# Patient Record
Sex: MALE | Race: WHITE | NOT HISPANIC OR LATINO | Employment: OTHER | URBAN - METROPOLITAN AREA
[De-identification: names, ages, dates, MRNs, and addresses within clinical notes are randomized per-mention and may not be internally consistent; named-entity substitution may affect disease eponyms.]

---

## 2017-01-09 DIAGNOSIS — C61 PROSTATE CANCER (H): Primary | ICD-10-CM

## 2017-01-10 ENCOUNTER — INFUSION THERAPY VISIT (OUTPATIENT)
Dept: INFUSION THERAPY | Facility: CLINIC | Age: 72
End: 2017-01-10
Attending: INTERNAL MEDICINE
Payer: MEDICARE

## 2017-01-10 ENCOUNTER — HOSPITAL ENCOUNTER (OUTPATIENT)
Facility: CLINIC | Age: 72
Setting detail: SPECIMEN
Discharge: HOME | End: 2017-01-10
Attending: INTERNAL MEDICINE | Admitting: INTERNAL MEDICINE
Payer: MEDICARE

## 2017-01-10 ENCOUNTER — ONCOLOGY VISIT (OUTPATIENT)
Dept: ONCOLOGY | Facility: CLINIC | Age: 72
End: 2017-01-10
Attending: INTERNAL MEDICINE
Payer: MEDICARE

## 2017-01-10 VITALS
WEIGHT: 225.6 LBS | RESPIRATION RATE: 20 BRPM | SYSTOLIC BLOOD PRESSURE: 148 MMHG | BODY MASS INDEX: 32.85 KG/M2 | HEART RATE: 57 BPM | TEMPERATURE: 98.3 F | DIASTOLIC BLOOD PRESSURE: 87 MMHG | OXYGEN SATURATION: 96 %

## 2017-01-10 DIAGNOSIS — C61 PROSTATE CANCER (H): Primary | ICD-10-CM

## 2017-01-10 DIAGNOSIS — C61 PROSTATE CANCER (H): ICD-10-CM

## 2017-01-10 LAB
ALBUMIN SERPL-MCNC: 3.9 G/DL (ref 3.4–5)
ALP SERPL-CCNC: 99 U/L (ref 40–150)
ALT SERPL W P-5'-P-CCNC: 35 U/L (ref 0–70)
ANION GAP SERPL CALCULATED.3IONS-SCNC: 7 MMOL/L (ref 3–14)
AST SERPL W P-5'-P-CCNC: 21 U/L (ref 0–45)
BASOPHILS # BLD AUTO: 0.1 10E9/L (ref 0–0.2)
BASOPHILS NFR BLD AUTO: 1.2 %
BILIRUB SERPL-MCNC: 0.7 MG/DL (ref 0.2–1.3)
BUN SERPL-MCNC: 14 MG/DL (ref 7–30)
CALCIUM SERPL-MCNC: 8.8 MG/DL (ref 8.5–10.1)
CHLORIDE SERPL-SCNC: 106 MMOL/L (ref 94–109)
CO2 SERPL-SCNC: 26 MMOL/L (ref 20–32)
CREAT SERPL-MCNC: 0.96 MG/DL (ref 0.66–1.25)
DIFFERENTIAL METHOD BLD: NORMAL
EOSINOPHIL # BLD AUTO: 0.2 10E9/L (ref 0–0.7)
EOSINOPHIL NFR BLD AUTO: 5 %
ERYTHROCYTE [DISTWIDTH] IN BLOOD BY AUTOMATED COUNT: 12.8 % (ref 10–15)
GFR SERPL CREATININE-BSD FRML MDRD: 77 ML/MIN/1.7M2
GLUCOSE SERPL-MCNC: 90 MG/DL (ref 70–99)
HCT VFR BLD AUTO: 44.1 % (ref 40–53)
HGB BLD-MCNC: 15.2 G/DL (ref 13.3–17.7)
IMM GRANULOCYTES # BLD: 0 10E9/L (ref 0–0.4)
IMM GRANULOCYTES NFR BLD: 0.4 %
LDH SERPL L TO P-CCNC: 216 U/L (ref 85–227)
LYMPHOCYTES # BLD AUTO: 1.3 10E9/L (ref 0.8–5.3)
LYMPHOCYTES NFR BLD AUTO: 26.9 %
MCH RBC QN AUTO: 31 PG (ref 26.5–33)
MCHC RBC AUTO-ENTMCNC: 34.5 G/DL (ref 31.5–36.5)
MCV RBC AUTO: 90 FL (ref 78–100)
MONOCYTES # BLD AUTO: 0.4 10E9/L (ref 0–1.3)
MONOCYTES NFR BLD AUTO: 8.5 %
NEUTROPHILS # BLD AUTO: 2.8 10E9/L (ref 1.6–8.3)
NEUTROPHILS NFR BLD AUTO: 58 %
NRBC # BLD AUTO: 0 10*3/UL
NRBC BLD AUTO-RTO: 0 /100
PLATELET # BLD AUTO: 179 10E9/L (ref 150–450)
POTASSIUM SERPL-SCNC: 4 MMOL/L (ref 3.4–5.3)
PROT SERPL-MCNC: 7.6 G/DL (ref 6.8–8.8)
PSA SERPL-MCNC: 2.15 UG/L (ref 0–4)
RBC # BLD AUTO: 4.9 10E12/L (ref 4.4–5.9)
SODIUM SERPL-SCNC: 139 MMOL/L (ref 133–144)
WBC # BLD AUTO: 4.8 10E9/L (ref 4–11)

## 2017-01-10 PROCEDURE — 83615 LACTATE (LD) (LDH) ENZYME: CPT | Performed by: INTERNAL MEDICINE

## 2017-01-10 PROCEDURE — 25000128 H RX IP 250 OP 636: Performed by: INTERNAL MEDICINE

## 2017-01-10 PROCEDURE — 99211 OFF/OP EST MAY X REQ PHY/QHP: CPT

## 2017-01-10 PROCEDURE — 99211 OFF/OP EST MAY X REQ PHY/QHP: CPT | Mod: 25

## 2017-01-10 PROCEDURE — 85025 COMPLETE CBC W/AUTO DIFF WBC: CPT | Performed by: INTERNAL MEDICINE

## 2017-01-10 PROCEDURE — 36415 COLL VENOUS BLD VENIPUNCTURE: CPT

## 2017-01-10 PROCEDURE — 84403 ASSAY OF TOTAL TESTOSTERONE: CPT | Performed by: INTERNAL MEDICINE

## 2017-01-10 PROCEDURE — 96402 CHEMO HORMON ANTINEOPL SQ/IM: CPT

## 2017-01-10 PROCEDURE — 80053 COMPREHEN METABOLIC PANEL: CPT | Performed by: INTERNAL MEDICINE

## 2017-01-10 PROCEDURE — 99215 OFFICE O/P EST HI 40 MIN: CPT | Performed by: INTERNAL MEDICINE

## 2017-01-10 PROCEDURE — 84153 ASSAY OF PSA TOTAL: CPT | Performed by: INTERNAL MEDICINE

## 2017-01-10 RX ADMIN — LEUPROLIDE ACETATE 22.5 MG: KIT at 15:02

## 2017-01-10 NOTE — PROGRESS NOTES
"Wallace Zelaya is a 71 year old male who presents for:  Chief Complaint   Patient presents with     Oncology Clinic Visit     prostate checkup. follow up        Initial Vitals:  There were no vitals taken for this visit. Estimated body mass index is 32.97 kg/(m^2) as calculated from the following:    Height as of 10/28/16: 1.765 m (5' 9.49\").    Weight as of 12/14/16: 102.694 kg (226 lb 6.4 oz).. There is no height or weight on file to calculate BSA. BP completed using cuff size: regular  Data Unavailable No LMP for male patient. Allergies and medications reviewed.     Medications: Medication refills not needed today.  Pharmacy name entered into teextee:    [facility] - [place], [place] - [address] [facility] - [facility] [address]    Comments: nervous about results--  bloodwork drawn.    10 minutes for nursing intake (face to face time)   Brie Abraham LPN  DISCHARGE PLAN:  Next appointments: See patient instruction section   Departure Mode: Ambulatory  Accompanied by: self  5 minutes for nursing discharge (face to face time)   Brie Abraham LPN        "

## 2017-01-10 NOTE — MR AVS SNAPSHOT
After Visit Summary   1/10/2017    Wallace Zelaya    MRN: [id number]           Patient Information     Date Of Birth          1945        Visit Information        Provider Department      1/10/2017 12:45 PM Gigi Ward MD [facility]        Today's Diagnoses     Prostate cancer (HCC)           Care Instructions    - CT chest soon at patient's convenience- scheduled for Friday, Jan 20th @ 3:30/Zaria    - Lupron today and then again in 3 months- Scheduled for April 11th @ 3:45/Zaria    - To see me in 3 months on day of his lupron with labs a week prior to visit- Scheduled for April 11th @ 3:15/Zaria    Lab Draw--April 4th @ 3:30/Zaria    AVS printed for ptAugie Enciso        Follow-ups after your visit        Your next 10 appointments already scheduled     Jan 20, 2017  3:30 PM   CT CHEST W CONTRAST with [id number] ([facility])    [address]  [facility] [address] [phone]           Please bring any scans or X-rays taken at other hospitals, if similar tests were done. Also bring a list of your medicines, including vitamins, minerals and over-the-counter drugs. It is safest to leave personal items at home.  Be sure to tell your doctor:   If you have any allergies.   If there s any chance you are pregnant.   If you are breastfeeding.   If you have any special needs.  You will have contrast for this exam. To prepare:   Do not eat or drink for 2 hours before your exam. If you need to take medicine, you may take it with small sips of water. (We may ask you to take liquid medicine as well.)   The day before your exam, drink extra fluids at least six 8-ounce glasses (unless your doctor tells you to restrict your fluids).  Patients over 70 or patients with diabetes or kidney problems:   If you haven t had a blood test (creatinine test) within the last 30 days, go to your clinic or Diagnostic Imaging  Department for this test.  If you have diabetes:   If your kidney function is normal, continue taking your metformin (Avandamet, Glucophage, Glucovance, Metaglip) on the day of your exam.   If your kidney function is abnormal, wait 48 hours before restarting this medicine.  Please wear loose clothing, such as a sweat suit or jogging clothes. Avoid snaps, zippers and other metal. We may ask you to undress and put on a hospital gown.  If you have any questions, please call the Imaging Department where you will have your exam.            Apr 04, 2017  3:30 PM   Return Visit with  ONCOLOGY NURSE   [facility] ([facility])    [facility]  [address]  [facility] [address]   [phone]            Apr 11, 2017  3:15 PM   Return Visit with Gigi Ward MD   [facility] [place] ([facility])    [facility]  [address]  [facility] [address]   [phone]            Apr 11, 2017  3:45 PM   Level 1 with  INFUSION CHAIR 6   [facility] ([facility])    [facility]  [address]  [facility] [address]   [phone]              Future tests that were ordered for you today     Open Future Orders        Priority Expected Expires Ordered    CT Chest w Contrast Routine 1/10/2017 12/11/2017 1/10/2017            Who to contact     If you have questions or need follow up information about today's clinic visit or your schedule please contact [facility] directly at [phone].  Normal or non-critical lab and imaging results will be communicated to you by MyChart, letter or phone within 4 business days after the clinic has received the results. If you do not hear from us within 7 days, please contact the clinic through MyChart or phone. If you have a critical or abnormal lab  result, we will notify you by phone as soon as possible.  Submit refill requests through Teneros or call your pharmacy and they will forward the refill request to us. Please allow 3 business days for your refill to be completed.          Additional Information About Your Visit        Oxitechart Information     Teneros gives you secure access to your electronic health record. If you see a primary care provider, you can also send messages to your care team and make appointments. If you have questions, please call your primary care clinic.  If you do not have a primary care provider, please call [phone] and they will assist you.        Care EveryWhere ID     This is your Care EveryWhere ID. This could be used by other organizations to access your [place] medical records  [id number]        Your Vitals Were     Pulse Temperature Respirations Pulse Oximetry          57 98.3  F (36.8  C) (Tympanic) 20 96%         Blood Pressure from Last 3 Encounters:   01/10/17 148/87   12/14/16 133/80   12/03/16 120/80    Weight from Last 3 Encounters:   01/10/17 102.331 kg (225 lb 9.6 oz)   12/14/16 102.694 kg (226 lb 6.4 oz)   12/03/16 96.616 kg (213 lb)              We Performed the Following     CBC with platelets differential     Comprehensive metabolic panel     Lactate Dehydrogenase     PSA tumor marker     Testosterone total        Primary Care Provider Office Phone # Fax #    Jd Almaraz [phone]       [facility] [address] [address] [facility] [address]        Thank you!     Thank you for choosing [facility]  for your care. Our goal is always to provide you with excellent care. Hearing back from our patients is one way we can continue to improve our services. Please take a few minutes to complete the written survey that you may receive in the mail after your visit with us. Thank you!             Your Updated Medication List - Protect others around you: Learn how  to safely use, store and throw away your medicines at www.disposemymeds.org.          This list is accurate as of: 1/10/17  2:41 PM.  Always use your most recent med list.                   Brand Name Dispense Instructions for use    albuterol 108 (90 BASE) MCG/ACT Inhaler    PROAIR HFA/PROVENTIL HFA/VENTOLIN HFA    1 Inhaler    Inhale 1-2 puffs into the lungs every 6 hours as needed for shortness of breath / dyspnea or wheezing       amLODIPine 5 MG tablet    NORVASC    90 tablet    Take 1 tablet (5 mg) by mouth daily       atorvastatin 20 MG tablet    LIPITOR    90 tablet    Take 1 tablet (20 mg) by mouth daily       azithromycin 250 MG tablet    ZITHROMAX    6 tablet    Two tablets first day, then one tablet daily for four days.       benzonatate 100 MG capsule    TESSALON    20 capsule    Take 1 capsule (100 mg) by mouth 3 times daily as needed for cough       bicalutamide 50 MG tablet    CASODEX    21 tablet    Take 1 tablet (50 mg) by mouth daily       valsartan 160 MG tablet    DIOVAN    90 tablet    Take 1 tablet (160 mg) by mouth daily

## 2017-01-10 NOTE — PATIENT INSTRUCTIONS
- CT chest soon at patient's convenience- scheduled for Friday, Jan 20th @ 3:30/Zaria    - Lupron today and then again in 3 months- Scheduled for April 11th @ 3:45/Zaria    - To see me in 3 months on day of his lupron with labs a week prior to visit- Scheduled for April 11th @ 3:15/Zaria    Lab Draw--April 4th @ 3:30/Zaria    AVS printed for ptAugie Enciso

## 2017-01-10 NOTE — MR AVS SNAPSHOT
After Visit Summary   1/10/2017    Wallace Zelaya    MRN: [id number]           Patient Information     Date Of Birth          1945        Visit Information        Provider Department      1/10/2017 1:00 PM  INFUSION CHAIR 5 [facility]         Follow-ups after your visit        Your next 10 appointments already scheduled     Jan 20, 2017  3:30 PM   CT CHEST W CONTRAST with [id number]   [facility] ([facility])    [address]  [facility] [address] [phone]           Please bring any scans or X-rays taken at other hospitals, if similar tests were done. Also bring a list of your medicines, including vitamins, minerals and over-the-counter drugs. It is safest to leave personal items at home.  Be sure to tell your doctor:   If you have any allergies.   If there s any chance you are pregnant.   If you are breastfeeding.   If you have any special needs.  You will have contrast for this exam. To prepare:   Do not eat or drink for 2 hours before your exam. If you need to take medicine, you may take it with small sips of water. (We may ask you to take liquid medicine as well.)   The day before your exam, drink extra fluids at least six 8-ounce glasses (unless your doctor tells you to restrict your fluids).  Patients over 70 or patients with diabetes or kidney problems:   If you haven t had a blood test (creatinine test) within the last 30 days, go to your clinic or Diagnostic Imaging Department for this test.  If you have diabetes:   If your kidney function is normal, continue taking your metformin (Avandamet, Glucophage, Glucovance, Metaglip) on the day of your exam.   If your kidney function is abnormal, wait 48 hours before restarting this medicine.  Please wear loose clothing, such as a sweat suit or jogging clothes. Avoid snaps, zippers and other metal. We may ask you to undress and put on a  [facility].  If you have any questions, please call the Imaging Department where you will have your exam.            Apr 04, 2017  3:30 PM   Return Visit with RH ONCOLOGY NURSE   [facility] ([facility])    [facility]  [address]  [facility] [address]   [phone]            Apr 11, 2017  3:15 PM   Return Visit with Gigi Ward MD   [facility] ([facility])    [facility]  [address]  [facility] [address]   [phone]            Apr 11, 2017  3:45 PM   Level 1 with  INFUSION CHAIR 6   [facility] ([facility])    [facility]  [address]  [facility] [address]   [phone]              Future tests that were ordered for you today     Open Future Orders        Priority Expected Expires Ordered    CT Chest w Contrast Routine 1/10/2017 12/11/2017 1/10/2017            Who to contact     If you have questions or need follow up information about today's clinic visit or your schedule please contact [facility] directly at [phone].  Normal or non-critical lab and imaging results will be communicated to you by Investoprestohart, letter or phone within 4 business days after the clinic has received the results. If you do not hear from us within 7 days, please contact the clinic through Investoprestohart or phone. If you have a critical or abnormal lab result, we will notify you by phone as soon as possible.  Submit refill requests through Naonext or call your pharmacy and they will forward the refill request to us. Please allow 3 business days for your refill to be completed.          Additional Information About Your Visit        InvestoprestoharManyWho Information     Naonext gives you secure access to your electronic health record. If you see a primary care  provider, you can also send messages to your care team and make appointments. If you have questions, please call your primary care clinic.  If you do not have a primary care provider, please call [phone] and they will assist you.        Care EveryWhere ID     This is your Care EveryWhere ID. This could be used by other organizations to access your [place] medical records  [id number]         Blood Pressure from Last 3 Encounters:   01/10/17 148/87   12/14/16 133/80   12/03/16 120/80    Weight from Last 3 Encounters:   01/10/17 102.331 kg (225 lb 9.6 oz)   12/14/16 102.694 kg (226 lb 6.4 oz)   12/03/16 96.616 kg (213 lb)              Today, you had the following     No orders found for display       Primary Care Provider Office Phone # Fax #    Jd Almaraz [phone]       [facility] [address]  [facility] [address]        Thank you!     Thank you for choosing [facility]  for your care. Our goal is always to provide you with excellent care. Hearing back from our patients is one way we can continue to improve our services. Please take a few minutes to complete the written survey that you may receive in the mail after your visit with us. Thank you!             Your Updated Medication List - Protect others around you: Learn how to safely use, store and throw away your medicines at www.disposemymeds.org.          This list is accurate as of: 1/10/17  2:51 PM.  Always use your most recent med list.                   Brand Name Dispense Instructions for use    albuterol 108 (90 BASE) MCG/ACT Inhaler    PROAIR HFA/PROVENTIL HFA/VENTOLIN HFA    1 Inhaler    Inhale 1-2 puffs into the lungs every 6 hours as needed for shortness of breath / dyspnea or wheezing       amLODIPine 5 MG tablet    NORVASC    90 tablet    Take 1 tablet (5 mg) by mouth daily       atorvastatin 20 MG tablet    LIPITOR    90 tablet    Take 1 tablet (20 mg) by mouth  daily       azithromycin 250 MG tablet    ZITHROMAX    6 tablet    Two tablets first day, then one tablet daily for four days.       benzonatate 100 MG capsule    TESSALON    20 capsule    Take 1 capsule (100 mg) by mouth 3 times daily as needed for cough       bicalutamide 50 MG tablet    CASODEX    21 tablet    Take 1 tablet (50 mg) by mouth daily       valsartan 160 MG tablet    DIOVAN    90 tablet    Take 1 tablet (160 mg) by mouth daily

## 2017-01-10 NOTE — PROGRESS NOTES
Infusion Nursing Note:  Wallace Zelaya presents today for Lupron.    Patient seen by provider today: Yes: Dr. Ward    Note: N/A.    Intravenous Access:  No Intravenous access/labs at this visit.    Treatment Conditions:  Not Applicable.      Post Infusion Assessment:  Patient tolerated injection without incident.    Discharge Plan:   Discharge instructions reviewed with: Patient.  Patient and/or family verbalized understanding of discharge instructions and all questions answered.  AVS to patient via [employer] AGT.  Patient will return 4/11/2017 for next appointment.   Patient discharged in stable condition accompanied by: self.  Departure Mode: Ambulatory.    Face to Face: first time Lupron. Information sheet given and reviewed. 5 Min.    Isha Zamora RN

## 2017-01-10 NOTE — PROGRESS NOTES
[facility] PHYSICIANS  [facility]   HEMATOLOGY AND MEDICAL ONCOLOGY    NEW PATIENT VISIT NOTE    PATIENT NAME: Wallace Zelaya   MRN# [id number]     Date of Visit: Jude 10, 2017    Referring Provider: No referring provider defined for this encounter. YOB: 1945      HISTORY OF PRESENTING ILLNESS   Wallace is 71 year old male with prostate cancer - chaparro 4+4 which is recurrent after prostatectomy and radiation therapy for which he has been referred to Medical Oncology.     Wallace comes alone for visit alone. He notes that he was noted to have rising screening PSA from 2 -4. He was referred to Dr. Rodolfo Connor. He had radical prostatectomy on 11/2015. Pathology from this revealed Dunfermline 4+4 disease with extraprostatic extension, seminal vesicle extension and he was staged at pT3b. All of the 12 lymph nodes resected were negative for disease. Post operatively his PSA did not drop to undetectable levels and remained elevated at 0.56. It vladimir to 0.9 in April 2016 and he was referred to Dr. Newton for adjuvant radiation therapy. He completed radiation therapy but did not have any PSA response to this treatment.     He has some urinary incontinence. He leaks a little bit and uses a pad. He sleeps good and continues to stay active.      He has stayed positive. Prostate cancer has been a life changing event, but he seems to be coping with it. He has his own insurance agency. 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      PAST MEDICAL HISTORY     Past Medical History   Diagnosis Date     Hypertension goal BP (blood pressure) < 140/90 5/22/2014     Hyperlipidemia LDL goal <130 10/31/2010     Basal cell carcinoma     Prostate cancer with biochemical recurrence      CURRENT OUTPATIENT MEDICATIONS     Current Outpatient Prescriptions   Medication Sig     bicalutamide (CASODEX) 50 MG tablet Take 1 tablet (50 mg) by mouth daily     atorvastatin (LIPITOR) 20 MG tablet Take 1 tablet (20  mg) by mouth daily     valsartan (DIOVAN) 160 MG tablet Take 1 tablet (160 mg) by mouth daily     amLODIPine (NORVASC) 5 MG tablet Take 1 tablet (5 mg) by mouth daily     azithromycin (ZITHROMAX) 250 MG tablet Two tablets first day, then one tablet daily for four days.     albuterol (PROAIR HFA/PROVENTIL HFA/VENTOLIN HFA) 108 (90 BASE) MCG/ACT Inhaler Inhale 1-2 puffs into the lungs every 6 hours as needed for shortness of breath / dyspnea or wheezing     benzonatate (TESSALON) 100 MG capsule Take 1 capsule (100 mg) by mouth 3 times daily as needed for cough     No current facility-administered medications for this visit.        ALLERGIES   All allergies reviewed and addressed  Allergies   Allergen Reactions     Seasonal Allergies       SOCIAL HISTORY   He is . He has his  for - [employer]. He has 3 grown up kids. 1 in [place] and 2 in [place]. He has 3 grandsons and 1 grand daughter.     He does not smoke. He quit 5-6 years ago. He used to smoke pack to pack and half a day. He drinks alcohol  occasionally. He denies drug abuse.      FAMILY HISTORY   Father had prostate cancer  2-3 uncles had prostate cancer  He has 5 brother and 5 sisters; 1 brother (Brooks) has prostate cancer (lives in [place])     REVIEW OF SYSTEMS   Pertinent positives have been included in HPI; remainder of detailed complete 20-point ROS was negative.     PHYSICAL EXAM   B/P: 148/87, T: 98.3, P: 57, R: 20  Wt Readings from Last 3 Encounters:   01/10/17 102.331 kg (225 lb 9.6 oz)   12/14/16 102.694 kg (226 lb 6.4 oz)   12/03/16 96.616 kg (213 lb)     GEN: NAD  HEENT: PERRL, EOMI, no icterus, injection or pallor. Oropharynx is clear.  NECK: no cervical or supraclavicular lymphadenopathy  LUNGS: clear bilaterally  CV: regular, no murmurs, rubs, or gallops  ABDOMEN: soft, non-tender, non-distended, normal bowel sounds, no hepatosplenomegaly by percussion or palpation  EXT: warm, well perfused, no  edema  NEURO: alert  SKIN: no rashes     LABORATORY AND IMAGING STUDIES     Recent Labs   Lab Test  01/10/17   1257  08/03/16   0920  11/16/15   0714  11/14/15   0633  11/12/15   1625  07/02/15   0946   NA  139  142  137  140   --   140   POTASSIUM  4.0  4.7  3.9  4.3  3.9  4.1   CHLORIDE  106  107  103  108   --   107   CO2  26  27  28  25   --   25   ANIONGAP  7  8  6  7   --   8   BUN  14  17  9  16   --   13   CR  0.96  1.03  0.90  0.99  0.99  0.90   GLC  90  96  118*  108*   --   98   TY  8.8  8.8  7.8*  7.9*   --   8.3*       Recent Labs   Lab Test  01/10/17   1257  08/03/16   0920  11/16/15   0714  11/14/15   0633  11/12/15   1625  04/09/13   1003  04/04/12   1123  03/17/11   1128  03/11/10   1014   WBC  4.8  5.1  8.8   --    --   5.8  6.7  5.8  6.2   HGB  15.2  15.6  11.6*  13.8  16.8  15.9  15.8  15.9  15.4   PLT  179  158  160   --    --   182  183  182  181   MCV  90  91  90   --    --   89  89  90  90   NEUTROPHIL  58.0   --    --    --    --   48.1  48.8  49.1  52     Recent Labs   Lab Test  01/10/17   1257  08/03/16   0920  07/02/15   0946   BILITOTAL  0.7  0.7  0.5   ALKPHOS  99  101  100   ALT  35  30  26   AST  21  20  17   ALBUMIN  3.9  3.8  3.7   LDH  216   --    --      No results found for: TSH    Results for orders placed or performed during the hospital encounter of 11/09/16   NM Bone Scan Whole Body    Narrative    NUCLEAR MEDICINE BONE SCAN WHOLE BODY  11/9/2016 12:48 PM    HISTORY: Malignant neoplasm of prostate.    DOSE: 25.0 mCi 99mTc-HDP Injected.    COMPARISON:  Prior bone scan: 4/14/2016.   Relevant imaging study: None.    FINDINGS: There is a small focus of increased activity at the right  third costovertebral junction. This could represent a fracture or  degenerative change, but is new compared with the previous exam.  Therefore, a metastatic focus cannot be excluded. Radiographic  correlation recommended. There are some scattered areas of presumed  degenerative/arthritic type  "activity. No bony metastatic pattern.      Impression    IMPRESSION: New small indeterminate lesion at the right costovertebral  junction.    GUANAKITO BROUSSARD MD     Lab Results   Component Value Date    PATH  11/13/2015     Patient Name: PEPITO DAY  MR#: [id number]  Specimen #: [id number]  Collected: 11/13/2015  Received: 11/13/2015  Reported: 11/16/2015 15:25  Ordering Phy(s): SULEIMAN ROCK WEIGHT    SPECIMEN(S):  A: Prostate radical resection  B: Lymph nodes, bilateral pelvic    FINAL DIAGNOSIS:  Prostate, radical prostatectomy and bilateral pelvic lymph node  dissection.         PROSTATE GLAND: Radical Prostatectomy    Specimen  Procedure:    Radical prostatectomy.  Prostate Size and weight: See gross description.  Lymph Node Sampling: Bilateral pelvic lymph nodes.    Tumor  Histologic Type:   Adenocarcinoma (acinar, not otherwise specified).  Alexis Pattern:  Primary Pattern:   Grade 4  Secondary Pattern:   Grade 4  Total Alexis Score: 8 of 10.    Extent  Tumor Quantitation:  Tumor estimated to involve approximately 30% of  total prostate volume.  Extraprostatic Extension:   Present.  Seminal Vesicle Invasion:   Present.    Margins:   Close but uninvolved by invasive carcinoma, see microscopic  description.    Lymph Nodes: 12 bilateral pelvic lymph nodes negative for metastatic  carcinoma (specimen B.).    Accessory Findings  Lymph-Vascular Invasion:   Indeterminate.  Perineural Invasion:   Present.    Pathologic Staging (pTNM):  pT3b pN0 pM N/A.    Electronically signed out by:    Vincent Almaguer M.D.    CLINICAL HISTORY:  Malignant neoplasm.    GROSS:  A.  The specimen received in formalin labeled \"prostate and seminal  vesicles\" consists of a 90 g radical prostatectomy specimen comprised of  prostate gland (5.5 cm-superior to inferior; 5.5 cm-right to left; and  4.5 cm-anterior to posterior) with attached right seminal vesicle (2 x  1.5 x 0.5 cm), left seminal vesicle (2 x 1.5 x 0.5 cm, " "partly  disrupted), right vas deferens (6.5 cm long, 0.5 cm wide), and left vas  deferens (3.5 cm long, 0.5 cm wide).  The prostatic capsular surface is  tan-pink slightly irregular over the posterior aspect.  The left side of  the prostate is inked in black, and the right side in blue. Sectioning  of the prostate shows a pink-cream multinodular cut surface.  The  prostate is sectioned into thin slices and representative slices are  submitted.   The apical and bladder neck margins are coned, radially  sectioned, and entirely submitted.  Representative sections of the  seminal vesicles and vasa deferentia are submitted.  Representative is  submitted.    1. Left seminal vesicle and vas deferens  2. Right seminal vesicle and vas deferens  3. Left apical margin  4. Right apical margin  5. Left bladder neck margin  6. Right bladder neck margin  7-9. Left anterior prostate, inferior to superior  10-12.  Left posterior prostate, inferior to superior  13-15.  Right anterior prostate, inferior to superior  16-18.  Right posterior prostate inferior to superior.    B. The specimen received in formalin labeled \"bilateral pelvic lymph  nodes\" consists of multiple yellow lobular fatty-appearing soft tissue  fragments with an aggregate dimension of 5 x 4.5 x 2 cm.  Several fatty  lymph nodes are palpable, largest measuring 1.5 cm in greatest  dimension.  The larger 3 nodes are bisected.  The specimen is entirely  submitted in a total of 10 cassettes with cassettes 8 -10 each including  a single bisected lymph node. (Dictated by: Yolanda Johnson MD 11/13/2015  03:39 PM)    MICROSCOPIC:  A: Carcinoma is present bilaterally, predominately on the right side.  The majoity is Cayla grade 4 pattern with fused cribriform and solid  nests of tumor present.  Some of the tumor shows clear or foamy  cytoplasm.  A smaller component of grade 3 is present this makes up a  minority of the tumor mass.  Tumor is seen extending from sections " from  the apex to the prostate base.  Bilateral seminal vesicle involvement is  present.  Extracapsular extension is present in the form of tumor around  nerves and ganglia as well as infiltrating soft tissues.  Some of the  latter may be within vascular spaces.  Tumor in a perineural location is  focally very close to an inked margin (within a fraction of a  millimeter) on the right posterior superior region and there is some  artifactual tumor carry over secondary to tissue cutting and processing.   No definitive tumor is seen transected at ink.    B: No metastatic tumor is identified in the lymph nodes.  Some show  prominent fatty replacement.    CPT Codes:  A: 60717-VG0  B: 03881-QX2    TESTING LAB LOCATION:  [facility]  [address] [place]  [address] [phone]    COLLECTION SITE:  Client: [facility]  [facility]       Recent Labs   Lab Test  01/10/17   1257  10/24/16   0826  08/03/16   0920  04/30/16   1021  04/04/16   1009   PSA  2.15  2.08  1.27  1.15  0.90   TESTOSTTOTAL  310   --    --    --    --           ASSESSMENT  1.   Biochemical PSA relapse post prostatectomy without any clear response to adjuvant radiation therapy  2. HTN  3. ECOG PS1  4. No medical comorbidity     DISCUSSION   I had a lengthy discussion with Wallace regarding his prostate cancer. His Donahue 8 disease did place him in a high risk category and he is behaving like one. His PSA has been steadily rising after just a brief fall post prostatectomy. He did not have any PSA response to adjuvant radiation therapy. His PSA continues to rise steadily and was elevated at 2.08 in Oct 2016.   His staging CT scans has been negative (though CT abd/pelvis was only done). His bone scan is negative except for small indeterminate lesion at the right costovertebral junction. I would suggest that we get a CT scan as this will help interpret this lesion seen on bone scan and also help complete  staging.     For now Mr. Zelaya has a biochemical PSA relapse after radical prostatectomy and adjuvant radiation. His PSA is gradually trending up with the doubling time is over six months. We reviewed different treatment options including watchful waiting versus intermittent androgen ablation therapy with him for his prostate cancer.     The likelihood of an imaging study to find residual disease is low. I am not sure if this would change our management at this point in time. It is hard to imagine that any further radiation therapy would be an option for him for persistent localized disease. If indeed we do find metastatic disease again, the only definitive management for this would be for hormonal therapy with androgen ablation.     I discussed the sequence of systemic treatment. I reviewed with him data from trial from continuous versus intermittent androgen ablation from [facility].7 trial in patients with nonmetastatic prostate cancer and elevation of PSA levels after definitive radiotherapy. Eligible patients had rising PSA > 3.0 ng/ml. Intermittent androgen ablation cycles were 8 months treatment with PSA-determined off-treatment periods. PSA levels were monitored every 2 months on trial and if PSA dropped to <4 during the 8-month treatment period, androgen ablation therapy was held until levels vladimir to 10 ng/mL, at which time therapy was resumed for another 8-month period. This was continued as long as PSA levels were controlled. At the time of disease progression, patients were treated with continuous hormonal treatment.  The study concluded that intermittent therapy was not inferior to continuous therapy with respect to survival (median survival, 8.8 years and 9.1 years, respectively).  I also reviewed similar study led by Pablo Irvin for the SWOG - intergroup (N Engl J Med 2013; 368:1531-6825) in which patients had known metastatic disease were offered continuous versus intermittent androgen ablation  therapy. With his metastatic disease, he would have been a candidate for this study. There was a trend towards inferior survival with intermittent androgen ablation as compared to continuous androgen ablation therapy. Median survival was 5.8 years in the continuous-therapy group and 5.1 years in the intermittent-therapy group (hazard ratio for death with intermittent therapy, 1.10; 90% confidence interval, 0.99 to 1.23).   Based on these two above studies, it does appear that continuous androgen deprivation therapy might have benefit in patients with aggressive disease and or higher disease burden. While those with low grade tumor and or minimal tumor burden, the benefit of this therapy is lost due to additional death from other causes.   He has an aggressive disease. He would benefit from initiation of therapy.        PLAN  1.   We will get his labs, including testosterone and PSA levels drawn at this clinic visit.   2. We will get a CT scan of chest done to correlate with the indeterminate lesion seen on bone scan and complete staging.   3. I would initiate therapy with lupron. I have explained the testosterone surge with lupron and have prescribed casodex for a few weeks.     Over 65 minutes of direct face-to-face time was spent with the patient with more than 50% of the time spent in counseling and coordinating care.     Gigi Ward MD    Hematology, Oncology and Transplantation

## 2017-01-12 LAB — TESTOST SERPL-MCNC: 310 NG/DL (ref 240–950)

## 2017-01-20 ENCOUNTER — HOSPITAL ENCOUNTER (OUTPATIENT)
Dept: CT IMAGING | Facility: CLINIC | Age: 72
Discharge: HOME | End: 2017-01-20
Attending: INTERNAL MEDICINE | Admitting: INTERNAL MEDICINE
Payer: MEDICARE

## 2017-01-20 DIAGNOSIS — C61 PROSTATE CANCER (H): ICD-10-CM

## 2017-01-20 PROCEDURE — 71260 CT THORAX DX C+: CPT

## 2017-01-20 PROCEDURE — 25500064 ZZH RX 255 OP 636: Performed by: RADIOLOGY

## 2017-01-20 PROCEDURE — 25000128 H RX IP 250 OP 636: Performed by: RADIOLOGY

## 2017-01-20 RX ORDER — IOPAMIDOL 755 MG/ML
100 INJECTION, SOLUTION INTRAVASCULAR ONCE
Status: COMPLETED | OUTPATIENT
Start: 2017-01-20 | End: 2017-01-20

## 2017-01-20 RX ORDER — IOPAMIDOL 755 MG/ML
100 INJECTION, SOLUTION INTRAVASCULAR ONCE
Status: DISCONTINUED | OUTPATIENT
Start: 2017-01-20 | End: 2017-01-20 | Stop reason: CLARIF

## 2017-01-20 RX ORDER — IOPAMIDOL 755 MG/ML
500 INJECTION, SOLUTION INTRAVASCULAR ONCE
Status: DISCONTINUED | OUTPATIENT
Start: 2017-01-20 | End: 2017-01-20 | Stop reason: CLARIF

## 2017-01-20 RX ADMIN — SODIUM CHLORIDE 60 ML: 9 INJECTION, SOLUTION INTRAVENOUS at 15:53

## 2017-01-20 RX ADMIN — IOPAMIDOL 80 ML: 755 INJECTION, SOLUTION INTRAVENOUS at 15:59

## 2017-03-16 DIAGNOSIS — I10 HYPERTENSION GOAL BP (BLOOD PRESSURE) < 140/90: ICD-10-CM

## 2017-03-16 RX ORDER — VALSARTAN 160 MG/1
160 TABLET ORAL DAILY
Qty: 30 TABLET | Refills: 0 | Status: SHIPPED | OUTPATIENT
Start: 2017-03-16 | End: 2017-04-05

## 2017-03-16 NOTE — TELEPHONE ENCOUNTER
Pending Prescriptions:                       Disp   Refills    valsartan (DIOVAN) 160 MG tablet          90 tab*1            Sig: Take 1 tablet (160 mg) by mouth daily          Last Written Prescription Date: 08/20/2016  Last Fill Quantity: 90, # refills: 1  Last Office Visit with FMLEON, NAIF or [facility] prescribing provider: 08/03/2016    Next 5 appointments (look out 90 days)     Apr 04, 2017  3:30 PM CDT   Return Visit with  ONCOLOGY NURSE   [facility] ([facility])    [facility]  [address] [place] [address] [address]  [facility] [address]   [phone]            Apr 11, 2017  3:15 PM CDT   Return Visit with Gigi Ward MD   [facility] ([facility])    [facility]  [address]  [facility] [address]   [phone]                   Potassium   Date Value Ref Range Status   01/10/2017 4.0 3.4 - 5.3 mmol/L Final     Creatinine   Date Value Ref Range Status   01/10/2017 0.96 0.66 - 1.25 mg/dL Final     BP Readings from Last 3 Encounters:   01/10/17 148/87   12/14/16 133/80   12/03/16 120/80     Alfred BUTTS

## 2017-03-16 NOTE — TELEPHONE ENCOUNTER
Medication is being filled for 1 time refill only due to:  Patient needs to be seen because needs BP follow up.   Arturo Howard RN, BSN

## 2017-04-04 ENCOUNTER — HOSPITAL ENCOUNTER (OUTPATIENT)
Facility: CLINIC | Age: 72
Setting detail: SPECIMEN
Discharge: HOME | End: 2017-04-04
Attending: INTERNAL MEDICINE | Admitting: INTERNAL MEDICINE
Payer: MEDICARE

## 2017-04-04 ENCOUNTER — ONCOLOGY VISIT (OUTPATIENT)
Dept: ONCOLOGY | Facility: CLINIC | Age: 72
End: 2017-04-04
Attending: INTERNAL MEDICINE
Payer: MEDICARE

## 2017-04-04 DIAGNOSIS — C61 PROSTATE CANCER (H): Primary | ICD-10-CM

## 2017-04-04 LAB
ALBUMIN SERPL-MCNC: 3.8 G/DL (ref 3.4–5)
ALP SERPL-CCNC: 99 U/L (ref 40–150)
ALT SERPL W P-5'-P-CCNC: 56 U/L (ref 0–70)
ANION GAP SERPL CALCULATED.3IONS-SCNC: 8 MMOL/L (ref 3–14)
AST SERPL W P-5'-P-CCNC: 32 U/L (ref 0–45)
BASOPHILS # BLD AUTO: 0.1 10E9/L (ref 0–0.2)
BASOPHILS NFR BLD AUTO: 1 %
BILIRUB SERPL-MCNC: 0.5 MG/DL (ref 0.2–1.3)
BUN SERPL-MCNC: 12 MG/DL (ref 7–30)
CALCIUM SERPL-MCNC: 8.4 MG/DL (ref 8.5–10.1)
CHLORIDE SERPL-SCNC: 106 MMOL/L (ref 94–109)
CO2 SERPL-SCNC: 27 MMOL/L (ref 20–32)
CREAT SERPL-MCNC: 0.95 MG/DL (ref 0.66–1.25)
DIFFERENTIAL METHOD BLD: NORMAL
EOSINOPHIL # BLD AUTO: 0.2 10E9/L (ref 0–0.7)
EOSINOPHIL NFR BLD AUTO: 4.2 %
ERYTHROCYTE [DISTWIDTH] IN BLOOD BY AUTOMATED COUNT: 13.1 % (ref 10–15)
GFR SERPL CREATININE-BSD FRML MDRD: 78 ML/MIN/1.7M2
GLUCOSE SERPL-MCNC: 88 MG/DL (ref 70–99)
HCT VFR BLD AUTO: 41.9 % (ref 40–53)
HGB BLD-MCNC: 14.7 G/DL (ref 13.3–17.7)
IMM GRANULOCYTES # BLD: 0 10E9/L (ref 0–0.4)
IMM GRANULOCYTES NFR BLD: 0.2 %
LDH SERPL L TO P-CCNC: 244 U/L (ref 85–227)
LYMPHOCYTES # BLD AUTO: 1.1 10E9/L (ref 0.8–5.3)
LYMPHOCYTES NFR BLD AUTO: 21.6 %
MCH RBC QN AUTO: 31.1 PG (ref 26.5–33)
MCHC RBC AUTO-ENTMCNC: 35.1 G/DL (ref 31.5–36.5)
MCV RBC AUTO: 89 FL (ref 78–100)
MONOCYTES # BLD AUTO: 0.4 10E9/L (ref 0–1.3)
MONOCYTES NFR BLD AUTO: 7.7 %
NEUTROPHILS # BLD AUTO: 3.3 10E9/L (ref 1.6–8.3)
NEUTROPHILS NFR BLD AUTO: 65.3 %
NRBC # BLD AUTO: 0 10*3/UL
NRBC BLD AUTO-RTO: 0 /100
PLATELET # BLD AUTO: 197 10E9/L (ref 150–450)
POTASSIUM SERPL-SCNC: 3.9 MMOL/L (ref 3.4–5.3)
PROT SERPL-MCNC: 7.5 G/DL (ref 6.8–8.8)
PSA SERPL-MCNC: 0.09 UG/L (ref 0–4)
RBC # BLD AUTO: 4.72 10E12/L (ref 4.4–5.9)
SODIUM SERPL-SCNC: 141 MMOL/L (ref 133–144)
WBC # BLD AUTO: 5 10E9/L (ref 4–11)

## 2017-04-04 PROCEDURE — 80053 COMPREHEN METABOLIC PANEL: CPT | Performed by: INTERNAL MEDICINE

## 2017-04-04 PROCEDURE — 84403 ASSAY OF TOTAL TESTOSTERONE: CPT | Performed by: INTERNAL MEDICINE

## 2017-04-04 PROCEDURE — 36415 COLL VENOUS BLD VENIPUNCTURE: CPT

## 2017-04-04 PROCEDURE — 83615 LACTATE (LD) (LDH) ENZYME: CPT | Performed by: INTERNAL MEDICINE

## 2017-04-04 PROCEDURE — 84153 ASSAY OF PSA TOTAL: CPT | Performed by: INTERNAL MEDICINE

## 2017-04-04 PROCEDURE — 85025 COMPLETE CBC W/AUTO DIFF WBC: CPT | Performed by: INTERNAL MEDICINE

## 2017-04-04 NOTE — MR AVS SNAPSHOT
After Visit Summary   4/4/2017    Wallace Zelaya    MRN: [id number]           Patient Information     Date Of Birth          1945        Visit Information        Provider Department      4/4/2017 3:30 PM RH ONCOLOGY NURSE [facility]        Today's Diagnoses     Prostate cancer (HCC)    -  1       Follow-ups after your visit        Your next 10 appointments already scheduled     Apr 05, 2017  2:45 PM CDT   SHORT with Jd Almaraz MD   [facility]    [address] [place] [address] [phone]            Apr 11, 2017  3:15 PM CDT   Return Visit with Gigi Ward MD   [facility] ([facility])    [facility]  [address]  [facility] [address] [phone]            Apr 11, 2017  3:30 PM CDT   Level 1 with  INFUSION CHAIR 6   [facility] ([facility])    [facility]  [address]  [facility] [address] [phone]              Who to contact     If you have questions or need follow up information about today's clinic visit or your schedule please contact [facility] directly at [phone].  Normal or non-critical lab and imaging results will be communicated to you by [employer], letter or phone within 4 business days after the clinic has received the results. If you do not hear from us within 7 days, please contact the clinic through MyChart or phone. If you have a critical or abnormal lab result, we will notify you by phone as soon as possible.  Submit refill requests through [employer] or call your pharmacy and they will forward the refill request to us. Please allow 3 business days for your refill to be completed.          Additional Information About Your Visit        [employer] Information     [employer] gives you secure  access to your electronic health record. If you see a primary care provider, you can also send messages to your care team and make appointments. If you have questions, please call your primary care clinic.  If you do not have a primary care provider, please call [phone] and they will assist you.        Care EveryWhere ID     This is your Care EveryWhere ID. This could be used by other organizations to access your [place] medical records  [id number]         Blood Pressure from Last 3 Encounters:   01/10/17 148/87   12/14/16 133/80   12/03/16 120/80    Weight from Last 3 Encounters:   01/10/17 102.3 kg (225 lb 9.6 oz)   12/14/16 102.7 kg (226 lb 6.4 oz)   12/03/16 96.6 kg (213 lb)              We Performed the Following     CBC with platelets and differential     Comprehensive metabolic panel (BMP + Alb, Alk Phos, ALT, AST, Total. Bili, TP)     Lactate Dehydrogenase     PSA, tumor marker     Testosterone, total        Primary Care Provider Office Phone # Fax #    Jd Almaraz [phone]       [facility] [address] [facility] [address]        Thank you!     Thank you for choosing [facility]  for your care. Our goal is always to provide you with excellent care. Hearing back from our patients is one way we can continue to improve our services. Please take a few minutes to complete the written survey that you may receive in the mail after your visit with us. Thank you!             Your Updated Medication List - Protect others around you: Learn how to safely use, store and throw away your medicines at www.disposemymeds.org.          This list is accurate as of: 4/4/17  3:52 PM.  Always use your most recent med list.                   Brand Name Dispense Instructions for use    albuterol 108 (90 BASE) MCG/ACT Inhaler    PROAIR HFA/PROVENTIL HFA/VENTOLIN HFA    1 Inhaler    Inhale 1-2 puffs into the lungs every 6 hours as needed for shortness of  breath / dyspnea or wheezing       amLODIPine 5 MG tablet    NORVASC    90 tablet    Take 1 tablet (5 mg) by mouth daily       atorvastatin 20 MG tablet    LIPITOR    90 tablet    Take 1 tablet (20 mg) by mouth daily       azithromycin 250 MG tablet    ZITHROMAX    6 tablet    Two tablets first day, then one tablet daily for four days.       benzonatate 100 MG capsule    TESSALON    20 capsule    Take 1 capsule (100 mg) by mouth 3 times daily as needed for cough       bicalutamide 50 MG tablet    CASODEX    21 tablet    Take 1 tablet (50 mg) by mouth daily       valsartan 160 MG tablet    DIOVAN    30 tablet    Take 1 tablet (160 mg) by mouth daily

## 2017-04-04 NOTE — NURSING NOTE
Medical Assistant Note:  Wallace Zelaya presents today for Blood Draw.    Patient seen by provider today: No.   present during visit today: Not Applicable.    Concerns: No Concerns.    Procedure:  Labs drawn: Yes     Post Assessment:  Labs drawn without difficulty: Yes.    Discharge Plan:  Patient discharged in stable condition accompanied by: mauricio Franks MA

## 2017-04-05 ENCOUNTER — OFFICE VISIT (OUTPATIENT)
Dept: FAMILY MEDICINE | Facility: CLINIC | Age: 72
End: 2017-04-05
Payer: COMMERCIAL

## 2017-04-05 VITALS
TEMPERATURE: 98.3 F | HEART RATE: 73 BPM | BODY MASS INDEX: 32.35 KG/M2 | WEIGHT: 226 LBS | SYSTOLIC BLOOD PRESSURE: 164 MMHG | DIASTOLIC BLOOD PRESSURE: 88 MMHG | HEIGHT: 70 IN | OXYGEN SATURATION: 96 %

## 2017-04-05 DIAGNOSIS — I10 ESSENTIAL HYPERTENSION WITH GOAL BLOOD PRESSURE LESS THAN 140/90: Primary | ICD-10-CM

## 2017-04-05 DIAGNOSIS — C61 PROSTATE CANCER (H): ICD-10-CM

## 2017-04-05 DIAGNOSIS — E78.5 HYPERLIPIDEMIA LDL GOAL <130: ICD-10-CM

## 2017-04-05 PROCEDURE — 99214 OFFICE O/P EST MOD 30 MIN: CPT | Performed by: FAMILY MEDICINE

## 2017-04-05 RX ORDER — ATORVASTATIN CALCIUM 20 MG/1
20 TABLET, FILM COATED ORAL DAILY
Qty: 90 TABLET | Refills: 3 | Status: SHIPPED | OUTPATIENT
Start: 2017-04-05 | End: 2018-04-30

## 2017-04-05 RX ORDER — AMLODIPINE BESYLATE 5 MG/1
5 TABLET ORAL DAILY
Qty: 90 TABLET | Refills: 1 | Status: CANCELLED | OUTPATIENT
Start: 2017-04-05

## 2017-04-05 RX ORDER — AMLODIPINE BESYLATE 10 MG/1
10 TABLET ORAL DAILY
Qty: 90 TABLET | Refills: 3 | Status: SHIPPED | OUTPATIENT
Start: 2017-04-05 | End: 2018-01-08

## 2017-04-05 RX ORDER — VALSARTAN 160 MG/1
160 TABLET ORAL DAILY
Qty: 90 TABLET | Refills: 3 | Status: SHIPPED | OUTPATIENT
Start: 2017-04-05 | End: 2018-02-15

## 2017-04-05 NOTE — NURSING NOTE
"Chief Complaint   Patient presents with     Hypertension     medication request, 90 pills       Initial /82 (BP Location: Right arm, Patient Position: Chair, Cuff Size: Adult Regular)  Pulse 73  Temp 98.3  F (36.8  C) (Oral)  Ht 5' 9.5\" (1.765 m)  Wt 226 lb (102.5 kg)  SpO2 96%  BMI 32.9 kg/m2 Estimated body mass index is 32.9 kg/(m^2) as calculated from the following:    Height as of this encounter: 5' 9.5\" (1.765 m).    Weight as of this encounter: 226 lb (102.5 kg).  Medication Reconciliation: complete     Jacky Ramon CMA      "

## 2017-04-05 NOTE — MR AVS SNAPSHOT
After Visit Summary   4/5/2017    Wallace Zelaya    MRN: [id number]           Patient Information     Date Of Birth          1945        Visit Information        Provider Department      4/5/2017 2:45 PM Jd Almaraz MD [facility]        Today's Diagnoses     Essential hypertension with goal blood pressure less than 140/90        Hyperlipidemia LDL goal <130          Care Instructions    Increase amlodipine to 10 mg daily    Follow-up BP 2-3 weeks with nurse blood pressure check        Follow-ups after your visit        Your next 10 appointments already scheduled     Apr 11, 2017  3:15 PM CDT   Return Visit with Gigi Ward MD   [facility] ([facility])    [facility]  [address]  [facility] [address] [phone]            Apr 11, 2017  3:30 PM CDT   Level 1 with RH INFUSION CHAIR 6   [facility] ([facility])    [facility]  [address]  [facility] [address] [phone]              Who to contact     If you have questions or need follow up information about today's clinic visit or your schedule please contact [facility] directly at [phone].  Normal or non-critical lab and imaging results will be communicated to you by MyChart, letter or phone within 4 business days after the clinic has received the results. If you do not hear from us within 7 days, please contact the clinic through [employer] or phone. If you have a critical or abnormal lab result, we will notify you by phone as soon as possible.  Submit refill requests through [employer] or call your pharmacy and they will forward the refill request to us. Please allow 3 business days for your refill to be completed.          Additional Information About Your Visit        MyChart Information     [employer] gives you secure access to your  "electronic health record. If you see a primary care provider, you can also send messages to your care team and make appointments. If you have questions, please call your primary care clinic.  If you do not have a primary care provider, please call [phone] and they will assist you.        Care EveryWhere ID     This is your Care EveryWhere ID. This could be used by other organizations to access your [facility] medical records  [id number]        Your Vitals Were     Pulse Temperature Height Pulse Oximetry BMI (Body Mass Index)       73 98.3  F (36.8  C) (Oral) 5' 9.5\" (1.765 m) 96% 32.9 kg/m2        Blood Pressure from Last 3 Encounters:   04/05/17 164/88   01/10/17 148/87   12/14/16 133/80    Weight from Last 3 Encounters:   04/05/17 226 lb (102.5 kg)   01/10/17 225 lb 9.6 oz (102.3 kg)   12/14/16 226 lb 6.4 oz (102.7 kg)              Today, you had the following     No orders found for display         Today's Medication Changes          These changes are accurate as of: 4/5/17  3:41 PM.  If you have any questions, ask your nurse or doctor.               These medicines have changed or have updated prescriptions.        Dose/Directions    amLODIPine 10 MG tablet   Commonly known as:  NORVASC   This may have changed:    - medication strength  - how much to take   Used for:  Essential hypertension with goal blood pressure less than 140/90   Changed by:  Jd Almaraz MD        Dose:  10 mg   Take 1 tablet (10 mg) by mouth daily   Quantity:  90 tablet   Refills:  3            Where to get your medicines      These medications were sent to [facility] - [place], [place] - [address]  [address], [facility] [address]     Phone:  [phone]     atorvastatin 20 MG tablet    valsartan 160 MG tablet         These medications were sent to [id number] - [facility] [address] [facility]  [address] [facility] [address]     Phone:  [phone]     amLODIPine 10 MG tablet "                Primary Care Provider Office Phone # Fax #    Jd Almaraz [phone]       [facility] [address]  [facility] [address]        Thank you!     Thank you for choosing [facility]  for your care. Our goal is always to provide you with excellent care. Hearing back from our patients is one way we can continue to improve our services. Please take a few minutes to complete the written survey that you may receive in the mail after your visit with us. Thank you!             Your Updated Medication List - Protect others around you: Learn how to safely use, store and throw away your medicines at www.disposemymeds.org.          This list is accurate as of: 4/5/17  3:41 PM.  Always use your most recent med list.                   Brand Name Dispense Instructions for use    amLODIPine 10 MG tablet    NORVASC    90 tablet    Take 1 tablet (10 mg) by mouth daily       atorvastatin 20 MG tablet    LIPITOR    90 tablet    Take 1 tablet (20 mg) by mouth daily       bicalutamide 50 MG tablet    CASODEX    21 tablet    Take 1 tablet (50 mg) by mouth daily       valsartan 160 MG tablet    DIOVAN    90 tablet    Take 1 tablet (160 mg) by mouth daily

## 2017-04-06 LAB — TESTOST SERPL-MCNC: 7 NG/DL (ref 240–950)

## 2017-04-11 ENCOUNTER — INFUSION THERAPY VISIT (OUTPATIENT)
Dept: INFUSION THERAPY | Facility: CLINIC | Age: 72
End: 2017-04-11
Attending: INTERNAL MEDICINE
Payer: MEDICARE

## 2017-04-11 ENCOUNTER — ONCOLOGY VISIT (OUTPATIENT)
Dept: ONCOLOGY | Facility: CLINIC | Age: 72
End: 2017-04-11
Attending: INTERNAL MEDICINE
Payer: MEDICARE

## 2017-04-11 VITALS
TEMPERATURE: 97 F | BODY MASS INDEX: 32.21 KG/M2 | RESPIRATION RATE: 16 BRPM | WEIGHT: 225 LBS | HEART RATE: 69 BPM | SYSTOLIC BLOOD PRESSURE: 152 MMHG | OXYGEN SATURATION: 95 % | DIASTOLIC BLOOD PRESSURE: 88 MMHG | HEIGHT: 70 IN

## 2017-04-11 DIAGNOSIS — C61 PROSTATE CANCER (H): Primary | ICD-10-CM

## 2017-04-11 PROCEDURE — 25000128 H RX IP 250 OP 636: Performed by: INTERNAL MEDICINE

## 2017-04-11 PROCEDURE — 96402 CHEMO HORMON ANTINEOPL SQ/IM: CPT

## 2017-04-11 PROCEDURE — 99213 OFFICE O/P EST LOW 20 MIN: CPT | Performed by: INTERNAL MEDICINE

## 2017-04-11 RX ADMIN — LEUPROLIDE ACETATE 22.5 MG: KIT at 15:28

## 2017-04-11 ASSESSMENT — PAIN SCALES - GENERAL: PAINLEVEL: NO PAIN (0)

## 2017-04-11 NOTE — MR AVS SNAPSHOT
After Visit Summary   4/11/2017    Wallace Zelaya    MRN: [id number]           Patient Information     Date Of Birth          1945        Visit Information        Provider Department      4/11/2017 3:15 PM Gigi Ward MD [facility]        Care Instructions    Lupron today as previously planned    Follow up in 4 months or so with labs a day or two prior to visit            Follow-ups after your visit        Your next 10 appointments already scheduled     Aug 03, 2017  4:00 PM CDT   Return Visit with  ONCOLOGY NURSE   [facility] ([facility])    [facility]  [address] [place] [address] [address]  [facility] [address]   [phone]            Aug 08, 2017  3:15 PM CDT   Return Visit with Gigi Ward MD   [facility] ([facility])    [facility]  [address]  [facility] [address]   [phone]            Aug 08, 2017  3:30 PM CDT   Level 1 with  INFUSION CHAIR 11   [facility] ([facility])    [facility]  [address]  [facility] [address]   [phone]              Who to contact     If you have questions or need follow up information about today's clinic visit or your schedule please contact [facility] directly at [phone].  Normal or non-critical lab and imaging results will be communicated to you by MyChart, letter or phone within 4 business days after the clinic has received the results. If you do not hear from us within 7 days, please contact the clinic through MyChart or phone. If you have a critical or abnormal lab result, we will notify you by phone as soon as possible.  Submit refill requests through [employer] or call your pharmacy and they will forward the refill request to us. Please allow 3 business days for  "your refill to be completed.          Additional Information About Your Visit        MyChart Information     [employer] gives you secure access to your electronic health record. If you see a primary care provider, you can also send messages to your care team and make appointments. If you have questions, please call your primary care clinic.  If you do not have a primary care provider, please call [phone] and they will assist you.        Care EveryWhere ID     This is your Care EveryWhere ID. This could be used by other organizations to access your [facility] medical records  [id number]        Your Vitals Were     Pulse Temperature Respirations Height Pulse Oximetry BMI (Body Mass Index)    69 97  F (36.1  C) (Tympanic) 16 1.765 m (5' 9.5\") 95% 32.75 kg/m2       Blood Pressure from Last 3 Encounters:   04/11/17 152/88   04/05/17 164/88   01/10/17 148/87    Weight from Last 3 Encounters:   04/11/17 102.1 kg (225 lb)   04/05/17 102.5 kg (226 lb)   01/10/17 102.3 kg (225 lb 9.6 oz)              Today, you had the following     No orders found for display       Primary Care Provider Office Phone # Fax #    Jd Almaraz [phone]       [facility] [address] [address] [facility] [address]        Thank you!     Thank you for choosing [facility]  for your care. Our goal is always to provide you with excellent care. Hearing back from our patients is one way we can continue to improve our services. Please take a few minutes to complete the written survey that you may receive in the mail after your visit with us. Thank you!             Your Updated Medication List - Protect others around you: Learn how to safely use, store and throw away your medicines at www.disposemymeds.org.          This list is accurate as of: 4/11/17  4:01 PM.  Always use your most recent med list.                   Brand Name Dispense Instructions for use    amLODIPine 10 MG tablet    NORVASC "    90 tablet    Take 1 tablet (10 mg) by mouth daily       atorvastatin 20 MG tablet    LIPITOR    90 tablet    Take 1 tablet (20 mg) by mouth daily       bicalutamide 50 MG tablet    CASODEX    21 tablet    Take 1 tablet (50 mg) by mouth daily       valsartan 160 MG tablet    DIOVAN    90 tablet    Take 1 tablet (160 mg) by mouth daily

## 2017-04-11 NOTE — NURSING NOTE
"Wallace Zelaya is a 71 year old male who presents for:  Chief Complaint   Patient presents with     Oncology Clinic Visit     Prostate cancer - follow up        Initial Vitals:  /88  Pulse 69  Temp 97  F (36.1  C) (Tympanic)  Resp 16  Ht 1.765 m (5' 9.5\")  Wt 102.1 kg (225 lb)  SpO2 95%  BMI 32.75 kg/m2 Estimated body mass index is 32.75 kg/(m^2) as calculated from the following:    Height as of this encounter: 1.765 m (5' 9.5\").    Weight as of this encounter: 102.1 kg (225 lb).. Body surface area is 2.24 meters squared. BP completed using cuff size: regular  No Pain (0) No LMP for male patient. Allergies and medications reviewed.     Medications: Medication refills not needed today.  Pharmacy name entered into St[employer]:    [facility] - [place], [place] - [address] [facility] [address] [facility] [address]    Comments: Follow up    8 minutes for nursing intake (face to face time)   Tania Giordano CMA     DISCHARGE PLAN:  Next appointments: See patient instruction section  Departure Mode: Ambulatory  Accompanied by: self  0 minutes for nursing discharge (face to face time)   Tania Giordano CMA              "

## 2017-04-11 NOTE — MR AVS SNAPSHOT
After Visit Summary   4/11/2017    Wallace Zelaya    MRN: [id number]           Patient Information     Date Of Birth          1945        Visit Information        Provider Department      4/11/2017 3:30 PM  INFUSION CHAIR 2 [facility]        Today's Diagnoses     Prostate cancer (HCC)    -  1       Follow-ups after your visit        Who to contact     If you have questions or need follow up information about today's clinic visit or your schedule please contact [facility] directly at [phone].  Normal or non-critical lab and imaging results will be communicated to you by [employer], letter or phone within 4 business days after the clinic has received the results. If you do not hear from us within 7 days, please contact the clinic through [employer] or phone. If you have a critical or abnormal lab result, we will notify you by phone as soon as possible.  Submit refill requests through [employer] or call your pharmacy and they will forward the refill request to us. Please allow 3 business days for your refill to be completed.          Additional Information About Your Visit        MyChart Information     [employer] gives you secure access to your electronic health record. If you see a primary care provider, you can also send messages to your care team and make appointments. If you have questions, please call your primary care clinic.  If you do not have a primary care provider, please call [phone] and they will assist you.        Care EveryWhere ID     This is your Care EveryWhere ID. This could be used by other organizations to access your [facility] medical records  [id number]         Blood Pressure from Last 3 Encounters:   04/11/17 152/88   04/05/17 164/88   01/10/17 148/87    Weight from Last 3 Encounters:   04/11/17 102.1 kg (225 lb)   04/05/17 102.5 kg (226 lb)   01/10/17 102.3 kg (225 lb 9.6 oz)              Today, you had  the following     No orders found for display       Primary Care Provider Office Phone # Fax #    Jd Almaraz [phone]       [facility] [address]  [facility] [address]        Thank you!     Thank you for choosing [facility]  for your care. Our goal is always to provide you with excellent care. Hearing back from our patients is one way we can continue to improve our services. Please take a few minutes to complete the written survey that you may receive in the mail after your visit with us. Thank you!             Your Updated Medication List - Protect others around you: Learn how to safely use, store and throw away your medicines at www.disposemymeds.org.          This list is accurate as of: 4/11/17  3:31 PM.  Always use your most recent med list.                   Brand Name Dispense Instructions for use    amLODIPine 10 MG tablet    NORVASC    90 tablet    Take 1 tablet (10 mg) by mouth daily       atorvastatin 20 MG tablet    LIPITOR    90 tablet    Take 1 tablet (20 mg) by mouth daily       bicalutamide 50 MG tablet    CASODEX    21 tablet    Take 1 tablet (50 mg) by mouth daily       valsartan 160 MG tablet    DIOVAN    90 tablet    Take 1 tablet (160 mg) by mouth daily

## 2017-04-11 NOTE — PROGRESS NOTES
SUBJECTIVE:                                                    Wallace Zelaya is a 71 year old male who presents to clinic today for the following health issues:    Patient presents with:  Hypertension: medication request, 90 pills    Patient with prostate cancer treated by Dr. Rodolfo holloway with robotic radical prostatectomy and radiation 2015.  Now on Casodex and Lupron injections.  PSA down today from previous.    History of hyperlipidemia on statin, denies myalgias.    History of hypertension currently well controlled with ARB.  Denies shortness or breath, chest pain, headache, vision change, or lower extremity edema.    Patient Active Problem List   Diagnosis     Colon polyp     Advanced directives, counseling/discussion     Hypertension goal BP (blood pressure) < 140/90     Hyperlipidemia LDL goal <130     Prostate cancer (HCC)     Past Surgical History:   Procedure Laterality Date     BIOPSY  2011    skin tagged left arm     C NONSPECIFIC PROCEDURE      Lawnmower injury right foot-toe      C NONSPECIFIC PROCEDURE      Pilonidal cyst     COLONOSCOPY  2013    Procedure: COLONOSCOPY;  Colonoscopy ;  Surgeon: Emerson Martinez MD, MD;  Location:  GI     DAVINCI PROSTATECTOMY N/A 2015    Procedure: DAVINCI PROSTATECTOMY;  Surgeon: Rodolfo Holloway MD;  Location:  OR       Social History   Substance Use Topics     Smoking status: Former Smoker     Packs/day: 1.00     Years: 25.00     Quit date: 2009     Smokeless tobacco: Never Used      Comment: 4-5 cigs qd /quit 2009     Alcohol use Yes      Comment: socially,very little     Family History   Problem Relation Age of Onset     CANCER Mother      86 YO AND HYPERTENSION     Hypertension Mother      HEART DISEASE Father       86 YO MI     Prostate Cancer Brother 50     prostate cancer         ROS:  CV: NEGATIVE for chest pain, palpitations or peripheral edema  : stable mild urinary issues, no hematuria or  "dysuria    OBJECTIVE:                                                    /88  Pulse 73  Temp 98.3  F (36.8  C) (Oral)  Ht 5' 9.5\" (1.765 m)  Wt 226 lb (102.5 kg)  SpO2 96%  BMI 32.9 kg/m2Body mass index is 32.9 kg/(m^2).  GENERAL APPEARANCE: alert and no distress  RESP: lungs clear to auscultation - no rales, rhonchi or wheezes  CV: regular rates and rhythm and no murmur, click or rub     ASSESSMENT/PLAN:                                                    1. Essential hypertension with goal blood pressure less than 140/90  Blood pressure not controlled. Increase amlodipine to 10 mg daily.  Continue with exercise, recommend weight loss and reduce sodium intake.  Recheck blood pressure in 2-3 weeks.  - amLODIPine (NORVASC) 10 MG tablet; Take 1 tablet (10 mg) by mouth daily  Dispense: 90 tablet; Refill: 3  - valsartan (DIOVAN) 160 MG tablet; Take 1 tablet (160 mg) by mouth daily  Dispense: 90 tablet; Refill: 3    2. Hyperlipidemia LDL goal <130  - atorvastatin (LIPITOR) 20 MG tablet; Take 1 tablet (20 mg) by mouth daily  Dispense: 90 tablet; Refill: 3    3. Prostate cancer (HCC)  Continue follow up with Dr. Connor and oncology Dr. Ward.    Jd Almaraz MD  [facility]  "

## 2017-04-11 NOTE — PATIENT INSTRUCTIONS
Lupron today as previously planned    Follow up in 4 months or so with labs a day or two prior to visit

## 2017-04-11 NOTE — PROGRESS NOTES
[facility] CANCER CLINIC  FOLLOW-UP VISIT NOTE    PATIENT NAME: Wallace Zelaya MRN # [id number]  DATE OF VISIT: Apr 11, 2017 YOB: 1945    REFERRING PROVIDER: No referring provider defined for this encounter.    CANCER TYPE: Prostate cancer; Biochemical recurrence; Hormone sensitive disease  STAGE: Stage III - pT3b at diagnosis; M0    HISTORY OF PRESENTING ILLNESS:  Wallace was noted to have rising screening PSA from 2 - 4. He was referred to Dr. Rodolfo Connor. He had radical prostatectomy on 11/2015. Pathology from this revealed Cayla 4+4 disease with extraprostatic extension, seminal vesicle extension and he was staged at pT3b. All of the 12 lymph nodes resected were negative for disease. Post operatively his PSA did not drop to undetectable levels and remained elevated at 0.56. It vladimir to 0.9 in April 2016 and he was referred to Dr. Newton for adjuvant radiation therapy. He completed radiation therapy but did not have any PSA response to this treatment.     TREATMENT SUMMARY:  11/13/2015 Radical prostatectomy  April 2016  Radiation therapy    CURRENT INTERVENTIONS:  Lupron - Intermittent androgen deprivation on treatment phase    SUBJECTIVE   Wallace is being seen for his prostate cancer    He received his first dose of lupron about 3 months ago. He gets 3-4 hot flashes every day. He has been finding it very tolerable. He wakes up once at night to urinate.     He has been trying to lose weight for the last 2-3 years but finds it hard. He continues to go out to golf regularly.       PAST MEDICAL HISTORY   1. HTN  2. Dyslipidemia  3. Prostate cancer as detailed above      CURRENT OUTPATIENT MEDICATIONS     Current Outpatient Prescriptions   Medication Sig     amLODIPine (NORVASC) 10 MG tablet Take 1 tablet (10 mg) by mouth daily     valsartan (DIOVAN) 160 MG tablet Take 1 tablet (160 mg) by mouth daily     atorvastatin (LIPITOR) 20 MG tablet Take 1 tablet (20 mg) by mouth  "daily     bicalutamide (CASODEX) 50 MG tablet Take 1 tablet (50 mg) by mouth daily     No current facility-administered medications for this visit.      Facility-Administered Medications Ordered in Other Visits   Medication     leuprolide (LUPRON DEPOT) kit 22.5 mg        ALLERGIES     Allergies   Allergen Reactions     Seasonal Allergies         REVIEW OF SYSTEMS   As above in the HPI, o/w complete 12-point ROS was negative.     PHYSICAL EXAM   /88  Pulse 69  Temp 97  F (36.1  C) (Tympanic)  Resp 16  Ht 1.765 m (5' 9.5\")  Wt 102.1 kg (225 lb)  SpO2 95%  BMI 32.75 kg/m2  SpO2 Readings from Last 4 Encounters:   04/11/17 95%   04/05/17 96%   01/10/17 96%   12/14/16 95%     Wt Readings from Last 3 Encounters:   04/11/17 102.1 kg (225 lb)   04/05/17 102.5 kg (226 lb)   01/10/17 102.3 kg (225 lb 9.6 oz)     GEN: NAD  HEENT: PERRL, EOMI, no icterus, injection or pallor. Oropharynx is clear.  NECK: no cervical or supraclavicular lymphadenopathy  LUNGS: clear bilaterally  CV: regular, no murmurs, rubs, or gallops  ABDOMEN: soft, non-tender, non-distended, normal bowel sounds, no hepatosplenomegaly by percussion or palpation  EXT: warm, well perfused, no edema  NEURO: alert  SKIN: no rashes   LABORATORY AND IMAGING STUDIES     Recent Labs   Lab Test  04/04/17   1542  01/10/17   1257   NA  141  139   POTASSIUM  3.9  4.0   CHLORIDE  106  106   CO2  27  26   ANIONGAP  8  7   BUN  12  14   CR  0.95  0.96   GLC  88  90   TY  8.4*  8.8     Recent Labs   Lab Test  04/04/17   1542  01/10/17   1257  08/03/16   0920   04/09/13   1003   WBC  5.0  4.8  5.1   < >  5.8   HGB  14.7  15.2  15.6   < >  15.9   PLT  197  179  158   < >  182   MCV  89  90  91   < >  89   NEUTROPHIL  65.3  58.0   --    --   48.1    < > = values in this interval not displayed.     Recent Labs   Lab Test  04/04/17   1542  01/10/17   1257  08/03/16   0920   BILITOTAL  0.5  0.7  0.7   ALKPHOS  99  99  101   ALT  56  35  30   AST  32  21  20   ALBUMIN  " 3.8  3.9  3.8   LDH  244*  216   --      No results found for: TSH]    Results for orders placed or performed during the hospital encounter of 01/20/17   CT Chest w Contrast    Narrative    CT CHEST WITH CONTRAST   1/20/2017 4:04 PM     HISTORY: Malignant neoplasm of the prostate gland.    COMPARISON: 11/9/2016 and 4/14/2016 - CT abdomen and pelvis.    TECHNIQUE: Following the uneventful administration of 80mL Isovue-370  intravenous contrast, helical sections were acquired through the  lungs. Coronal reconstructions were generated. Radiation dose for this  scan was reduced using automated exposure control, adjustment of the  mA and/or kV according to the patient's size, or iterative  reconstruction technique.    FINDINGS: Tiny 0.2 cm nodule in the posterolateral aspect of the left  upper lobe (series 3 image 13) and tiny 0.2 cm nodule in the  posteromedial aspect of the right upper lobe (series 3 image 19). A  few linear opacities in the lungs likely represent atelectasis or  scarring. The lungs are otherwise clear. No pleural or pericardial  effusion. No enlarged lymph nodes in the chest. Atherosclerotic  calcification in the thoracic aorta. Small hiatal hernia.    Scan through the upper abdomen is significant for a 1.3 cm right  adrenal nodule that is unchanged since 4/14/2016.      Impression    IMPRESSION:   1. No convincing evidence of metastatic neoplasm in the chest.  2. Two tiny indeterminate pulmonary nodules. These are likely benign  nodules. Recommend comparison with prior imaging studies, if  available. If not available and the patient is a smoker or has other  significant risk factors, a followup CT scan could be considered in 12  months to confirm stability.   3. Indeterminate 1.3 cm right adrenal nodule, unchanged since  4/14/2016. 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    MANUEL MARQUEZ MD     Recent Labs   Lab Test  04/04/17   1542  01/10/17   1257  10/24/16   0826  08/03/16   0920  04/30/16   1021   PSA   0.09  2.15  2.08  1.27  1.15   TESTOSTTOTAL  7*  310   --    --    --           ASSESSMENT AND PLAN   1. Biochemical PSA relapse post prostatectomy without any response to adjuvant radiation therapy  2. HTN  3. ECOG PS1  4. No medical comorbidity    I had a lengthy discussion with Wallace. He has been doing well. He has no significant difficulty with the ongoing androgen deprivation. He does get a few episodes of hot flashes - but it is not bothersome.     His PSA has responded nicely as expected. We had reviewed intermittent androgen deprivation therapy and he is interested. I would suggest that we administer lupron today as in the intermittent androgen deprivation therapy studies utilized 8 months of androgen deprivation. I would like to see him in 4-6 months time. We will follow his PSA values over time and treat him when the PSA value rises above 4.     He has been trying to lose weight. He has been golfing and stays activity. I have encouraged him to chose an activity other than his current golfing.     I have reviewed his restaging scans. The lesion noted on the bone scan is not seen on the CT scan.       Gigi Ward  ,  Division of Hematology, Oncology & Transplantation  [facility].

## 2017-04-11 NOTE — PROGRESS NOTES
Infusion Nursing Note:  Wallace Zelaya presents today for Lupron.    Patient seen by provider today: Yes: Dr Ward   present during visit today: Not Applicable.    Note: N/A.    Intravenous Access:  No Intravenous access/labs at this visit.    Treatment Conditions:  Not Applicable.      Post Infusion Assessment:  Patient tolerated injection without incident.    Discharge Plan:   Discharge instructions reviewed with: Patient.  Patient and/or family verbalized understanding of discharge instructions and all questions answered.  Patient discharged in stable condition accompanied by: self.  Departure Mode: Ambulatory.    Charlee Florez RN

## 2017-05-24 ENCOUNTER — TELEPHONE (OUTPATIENT)
Dept: FAMILY MEDICINE | Facility: CLINIC | Age: 72
End: 2017-05-24

## 2017-05-24 NOTE — TELEPHONE ENCOUNTER
Panel Management Review      Patient has the following on his problem list:     Hypertension   Last three blood pressure readings:  BP Readings from Last 3 Encounters:   04/11/17 152/88   04/05/17 164/88   01/10/17 148/87     Blood pressure: Passed    HTN Guidelines:  Age 18-59 BP range:  Less than 140/90  Age 60-85 with Diabetes:  Less than 140/90  Age 60-85 without Diabetes:  less than 150/90      Composite cancer screening  Chart review shows that this patient is due/due soon for the following None  Summary:    Patient is due/failing the following:   BP CHECK    Action needed:   Patient needs nurse only appointment.    Type of outreach:    Phone, left message for patient to call back.     Questions for provider review:    None                                                                                                                                    Ivelisse Simpson CMA       Chart routed to none .

## 2017-06-23 ENCOUNTER — OFFICE VISIT (OUTPATIENT)
Dept: FAMILY MEDICINE | Facility: CLINIC | Age: 72
End: 2017-06-23
Payer: COMMERCIAL

## 2017-06-23 VITALS
OXYGEN SATURATION: 96 % | HEART RATE: 62 BPM | DIASTOLIC BLOOD PRESSURE: 70 MMHG | TEMPERATURE: 98.1 F | BODY MASS INDEX: 33.46 KG/M2 | WEIGHT: 225.9 LBS | HEIGHT: 69 IN | SYSTOLIC BLOOD PRESSURE: 130 MMHG
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
## 2017-06-23 DIAGNOSIS — S30.860A TICK BITE OF BACK, INITIAL ENCOUNTER: Primary | ICD-10-CM

## 2017-06-23 PROCEDURE — 99213 OFFICE O/P EST LOW 20 MIN: CPT | Performed by: PHYSICIAN ASSISTANT

## 2017-06-23 RX ORDER — DOXYCYCLINE 100 MG/1
200 CAPSULE ORAL ONCE
Qty: 2 CAPSULE | Refills: 0 | Status: SHIPPED | OUTPATIENT
Start: 2017-06-23 | End: 2017-06-24

## 2017-06-23 NOTE — NURSING NOTE
"Chief Complaint   Patient presents with     Insect Bites       Initial /70 (BP Location: Right arm, Patient Position: Chair, Cuff Size: Adult Regular)  Pulse 62  Temp 98.1  F (36.7  C)  Ht 5' 9\" (1.753 m)  Wt 225 lb 14.4 oz (102.5 kg)  SpO2 96%  BMI 33.36 kg/m2 Estimated body mass index is 33.36 kg/(m^2) as calculated from the following:    Height as of this encounter: 5' 9\" (1.753 m).    Weight as of this encounter: 225 lb 14.4 oz (102.5 kg).  Medication Reconciliation: complete     KA    "

## 2017-06-23 NOTE — PATIENT INSTRUCTIONS
"(S38.955A,  W57.XXXA) Tick bite of back, initial encounter  (primary encounter diagnosis)  Comment:   Plan: doxycycline (VIBRAMYCIN) 100 MG capsule              Tick Bites  Ticks are insects that feed on the blood of animals and humans. They may gorge themselves for days before you find and remove them. The bites themselves aren't cause for concern. But ticks can carry and transmit illnesses such as Lyme disease and [place] spotted fever. Both diseases begin with a rash and symptoms similar to the flu. In advanced stages, these diseases can be quite serious.     A \"bull's eye\" rash is a common symptom of Lyme disease.   When to go to the emergency department (ED)  Not all ticks carry disease. And a tick must remain attached for at least 24 hours to infect you. If you find a tick, don't panic. Try to carefully remove it with tweezers. Grasp the insect near its head and pull without twisting. If you can't easily dislodge the tick or if you leave the head in your skin, get medical care right away.  What to expect in the ED    The tick or any remnants will be removed and the bite cleaned.    To prevent disease, you may be given antibiotics. Both Lyme disease and [place] spotted fever respond quickly to these medications.    You may be asked to see your health care provider for a blood test to check for Lyme disease.  Follow-up care  Some states and [facility] have services that test ticks for the presence of Lyme's disease and other diseases. You may check with your local officials to see if this service is available in your area.  If you remove a tick yourself, watch for signs of a tick-borne illness. Symptoms may appear within a few days or weeks after a bite. Call your health care provider if you notice any of the following:    Rash (This may spread outward in a ring from a hard white lump. Or, it may move up your arms and legs to your chest.)    Chills and fever    Body aches and joint pain    Severe " headache  Date Last Reviewed: 12/1/2016 2000-2017 [employer]. [address] [place] [address], [place] [address]. All rights reserved. This information is not intended as a substitute for professional medical care. Always follow your healthcare professional's instructions.

## 2017-06-23 NOTE — PROGRESS NOTES
"  SUBJECTIVE:                                                    Wallace Zelaya is a 71 year old male who presents to clinic today for the following health issues:      Tick bite : noticed last night and wife pulled out without any complications.  Itchy and rashy  Thinks he may have had it attached for 2 days after mowing lawn         Problem list and histories reviewed & adjusted, as indicated.  Additional history: as documented    BP Readings from Last 3 Encounters:   06/23/17 130/70   04/11/17 152/88   04/05/17 164/88    Wt Readings from Last 3 Encounters:   06/23/17 225 lb 14.4 oz (102.5 kg)   04/11/17 225 lb (102.1 kg)   04/05/17 226 lb (102.5 kg)                    Reviewed and updated as needed this visit by clinical staff       Reviewed and updated as needed this visit by Provider         ROS:  Constitutional, HEENT, cardiovascular, pulmonary, gi and gu systems are negative, except as otherwise noted.    OBJECTIVE:                                                    /70 (BP Location: Right arm, Patient Position: Chair, Cuff Size: Adult Regular)  Pulse 62  Temp 98.1  F (36.7  C)  Ht 5' 9\" (1.753 m)  Wt 225 lb 14.4 oz (102.5 kg)  SpO2 96%  BMI 33.36 kg/m2  Body mass index is 33.36 kg/(m^2).  GENERAL APPEARANCE: healthy, alert and no distress  RESP: lungs clear to auscultation - no rales, rhonchi or wheezes  CV: regular rates and rhythm, normal S1 S2, no S3 or S4 and no murmur, click or rub  SKIN: 1 cm excoriation surrounded by  3 cm of erythema. Weeping          ASSESSMENT/PLAN:                                                    1. Tick bite of back, initial encounter  Well treat prophylactic ally   - doxycycline (VIBRAMYCIN) 100 MG capsule; Take 2 capsules (200 mg) by mouth once for 1 dose  Dispense: 2 capsule; Refill: 0    Signs of infection discussed.  If lesion enlarged and/or gets more painful, please follow-up       Patient Instructions     (S30.860A,  W57.XXXA) Tick bite of back, initial " "encounter  (primary encounter diagnosis)  Comment:   Plan: doxycycline (VIBRAMYCIN) 100 MG capsule              Tick Bites  Ticks are insects that feed on the blood of animals and humans. They may gorge themselves for days before you find and remove them. The bites themselves aren't cause for concern. But ticks can carry and transmit illnesses such as Lyme disease and [place] spotted fever. Both diseases begin with a rash and symptoms similar to the flu. In advanced stages, these diseases can be quite serious.     A \"bull's eye\" rash is a common symptom of Lyme disease.   When to go to the emergency department (ED)  Not all ticks carry disease. And a tick must remain attached for at least 24 hours to infect you. If you find a tick, don't panic. Try to carefully remove it with tweezers. Grasp the insect near its head and pull without twisting. If you can't easily dislodge the tick or if you leave the head in your skin, get medical care right away.  What to expect in the ED    The tick or any remnants will be removed and the bite cleaned.    To prevent disease, you may be given antibiotics. Both Lyme disease and [place] spotted fever respond quickly to these medications.    You may be asked to see your health care provider for a blood test to check for Lyme disease.  Follow-up care  Some states and counties have services that test ticks for the presence of Lyme's disease and other diseases. You may check with your local officials to see if this service is available in your area.  If you remove a tick yourself, watch for signs of a tick-borne illness. Symptoms may appear within a few days or weeks after a bite. Call your health care provider if you notice any of the following:    Rash (This may spread outward in a ring from a hard white lump. Or, it may move up your arms and legs to your chest.)    Chills and fever    Body aches and joint pain    Severe headache  Date Last Reviewed: 12/1/2016 2000-2017 " [employer]. [address] [place] [address], [place] [address]. All rights reserved. This information is not intended as a substitute for professional medical care. Always follow your healthcare professional's instructions.            Ramona Ann Aaseby-Aguilera, PA-C  [facility]

## 2017-06-23 NOTE — MR AVS SNAPSHOT
"              After Visit Summary   6/23/2017    Wallace Zelaya    MRN: [id number]           Patient Information     Date Of Birth          1945        Visit Information        Provider Department      6/23/2017 11:00 AM Aaseby-Aguilera, Ramona Ann, PA-C [facility]        Today's Diagnoses     Tick bite of back, initial encounter    -  1      Care Instructions    (S30.860A,  W57.XXXA) Tick bite of back, initial encounter  (primary encounter diagnosis)  Comment:   Plan: doxycycline (VIBRAMYCIN) 100 MG capsule              Tick Bites  Ticks are insects that feed on the blood of animals and humans. They may gorge themselves for days before you find and remove them. The bites themselves aren't cause for concern. But ticks can carry and transmit illnesses such as Lyme disease and [place] spotted fever. Both diseases begin with a rash and symptoms similar to the flu. In advanced stages, these diseases can be quite serious.     A \"bull's eye\" rash is a common symptom of Lyme disease.   When to go to the emergency department (ED)  Not all ticks carry disease. And a tick must remain attached for at least 24 hours to infect you. If you find a tick, don't panic. Try to carefully remove it with tweezers. Grasp the insect near its head and pull without twisting. If you can't easily dislodge the tick or if you leave the head in your skin, get medical care right away.  What to expect in the ED    The tick or any remnants will be removed and the bite cleaned.    To prevent disease, you may be given antibiotics. Both Lyme disease and Jeremy [place] spotted fever respond quickly to these medications.    You may be asked to see your health care provider for a blood test to check for Lyme disease.  Follow-up care  Some [facility] and [facility] have services that test ticks for the presence of Lyme's disease and other diseases. You may check with your local officials to see if this service is available in your " area.  If you remove a tick yourself, watch for signs of a tick-borne illness. Symptoms may appear within a few days or weeks after a bite. Call your health care provider if you notice any of the following:    Rash (This may spread outward in a ring from a hard white lump. Or, it may move up your arms and legs to your chest.)    Chills and fever    Body aches and joint pain    Severe headache  Date Last Reviewed: 12/1/2016 2000-2017 [employer]. [address] [place] [address]. All rights reserved. This information is not intended as a substitute for professional medical care. Always follow your healthcare professional's instructions.                Follow-ups after your visit        Your next 10 appointments already scheduled     Aug 03, 2017  4:00 PM CDT   Return Visit with  ONCOLOGY NURSE   [facility]    [facility]  [address]  [facility] [address]   [phone]            Aug 08, 2017  3:15 PM CDT   Return Visit with Gigi Ward MD   [facility]    [facility]  [address]  [facility] [address]   [phone]            Aug 08, 2017  3:30 PM CDT   Level 1 with  INFUSION CHAIR 11   [facility] ([facility])    [facility]  [address]  [facility] [address]   [phone]              Who to contact     If you have questions or need follow up information about today's clinic visit or your schedule please contact [facility] directly at [phone].  Normal or non-critical lab and imaging results will be communicated to you by MyChart, letter or phone within 4 business days after the clinic has received the results. If you do not hear from us within 7 days, please contact the clinic through [employer]  "or phone. If you have a critical or abnormal lab result, we will notify you by phone as soon as possible.  Submit refill requests through [employer] or call your pharmacy and they will forward the refill request to us. Please allow 3 business days for your refill to be completed.          Additional Information About Your Visit        [employer] Information     [employer] gives you secure access to your electronic health record. If you see a primary care provider, you can also send messages to your care team and make appointments. If you have questions, please call your primary care clinic.  If you do not have a primary care provider, please call [phone] and they will assist you.        Care EveryWhere ID     This is your Care EveryWhere ID. This could be used by other organizations to access your [place] medical records  [id number]        Your Vitals Were     Pulse Temperature Height Pulse Oximetry BMI (Body Mass Index)       62 98.1  F (36.7  C) 5' 9\" (1.753 m) 96% 33.36 kg/m2        Blood Pressure from Last 3 Encounters:   06/23/17 130/70   04/11/17 152/88   04/05/17 164/88    Weight from Last 3 Encounters:   06/23/17 225 lb 14.4 oz (102.5 kg)   04/11/17 225 lb (102.1 kg)   04/05/17 226 lb (102.5 kg)              Today, you had the following     No orders found for display         Today's Medication Changes          These changes are accurate as of: 6/23/17 11:14 AM.  If you have any questions, ask your nurse or doctor.               Start taking these medicines.        Dose/Directions    doxycycline 100 MG capsule   Commonly known as:  VIBRAMYCIN   Used for:  Tick bite of back, initial encounter   Started by:  Aaseby-Aguilera, Ramona Ann, PA-C        Dose:  200 mg   Take 2 capsules (200 mg) by mouth once for 1 dose   Quantity:  2 capsule   Refills:  0            Where to get your medicines      These medications were sent to [facility] [address] [place] [address] [facility]  [address] [facility] " [address]     Phone:  [phone]     doxycycline 100 MG capsule                Primary Care Provider Office Phone # Fax #    Jd Almaraz [phone]       [facility] [place] [address]  [facility] [address]        Equal Access to Services     CADEN ALLRED : Hadii aad ku hadasho Soomaali, waaxda luqadaha, qaybta kaalmada adeegyada, waxay joseein hayrolfn sergio charmaineute ladamaris fernandez. So [facility] [phone].    ATENCIÓN: Si habla español, tiene a crain disposición servicios gratuitos de asistencia lingüística. Alcidesame al [phone].    We comply with applicable federal civil rights laws and [place] laws. We do not discriminate on the basis of race, color, national origin, age, disability sex, sexual orientation or gender identity.            Thank you!     Thank you for choosing [facility]  for your care. Our goal is always to provide you with excellent care. Hearing back from our patients is one way we can continue to improve our services. Please take a few minutes to complete the written survey that you may receive in the mail after your visit with us. Thank you!             Your Updated Medication List - Protect others around you: Learn how to safely use, store and throw away your medicines at www.disposemymeds.org.          This list is accurate as of: 6/23/17 11:14 AM.  Always use your most recent med list.                   Brand Name Dispense Instructions for use Diagnosis    amLODIPine 10 MG tablet    NORVASC    90 tablet    Take 1 tablet (10 mg) by mouth daily    Essential hypertension with goal blood pressure less than 140/90       atorvastatin 20 MG tablet    LIPITOR    90 tablet    Take 1 tablet (20 mg) by mouth daily    Hyperlipidemia LDL goal <130       bicalutamide 50 MG tablet    CASODEX    21 tablet    Take 1 tablet (50 mg) by mouth daily    Malignant neoplasm of prostate (H)       doxycycline 100 MG capsule    VIBRAMYCIN    2 capsule    Take 2 capsules (200 mg) by  mouth once for 1 dose    Tick bite of back, initial encounter       valsartan 160 MG tablet    DIOVAN    90 tablet    Take 1 tablet (160 mg) by mouth daily    Essential hypertension with goal blood pressure less than 140/90

## 2017-06-24 ENCOUNTER — OFFICE VISIT (OUTPATIENT)
Dept: URGENT CARE | Facility: URGENT CARE | Age: 72
End: 2017-06-24
Payer: COMMERCIAL

## 2017-06-24 VITALS
OXYGEN SATURATION: 97 % | HEART RATE: 66 BPM | RESPIRATION RATE: 18 BRPM | DIASTOLIC BLOOD PRESSURE: 74 MMHG | TEMPERATURE: 97.6 F | SYSTOLIC BLOOD PRESSURE: 126 MMHG | BODY MASS INDEX: 33.23 KG/M2 | WEIGHT: 225 LBS

## 2017-06-24 DIAGNOSIS — S30.860D TICK BITE OF BACK, SUBSEQUENT ENCOUNTER: Primary | ICD-10-CM
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
## 2017-06-24 PROCEDURE — 99213 OFFICE O/P EST LOW 20 MIN: CPT | Performed by: FAMILY MEDICINE

## 2017-06-24 RX ORDER — DOXYCYCLINE 100 MG/1
100 CAPSULE ORAL 2 TIMES DAILY
Qty: 28 CAPSULE | Refills: 0 | Status: SHIPPED | OUTPATIENT
Start: 2017-06-24 | End: 2017-07-06

## 2017-06-24 NOTE — PROGRESS NOTES
SUBJECTIVE:  Chief Complaint   Patient presents with     Urgent Care     Insect Bites     Follow up from yesterday     Wallace Zelaya is a 71 year old male who presents to the clinic today for a tick bite with    accompanying rash.  The embedded tick was discovered and removed 3 day(s) ago.  He was seen in clinic 1 day ago and given single dose treatment of doxycycline 200 mg, but rash has developed and worsened since he took the medication     Rash is gradual onset, still present and worsening.   Location of the rash:  right   back.  Quality/symptoms of rash: itching, burning and redness   Symptoms are moderate and rash seems to be worsening.     Associated symptoms include: nothing.-  No fever or joint aches    Past Medical History:   Diagnosis Date     Basal cell carcinoma      Hyperlipidemia LDL goal <130 10/31/2010     Hypertension goal BP (blood pressure) < 140/90 2014       ALLERGIES:  Seasonal allergies      Current Outpatient Prescriptions on File Prior to Visit:  doxycycline (VIBRAMYCIN) 100 MG capsule Take 2 capsules (200 mg) by mouth once for 1 dose   amLODIPine (NORVASC) 10 MG tablet Take 1 tablet (10 mg) by mouth daily   valsartan (DIOVAN) 160 MG tablet Take 1 tablet (160 mg) by mouth daily   atorvastatin (LIPITOR) 20 MG tablet Take 1 tablet (20 mg) by mouth daily   bicalutamide (CASODEX) 50 MG tablet Take 1 tablet (50 mg) by mouth daily     No current facility-administered medications on file prior to visit.     Social History   Substance Use Topics     Smoking status: Former Smoker     Packs/day: 1.00     Years: 25.00     Quit date: 2009     Smokeless tobacco: Never Used      Comment: 4-5 cigs qd /quit 2009     Alcohol use Yes      Comment: socially,very little       Family History   Problem Relation Age of Onset     CANCER Mother      86 YO AND HYPERTENSION     Hypertension Mother      HEART DISEASE Father       86 YO MI     Prostate Cancer Brother 50     prostate cancer           ROS:  EYES: NEGATIVE for vision changes or irritation  ENT/MOUTH: NEGATIVE for ear, mouth and throat problems  RESP:NEGATIVE for significant cough or SOB  GI: NEGATIVE for nausea, abdominal pain, heartburn, or change in bowel habits    EXAM:   /74  Pulse 66  Temp 97.6  F (36.4  C) (Oral)  Resp 18  Wt 225 lb (102.1 kg)  SpO2 97%  BMI 33.23 kg/m2  GENERAL: alert, no acute distress.  SKIN: Rash description:    Distribution: localized  Location: right back    Color: red,  Lesion type: papular, pustular, confluent oval  with tenderness, swelling and inflammation  10 cm x 4 cm    Has square shape rash around the site from allergic reaction to adhesive of bandage     GENERAL APPEARANCE: alert, mild distress and cooperative   EYES: EOMI,  PERRL, conjunctiva clear,NECK: supple, non-tender to palpation, no adenopathy noted,RESP: lungs clear to auscultation - no rales, rhonchi or wheezes,CV: regular rates and rhythm, normal S1 S2, no murmur noted    ASSESSMENT:  Tick bite of back, subsequent encounter     - doxycycline (VIBRAMYCIN) 100 MG capsule; Take 1 capsule (100 mg) by mouth 2 times daily for 14 days         Patient agreed to prophylactic treatment of the tick bite/  Possible Lyme disease       Doxycycline 100 mg. bid x 14  days        We discussed the limitations of Lyme Disease testing early in the disease process  and the need to follow-up with primary physician for possible repeat titer  or if worsening    Given patient information on tick bite

## 2017-06-24 NOTE — NURSING NOTE
"Chief Complaint   Patient presents with     Urgent Care     Insect Bites     Follow up from yesterday       Initial /74  Pulse 66  Temp 97.6  F (36.4  C) (Oral)  Resp 18  Wt 225 lb (102.1 kg)  SpO2 97%  BMI 33.23 kg/m2 Estimated body mass index is 33.23 kg/(m^2) as calculated from the following:    Height as of 6/23/17: 5' 9\" (1.753 m).    Weight as of this encounter: 225 lb (102.1 kg).  Medication Reconciliation: complete       Mihaela Holland  CMA      "

## 2017-06-24 NOTE — MR AVS SNAPSHOT
After Visit Summary   6/24/2017    Wallace Zelaya    MRN: [id number]           Patient Information     Date Of Birth          1945        Visit Information        Provider Department      6/24/2017 10:45 AM Johana Clark MD [facility]        Today's Diagnoses     Tick bite of back, subsequent encounter    -  1      Care Instructions      Tick Bite (Antibiotic Treatment)    Ticks are small arachnids that feed on the blood of rodents, rabbits, birds, deer, dogs and humans. The bite may cause a local reaction like that of a spider, with a small amount of local redness, itching and slight swelling. Sometimes there is no local reaction.  Most tick bites are harmless. But some ticks carry diseases, such as Lyme disease or [place] spotted fever. These can be passed to people at the time of the bite. Lyme disease is of greatest concern. Right now you have no symptoms of Lyme disease or other serious reaction to the bite. It is important to watch for the warning signs, which could appear weeks to months after the tick bite.  Home care  The following guidelines can help you care for your bite at home:    If itching is a problem, avoid tight clothing and anything that heats up your skin. This includes hot showers or baths and direct sunlight. This often makes the itching worse.    An ice pack will reduce local areas of redness and itching. Make your own ice pack by putting ice cubes in a zip-top plastic bag and wrapping it in a thin towel. Over-the-counter creams containing benzocaine may help with itching.    You can use an antihistamine with diphenhydramine if your doctor did not give you another antihistamine. This medicine may be used to reduce itching if large areas of the skin are involved. It is available at drugstores and grocery stores. If symptoms continue, talk with your doctor or pharmacist about other over-the counter medicines that may be helpful.    Your doctor  may prescribe antibiotics to reduce your risk of getting Lyme disease. It is very important that you take them exactly as directed until they are completely finished.  Follow-up care  Follow up with your healthcare provider, or as advised.  Call 911  Call 911 if any of these occur:    Irregular or rapid heartbeat    Numbness, tingling, or weakness in the arms or legs  When to seek medical advice  Call your healthcare provider right away if any of these occur:  Signs of local infection. Watch for these during the next few days.    Increasing redness around the bite site    Increased pain or swelling    Fever over 100.4 F (38.0 C), or as directed by your healthcare provider    Fluid draining from the bite area  Signs of tick-related disease. Watch for these over the next few weeks to months.    Circular, red, ring-like rash appears at the bite area within 1 to 3 weeks    Tiredness, fever or chills, nausea or vomiting    Neck pain or stiffness, headache, or confusion    Muscle or bone aches    Joint pain or swelling, especially in the knee    Weakness on one side of the face  Date Last Reviewed: 10/1/2016    5243-8558 [employer]. [address] [place] [address]. All rights reserved. This information is not intended as a substitute for professional medical care. Always follow your healthcare professional's instructions.                Follow-ups after your visit        Your next 10 appointments already scheduled     Aug 03, 2017  4:00 PM CDT   Return Visit with  ONCOLOGY NURSE   [facility] ([facility] [place])    [facility]  [address]  [facility] [address]   [phone]            Aug 08, 2017  3:15 PM CDT   Return Visit with Gigi Ward MD   [facility] ([facility] [place])    [facility]  [address]  [facility] [address]   [phone]             Aug 08, 2017  3:30 PM CDT   Level 1 with RH INFUSION CHAIR 11   [facility] ([facility])    [facility]  [address]  [facility] [address] [phone]              Who to contact     If you have questions or need follow up information about today's clinic visit or your schedule please contact [facility] directly at [phone].  Normal or non-critical lab and imaging results will be communicated to you by [employer], letter or phone within 4 business days after the clinic has received the results. If you do not hear from us within 7 days, please contact the clinic through [employer] or phone. If you have a critical or abnormal lab result, we will notify you by phone as soon as possible.  Submit refill requests through [employer] or call your pharmacy and they will forward the refill request to us. Please allow 3 business days for your refill to be completed.          Additional Information About Your Visit        [employer] Information     [employer] gives you secure access to your electronic health record. If you see a primary care provider, you can also send messages to your care team and make appointments. If you have questions, please call your primary care clinic.  If you do not have a primary care provider, please call [phone] and they will assist you.        Care EveryWhere ID     This is your Care EveryWhere ID. This could be used by other organizations to access your [place] medical records  [id number]        Your Vitals Were     Pulse Temperature Respirations Pulse Oximetry BMI (Body Mass Index)       66 97.6  F (36.4  C) (Oral) 18 97% 33.23 kg/m2        Blood Pressure from Last 3 Encounters:   06/24/17 126/74   06/23/17 130/70   04/11/17 152/88    Weight from Last 3 Encounters:   06/24/17 225 lb (102.1 kg)   06/23/17 225 lb 14.4 oz (102.5 kg)   04/11/17 225 lb (102.1 kg)              Today, you had  the following     No orders found for display         Today's Medication Changes          These changes are accurate as of: 6/24/17 11:25 AM.  If you have any questions, ask your nurse or doctor.               These medicines have changed or have updated prescriptions.        Dose/Directions    * doxycycline 100 MG capsule   Commonly known as:  VIBRAMYCIN   This may have changed:  Another medication with the same name was added. Make sure you understand how and when to take each.   Used for:  Tick bite of back, initial encounter   Changed by:  Aaseby-Aguilera, Ramona Ann, PA-C        Dose:  200 mg   Take 2 capsules (200 mg) by mouth once for 1 dose   Quantity:  2 capsule   Refills:  0       * doxycycline 100 MG capsule   Commonly known as:  VIBRAMYCIN   This may have changed:  You were already taking a medication with the same name, and this prescription was added. Make sure you understand how and when to take each.   Used for:  Tick bite of back, subsequent encounter   Changed by:  Johana Clark MD        Dose:  100 mg   Take 1 capsule (100 mg) by mouth 2 times daily for 14 days   Quantity:  28 capsule   Refills:  0       * Notice:  This list has 2 medication(s) that are the same as other medications prescribed for you. Read the directions carefully, and ask your doctor or other care provider to review them with you.         Where to get your medicines      These medications were sent to [facility] [address] [place] [address] [address] [place] [address]  [id number] [place], [place] [address]     Phone:  [phone]     doxycycline 100 MG capsule                Primary Care Provider Office Phone # Fax #    Jd Almaraz [phone]       [facility] [address] [facility] [address]        Equal Access to Services     [facility] BRIGIDA AH: Mayda Ibanez, miguel a bansal, qaana leiva. So [facility]  [phone].    ATENCIÓN: Si keven hoffman, tiene a crain disposición servicios gratuitos de asistencia lingüística. Brennen jimenez [phone].    We comply with applicable federal civil rights laws and [place] laws. We do not discriminate on the basis of race, color, national origin, age, disability sex, sexual orientation or gender identity.            Thank you!     Thank you for choosing [facility]  for your care. Our goal is always to provide you with excellent care. Hearing back from our patients is one way we can continue to improve our services. Please take a few minutes to complete the written survey that you may receive in the mail after your visit with us. Thank you!             Your Updated Medication List - Protect others around you: Learn how to safely use, store and throw away your medicines at www.disposemymeds.org.          This list is accurate as of: 6/24/17 11:25 AM.  Always use your most recent med list.                   Brand Name Dispense Instructions for use Diagnosis    amLODIPine 10 MG tablet    NORVASC    90 tablet    Take 1 tablet (10 mg) by mouth daily    Essential hypertension with goal blood pressure less than 140/90       atorvastatin 20 MG tablet    LIPITOR    90 tablet    Take 1 tablet (20 mg) by mouth daily    Hyperlipidemia LDL goal <130       bicalutamide 50 MG tablet    CASODEX    21 tablet    Take 1 tablet (50 mg) by mouth daily    Malignant neoplasm of prostate (H)       * doxycycline 100 MG capsule    VIBRAMYCIN    2 capsule    Take 2 capsules (200 mg) by mouth once for 1 dose    Tick bite of back, initial encounter       * doxycycline 100 MG capsule    VIBRAMYCIN    28 capsule    Take 1 capsule (100 mg) by mouth 2 times daily for 14 days    Tick bite of back, subsequent encounter       valsartan 160 MG tablet    DIOVAN    90 tablet    Take 1 tablet (160 mg) by mouth daily    Essential hypertension with goal blood pressure less than 140/90       * Notice:   This list has 2 medication(s) that are the same as other medications prescribed for you. Read the directions carefully, and ask your doctor or other care provider to review them with you.

## 2017-06-24 NOTE — PATIENT INSTRUCTIONS
Tick Bite (Antibiotic Treatment)    Ticks are small arachnids that feed on the blood of rodents, rabbits, birds, deer, dogs and humans. The bite may cause a local reaction like that of a spider, with a small amount of local redness, itching and slight swelling. Sometimes there is no local reaction.  Most tick bites are harmless. But some ticks carry diseases, such as Lyme disease or [place] spotted fever. These can be passed to people at the time of the bite. Lyme disease is of greatest concern. Right now you have no symptoms of Lyme disease or other serious reaction to the bite. It is important to watch for the warning signs, which could appear weeks to months after the tick bite.  Home care  The following guidelines can help you care for your bite at home:    If itching is a problem, avoid tight clothing and anything that heats up your skin. This includes hot showers or baths and direct sunlight. This often makes the itching worse.    An ice pack will reduce local areas of redness and itching. Make your own ice pack by putting ice cubes in a zip-top plastic bag and wrapping it in a thin towel. Over-the-counter creams containing benzocaine may help with itching.    You can use an antihistamine with diphenhydramine if your doctor did not give you another antihistamine. This medicine may be used to reduce itching if large areas of the skin are involved. It is available at drugstores and grocery stores. If symptoms continue, talk with your doctor or pharmacist about other over-the counter medicines that may be helpful.    Your doctor may prescribe antibiotics to reduce your risk of getting Lyme disease. It is very important that you take them exactly as directed until they are completely finished.  Follow-up care  Follow up with your healthcare provider, or as advised.  Call 911  Call 911 if any of these occur:    Irregular or rapid heartbeat    Numbness, tingling, or weakness in the arms or legs  When to  seek medical advice  Call your healthcare provider right away if any of these occur:  Signs of local infection. Watch for these during the next few days.    Increasing redness around the bite site    Increased pain or swelling    Fever over 100.4 F (38.0 C), or as directed by your healthcare provider    Fluid draining from the bite area  Signs of tick-related disease. Watch for these over the next few weeks to months.    Circular, red, ring-like rash appears at the bite area within 1 to 3 weeks    Tiredness, fever or chills, nausea or vomiting    Neck pain or stiffness, headache, or confusion    Muscle or bone aches    Joint pain or swelling, especially in the knee    Weakness on one side of the face  Date Last Reviewed: 10/1/2016    8885-5230 [employer]. [address] [place] [address], [place] [address]. All rights reserved. This information is not intended as a substitute for professional medical care. Always follow your healthcare professional's instructions.

## 2017-07-06 ENCOUNTER — OFFICE VISIT (OUTPATIENT)
Dept: FAMILY MEDICINE | Facility: CLINIC | Age: 72
End: 2017-07-06
Payer: COMMERCIAL

## 2017-07-06 VITALS
HEIGHT: 69 IN | OXYGEN SATURATION: 97 % | TEMPERATURE: 98.3 F | HEART RATE: 60 BPM | DIASTOLIC BLOOD PRESSURE: 70 MMHG | SYSTOLIC BLOOD PRESSURE: 125 MMHG | BODY MASS INDEX: 33.22 KG/M2 | WEIGHT: 224.3 LBS
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
## 2017-07-06 DIAGNOSIS — K60.2 ANAL FISSURE: Primary | ICD-10-CM

## 2017-07-06 PROCEDURE — 99213 OFFICE O/P EST LOW 20 MIN: CPT | Performed by: FAMILY MEDICINE

## 2017-07-06 NOTE — NURSING NOTE
"Chief Complaint   Patient presents with     Abdominal Pain       Initial /70 (BP Location: Right arm, Patient Position: Chair, Cuff Size: Adult Regular)  Pulse 60  Temp 98.3  F (36.8  C)  Ht 5' 9\" (1.753 m)  Wt 224 lb 4.8 oz (101.7 kg)  SpO2 97%  BMI 33.12 kg/m2 Estimated body mass index is 33.12 kg/(m^2) as calculated from the following:    Height as of this encounter: 5' 9\" (1.753 m).    Weight as of this encounter: 224 lb 4.8 oz (101.7 kg).  Medication Reconciliation: complete     KA    "

## 2017-07-06 NOTE — PATIENT INSTRUCTIONS
Add fiber to your diet - Fiber one cereal or other, or metamucil    Miralax - 1 cap daily    Stop iron    Stop doxycyline and test Lyme with next labs

## 2017-07-06 NOTE — PROGRESS NOTES
"  SUBJECTIVE:                                                    Wallace Zelaya is a 71 year old male who presents to clinic today for the following health issues:    Patient presents with:  Abdominal Pain    Patient with mild intermittent abdominal pain and hard stools. Has had pain at the rectal area over the past few days with bowel movements with small amount of blood noted just on external with wiping.  No blood mixed with stool.    ROS:  GI: as noted    OBJECTIVE:                                                    /70 (BP Location: Right arm, Patient Position: Chair, Cuff Size: Adult Regular)  Pulse 60  Temp 98.3  F (36.8  C)  Ht 5' 9\" (1.753 m)  Wt 224 lb 4.8 oz (101.7 kg)  SpO2 97%  BMI 33.12 kg/m2Body mass index is 33.12 kg/(m^2).  GENERAL APPEARANCE: healthy, alert and no distress  ABD: Soft, non-tender, no mass   RECTAL: Fissure noted at 6:00 position without current bleeding      ASSESSMENT/PLAN:                                                    1. Anal fissure  Discussed treatment with conservative care for anal fissure with having daily soft stools with fiber and stool softener, sitz bath.  Consider nitroglycerin 0.2% ointment topically.  Follow-up with colorectal if not improving.    2. Tick bite, subsequent encounter  - Lyme Disease Zoe with reflex to WB Serum; Future    Jd Almaraz MD  [facility]    "

## 2017-07-06 NOTE — MR AVS SNAPSHOT
After Visit Summary   7/6/2017    Wallace Zelaya    MRN: [id number]           Patient Information     Date Of Birth          1945        Visit Information        Provider Department      7/6/2017 10:15 AM Jd Almaraz MD [facility]        Today's Diagnoses     Anal fissure    -  1    Tick bite, subsequent encounter          Care Instructions    Add fiber to your diet - Fiber one cereal or other, or metamucil    Miralax - 1 cap daily    Stop iron    Stop doxycyline and test Lyme with next labs            Follow-ups after your visit        Your next 10 appointments already scheduled     Aug 03, 2017  4:00 PM CDT   Return Visit with  ONCOLOGY NURSE   [facility] ([facility])    [facility]  [id number] [place] [address] [address]  [facility] [address]   [phone]            Aug 08, 2017  3:15 PM CDT   Return Visit with Gigi Ward MD   [facility] ([facility])    [facility]  [address]  [facility] [address]   [phone]            Aug 08, 2017  3:30 PM CDT   Level 1 with  INFUSION CHAIR 11   [facility] ([facility])    [facility]  [address]  [facility] [address]   [phone]              Future tests that were ordered for you today     Open Future Orders        Priority Expected Expires Ordered    Lyme Disease Zoe with reflex to WB Serum Routine 8/3/2017 7/6/2018 7/6/2017            Who to contact     If you have questions or need follow up information about today's clinic visit or your schedule please contact [facility] directly at [phone].  Normal or non-critical lab and imaging results will be communicated to you by MyChart, letter or phone within 4 business days after the clinic has received the results. If you do not hear  "from us within 7 days, please contact the clinic through [employer] or phone. If you have a critical or abnormal lab result, we will notify you by phone as soon as possible.  Submit refill requests through [employer] or call your pharmacy and they will forward the refill request to us. Please allow 3 business days for your refill to be completed.          Additional Information About Your Visit        [employer] Information     [employer] gives you secure access to your electronic health record. If you see a primary care provider, you can also send messages to your care team and make appointments. If you have questions, please call your primary care clinic.  If you do not have a primary care provider, please call [phone] and they will assist you.        Care EveryWhere ID     This is your Care EveryWhere ID. This could be used by other organizations to access your [place] medical records  [id number]        Your Vitals Were     Pulse Temperature Height Pulse Oximetry BMI (Body Mass Index)       60 98.3  F (36.8  C) 5' 9\" (1.753 m) 97% 33.12 kg/m2        Blood Pressure from Last 3 Encounters:   07/06/17 125/70   06/24/17 126/74   06/23/17 130/70    Weight from Last 3 Encounters:   07/06/17 224 lb 4.8 oz (101.7 kg)   06/24/17 225 lb (102.1 kg)   06/23/17 225 lb 14.4 oz (102.5 kg)                 Today's Medication Changes          These changes are accurate as of: 7/6/17 11:00 AM.  If you have any questions, ask your nurse or doctor.               Stop taking these medicines if you haven't already. Please contact your care team if you have questions.     doxycycline 100 MG capsule   Commonly known as:  VIBRAMYCIN   Stopped by:  Jd Almaraz MD                    Primary Care Provider Office Phone # Fax #    Jd Almaraz [phone]       [facility] [address] [address] [facility] [address]        Equal Access to Services     CADEN ALLRED AH: miguel a Marion " catherine bansalmckayberta sanchezana quispe. So [facility] [phone].    ATENCIÓN: Si keevn hoffman, tiene a crain disposición servicios gratuitos de asistencia lingüística. Brennen al [phone].    We comply with applicable federal civil rights laws and [place] laws. We do not discriminate on the basis of race, color, national origin, age, disability sex, sexual orientation or gender identity.            Thank you!     Thank you for choosing [facility]  for your care. Our goal is always to provide you with excellent care. Hearing back from our patients is one way we can continue to improve our services. Please take a few minutes to complete the written survey that you may receive in the mail after your visit with us. Thank you!             Your Updated Medication List - Protect others around you: Learn how to safely use, store and throw away your medicines at www.disposemymeds.org.          This list is accurate as of: 7/6/17 11:00 AM.  Always use your most recent med list.                   Brand Name Dispense Instructions for use Diagnosis    amLODIPine 10 MG tablet    NORVASC    90 tablet    Take 1 tablet (10 mg) by mouth daily    Essential hypertension with goal blood pressure less than 140/90       atorvastatin 20 MG tablet    LIPITOR    90 tablet    Take 1 tablet (20 mg) by mouth daily    Hyperlipidemia LDL goal <130       bicalutamide 50 MG tablet    CASODEX    21 tablet    Take 1 tablet (50 mg) by mouth daily    Malignant neoplasm of prostate (H)       valsartan 160 MG tablet    DIOVAN    90 tablet    Take 1 tablet (160 mg) by mouth daily    Essential hypertension with goal blood pressure less than 140/90

## 2017-07-18 ENCOUNTER — OFFICE VISIT (OUTPATIENT)
Dept: FAMILY MEDICINE | Facility: CLINIC | Age: 72
End: 2017-07-18
Payer: COMMERCIAL

## 2017-07-18 VITALS
RESPIRATION RATE: 16 BRPM | TEMPERATURE: 98.2 F | WEIGHT: 224 LBS | DIASTOLIC BLOOD PRESSURE: 70 MMHG | OXYGEN SATURATION: 97 % | HEART RATE: 58 BPM | BODY MASS INDEX: 33.08 KG/M2 | SYSTOLIC BLOOD PRESSURE: 110 MMHG

## 2017-07-18 DIAGNOSIS — K60.2 ANAL FISSURE: Primary | ICD-10-CM

## 2017-07-18 PROCEDURE — 99213 OFFICE O/P EST LOW 20 MIN: CPT | Performed by: FAMILY MEDICINE

## 2017-07-18 RX ORDER — NITROGLYCERIN 4 MG/G
1 OINTMENT RECTAL EVERY 12 HOURS
Qty: 1 TUBE | Refills: 0 | Status: SHIPPED | OUTPATIENT
Start: 2017-07-18 | End: 2018-01-08

## 2017-07-18 NOTE — MR AVS SNAPSHOT
After Visit Summary   7/18/2017    Wallace Zelaya    MRN: [id number]           Patient Information     Date Of Birth          1945        Visit Information        Provider Department      7/18/2017 10:15 AM Jd Almaraz MD [facility]        Today's Diagnoses     Anal fissure    -  1       Follow-ups after your visit        Your next 10 appointments already scheduled     Jul 28, 2017  4:00 PM CDT   Return Visit with  ONCOLOGY NURSE   [facility] ([facility])    [facility]  [address]  [facility] [address]   [phone]            Aug 04, 2017  3:15 PM CDT   Return Visit with Gigi Ward MD   [facility] ([facility])    [facility]  [address]  [facility] [address]   [phone]            Aug 04, 2017  3:30 PM CDT   Level 1 with  INFUSION CHAIR 2   [facility] ([facility])    [facility]  [address]  [facility] [address]   [phone]              Who to contact     If you have questions or need follow up information about today's clinic visit or your schedule please contact [facility] directly at [phone].  Normal or non-critical lab and imaging results will be communicated to you by MyChart, letter or phone within 4 business days after the clinic has received the results. If you do not hear from us within 7 days, please contact the clinic through MyChart or phone. If you have a critical or abnormal lab result, we will notify you by phone as soon as possible.  Submit refill requests through [employer] or call your pharmacy and they will forward the refill request to us. Please allow 3 business days for your refill to be completed.          Additional Information About Your Visit        MyChart Information      [employer] gives you secure access to your electronic health record. If you see a primary care provider, you can also send messages to your care team and make appointments. If you have questions, please call your primary care clinic.  If you do not have a primary care provider, please call [phone] and they will assist you.        Care EveryWhere ID     This is your Care EveryWhere ID. This could be used by other organizations to access your [place] medical records  [id number]        Your Vitals Were     Pulse Temperature Respirations Pulse Oximetry BMI (Body Mass Index)       58 98.2  F (36.8  C) (Oral) 16 97% 33.08 kg/m2        Blood Pressure from Last 3 Encounters:   07/18/17 110/70   07/06/17 125/70   06/24/17 126/74    Weight from Last 3 Encounters:   07/18/17 224 lb (101.6 kg)   07/06/17 224 lb 4.8 oz (101.7 kg)   06/24/17 225 lb (102.1 kg)              Today, you had the following     No orders found for display         Today's Medication Changes          These changes are accurate as of: 7/18/17 10:44 AM.  If you have any questions, ask your nurse or doctor.               Start taking these medicines.        Dose/Directions    Lidocaine 2 % Gel   Used for:  Anal fissure   Started by:  Jd Almaraz MD        Dose:  1 Application   Externally apply 1 Application topically daily as needed   Quantity:  1 Tube   Refills:  0       nitroGLYcerin 0.4 % Oint rectal ointment   Commonly known as:  RECTIV   Used for:  Anal fissure   Started by:  Jd Almarza MD        Dose:  1 inch   Place 1 inch (1.5 mg) rectally every 12 hours   Quantity:  1 Tube   Refills:  0            Where to get your medicines      These medications were sent to [facility] [address] [place] [address] [facility]  [address] [facility] [address]     Phone:  [phone]     Lidocaine 2 % Gel    nitroGLYcerin 0.4 % Oint rectal ointment                Primary Care Provider Office Phone # Fax #    Jd Almaraz MD  [phone]       [facility] [address]  [facility] [address]        Equal Access to Services     CADEN ALLRED : Hadii aad ku hadalonzoo Sodaylinali, waaxda luqadaha, qaybta kaalmada adesamanthada, aan kingjavon fernandez. So [facility] [phone].    ATENCIÓN: Si habla español, tiene a crain disposición servicios gratuitos de asistencia lingüística. Llame al [phone].    We comply with applicable federal civil rights laws and [place] laws. We do not discriminate on the basis of race, color, national origin, age, disability sex, sexual orientation or gender identity.            Thank you!     Thank you for choosing [facility]  for your care. Our goal is always to provide you with excellent care. Hearing back from our patients is one way we can continue to improve our services. Please take a few minutes to complete the written survey that you may receive in the mail after your visit with us. Thank you!             Your Updated Medication List - Protect others around you: Learn how to safely use, store and throw away your medicines at www.disposemymeds.org.          This list is accurate as of: 7/18/17 10:44 AM.  Always use your most recent med list.                   Brand Name Dispense Instructions for use Diagnosis    amLODIPine 10 MG tablet    NORVASC    90 tablet    Take 1 tablet (10 mg) by mouth daily    Essential hypertension with goal blood pressure less than 140/90       atorvastatin 20 MG tablet    LIPITOR    90 tablet    Take 1 tablet (20 mg) by mouth daily    Hyperlipidemia LDL goal <130       bicalutamide 50 MG tablet    CASODEX    21 tablet    Take 1 tablet (50 mg) by mouth daily    Malignant neoplasm of prostate (H)       Lidocaine 2 % Gel     1 Tube    Externally apply 1 Application topically daily as needed    Anal fissure       nitroGLYcerin 0.4 % Oint rectal ointment    RECTIV    1 Tube    Place 1 inch (1.5 mg) rectally every 12 hours    Anal  fissure       valsartan 160 MG tablet    DIOVAN    90 tablet    Take 1 tablet (160 mg) by mouth daily    Essential hypertension with goal blood pressure less than 140/90

## 2017-07-18 NOTE — NURSING NOTE
"Chief Complaint   Patient presents with     RECHECK     anal fissure        Initial /70 (BP Location: Right arm, Patient Position: Chair, Cuff Size: Adult Large)  Pulse 58  Temp 98.2  F (36.8  C) (Oral)  Resp 16  Wt 224 lb (101.6 kg)  SpO2 97%  BMI 33.08 kg/m2 Estimated body mass index is 33.08 kg/(m^2) as calculated from the following:    Height as of 7/6/17: 5' 9\" (1.753 m).    Weight as of this encounter: 224 lb (101.6 kg).  Medication Reconciliation: complete.Ana JO MA      "

## 2017-07-28 ENCOUNTER — HOSPITAL ENCOUNTER (OUTPATIENT)
Facility: CLINIC | Age: 72
Setting detail: SPECIMEN
Discharge: HOME | End: 2017-07-28
Attending: INTERNAL MEDICINE | Admitting: UROLOGY
Payer: MEDICARE

## 2017-07-28 ENCOUNTER — ONCOLOGY VISIT (OUTPATIENT)
Dept: ONCOLOGY | Facility: CLINIC | Age: 72
End: 2017-07-28
Attending: INTERNAL MEDICINE
Payer: MEDICARE

## 2017-07-28 DIAGNOSIS — C61 PROSTATE CANCER (H): ICD-10-CM

## 2017-07-28 DIAGNOSIS — C61 PROSTATE CANCER (H): Primary | ICD-10-CM

## 2017-07-28 LAB
ALBUMIN SERPL-MCNC: 3.7 G/DL (ref 3.4–5)
ALP SERPL-CCNC: 111 U/L (ref 40–150)
ALT SERPL W P-5'-P-CCNC: 48 U/L (ref 0–70)
ANION GAP SERPL CALCULATED.3IONS-SCNC: 7 MMOL/L (ref 3–14)
AST SERPL W P-5'-P-CCNC: 26 U/L (ref 0–45)
BASOPHILS # BLD AUTO: 0.1 10E9/L (ref 0–0.2)
BASOPHILS NFR BLD AUTO: 0.9 %
BILIRUB SERPL-MCNC: 0.4 MG/DL (ref 0.2–1.3)
BUN SERPL-MCNC: 15 MG/DL (ref 7–30)
CALCIUM SERPL-MCNC: 8.5 MG/DL (ref 8.5–10.1)
CHLORIDE SERPL-SCNC: 104 MMOL/L (ref 94–109)
CO2 SERPL-SCNC: 29 MMOL/L (ref 20–32)
CREAT SERPL-MCNC: 1.04 MG/DL (ref 0.66–1.25)
DIFFERENTIAL METHOD BLD: ABNORMAL
EOSINOPHIL # BLD AUTO: 0.3 10E9/L (ref 0–0.7)
EOSINOPHIL NFR BLD AUTO: 5.5 %
ERYTHROCYTE [DISTWIDTH] IN BLOOD BY AUTOMATED COUNT: 12.7 % (ref 10–15)
GFR SERPL CREATININE-BSD FRML MDRD: 70 ML/MIN/1.7M2
GLUCOSE SERPL-MCNC: 92 MG/DL (ref 70–99)
HCT VFR BLD AUTO: 39.6 % (ref 40–53)
HGB BLD-MCNC: 13.9 G/DL (ref 13.3–17.7)
IMM GRANULOCYTES # BLD: 0 10E9/L (ref 0–0.4)
IMM GRANULOCYTES NFR BLD: 0.4 %
LDH SERPL L TO P-CCNC: 235 U/L (ref 85–227)
LYMPHOCYTES # BLD AUTO: 1.6 10E9/L (ref 0.8–5.3)
LYMPHOCYTES NFR BLD AUTO: 30.9 %
MCH RBC QN AUTO: 31.7 PG (ref 26.5–33)
MCHC RBC AUTO-ENTMCNC: 35.1 G/DL (ref 31.5–36.5)
MCV RBC AUTO: 90 FL (ref 78–100)
MONOCYTES # BLD AUTO: 0.5 10E9/L (ref 0–1.3)
MONOCYTES NFR BLD AUTO: 9.5 %
NEUTROPHILS # BLD AUTO: 2.8 10E9/L (ref 1.6–8.3)
NEUTROPHILS NFR BLD AUTO: 52.8 %
NRBC # BLD AUTO: 0 10*3/UL
NRBC BLD AUTO-RTO: 0 /100
PLATELET # BLD AUTO: 191 10E9/L (ref 150–450)
POTASSIUM SERPL-SCNC: 3.9 MMOL/L (ref 3.4–5.3)
PROT SERPL-MCNC: 7.3 G/DL (ref 6.8–8.8)
PSA SERPL-MCNC: 0.01 UG/L (ref 0–4)
RBC # BLD AUTO: 4.38 10E12/L (ref 4.4–5.9)
SODIUM SERPL-SCNC: 140 MMOL/L (ref 133–144)
WBC # BLD AUTO: 5.3 10E9/L (ref 4–11)

## 2017-07-28 PROCEDURE — 84403 ASSAY OF TOTAL TESTOSTERONE: CPT | Performed by: UROLOGY

## 2017-07-28 PROCEDURE — 84153 ASSAY OF PSA TOTAL: CPT | Performed by: UROLOGY

## 2017-07-28 PROCEDURE — 80053 COMPREHEN METABOLIC PANEL: CPT | Performed by: UROLOGY

## 2017-07-28 PROCEDURE — 85025 COMPLETE CBC W/AUTO DIFF WBC: CPT | Performed by: UROLOGY

## 2017-07-28 PROCEDURE — 83615 LACTATE (LD) (LDH) ENZYME: CPT | Performed by: UROLOGY

## 2017-07-28 PROCEDURE — 36415 COLL VENOUS BLD VENIPUNCTURE: CPT

## 2017-07-28 NOTE — MR AVS SNAPSHOT
After Visit Summary   7/28/2017    Wallace Zelaya    MRN: [id number]           Patient Information     Date Of Birth          1945        Visit Information        Provider Department      7/28/2017 4:00 PM RH ONCOLOGY NURSE [facility]        Today's Diagnoses     Prostate cancer (HCC)    -  1    Prostate cancer (H)           Follow-ups after your visit        Your next 10 appointments already scheduled     Aug 04, 2017  3:15 PM CDT   Return Visit with Gigi Ward MD   [facility] ([facility])    [facility]  [address]  [facility] [address]   [phone]            Aug 04, 2017  3:30 PM CDT   Level 1 with  INFUSION CHAIR 2   [facility] ([facility])    [facility]  [address]  [facility] [address]   [phone]              Future tests that were ordered for you today     Open Future Orders        Priority Expected Expires Ordered    PSA, tumor marker Routine 7/28/2017 7/28/2018 7/28/2017            Who to contact     If you have questions or need follow up information about today's clinic visit or your schedule please contact [facility] directly at [phone].  Normal or non-critical lab and imaging results will be communicated to you by MyChart, letter or phone within 4 business days after the clinic has received the results. If you do not hear from us within 7 days, please contact the clinic through MyChart or phone. If you have a critical or abnormal lab result, we will notify you by phone as soon as possible.  Submit refill requests through [employer] or call your pharmacy and they will forward the refill request to us. Please allow 3 business days for your refill to be completed.          Additional Information About Your Visit        MyChart Information     [employer]  gives you secure access to your electronic health record. If you see a primary care provider, you can also send messages to your care team and make appointments. If you have questions, please call your primary care clinic.  If you do not have a primary care provider, please call [phone] and they will assist you.        Care EveryWhere ID     This is your Care EveryWhere ID. This could be used by other organizations to access your [place] medical records  [id number]         Blood Pressure from Last 3 Encounters:   07/18/17 110/70   07/06/17 125/70   06/24/17 126/74    Weight from Last 3 Encounters:   07/18/17 101.6 kg (224 lb)   07/06/17 101.7 kg (224 lb 4.8 oz)   06/24/17 102.1 kg (225 lb)              We Performed the Following     PSA tumor marker        Primary Care Provider Office Phone # Fax #    Jd Almraaz [phone]       [facility] [address]  [facility] [address]        Equal Access to Services     CADEN ALLRED : Hadii aad ku hadasho Soomaali, waaxda luqadaha, qaybta kaalmada adeegyada, waxay cherie hayjavon shafer . So [facility] [phone].    ATENCIÓN: Si habla español, tiene a crain disposición servicios gratuitos de asistencia lingüística. Llame al [phone].    We comply with applicable federal civil rights laws and [place] laws. We do not discriminate on the basis of race, color, national origin, age, disability sex, sexual orientation or gender identity.            Thank you!     Thank you for choosing [facility]  for your care. Our goal is always to provide you with excellent care. Hearing back from our patients is one way we can continue to improve our services. Please take a few minutes to complete the written survey that you may receive in the mail after your visit with us. Thank you!             Your Updated Medication List - Protect others around you: Learn how to safely use, store and throw away your  medicines at www.disposemymeds.org.          This list is accurate as of: 7/28/17  4:17 PM.  Always use your most recent med list.                   Brand Name Dispense Instructions for use Diagnosis    amLODIPine 10 MG tablet    NORVASC    90 tablet    Take 1 tablet (10 mg) by mouth daily    Essential hypertension with goal blood pressure less than 140/90       atorvastatin 20 MG tablet    LIPITOR    90 tablet    Take 1 tablet (20 mg) by mouth daily    Hyperlipidemia LDL goal <130       bicalutamide 50 MG tablet    CASODEX    21 tablet    Take 1 tablet (50 mg) by mouth daily    Malignant neoplasm of prostate (H)       Lidocaine 2 % Gel     1 Tube    Externally apply 1 Application topically daily as needed    Anal fissure       nitroGLYcerin 0.4 % Oint rectal ointment    RECTIV    1 Tube    Place 1 inch (1.5 mg) rectally every 12 hours    Anal fissure       valsartan 160 MG tablet    DIOVAN    90 tablet    Take 1 tablet (160 mg) by mouth daily    Essential hypertension with goal blood pressure less than 140/90

## 2017-07-28 NOTE — PROGRESS NOTES
Medical Assistant Note:  Wallace Zelaya presents today for Lab draw.    Patient seen by provider today: No.   present during visit today: Not Applicable.    Concerns: No Concerns.    Procedure:  Labs drawn: Yes.    Post Assessment:  Labs drawn without difficulty: Yes.    Discharge Plan:  Patient discharged in stable condition accompanied by: self    Face to Face Time: 10 mins.    Carlene Tong

## 2017-07-30 ENCOUNTER — MYC MEDICAL ADVICE (OUTPATIENT)
Dept: ONCOLOGY | Facility: CLINIC | Age: 72
End: 2017-07-30

## 2017-07-30 NOTE — PROGRESS NOTES
SUBJECTIVE:                                                    Wallace Zelaya is a 72 year old male who presents to clinic today for the following health issues:    Patient presents with:  RECHECK: anal fissure     Patient here for follow-up on issue with rectal pain. Pain just a external rectal area. Has noticed smaller pleading but not significant. Had previous constipation but this is improved now which treatments. Continues to have pain that is tender to touch and with bowel movement. Denies mass or lump of the rectal area.    ROS:  GI: as noted    OBJECTIVE:                                                    /70 (BP Location: Right arm, Patient Position: Chair, Cuff Size: Adult Large)  Pulse 58  Temp 98.2  F (36.8  C) (Oral)  Resp 16  Wt 224 lb (101.6 kg)  SpO2 97%  BMI 33.08 kg/m2Body mass index is 33.08 kg/(m^2).  GENERAL APPEARANCE: healthy, alert and no distress  RECTAL: small anal fissure noted without bleeding     ASSESSMENT/PLAN:                                                    1. Anal fissure  Reiterated previous information from last visit, trial of medication below for symptomatic relief as well. Follow-up with colorectal if not improving.  - Lidocaine 2 % GEL; Externally apply 1 Application topically daily as needed  Dispense: 1 Tube; Refill: 0  - nitroGLYcerin (RECTIV) 0.4 % OINT rectal ointment; Place 1 inch (1.5 mg) rectally every 12 hours  Dispense: 1 Tube; Refill: 0    Jd Almaraz MD  [facility]

## 2017-08-01 LAB — TESTOST SERPL-MCNC: 4 NG/DL (ref 240–950)

## 2017-08-04 ENCOUNTER — ONCOLOGY VISIT (OUTPATIENT)
Dept: ONCOLOGY | Facility: CLINIC | Age: 72
End: 2017-08-04
Attending: INTERNAL MEDICINE
Payer: COMMERCIAL

## 2017-08-04 VITALS
BODY MASS INDEX: 33.36 KG/M2 | HEART RATE: 61 BPM | DIASTOLIC BLOOD PRESSURE: 76 MMHG | WEIGHT: 225.2 LBS | HEIGHT: 69 IN | SYSTOLIC BLOOD PRESSURE: 138 MMHG | RESPIRATION RATE: 16 BRPM | TEMPERATURE: 97.5 F

## 2017-08-04 DIAGNOSIS — C61 PROSTATE CANCER (H): Primary | ICD-10-CM

## 2017-08-04 PROCEDURE — 99211 OFF/OP EST MAY X REQ PHY/QHP: CPT

## 2017-08-04 PROCEDURE — 99214 OFFICE O/P EST MOD 30 MIN: CPT | Performed by: INTERNAL MEDICINE

## 2017-08-04 ASSESSMENT — PAIN SCALES - GENERAL: PAINLEVEL: NO PAIN (0)

## 2017-08-04 NOTE — PROGRESS NOTES
[facility]CER CLINIC  FOLLOW-UP VISIT NOTE    PATIENT NAME: Wallace Zelaya MRN # [id number]  DATE OF VISIT: Aug 4, 2017 YOB: 1945    REFERRING PROVIDER: No referring provider defined for this encounter.    CANCER TYPE: Prostate cancer; Biochemical recurrence; Hormone sensitive disease  STAGE: Stage III - pT3b at diagnosis; M0    HISTORY OF PRESENTING ILLNESS:  Wallace was noted to have rising screening PSA from 2 - 4. He was referred to Dr. Rodolfo Connor. He had radical prostatectomy on 11/2015. Pathology from this revealed Cayla 4+4 disease with extraprostatic extension, seminal vesicle extension and he was staged at pT3b. All of the 12 lymph nodes resected were negative for disease. Post operatively his PSA did not drop to undetectable levels and remained elevated at 0.56. It vladimir to 0.9 in April 2016 and he was referred to Dr. Newton for adjuvant radiation therapy. He completed radiation therapy but did not have any PSA response to this treatment.     TREATMENT SUMMARY:  11/13/2015 Radical prostatectomy  April 2016  Radiation therapy    Oncology Supportive Medications 1/10/2017 4/11/2017   leuprolide (LUPRON DEPOT) IM 22.5 mg 22.5 mg     CURRENT INTERVENTIONS:  Lupron - Intermittent androgen deprivation OFF treatment phase    SUBJECTIVE   Wallace is being seen for his prostate cancer    He received his first dose of lupron about 6 months ago. He gets 3-4 hot flashes every day. He has been finding it very tolerable. He wakes up 2-3 times at night to urinate.     He has been trying to lose weight for the last 2-3 years but finds it hard. He continues to go out to golf regularly - 3-4 times a week.       PAST MEDICAL HISTORY   1. HTN  2. Dyslipidemia  3. Prostate cancer as detailed above      CURRENT OUTPATIENT MEDICATIONS     Current Outpatient Prescriptions   Medication Sig     Lidocaine 2 % GEL Externally apply 1 Application topically daily as needed      nitroGLYcerin (RECTIV) 0.4 % OINT rectal ointment Place 1 inch (1.5 mg) rectally every 12 hours     amLODIPine (NORVASC) 10 MG tablet Take 1 tablet (10 mg) by mouth daily     valsartan (DIOVAN) 160 MG tablet Take 1 tablet (160 mg) by mouth daily     atorvastatin (LIPITOR) 20 MG tablet Take 1 tablet (20 mg) by mouth daily     bicalutamide (CASODEX) 50 MG tablet Take 1 tablet (50 mg) by mouth daily     No current facility-administered medications for this visit.         ALLERGIES     Allergies   Allergen Reactions     Seasonal Allergies         REVIEW OF SYSTEMS   As above in the HPI, o/w complete 12-point ROS was negative.     PHYSICAL EXAM   There were no vitals taken for this visit.  SpO2 Readings from Last 4 Encounters:   07/18/17 97%   07/06/17 97%   06/24/17 97%   06/23/17 96%     Wt Readings from Last 3 Encounters:   07/18/17 101.6 kg (224 lb)   07/06/17 101.7 kg (224 lb 4.8 oz)   06/24/17 102.1 kg (225 lb)     GEN: NAD  HEENT: PERRL, EOMI, no icterus, injection or pallor. Oropharynx is clear.  NECK: no cervical or supraclavicular lymphadenopathy  LUNGS: clear bilaterally  CV: regular, no murmurs, rubs, or gallops  ABDOMEN: soft, non-tender, non-distended, normal bowel sounds, no hepatosplenomegaly by percussion or palpation  EXT: warm, well perfused, no edema  NEURO: alert  SKIN: no rashes   LABORATORY AND IMAGING STUDIES     Recent Labs   Lab Test  07/28/17   1558  04/04/17   1542  01/10/17   1257  08/03/16   0920  11/16/15   0714   NA  140  141  139  142  137   POTASSIUM  3.9  3.9  4.0  4.7  3.9   CHLORIDE  104  106  106  107  103   CO2  29  27  26  27  28   ANIONGAP  7  8  7  8  6   BUN  15  12  14  17  9   CR  1.04  0.95  0.96  1.03  0.90   GLC  92  88  90  96  118*   TY  8.5  8.4*  8.8  8.8  7.8*     No results for input(s): MAG, PHOS in the last 61634 hours.  Recent Labs   Lab Test  07/28/17   1558  04/04/17   1542  01/10/17   1257  08/03/16   0920  11/16/15   0714   04/09/13   1003  04/04/12   1123    WBC  5.3  5.0  4.8  5.1  8.8   --   5.8  6.7   HGB  13.9  14.7  15.2  15.6  11.6*   < >  15.9  15.8   PLT  191  197  179  158  160   --   182  183   MCV  90  89  90  91  90   --   89  89   NEUTROPHIL  52.8  65.3  58.0   --    --    --   48.1  48.8    < > = values in this interval not displayed.     Recent Labs   Lab Test  07/28/17   1558  04/04/17   1542  01/10/17   1257   BILITOTAL  0.4  0.5  0.7   ALKPHOS  111  99  99   ALT  48  56  35   AST  26  32  21   ALBUMIN  3.7  3.8  3.9   LDH  235*  244*  216     No results found for: TSH  No results for input(s): CEA in the last 05272 hours.  Results for orders placed or performed during the hospital encounter of 01/20/17   CT Chest w Contrast    Narrative    CT CHEST WITH CONTRAST   1/20/2017 4:04 PM     HISTORY: Malignant neoplasm of the prostate gland.    COMPARISON: 11/9/2016 and 4/14/2016 - CT abdomen and pelvis.    TECHNIQUE: Following the uneventful administration of 80mL Isovue-370  intravenous contrast, helical sections were acquired through the  lungs. Coronal reconstructions were generated. Radiation dose for this  scan was reduced using automated exposure control, adjustment of the  mA and/or kV according to the patient's size, or iterative  reconstruction technique.    FINDINGS: Tiny 0.2 cm nodule in the posterolateral aspect of the left  upper lobe (series 3 image 13) and tiny 0.2 cm nodule in the  posteromedial aspect of the right upper lobe (series 3 image 19). A  few linear opacities in the lungs likely represent atelectasis or  scarring. The lungs are otherwise clear. No pleural or pericardial  effusion. No enlarged lymph nodes in the chest. Atherosclerotic  calcification in the thoracic aorta. Small hiatal hernia.    Scan through the upper abdomen is significant for a 1.3 cm right  adrenal nodule that is unchanged since 4/14/2016.      Impression    IMPRESSION:   1. No convincing evidence of metastatic neoplasm in the chest.  2. Two tiny indeterminate  pulmonary nodules. These are likely benign  nodules. Recommend comparison with prior imaging studies, if  available. If not available and the patient is a smoker or has other  significant risk factors, a followup CT scan could be considered in 12  months to confirm stability.   3. Indeterminate 1.3 cm right adrenal nodule, unchanged since  4/14/2016. 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    MANUEL MARQUEZ MD     Recent Labs   Lab Test  07/28/17   1558  04/04/17   1542  01/10/17   1257  10/24/16   0826  08/03/16   0920   PSA  0.01  0.09  2.15  2.08  1.27   TESTOSTTOTAL  4*  7*  310   --    --              ASSESSMENT AND PLAN   1. Biochemical PSA relapse post prostatectomy without any response to adjuvant radiation therapy  2. HTN  3. ECOG PS1  4. No medical comorbidity    I had a lengthy discussion with Wallace. He has been doing well. He has no significant difficulty with the ongoing androgen deprivation. He does get a few episodes of hot flashes and nocturia.     His PSA has responded nicely as expected. We had reviewed intermittent androgen deprivation therapy and he is interested. I would suggest that we hold lupron today as in the intermittent androgen deprivation therapy studies utilized 8 months of androgen deprivation. I would like to see him in 4-6 months time. We will follow his PSA values over time and treat him when the PSA value rises above 4.     He has been trying to lose weight. He has been golfing and stays activity. I have encouraged him to chose an activity other than his current golfing.       Gigi Ward  ,  Division of Hematology, Oncology & Transplantation  [facility].

## 2017-08-04 NOTE — PATIENT INSTRUCTIONS
- Follow up in November with labs prior to visit-Scheduled for 11/3/17. Khushbu CARR printed and given to Roxann SANCHEZ

## 2017-08-04 NOTE — NURSING NOTE
"Oncology Rooming Note    August 4, 2017 3:59 PM   Wallace Zelaya is a 72 year old male who presents for:    Chief Complaint   Patient presents with     Oncology Clinic Visit     Follow up      Initial Vitals: /76  Pulse 61  Temp 97.5  F (36.4  C) (Tympanic)  Resp 16  Ht 1.753 m (5' 9\")  Wt 102.2 kg (225 lb 3.2 oz)  BMI 33.26 kg/m2 Estimated body mass index is 33.26 kg/(m^2) as calculated from the following:    Height as of this encounter: 1.753 m (5' 9\").    Weight as of this encounter: 102.2 kg (225 lb 3.2 oz). Body surface area is 2.23 meters squared.  No Pain (0) Comment: Data Unavailable   No LMP for male patient.  Allergies reviewed: Yes  Medications reviewed: Yes    Medications: Medication refills not needed today.  Pharmacy name entered into [employer]:    [facility] - [place], [place] - [address] [facility] [address] [place] [address] [address]    Clinical concerns: Follow up- Pt states to be happy just less energy. He said the injection gives him hot flashes that he does not like.     8 minutes for nursing intake (face to face time)     Carlene Tong CMA     DISCHARGE PLAN:  Next appointments: See patient instruction section  Departure Mode: Ambulatory  Accompanied by: self  0 minutes for nursing discharge (face to face time)   Carlene Tong CMA                  "

## 2017-08-04 NOTE — MR AVS SNAPSHOT
After Visit Summary   8/4/2017    Wallace Zelaya    MRN: [id number]           Patient Information     Date Of Birth          1945        Visit Information        Provider Department      8/4/2017 3:15 PM Gigi Ward MD [facility]        Care Instructions    - Follow up in November with labs prior to visit          Follow-ups after your visit        Your next 10 appointments already scheduled     Nov 03, 2017  3:45 PM CDT   Return Visit with Gigi Ward MD   [facility] ([facility])    [facility]  [address]  [address]  [facility] [id number] [phone]              Who to contact     If you have questions or need follow up information about today's clinic visit or your schedule please contact [facility] directly at [phone].  Normal or non-critical lab and imaging results will be communicated to you by MyChart, letter or phone within 4 business days after the clinic has received the results. If you do not hear from us within 7 days, please contact the clinic through [employer] or phone. If you have a critical or abnormal lab result, we will notify you by phone as soon as possible.  Submit refill requests through [employer] or call your pharmacy and they will forward the refill request to us. Please allow 3 business days for your refill to be completed.          Additional Information About Your Visit        MyChart Information     [employer] gives you secure access to your electronic health record. If you see a primary care provider, you can also send messages to your care team and make appointments. If you have questions, please call your primary care clinic.  If you do not have a primary care provider, please call [phone] and they will assist you.        Care EveryWhere ID     This is your Care EveryWhere ID. This could be used by other organizations to access  "your [facility] medical records  [id number]        Your Vitals Were     Pulse Temperature Respirations Height BMI (Body Mass Index)       61 97.5  F (36.4  C) (Tympanic) 16 1.753 m (5' 9\") 33.26 kg/m2        Blood Pressure from Last 3 Encounters:   08/04/17 138/76   07/18/17 110/70   07/06/17 125/70    Weight from Last 3 Encounters:   08/04/17 102.2 kg (225 lb 3.2 oz)   07/18/17 101.6 kg (224 lb)   07/06/17 101.7 kg (224 lb 4.8 oz)              Today, you had the following     No orders found for display       Primary Care Provider Office Phone # Fax #    Jd Almaraz [phone]       [facility] [address] [facility] [address]        Equal Access to Services     CADEN ALLRED : Hadii aad ku hadasho Soomaali, waaxda luqadaha, qaybta kaalmada adeegyada, waxay idiin hayaan sergio kharaute shafer . So [facility] [phone].    ATENCIÓN: Si habla español, tiene a crain disposición servicios gratuitos de asistencia lingüística. Brennen al [phone].    We comply with applicable federal civil rights laws and [place] laws. We do not discriminate on the basis of race, color, national origin, age, disability sex, sexual orientation or gender identity.            Thank you!     Thank you for choosing [facility]  for your care. Our goal is always to provide you with excellent care. Hearing back from our patients is one way we can continue to improve our services. Please take a few minutes to complete the written survey that you may receive in the mail after your visit with us. Thank you!             Your Updated Medication List - Protect others around you: Learn how to safely use, store and throw away your medicines at www.disposemymeds.org.          This list is accurate as of: 8/4/17  4:18 PM.  Always use your most recent med list.                   Brand Name Dispense Instructions for use Diagnosis    amLODIPine 10 MG tablet    NORVASC    90 tablet    Take 1 " tablet (10 mg) by mouth daily    Essential hypertension with goal blood pressure less than 140/90       atorvastatin 20 MG tablet    LIPITOR    90 tablet    Take 1 tablet (20 mg) by mouth daily    Hyperlipidemia LDL goal <130       bicalutamide 50 MG tablet    CASODEX    21 tablet    Take 1 tablet (50 mg) by mouth daily    Malignant neoplasm of prostate (H)       Lidocaine 2 % Gel     1 Tube    Externally apply 1 Application topically daily as needed    Anal fissure       nitroGLYcerin 0.4 % Oint rectal ointment    RECTIV    1 Tube    Place 1 inch (1.5 mg) rectally every 12 hours    Anal fissure       valsartan 160 MG tablet    DIOVAN    90 tablet    Take 1 tablet (160 mg) by mouth daily    Essential hypertension with goal blood pressure less than 140/90

## 2017-08-22 ENCOUNTER — TELEPHONE (OUTPATIENT)
Dept: FAMILY MEDICINE | Facility: CLINIC | Age: 72
End: 2017-08-22

## 2017-08-22 NOTE — TELEPHONE ENCOUNTER
Panel Management Review      Patient has the following on his problem list:     Hypertension   Last three blood pressure readings:  BP Readings from Last 3 Encounters:   08/04/17 138/76   07/18/17 110/70   07/06/17 125/70     Blood pressure: FAILED    HTN Guidelines:  Age 18-59 BP range:  Less than 140/90  Age 60-85 with Diabetes:  Less than 140/90  Age 60-85 without Diabetes:  less than 150/90        Composite cancer screening  Chart review shows that this patient is due/due soon for the following None  Summary:    Patient is due/failing the following:   BP CHECK    Action needed:   Patient needs nurse only appointment.    Type of outreach:    Phone, left message for patient to call back.     Questions for provider review:    None                                                                                                                                    Ivelisse Simpson CMA       Chart routed to none .

## 2017-10-04 ENCOUNTER — ALLIED HEALTH/NURSE VISIT (OUTPATIENT)
Dept: NURSING | Facility: CLINIC | Age: 72
End: 2017-10-04
Payer: COMMERCIAL

## 2017-10-04 DIAGNOSIS — Z23 NEED FOR PROPHYLACTIC VACCINATION AND INOCULATION AGAINST INFLUENZA: Primary | ICD-10-CM

## 2017-10-04 PROCEDURE — G0008 ADMIN INFLUENZA VIRUS VAC: HCPCS

## 2017-10-04 PROCEDURE — 90662 IIV NO PRSV INCREASED AG IM: CPT

## 2017-10-04 NOTE — PROGRESS NOTES
Injectable Influenza Immunization Documentation    1.  Is the person to be vaccinated sick today?   No    2. Does the person to be vaccinated have an allergy to a component   of the vaccine?   No    3. Has the person to be vaccinated ever had a serious reaction   to influenza vaccine in the past?   No    4. Has the person to be vaccinated ever had Guillain-Barré syndrome?   No    Form completed by LINN Naranjo

## 2017-10-04 NOTE — MR AVS SNAPSHOT
After Visit Summary   10/4/2017    Wallace Zelaya    MRN: [id number]           Patient Information     Date Of Birth          1945        Visit Information        Provider Department      10/4/2017 3:00 PM AARTI CALLEJAS/LPN [facility]        Today's Diagnoses     Need for prophylactic vaccination and inoculation against influenza    -  1       Follow-ups after your visit        Your next 10 appointments already scheduled     Nov 03, 2017  3:45 PM CDT   Return Visit with Gigi Ward MD   [facility] ([facility])    [facility]  [address]  [address]  [facility] [id number] [phone]              Who to contact     If you have questions or need follow up information about today's clinic visit or your schedule please contact [facility] directly at [phone].  Normal or non-critical lab and imaging results will be communicated to you by MyChart, letter or phone within 4 business days after the clinic has received the results. If you do not hear from us within 7 days, please contact the clinic through [employer] or phone. If you have a critical or abnormal lab result, we will notify you by phone as soon as possible.  Submit refill requests through [employer] or call your pharmacy and they will forward the refill request to us. Please allow 3 business days for your refill to be completed.          Additional Information About Your Visit        MyChart Information     [employer] gives you secure access to your electronic health record. If you see a primary care provider, you can also send messages to your care team and make appointments. If you have questions, please call your primary care clinic.  If you do not have a primary care provider, please call [phone] and they will assist you.        Care EveryWhere ID     This is your Care EveryWhere ID. This could be used by other organizations to access your  [place] medical records  [id number]         Blood Pressure from Last 3 Encounters:   08/04/17 138/76   07/18/17 110/70   07/06/17 125/70    Weight from Last 3 Encounters:   08/04/17 225 lb 3.2 oz (102.2 kg)   07/18/17 224 lb (101.6 kg)   07/06/17 224 lb 4.8 oz (101.7 kg)              We Performed the Following     FLU VACCINE, INCREASED ANTIGEN, PRESV FREE, AGE 65+ [95620]     Vaccine Administration, Initial [68591]        Primary Care Provider Office Phone # Fax #    Jd Almaraz [phone] [address]  [facility] [address]        Equal Access to Services     CADEN ALLRED : Hadii aad ku hadasho Soomaali, waaxda luqadaha, qaybta kaalmada adeegyada, ana fernandez. So [facility] [phone].    ATENCIÓN: Si habla español, tiene a crain disposición servicios gratuitos de asistencia lingüística. Llame al [phone].    We comply with applicable federal civil rights laws and [place] laws. We do not discriminate on the basis of race, color, national origin, age, disability, sex, sexual orientation, or gender identity.            Thank you!     Thank you for choosing [facility]  for your care. Our goal is always to provide you with excellent care. Hearing back from our patients is one way we can continue to improve our services. Please take a few minutes to complete the written survey that you may receive in the mail after your visit with us. Thank you!             Your Updated Medication List - Protect others around you: Learn how to safely use, store and throw away your medicines at www.disposemymeds.org.          This list is accurate as of: 10/4/17  3:20 PM.  Always use your most recent med list.                   Brand Name Dispense Instructions for use Diagnosis    amLODIPine 10 MG tablet    NORVASC    90 tablet    Take 1 tablet (10 mg) by mouth daily    Essential hypertension with goal blood pressure less than 140/90       atorvastatin 20 MG  tablet    LIPITOR    90 tablet    Take 1 tablet (20 mg) by mouth daily    Hyperlipidemia LDL goal <130       bicalutamide 50 MG tablet    CASODEX    21 tablet    Take 1 tablet (50 mg) by mouth daily    Malignant neoplasm of prostate (H)       Lidocaine 2 % Gel     1 Tube    Externally apply 1 Application topically daily as needed    Anal fissure       nitroGLYcerin 0.4 % Oint rectal ointment    RECTIV    1 Tube    Place 1 inch (1.5 mg) rectally every 12 hours    Anal fissure       valsartan 160 MG tablet    DIOVAN    90 tablet    Take 1 tablet (160 mg) by mouth daily    Essential hypertension with goal blood pressure less than 140/90

## 2017-10-26 DIAGNOSIS — C61 PROSTATE CANCER (H): ICD-10-CM

## 2017-10-26 LAB
BASOPHILS # BLD AUTO: 0 10E9/L (ref 0–0.2)
BASOPHILS NFR BLD AUTO: 0.8 %
DIFFERENTIAL METHOD BLD: NORMAL
EOSINOPHIL # BLD AUTO: 0.4 10E9/L (ref 0–0.7)
EOSINOPHIL NFR BLD AUTO: 7.8 %
ERYTHROCYTE [DISTWIDTH] IN BLOOD BY AUTOMATED COUNT: 13.5 % (ref 10–15)
HCT VFR BLD AUTO: 40.8 % (ref 40–53)
HGB BLD-MCNC: 14 G/DL (ref 13.3–17.7)
LYMPHOCYTES # BLD AUTO: 1.5 10E9/L (ref 0.8–5.3)
LYMPHOCYTES NFR BLD AUTO: 29 %
MCH RBC QN AUTO: 31.6 PG (ref 26.5–33)
MCHC RBC AUTO-ENTMCNC: 34.3 G/DL (ref 31.5–36.5)
MCV RBC AUTO: 92 FL (ref 78–100)
MONOCYTES # BLD AUTO: 0.5 10E9/L (ref 0–1.3)
MONOCYTES NFR BLD AUTO: 10.7 %
NEUTROPHILS # BLD AUTO: 2.6 10E9/L (ref 1.6–8.3)
NEUTROPHILS NFR BLD AUTO: 51.7 %
PLATELET # BLD AUTO: 204 10E9/L (ref 150–450)
RBC # BLD AUTO: 4.43 10E12/L (ref 4.4–5.9)
WBC # BLD AUTO: 5 10E9/L (ref 4–11)

## 2017-10-26 PROCEDURE — 36415 COLL VENOUS BLD VENIPUNCTURE: CPT | Performed by: FAMILY MEDICINE

## 2017-10-26 PROCEDURE — 83615 LACTATE (LD) (LDH) ENZYME: CPT | Performed by: FAMILY MEDICINE

## 2017-10-26 PROCEDURE — 85025 COMPLETE CBC W/AUTO DIFF WBC: CPT | Performed by: FAMILY MEDICINE

## 2017-10-26 PROCEDURE — 84153 ASSAY OF PSA TOTAL: CPT | Performed by: FAMILY MEDICINE

## 2017-10-26 PROCEDURE — 80053 COMPREHEN METABOLIC PANEL: CPT | Performed by: FAMILY MEDICINE

## 2017-10-26 PROCEDURE — 84403 ASSAY OF TOTAL TESTOSTERONE: CPT | Performed by: FAMILY MEDICINE

## 2017-10-27 LAB
ALBUMIN SERPL-MCNC: 3.6 G/DL (ref 3.4–5)
ALP SERPL-CCNC: 117 U/L (ref 40–150)
ALT SERPL W P-5'-P-CCNC: 36 U/L (ref 0–70)
ANION GAP SERPL CALCULATED.3IONS-SCNC: 9 MMOL/L (ref 3–14)
AST SERPL W P-5'-P-CCNC: 28 U/L (ref 0–45)
BILIRUB SERPL-MCNC: 0.4 MG/DL (ref 0.2–1.3)
BUN SERPL-MCNC: 15 MG/DL (ref 7–30)
CALCIUM SERPL-MCNC: 8.5 MG/DL (ref 8.5–10.1)
CHLORIDE SERPL-SCNC: 105 MMOL/L (ref 94–109)
CO2 SERPL-SCNC: 26 MMOL/L (ref 20–32)
CREAT SERPL-MCNC: 1.05 MG/DL (ref 0.66–1.25)
GFR SERPL CREATININE-BSD FRML MDRD: 69 ML/MIN/1.7M2
GLUCOSE SERPL-MCNC: 98 MG/DL (ref 70–99)
LDH SERPL L TO P-CCNC: 206 U/L (ref 85–227)
POTASSIUM SERPL-SCNC: 4 MMOL/L (ref 3.4–5.3)
PROT SERPL-MCNC: 7.3 G/DL (ref 6.8–8.8)
PSA SERPL-MCNC: 0.04 UG/L (ref 0–4)
SODIUM SERPL-SCNC: 140 MMOL/L (ref 133–144)

## 2017-10-31 LAB — TESTOST SERPL-MCNC: 174 NG/DL (ref 240–950)

## 2017-11-03 ENCOUNTER — ONCOLOGY VISIT (OUTPATIENT)
Dept: ONCOLOGY | Facility: CLINIC | Age: 72
End: 2017-11-03
Attending: INTERNAL MEDICINE
Payer: COMMERCIAL

## 2017-11-03 VITALS
SYSTOLIC BLOOD PRESSURE: 131 MMHG | RESPIRATION RATE: 16 BRPM | HEIGHT: 69 IN | WEIGHT: 230 LBS | TEMPERATURE: 97 F | BODY MASS INDEX: 34.07 KG/M2 | OXYGEN SATURATION: 96 % | DIASTOLIC BLOOD PRESSURE: 74 MMHG | HEART RATE: 72 BPM

## 2017-11-03 DIAGNOSIS — C61 PROSTATE CANCER (H): Primary | ICD-10-CM

## 2017-11-03 PROCEDURE — 99213 OFFICE O/P EST LOW 20 MIN: CPT | Performed by: INTERNAL MEDICINE

## 2017-11-03 PROCEDURE — 99211 OFF/OP EST MAY X REQ PHY/QHP: CPT

## 2017-11-03 ASSESSMENT — PAIN SCALES - GENERAL: PAINLEVEL: NO PAIN (0)

## 2017-11-03 NOTE — PATIENT INSTRUCTIONS
- Follow up in 4 months with labs a week prior to visit    Addendum:  11/6/17 scheduled for labs at [place] lab 3/9/18 and with Dr. Ward on 3/16/18.  Brenda LIZAMA

## 2017-11-03 NOTE — NURSING NOTE
"Oncology Rooming Note    November 3, 2017 3:53 PM   Wallace Zelaya is a 72 year old male who presents for:    Chief Complaint   Patient presents with     Oncology Clinic Visit     follow up     Initial Vitals: /74  Pulse 72  Temp 97  F (36.1  C) (Tympanic)  Resp 16  Ht 1.753 m (5' 9\")  Wt 104.3 kg (230 lb)  SpO2 96%  BMI 33.97 kg/m2 Estimated body mass index is 33.97 kg/(m^2) as calculated from the following:    Height as of this encounter: 1.753 m (5' 9\").    Weight as of this encounter: 104.3 kg (230 lb). Body surface area is 2.25 meters squared.  No Pain (0) Comment: Data Unavailable   No LMP for male patient.  Allergies reviewed: Yes  Medications reviewed: Yes    Medications: Medication refills not needed today.  Pharmacy name entered into [facility]:    [facility] - [place], [place] - [address] [facility] [address]    Clinical concerns: Follow up     8 minutes for nursing intake (face to face time)     Tania Giordano CMA     DISCHARGE PLAN:  Next appointments: See patient instruction section  Departure Mode: Ambulatory  Accompanied by: self  0 minutes for nursing discharge (face to face time)   Tania Giordano CMA                    "

## 2017-11-03 NOTE — PROGRESS NOTES
[facility] CANCER CLINIC  FOLLOW-UP VISIT NOTE    PATIENT NAME: Wallace Zelaya MRN # [id number]  DATE OF VISIT: Nov 3, 2017 YOB: 1945    REFERRING PROVIDER: No referring provider defined for this encounter.    CANCER TYPE: Prostate cancer; Biochemical recurrence; Hormone sensitive disease  STAGE: Stage III - pT3b at diagnosis; M0    HISTORY OF PRESENTING ILLNESS:  Wallace was noted to have rising screening PSA from 2 - 4. He was referred to Dr. Rodolfo Connor. He had radical prostatectomy on 11/2015. Pathology from this revealed Cayla 4+4 disease with extraprostatic extension, seminal vesicle extension and he was staged at pT3b. All of the 12 lymph nodes resected were negative for disease. Post operatively his PSA did not drop to undetectable levels and remained elevated at 0.56. It vladimir to 0.9 in April 2016 and he was referred to Dr. Newton for adjuvant radiation therapy. He completed radiation therapy but did not have any PSA response to this treatment.     TREATMENT SUMMARY:  11/13/2015 Radical prostatectomy  April 2016  Radiation therapy    Oncology Supportive Medications 1/10/2017 4/11/2017   leuprolide (LUPRON DEPOT) IM 22.5 mg 22.5 mg     CURRENT INTERVENTIONS:  Lupron - Intermittent androgen deprivation OFF treatment phase    SUBJECTIVE   Wallace is being seen for his prostate cancer    He has been off lupron and is feeling better. His hot flashes have gone. He had a good summer and did play a lot of golf. He has no complains at this visit.       PAST MEDICAL HISTORY   1. HTN  2. Dyslipidemia  3. Prostate cancer as detailed above      CURRENT OUTPATIENT MEDICATIONS     Current Outpatient Prescriptions   Medication Sig     Lidocaine 2 % GEL Externally apply 1 Application topically daily as needed     nitroGLYcerin (RECTIV) 0.4 % OINT rectal ointment Place 1 inch (1.5 mg) rectally every 12 hours     amLODIPine (NORVASC) 10 MG tablet Take 1 tablet (10 mg) by mouth  "daily     valsartan (DIOVAN) 160 MG tablet Take 1 tablet (160 mg) by mouth daily     atorvastatin (LIPITOR) 20 MG tablet Take 1 tablet (20 mg) by mouth daily     No current facility-administered medications for this visit.         ALLERGIES     Allergies   Allergen Reactions     Seasonal Allergies         REVIEW OF SYSTEMS   As above in the HPI, o/w complete 12-point ROS was negative.     PHYSICAL EXAM   /74  Pulse 72  Temp 97  F (36.1  C) (Tympanic)  Resp 16  Ht 1.753 m (5' 9\")  Wt 104.3 kg (230 lb)  SpO2 96%  BMI 33.97 kg/m2  SpO2 Readings from Last 4 Encounters:   11/03/17 96%   07/18/17 97%   07/06/17 97%   06/24/17 97%     Wt Readings from Last 3 Encounters:   11/03/17 104.3 kg (230 lb)   08/04/17 102.2 kg (225 lb 3.2 oz)   07/18/17 101.6 kg (224 lb)     GEN: NAD  HEENT: PERRL, EOMI, no icterus, injection or pallor. Oropharynx is clear.  NECK: no cervical or supraclavicular lymphadenopathy  LUNGS: clear bilaterally  CV: regular, no murmurs, rubs, or gallops  ABDOMEN: soft, non-tender, non-distended, normal bowel sounds, no hepatosplenomegaly by percussion or palpation  EXT: warm, well perfused, no edema  NEURO: alert  SKIN: no rashes   LABORATORY AND IMAGING STUDIES     Recent Labs   Lab Test  10/26/17   1553  07/28/17   1558  04/04/17   1542  01/10/17   1257  08/03/16   0920   NA  140  140  141  139  142   POTASSIUM  4.0  3.9  3.9  4.0  4.7   CHLORIDE  105  104  106  106  107   CO2  26  29  27  26  27   ANIONGAP  9  7  8  7  8   BUN  15  15  12  14  17   CR  1.05  1.04  0.95  0.96  1.03   GLC  98  92  88  90  96   TY  8.5  8.5  8.4*  8.8  8.8     No results for input(s): MAG, PHOS in the last 62470 hours.  Recent Labs   Lab Test  10/26/17   1553  07/28/17   1558  04/04/17   1542  01/10/17   1257  08/03/16   0920   04/09/13   1003   WBC  5.0  5.3  5.0  4.8  5.1   < >  5.8   HGB  14.0  13.9  14.7  15.2  15.6   < >  15.9   PLT  204  191  197  179  158   < >  182   MCV  92  90  89  90  91   < >  89 "   NEUTROPHIL  51.7  52.8  65.3  58.0   --    --   48.1    < > = values in this interval not displayed.     Recent Labs   Lab Test  10/26/17   1553  07/28/17   1558  04/04/17   1542   BILITOTAL  0.4  0.4  0.5   ALKPHOS  117  111  99   ALT  36  48  56   AST  28  26  32   ALBUMIN  3.6  3.7  3.8   LDH  206  235*  244*     No results found for: TSH  No results for input(s): CEA in the last 85768 hours.  Results for orders placed or performed during the hospital encounter of 01/20/17   CT Chest w Contrast    Narrative    CT CHEST WITH CONTRAST   1/20/2017 4:04 PM     HISTORY: Malignant neoplasm of the prostate gland.    COMPARISON: 11/9/2016 and 4/14/2016 - CT abdomen and pelvis.    TECHNIQUE: Following the uneventful administration of 80mL Isovue-370  intravenous contrast, helical sections were acquired through the  lungs. Coronal reconstructions were generated. Radiation dose for this  scan was reduced using automated exposure control, adjustment of the  mA and/or kV according to the patient's size, or iterative  reconstruction technique.    FINDINGS: Tiny 0.2 cm nodule in the posterolateral aspect of the left  upper lobe (series 3 image 13) and tiny 0.2 cm nodule in the  posteromedial aspect of the right upper lobe (series 3 image 19). A  few linear opacities in the lungs likely represent atelectasis or  scarring. The lungs are otherwise clear. No pleural or pericardial  effusion. No enlarged lymph nodes in the chest. Atherosclerotic  calcification in the thoracic aorta. Small hiatal hernia.    Scan through the upper abdomen is significant for a 1.3 cm right  adrenal nodule that is unchanged since 4/14/2016.      Impression    IMPRESSION:   1. No convincing evidence of metastatic neoplasm in the chest.  2. Two tiny indeterminate pulmonary nodules. These are likely benign  nodules. Recommend comparison with prior imaging studies, if  available. If not available and the patient is a smoker or has other  significant risk  factors, a followup CT scan could be considered in 12  months to confirm stability.   3. Indeterminate 1.3 cm right adrenal nodule, unchanged since  4/14/2016. 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    MANUEL MARQUEZ MD     Recent Labs   Lab Test  10/26/17   1553  07/28/17   1558  04/04/17   1542  01/10/17   1257  10/24/16   0826   PSA  0.04  0.01  0.09  2.15  2.08   TESTOSTTOTAL  174*  4*  7*  310   --              ASSESSMENT AND PLAN   1. Biochemical PSA relapse post prostatectomy without any response to adjuvant radiation therapy  2. Observation phase of intermittent androgen deprivation therapy  3. HTN  4. ECOG PS1  5. No medical comorbidity    I had a lengthy discussion with Wallace. All his labs are within normal limits. His PSA remains low at 0.04. He does not have hot flashes any more. His testosterone is recovering and is up to 174. We will continue with there observation phase of his intermittent androgen deprivation therapy. I will see her in 4 months or so with labs prior to visit.     Gigi Ward  ,  Division of Hematology, Oncology & Transplantation  [facility].

## 2017-11-06 ENCOUNTER — TELEPHONE (OUTPATIENT)
Dept: ONCOLOGY | Facility: CLINIC | Age: 72
End: 2017-11-06

## 2017-11-06 NOTE — TELEPHONE ENCOUNTER
Called and left message for patient to call and schedule his 4 mo follow up with Dr. Ward and labs to be done prior to visit Trini Palma

## 2018-01-08 ENCOUNTER — TELEPHONE (OUTPATIENT)
Dept: FAMILY MEDICINE | Facility: CLINIC | Age: 73
End: 2018-01-08

## 2018-01-08 ENCOUNTER — OFFICE VISIT (OUTPATIENT)
Dept: FAMILY MEDICINE | Facility: CLINIC | Age: 73
End: 2018-01-08
Payer: COMMERCIAL

## 2018-01-08 VITALS
SYSTOLIC BLOOD PRESSURE: 126 MMHG | BODY MASS INDEX: 33.02 KG/M2 | TEMPERATURE: 98.5 F | DIASTOLIC BLOOD PRESSURE: 74 MMHG | WEIGHT: 222.9 LBS | HEART RATE: 58 BPM | HEIGHT: 69 IN

## 2018-01-08 DIAGNOSIS — L60.2 ONYCHOGRYPHOSIS: ICD-10-CM

## 2018-01-08 DIAGNOSIS — D23.9 DERMATOFIBROMA: ICD-10-CM

## 2018-01-08 DIAGNOSIS — K60.2 ANAL FISSURE: Primary | ICD-10-CM

## 2018-01-08 DIAGNOSIS — I10 ESSENTIAL HYPERTENSION WITH GOAL BLOOD PRESSURE LESS THAN 140/90: ICD-10-CM

## 2018-01-08 DIAGNOSIS — K60.2 ANAL FISSURE: ICD-10-CM

## 2018-01-08 DIAGNOSIS — M20.5X1 ACQUIRED CLAW TOE OF RIGHT FOOT: ICD-10-CM

## 2018-01-08 PROCEDURE — 99214 OFFICE O/P EST MOD 30 MIN: CPT | Performed by: FAMILY MEDICINE

## 2018-01-08 NOTE — MR AVS SNAPSHOT
After Visit Summary   1/8/2018    Wallace Zelaya    MRN: [id number]           Patient Information     Date Of Birth          1945        Visit Information        Provider Department      1/8/2018 8:40 AM Jd Almaraz MD [facility]        Today's Diagnoses     Anal fissure    -  1    Essential hypertension with goal blood pressure less than 140/90        Onychogryphosis        Acquired claw toe of right foot           Follow-ups after your visit        Additional Services     COLORECTAL SURGERY REFERRAL       Your provider has referred you to: [facility] [phone]   [email or web address]    Referral Reason(s): Anal Fissure  Special Concerns: None  This referral is: Elective (week +)  It is OK to leave a message on patient's voicemail.    Please be aware that coverage of these services is subject to the terms and limitations of your health insurance plan.  Call member services at your health plan with any benefit or coverage questions.      Please bring the following with you to your appointment:    (1) Any X-Rays, CTs or MRIs which have been performed.  Contact the facility where they were done to arrange for  prior to your scheduled appointment.    (2) List of current medications  (3) This referral request   (4) Any documents/labs given to you for this referral            ORTHO  REFERRAL       [facility] is referring you to the Orthopedic  Services at [facility].       The  Representative will assist you in the coordination of your Orthopedic and Musculoskeletal Care as prescribed by your physician.    The  Representative will call you within 1 business day to help schedule your appointment, or you may contact the  Representative at:    All areas ~ [phone]     Type of Referral : [facility]        Timeframe requested: Routine    Coverage of these services is subject to the terms and limitations of your health insurance plan.  Please call member services at your health plan with any benefit or coverage questions.      If X-rays, CT or MRI's have been performed, please contact the facility where they were done to arrange for , prior to your scheduled appointment.  Please bring this referral request to your appointment and present it to your specialist.                  Your next 10 appointments already scheduled     Mar 09, 2018  3:30 PM CST   LAB with [facility] LAB   [facility]    [address] [facility] [address] [phone]           Please do not eat 10-12 hours before your appointment if you are coming in fasting for labs on lipids, cholesterol, or glucose (sugar). This does not apply to pregnant women. Water, hot tea and black coffee (with nothing added) are okay. Do not drink other fluids, diet soda or chew gum.            Mar 16, 2018  3:45 PM CDT   Return Visit with Gigi Ward MD   [facility] ([facility])    [facility]  [address]  [facility] [id number] [phone]              Who to contact     If you have questions or need follow up information about today's clinic visit or your schedule please contact [facility] directly at [phone].  Normal or non-critical lab and imaging results will be communicated to you by MyChart, letter or phone within 4 business days after the clinic has received the results. If you do not hear from us within 7 days, please contact the clinic through MyChart or phone. If you have a critical or abnormal lab result, we will notify you by phone as soon as possible.  Submit refill requests through [employer] or call your pharmacy and they will forward the refill request to us. Please allow 3 business days for  "your refill to be completed.          Additional Information About Your Visit        [employer] Information     [employer] gives you secure access to your electronic health record. If you see a primary care provider, you can also send messages to your care team and make appointments. If you have questions, please call your primary care clinic.  If you do not have a primary care provider, please call [phone] and they will assist you.        Care EveryWhere ID     This is your Care EveryWhere ID. This could be used by other organizations to access your [facility] medical records  [id number]        Your Vitals Were     Pulse Temperature Height BMI (Body Mass Index)          58 98.5  F (36.9  C) (Oral) 5' 9\" (1.753 m) 32.92 kg/m2         Blood Pressure from Last 3 Encounters:   01/08/18 126/74   11/03/17 131/74   08/04/17 138/76    Weight from Last 3 Encounters:   01/08/18 222 lb 14.4 oz (101.1 kg)   11/03/17 230 lb (104.3 kg)   08/04/17 225 lb 3.2 oz (102.2 kg)              We Performed the Following     COLORECTAL SURGERY REFERRAL     ORTHO [facility] REFERRAL          Today's Medication Changes          These changes are accurate as of: 1/8/18  9:27 AM.  If you have any questions, ask your nurse or doctor.               Start taking these medicines.        Dose/Directions    COMPOUNDED NON-CONTROLLED SUBSTANCE - PHARMACY TO MIX COMPOUNDED MEDICATION   Commonly known as:  CMPD RX   Used for:  Anal fissure   Started by:  Jd Almaraz MD        Dose:  1 applicator   Place 1 applicator rectally 2 times daily   Quantity:  30 g   Refills:  0         Stop taking these medicines if you haven't already. Please contact your care team if you have questions.     amLODIPine 10 MG tablet   Commonly known as:  NORVASC   Stopped by:  Jd Almaraz MD           Lidocaine 2 % Gel   Stopped by:  Jd Almaraz MD           nitroGLYcerin 0.4 % Oint rectal ointment   Commonly known as:  RECTIV   Stopped by:  Jd Almaraz" MD Darvin                Where to get your medicines      These medications were sent to [facility] [address] [address] [address]  [address] [facility] [place] [address]     Phone:  [phone]     COMPOUNDED NON-CONTROLLED SUBSTANCE - PHARMACY TO MIX COMPOUNDED MEDICATION                Primary Care Provider Office Phone # Fax #    Jd Darvin Almaraz [phone] [address]  [facility] [address]        Equal Access to Services     [facility] : Hadii aad ku hadasho Soomaali, waaxda luqadaha, qaybta kaalmada adeegyada, waxay idiin hayaan adeeg kharash la'aan . So [facility] [phone].    ATENCIÓN: Si habla español, tiene a crain disposición servicios gratuitos de asistencia lingüística. [facility] [phone].    We comply with applicable federal civil rights laws and [place] laws. We do not discriminate on the basis of race, color, national origin, age, disability, sex, sexual orientation, or gender identity.            Thank you!     Thank you for choosing [facility]  for your care. Our goal is always to provide you with excellent care. Hearing back from our patients is one way we can continue to improve our services. Please take a few minutes to complete the written survey that you may receive in the mail after your visit with us. Thank you!             Your Updated Medication List - Protect others around you: Learn how to safely use, store and throw away your medicines at www.disposemymeds.org.          This list is accurate as of: 1/8/18  9:27 AM.  Always use your most recent med list.                   Brand Name Dispense Instructions for use Diagnosis    atorvastatin 20 MG tablet    LIPITOR    90 tablet    Take 1 tablet (20 mg) by mouth daily    Hyperlipidemia LDL goal <130       COMPOUNDED NON-CONTROLLED SUBSTANCE - PHARMACY TO MIX COMPOUNDED MEDICATION    CMPD RX    30 g    Place 1 applicator rectally 2 times daily    Anal fissure       valsartan 160 MG  tablet    DIOVAN    90 tablet    Take 1 tablet (160 mg) by mouth daily    Essential hypertension with goal blood pressure less than 140/90

## 2018-01-08 NOTE — PROGRESS NOTES
SUBJECTIVE:   Wallace Zelaya is a 72 year old male who presents to clinic today for the following health issues:    Patient here for follow-up on issue with rectal pain. Pain just at external rectal area.  Had previous constipation but this is improved now with treatments. Continues to have itchiness during the day and pain with bowel movements. He noticed a lump recently and applies nitroglycerin to the area.     Patient has had a cough for the past week.     Patient with right second toe injury. The injury occurred with a  four or five years ago.     Patient with skin spot on his right ankle for the past ten years. It recently became itchy.    Problem list and histories reviewed & adjusted, as indicated.  Additional history: as documented    Patient Active Problem List   Diagnosis     Colon polyp     Advanced directives, counseling/discussion     Hypertension goal BP (blood pressure) < 140/90     Hyperlipidemia LDL goal <130     Prostate cancer (HCC)     Past Surgical History:   Procedure Laterality Date     BIOPSY  2011    skin tagged left arm     C NONSPECIFIC PROCEDURE      Lawnmower injury right foot-toe      C NONSPECIFIC PROCEDURE      Pilonidal cyst     COLONOSCOPY  2013    Procedure: COLONOSCOPY;  Colonoscopy ;  Surgeon: Emerson Martinez MD, MD;  Location:  GI     DAVINCI PROSTATECTOMY N/A 2015    Procedure: DAVINCI PROSTATECTOMY;  Surgeon: Rodolfo Connor MD;  Location:  OR       Social History   Substance Use Topics     Smoking status: Former Smoker     Packs/day: 1.00     Years: 25.00     Quit date: 2009     Smokeless tobacco: Never Used      Comment: 4-5 cigs qd /quit 2009     Alcohol use Yes      Comment: socially,very little     Family History   Problem Relation Age of Onset     CANCER Mother      86 YO AND HYPERTENSION     Hypertension Mother      HEART DISEASE Father       86 YO MI     Prostate Cancer Brother 50     prostate cancer      "        Reviewed and updated as needed this visit by clinical staffTobacco  Allergies  Meds  Med Hx  Surg Hx  Fam Hx  Soc Hx      Reviewed and updated as needed this visit by Provider         ROS:  INTEGUMENTARY/SKIN: as above   RESP:as above   GI: as above     This document serves as a record of the services and decisions personally performed and made by Jd Almaraz MD. It was created on his behalf by Ev Palacios, a trained medical scribe. The creation of this document is based on the provider's statements to the medical scribe.  Ev Palacios 9:08 AM January 8, 2018    OBJECTIVE:     /74 (BP Location: Right arm, Patient Position: Chair, Cuff Size: Adult Regular)  Pulse 58  Temp 98.5  F (36.9  C) (Oral)  Ht 1.753 m (5' 9\")  Wt 101.1 kg (222 lb 14.4 oz)  BMI 32.92 kg/m2  Body mass index is 32.92 kg/(m^2).  GENERAL: healthy, alert and no distress  RESP: lungs clear to auscultation - no rales, rhonchi or wheezes  RECTAL (male): Anal  fissure at 5 o'clock position  SKIN: Firm raised nodule 1 cm on right lateral ankle, second digit claw toe with thickened curved nail     ASSESSMENT/PLAN:     1. Anal fissure  Trial of nifedipine topical.  Discussed treatment with conservative care for anal fissure with having daily soft stools with fiber and stool softener, sitz bath.  Follow-up with colorectal if not improving.  - COLORECTAL SURGERY REFERRAL    2. Essential hypertension with goal blood pressure less than 140/90  Controlled.    3. Onychogryphosis  Follow-up with podiatry for nail avulsion.    4. Acquired claw toe of right foot  - ORTHO  REFERRAL    5. Dermatofibroma  Appears to be dermatofibroma with no change over many years, follow-up if growing/changing.    The information in this document, created by the medical scribe for me, accurately reflects the services I personally performed and the decisions made by me. I have reviewed and approved this document for accuracy prior to leaving " the patient care area.  January 8, 2018 9:39 AM    Jd Almaraz MD  [facility]

## 2018-01-08 NOTE — NURSING NOTE
"Chief Complaint   Patient presents with     RECHECK       Initial /74 (BP Location: Right arm, Patient Position: Chair, Cuff Size: Adult Regular)  Pulse 58  Temp 98.5  F (36.9  C) (Oral)  Ht 5' 9\" (1.753 m)  Wt 222 lb 14.4 oz (101.1 kg)  BMI 32.92 kg/m2 Estimated body mass index is 32.92 kg/(m^2) as calculated from the following:    Height as of this encounter: 5' 9\" (1.753 m).    Weight as of this encounter: 222 lb 14.4 oz (101.1 kg).    Medication Reconciliation: complete    Health Maintenance addressed:  NONE    n/a    Ivelisse Simpson CMA                         "

## 2018-01-08 NOTE — TELEPHONE ENCOUNTER
[facility] pharmacy calling stating they cannot do the compound 0.3% nifedipine in 1.5% lidocaine ointment    Spoke with [facility] in [place] and the only [place] that compounds the suppositories is the [facility] [address] in [place].  Pt informed and ok with this but they won't be ready until tomorrow per AV pharmacist.    Rx sent    Arturo Howard RN, BSN

## 2018-01-08 NOTE — TELEPHONE ENCOUNTER
"Received a call from patient's pharmacy requesting information regarding compounded rx:    \"How many grams per applicator?\"    Please review and advise.      [phone]    Tati Ramon RN  "

## 2018-01-09 NOTE — TELEPHONE ENCOUNTER
Disp Refills Start End YVETTE   COMPOUNDED NON-CONTROLLED SUBSTANCE (CMPD RX) - PHARMACY TO MIX COMPOUNDED MEDICATION 30 g 0 1/8/2018  No   Sig: Place 1 applicator rectally 2 times daily   Class: E-Prescribe   Notes to Pharmacy: Pharmacy please compound 0.3% nifedipine in 1.5% lidocaine ointment   Route: Rectal     [facility] compound pharmacy calling  The usual dosing for this med 0.3% nifedipine and 1.5% lidocaine in oint base    Is still acceptable?    CB# [phone]    Route to provider to review and advise    Lydia RN Nurse Triage

## 2018-02-15 DIAGNOSIS — I10 ESSENTIAL HYPERTENSION WITH GOAL BLOOD PRESSURE LESS THAN 140/90: ICD-10-CM

## 2018-02-15 RX ORDER — VALSARTAN 160 MG/1
TABLET ORAL
Qty: 90 TABLET | Refills: 3 | Status: SHIPPED | OUTPATIENT
Start: 2018-02-15 | End: 2019-07-12

## 2018-02-15 NOTE — TELEPHONE ENCOUNTER
"Requested Prescriptions   Pending Prescriptions Disp Refills     valsartan (DIOVAN) 160 MG tablet [Pharmacy Med Name: VALSARTAN 160 MG Tablet] 90 tablet 3    Last Written Prescription Date:  04/05/2017  Last Fill Quantity: 90 tablet,  # refills: 3   Last office visit: 1/8/2018 with prescribing provider:  01/08/2018   Future Office Visit:   Next 5 appointments (look out 90 days)     Mar 16, 2018  3:45 PM CDT   Return Visit with Gigi Ward MD   [facility]    [facility]  [address]  [facility] [address]   [phone]                  Sig: TAKE 1 TABLET (160 MG) BY MOUTH DAILY    Angiotensin-II Receptors Passed    2/15/2018  2:21 PM       Passed - Blood pressure under 140/90 in past 12 months    BP Readings from Last 3 Encounters:   01/08/18 126/74   11/03/17 131/74   08/04/17 138/76                Passed - Recent or future visit with authorizing provider's specialty    Patient had office visit in the last year or has a visit in the next 30 days with authorizing provider.  See \"Patient Info\" tab in inbasket, or \"Choose Columns\" in Meds & Orders section of the refill encounter.            Passed - Patient is age 18 or older       Passed - Normal serum creatinine on file in past 12 months    Recent Labs   Lab Test  10/26/17   1553   CR  1.05            Passed - Normal serum potassium on file in past 12 months    Recent Labs   Lab Test  10/26/17   1553   POTASSIUM  4.0                      Alfred AMEST  "

## 2018-02-15 NOTE — TELEPHONE ENCOUNTER
Prescription approved per [facility] Refill Protocol.  Arturo Howard RN, BSN

## 2018-03-09 ENCOUNTER — ALLIED HEALTH/NURSE VISIT (OUTPATIENT)
Dept: NURSING | Facility: CLINIC | Age: 73
End: 2018-03-09
Payer: COMMERCIAL

## 2018-03-09 DIAGNOSIS — Z23 ENCOUNTER FOR IMMUNIZATION: Primary | ICD-10-CM

## 2018-03-09 DIAGNOSIS — C61 PROSTATE CANCER (H): ICD-10-CM

## 2018-03-09 LAB
BASOPHILS # BLD AUTO: 0 10E9/L (ref 0–0.2)
BASOPHILS NFR BLD AUTO: 0.6 %
DIFFERENTIAL METHOD BLD: NORMAL
EOSINOPHIL # BLD AUTO: 0.2 10E9/L (ref 0–0.7)
EOSINOPHIL NFR BLD AUTO: 2.2 %
ERYTHROCYTE [DISTWIDTH] IN BLOOD BY AUTOMATED COUNT: 13.5 % (ref 10–15)
HCT VFR BLD AUTO: 46.7 % (ref 40–53)
HGB BLD-MCNC: 15.9 G/DL (ref 13.3–17.7)
LDH SERPL L TO P-CCNC: 197 U/L (ref 85–227)
LYMPHOCYTES # BLD AUTO: 1.5 10E9/L (ref 0.8–5.3)
LYMPHOCYTES NFR BLD AUTO: 21.7 %
MCH RBC QN AUTO: 30.3 PG (ref 26.5–33)
MCHC RBC AUTO-ENTMCNC: 34 G/DL (ref 31.5–36.5)
MCV RBC AUTO: 89 FL (ref 78–100)
MONOCYTES # BLD AUTO: 0.5 10E9/L (ref 0–1.3)
MONOCYTES NFR BLD AUTO: 6.5 %
NEUTROPHILS # BLD AUTO: 4.8 10E9/L (ref 1.6–8.3)
NEUTROPHILS NFR BLD AUTO: 69 %
PLATELET # BLD AUTO: 171 10E9/L (ref 150–450)
RBC # BLD AUTO: 5.25 10E12/L (ref 4.4–5.9)
WBC # BLD AUTO: 7 10E9/L (ref 4–11)

## 2018-03-09 PROCEDURE — 90471 IMMUNIZATION ADMIN: CPT

## 2018-03-09 PROCEDURE — 84153 ASSAY OF PSA TOTAL: CPT | Performed by: FAMILY MEDICINE

## 2018-03-09 PROCEDURE — 83615 LACTATE (LD) (LDH) ENZYME: CPT | Performed by: FAMILY MEDICINE

## 2018-03-09 PROCEDURE — 36415 COLL VENOUS BLD VENIPUNCTURE: CPT | Performed by: FAMILY MEDICINE

## 2018-03-09 PROCEDURE — 85025 COMPLETE CBC W/AUTO DIFF WBC: CPT | Performed by: FAMILY MEDICINE

## 2018-03-09 PROCEDURE — 84403 ASSAY OF TOTAL TESTOSTERONE: CPT | Performed by: FAMILY MEDICINE

## 2018-03-09 PROCEDURE — 80053 COMPREHEN METABOLIC PANEL: CPT | Performed by: FAMILY MEDICINE

## 2018-03-09 PROCEDURE — 90715 TDAP VACCINE 7 YRS/> IM: CPT

## 2018-03-09 NOTE — MR AVS SNAPSHOT
After Visit Summary   3/9/2018    Wallace Zelaya    MRN: [id number]           Patient Information     Date Of Birth          1945        Visit Information        Provider Department      3/9/2018 4:00 PM AARTI CALLEJAS/LPN [facility]        Today's Diagnoses     Encounter for immunization    -  1       Follow-ups after your visit        Your next 10 appointments already scheduled     Mar 16, 2018  3:45 PM CDT   Return Visit with Gigi Ward MD   [facility] ([facility])    [facility]  [address]  [address]  [facility] [address]   [phone]            Oct 03, 2018  8:20 AM CDT   PHYSICAL with Jd Almaraz MD   [facility]    [address] [facility] [address] [phone]              Who to contact     If you have questions or need follow up information about today's clinic visit or your schedule please contact [facility] directly at [phone].  Normal or non-critical lab and imaging results will be communicated to you by OneStopWebhart, letter or phone within 4 business days after the clinic has received the results. If you do not hear from us within 7 days, please contact the clinic through Agility Communicationst or phone. If you have a critical or abnormal lab result, we will notify you by phone as soon as possible.  Submit refill requests through Ready or call your pharmacy and they will forward the refill request to us. Please allow 3 business days for your refill to be completed.          Additional Information About Your Visit        MyChart Information     Ready gives you secure access to your electronic health record. If you see a primary care provider, you can also send messages to your care team and make appointments. If you have questions, please call your primary care clinic.  If you do not have a primary care provider, please call  [phone] and they will assist you.        Care EveryWhere ID     This is your Care EveryWhere ID. This could be used by other organizations to access your [place] medical records  [id number]         Blood Pressure from Last 3 Encounters:   01/08/18 126/74   11/03/17 131/74   08/04/17 138/76    Weight from Last 3 Encounters:   01/08/18 222 lb 14.4 oz (101.1 kg)   11/03/17 230 lb (104.3 kg)   08/04/17 225 lb 3.2 oz (102.2 kg)              We Performed the Following     ADMIN 1st VACCINE     TDAP VACCINE (ADACEL)        Primary Care Provider Office Phone # Fax #    Jd Almaraz [phone] [address]  [facility] [address]        Equal Access to Services     [facility] : Hadii aad germaine hadasho Soomaali, waaxda luqadaha, qaybta kaalmada adeegyada, ana shafer . So [facility] [phone].    ATENCIÓN: Si habla español, tiene a crain disposición servicios gratuitos de asistencia lingüística. Brennen al [phone].    We comply with applicable federal civil rights laws and [place] laws. We do not discriminate on the basis of race, color, national origin, age, disability, sex, sexual orientation, or gender identity.            Thank you!     Thank you for choosing [facility]  for your care. Our goal is always to provide you with excellent care. Hearing back from our patients is one way we can continue to improve our services. Please take a few minutes to complete the written survey that you may receive in the mail after your visit with us. Thank you!             Your Updated Medication List - Protect others around you: Learn how to safely use, store and throw away your medicines at www.disposemymeds.org.          This list is accurate as of 3/9/18  4:14 PM.  Always use your most recent med list.                   Brand Name Dispense Instructions for use Diagnosis    atorvastatin 20 MG tablet    LIPITOR    90 tablet    Take 1 tablet (20 mg) by mouth  daily    Hyperlipidemia LDL goal <130       COMPOUNDED NON-CONTROLLED SUBSTANCE - PHARMACY TO MIX COMPOUNDED MEDICATION    CMPD RX    30 g    Place 1 applicator rectally 2 times daily    Anal fissure       valsartan 160 MG tablet    DIOVAN    90 tablet    TAKE 1 TABLET (160 MG) BY MOUTH DAILY    Essential hypertension with goal blood pressure less than 140/90

## 2018-03-09 NOTE — NURSING NOTE
Screening Questionnaire for Adult Immunization    Are you sick today?   No   Do you have allergies to medications, food, a vaccine component or latex?   No   Have you ever had a serious reaction after receiving a vaccination?   No   Do you have a long-term health problem with heart disease, lung disease, asthma, kidney disease, metabolic disease (e.g. diabetes), anemia, or other blood disorder?   No   Do you have cancer, leukemia, HIV/AIDS, or any other immune system problem?   No   In the past 3 months, have you taken medications that affect  your immune system, such as prednisone, other steroids, or anticancer drugs; drugs for the treatment of rheumatoid arthritis, Crohn s disease, or psoriasis; or have you had radiation treatments?   No   Have you had a seizure, or a brain or other nervous system problem?   No   During the past year, have you received a transfusion of blood or blood     products, or been given immune (gamma) globulin or antiviral drug?   No   For women: Are you pregnant or is there a chance you could become        pregnant during the next month?   No   Have you received any vaccinations in the past 4 weeks?   No     Immunization questionnaire answers were all negative.      MNVFC doesn't apply on this patient    Per orders of nurse only, injection of Tdap  given by Arline Coffman. Patient instructed to remain in clinic for 20 minutes afterwards, and to report any adverse reaction to me immediately.       Screening performed by Arline Coffman on 3/9/2018 at 4:13 PM.

## 2018-03-10 LAB
ALBUMIN SERPL-MCNC: 3.9 G/DL (ref 3.4–5)
ALP SERPL-CCNC: 110 U/L (ref 40–150)
ALT SERPL W P-5'-P-CCNC: 24 U/L (ref 0–70)
ANION GAP SERPL CALCULATED.3IONS-SCNC: 6 MMOL/L (ref 3–14)
AST SERPL W P-5'-P-CCNC: 22 U/L (ref 0–45)
BILIRUB SERPL-MCNC: 0.6 MG/DL (ref 0.2–1.3)
BUN SERPL-MCNC: 12 MG/DL (ref 7–30)
CALCIUM SERPL-MCNC: 8.7 MG/DL (ref 8.5–10.1)
CHLORIDE SERPL-SCNC: 107 MMOL/L (ref 94–109)
CO2 SERPL-SCNC: 28 MMOL/L (ref 20–32)
CREAT SERPL-MCNC: 1.01 MG/DL (ref 0.66–1.25)
GFR SERPL CREATININE-BSD FRML MDRD: 72 ML/MIN/1.7M2
GLUCOSE SERPL-MCNC: 89 MG/DL (ref 70–99)
POTASSIUM SERPL-SCNC: 4.1 MMOL/L (ref 3.4–5.3)
PROT SERPL-MCNC: 7.4 G/DL (ref 6.8–8.8)
SODIUM SERPL-SCNC: 141 MMOL/L (ref 133–144)

## 2018-03-12 LAB — PSA SERPL-MCNC: 2.73 UG/L (ref 0–4)

## 2018-03-14 LAB — TESTOST SERPL-MCNC: 331 NG/DL (ref 240–950)

## 2018-03-16 ENCOUNTER — ONCOLOGY VISIT (OUTPATIENT)
Dept: ONCOLOGY | Facility: CLINIC | Age: 73
End: 2018-03-16
Attending: INTERNAL MEDICINE
Payer: MEDICARE

## 2018-03-16 VITALS
BODY MASS INDEX: 32.29 KG/M2 | WEIGHT: 218 LBS | HEART RATE: 63 BPM | OXYGEN SATURATION: 92 % | DIASTOLIC BLOOD PRESSURE: 74 MMHG | TEMPERATURE: 97.5 F | SYSTOLIC BLOOD PRESSURE: 151 MMHG | RESPIRATION RATE: 20 BRPM | HEIGHT: 69 IN

## 2018-03-16 DIAGNOSIS — C61 PROSTATE CANCER (H): Primary | ICD-10-CM

## 2018-03-16 PROCEDURE — G0463 HOSPITAL OUTPT CLINIC VISIT: HCPCS

## 2018-03-16 PROCEDURE — 99213 OFFICE O/P EST LOW 20 MIN: CPT | Performed by: INTERNAL MEDICINE

## 2018-03-16 NOTE — PROGRESS NOTES
[facility]  FOLLOW-UP VISIT NOTE    PATIENT NAME: Wallace Zelaya MRN # [id number]  DATE OF VISIT: Mar 16, 2018 YOB: 1945    REFERRING PROVIDER: No referring provider defined for this encounter.    CANCER TYPE: Prostate cancer; Biochemical recurrence; Hormone sensitive disease  STAGE: Stage III - pT3b at diagnosis; M0    HISTORY OF PRESENTING ILLNESS:  Wallace was noted to have rising screening PSA from 2 - 4. He was referred to Dr. Rodolfo Connor. He had radical prostatectomy on 11/2015. Pathology from this revealed Cayla 4+4 disease with extraprostatic extension, seminal vesicle extension and he was staged at pT3b. All of the 12 lymph nodes resected were negative for disease. Post operatively his PSA did not drop to undetectable levels and remained elevated at 0.56. It vladimir to 0.9 in April 2016 and he was referred to Dr. Newton for adjuvant radiation therapy. He completed radiation therapy but did not have any PSA response to this treatment.     TREATMENT SUMMARY:  11/13/2015 Radical prostatectomy  April 2016  Radiation therapy    Oncology Supportive Medications 1/10/2017 4/11/2017   leuprolide (LUPRON DEPOT) IM 22.5 mg 22.5 mg     CURRENT INTERVENTIONS:  Lupron - Intermittent androgen deprivation OFF treatment phase    SUBJECTIVE   Wallace is being seen for his prostate cancer    He has been off lupron and is feeling better. His hot flashes have gone. He does go to hit some balls at [employer]. He has no complains at this visit.       PAST MEDICAL HISTORY   1. HTN  2. Dyslipidemia  3. Prostate cancer as detailed above      CURRENT OUTPATIENT MEDICATIONS     Current Outpatient Prescriptions   Medication Sig     valsartan (DIOVAN) 160 MG tablet TAKE 1 TABLET (160 MG) BY MOUTH DAILY     COMPOUNDED NON-CONTROLLED SUBSTANCE (CMPD RX) - PHARMACY TO MIX COMPOUNDED MEDICATION Place 1 applicator rectally 2 times daily     atorvastatin (LIPITOR) 20 MG tablet Take  "1 tablet (20 mg) by mouth daily     No current facility-administered medications for this visit.         ALLERGIES     Allergies   Allergen Reactions     Seasonal Allergies         REVIEW OF SYSTEMS   As above in the HPI, o/w complete 12-point ROS was negative.     PHYSICAL EXAM   /74  Pulse 63  Temp 97.5  F (36.4  C) (Tympanic)  Resp 20  Ht 1.753 m (5' 9\")  Wt 98.9 kg (218 lb)  SpO2 92%  BMI 32.19 kg/m2  SpO2 Readings from Last 4 Encounters:   03/16/18 92%   11/03/17 96%   07/18/17 97%   07/06/17 97%     Wt Readings from Last 3 Encounters:   03/16/18 98.9 kg (218 lb)   01/08/18 101.1 kg (222 lb 14.4 oz)   11/03/17 104.3 kg (230 lb)     GEN: NAD  HEENT: PERRL, EOMI, no icterus, injection or pallor. Oropharynx is clear.  NECK: no cervical or supraclavicular lymphadenopathy  LUNGS: clear bilaterally  CV: regular, no murmurs, rubs, or gallops  ABDOMEN: soft, non-tender, non-distended, normal bowel sounds, no hepatosplenomegaly by percussion or palpation  EXT: warm, well perfused, no edema  NEURO: alert  SKIN: no rashes   LABORATORY AND IMAGING STUDIES     Recent Labs   Lab Test  03/09/18   1525  10/26/17   1553  07/28/17   1558  04/04/17   1542  01/10/17   1257   NA  141  140  140  141  139   POTASSIUM  4.1  4.0  3.9  3.9  4.0   CHLORIDE  107  105  104  106  106   CO2  28  26  29  27  26   ANIONGAP  6  9  7  8  7   BUN  12  15  15  12  14   CR  1.01  1.05  1.04  0.95  0.96   GLC  89  98  92  88  90   TY  8.7  8.5  8.5  8.4*  8.8     No results for input(s): MAG, PHOS in the last 64409 hours.  Recent Labs   Lab Test  03/09/18   1525  10/26/17   1553  07/28/17   1558  04/04/17   1542  01/10/17   1257   WBC  7.0  5.0  5.3  5.0  4.8   HGB  15.9  14.0  13.9  14.7  15.2   PLT  171  204  191  197  179   MCV  89  92  90  89  90   NEUTROPHIL  69.0  51.7  52.8  65.3  58.0     Recent Labs   Lab Test  03/09/18   1525  10/26/17   1553  07/28/17   1558   BILITOTAL  0.6  0.4  0.4   ALKPHOS  110  117  111   ALT  24  36  " 48   AST  22  28  26   ALBUMIN  3.9  3.6  3.7   LDH  197  206  235*     No results found for: TSH  No results for input(s): CEA in the last 91842 hours.  Results for orders placed or performed during the hospital encounter of 01/20/17   CT Chest w Contrast    Narrative    CT CHEST WITH CONTRAST   1/20/2017 4:04 PM     HISTORY: Malignant neoplasm of the prostate gland.    COMPARISON: 11/9/2016 and 4/14/2016 - CT abdomen and pelvis.    TECHNIQUE: Following the uneventful administration of 80mL Isovue-370  intravenous contrast, helical sections were acquired through the  lungs. Coronal reconstructions were generated. Radiation dose for this  scan was reduced using automated exposure control, adjustment of the  mA and/or kV according to the patient's size, or iterative  reconstruction technique.    FINDINGS: Tiny 0.2 cm nodule in the posterolateral aspect of the left  upper lobe (series 3 image 13) and tiny 0.2 cm nodule in the  posteromedial aspect of the right upper lobe (series 3 image 19). A  few linear opacities in the lungs likely represent atelectasis or  scarring. The lungs are otherwise clear. No pleural or pericardial  effusion. No enlarged lymph nodes in the chest. Atherosclerotic  calcification in the thoracic aorta. Small hiatal hernia.    Scan through the upper abdomen is significant for a 1.3 cm right  adrenal nodule that is unchanged since 4/14/2016.      Impression    IMPRESSION:   1. No convincing evidence of metastatic neoplasm in the chest.  2. Two tiny indeterminate pulmonary nodules. These are likely benign  nodules. Recommend comparison with prior imaging studies, if  available. If not available and the patient is a smoker or has other  significant risk factors, a followup CT scan could be considered in 12  months to confirm stability.   3. Indeterminate 1.3 cm right adrenal nodule, unchanged since  4/14/2016. 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    MANUEL MARQUEZ MD     Recent Labs   Lab Test   03/09/18   1525  10/26/17   1553  07/28/17   1558  04/04/17   1542  01/10/17   1257   PSA  2.73  0.04  0.01  0.09  2.15   TESTOSTTOTAL  331  174*  4*  7*  310           ASSESSMENT AND PLAN   1. Biochemical PSA relapse post prostatectomy without any response to adjuvant radiation therapy  2. Observation phase of intermittent androgen deprivation therapy  3. HTN  4. ECOG PS1  5. No medical comorbidity    I had a lengthy discussion with Wallace. All his labs are within normal limits except for his PSA which is higher at 2.73.     His PSA has gone through over 5 doubling times in the last 4 months. This is not a good sign. He is not a good candidate for intermittent androgen deprivation therapy. He was teary eyed. He is very unhappy with the rise in PSA. I reviewed that we could restart his lupron and he would like to get started on therapy and does not see any reason to wait any further. I do agree that in next 3 months we will be looking at number in double digits.     He would like me to play golf and notes that he will one day take me for golf. He has continued to play golf through winter - hitting balls at [facility].    I will see him in 3 months when he will be due for his next dose of lupron.      Gigi Ward  ,  Division of Hematology, Oncology & Transplantation  [facility].

## 2018-03-16 NOTE — NURSING NOTE
"Oncology Rooming Note    March 16, 2018 3:46 PM   Wallace Zelaya is a 72 year old male who presents for:    Chief Complaint   Patient presents with     Oncology Clinic Visit     Follow up - Prostate cancer     Initial Vitals: Resp 20  Ht 1.753 m (5' 9\")  Wt 98.9 kg (218 lb)  BMI 32.19 kg/m2 Estimated body mass index is 32.19 kg/(m^2) as calculated from the following:    Height as of this encounter: 1.753 m (5' 9\").    Weight as of this encounter: 98.9 kg (218 lb). Body surface area is 2.19 meters squared.  Data Unavailable Comment: Data Unavailable   No LMP for male patient.  Allergies reviewed: Yes  Medications reviewed: Yes    Medications: Medication refills not needed today.  Pharmacy name entered into Quincee:    [facility] - [facility] [address] [facility] [address] [place] [address] - [address]  [facility] [address] [place] [address] [address]    Clinical concerns: Follow up     8 minutes for nursing intake (face to face time)     Carlene Tong CMA     DISCHARGE PLAN:  Next appointments: See patient instruction section  Departure Mode: Ambulatory  Accompanied by: self  0 minutes for nursing discharge (face to face time)   Carlene Tong CMA                  "

## 2018-03-16 NOTE — LETTER
3/16/2018         RE: Wallace Zelaya  [address] [address] [address]        Dear Colleague,    Thank you for referring your patient, Wallace Zelaya, to the [facility]. Please see a copy of my visit note below.    [facility]INIC  FOLLOW-UP VISIT NOTE    PATIENT NAME: Wallace Zelaya MRN # [id number]  DATE OF VISIT: Mar 16, 2018 YOB: 1945    REFERRING PROVIDER: No referring provider defined for this encounter.    CANCER TYPE: Prostate cancer; Biochemical recurrence; Hormone sensitive disease  STAGE: Stage III - pT3b at diagnosis; M0    HISTORY OF PRESENTING ILLNESS:  Wallace was noted to have rising screening PSA from 2 - 4. He was referred to Dr. Rodolfo Connor. He had radical prostatectomy on 11/2015. Pathology from this revealed Cayla 4+4 disease with extraprostatic extension, seminal vesicle extension and he was staged at pT3b. All of the 12 lymph nodes resected were negative for disease. Post operatively his PSA did not drop to undetectable levels and remained elevated at 0.56. It vladimir to 0.9 in April 2016 and he was referred to Dr. Newton for adjuvant radiation therapy. He completed radiation therapy but did not have any PSA response to this treatment.     TREATMENT SUMMARY:  11/13/2015 Radical prostatectomy  April 2016  Radiation therapy    Oncology Supportive Medications 1/10/2017 4/11/2017   leuprolide (LUPRON DEPOT) IM 22.5 mg 22.5 mg     CURRENT INTERVENTIONS:  Lupron - Intermittent androgen deprivation OFF treatment phase    SUBJECTIVE   Wallace is being seen for his prostate cancer    He has been off lupron and is feeling better. His hot flashes have gone. He does go to hit some balls at [employer]. He has no complains at this visit.       PAST MEDICAL HISTORY   1. HTN  2. Dyslipidemia  3. Prostate cancer as detailed above      CURRENT OUTPATIENT MEDICATIONS     Current Outpatient Prescriptions   Medication  "Sig     valsartan (DIOVAN) 160 MG tablet TAKE 1 TABLET (160 MG) BY MOUTH DAILY     COMPOUNDED NON-CONTROLLED SUBSTANCE (CMPD RX) - PHARMACY TO MIX COMPOUNDED MEDICATION Place 1 applicator rectally 2 times daily     atorvastatin (LIPITOR) 20 MG tablet Take 1 tablet (20 mg) by mouth daily     No current facility-administered medications for this visit.         ALLERGIES     Allergies   Allergen Reactions     Seasonal Allergies         REVIEW OF SYSTEMS   As above in the HPI, o/w complete 12-point ROS was negative.     PHYSICAL EXAM   /74  Pulse 63  Temp 97.5  F (36.4  C) (Tympanic)  Resp 20  Ht 1.753 m (5' 9\")  Wt 98.9 kg (218 lb)  SpO2 92%  BMI 32.19 kg/m2  SpO2 Readings from Last 4 Encounters:   03/16/18 92%   11/03/17 96%   07/18/17 97%   07/06/17 97%     Wt Readings from Last 3 Encounters:   03/16/18 98.9 kg (218 lb)   01/08/18 101.1 kg (222 lb 14.4 oz)   11/03/17 104.3 kg (230 lb)     GEN: NAD  HEENT: PERRL, EOMI, no icterus, injection or pallor. Oropharynx is clear.  NECK: no cervical or supraclavicular lymphadenopathy  LUNGS: clear bilaterally  CV: regular, no murmurs, rubs, or gallops  ABDOMEN: soft, non-tender, non-distended, normal bowel sounds, no hepatosplenomegaly by percussion or palpation  EXT: warm, well perfused, no edema  NEURO: alert  SKIN: no rashes   LABORATORY AND IMAGING STUDIES     Recent Labs   Lab Test  03/09/18   1525  10/26/17   1553  07/28/17   1558  04/04/17   1542  01/10/17   1257   NA  141  140  140  141  139   POTASSIUM  4.1  4.0  3.9  3.9  4.0   CHLORIDE  107  105  104  106  106   CO2  28  26  29  27  26   ANIONGAP  6  9  7  8  7   BUN  12  15  15  12  14   CR  1.01  1.05  1.04  0.95  0.96   GLC  89  98  92  88  90   TY  8.7  8.5  8.5  8.4*  8.8     No results for input(s): MAG, PHOS in the last 37133 hours.  Recent Labs   Lab Test  03/09/18   1525  10/26/17   1553  07/28/17   1558  04/04/17   1542  01/10/17   1257   WBC  7.0  5.0  5.3  5.0  4.8   HGB  15.9  14.0  13.9  " 14.7  15.2   PLT  171  204  191  197  179   MCV  89  92  90  89  90   NEUTROPHIL  69.0  51.7  52.8  65.3  58.0     Recent Labs   Lab Test  03/09/18   1525  10/26/17   1553  07/28/17   1558   BILITOTAL  0.6  0.4  0.4   ALKPHOS  110  117  111   ALT  24  36  48   AST  22  28  26   ALBUMIN  3.9  3.6  3.7   LDH  197  206  235*     No results found for: TSH  No results for input(s): CEA in the last 31092 hours.  Results for orders placed or performed during the hospital encounter of 01/20/17   CT Chest w Contrast    Narrative    CT CHEST WITH CONTRAST   1/20/2017 4:04 PM     HISTORY: Malignant neoplasm of the prostate gland.    COMPARISON: 11/9/2016 and 4/14/2016 - CT abdomen and pelvis.    TECHNIQUE: Following the uneventful administration of 80mL Isovue-370  intravenous contrast, helical sections were acquired through the  lungs. Coronal reconstructions were generated. Radiation dose for this  scan was reduced using automated exposure control, adjustment of the  mA and/or kV according to the patient's size, or iterative  reconstruction technique.    FINDINGS: Tiny 0.2 cm nodule in the posterolateral aspect of the left  upper lobe (series 3 image 13) and tiny 0.2 cm nodule in the  posteromedial aspect of the right upper lobe (series 3 image 19). A  few linear opacities in the lungs likely represent atelectasis or  scarring. The lungs are otherwise clear. No pleural or pericardial  effusion. No enlarged lymph nodes in the chest. Atherosclerotic  calcification in the thoracic aorta. Small hiatal hernia.    Scan through the upper abdomen is significant for a 1.3 cm right  adrenal nodule that is unchanged since 4/14/2016.      Impression    IMPRESSION:   1. No convincing evidence of metastatic neoplasm in the chest.  2. Two tiny indeterminate pulmonary nodules. These are likely benign  nodules. Recommend comparison with prior imaging studies, if  available. If not available and the patient is a smoker or has  other  significant risk factors, a followup CT scan could be considered in 12  months to confirm stability.   3. Indeterminate 1.3 cm right adrenal nodule, unchanged since  4/14/2016. 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    MANUEL MARQUEZ MD     Recent Labs   Lab Test  03/09/18   1525  10/26/17   1553  07/28/17   1558  04/04/17   1542  01/10/17   1257   PSA  2.73  0.04  0.01  0.09  2.15   TESTOSTTOTAL  331  174*  4*  7*  310           ASSESSMENT AND PLAN   1. Biochemical PSA relapse post prostatectomy without any response to adjuvant radiation therapy  2. Observation phase of intermittent androgen deprivation therapy  3. HTN  4. ECOG PS1  5. No medical comorbidity    I had a lengthy discussion with Wallace. All his labs are within normal limits except for his PSA which is higher at 2.73.     His PSA has gone through over 5 doubling times in the last 4 months. This is not a good sign. He is not a good candidate for intermittent androgen deprivation therapy. He was teary eyed. He is very unhappy with the rise in PSA. I reviewed that we could restart his lupron and he would like to get started on therapy and does not see any reason to wait any further. I do agree that in next 3 months we will be looking at number in double digits.     He would like me to play golf and notes that he will one day take me for golf. He has continued to play golf through winter - hitting balls at [facility].    I will see him in 3 months when he will be due for his next dose of lupron.      Gigi Ward  ,  Division of Hematology, Oncology & Transplantation  [facility].       Again, thank you for allowing me to participate in the care of your patient.        Sincerely,        Gigi Ward MD

## 2018-03-16 NOTE — PATIENT INSTRUCTIONS
Lupron today or next available    Follow up in 3 months with labs few days prior to visit and lupron after visit scheduled and labs to be done at [facility]

## 2018-03-16 NOTE — MR AVS SNAPSHOT
After Visit Summary   3/16/2018    Wallace Zelaya    MRN: [id number]           Patient Information     Date Of Birth          1945        Visit Information        Provider Department      3/16/2018 3:45 PM Gigi Ward MD [facility]        Today's Diagnoses     Prostate cancer (HCC)    -  1      Care Instructions    Lupron today or next available    Follow up in 3 months with labs few days prior to visit and lupron after visit scheduled and labs to be done at [facility] Tr[facility]          Follow-ups after your visit        Your next 10 appointments already scheduled     Mar 21, 2018  3:00 PM CDT   injection with RH LAB DRAW 1   [facility] ([facility])    [facility] [address]  [facility] [address]   [phone]            Jun 22, 2018  3:15 PM CDT   Return Visit with Gigi Ward MD   [facility] ([facility])    [facility]  [address]  [facility] [address]   [phone]            Jun 22, 2018  3:15 PM CDT   injection with RH LAB DRAW 1   [facility] ([facility])    [facility]  [address]  [facility] [address]   [phone]            Oct 03, 2018  8:20 AM CDT   PHYSICAL with Jd Almaraz MD   [facility] ([facility]    [address] [place] [address] [phone]              Who to contact     If you have questions or need follow up information about today's clinic visit or your schedule please contact [facility] directly at [phone].  Normal or non-critical lab and imaging results will be communicated to you by MyChart, letter or phone within 4 business days after the clinic has received the results. If you  "do not hear from us within 7 days, please contact the clinic through [employer] or phone. If you have a critical or abnormal lab result, we will notify you by phone as soon as possible.  Submit refill requests through [employer] or call your pharmacy and they will forward the refill request to us. Please allow 3 business days for your refill to be completed.          Additional Information About Your Visit        IDINCUharIPG Information     [employer] gives you secure access to your electronic health record. If you see a primary care provider, you can also send messages to your care team and make appointments. If you have questions, please call your primary care clinic.  If you do not have a primary care provider, please call [phone] and they will assist you.        Care EveryWhere ID     This is your Care EveryWhere ID. This could be used by other organizations to access your [place] medical records  [id number]        Your Vitals Were     Pulse Temperature Respirations Height Pulse Oximetry BMI (Body Mass Index)    63 97.5  F (36.4  C) (Tympanic) 20 1.753 m (5' 9\") 92% 32.19 kg/m2       Blood Pressure from Last 3 Encounters:   03/16/18 151/74   01/08/18 126/74   11/03/17 131/74    Weight from Last 3 Encounters:   03/16/18 98.9 kg (218 lb)   01/08/18 101.1 kg (222 lb 14.4 oz)   11/03/17 104.3 kg (230 lb)              Today, you had the following     No orders found for display       Primary Care Provider Office Phone # Fax #    Jd Almaraz [phone] [address]  [facility] [address]        Equal Access to Services     [facility] Center[facility]YSI : Hadii garry Ibanez, miguel a bansal, qaana leiva. So [facility] [phone].    ATENCIÓN: Si habla español, tiene a crain disposición servicios gratuitos de asistencia lingüística. Brennen al [phone].    We comply with applicable federal civil rights laws and [place] laws. We do not discriminate " on the basis of race, color, national origin, age, disability, sex, sexual orientation, or gender identity.            Thank you!     Thank you for choosing [facility]  for your care. Our goal is always to provide you with excellent care. Hearing back from our patients is one way we can continue to improve our services. Please take a few minutes to complete the written survey that you may receive in the mail after your visit with us. Thank you!             Your Updated Medication List - Protect others around you: Learn how to safely use, store and throw away your medicines at www.disposemymeds.org.          This list is accurate as of 3/16/18  4:40 PM.  Always use your most recent med list.                   Brand Name Dispense Instructions for use Diagnosis    atorvastatin 20 MG tablet    LIPITOR    90 tablet    Take 1 tablet (20 mg) by mouth daily    Hyperlipidemia LDL goal <130       COMPOUNDED NON-CONTROLLED SUBSTANCE - PHARMACY TO MIX COMPOUNDED MEDICATION    CMPD RX    30 g    Place 1 applicator rectally 2 times daily    Anal fissure       valsartan 160 MG tablet    DIOVAN    90 tablet    TAKE 1 TABLET (160 MG) BY MOUTH DAILY    Essential hypertension with goal blood pressure less than 140/90

## 2018-03-21 ENCOUNTER — ALLIED HEALTH/NURSE VISIT (OUTPATIENT)
Dept: INFUSION THERAPY | Facility: CLINIC | Age: 73
End: 2018-03-21
Attending: INTERNAL MEDICINE
Payer: MEDICARE

## 2018-03-21 VITALS
DIASTOLIC BLOOD PRESSURE: 79 MMHG | SYSTOLIC BLOOD PRESSURE: 139 MMHG | RESPIRATION RATE: 18 BRPM | OXYGEN SATURATION: 95 % | TEMPERATURE: 96.8 F | HEART RATE: 60 BPM

## 2018-03-21 DIAGNOSIS — C61 PROSTATE CANCER (H): Primary | ICD-10-CM

## 2018-03-21 PROCEDURE — 96402 CHEMO HORMON ANTINEOPL SQ/IM: CPT

## 2018-03-21 PROCEDURE — 25000128 H RX IP 250 OP 636: Performed by: INTERNAL MEDICINE

## 2018-03-21 RX ADMIN — LEUPROLIDE ACETATE 22.5 MG: KIT at 15:06

## 2018-03-21 ASSESSMENT — PAIN SCALES - GENERAL: PAINLEVEL: NO PAIN (0)

## 2018-03-21 NOTE — PROGRESS NOTES
Infusion Nursing Note:  Wallace Zelaya presents today for Lupron.    Patient seen by provider today: No   present during visit today: Not Applicable.    Note: Last Lupron was April 2017. Patient restarting every 3 month schedule. Next dose will be June, with labs and MD visit prior. Labs will be done at [facility]; patient will schedule.    Intravenous Access:  No Intravenous access/labs at this visit.    Treatment Conditions:  Not Applicable.    Post Infusion Assessment:  Patient tolerated injection without incident.    Discharge Plan:   Patient declined prescription refills.  Discharge instructions reviewed with: Patient.  Copy of AVS reviewed with patient and/or family.  Patient will return 6/22/18 for MD visit and Lupron for next appointment.  Patient discharged in stable condition accompanied by: self.  Departure Mode: Ambulatory.    Dolly Isaac RN

## 2018-03-21 NOTE — MR AVS SNAPSHOT
After Visit Summary   3/21/2018    Wallace Zelaya    MRN: [id number]           Patient Information     Date Of Birth          1945        Visit Information        Provider Department      3/21/2018 3:00 PM RH LAB DRAW 1 [facility]        Today's Diagnoses     Prostate cancer (HCC)    -  1       Follow-ups after your visit        Your next 10 appointments already scheduled     Jun 22, 2018  3:15 PM CDT   Return Visit with Gigi Ward MD   [facility] ([facility])    [facility]  [address]  [facility] [address]   [phone]            Jun 22, 2018  3:15 PM CDT   injection with RH LAB DRAW 1   [facility] ([facility])    [facility]  [address]  [facility] [address]   [phone]            Oct 03, 2018  8:20 AM CDT   PHYSICAL with Jd Almaraz MD   [facility] ([facility]    [address] [place] [address] [phone]              Who to contact     If you have questions or need follow up information about today's clinic visit or your schedule please contact  INFUSION SERVICES directly at [phone].  Normal or non-critical lab and imaging results will be communicated to you by MyChart, letter or phone within 4 business days after the clinic has received the results. If you do not hear from us within 7 days, please contact the clinic through MyChart or phone. If you have a critical or abnormal lab result, we will notify you by phone as soon as possible.  Submit refill requests through [employer] or call your pharmacy and they will forward the refill request to us. Please allow 3 business days for your refill to be completed.          Additional Information About Your Visit        MyChart Information     [employer]  gives you secure access to your electronic health record. If you see a primary care provider, you can also send messages to your care team and make appointments. If you have questions, please call your primary care clinic.  If you do not have a primary care provider, please call [phone] and they will assist you.        Care EveryWhere ID     This is your Care EveryWhere ID. This could be used by other organizations to access your [place] medical records  [id number]        Your Vitals Were     Pulse Temperature Respirations Pulse Oximetry          60 96.8  F (36  C) 18 95%         Blood Pressure from Last 3 Encounters:   03/21/18 139/79   03/16/18 151/74   01/08/18 126/74    Weight from Last 3 Encounters:   03/16/18 98.9 kg (218 lb)   01/08/18 101.1 kg (222 lb 14.4 oz)   11/03/17 104.3 kg (230 lb)              Today, you had the following     No orders found for display       Primary Care Provider Office Phone # Fax #    Jd Almaraz [phone] [address]  [facility] [address]        Equal Access to Services     [facility] Hospital[facility] : Hadii aad ku hadasho Soomaali, waaxda luqadaha, qaybta kaalmada adeegyada, ana shafer . So [facility] [phone].    ATENCIÓN: Si habla español, tiene a crain disposición servicios gratuitos de asistencia lingüística. [facility] [phone].    We comply with applicable federal civil rights laws and [place] laws. We do not discriminate on the basis of race, color, national origin, age, disability, sex, sexual orientation, or gender identity.            Thank you!     Thank you for choosing [facility]  for your care. Our goal is always to provide you with excellent care. Hearing back from our patients is one way we can continue to improve our services. Please take a few minutes to complete the written survey that you may receive in the mail after your visit with us. Thank you!             Your  Updated Medication List - Protect others around you: Learn how to safely use, store and throw away your medicines at www.disposemymeds.org.          This list is accurate as of 3/21/18  3:03 PM.  Always use your most recent med list.                   Brand Name Dispense Instructions for use Diagnosis    atorvastatin 20 MG tablet    LIPITOR    90 tablet    Take 1 tablet (20 mg) by mouth daily    Hyperlipidemia LDL goal <130       COMPOUNDED NON-CONTROLLED SUBSTANCE - PHARMACY TO MIX COMPOUNDED MEDICATION    CMPD RX    30 g    Place 1 applicator rectally 2 times daily    Anal fissure       valsartan 160 MG tablet    DIOVAN    90 tablet    TAKE 1 TABLET (160 MG) BY MOUTH DAILY    Essential hypertension with goal blood pressure less than 140/90

## 2018-04-30 DIAGNOSIS — E78.5 HYPERLIPIDEMIA LDL GOAL <130: ICD-10-CM

## 2018-04-30 RX ORDER — ATORVASTATIN CALCIUM 20 MG/1
TABLET, FILM COATED ORAL
Qty: 90 TABLET | Refills: 3 | Status: SHIPPED | OUTPATIENT
Start: 2018-04-30 | End: 2019-06-18

## 2018-04-30 NOTE — TELEPHONE ENCOUNTER
"Requested Prescriptions   Pending Prescriptions Disp Refills     atorvastatin (LIPITOR) 20 MG tablet [Pharmacy Med Name: ATORVASTATIN CALCIUM 20 MG Tablet] 90 tablet 3    Last Written Prescription Date:  04/05/2017  Last Fill Quantity: 90 TABLET,  # refills: 3   Last office visit: 1/8/2018 with prescribing provider:  01/08/2018   Future Office Visit:   Next 5 appointments (look out 90 days)     Jun 22, 2018  3:15 PM CDT   Return Visit with Gigi Ward MD   [facility] ([facility])    [facility]  [address]  [facility] [address]   [phone]                  Sig: TAKE 1 TABLET EVERY DAY    Statins Protocol Failed    4/30/2018  2:03 PM       Failed - LDL on file in past 12 months    Recent Labs   Lab Test  08/03/16   0920   LDL  106*            Passed - No abnormal creatine kinase in past 12 months    No lab results found.            Passed - Recent (12 mo) or future (30 days) visit within the authorizing provider's specialty    Patient had office visit in the last 12 months or has a visit in the next 30 days with authorizing provider or within the authorizing provider's specialty.  See \"Patient Info\" tab in inbasket, or \"Choose Columns\" in Meds & Orders section of the refill encounter.           Passed - Patient is age 18 or older          Alfred AMEST  "

## 2018-05-11 ENCOUNTER — OFFICE VISIT (OUTPATIENT)
Dept: FAMILY MEDICINE | Facility: CLINIC | Age: 73
End: 2018-05-11
Payer: COMMERCIAL

## 2018-05-11 VITALS
BODY MASS INDEX: 31.24 KG/M2 | WEIGHT: 210.9 LBS | DIASTOLIC BLOOD PRESSURE: 90 MMHG | OXYGEN SATURATION: 97 % | SYSTOLIC BLOOD PRESSURE: 160 MMHG | HEIGHT: 69 IN | HEART RATE: 58 BPM | TEMPERATURE: 98.3 F | RESPIRATION RATE: 16 BRPM

## 2018-05-11 DIAGNOSIS — I10 ESSENTIAL HYPERTENSION: Primary | ICD-10-CM

## 2018-05-11 PROCEDURE — 99213 OFFICE O/P EST LOW 20 MIN: CPT | Performed by: FAMILY MEDICINE

## 2018-05-11 RX ORDER — AMLODIPINE BESYLATE 5 MG/1
5 TABLET ORAL DAILY
Qty: 30 TABLET | Refills: 1 | Status: SHIPPED | OUTPATIENT
Start: 2018-05-11 | End: 2018-05-29

## 2018-05-11 NOTE — MR AVS SNAPSHOT
After Visit Summary   5/11/2018    Wallace Zelaya    MRN: [id number]           Patient Information     Date Of Birth          1945        Visit Information        Provider Department      5/11/2018 8:20 AM Jd Almaraz MD [facility]        Today's Diagnoses     Essential hypertension    -  1       Follow-ups after your visit        Follow-up notes from your care team     Return in about 2 weeks (around 5/25/2018) for nurse blood pressure check.      Your next 10 appointments already scheduled     Jun 22, 2018  3:15 PM CDT   Return Visit with Gigi Ward MD   [facility] ([facility])    [facility]  [address]  [facility] [address]   [phone]            Jun 22, 2018  3:15 PM CDT   injection with RH LAB DRAW 1   [facility] ([facility])    [facility]  [address]  [facility] [address]   [phone]            Oct 03, 2018  8:20 AM CDT   PHYSICAL with Jd Almaraz MD   [facility] ([facility])    [address] [facility] [address] [phone]              Who to contact     If you have questions or need follow up information about today's clinic visit or your schedule please contact [facility] directly at [phone].  Normal or non-critical lab and imaging results will be communicated to you by MyChart, letter or phone within 4 business days after the clinic has received the results. If you do not hear from us within 7 days, please contact the clinic through MyChart or phone. If you have a critical or abnormal lab result, we will notify you by phone as soon as possible.  Submit refill requests through [employer] or call your pharmacy and they will forward the refill request to us. Please allow 3 business days for your refill to be completed.  "         Additional Information About Your Visit        MyChart Information     [employer] gives you secure access to your electronic health record. If you see a primary care provider, you can also send messages to your care team and make appointments. If you have questions, please call your primary care clinic.  If you do not have a primary care provider, please call [phone] and they will assist you.        Care EveryWhere ID     This is your Care EveryWhere ID. This could be used by other organizations to access your [place] medical records  [id number]        Your Vitals Were     Pulse Temperature Respirations Height Pulse Oximetry BMI (Body Mass Index)    58 98.3  F (36.8  C) (Oral) 16 5' 9\" (1.753 m) 97% 31.14 kg/m2       Blood Pressure from Last 3 Encounters:   05/11/18 160/90   03/21/18 139/79   03/16/18 151/74    Weight from Last 3 Encounters:   05/11/18 210 lb 14.4 oz (95.7 kg)   03/16/18 218 lb (98.9 kg)   01/08/18 222 lb 14.4 oz (101.1 kg)              Today, you had the following     No orders found for display         Today's Medication Changes          These changes are accurate as of 5/11/18  8:45 AM.  If you have any questions, ask your nurse or doctor.               Start taking these medicines.        Dose/Directions    amLODIPine 5 MG tablet   Commonly known as:  NORVASC   Used for:  Essential hypertension   Started by:  Jd Almaraz MD        Dose:  5 mg   Take 1 tablet (5 mg) by mouth daily   Quantity:  30 tablet   Refills:  1            Where to get your medicines      These medications were sent to [facility] [address] [place] [address] [facility]  [address] [facility] [address]     Phone:  [phone]     amLODIPine 5 MG tablet                Primary Care Provider Office Phone # Fax #    Jd Almaraz [phone] [address]  [facility] [address]        Equal Access to Services     CADEN ALLRED AH: miguel a Marion " catherine bansalmckayberta sanchezana quispe. So [facility] [phone].    ATENCIÓN: Si keven hoffman, tiene a crain disposición servicios gratuitos de asistencia lingüística. Brennen al [phone].    We comply with applicable federal civil rights laws and [place] laws. We do not discriminate on the basis of race, color, national origin, age, disability, sex, sexual orientation, or gender identity.            Thank you!     Thank you for choosing [facility]  for your care. Our goal is always to provide you with excellent care. Hearing back from our patients is one way we can continue to improve our services. Please take a few minutes to complete the written survey that you may receive in the mail after your visit with us. Thank you!             Your Updated Medication List - Protect others around you: Learn how to safely use, store and throw away your medicines at www.disposemymeds.org.          This list is accurate as of 5/11/18  8:45 AM.  Always use your most recent med list.                   Brand Name Dispense Instructions for use Diagnosis    amLODIPine 5 MG tablet    NORVASC    30 tablet    Take 1 tablet (5 mg) by mouth daily    Essential hypertension       atorvastatin 20 MG tablet    LIPITOR    90 tablet    TAKE 1 TABLET EVERY DAY    Hyperlipidemia LDL goal <130       valsartan 160 MG tablet    DIOVAN    90 tablet    TAKE 1 TABLET (160 MG) BY MOUTH DAILY    Essential hypertension with goal blood pressure less than 140/90

## 2018-05-11 NOTE — NURSING NOTE
"Chief Complaint   Patient presents with     Hypertension       Initial /74 (BP Location: Right arm, Patient Position: Chair, Cuff Size: Adult Large)  Pulse 58  Temp 98.3  F (36.8  C) (Oral)  Resp 16  Ht 5' 9\" (1.753 m)  Wt 210 lb 14.4 oz (95.7 kg)  SpO2 97%  BMI 31.14 kg/m2 Estimated body mass index is 31.14 kg/(m^2) as calculated from the following:    Height as of this encounter: 5' 9\" (1.753 m).    Weight as of this encounter: 210 lb 14.4 oz (95.7 kg).  Medication Reconciliation: complete      Health Maintenance addressed:  NONE    N/a    LINN Naranjo        "

## 2018-05-25 ENCOUNTER — TELEPHONE (OUTPATIENT)
Dept: FAMILY MEDICINE | Facility: CLINIC | Age: 73
End: 2018-05-25

## 2018-05-25 ENCOUNTER — ALLIED HEALTH/NURSE VISIT (OUTPATIENT)
Dept: NURSING | Facility: CLINIC | Age: 73
End: 2018-05-25
Payer: COMMERCIAL

## 2018-05-25 VITALS — DIASTOLIC BLOOD PRESSURE: 66 MMHG | SYSTOLIC BLOOD PRESSURE: 118 MMHG

## 2018-05-25 DIAGNOSIS — Z01.30 BP CHECK: Primary | ICD-10-CM

## 2018-05-25 PROCEDURE — 99207 ZZC NO CHARGE NURSE ONLY: CPT

## 2018-05-25 NOTE — TELEPHONE ENCOUNTER
"Vital Signs 11/3/2017 1/8/2018 3/16/2018 3/21/2018   Systolic 131 126 151 139   Diastolic 74 74 74 79   Pulse 72 58 63 60   Temperature 97 98.5 97.5 96.8   Respirations 16  20 18   Weight (LB) 230 lb 222 lb 14.4 oz 218 lb    Height 5' 9\" 5' 9\" 5' 9\"    BMI (Calculated) 34.04 32.99 32.26    Pain 0   0   O2 96  92 95     Vital Signs 5/11/2018 5/25/2018   Systolic 160 118   Diastolic 90 66   Pulse 58    Temperature 98.3    Respirations 16    Weight (LB) 210 lb 14.4 oz    Height 5' 9\"    BMI (Calculated) 31.21    Pain     O2 97      Pt sat for 12 minutes and took on right arm at 3pm on 5/25/2018.Jacky Ramon CMA    "

## 2018-05-29 ENCOUNTER — MYC MEDICAL ADVICE (OUTPATIENT)
Dept: FAMILY MEDICINE | Facility: CLINIC | Age: 73
End: 2018-05-29

## 2018-05-29 DIAGNOSIS — I10 ESSENTIAL HYPERTENSION: ICD-10-CM

## 2018-05-29 RX ORDER — AMLODIPINE BESYLATE 5 MG/1
5 TABLET ORAL DAILY
Qty: 90 TABLET | Refills: 3 | Status: SHIPPED | OUTPATIENT
Start: 2018-05-29 | End: 2019-06-04

## 2018-05-29 NOTE — TELEPHONE ENCOUNTER
Patient is requesting a 90 day supply.    Requested Prescriptions   Pending Prescriptions Disp Refills     amLODIPine (NORVASC) 5 MG tablet 90 tablet 1     Sig: Take 1 tablet (5 mg) by mouth daily    There is no refill protocol information for this order        Last Written Prescription Date:  05/11/18  Last Fill Quantity: 30,  # refills: 1   Last office visit: 5/11/2018 with prescribing provider:  Dr. Almaraz   Future Office Visit:   Next 5 appointments (look out 90 days)     Jun 22, 2018  3:15 PM CDT   Return Visit with Gigi Ward MD   [facility] ([facility])    [facility]  [address]  [facility] [address] [phone]                 Glenna Renee

## 2018-05-29 NOTE — TELEPHONE ENCOUNTER
Routing refill request to provider for review/approval because:  Pt requesting 90 day fill -  BP was at goal in nurse check-       Unsure of f/u, please add refills as appropriate     Dona Da Silva, RN      BP Readings from Last 3 Encounters:   05/25/18 118/66   05/11/18 160/90   03/21/18 139/79

## 2018-06-04 ENCOUNTER — TELEPHONE (OUTPATIENT)
Dept: FAMILY MEDICINE | Facility: CLINIC | Age: 73
End: 2018-06-04

## 2018-06-04 DIAGNOSIS — B00.1 HERPES LABIALIS: Primary | ICD-10-CM

## 2018-06-04 NOTE — TELEPHONE ENCOUNTER
acyclovir (ZOVIRAX) 5 % cream       Last Written Prescription Date:  06/08/12/  Last Fill Quantity: 5 g,   # refills: 3  Last Office Visit: 05/11/2018  Future Office visit:    Next 5 appointments (look out 90 days)     Jun 22, 2018  3:15 PM CDT   Return Visit with Gigi Ward MD   [facility] ([facility])    [facility]  [address]  [facility] [address]   [phone]                   Routing refill request to provider for review/approval because:  Medication is reported/historical      Patient would like to know if he can get a refill, forgot to ask when he was in.    Glenna Renee

## 2018-06-04 NOTE — TELEPHONE ENCOUNTER
Call to pt-  He uses this when he gets cold sores.     Routing refill request to provider for review/approval because:  Medication is reported/historical    Dona Da Sliva RN

## 2018-06-06 RX ORDER — ACYCLOVIR 50 MG/G
OINTMENT TOPICAL
Qty: 15 G | Refills: 3 | Status: SHIPPED | OUTPATIENT
Start: 2018-06-06 | End: 2019-11-08

## 2018-06-06 NOTE — TELEPHONE ENCOUNTER
"Pt declines pill \"this stuff just works like magic\"      He wants to continue with the cream    Dona Da Silva RN    "

## 2018-06-11 NOTE — TELEPHONE ENCOUNTER
[facility] pharmacy calling and states if we are not going to do PA we should call him.  1 tube is over $700.  Josefa Dexter RN

## 2018-06-12 ENCOUNTER — TELEPHONE (OUTPATIENT)
Dept: FAMILY MEDICINE | Facility: CLINIC | Age: 73
End: 2018-06-12

## 2018-06-12 NOTE — TELEPHONE ENCOUNTER
PRIOR AUTHORIZATION DENIED    Medication: acyclovir (ZOVIRAX) 5 % ointment-DENIED    Denial Date: 6/12/2018    Denial Rational:        Appeal Information:  IF YOU WOULD LIKE TO APPEAL PLEASE SUPPLY PA TEAM WITH A LETTER OF MEDICAL NECESSITY WITH CLINICAL REASON.

## 2018-06-12 NOTE — TELEPHONE ENCOUNTER
Central Prior Authorization Team   Phone: [phone]    PA Initiation    Medication: acyclovir (ZOVIRAX) 5 % ointment  Insurance Company: [employer] - Phone [phone] Fax [phone]  Pharmacy Filling the Rx: [facility] [address] [place], [place] - [address]  Filling Pharmacy Phone: [phone]  Filling Pharmacy Fax: [phone]  Start Date: 6/12/2018

## 2018-06-12 NOTE — TELEPHONE ENCOUNTER
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    Insurance Name:  [employer]  Insurance ID:  [id number]      Pharmacy Information (if different than what is on RX)  Name:  [facility]  Phone:  [phone]  Fax:  [phone]    Alfred BUTTS

## 2018-06-19 DIAGNOSIS — C61 PROSTATE CANCER (H): ICD-10-CM

## 2018-06-19 LAB
ALBUMIN SERPL-MCNC: 3.6 G/DL (ref 3.4–5)
ALP SERPL-CCNC: 104 U/L (ref 40–150)
ALT SERPL W P-5'-P-CCNC: 31 U/L (ref 0–70)
ANION GAP SERPL CALCULATED.3IONS-SCNC: 8 MMOL/L (ref 3–14)
AST SERPL W P-5'-P-CCNC: 24 U/L (ref 0–45)
BASOPHILS # BLD AUTO: 0 10E9/L (ref 0–0.2)
BASOPHILS NFR BLD AUTO: 0.9 %
BILIRUB SERPL-MCNC: 0.5 MG/DL (ref 0.2–1.3)
BUN SERPL-MCNC: 16 MG/DL (ref 7–30)
CALCIUM SERPL-MCNC: 9 MG/DL (ref 8.5–10.1)
CHLORIDE SERPL-SCNC: 109 MMOL/L (ref 94–109)
CO2 SERPL-SCNC: 26 MMOL/L (ref 20–32)
CREAT SERPL-MCNC: 0.9 MG/DL (ref 0.66–1.25)
DIFFERENTIAL METHOD BLD: NORMAL
EOSINOPHIL # BLD AUTO: 0.3 10E9/L (ref 0–0.7)
EOSINOPHIL NFR BLD AUTO: 7 %
ERYTHROCYTE [DISTWIDTH] IN BLOOD BY AUTOMATED COUNT: 13.5 % (ref 10–15)
GFR SERPL CREATININE-BSD FRML MDRD: 82 ML/MIN/1.7M2
GLUCOSE SERPL-MCNC: 92 MG/DL (ref 70–99)
HCT VFR BLD AUTO: 43.2 % (ref 40–53)
HGB BLD-MCNC: 14.8 G/DL (ref 13.3–17.7)
LDH SERPL L TO P-CCNC: 200 U/L (ref 85–227)
LYMPHOCYTES # BLD AUTO: 1.2 10E9/L (ref 0.8–5.3)
LYMPHOCYTES NFR BLD AUTO: 28.7 %
MCH RBC QN AUTO: 30.9 PG (ref 26.5–33)
MCHC RBC AUTO-ENTMCNC: 34.3 G/DL (ref 31.5–36.5)
MCV RBC AUTO: 90 FL (ref 78–100)
MONOCYTES # BLD AUTO: 0.4 10E9/L (ref 0–1.3)
MONOCYTES NFR BLD AUTO: 9.1 %
NEUTROPHILS # BLD AUTO: 2.3 10E9/L (ref 1.6–8.3)
NEUTROPHILS NFR BLD AUTO: 54.3 %
PLATELET # BLD AUTO: 166 10E9/L (ref 150–450)
POTASSIUM SERPL-SCNC: 4.2 MMOL/L (ref 3.4–5.3)
PROT SERPL-MCNC: 7.3 G/DL (ref 6.8–8.8)
PSA SERPL-MCNC: 0.19 UG/L (ref 0–4)
RBC # BLD AUTO: 4.79 10E12/L (ref 4.4–5.9)
SODIUM SERPL-SCNC: 143 MMOL/L (ref 133–144)
WBC # BLD AUTO: 4.3 10E9/L (ref 4–11)

## 2018-06-19 PROCEDURE — 84153 ASSAY OF PSA TOTAL: CPT | Performed by: INTERNAL MEDICINE

## 2018-06-19 PROCEDURE — 85025 COMPLETE CBC W/AUTO DIFF WBC: CPT | Performed by: INTERNAL MEDICINE

## 2018-06-19 PROCEDURE — 83615 LACTATE (LD) (LDH) ENZYME: CPT | Performed by: INTERNAL MEDICINE

## 2018-06-19 PROCEDURE — 80053 COMPREHEN METABOLIC PANEL: CPT | Performed by: INTERNAL MEDICINE

## 2018-06-19 PROCEDURE — 36415 COLL VENOUS BLD VENIPUNCTURE: CPT | Performed by: INTERNAL MEDICINE

## 2018-06-19 PROCEDURE — 84403 ASSAY OF TOTAL TESTOSTERONE: CPT | Performed by: INTERNAL MEDICINE

## 2018-06-21 LAB — TESTOST SERPL-MCNC: 7 NG/DL (ref 240–950)

## 2018-06-22 ENCOUNTER — ONCOLOGY VISIT (OUTPATIENT)
Dept: ONCOLOGY | Facility: CLINIC | Age: 73
End: 2018-06-22
Attending: INTERNAL MEDICINE
Payer: MEDICARE

## 2018-06-22 VITALS
OXYGEN SATURATION: 95 % | WEIGHT: 205 LBS | HEIGHT: 69 IN | SYSTOLIC BLOOD PRESSURE: 128 MMHG | BODY MASS INDEX: 30.36 KG/M2 | HEART RATE: 58 BPM | RESPIRATION RATE: 18 BRPM | DIASTOLIC BLOOD PRESSURE: 76 MMHG

## 2018-06-22 DIAGNOSIS — C61 PROSTATE CANCER (H): Primary | ICD-10-CM

## 2018-06-22 PROCEDURE — 99213 OFFICE O/P EST LOW 20 MIN: CPT | Performed by: INTERNAL MEDICINE

## 2018-06-22 PROCEDURE — G0463 HOSPITAL OUTPT CLINIC VISIT: HCPCS

## 2018-06-22 ASSESSMENT — PAIN SCALES - GENERAL: PAINLEVEL: NO PAIN (0)

## 2018-06-22 NOTE — NURSING NOTE
"Oncology Rooming Note    June 22, 2018 3:13 PM   Wallace Zelaya is a 72 year old male who presents for:    Chief Complaint   Patient presents with     Oncology Clinic Visit     Follow up - Prostate Cancer     Initial Vitals: Resp 18  Ht 1.753 m (5' 9\")  Wt 93 kg (205 lb)  BMI 30.27 kg/m2 Estimated body mass index is 30.27 kg/(m^2) as calculated from the following:    Height as of this encounter: 1.753 m (5' 9\").    Weight as of this encounter: 93 kg (205 lb). Body surface area is 2.13 meters squared.  Data Unavailable Comment: Data Unavailable   No LMP for male patient.  Allergies reviewed: Yes  Medications reviewed: Yes    Medications: Medication refills not needed today.  Pharmacy name entered into [employer]:    [facility] MAIL DELIVERY - [facility] [address] [facility] [address] [place] [address] - [id number]  [facility] [id number] [place] [address] [address]    Clinical concerns: Follow up   8 minutes for nursing intake (face to face time)     Carlene Tong CMA     DISCHARGE PLAN:  Next appointments: See patient instruction section  Departure Mode: Ambulatory  Accompanied by: self  0 minutes for nursing discharge (face to face time)   Carlene Tong CMA                "

## 2018-06-22 NOTE — MR AVS SNAPSHOT
After Visit Summary   6/22/2018    Wallace Zelaya    MRN: [id number]           Patient Information     Date Of Birth          1945        Visit Information        Provider Department      6/22/2018 3:15 PM Ed, Gigi Napier MD [facility]        Care Instructions    Cancel lupron for today    Follow up in 3 months with labs a week prior to visit and possibly lupron after visit    Labs at [facility] 9/18/18  JV  F/U with Dr Ward and Lupron 9/21/18  JV    AVS given to patient 6/22/18  JV          Follow-ups after your visit        Your next 10 appointments already scheduled     Sep 18, 2018  9:00 AM CDT   LAB with LV LAB   [facility] ([facility])    [address] [facility] [address] [phone]           Please do not eat 10-12 hours before your appointment if you are coming in fasting for labs on lipids, cholesterol, or glucose (sugar). This does not apply to pregnant women. Water, hot tea and black coffee (with nothing added) are okay. Do not drink other fluids, diet soda or chew gum.            Sep 21, 2018  2:45 PM CDT   Return Visit with Gigi Ward MD   [facility] ([facility])    [facility]  [address]  [facility] [address]   [phone]            Sep 21, 2018  3:30 PM CDT   injection with RH LAB DRAW 1   [facility] ([facility])    [facility]  [address]  [facility] [address]   [phone]            Oct 03, 2018  8:20 AM CDT   PHYSICAL with Jd Almaraz MD   [facility] ([facility])    [address] [facility] [address] [phone]              Who to contact     If you have questions or need follow up information about today's clinic visit or your schedule please contact [facility]  "[facility] directly at [phone].  Normal or non-critical lab and imaging results will be communicated to you by MyChart, letter or phone within 4 business days after the clinic has received the results. If you do not hear from us within 7 days, please contact the clinic through [employer] or phone. If you have a critical or abnormal lab result, we will notify you by phone as soon as possible.  Submit refill requests through [employer] or call your pharmacy and they will forward the refill request to us. Please allow 3 business days for your refill to be completed.          Additional Information About Your Visit        [employer] Information     [employer] gives you secure access to your electronic health record. If you see a primary care provider, you can also send messages to your care team and make appointments. If you have questions, please call your primary care clinic.  If you do not have a primary care provider, please call [phone] and they will assist you.        Care EveryWhere ID     This is your Care EveryWhere ID. This could be used by other organizations to access your [place] medical records  [id number]        Your Vitals Were     Pulse Respirations Height Pulse Oximetry BMI (Body Mass Index)       58 18 1.753 m (5' 9\") 95% 30.27 kg/m2        Blood Pressure from Last 3 Encounters:   06/22/18 128/76   05/25/18 118/66   05/11/18 160/90    Weight from Last 3 Encounters:   06/22/18 93 kg (205 lb)   05/11/18 95.7 kg (210 lb 14.4 oz)   03/16/18 98.9 kg (218 lb)              Today, you had the following     No orders found for display       Primary Care Provider Office Phone # Fax #    Jd Almaraz [phone] [address]  [facility] [address]        Equal Access to Services     CADEN ALLRED : Mayda Ibanez, wajuan luis bansal, qaybta kaalana parra. So [facility] [phone].    ATENCIÓN: Si radha marshall " a crain disposición servicios gratuitos de asistencia lingüística. Brennen jimenez [phone].    We comply with applicable federal civil rights laws and [place] laws. We do not discriminate on the basis of race, color, national origin, age, disability, sex, sexual orientation, or gender identity.            Thank you!     Thank you for choosing [facility]  for your care. Our goal is always to provide you with excellent care. Hearing back from our patients is one way we can continue to improve our services. Please take a few minutes to complete the written survey that you may receive in the mail after your visit with us. Thank you!             Your Updated Medication List - Protect others around you: Learn how to safely use, store and throw away your medicines at www.disposemymeds.org.          This list is accurate as of 6/22/18  3:50 PM.  Always use your most recent med list.                   Brand Name Dispense Instructions for use Diagnosis    acyclovir 5 % ointment    ZOVIRAX    15 g    Apply topically 6 times daily    Herpes labialis       amLODIPine 5 MG tablet    NORVASC    90 tablet    Take 1 tablet (5 mg) by mouth daily    Essential hypertension       atorvastatin 20 MG tablet    LIPITOR    90 tablet    TAKE 1 TABLET EVERY DAY    Hyperlipidemia LDL goal <130       valsartan 160 MG tablet    DIOVAN    90 tablet    TAKE 1 TABLET (160 MG) BY MOUTH DAILY    Essential hypertension with goal blood pressure less than 140/90

## 2018-06-22 NOTE — PATIENT INSTRUCTIONS
Cancel lupron for today    Follow up in 3 months with labs a week prior to visit and possibly lupron after visit    Labs at [facility] 9/18/18  JLOGAN  F/U with Dr Ward and Sirisha 9/21/18  JLOGAN    AVS given to patient 6/22/18  JV

## 2018-06-22 NOTE — LETTER
6/22/2018         RE: Wallace Zelaya  [address] [facility] [address]        Dear Colleague,    Thank you for referring your patient, Wallace Zelaya, to the [facility]. Please see a copy of my visit note below.    [facility]  FOLLOW-UP VISIT NOTE    PATIENT NAME: Wallace Zelaya MRN # [id number]  DATE OF VISIT: Jun 22, 2018 YOB: 1945    REFERRING PROVIDER: No referring provider defined for this encounter.    CANCER TYPE: Prostate cancer; Biochemical recurrence; Hormone sensitive disease  STAGE: Stage III - pT3b at diagnosis; M0    HISTORY OF PRESENTING ILLNESS:  Wallace was noted to have rising screening PSA from 2 - 4. He was referred to Dr. Rodolfo Connor. He had radical prostatectomy on 11/2015. Pathology from this revealed Dudley 4+4 disease with extraprostatic extension, seminal vesicle extension and he was staged at pT3b. All of the 12 lymph nodes resected were negative for disease. Post operatively his PSA did not drop to undetectable levels and remained elevated at 0.56. It vladimir to 0.9 in April 2016 and he was referred to Dr. Newton for adjuvant radiation therapy. He completed radiation therapy but did not have any PSA response to this treatment.     TREATMENT SUMMARY:  11/13/2015 Radical prostatectomy  April 2016  Radiation therapy    Oncology Supportive Medications 1/10/2017 4/11/2017   leuprolide (LUPRON DEPOT) IM 22.5 mg 22.5 mg     CURRENT INTERVENTIONS:  Lupron - Intermittent androgen deprivation ON treatment phase    SUBJECTIVE   Wallace is being seen for his prostate cancer    He is back on lupron. He again had hot flashes with therapy.       PAST MEDICAL HISTORY   1. HTN  2. Dyslipidemia  3. Prostate cancer as detailed above      CURRENT OUTPATIENT MEDICATIONS     Current Outpatient Prescriptions   Medication Sig     acyclovir (ZOVIRAX) 5 % ointment Apply topically 6 times daily     amLODIPine (NORVASC) 5  "MG tablet Take 1 tablet (5 mg) by mouth daily     atorvastatin (LIPITOR) 20 MG tablet TAKE 1 TABLET EVERY DAY     valsartan (DIOVAN) 160 MG tablet TAKE 1 TABLET (160 MG) BY MOUTH DAILY     No current facility-administered medications for this visit.         ALLERGIES     Allergies   Allergen Reactions     Seasonal Allergies         REVIEW OF SYSTEMS   As above in the HPI, o/w complete 12-point ROS was negative.     PHYSICAL EXAM   /76  Pulse 58  Resp 18  Ht 1.753 m (5' 9\")  Wt 93 kg (205 lb)  SpO2 95%  BMI 30.27 kg/m2  SpO2 Readings from Last 4 Encounters:   06/22/18 95%   05/11/18 97%   03/21/18 95%   03/16/18 92%     Wt Readings from Last 3 Encounters:   06/22/18 93 kg (205 lb)   05/11/18 95.7 kg (210 lb 14.4 oz)   03/16/18 98.9 kg (218 lb)     GEN: NAD  HEENT: PERRL, EOMI, no icterus, injection or pallor. Oropharynx is clear.  NECK: no cervical or supraclavicular lymphadenopathy  LUNGS: clear bilaterally  CV: regular, no murmurs, rubs, or gallops  ABDOMEN: soft, non-tender, non-distended, normal bowel sounds, no hepatosplenomegaly by percussion or palpation  EXT: warm, well perfused, no edema  NEURO: alert  SKIN: no rashes   LABORATORY AND IMAGING STUDIES     Recent Labs   Lab Test  06/19/18   0909  03/09/18   1525  10/26/17   1553  07/28/17   1558  04/04/17   1542   NA  143  141  140  140  141   POTASSIUM  4.2  4.1  4.0  3.9  3.9   CHLORIDE  109  107  105  104  106   CO2  26  28  26  29  27   ANIONGAP  8  6  9  7  8   BUN  16  12  15  15  12   CR  0.90  1.01  1.05  1.04  0.95   GLC  92  89  98  92  88   YT  9.0  8.7  8.5  8.5  8.4*     No results for input(s): MAG, PHOS in the last 56771 hours.  Recent Labs   Lab Test  06/19/18   0909  03/09/18   1525  10/26/17   1553  07/28/17   1558  04/04/17   1542   WBC  4.3  7.0  5.0  5.3  5.0   HGB  14.8  15.9  14.0  13.9  14.7   PLT  166  171  204  191  197   MCV  90  89  92  90  89   NEUTROPHIL  54.3  69.0  51.7  52.8  65.3     Recent Labs   Lab Test  " 06/19/18   0909  03/09/18   1525  10/26/17   1553   BILITOTAL  0.5  0.6  0.4   ALKPHOS  104  110  117   ALT  31  24  36   AST  24  22  28   ALBUMIN  3.6  3.9  3.6   LDH  200  197  206     No results found for: TSH  No results for input(s): CEA in the last 75448 hours.  Results for orders placed or performed during the hospital encounter of 01/20/17   CT Chest w Contrast    Narrative    CT CHEST WITH CONTRAST   1/20/2017 4:04 PM     HISTORY: Malignant neoplasm of the prostate gland.    COMPARISON: 11/9/2016 and 4/14/2016 - CT abdomen and pelvis.    TECHNIQUE: Following the uneventful administration of 80mL Isovue-370  intravenous contrast, helical sections were acquired through the  lungs. Coronal reconstructions were generated. Radiation dose for this  scan was reduced using automated exposure control, adjustment of the  mA and/or kV according to the patient's size, or iterative  reconstruction technique.    FINDINGS: Tiny 0.2 cm nodule in the posterolateral aspect of the left  upper lobe (series 3 image 13) and tiny 0.2 cm nodule in the  posteromedial aspect of the right upper lobe (series 3 image 19). A  few linear opacities in the lungs likely represent atelectasis or  scarring. The lungs are otherwise clear. No pleural or pericardial  effusion. No enlarged lymph nodes in the chest. Atherosclerotic  calcification in the thoracic aorta. Small hiatal hernia.    Scan through the upper abdomen is significant for a 1.3 cm right  adrenal nodule that is unchanged since 4/14/2016.      Impression    IMPRESSION:   1. No convincing evidence of metastatic neoplasm in the chest.  2. Two tiny indeterminate pulmonary nodules. These are likely benign  nodules. Recommend comparison with prior imaging studies, if  available. If not available and the patient is a smoker or has other  significant risk factors, a followup CT scan could be considered in 12  months to confirm stability.   3. Indeterminate 1.3 cm right adrenal nodule,  unchanged since  4/14/2016. 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    MANUEL MARQUEZ MD     Recent Labs   Lab Test  06/19/18   0909  03/09/18   1525  10/26/17   1553  07/28/17   1558  04/04/17   1542   PSA  0.19  2.73  0.04  0.01  0.09   TESTOSTTOTAL  7*  331  174*  4*  7*           ASSESSMENT AND PLAN   1. Biochemical PSA relapse post prostatectomy without any response to adjuvant radiation therapy  2. Observation phase of intermittent androgen deprivation therapy  3. HTN  4. ECOG PS1  5. No medical comorbidity    I had a lengthy discussion with Wallace. All his labs are within normal limits. His PSA has declined nicely from 2.73 to current value of 0.19. He was very happy about it. He would like to hold lupron for now. I believe his PSA would rise rapidly on holding therapy. He is quite content. This would not be detrimental for his care. I would hold therapy at this time per his wishes.      I will see him in 3 months and possible lupron after visit if he has a rapid rise in his PSA again.     Gigi Ward  ,  Division of Hematology, Oncology & Transplantation  [facility].       Again, thank you for allowing me to participate in the care of your patient.        Sincerely,        Gigi Ward MD

## 2018-06-22 NOTE — PROGRESS NOTES
[facility]  FOLLOW-UP VISIT NOTE    PATIENT NAME: Wallace Zelaya MRN # [id number]  DATE OF VISIT: Jun 22, 2018 YOB: 1945    REFERRING PROVIDER: No referring provider defined for this encounter.    CANCER TYPE: Prostate cancer; Biochemical recurrence; Hormone sensitive disease  STAGE: Stage III - pT3b at diagnosis; M0    HISTORY OF PRESENTING ILLNESS:  Wallace was noted to have rising screening PSA from 2 - 4. He was referred to Dr. Rodolfo Connor. He had radical prostatectomy on 11/2015. Pathology from this revealed Cayla 4+4 disease with extraprostatic extension, seminal vesicle extension and he was staged at pT3b. All of the 12 lymph nodes resected were negative for disease. Post operatively his PSA did not drop to undetectable levels and remained elevated at 0.56. It vladimir to 0.9 in April 2016 and he was referred to Dr. Newton for adjuvant radiation therapy. He completed radiation therapy but did not have any PSA response to this treatment.     TREATMENT SUMMARY:  11/13/2015 Radical prostatectomy  April 2016  Radiation therapy    Oncology Supportive Medications 1/10/2017 4/11/2017   leuprolide (LUPRON DEPOT) IM 22.5 mg 22.5 mg     CURRENT INTERVENTIONS:  Lupron - Intermittent androgen deprivation ON treatment phase    SUBJECTIVE   Wallace is being seen for his prostate cancer    He is back on lupron. He again had hot flashes with therapy.       PAST MEDICAL HISTORY   1. HTN  2. Dyslipidemia  3. Prostate cancer as detailed above      CURRENT OUTPATIENT MEDICATIONS     Current Outpatient Prescriptions   Medication Sig     acyclovir (ZOVIRAX) 5 % ointment Apply topically 6 times daily     amLODIPine (NORVASC) 5 MG tablet Take 1 tablet (5 mg) by mouth daily     atorvastatin (LIPITOR) 20 MG tablet TAKE 1 TABLET EVERY DAY     valsartan (DIOVAN) 160 MG tablet TAKE 1 TABLET (160 MG) BY MOUTH DAILY     No current facility-administered medications for this visit.   "       ALLERGIES     Allergies   Allergen Reactions     Seasonal Allergies         REVIEW OF SYSTEMS   As above in the HPI, o/w complete 12-point ROS was negative.     PHYSICAL EXAM   /76  Pulse 58  Resp 18  Ht 1.753 m (5' 9\")  Wt 93 kg (205 lb)  SpO2 95%  BMI 30.27 kg/m2  SpO2 Readings from Last 4 Encounters:   06/22/18 95%   05/11/18 97%   03/21/18 95%   03/16/18 92%     Wt Readings from Last 3 Encounters:   06/22/18 93 kg (205 lb)   05/11/18 95.7 kg (210 lb 14.4 oz)   03/16/18 98.9 kg (218 lb)     GEN: NAD  HEENT: PERRL, EOMI, no icterus, injection or pallor. Oropharynx is clear.  NECK: no cervical or supraclavicular lymphadenopathy  LUNGS: clear bilaterally  CV: regular, no murmurs, rubs, or gallops  ABDOMEN: soft, non-tender, non-distended, normal bowel sounds, no hepatosplenomegaly by percussion or palpation  EXT: warm, well perfused, no edema  NEURO: alert  SKIN: no rashes   LABORATORY AND IMAGING STUDIES     Recent Labs   Lab Test  06/19/18   0909  03/09/18   1525  10/26/17   1553  07/28/17   1558  04/04/17   1542   NA  143  141  140  140  141   POTASSIUM  4.2  4.1  4.0  3.9  3.9   CHLORIDE  109  107  105  104  106   CO2  26  28  26  29  27   ANIONGAP  8  6  9  7  8   BUN  16  12  15  15  12   CR  0.90  1.01  1.05  1.04  0.95   GLC  92  89  98  92  88   TY  9.0  8.7  8.5  8.5  8.4*     No results for input(s): MAG, PHOS in the last 58418 hours.  Recent Labs   Lab Test  06/19/18   0909  03/09/18   1525  10/26/17   1553  07/28/17   1558  04/04/17   1542   WBC  4.3  7.0  5.0  5.3  5.0   HGB  14.8  15.9  14.0  13.9  14.7   PLT  166  171  204  191  197   MCV  90  89  92  90  89   NEUTROPHIL  54.3  69.0  51.7  52.8  65.3     Recent Labs   Lab Test  06/19/18   0909  03/09/18   1525  10/26/17   1553   BILITOTAL  0.5  0.6  0.4   ALKPHOS  104  110  117   ALT  31  24  36   AST  24  22  28   ALBUMIN  3.6  3.9  3.6   LDH  200  197  206     No results found for: TSH  No results for input(s): CEA in the last " 83084 hours.  Results for orders placed or performed during the hospital encounter of 01/20/17   CT Chest w Contrast    Narrative    CT CHEST WITH CONTRAST   1/20/2017 4:04 PM     HISTORY: Malignant neoplasm of the prostate gland.    COMPARISON: 11/9/2016 and 4/14/2016 - CT abdomen and pelvis.    TECHNIQUE: Following the uneventful administration of 80mL Isovue-370  intravenous contrast, helical sections were acquired through the  lungs. Coronal reconstructions were generated. Radiation dose for this  scan was reduced using automated exposure control, adjustment of the  mA and/or kV according to the patient's size, or iterative  reconstruction technique.    FINDINGS: Tiny 0.2 cm nodule in the posterolateral aspect of the left  upper lobe (series 3 image 13) and tiny 0.2 cm nodule in the  posteromedial aspect of the right upper lobe (series 3 image 19). A  few linear opacities in the lungs likely represent atelectasis or  scarring. The lungs are otherwise clear. No pleural or pericardial  effusion. No enlarged lymph nodes in the chest. Atherosclerotic  calcification in the thoracic aorta. Small hiatal hernia.    Scan through the upper abdomen is significant for a 1.3 cm right  adrenal nodule that is unchanged since 4/14/2016.      Impression    IMPRESSION:   1. No convincing evidence of metastatic neoplasm in the chest.  2. Two tiny indeterminate pulmonary nodules. These are likely benign  nodules. Recommend comparison with prior imaging studies, if  available. If not available and the patient is a smoker or has other  significant risk factors, a followup CT scan could be considered in 12  months to confirm stability.   3. Indeterminate 1.3 cm right adrenal nodule, unchanged since  4/14/2016. 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    MANUEL MARQUEZ MD     Recent Labs   Lab Test  06/19/18   0909  03/09/18   1525  10/26/17   1553  07/28/17   1558  04/04/17   1542   PSA  0.19  2.73  0.04  0.01  0.09   TESTOSTTOTAL  7*  331   174*  4*  7*           ASSESSMENT AND PLAN   1. Biochemical PSA relapse post prostatectomy without any response to adjuvant radiation therapy  2. Observation phase of intermittent androgen deprivation therapy  3. HTN  4. ECOG PS1  5. No medical comorbidity    I had a lengthy discussion with Wallace. All his labs are within normal limits. His PSA has declined nicely from 2.73 to current value of 0.19. He was very happy about it. He would like to hold lupron for now. I believe his PSA would rise rapidly on holding therapy. He is quite content. This would not be detrimental for his care. I would hold therapy at this time per his wishes.      I will see him in 3 months and possible lupron after visit if he has a rapid rise in his PSA again.     Gigi Ward  ,  Division of Hematology, Oncology & Transplantation  [facility].

## 2018-06-25 ENCOUNTER — TELEPHONE (OUTPATIENT)
Dept: FAMILY MEDICINE | Facility: CLINIC | Age: 73
End: 2018-06-25

## 2018-06-25 DIAGNOSIS — Z12.11 SPECIAL SCREENING FOR MALIGNANT NEOPLASMS, COLON: Primary | ICD-10-CM

## 2018-06-25 NOTE — TELEPHONE ENCOUNTER
Please review pended order and sign if appropriate  \  Dona Da Silva RN     Patient/Caregiver provided printed discharge information.

## 2018-06-25 NOTE — TELEPHONE ENCOUNTER
Patient sent in a [employer] message asking if he can scheduled for a colonoscopy. Can we placed a order in for him? You can respond to patient by [employer].    Glenna Renee

## 2018-08-24 ENCOUNTER — HOSPITAL ENCOUNTER (OUTPATIENT)
Facility: CLINIC | Age: 73
Discharge: HOME | End: 2018-08-24
Attending: INTERNAL MEDICINE | Admitting: INTERNAL MEDICINE
Payer: MEDICARE

## 2018-08-24 VITALS
OXYGEN SATURATION: 94 % | SYSTOLIC BLOOD PRESSURE: 127 MMHG | BODY MASS INDEX: 29.62 KG/M2 | HEIGHT: 69 IN | WEIGHT: 200 LBS | DIASTOLIC BLOOD PRESSURE: 84 MMHG | RESPIRATION RATE: 16 BRPM

## 2018-08-24 LAB — COLONOSCOPY: NORMAL

## 2018-08-24 PROCEDURE — 88305 TISSUE EXAM BY PATHOLOGIST: CPT | Performed by: INTERNAL MEDICINE

## 2018-08-24 PROCEDURE — 25000128 H RX IP 250 OP 636: Performed by: INTERNAL MEDICINE

## 2018-08-24 PROCEDURE — 88305 TISSUE EXAM BY PATHOLOGIST: CPT | Mod: 26 | Performed by: INTERNAL MEDICINE

## 2018-08-24 PROCEDURE — G0500 MOD SEDAT ENDO SERVICE >5YRS: HCPCS | Performed by: INTERNAL MEDICINE

## 2018-08-24 PROCEDURE — 45385 COLONOSCOPY W/LESION REMOVAL: CPT | Performed by: INTERNAL MEDICINE

## 2018-08-24 RX ORDER — ONDANSETRON 2 MG/ML
4 INJECTION INTRAMUSCULAR; INTRAVENOUS
Status: DISCONTINUED | OUTPATIENT
Start: 2018-08-24 | End: 2018-08-24 | Stop reason: HOSPADM

## 2018-08-24 RX ORDER — ONDANSETRON 2 MG/ML
4 INJECTION INTRAMUSCULAR; INTRAVENOUS EVERY 6 HOURS PRN
Status: DISCONTINUED | OUTPATIENT
Start: 2018-08-24 | End: 2018-08-24 | Stop reason: HOSPADM

## 2018-08-24 RX ORDER — FENTANYL CITRATE 50 UG/ML
INJECTION, SOLUTION INTRAMUSCULAR; INTRAVENOUS PRN
Status: DISCONTINUED | OUTPATIENT
Start: 2018-08-24 | End: 2018-08-24 | Stop reason: HOSPADM

## 2018-08-24 RX ORDER — ONDANSETRON 4 MG/1
4 TABLET, ORALLY DISINTEGRATING ORAL EVERY 6 HOURS PRN
Status: DISCONTINUED | OUTPATIENT
Start: 2018-08-24 | End: 2018-08-24 | Stop reason: HOSPADM

## 2018-08-24 RX ORDER — FLUMAZENIL 0.1 MG/ML
0.2 INJECTION, SOLUTION INTRAVENOUS
Status: DISCONTINUED | OUTPATIENT
Start: 2018-08-24 | End: 2018-08-24 | Stop reason: HOSPADM

## 2018-08-24 RX ORDER — NALOXONE HYDROCHLORIDE 0.4 MG/ML
.1-.4 INJECTION, SOLUTION INTRAMUSCULAR; INTRAVENOUS; SUBCUTANEOUS
Status: DISCONTINUED | OUTPATIENT
Start: 2018-08-24 | End: 2018-08-24 | Stop reason: HOSPADM

## 2018-08-24 RX ORDER — LIDOCAINE 40 MG/G
CREAM TOPICAL
Status: DISCONTINUED | OUTPATIENT
Start: 2018-08-24 | End: 2018-08-24 | Stop reason: HOSPADM

## 2018-08-24 NOTE — PROCEDURES
PRE-PROCEDURE H&P    CHIEF COMPLAINT / REASON FOR PROCEDURE:  surveillance    PERTINENT HISTORY :    Past Medical History:   Diagnosis Date     Basal cell carcinoma      Hyperlipidemia LDL goal <130 10/31/2010     Hypertension goal BP (blood pressure) < 140/90 5/22/2014      Past Surgical History:   Procedure Laterality Date     BIOPSY  02/2011    skin tagged left arm     C NONSPECIFIC PROCEDURE      Lawnmower injury right foot-toe      C NONSPECIFIC PROCEDURE      Pilonidal cyst     COLONOSCOPY  8/19/2013    Procedure: COLONOSCOPY;  Colonoscopy ;  Surgeon: Emerson Martinez MD, MD;  Location: [facility]     DAVINCI PROSTATECTOMY N/A 11/13/2015    Procedure: DAVINCI PROSTATECTOMY;  Surgeon: Rodolfo Connor MD;  Location: [facility] OR         Bleeding tendencies:  No    Relevant Family History:  NONE     Relevant Social History:  NONE      A relevant review of systems was performed and was negative    Current symptoms include: none    ALLERGIES/SENSITIVITIES:   Allergies   Allergen Reactions     Seasonal Allergies        CURRENT MEDICATIONS:   Prior to Admission Medications   Prescriptions Last Dose Informant Patient Reported? Taking?   acyclovir (ZOVIRAX) 5 % ointment   No Yes   Sig: Apply topically 6 times daily   amLODIPine (NORVASC) 5 MG tablet   No Yes   Sig: Take 1 tablet (5 mg) by mouth daily   atorvastatin (LIPITOR) 20 MG tablet   No Yes   Sig: TAKE 1 TABLET EVERY DAY   valsartan (DIOVAN) 160 MG tablet   No Yes   Sig: TAKE 1 TABLET (160 MG) BY MOUTH DAILY      Facility-Administered Medications: None        PRE-SEDATION ASSESSMENT:    Lung Exam:  normal  Heart Exam:  normal  Airway Exam: normal  Previous reaction to anesthesia/sedation:   No  Sedation plan based on assessment: Moderate (conscious) sedation  ASA Classification:  2 - Mild systemic disease    Comments: none    IMPRESSION:  History of polype    PLAN:  colonoscopy     Judie Raymundo MD  [facility]  Office: [phone]

## 2018-08-24 NOTE — DISCHARGE INSTRUCTIONS
Understanding Colon and Rectal Polyps     The colon has a smooth lining composed of millions of cells.     The colon (also called the large intestine) is a muscular tube that forms the last part of the digestive tract. It absorbs water and stores food waste. The colon is about 4 to 6 feet long. The rectum is the last 6 inches of the colon. The colon and rectum have a smooth lining composed of millions of cells. Changes in these cells can lead to growths in the colon that can become cancerous and should be removed.     When the Colon Lining Changes  Changes that occur in the cells that line the colon or rectum can lead to growths called polyps. Over a period of years, polyps can turn cancerous. Removing polyps early may prevent cancer from ever forming.      Polyps  Polyps are fleshy clumps of tissue that form on the lining of the colon or rectum. Small polyps are usually benign (not cancerous). However, over time, cells in a polyp can change and become cancerous. The larger a polyp grows, the more likely this is to happen. Also, certain types of polyps known as adenomatous polyps are considered premalignant. This means that they will almost always become cancerous if they re not removed.          Cancer  Almost all colorectal cancers start when polyp cells begin growing abnormally. As a cancerous tumor grows, it may involve more and more of the colon or rectum. In time, cancer can also grow beyond the colon or rectum and spread to nearby organs or to glands called lymph nodes. The cells can also travel to other parts of the body. This is known as metastasis. The earlier a cancerous tumor is removed, the better the chance of preventing its spread.        1288-6414 [facility], [address] [place] [address], [place] [address]. All rights reserved. This information is not intended as a substitute for professional medical care. Always follow your healthcare professional's instructions.

## 2018-08-24 NOTE — IP AVS SNAPSHOT
MRN:[id number]                      After Visit Summary   8/24/2018    Wallace Zelaya    MRN: [id number]           Thank you!     Thank you for choosing [facility] for your care. Our goal is always to provide you with excellent care. Hearing back from our patients is one way we can continue to improve our services. Please take a few minutes to complete the written survey that you may receive in the mail after you visit. If you would like to speak to someone directly about your visit please contact Patient Relations at [phone]. Thank you!          Patient Information     Date Of Birth          1945        About your hospital stay     You were admitted on:  August 24, 2018 You last received care in the:  [facility]    You were discharged on:  August 24, 2018       Who to Call     For medical emergencies, please call 911.  For non-urgent questions about your medical care, please call your primary care provider or clinic, [phone]  For questions related to your surgery, please call your surgery clinic        Attending Provider     Provider Specialty    Judie Raymundo MD Gastroenterology       Primary Care Provider Office Phone # Fax #    Jd Almaraz [phone]      Your next 10 appointments already scheduled     Sep 18, 2018  9:00 AM CDT   LAB with LV LAB   [facility] ([address] [address] [phone]           Please do not eat 10-12 hours before your appointment if you are coming in fasting for labs on lipids, cholesterol, or glucose (sugar). This does not apply to pregnant women. Water, hot tea and black coffee (with nothing added) are okay. Do not drink other fluids, diet soda or chew gum.            Sep 21, 2018  2:45 PM CDT   Return Visit with Gigi Ward MD   [facility] ([facility])    [facility]  [facility]  [address]  [place] [address]   [phone]            Sep 21, 2018  3:30 PM CDT   injection with RH LAB DRAW 1   [facility] ([facility])    [facility] [address]  [address]  [place] [address]   [phone]            Oct 03, 2018  8:20 AM CDT   [facility]YSICAL with Jd Al[facility]az MD   [facility]    [address] [facility] [address] [phone]              Further instructions from your care team         Understanding Colon and Rectal Polyps     The colon has a smooth lining composed of millions of cells.     The colon (also called the large intestine) is a muscular tube that forms the last part of the digestive tract. It absorbs water and stores food waste. The colon is about 4 to 6 feet long. The rectum is the last 6 inches of the colon. The colon and rectum have a smooth lining composed of millions of cells. Changes in these cells can lead to growths in the colon that can become cancerous and should be removed.     When the Colon Lining Changes  Changes that occur in the cells that line the colon or rectum can lead to growths called polyps. Over a period of years, polyps can turn cancerous. Removing polyps early may prevent cancer from ever forming.      Polyps  Polyps are fleshy clumps of tissue that form on the lining of the colon or rectum. Small polyps are usually benign (not cancerous). However, over time, cells in a polyp can change and become cancerous. The larger a polyp grows, the more likely this is to happen. Also, certain types of polyps known as adenomatous polyps are considered premalignant. This means that they will almost always become cancerous if they re not removed.          Cancer  Almost all colorectal cancers start when polyp cells begin growing abnormally. As a cancerous tumor grows, it may involve more and more  "of the colon or rectum. In time, cancer can also grow beyond the colon or rectum and spread to nearby organs or to glands called lymph nodes. The cells can also travel to other parts of the body. This is known as metastasis. The earlier a cancerous tumor is removed, the better the chance of preventing its spread.        [id number] [employer], [address] [place] [address], [place] [address]. All rights reserved. This information is not intended as a substitute for professional medical care. Always follow your healthcare professional's instructions.    Pending Results     Date and Time Order Name Status Description    8/24/2018 0906 Surgical pathology exam In process             Admission Information     Date & Time Provider Department Dept. Phone    8/24/2018 Judie Raymundo MD [facility] [phone]      Your Vitals Were     Blood Pressure Respirations Height Weight Pulse Oximetry BMI (Body Mass Index)    143/80 9 1.753 m (5' 9\") 90.7 kg (200 lb) 95% 29.53 kg/m2      [employer] Information     [employer] gives you secure access to your electronic health record. If you see a primary care provider, you can also send messages to your care team and make appointments. If you have questions, please call your primary care clinic.  If you do not have a primary care provider, please call [phone] and they will assist you.        Care EveryWhere ID     This is your Care EveryWhere ID. This could be used by other organizations to access your [facility] medical records  [id number]        Equal Access to Services     JOSEFINA ALLRED : Hadii garry Ibanez, waaxda luqadaha, qaybta kaalmada sergioyada, ana fernandez. So [facility] [place] [facility] [phone].    ATENCIÓN: Si habla español, tiene a crain disposición servicios gratuitos de asistencia lingüística. Llame al [phone].    We comply with applicable federal civil rights laws and [place] laws. We do not discriminate on the basis of race, " color, national origin, age, disability, sex, sexual orientation, or gender identity.               Review of your medicines      UNREVIEWED medicines. Ask your doctor about these medicines        Dose / Directions    acyclovir 5 % ointment   Commonly known as:  ZOVIRAX   Used for:  Herpes labialis        Apply topically 6 times daily   Quantity:  15 g   Refills:  3       amLODIPine 5 MG tablet   Commonly known as:  NORVASC   Used for:  Essential hypertension        Dose:  5 mg   Take 1 tablet (5 mg) by mouth daily   Quantity:  90 tablet   Refills:  3       atorvastatin 20 MG tablet   Commonly known as:  LIPITOR   Used for:  Hyperlipidemia LDL goal <130        TAKE 1 TABLET EVERY DAY   Quantity:  90 tablet   Refills:  3       valsartan 160 MG tablet   Commonly known as:  DIOVAN   Used for:  Essential hypertension with goal blood pressure less than 140/90        TAKE 1 TABLET (160 MG) BY MOUTH DAILY   Quantity:  90 tablet   Refills:  3                Protect others around you: Learn how to safely use, store and throw away your medicines at www.disposemymeds.org.             Medication List: This is a list of all your medications and when to take them. Check marks below indicate your daily home schedule. Keep this list as a reference.      Medications           Morning Afternoon Evening Bedtime As Needed    acyclovir 5 % ointment   Commonly known as:  ZOVIRAX   Apply topically 6 times daily                                amLODIPine 5 MG tablet   Commonly known as:  NORVASC   Take 1 tablet (5 mg) by mouth daily                                atorvastatin 20 MG tablet   Commonly known as:  LIPITOR   TAKE 1 TABLET EVERY DAY                                valsartan 160 MG tablet   Commonly known as:  DIOVAN   TAKE 1 TABLET (160 MG) BY MOUTH DAILY

## 2018-08-24 NOTE — LETTER
August 3, 2018        Wallace Zelaya  [address] [address] [facility] [address]        Thank you for choosing [facility]. You are scheduled for the following service.     Date:  Friday, August 24             Procedure:  COLONOSCOPY  Doctor:        Dr. Judie Raymundo   Arrival Time:   8am  *Check in at Emergency/Endoscopy desk*  Procedure Time:  8:30am    Location:   [facility]        Endoscopy Department, First Floor (Enter through ER Doors) *        [address] [place], [place] [address]      [phone] or [phone] () to reschedule      MIRALAX -GATORADE  PREP  Colonoscopy is the most accurate test to detect colon polyps and colon cancer; and the only test where polyps can be removed. During this procedure, a doctor examines the lining of your large intestine and rectum through a flexible tube.           Transportation  Arrange for a ride for the day of your procedure with a responsible adult.  A taxi ride is not an option unless you are accompanied by a responsible adult. If you fail to arrange transportation with a responsible adult, your procedure will be cancelled and rescheduled.    Purchase the  following supplies at your local pharmacy:  - 2 (two) bisacodyl tablets: each tablet contains 5 mg.  (Dulcolax  laxative NOT Dulcolax  stool softener)   - 1 (one) 8.3 oz bottle of Polyethylene Glycol (PEG) 3350 Powder   (MiraLAX , Smooth LAX , ClearLAX  or equivalent)  - 64 oz Gatorade    Regular Gatorade, Gatorade G2 , Powerade , Powerade Zero  or Pedialyte  is acceptable. Red colored flavors are not allowed; all other colors (yellow, green, orange, purple and blue) are okay. It is also okay to buy two 2.12 oz packets of powdered Gatorade that can be mixed with water to a total volume of 64 oz of liquid.  - 1 (one) 10 oz bottle of Magnesium Citrate (Red colored flavors are not allowed)  It is also okay for you to use a 0.5 oz package of  powdered magnesium citrate (17 g) mixed with 10 oz of water.    PREPARATION FOR COLONOSCOPY    7 days before:    Discontinue fiber supplements and medications containing iron. This includes Metamucil  and Fibercon ; and multivitamins with iron.  3 days before:    Begin a low-fiber diet. A low-fiber diet helps making the cleanout more effective.     Examples of a low-fiber diet include (but are not limited to): white bread, white rice, pasta, crackers, fish, chicken, eggs, ground beef, creamy peanut butter, cooked/steamed/boiled vegetables, canned fruit, bananas, melons, milk, plain yogurt cheese, salad dressing and other condiments.     The following are not allowed on a low-fiber diet: seeds, nuts, popcorn, bran, whole wheat, corn, quinoa, raw fruits and vegetables, berries and dried fruit, beans and lentils.    For additional details on low-fiber diet, please refer to the table on the last page.  2 days before:    Continue the low-fiber diet.     Drink at least 8 glasses of water throughout the day.     Stop eating solid foods at 11:45 pm.  1 day before:    In the morning: begin a clear liquid diet (liquids you can see through).     Examples of a clear liquid diet include: water, clear broth or bouillon, Gatorade, Pedialyte or Powerade, carbonated and non-carbonated soft drinks (Sprite , 7-Up , ginger ale), strained fruit juices without pulp (apple, white grape, white cranberry), Jell-O  and popsicles.     The following are not allowed on a clear liquid diet: red liquids, alcoholic beverages, dairy products (milk, creamer, and yogurt), protein shakes, creamy broths, juice with pulp and chewing tobacco.    At noon: take 2 (two) bisacodyl tablets     At 4 (and no later than 6pm): start drinking the Miralax-Gatorade preparation (8.3 oz of Miralax mixed with 64 oz of Gatorade in a large pitcher). Drink 1(one) 8 oz glass every 15 minutes thereafter, until the mixture is gone.    COLON CLEANSING TIPS: drink adequate  amounts of fluids before and after your colon cleansing to prevent dehydration. Stay near a toilet because you will have diarrhea. Even if you are sitting on the toilet, continue to drink the cleansing solution every 15 minutes. If you feel nauseous or vomit, rinse your mouth with water, take a 15 to 30-minute-break and then continue drinking the solution. You will be uncomfortable until the stool has flushed from your colon (in about 2 to 4 hours). You may feel chilled.              Day of your procedure  You may take all of your morning medications including blood pressure medications, blood thinners (if you have not been instructed to stop these by our office), methadone, anti-seizure medications with sips of water 3 hours prior to your procedure or earlier. Do not take insulin or vitamins prior to your procedure. Continue the clear liquid diet.   4 hours prior: drink 10 oz of magnesium citrate. It may be easier to drink it with a straw.    STOP consuming all liquids after that.     Do not take anything by mouth during this time.     Allow extra time to travel to your procedure as you may need to stop and use a restroom along the way.  You are ready for the procedure, if you followed all instructions and your stool is no longer formed, but clear or yellow liquid. If you are unsure whether your colon is clean, please call our office at [phone] before you leave for your appointment.  Bring the following to your procedure:  - Insurance Card/Photo ID.   - List of current medications including over-the-counter medications and supplements.   - Your rescue inhaler if you currently use one to control asthma.      Canceling or rescheduling your appointment:   If you must cancel or reschedule your appointment, please call [phone] as soon as possible.      COLONOSCOPY PRE-PROCEDURE CHECKLIST  If you have diabetes, ask your regular doctor for diet and medication restrictions.  If you take an anticoagulant or  anti-platelet medication (such as Coumadin , Lovenox , Pradaxa , Xarelto , Eliquis , etc.), please call your primary doctor for advice on holding this medication.  If you take aspirin you may continue to do so.  If you are or may be pregnant, please discuss the risks and benefits of this procedure with your doctor.          What happens during a colonoscopy?    Plan to spend up to two hours, starting at registration time, at the endoscopy center the day of your procedure. The colonoscopy takes an average of 15 to 30 minutes. Recovery time is about 30 minutes.    Before the exam:    You will change into a gown.    Your medical history and medication list will be reviewed with you, unless that has been done over the phone prior to the procedure.     A nurse will insert an intravenous (IV) line into your hand or arm.    The doctor will meet with you and will give you a consent form to sign.    During the exam:     Medicine will be given through the IV line to help you relax.     Your heart rate and oxygen levels will be monitored. If your blood pressure is low, you may be given fluids through the IV line.     The doctor will insert a flexible hollow tube, called a colonoscope, into your rectum. The scope will be advanced slowly through the large intestine (colon).    You may have a feeling of fullness or pressure.     If an abnormal tissue or a polyp is found, the doctor may remove it through the endoscope for closer examination, or biopsy. Tissue removal is painless    After the exam:           Any tissue samples removed during the exam will be sent to a lab for evaluation. It may take 5-7 working days for you to be notified of the results.     A nurse will provide you with complete discharge instructions before you leave the endoscopy center. Be sure to ask the nurse for specific instructions if you take blood thinners such as Aspirin, Coumadin or Plavix.     The doctor will prepare a full report for you and for the  physician who referred you for the procedure.     Your doctor will talk with you about the initial results of your exam.      Medication given during the exam will prohibit you from driving for the rest of the day.     Following the exam, you may resume your normal diet. Your first meal should be light, no greasy foods. Avoid alcohol until the next day.     You may resume your regular activities the day after the procedure.     LOW-FIBER DIET    Foods RECOMMENDED Foods to AVOID   Breads, Cereal, Rice and Pasta:   White bread, rolls, biscuits, croissant and reuben toast.   Waffles, Faroese toast and pancakes.   White rice, noodles, pasta, macaroni and peeled cooked potatoes.   Plain crackers and saltines.   Cooked cereals: farina, cream of rice.   Cold cereals: Puffed Rice , Rice Krispies , Corn Flakes  and Special K    Breads, Cereal, Rice and Pasta:   Breads or rolls with nuts, seeds or fruit.   Whole wheat, pumpernickel, rye breads and cornbread.   Potatoes with skin, brown or wild rice, and kasha (buckwheat).     Vegetables:   Tender cooked and canned vegetables without seeds: carrots, asparagus tips, green or wax beans, pumpkin, spinach, lima beans. Vegetables:   Raw or steamed vegetables.   Vegetables with seeds.   Sauerkraut.   Winter squash, peas, broccoli, Brussel sprouts, cabbage, onions, cauliflower, baked beans, peas and corn.   Fruits:   Strained fruit juice.   Canned fruit, except pineapple.   Ripe bananas and melon. Fruits:   Prunes and prune juice.   Raw fruits.   Dried fruits: figs, dates and raisins.   Milk/Dairy:   Milk: plain or flavored.   Yogurt, custard and ice cream.   Cheese and cottage cheese Milk/Dairy:     Meat and other proteins:   ground, well-cooked tender beef, lamb, ham, veal, pork, fish, poultry and organ meats.   Eggs.   Peanut butter without nuts. Meat and other proteins:   Tough, fibrous meats with gristle.   Dry beans, peas and lentils.   Peanut butter with nuts.   Tofu.   Fats,  Snack, Sweets, Condiments and Beverages:   Margarine, butter, oils, mayonnaise, sour cream and salad dressing, plain gravy.   Sugar, hard candy, clear jelly, honey and syrup.   Spices, cooked herbs, bouillon, broth and soups made with allowed vegetable, ketchup and mustard.   Coffee, tea and carbonated drinks.   Plain cakes, cookies and pretzels.   Gelatin, plain puddings, custard, ice cream, sherbet and popsicles. Fats, Snack, Sweets, Condiments and Beverages:   Nuts, seeds and coconut.   Jam, marmalade and preserves.   Pickles, olives, relish and horseradish.   All desserts containing nuts, seeds, dried fruit and coconut; or made from whole grains or bran.   Candy made with nuts or seeds.   Popcorn.                     DIRECTIONS TO THE ENDOSCOPY DEPARTMENT     From the north ([facility])  Take [address] South, exit on [address] [address]. Get into the left hand merritt, turn left (east), go one-half mile to [address] and turn left. Go north to the first stoplight, take a right on [address] and follow it to the Emergency entrance.    From the south ([facility])  Take 35N to the [address]E split and exit on [address] [address]. On [address] , turn left (west) to [address]. Turn right (north) on [address]. Go north to the first stoplight, take a right on [address] and follow it to the Emergency entrance.    From the east via 35E ([facility])  Take [address] south to [address] [address] exit. Turn right on [address] . Go west to [address]. Turn right (north) on [address]. Go to the first stoplight, take a right and follow on [address] to the Emergency entrance.    From the east via [address] ([facility])  Take [address] to [address]. Turn left (south) on [address] to [address]. Turn left (east) on [address] and follow it to the Emergency entrance.    From the west via [address] ([place])  Take [address] east to  [address]. Turn right (south) on [address] to [facility]. Turn left (east) on [facility] and follow it to the Emergency entrance.

## 2018-08-27 LAB — COPATH REPORT: NORMAL

## 2018-09-18 DIAGNOSIS — C61 PROSTATE CANCER (H): ICD-10-CM

## 2018-09-18 LAB
BASOPHILS # BLD AUTO: 0 10E9/L (ref 0–0.2)
BASOPHILS NFR BLD AUTO: 0.6 %
DIFFERENTIAL METHOD BLD: NORMAL
EOSINOPHIL # BLD AUTO: 0.3 10E9/L (ref 0–0.7)
EOSINOPHIL NFR BLD AUTO: 6 %
ERYTHROCYTE [DISTWIDTH] IN BLOOD BY AUTOMATED COUNT: 13.1 % (ref 10–15)
HCT VFR BLD AUTO: 43.3 % (ref 40–53)
HGB BLD-MCNC: 14.8 G/DL (ref 13.3–17.7)
LDH SERPL L TO P-CCNC: 204 U/L (ref 85–227)
LYMPHOCYTES # BLD AUTO: 1.4 10E9/L (ref 0.8–5.3)
LYMPHOCYTES NFR BLD AUTO: 29.9 %
MCH RBC QN AUTO: 31.6 PG (ref 26.5–33)
MCHC RBC AUTO-ENTMCNC: 34.2 G/DL (ref 31.5–36.5)
MCV RBC AUTO: 92 FL (ref 78–100)
MONOCYTES # BLD AUTO: 0.4 10E9/L (ref 0–1.3)
MONOCYTES NFR BLD AUTO: 9.4 %
NEUTROPHILS # BLD AUTO: 2.5 10E9/L (ref 1.6–8.3)
NEUTROPHILS NFR BLD AUTO: 54.1 %
PLATELET # BLD AUTO: 160 10E9/L (ref 150–450)
PSA SERPL-MCNC: 0.46 UG/L (ref 0–4)
RBC # BLD AUTO: 4.69 10E12/L (ref 4.4–5.9)
WBC # BLD AUTO: 4.7 10E9/L (ref 4–11)

## 2018-09-18 PROCEDURE — 36415 COLL VENOUS BLD VENIPUNCTURE: CPT | Performed by: INTERNAL MEDICINE

## 2018-09-18 PROCEDURE — 84403 ASSAY OF TOTAL TESTOSTERONE: CPT | Performed by: INTERNAL MEDICINE

## 2018-09-18 PROCEDURE — 84153 ASSAY OF PSA TOTAL: CPT | Performed by: INTERNAL MEDICINE

## 2018-09-18 PROCEDURE — 80053 COMPREHEN METABOLIC PANEL: CPT | Performed by: INTERNAL MEDICINE

## 2018-09-18 PROCEDURE — 85025 COMPLETE CBC W/AUTO DIFF WBC: CPT | Performed by: INTERNAL MEDICINE

## 2018-09-18 PROCEDURE — 83615 LACTATE (LD) (LDH) ENZYME: CPT | Performed by: INTERNAL MEDICINE

## 2018-09-19 LAB
ALBUMIN SERPL-MCNC: 3.8 G/DL (ref 3.4–5)
ALP SERPL-CCNC: 94 U/L (ref 40–150)
ALT SERPL W P-5'-P-CCNC: 29 U/L (ref 0–70)
ANION GAP SERPL CALCULATED.3IONS-SCNC: 10 MMOL/L (ref 3–14)
AST SERPL W P-5'-P-CCNC: 22 U/L (ref 0–45)
BILIRUB SERPL-MCNC: 0.6 MG/DL (ref 0.2–1.3)
BUN SERPL-MCNC: 18 MG/DL (ref 7–30)
CALCIUM SERPL-MCNC: 8.9 MG/DL (ref 8.5–10.1)
CHLORIDE SERPL-SCNC: 107 MMOL/L (ref 94–109)
CO2 SERPL-SCNC: 24 MMOL/L (ref 20–32)
CREAT SERPL-MCNC: 1.02 MG/DL (ref 0.66–1.25)
GFR SERPL CREATININE-BSD FRML MDRD: 71 ML/MIN/1.7M2
GLUCOSE SERPL-MCNC: 92 MG/DL (ref 70–99)
POTASSIUM SERPL-SCNC: 4.3 MMOL/L (ref 3.4–5.3)
PROT SERPL-MCNC: 7.4 G/DL (ref 6.8–8.8)
SODIUM SERPL-SCNC: 141 MMOL/L (ref 133–144)

## 2018-09-20 LAB — TESTOST SERPL-MCNC: 284 NG/DL (ref 240–950)

## 2018-09-21 ENCOUNTER — ONCOLOGY VISIT (OUTPATIENT)
Dept: ONCOLOGY | Facility: CLINIC | Age: 73
End: 2018-09-21
Attending: INTERNAL MEDICINE
Payer: COMMERCIAL

## 2018-09-21 ENCOUNTER — APPOINTMENT (OUTPATIENT)
Dept: INFUSION THERAPY | Facility: CLINIC | Age: 73
End: 2018-09-21
Attending: INTERNAL MEDICINE
Payer: MEDICARE

## 2018-09-21 VITALS
RESPIRATION RATE: 16 BRPM | OXYGEN SATURATION: 96 % | HEART RATE: 63 BPM | SYSTOLIC BLOOD PRESSURE: 144 MMHG | HEIGHT: 69 IN | DIASTOLIC BLOOD PRESSURE: 76 MMHG | TEMPERATURE: 97.8 F | BODY MASS INDEX: 30.78 KG/M2 | WEIGHT: 207.8 LBS

## 2018-09-21 DIAGNOSIS — C61 PROSTATE CANCER (H): Primary | ICD-10-CM

## 2018-09-21 PROCEDURE — 99213 OFFICE O/P EST LOW 20 MIN: CPT | Performed by: INTERNAL MEDICINE

## 2018-09-21 ASSESSMENT — PAIN SCALES - GENERAL: PAINLEVEL: NO PAIN (0)

## 2018-09-21 NOTE — PATIENT INSTRUCTIONS
Cancel lupron for today    Follow up in 3 months with labs a week prior to visit and possibly lupron after visit put in remind me patient left

## 2018-09-21 NOTE — NURSING NOTE
"Oncology Rooming Note    September 21, 2018 2:52 PM   Wallace Zelaya is a 73 year old male who presents for:    Chief Complaint   Patient presents with     Oncology Clinic Visit     Prostate cancer     Initial Vitals: /76  Pulse 63  Temp 97.8  F (36.6  C) (Tympanic)  Resp 16  Ht 1.753 m (5' 9\")  Wt 94.3 kg (207 lb 12.8 oz)  SpO2 96%  BMI 30.69 kg/m2 Estimated body mass index is 30.69 kg/(m^2) as calculated from the following:    Height as of this encounter: 1.753 m (5' 9\").    Weight as of this encounter: 94.3 kg (207 lb 12.8 oz). Body surface area is 2.14 meters squared.  No Pain (0) Comment: Data Unavailable   No LMP for male patient.  Allergies reviewed: Yes  Medications reviewed: Yes    Medications: Medication refills not needed today.  Pharmacy name entered into [facility]:    [facility] - [facility] [address] [facility] [address] [place], [place] - [address]  [facility] [id number] [place] [address] - [address] AT [address] [place] [address]    Clinical concerns: Prostate cancer    8 minutes for nursing intake (face to face time)     Judie Linton CMA     DISCHARGE PLAN:  Next appointments: See patient instruction section  Departure Mode: Ambulatory  Accompanied by: self  0 minutes for nursing discharge (face to face time)   Judie Linton CMA                .[email or web address]    "

## 2018-09-21 NOTE — PROGRESS NOTES
[facility] CANCER CLINIC  FOLLOW-UP VISIT NOTE    PATIENT NAME: Wallace Zelaya MRN # [id number]  DATE OF VISIT: Sep 21, 2018 YOB: 1945    REFERRING PROVIDER: No referring provider defined for this encounter.    CANCER TYPE: Prostate cancer; Biochemical recurrence; Hormone sensitive disease  STAGE: Stage III - pT3b at diagnosis; M0    HISTORY OF PRESENTING ILLNESS:  Wallace was noted to have rising screening PSA from 2 - 4. He was referred to Dr. Rodolfo Connor. He had radical prostatectomy on 11/2015. Pathology from this revealed Cayla 4+4 disease with extraprostatic extension, seminal vesicle extension and he was staged at pT3b. All of the 12 lymph nodes resected were negative for disease. Post operatively his PSA did not drop to undetectable levels and remained elevated at 0.56. It vladimir to 0.9 in April 2016 and he was referred to Dr. Newton for adjuvant radiation therapy. He completed radiation therapy but did not have any PSA response to this treatment.     TREATMENT SUMMARY:  11/13/2015 Radical prostatectomy  April 2016  Radiation therapy    Oncology Supportive Medications 1/10/2017 4/11/2017   leuprolide (LUPRON DEPOT) IM 22.5 mg 22.5 mg     CURRENT INTERVENTIONS:  Lupron - Intermittent androgen deprivation OFF treatment phase    SUBJECTIVE   Wallace is being seen for his prostate cancer    He is back off lupron. He again had hot flashes with therapy.       PAST MEDICAL HISTORY   1. HTN  2. Dyslipidemia  3. Prostate cancer as detailed above      CURRENT OUTPATIENT MEDICATIONS     Current Outpatient Prescriptions   Medication Sig     acyclovir (ZOVIRAX) 5 % ointment Apply topically 6 times daily     amLODIPine (NORVASC) 5 MG tablet Take 1 tablet (5 mg) by mouth daily     atorvastatin (LIPITOR) 20 MG tablet TAKE 1 TABLET EVERY DAY     valsartan (DIOVAN) 160 MG tablet TAKE 1 TABLET (160 MG) BY MOUTH DAILY     No current facility-administered medications for this visit.   "       ALLERGIES     Allergies   Allergen Reactions     Seasonal Allergies         REVIEW OF SYSTEMS   As above in the HPI, o/w complete 12-point ROS was negative.     PHYSICAL EXAM   /76  Pulse 63  Temp 97.8  F (36.6  C) (Tympanic)  Resp 16  Ht 1.753 m (5' 9\")  Wt 94.3 kg (207 lb 12.8 oz)  SpO2 96%  BMI 30.69 kg/m2  SpO2 Readings from Last 4 Encounters:   09/21/18 96%   08/24/18 94%   06/22/18 95%   05/11/18 97%     Wt Readings from Last 3 Encounters:   09/21/18 94.3 kg (207 lb 12.8 oz)   08/24/18 90.7 kg (200 lb)   06/22/18 93 kg (205 lb)     GEN: NAD  HEENT: PERRL, EOMI, no icterus, injection or pallor. Oropharynx is clear.  NECK: no cervical or supraclavicular lymphadenopathy  LUNGS: clear bilaterally  CV: regular, no murmurs, rubs, or gallops  ABDOMEN: soft, non-tender, non-distended, normal bowel sounds, no hepatosplenomegaly by percussion or palpation  EXT: warm, well perfused, no edema  NEURO: alert  SKIN: no rashes   LABORATORY AND IMAGING STUDIES     Recent Labs   Lab Test  09/18/18   0856  06/19/18   0909  03/09/18   1525  10/26/17   1553  07/28/17   1558   NA  141  143  141  140  140   POTASSIUM  4.3  4.2  4.1  4.0  3.9   CHLORIDE  107  109  107  105  104   CO2  24  26  28  26  29   ANIONGAP  10  8  6  9  7   BUN  18  16  12  15  15   CR  1.02  0.90  1.01  1.05  1.04   GLC  92  92  89  98  92   TY  8.9  9.0  8.7  8.5  8.5     No results for input(s): MAG, PHOS in the last 86866 hours.  Recent Labs   Lab Test  09/18/18   0856  06/19/18   0909  03/09/18   1525  10/26/17   1553  07/28/17   1558   WBC  4.7  4.3  7.0  5.0  5.3   HGB  14.8  14.8  15.9  14.0  13.9   PLT  160  166  171  204  191   MCV  92  90  89  92  90   NEUTROPHIL  54.1  54.3  69.0  51.7  52.8     Recent Labs   Lab Test  09/18/18   0856  06/19/18   0909  03/09/18   1525   BILITOTAL  0.6  0.5  0.6   ALKPHOS  94  104  110   ALT  29  31  24   AST  22  24  22   ALBUMIN  3.8  3.6  3.9   LDH  204  200  197     Recent Labs   Lab Test  " 09/18/18   0856  06/19/18   0909  03/09/18   1525  10/26/17   1553  07/28/17   1558   PSA  0.46  0.19  2.73  0.04  0.01   TESTOSTTOTAL  284  7*  331  174*  4*      ASSESSMENT AND PLAN   1. Biochemical PSA relapse post prostatectomy without any response to adjuvant radiation therapy  2. Observation phase of intermittent androgen deprivation therapy  3. HTN  4. ECOG PS1  5. No medical comorbidity    I had a lengthy discussion with Wallace. All his labs are within normal limits. His PSA had declined nicely from 2.73 to 0.19 and has increased to 0.46 at this visit off therapy. He was very happy about it. He would like to hold lupron for now. He is quite content.      I will see him in 3 months and possible lupron after visit if he has a rapid rise in his PSA again.     Gigi Ward  ,  Division of Hematology, Oncology & Transplantation  [facility].

## 2018-09-21 NOTE — LETTER
9/21/2018         RE: Wallace Zelaya  [address] [address] [address]        Dear Colleague,    Thank you for referring your patient, Wallace Zelaya, to the [facility] CANCER CARE. Please see a copy of my visit note below.    [facility] CANCER CLINIC  FOLLOW-UP VISIT NOTE    PATIENT NAME: Wallace Zelaya MRN # [id number]  DATE OF VISIT: Sep 21, 2018 YOB: 1945    REFERRING PROVIDER: No referring provider defined for this encounter.    CANCER TYPE: Prostate cancer; Biochemical recurrence; Hormone sensitive disease  STAGE: Stage III - pT3b at diagnosis; M0    HISTORY OF PRESENTING ILLNESS:  Wallace was noted to have rising screening PSA from 2 - 4. He was referred to Dr. Rodolfo Connor. He had radical prostatectomy on 11/2015. Pathology from this revealed Cayla 4+4 disease with extraprostatic extension, seminal vesicle extension and he was staged at pT3b. All of the 12 lymph nodes resected were negative for disease. Post operatively his PSA did not drop to undetectable levels and remained elevated at 0.56. It vladimir to 0.9 in April 2016 and he was referred to Dr. Newton for adjuvant radiation therapy. He completed radiation therapy but did not have any PSA response to this treatment.     TREATMENT SUMMARY:  11/13/2015 Radical prostatectomy  April 2016  Radiation therapy    Oncology Supportive Medications 1/10/2017 4/11/2017   leuprolide (LUPRON DEPOT) IM 22.5 mg 22.5 mg     CURRENT INTERVENTIONS:  Lupron - Intermittent androgen deprivation OFF treatment phase    SUBJECTIVE   Wallace is being seen for his prostate cancer    He is back off lupron. He again had hot flashes with therapy.       PAST MEDICAL HISTORY   1. HTN  2. Dyslipidemia  3. Prostate cancer as detailed above      CURRENT OUTPATIENT MEDICATIONS     Current Outpatient Prescriptions   Medication Sig     acyclovir (ZOVIRAX) 5 % ointment Apply topically 6 times daily     amLODIPine (NORVASC)  "5 MG tablet Take 1 tablet (5 mg) by mouth daily     atorvastatin (LIPITOR) 20 MG tablet TAKE 1 TABLET EVERY DAY     valsartan (DIOVAN) 160 MG tablet TAKE 1 TABLET (160 MG) BY MOUTH DAILY     No current facility-administered medications for this visit.         ALLERGIES     Allergies   Allergen Reactions     Seasonal Allergies         REVIEW OF SYSTEMS   As above in the HPI, o/w complete 12-point ROS was negative.     PHYSICAL EXAM   /76  Pulse 63  Temp 97.8  F (36.6  C) (Tympanic)  Resp 16  Ht 1.753 m (5' 9\")  Wt 94.3 kg (207 lb 12.8 oz)  SpO2 96%  BMI 30.69 kg/m2  SpO2 Readings from Last 4 Encounters:   09/21/18 96%   08/24/18 94%   06/22/18 95%   05/11/18 97%     Wt Readings from Last 3 Encounters:   09/21/18 94.3 kg (207 lb 12.8 oz)   08/24/18 90.7 kg (200 lb)   06/22/18 93 kg (205 lb)     GEN: NAD  HEENT: PERRL, EOMI, no icterus, injection or pallor. Oropharynx is clear.  NECK: no cervical or supraclavicular lymphadenopathy  LUNGS: clear bilaterally  CV: regular, no murmurs, rubs, or gallops  ABDOMEN: soft, non-tender, non-distended, normal bowel sounds, no hepatosplenomegaly by percussion or palpation  EXT: warm, well perfused, no edema  NEURO: alert  SKIN: no rashes   LABORATORY AND IMAGING STUDIES     Recent Labs   Lab Test  09/18/18   0856  06/19/18   0909  03/09/18   1525  10/26/17   1553  07/28/17   1558   NA  141  143  141  140  140   POTASSIUM  4.3  4.2  4.1  4.0  3.9   CHLORIDE  107  109  107  105  104   CO2  24  26  28  26  29   ANIONGAP  10  8  6  9  7   BUN  18  16  12  15  15   CR  1.02  0.90  1.01  1.05  1.04   GLC  92  92  89  98  92   TY  8.9  9.0  8.7  8.5  8.5     No results for input(s): MAG, PHOS in the last 28806 hours.  Recent Labs   Lab Test  09/18/18   0856  06/19/18   0909  03/09/18   1525  10/26/17   1553  07/28/17   1558   WBC  4.7  4.3  7.0  5.0  5.3   HGB  14.8  14.8  15.9  14.0  13.9   PLT  160  166  171  204  191   MCV  92  90  89  92  90   NEUTROPHIL  54.1  54.3  " 69.0  51.7  52.8     Recent Labs   Lab Test  09/18/18   0856  06/19/18   0909  03/09/18   1525   BILITOTAL  0.6  0.5  0.6   ALKPHOS  94  104  110   ALT  29  31  24   AST  22  24  22   ALBUMIN  3.8  3.6  3.9   LDH  204  200  197     Recent Labs   Lab Test  09/18/18   0856  06/19/18   0909  03/09/18   1525  10/26/17   1553  07/28/17   1558   PSA  0.46  0.19  2.73  0.04  0.01   TESTOSTTOTAL  284  7*  331  174*  4*      ASSESSMENT AND PLAN   1. Biochemical PSA relapse post prostatectomy without any response to adjuvant radiation therapy  2. Observation phase of intermittent androgen deprivation therapy  3. HTN  4. ECOG PS1  5. No medical comorbidity    I had a lengthy discussion with Wallace. All his labs are within normal limits. His PSA had declined nicely from 2.73 to 0.19 and has increased to 0.46 at this visit off therapy. He was very happy about it. He would like to hold lupron for now. He is quite content.      I will see him in 3 months and possible lupron after visit if he has a rapid rise in his PSA again.     Gigi Ward  ,  Division of Hematology, Oncology & Transplantation  [facility].       Again, thank you for allowing me to participate in the care of your patient.        Sincerely,        Gigi Ward MD

## 2018-10-03 ENCOUNTER — OFFICE VISIT (OUTPATIENT)
Dept: FAMILY MEDICINE | Facility: CLINIC | Age: 73
End: 2018-10-03
Payer: COMMERCIAL

## 2018-10-03 VITALS
BODY MASS INDEX: 28.99 KG/M2 | HEIGHT: 69 IN | HEART RATE: 58 BPM | WEIGHT: 195.7 LBS | SYSTOLIC BLOOD PRESSURE: 136 MMHG | DIASTOLIC BLOOD PRESSURE: 70 MMHG | OXYGEN SATURATION: 98 % | TEMPERATURE: 98 F

## 2018-10-03 DIAGNOSIS — M54.50 CHRONIC MIDLINE LOW BACK PAIN WITHOUT SCIATICA: ICD-10-CM

## 2018-10-03 DIAGNOSIS — C61 PROSTATE CANCER (H): ICD-10-CM

## 2018-10-03 DIAGNOSIS — Z23 NEED FOR PROPHYLACTIC VACCINATION AND INOCULATION AGAINST INFLUENZA: ICD-10-CM

## 2018-10-03 DIAGNOSIS — G89.29 CHRONIC MIDLINE LOW BACK PAIN WITHOUT SCIATICA: ICD-10-CM

## 2018-10-03 DIAGNOSIS — Z00.00 ROUTINE GENERAL MEDICAL EXAMINATION AT A HEALTH CARE FACILITY: Primary | ICD-10-CM

## 2018-10-03 DIAGNOSIS — I10 ESSENTIAL HYPERTENSION: ICD-10-CM

## 2018-10-03 DIAGNOSIS — E78.5 HYPERLIPIDEMIA LDL GOAL <130: ICD-10-CM

## 2018-10-03 PROCEDURE — 90662 IIV NO PRSV INCREASED AG IM: CPT | Performed by: FAMILY MEDICINE

## 2018-10-03 PROCEDURE — 99397 PER PM REEVAL EST PAT 65+ YR: CPT | Mod: 25 | Performed by: FAMILY MEDICINE

## 2018-10-03 PROCEDURE — G0008 ADMIN INFLUENZA VIRUS VAC: HCPCS | Performed by: FAMILY MEDICINE

## 2018-10-03 RX ORDER — IBUPROFEN 800 MG/1
800 TABLET, FILM COATED ORAL 3 TIMES DAILY
Qty: 90 TABLET | Refills: 3 | Status: SHIPPED | OUTPATIENT
Start: 2018-10-03 | End: 2020-01-14

## 2018-10-03 ASSESSMENT — ACTIVITIES OF DAILY LIVING (ADL)
I_NEED_ASSISTANCE_FOR_THE_FOLLOWING_DAILY_ACTIVITIES:: NO ASSISTANCE IS NEEDED
CURRENT_FUNCTION: NO ASSISTANCE NEEDED

## 2018-10-03 NOTE — PROGRESS NOTES
SUBJECTIVE:   Wallace Zelaya is a 73 year old male who presents for Preventive Visit.  Are you in the first 12 months of your Medicare coverage?  No    Physical   Annual:     Getting at least 3 servings of Calcium per day:  Yes    Bi-annual eye exam:  Yes    Dental care twice a year:  Yes    Sleep apnea or symptoms of sleep apnea:  None    Diet:  Regular (no restrictions)    Frequency of exercise:  4-5 days/week    Duration of exercise:  15-30 minutes    Taking medications regularly:  Yes    Medication side effects:  None    Additional concerns today:  No    Ability to successfully perform activities of daily living: no assistance needed    Home Safety:  Throw rugs in the hallway and lack of grab bars in the bathroom    Hearing Impairment: no hearing concerns        Fall risk:       COGNITIVE SCREEN  1) Repeat 3 items (Leader, Season, Table)    2) Clock draw: NORMAL  3) 3 item recall: Recalls 3 objects  Results: 3 items recalled: COGNITIVE IMPAIRMENT LESS LIKELY    Mini-CogTM Copyright S Kelsey. Licensed by the author for use in [facility]; reprinted with permission (soob@[facility]). All rights reserved.      Patient reports low back pain and general joint pain. The pain improves with ibuprofen. Denies radiating leg pain or numbness.     Patient with history of hypertension. Denies shortness or breath, chest pain, headache, vision change, or lower extremity edema.    History of hyperlipidemia, on statin.     Patient with prostate cancer initially treated by Dr. Rodolfo Connor with robotic radical prostatectomy and radiation 11/2015. Now off Lupron injections with declining PSA. Followed by Dr. Giig Ward with oncology.     Reviewed and updated as needed this visit by clinical staff  Tobacco  Allergies  Meds  Med Hx  Surg Hx  Fam Hx  Soc Hx        Reviewed and updated as needed this visit by Provider        Social History   Substance Use Topics     Smoking status: Former Smoker     Packs/day: 1.00      Years: 25.00     Quit date: 8/17/2009     Smokeless tobacco: Never Used      Comment: 4-5 cigs qd /quit July 1st 2009     Alcohol use Yes      Comment: socially,very little       Alcohol Use 10/3/2018   If you drink alcohol do you typically have greater than 3 drinks per day OR greater than 7 drinks per week? Not Applicable   No flowsheet data found.      Today's PHQ-2 Score:   PHQ-2 ( 1999 [employer]) 10/3/2018   Q1: Little interest or pleasure in doing things 0   Q2: Feeling down, depressed or hopeless 0   PHQ-2 Score 0   Q1: Little interest or pleasure in doing things Not at all   Q2: Feeling down, depressed or hopeless Not at all   PHQ-2 Score 0       Do you feel safe in your environment - Yes      Current providers sharing in care for this patient include:   Patient Care Team:  Jd Almaraz MD as PCP - General (Family Practice)  Jd Almaraz MD as Referring Physician (Family Practice)  Rodolfo Connor MD as MD (Urology)  Harry Myers, RN as Registered Nurse    The following health maintenance items are reviewed in Epic and correct as of today:  Health Maintenance   Topic Date Due     INFLUENZA VACCINE (1) 09/01/2018     FALL RISK ASSESSMENT  03/09/2019     PHQ-2 Q1 YR  05/11/2019     BMP Q1 YR  09/18/2019     LIPID SCREEN Q5 YR MALE (SYSTEM ASSIGNED)  08/03/2021     ADVANCE DIRECTIVE PLANNING Q5 YRS  08/03/2021     COLONOSCOPY Q5 YR  08/24/2023     TETANUS IMMUNIZATION (SYSTEM ASSIGNED)  03/09/2028     PNEUMOCOCCAL  Completed     AORTIC ANEURYSM SCREENING (SYSTEM ASSIGNED)  Completed     Patient Active Problem List   Diagnosis     Colon polyp     Advanced directives, counseling/discussion     Hyperlipidemia LDL goal <130     Prostate cancer (HCC)     Past Surgical History:   Procedure Laterality Date     BIOPSY  02/2011    skin tagged left arm     C NONSPECIFIC PROCEDURE      Lawnmower injury right foot-toe      C NONSPECIFIC PROCEDURE      Pilonidal cyst     COLONOSCOPY  8/19/2013  "   Procedure: COLONOSCOPY;  Colonoscopy ;  Surgeon: Emerson Martinez MD, MD;  Location:  GI     DAVINCI PROSTATECTOMY N/A 2015    Procedure: DAVINCI PROSTATECTOMY;  Surgeon: Rodolfo Connor MD;  Location:  OR       Social History   Substance Use Topics     Smoking status: Former Smoker     Packs/day: 1.00     Years: 25.00     Quit date: 2009     Smokeless tobacco: Never Used      Comment: 4-5 cigs qd /quit 2009     Alcohol use Yes      Comment: socially,very little     Family History   Problem Relation Age of Onset     Cancer Mother      86 YO AND HYPERTENSION     Hypertension Mother      Colon Cancer Mother      HEART DISEASE Father       86 YO MI     Prostate Cancer Brother 50     prostate cancer           Review of Systems  CONSTITUTIONAL: NEGATIVE for fever, chills, change in weight  INTEGUMENTARY/SKIN: NEGATIVE for worrisome rashes, moles or lesions  EYES: NEGATIVE for vision changes or irritation  ENT/MOUTH: NEGATIVE for ear, mouth and throat problems  RESP: NEGATIVE for significant cough or SOB  CV: NEGATIVE for chest pain, palpitations or peripheral edema  GI: NEGATIVE for nausea, abdominal pain, heartburn, or change in bowel habits  : NEGATIVE for frequency, dysuria, or hematuria  MUSCULOSKELETAL: as noted above   NEURO: NEGATIVE for weakness, dizziness or paresthesias  PSYCHIATRIC: NEGATIVE for changes in mood or affect    This document serves as a record of the services and decisions personally performed and made by Jd Almaraz MD. It was created on his behalf by Ev Palacios, a trained medical scribe. The creation of this document is based on the provider's statements to the medical scribe.  Ev Palacios 8:44 AM October 3, 2018    OBJECTIVE:   /70 (BP Location: Right arm, Patient Position: Chair, Cuff Size: Adult Regular)  Pulse 58  Temp 98  F (36.7  C) (Oral)  Ht 1.753 m (5' 9\")  Wt 88.8 kg (195 lb 11.2 oz)  SpO2 98%  BMI 28.9 kg/m2 Estimated " "body mass index is 28.9 kg/(m^2) as calculated from the following:    Height as of this encounter: 1.753 m (5' 9\").    Weight as of this encounter: 88.8 kg (195 lb 11.2 oz).  Physical Exam  GENERAL: healthy, alert and no distress  EYES: Eyes grossly normal to inspection, PERRL and conjunctivae and sclerae normal  HENT: ear canals and TM's normal, nose and mouth without ulcers or lesions  NECK: no adenopathy, no asymmetry, masses, or scars and thyroid normal to palpation  RESP: lungs clear to auscultation - no rales, rhonchi or wheezes  CV: regular rate and rhythm, normal S1 S2, no S3 or S4, no murmur, click or rub, no peripheral edema and peripheral pulses strong  ABDOMEN: soft, nontender, no hepatosplenomegaly, no masses and bowel sounds normal   (male): normal male genitalia without lesions or urethral discharge, no hernia  MS: no gross musculoskeletal defects noted, no edema  SKIN: no suspicious lesions or rashes  NEURO: Normal strength and tone, mentation intact and speech normal  PSYCH: mentation appears normal, affect normal/bright    ASSESSMENT / PLAN:   1. Routine general medical examination at a health care facility    2. Chronic midline low back pain without sciatica  - ibuprofen (ADVIL/MOTRIN) 800 MG tablet; Take 1 tablet (800 mg) by mouth 3 times daily with food  Dispense: 90 tablet; Refill: 3    3. Prostate cancer (HCC)  Continue follow-up with urology planned in 3 months.    4. Hyperlipidemia LDL goal <130  Continue statin.    5. Essential hypertension  Controlled, continue medications.    End of Life Planning:   Patient currently has an advanced directive: Yes. Patient will bring in a copy to the clinic.     COUNSELING:  Reviewed preventive health counseling, as reflected in patient instructions  Special attention given to:       Regular exercise       Healthy diet/nutrition       Immunizations    Vaccinated for: Influenza      BP Readings from Last 1 Encounters:   10/03/18 136/70     Estimated body " "mass index is 28.9 kg/(m^2) as calculated from the following:    Height as of this encounter: 1.753 m (5' 9\").    Weight as of this encounter: 88.8 kg (195 lb 11.2 oz).           reports that he quit smoking about 9 years ago. He has a 25.00 pack-year smoking history. He has never used smokeless tobacco.      Appropriate preventive services were discussed with this patient, including applicable screening as appropriate for cardiovascular disease, diabetes, osteopenia/osteoporosis, and glaucoma.  As appropriate for age/gender, discussed screening for colorectal cancer, prostate cancer, breast cancer, and cervical cancer. Checklist reviewing preventive services available has been given to the patient.    Reviewed patients plan of care and provided an AVS. The Basic Care Plan (routine screening as documented in Health Maintenance) for Wallace meets the Care Plan requirement. This Care Plan has been established and reviewed with the Patient.    Counseling Resources:  ATP IV Guidelines  Pooled Cohorts Equation Calculator  Breast Cancer Risk Calculator  FRAX Risk Assessment  ICSI Preventive Guidelines  Dietary Guidelines for Americans, 2010  [employer]'s MyPlate  ASA Prophylaxis  Lung CA Screening    The information in this document, created by the medical scribe for me, accurately reflects the services I personally performed and the decisions made by me. I have reviewed and approved this document for accuracy prior to leaving the patient care area.  October 3, 2018 9:03 AM    Jd Almaraz MD  [facility]  Answers for HPI/ROS submitted by the patient on 10/3/2018   PHQ-2 Score: 0      Injectable Influenza Immunization Documentation    1.  Is the person to be vaccinated sick today?   No    2. Does the person to be vaccinated have an allergy to a component   of the vaccine?   No  Egg Allergy Algorithm Link    3. Has the person to be vaccinated ever had a serious reaction   to influenza vaccine in the past?   " No    4. Has the person to be vaccinated ever had Guillain-Barré syndrome?   No    Form completed by Ivelisse Simpson [facility]

## 2018-12-21 DIAGNOSIS — C61 PROSTATE CANCER (H): ICD-10-CM

## 2018-12-21 LAB
ALBUMIN SERPL-MCNC: 3.8 G/DL (ref 3.4–5)
ALP SERPL-CCNC: 93 U/L (ref 40–150)
ALT SERPL W P-5'-P-CCNC: 26 U/L (ref 0–70)
ANION GAP SERPL CALCULATED.3IONS-SCNC: 7 MMOL/L (ref 3–14)
AST SERPL W P-5'-P-CCNC: 19 U/L (ref 0–45)
BASOPHILS # BLD AUTO: 0 10E9/L (ref 0–0.2)
BASOPHILS NFR BLD AUTO: 0.3 %
BILIRUB SERPL-MCNC: 0.9 MG/DL (ref 0.2–1.3)
BUN SERPL-MCNC: 15 MG/DL (ref 7–30)
CALCIUM SERPL-MCNC: 9.3 MG/DL (ref 8.5–10.1)
CHLORIDE SERPL-SCNC: 106 MMOL/L (ref 94–109)
CO2 SERPL-SCNC: 26 MMOL/L (ref 20–32)
CREAT SERPL-MCNC: 1.05 MG/DL (ref 0.66–1.25)
DIFFERENTIAL METHOD BLD: NORMAL
EOSINOPHIL # BLD AUTO: 0.4 10E9/L (ref 0–0.7)
EOSINOPHIL NFR BLD AUTO: 6 %
ERYTHROCYTE [DISTWIDTH] IN BLOOD BY AUTOMATED COUNT: 13.3 % (ref 10–15)
GFR SERPL CREATININE-BSD FRML MDRD: 69 ML/MIN/{1.73_M2}
GLUCOSE SERPL-MCNC: 94 MG/DL (ref 70–99)
HCT VFR BLD AUTO: 45.3 % (ref 40–53)
HGB BLD-MCNC: 15.5 G/DL (ref 13.3–17.7)
LDH SERPL L TO P-CCNC: 215 U/L (ref 85–227)
LYMPHOCYTES # BLD AUTO: 1.6 10E9/L (ref 0.8–5.3)
LYMPHOCYTES NFR BLD AUTO: 27.1 %
MCH RBC QN AUTO: 31.3 PG (ref 26.5–33)
MCHC RBC AUTO-ENTMCNC: 34.2 G/DL (ref 31.5–36.5)
MCV RBC AUTO: 92 FL (ref 78–100)
MONOCYTES # BLD AUTO: 0.5 10E9/L (ref 0–1.3)
MONOCYTES NFR BLD AUTO: 8.4 %
NEUTROPHILS # BLD AUTO: 3.4 10E9/L (ref 1.6–8.3)
NEUTROPHILS NFR BLD AUTO: 58.2 %
PLATELET # BLD AUTO: 174 10E9/L (ref 150–450)
POTASSIUM SERPL-SCNC: 4.2 MMOL/L (ref 3.4–5.3)
PROT SERPL-MCNC: 7.3 G/DL (ref 6.8–8.8)
PSA SERPL-MCNC: 5.41 UG/L (ref 0–4)
RBC # BLD AUTO: 4.95 10E12/L (ref 4.4–5.9)
SODIUM SERPL-SCNC: 139 MMOL/L (ref 133–144)
WBC # BLD AUTO: 5.8 10E9/L (ref 4–11)

## 2018-12-21 PROCEDURE — 83615 LACTATE (LD) (LDH) ENZYME: CPT | Performed by: INTERNAL MEDICINE

## 2018-12-21 PROCEDURE — 85025 COMPLETE CBC W/AUTO DIFF WBC: CPT | Performed by: INTERNAL MEDICINE

## 2018-12-21 PROCEDURE — 84403 ASSAY OF TOTAL TESTOSTERONE: CPT | Performed by: INTERNAL MEDICINE

## 2018-12-21 PROCEDURE — 80053 COMPREHEN METABOLIC PANEL: CPT | Performed by: INTERNAL MEDICINE

## 2018-12-21 PROCEDURE — 36415 COLL VENOUS BLD VENIPUNCTURE: CPT | Performed by: INTERNAL MEDICINE

## 2018-12-21 PROCEDURE — 84153 ASSAY OF PSA TOTAL: CPT | Performed by: INTERNAL MEDICINE

## 2018-12-22 LAB — TESTOST SERPL-MCNC: 275 NG/DL (ref 240–950)

## 2019-01-04 ENCOUNTER — ONCOLOGY VISIT (OUTPATIENT)
Dept: ONCOLOGY | Facility: CLINIC | Age: 74
End: 2019-01-04
Attending: INTERNAL MEDICINE
Payer: COMMERCIAL

## 2019-01-04 ENCOUNTER — ALLIED HEALTH/NURSE VISIT (OUTPATIENT)
Dept: INFUSION THERAPY | Facility: CLINIC | Age: 74
End: 2019-01-04
Attending: INTERNAL MEDICINE
Payer: COMMERCIAL

## 2019-01-04 VITALS
TEMPERATURE: 96.8 F | BODY MASS INDEX: 32.5 KG/M2 | HEART RATE: 59 BPM | RESPIRATION RATE: 16 BRPM | WEIGHT: 219.4 LBS | SYSTOLIC BLOOD PRESSURE: 142 MMHG | DIASTOLIC BLOOD PRESSURE: 81 MMHG | HEIGHT: 69 IN | OXYGEN SATURATION: 95 %

## 2019-01-04 DIAGNOSIS — C61 PROSTATE CANCER (H): Primary | ICD-10-CM

## 2019-01-04 PROCEDURE — 99213 OFFICE O/P EST LOW 20 MIN: CPT | Performed by: INTERNAL MEDICINE

## 2019-01-04 PROCEDURE — 96402 CHEMO HORMON ANTINEOPL SQ/IM: CPT

## 2019-01-04 PROCEDURE — 25000128 H RX IP 250 OP 636: Performed by: INTERNAL MEDICINE

## 2019-01-04 RX ADMIN — LEUPROLIDE ACETATE 22.5 MG: KIT at 10:13

## 2019-01-04 ASSESSMENT — MIFFLIN-ST. JEOR: SCORE: 1725.57

## 2019-01-04 ASSESSMENT — PAIN SCALES - GENERAL: PAINLEVEL: NO PAIN (0)

## 2019-01-04 NOTE — NURSING NOTE
"Oncology Rooming Note    January 4, 2019 8:55 AM   Wallace Zelaya is a 73 year old male who presents for:    Chief Complaint   Patient presents with     Oncology Clinic Visit     Prostate cancer (HCC)      Initial Vitals: /81   Pulse 59   Temp 96.8  F (36  C) (Oral)   Resp 16   Ht 1.753 m (5' 9\")   Wt 99.5 kg (219 lb 6.4 oz)   SpO2 95%   BMI 32.40 kg/m   Estimated body mass index is 32.4 kg/m  as calculated from the following:    Height as of this encounter: 1.753 m (5' 9\").    Weight as of this encounter: 99.5 kg (219 lb 6.4 oz). Body surface area is 2.2 meters squared.  No Pain (0) Comment: Data Unavailable   No LMP for male patient.  Allergies reviewed: Yes  Medications reviewed: Yes    Medications: Medication refills not needed today.  Pharmacy name entered into Rico:    [facility] - [facility] [address] [facility] [address] [facility] [address]  [facility] [address] [place] [address] [address] AT [address] [place] [address]    Clinical concerns: f/u     8 minutes for nursing intake (face to face time)     Olya Valenzuela CMA              DISCHARGE PLAN:  Next appointments: See patient instruction section  Departure Mode: Ambulatory  Accompanied by: self  0 minutes for nursing discharge (face to face time)   Olya Valenzuela CMA      "

## 2019-01-04 NOTE — PROGRESS NOTES
[facility] CANCER CLINIC  FOLLOW-UP VISIT NOTE    PATIENT NAME: Wallace Zelaya MRN # [id number]  DATE OF VISIT: Jan 4, 2019 YOB: 1945    REFERRING PROVIDER: No referring provider defined for this encounter.    CANCER TYPE: Prostate cancer; Biochemical recurrence; Hormone sensitive disease  STAGE: Stage III - pT3b at diagnosis; M0    HISTORY OF PRESENTING ILLNESS:  Wallace was noted to have rising screening PSA from 2 - 4. He was referred to Dr. Rodolfo Connor. He had radical prostatectomy on 11/2015. Pathology from this revealed Cayla 4+4 disease with extraprostatic extension, seminal vesicle extension and he was staged at pT3b. All of the 12 lymph nodes resected were negative for disease. Post operatively his PSA did not drop to undetectable levels and remained elevated at 0.56. It vladimir to 0.9 in April 2016 and he was referred to Dr. Newton for adjuvant radiation therapy. He completed radiation therapy but did not have any PSA response to this treatment.     TREATMENT SUMMARY:  11/13/2015 Radical prostatectomy  April 2016  Radiation therapy    Oncology Supportive Medications 1/10/2017 4/11/2017   leuprolide (LUPRON DEPOT) IM 22.5 mg 22.5 mg     CURRENT INTERVENTIONS:  Lupron - Intermittent androgen deprivation OFF treatment phase    SUBJECTIVE   Wallace is being seen for his prostate cancer    He is back off lupron. He again had hot flashes with therapy.       PAST MEDICAL HISTORY   1. HTN  2. Dyslipidemia  3. Prostate cancer as detailed above      CURRENT OUTPATIENT MEDICATIONS     Current Outpatient Medications   Medication Sig     acyclovir (ZOVIRAX) 5 % ointment Apply topically 6 times daily     amLODIPine (NORVASC) 5 MG tablet Take 1 tablet (5 mg) by mouth daily     atorvastatin (LIPITOR) 20 MG tablet TAKE 1 TABLET EVERY DAY     ibuprofen (ADVIL/MOTRIN) 800 MG tablet Take 1 tablet (800 mg) by mouth 3 times daily with food     valsartan (DIOVAN) 160 MG tablet TAKE 1  "TABLET (160 MG) BY MOUTH DAILY     No current facility-administered medications for this visit.         ALLERGIES     Allergies   Allergen Reactions     Seasonal Allergies         REVIEW OF SYSTEMS   As above in the HPI, o/w complete 12-point ROS was negative.     PHYSICAL EXAM   /81   Pulse 59   Temp 96.8  F (36  C) (Oral)   Resp 16   Ht 1.753 m (5' 9\")   Wt 99.5 kg (219 lb 6.4 oz)   SpO2 95%   BMI 32.40 kg/m    SpO2 Readings from Last 4 Encounters:   09/21/18 96%   08/24/18 94%   06/22/18 95%   05/11/18 97%     Wt Readings from Last 3 Encounters:   01/04/19 99.5 kg (219 lb 6.4 oz)   10/03/18 88.8 kg (195 lb 11.2 oz)   09/21/18 94.3 kg (207 lb 12.8 oz)     GEN: NAD  HEENT: PERRL, EOMI, no icterus, injection or pallor. Oropharynx is clear.  NECK: no cervical or supraclavicular lymphadenopathy  LUNGS: clear bilaterally  CV: regular, no murmurs, rubs, or gallops  ABDOMEN: soft, non-tender, non-distended, normal bowel sounds, no hepatosplenomegaly by percussion or palpation  EXT: warm, well perfused, no edema  NEURO: alert  SKIN: no rashes     LABORATORY AND IMAGING STUDIES     Recent Labs   Lab Test 12/21/18  0825 09/18/18  0856 06/19/18  0909 03/09/18  1525 10/26/17  1553    141 143 141 140   POTASSIUM 4.2 4.3 4.2 4.1 4.0   CHLORIDE 106 107 109 107 105   CO2 26 24 26 28 26   ANIONGAP 7 10 8 6 9   BUN 15 18 16 12 15   CR 1.05 1.02 0.90 1.01 1.05   GLC 94 92 92 89 98   TY 9.3 8.9 9.0 8.7 8.5     No results for input(s): MAG, PHOS in the last 90052 hours.  Recent Labs   Lab Test 12/21/18  0825 09/18/18  0856 06/19/18  0909 03/09/18  1525 10/26/17  1553   WBC 5.8 4.7 4.3 7.0 5.0   HGB 15.5 14.8 14.8 15.9 14.0    160 166 171 204   MCV 92 92 90 89 92   NEUTROPHIL 58.2 54.1 54.3 69.0 51.7     Recent Labs   Lab Test 12/21/18  0825 09/18/18  0856 06/19/18  0909   BILITOTAL 0.9 0.6 0.5   ALKPHOS 93 94 104   ALT 26 29 31   AST 19 22 24   ALBUMIN 3.8 3.8 3.6    204 200     Recent Labs   Lab Test " 12/21/18  0825 09/18/18  0856 06/19/18  0909 03/09/18  1525 10/26/17  1553   PSA 5.41* 0.46 0.19 2.73 0.04   TESTOSTTOTAL 275 284 7* 331 174*         ASSESSMENT AND PLAN   1. Biochemical PSA relapse post prostatectomy without any response to adjuvant radiation therapy  2. Rapid rise in PSA on observation phase of intermittent androgen deprivation therapy  3. HTN  4. ECOG PS1  5. No medical comorbidity    I had a lengthy discussion with Wallace. All his labs are within normal limits. His PSA had declined nicely from 2.73 to 0.19 and had increased to 0.46 at last visit and has risen to 5.4 at this visit off therapy. His PSA has doubled over 3 times in the last 3 months. This is quite short a doubling time. I think we would have to treat him at this time. His PSA could rise up to 50 at this rate in another 3 months. He was sad to hear this and was almost tearful. He again had questions on prognosis. I explained him that it is not uncommon to have shorter time off on intermittent ADT. He still has several treatment options remaining. He has just retired last week.     I will see him in 3 months and possible lupron after visit. We might have to transition to continuous ADT.     Gigi Ward  ,  Division of Hematology, Oncology & Transplantation  [facility].

## 2019-01-04 NOTE — PROGRESS NOTES
Infusion Nursing Note:  Wallace Zelaya presents today for Lupron.    Patient seen by provider today: Yes: Ed   present during visit today: Not Applicable.    Note: Assessment done by MD at appointment.    Intravenous Access:  No Intravenous access/labs at this visit.    Treatment Conditions:  Not Applicable.      Post Infusion Assessment:  Patient tolerated injection without incident.    Discharge Plan:   Discharge instructions reviewed with: Patient.  Patient discharged in stable condition accompanied by: self.  Departure Mode: Ambulatory.  Scheduled for labs on 3/29/19 and MD/injection on 4/5/19.    LAYNE CASTANON RN

## 2019-01-04 NOTE — LETTER
1/4/2019         RE: Wallace Zelaya  [address] [address] [address]        Dear Colleague,    Thank you for referring your patient, Wallace Zelaya, to the [facility] CANCER CARE. Please see a copy of my visit note below.    [facility] CANCER CLINIC  FOLLOW-UP VISIT NOTE    PATIENT NAME: Wallace Zelaya MRN # [id number]  DATE OF VISIT: Jan 4, 2019 YOB: 1945    REFERRING PROVIDER: No referring provider defined for this encounter.    CANCER TYPE: Prostate cancer; Biochemical recurrence; Hormone sensitive disease  STAGE: Stage III - pT3b at diagnosis; M0    HISTORY OF PRESENTING ILLNESS:  Wallace was noted to have rising screening PSA from 2 - 4. He was referred to Dr. Rodolfo Connor. He had radical prostatectomy on 11/2015. Pathology from this revealed Cayla 4+4 disease with extraprostatic extension, seminal vesicle extension and he was staged at pT3b. All of the 12 lymph nodes resected were negative for disease. Post operatively his PSA did not drop to undetectable levels and remained elevated at 0.56. It vladimir to 0.9 in April 2016 and he was referred to Dr. Newton for adjuvant radiation therapy. He completed radiation therapy but did not have any PSA response to this treatment.     TREATMENT SUMMARY:  11/13/2015 Radical prostatectomy  April 2016  Radiation therapy    Oncology Supportive Medications 1/10/2017 4/11/2017   leuprolide (LUPRON DEPOT) IM 22.5 mg 22.5 mg     CURRENT INTERVENTIONS:  Lupron - Intermittent androgen deprivation OFF treatment phase    SUBJECTIVE   Wallace is being seen for his prostate cancer    He is back off lupron. He again had hot flashes with therapy.       PAST MEDICAL HISTORY   1. HTN  2. Dyslipidemia  3. Prostate cancer as detailed above      CURRENT OUTPATIENT MEDICATIONS     Current Outpatient Medications   Medication Sig     acyclovir (ZOVIRAX) 5 % ointment Apply topically 6 times daily     amLODIPine (NORVASC) 5  "MG tablet Take 1 tablet (5 mg) by mouth daily     atorvastatin (LIPITOR) 20 MG tablet TAKE 1 TABLET EVERY DAY     ibuprofen (ADVIL/MOTRIN) 800 MG tablet Take 1 tablet (800 mg) by mouth 3 times daily with food     valsartan (DIOVAN) 160 MG tablet TAKE 1 TABLET (160 MG) BY MOUTH DAILY     No current facility-administered medications for this visit.         ALLERGIES     Allergies   Allergen Reactions     Seasonal Allergies         REVIEW OF SYSTEMS   As above in the HPI, o/w complete 12-point ROS was negative.     PHYSICAL EXAM   /81   Pulse 59   Temp 96.8  F (36  C) (Oral)   Resp 16   Ht 1.753 m (5' 9\")   Wt 99.5 kg (219 lb 6.4 oz)   SpO2 95%   BMI 32.40 kg/m     SpO2 Readings from Last 4 Encounters:   09/21/18 96%   08/24/18 94%   06/22/18 95%   05/11/18 97%     Wt Readings from Last 3 Encounters:   01/04/19 99.5 kg (219 lb 6.4 oz)   10/03/18 88.8 kg (195 lb 11.2 oz)   09/21/18 94.3 kg (207 lb 12.8 oz)     GEN: NAD  HEENT: PERRL, EOMI, no icterus, injection or pallor. Oropharynx is clear.  NECK: no cervical or supraclavicular lymphadenopathy  LUNGS: clear bilaterally  CV: regular, no murmurs, rubs, or gallops  ABDOMEN: soft, non-tender, non-distended, normal bowel sounds, no hepatosplenomegaly by percussion or palpation  EXT: warm, well perfused, no edema  NEURO: alert  SKIN: no rashes     LABORATORY AND IMAGING STUDIES     Recent Labs   Lab Test 12/21/18  0825 09/18/18  0856 06/19/18  0909 03/09/18  1525 10/26/17  1553    141 143 141 140   POTASSIUM 4.2 4.3 4.2 4.1 4.0   CHLORIDE 106 107 109 107 105   CO2 26 24 26 28 26   ANIONGAP 7 10 8 6 9   BUN 15 18 16 12 15   CR 1.05 1.02 0.90 1.01 1.05   GLC 94 92 92 89 98   TY 9.3 8.9 9.0 8.7 8.5     No results for input(s): MAG, PHOS in the last 95838 hours.  Recent Labs   Lab Test 12/21/18  0825 09/18/18  0856 06/19/18  0909 03/09/18  1525 10/26/17  1553   WBC 5.8 4.7 4.3 7.0 5.0   HGB 15.5 14.8 14.8 15.9 14.0    160 166 171 204   MCV 92 92 90 89 " 92   NEUTROPHIL 58.2 54.1 54.3 69.0 51.7     Recent Labs   Lab Test 12/21/18  0825 09/18/18  0856 06/19/18  0909   BILITOTAL 0.9 0.6 0.5   ALKPHOS 93 94 104   ALT 26 29 31   AST 19 22 24   ALBUMIN 3.8 3.8 3.6    204 200     Recent Labs   Lab Test 12/21/18  0825 09/18/18  0856 06/19/18  0909 03/09/18  1525 10/26/17  1553   PSA 5.41* 0.46 0.19 2.73 0.04   TESTOSTTOTAL 275 284 7* 331 174*         ASSESSMENT AND PLAN   1. Biochemical PSA relapse post prostatectomy without any response to adjuvant radiation therapy  2. Observation phase of intermittent androgen deprivation therapy  3. HTN  4. ECOG PS1  5. No medical comorbidity    I had a lengthy discussion with Wallace. All his labs are within normal limits. His PSA had declined nicely from 2.73 to 0.19 and has increased to 0.46 at this visit off therapy. He was very happy about it. He would like to hold lupron for now. He is quite content.      I will see him in 3 months and possible lupron after visit if he has a rapid rise in his PSA again.     Gigi Ward  ,  Division of Hematology, Oncology & Transplantation  [facility].       Again, thank you for allowing me to participate in the care of your patient.        Sincerely,        Gigi Ward MD

## 2019-03-29 DIAGNOSIS — C61 PROSTATE CANCER (H): ICD-10-CM

## 2019-03-29 LAB
ALBUMIN SERPL-MCNC: 3.7 G/DL (ref 3.4–5)
ALP SERPL-CCNC: 97 U/L (ref 40–150)
ALT SERPL W P-5'-P-CCNC: 30 U/L (ref 0–70)
ANION GAP SERPL CALCULATED.3IONS-SCNC: 3 MMOL/L (ref 3–14)
AST SERPL W P-5'-P-CCNC: 26 U/L (ref 0–45)
BASOPHILS # BLD AUTO: 0 10E9/L (ref 0–0.2)
BASOPHILS NFR BLD AUTO: 0.5 %
BILIRUB SERPL-MCNC: 0.7 MG/DL (ref 0.2–1.3)
BUN SERPL-MCNC: 14 MG/DL (ref 7–30)
CALCIUM SERPL-MCNC: 9 MG/DL (ref 8.5–10.1)
CHLORIDE SERPL-SCNC: 107 MMOL/L (ref 94–109)
CO2 SERPL-SCNC: 31 MMOL/L (ref 20–32)
CREAT SERPL-MCNC: 1 MG/DL (ref 0.66–1.25)
DIFFERENTIAL METHOD BLD: NORMAL
EOSINOPHIL # BLD AUTO: 0.3 10E9/L (ref 0–0.7)
EOSINOPHIL NFR BLD AUTO: 5.7 %
ERYTHROCYTE [DISTWIDTH] IN BLOOD BY AUTOMATED COUNT: 13.5 % (ref 10–15)
GFR SERPL CREATININE-BSD FRML MDRD: 74 ML/MIN/{1.73_M2}
GLUCOSE SERPL-MCNC: 92 MG/DL (ref 70–99)
HCT VFR BLD AUTO: 41.9 % (ref 40–53)
HGB BLD-MCNC: 14.6 G/DL (ref 13.3–17.7)
LDH SERPL L TO P-CCNC: 216 U/L (ref 85–227)
LYMPHOCYTES # BLD AUTO: 1.5 10E9/L (ref 0.8–5.3)
LYMPHOCYTES NFR BLD AUTO: 33 %
MCH RBC QN AUTO: 31.5 PG (ref 26.5–33)
MCHC RBC AUTO-ENTMCNC: 34.8 G/DL (ref 31.5–36.5)
MCV RBC AUTO: 90 FL (ref 78–100)
MONOCYTES # BLD AUTO: 0.4 10E9/L (ref 0–1.3)
MONOCYTES NFR BLD AUTO: 9.1 %
NEUTROPHILS # BLD AUTO: 2.3 10E9/L (ref 1.6–8.3)
NEUTROPHILS NFR BLD AUTO: 51.7 %
PLATELET # BLD AUTO: 177 10E9/L (ref 150–450)
POTASSIUM SERPL-SCNC: 4.3 MMOL/L (ref 3.4–5.3)
PROT SERPL-MCNC: 7.4 G/DL (ref 6.8–8.8)
PSA SERPL-MCNC: 0.46 UG/L (ref 0–4)
RBC # BLD AUTO: 4.64 10E12/L (ref 4.4–5.9)
SODIUM SERPL-SCNC: 141 MMOL/L (ref 133–144)
WBC # BLD AUTO: 4.4 10E9/L (ref 4–11)

## 2019-03-29 PROCEDURE — 84403 ASSAY OF TOTAL TESTOSTERONE: CPT | Performed by: INTERNAL MEDICINE

## 2019-03-29 PROCEDURE — 85025 COMPLETE CBC W/AUTO DIFF WBC: CPT | Performed by: INTERNAL MEDICINE

## 2019-03-29 PROCEDURE — 84153 ASSAY OF PSA TOTAL: CPT | Performed by: INTERNAL MEDICINE

## 2019-03-29 PROCEDURE — 36415 COLL VENOUS BLD VENIPUNCTURE: CPT | Performed by: INTERNAL MEDICINE

## 2019-03-29 PROCEDURE — 83615 LACTATE (LD) (LDH) ENZYME: CPT | Performed by: INTERNAL MEDICINE

## 2019-03-29 PROCEDURE — 80053 COMPREHEN METABOLIC PANEL: CPT | Performed by: INTERNAL MEDICINE

## 2019-04-03 LAB — TESTOST SERPL-MCNC: 9 NG/DL (ref 240–950)

## 2019-04-05 ENCOUNTER — HOSPITAL ENCOUNTER (OUTPATIENT)
Facility: CLINIC | Age: 74
Setting detail: SPECIMEN
End: 2019-04-05
Attending: INTERNAL MEDICINE
Payer: COMMERCIAL

## 2019-04-05 ENCOUNTER — ALLIED HEALTH/NURSE VISIT (OUTPATIENT)
Dept: INFUSION THERAPY | Facility: CLINIC | Age: 74
End: 2019-04-05
Attending: INTERNAL MEDICINE
Payer: COMMERCIAL

## 2019-04-05 ENCOUNTER — ONCOLOGY VISIT (OUTPATIENT)
Dept: ONCOLOGY | Facility: CLINIC | Age: 74
End: 2019-04-05
Attending: INTERNAL MEDICINE
Payer: COMMERCIAL

## 2019-04-05 VITALS
RESPIRATION RATE: 16 BRPM | WEIGHT: 215.8 LBS | TEMPERATURE: 97.5 F | BODY MASS INDEX: 31.96 KG/M2 | DIASTOLIC BLOOD PRESSURE: 80 MMHG | HEIGHT: 69 IN | OXYGEN SATURATION: 95 % | HEART RATE: 64 BPM | SYSTOLIC BLOOD PRESSURE: 139 MMHG

## 2019-04-05 VITALS
SYSTOLIC BLOOD PRESSURE: 143 MMHG | HEART RATE: 62 BPM | RESPIRATION RATE: 16 BRPM | DIASTOLIC BLOOD PRESSURE: 77 MMHG | TEMPERATURE: 97.6 F | OXYGEN SATURATION: 97 %

## 2019-04-05 DIAGNOSIS — C61 PROSTATE CANCER (H): Primary | ICD-10-CM

## 2019-04-05 PROCEDURE — 25000128 H RX IP 250 OP 636: Performed by: INTERNAL MEDICINE

## 2019-04-05 PROCEDURE — 96402 CHEMO HORMON ANTINEOPL SQ/IM: CPT

## 2019-04-05 PROCEDURE — 99213 OFFICE O/P EST LOW 20 MIN: CPT | Performed by: INTERNAL MEDICINE

## 2019-04-05 RX ADMIN — LEUPROLIDE ACETATE 22.5 MG: KIT at 15:49

## 2019-04-05 ASSESSMENT — PAIN SCALES - GENERAL: PAINLEVEL: NO PAIN (0)

## 2019-04-05 ASSESSMENT — MIFFLIN-ST. JEOR: SCORE: 1709.24

## 2019-04-05 NOTE — NURSING NOTE
"Oncology Rooming Note    April 5, 2019 2:49 PM   Wallace Zelaya is a 73 year old male who presents for:    Chief Complaint   Patient presents with     Oncology Clinic Visit     Prostate cancer      Initial Vitals: /80   Pulse 64   Temp 97.5  F (36.4  C) (Tympanic)   Resp 16   Ht 1.753 m (5' 9\")   Wt 97.9 kg (215 lb 12.8 oz)   SpO2 95%   BMI 31.87 kg/m   Estimated body mass index is 31.87 kg/m  as calculated from the following:    Height as of this encounter: 1.753 m (5' 9\").    Weight as of this encounter: 97.9 kg (215 lb 12.8 oz). Body surface area is 2.18 meters squared.  No Pain (0) Comment: Data Unavailable   No LMP for male patient.  Allergies reviewed: Yes  Medications reviewed: Yes    Medications: Medication refills not needed today.  Pharmacy name entered into [employer]:    [facility] - [place], [place] - [address]  [facility] [address] [place], [place] - [address]  [facility] [id number] [place] [address] - [address] AT [address] [place] [address]    Clinical concerns: Follow Up       Judie Linton CMA              "

## 2019-04-05 NOTE — PROGRESS NOTES
Infusion Nursing Note:  Wallace Zelaya presents today for Lupron.     present during visit today: Not Applicable.    Note: N/A.    Intravenous Access:  No Intravenous access/labs at this visit.    Treatment Conditions:    Post Lab Assessment:    Patient tolerated injection       Discharge Plan:   Patient and/or family verbalized understanding of  instructions and all questions answered.  Patient discharged in stable condition accompanied by: self.  Patient to see provider today: No  Departure Mode: Ambulatory.  Glenna Verduzco RN

## 2019-04-05 NOTE — LETTER
4/5/2019         RE: Wallace Zelaya  [address] [address] [address]        Dear Colleague,    Thank you for referring your patient, Wallace Zelaya, to the [facility] CANCER CARE. Please see a copy of my visit note below.    [facility] CANCER CLINIC  FOLLOW-UP VISIT NOTE    PATIENT NAME: Wallace Zelaya MRN # [id number]  DATE OF VISIT: Apr 5, 2019 YOB: 1945    REFERRING PROVIDER: No referring provider defined for this encounter.    CANCER TYPE: Prostate cancer; Biochemical recurrence; Hormone sensitive disease  STAGE: Stage III - pT3b at diagnosis; M0    HISTORY OF PRESENTING ILLNESS:  Wallace was noted to have rising screening PSA from 2 - 4. He was referred to Dr. Rodolfo Connor. He had radical prostatectomy on 11/2015. Pathology from this revealed Cayla 4+4 disease with extraprostatic extension, seminal vesicle extension and he was staged at pT3b. All of the 12 lymph nodes resected were negative for disease. Post operatively his PSA did not drop to undetectable levels and remained elevated at 0.56. It vladimir to 0.9 in April 2016 and he was referred to Dr. Newton for adjuvant radiation therapy. He completed radiation therapy but did not have any PSA response to this treatment.     TREATMENT SUMMARY:  11/13/2015 Radical prostatectomy  April 2016  Radiation therapy    Oncology Supportive Medications 1/10/2017 4/11/2017   leuprolide (LUPRON DEPOT) IM 22.5 mg 22.5 mg     CURRENT INTERVENTIONS:  Lupron -     SUBJECTIVE   Wallace is being seen for his prostate cancer    He is back on lupron. He again has hot flashes with therapy.       PAST MEDICAL HISTORY   1. HTN  2. Dyslipidemia  3. Prostate cancer as detailed above      CURRENT OUTPATIENT MEDICATIONS     Current Outpatient Medications   Medication Sig     acyclovir (ZOVIRAX) 5 % ointment Apply topically 6 times daily     amLODIPine (NORVASC) 5 MG tablet Take 1 tablet (5 mg) by mouth daily      "atorvastatin (LIPITOR) 20 MG tablet TAKE 1 TABLET EVERY DAY     ibuprofen (ADVIL/MOTRIN) 800 MG tablet Take 1 tablet (800 mg) by mouth 3 times daily with food     valsartan (DIOVAN) 160 MG tablet TAKE 1 TABLET (160 MG) BY MOUTH DAILY     No current facility-administered medications for this visit.         ALLERGIES     Allergies   Allergen Reactions     Seasonal Allergies         REVIEW OF SYSTEMS   As above in the HPI, o/w complete 12-point ROS was negative.     PHYSICAL EXAM   /80   Pulse 64   Temp 97.5  F (36.4  C) (Tympanic)   Resp 16   Ht 1.753 m (5' 9\")   Wt 97.9 kg (215 lb 12.8 oz)   SpO2 95%   BMI 31.87 kg/m     SpO2 Readings from Last 4 Encounters:   09/21/18 96%   08/24/18 94%   06/22/18 95%   05/11/18 97%     Wt Readings from Last 3 Encounters:   04/05/19 97.9 kg (215 lb 12.8 oz)   01/04/19 99.5 kg (219 lb 6.4 oz)   10/03/18 88.8 kg (195 lb 11.2 oz)     GEN: NAD  HEENT: PERRL, EOMI, no icterus, injection or pallor. Oropharynx is clear.  NECK: no cervical or supraclavicular lymphadenopathy  LUNGS: clear bilaterally  CV: regular, no murmurs, rubs, or gallops  ABDOMEN: soft, non-tender, non-distended, normal bowel sounds, no hepatosplenomegaly by percussion or palpation  EXT: warm, well perfused, no edema  NEURO: alert  SKIN: no rashes     LABORATORY AND IMAGING STUDIES     Recent Labs   Lab Test 03/29/19  0850 12/21/18  0825 09/18/18  0856 06/19/18  0909 03/09/18  1525    139 141 143 141   POTASSIUM 4.3 4.2 4.3 4.2 4.1   CHLORIDE 107 106 107 109 107   CO2 31 26 24 26 28   ANIONGAP 3 7 10 8 6   BUN 14 15 18 16 12   CR 1.00 1.05 1.02 0.90 1.01   GLC 92 94 92 92 89   TY 9.0 9.3 8.9 9.0 8.7     No results for input(s): MAG, PHOS in the last 54194 hours.  Recent Labs   Lab Test 03/29/19  0850 12/21/18  0825 09/18/18  0856 06/19/18  0909 03/09/18  1525   WBC 4.4 5.8 4.7 4.3 7.0   HGB 14.6 15.5 14.8 14.8 15.9    174 160 166 171   MCV 90 92 92 90 89   NEUTROPHIL 51.7 58.2 54.1 54.3 69.0 "     Recent Labs   Lab Test 03/29/19  0850 12/21/18  0825 09/18/18  0856   BILITOTAL 0.7 0.9 0.6   ALKPHOS 97 93 94   ALT 30 26 29   AST 26 19 22   ALBUMIN 3.7 3.8 3.8    215 204     Recent Labs   Lab Test 03/29/19  0850 12/21/18  0825 09/18/18  0856 06/19/18  0909 03/09/18  1525   PSA 0.46 5.41* 0.46 0.19 2.73   TESTOSTTOTAL 9* 275 284 7* 331     Recent Labs   Lab Test 03/29/19  0850 12/21/18  0825 09/18/18  0856 06/19/18  0909 03/09/18  1525   PSA 0.46 5.41* 0.46 0.19 2.73   ALKPHOS 97 93 94 104 110    215 204 200 197          ASSESSMENT AND PLAN   1. Biochemical PSA relapse post prostatectomy without any response to adjuvant radiation therapy  2. Rapid rise in PSA on observation phase of intermittent androgen deprivation therapy  3. HTN  4. ECOG PS1  5. No medical comorbidity    I had a lengthy discussion with Wallace. All his labs are within normal limits. His PSA has declined nicely from 5.4 to 0.46. It had previously increased from 0.46 to 5.4 within 3 months. His PSA has doubled over 3 times during 3 months with doubling time of less than a month. He was wondering if he could stay off therapy. I do not feel he is a candidate for intermittent therapy and I will continue with continuous androgen deprivation therapy for him.     I will see him in 3 months and lupron after visit.       Gigi Ward  ,  Division of Hematology, Oncology & Transplantation  [facility].       Again, thank you for allowing me to participate in the care of your patient.        Sincerely,        Gigi Ward MD

## 2019-04-05 NOTE — PROGRESS NOTES
[facility] CANCER CLINIC  FOLLOW-UP VISIT NOTE    PATIENT NAME: Wallace Zelaya MRN # [id number]  DATE OF VISIT: Apr 5, 2019 YOB: 1945    REFERRING PROVIDER: No referring provider defined for this encounter.    CANCER TYPE: Prostate cancer; Biochemical recurrence; Hormone sensitive disease  STAGE: Stage III - pT3b at diagnosis; M0    HISTORY OF PRESENTING ILLNESS:  Wallace was noted to have rising screening PSA from 2 - 4. He was referred to Dr. Rodolfo Connor. He had radical prostatectomy on 11/2015. Pathology from this revealed Cayla 4+4 disease with extraprostatic extension, seminal vesicle extension and he was staged at pT3b. All of the 12 lymph nodes resected were negative for disease. Post operatively his PSA did not drop to undetectable levels and remained elevated at 0.56. It vladimir to 0.9 in April 2016 and he was referred to Dr. Newton for adjuvant radiation therapy. He completed radiation therapy but did not have any PSA response to this treatment.     TREATMENT SUMMARY:  11/13/2015 Radical prostatectomy  April 2016  Radiation therapy    Oncology Supportive Medications 1/10/2017 4/11/2017   leuprolide (LUPRON DEPOT) IM 22.5 mg 22.5 mg     CURRENT INTERVENTIONS:  Lupron -     SUBJECTIVE   Wallace is being seen for his prostate cancer    He is back on lupron. He again has hot flashes with therapy.       PAST MEDICAL HISTORY   1. HTN  2. Dyslipidemia  3. Prostate cancer as detailed above      CURRENT OUTPATIENT MEDICATIONS     Current Outpatient Medications   Medication Sig     acyclovir (ZOVIRAX) 5 % ointment Apply topically 6 times daily     amLODIPine (NORVASC) 5 MG tablet Take 1 tablet (5 mg) by mouth daily     atorvastatin (LIPITOR) 20 MG tablet TAKE 1 TABLET EVERY DAY     ibuprofen (ADVIL/MOTRIN) 800 MG tablet Take 1 tablet (800 mg) by mouth 3 times daily with food     valsartan (DIOVAN) 160 MG tablet TAKE 1 TABLET (160 MG) BY MOUTH DAILY     No current  "facility-administered medications for this visit.         ALLERGIES     Allergies   Allergen Reactions     Seasonal Allergies         REVIEW OF SYSTEMS   As above in the HPI, o/w complete 12-point ROS was negative.     PHYSICAL EXAM   /80   Pulse 64   Temp 97.5  F (36.4  C) (Tympanic)   Resp 16   Ht 1.753 m (5' 9\")   Wt 97.9 kg (215 lb 12.8 oz)   SpO2 95%   BMI 31.87 kg/m    SpO2 Readings from Last 4 Encounters:   09/21/18 96%   08/24/18 94%   06/22/18 95%   05/11/18 97%     Wt Readings from Last 3 Encounters:   04/05/19 97.9 kg (215 lb 12.8 oz)   01/04/19 99.5 kg (219 lb 6.4 oz)   10/03/18 88.8 kg (195 lb 11.2 oz)     GEN: NAD  HEENT: PERRL, EOMI, no icterus, injection or pallor. Oropharynx is clear.  NECK: no cervical or supraclavicular lymphadenopathy  LUNGS: clear bilaterally  CV: regular, no murmurs, rubs, or gallops  ABDOMEN: soft, non-tender, non-distended, normal bowel sounds, no hepatosplenomegaly by percussion or palpation  EXT: warm, well perfused, no edema  NEURO: alert  SKIN: no rashes     LABORATORY AND IMAGING STUDIES     Recent Labs   Lab Test 03/29/19  0850 12/21/18  0825 09/18/18  0856 06/19/18  0909 03/09/18  1525    139 141 143 141   POTASSIUM 4.3 4.2 4.3 4.2 4.1   CHLORIDE 107 106 107 109 107   CO2 31 26 24 26 28   ANIONGAP 3 7 10 8 6   BUN 14 15 18 16 12   CR 1.00 1.05 1.02 0.90 1.01   GLC 92 94 92 92 89   TY 9.0 9.3 8.9 9.0 8.7     No results for input(s): MAG, PHOS in the last 41782 hours.  Recent Labs   Lab Test 03/29/19  0850 12/21/18  0825 09/18/18  0856 06/19/18  0909 03/09/18  1525   WBC 4.4 5.8 4.7 4.3 7.0   HGB 14.6 15.5 14.8 14.8 15.9    174 160 166 171   MCV 90 92 92 90 89   NEUTROPHIL 51.7 58.2 54.1 54.3 69.0     Recent Labs   Lab Test 03/29/19  0850 12/21/18  0825 09/18/18  0856   BILITOTAL 0.7 0.9 0.6   ALKPHOS 97 93 94   ALT 30 26 29   AST 26 19 22   ALBUMIN 3.7 3.8 3.8    215 204     Recent Labs   Lab Test 03/29/19  0850 12/21/18  0825 " 09/18/18  0856 06/19/18  0909 03/09/18  1525   PSA 0.46 5.41* 0.46 0.19 2.73   TESTOSTTOTAL 9* 275 284 7* 331     Recent Labs   Lab Test 03/29/19  0850 12/21/18  0825 09/18/18  0856 06/19/18  0909 03/09/18  1525   PSA 0.46 5.41* 0.46 0.19 2.73   ALKPHOS 97 93 94 104 110    215 204 200 197          ASSESSMENT AND PLAN   1. Biochemical PSA relapse post prostatectomy without any response to adjuvant radiation therapy  2. Rapid rise in PSA on observation phase of intermittent androgen deprivation therapy  3. HTN  4. ECOG PS1  5. No medical comorbidity    I had a lengthy discussion with Wallace. All his labs are within normal limits. His PSA has declined nicely from 5.4 to 0.46. It had previously increased from 0.46 to 5.4 within 3 months. His PSA has doubled over 3 times during 3 months with doubling time of less than a month. He was wondering if he could stay off therapy. I do not feel he is a candidate for intermittent therapy and I will continue with continuous androgen deprivation therapy for him.     I will see him in 3 months and lupron after visit.       Gigi Ward  ,  Division of Hematology, Oncology & Transplantation  [facility].

## 2019-04-06 DIAGNOSIS — I10 ESSENTIAL HYPERTENSION WITH GOAL BLOOD PRESSURE LESS THAN 140/90: ICD-10-CM

## 2019-04-06 NOTE — TELEPHONE ENCOUNTER
"Requested Prescriptions   Pending Prescriptions Disp Refills     valsartan (DIOVAN) 160 MG tablet  Last Written Prescription Date:  02/15/2018  Last Fill Quantity: 90 tablet,  # refills: 3   Last office visit: 10/3/2018 with prescribing provider:  Jd Almaraz MD    Future Office Visit:     90 tablet 3    Angiotensin-II Receptors Failed - 4/6/2019 11:17 AM       Failed - Blood pressure under 140/90 in past 12 months    BP Readings from Last 3 Encounters:   04/05/19 143/77   04/05/19 139/80   01/04/19 142/81                Passed - Recent (12 mo) or future (30 days) visit within the authorizing provider's specialty    Patient had office visit in the last 12 months or has a visit in the next 30 days with authorizing provider or within the authorizing provider's specialty.  See \"Patient Info\" tab in inbasket, or \"Choose Columns\" in Meds & Orders section of the refill encounter.             Passed - Medication is active on med list       Passed - Patient is age 18 or older       Passed - Normal serum creatinine on file in past 12 months    Recent Labs   Lab Test 03/29/19  0850  11/09/16  1008   CR 1.00   < >  --    CREAT  --   --  0.5*    < > = values in this interval not displayed.            Passed - Normal serum potassium on file in past 12 months    Recent Labs   Lab Test 03/29/19  0850   POTASSIUM 4.3                      "

## 2019-04-09 DIAGNOSIS — I10 HYPERTENSION GOAL BP (BLOOD PRESSURE) < 140/90: ICD-10-CM

## 2019-04-09 RX ORDER — VALSARTAN 160 MG/1
TABLET ORAL
Qty: 90 TABLET | Refills: 1 | Status: SHIPPED | OUTPATIENT
Start: 2019-04-09 | End: 2019-10-14

## 2019-04-09 RX ORDER — VALSARTAN 160 MG/1
TABLET ORAL
Qty: 90 TABLET | Refills: 1 | OUTPATIENT
Start: 2019-04-09

## 2019-04-09 NOTE — TELEPHONE ENCOUNTER
"Requested Prescriptions   Pending Prescriptions Disp Refills     valsartan (DIOVAN) 160 MG tablet [Pharmacy Med Name: VALSARTAN 160MG TAB] 90 tablet 4     Sig: TAKE ONE TABLET BY MOUTH ONCE DAILY     Last Written Prescription Date:  02/15/2018  Last Fill Quantity: 90 tablet,  # refills: 3   Last office visit: 10/3/2018 with prescribing provider:  10/03/2018   Future Office Visit:          Angiotensin-II Receptors Failed - 4/9/2019  9:25 AM        Failed - Blood pressure under 140/90 in past 12 months     BP Readings from Last 3 Encounters:   04/05/19 143/77   04/05/19 139/80   01/04/19 142/81                 Passed - Recent (12 mo) or future (30 days) visit within the authorizing provider's specialty     Patient had office visit in the last 12 months or has a visit in the next 30 days with authorizing provider or within the authorizing provider's specialty.  See \"Patient Info\" tab in inbasket, or \"Choose Columns\" in Meds & Orders section of the refill encounter.              Passed - Medication is active on med list        Passed - Patient is age 18 or older        Passed - Normal serum creatinine on file in past 12 months     Recent Labs   Lab Test 03/29/19  0850  11/09/16  1008   CR 1.00   < >  --    CREAT  --   --  0.5*    < > = values in this interval not displayed.             Passed - Normal serum potassium on file in past 12 months     Recent Labs   Lab Test 03/29/19  0850   POTASSIUM 4.3                    Alfred BUTTS  "

## 2019-04-09 NOTE — TELEPHONE ENCOUNTER
Routing refill request to provider for review/approval because:  Labs out of range:  BP  Arturo Howard RN, BSN

## 2019-06-04 DIAGNOSIS — I10 ESSENTIAL HYPERTENSION: ICD-10-CM

## 2019-06-04 RX ORDER — AMLODIPINE BESYLATE 5 MG/1
TABLET ORAL
Qty: 90 TABLET | Refills: 1 | Status: SHIPPED | OUTPATIENT
Start: 2019-06-04 | End: 2019-10-31

## 2019-06-04 NOTE — TELEPHONE ENCOUNTER
"Requested Prescriptions   Pending Prescriptions Disp Refills     amLODIPine (NORVASC) 5 MG tablet [Pharmacy Med Name: AMLODIPINE 5MG TAB] 90 tablet 3     Sig: TAKE 1 TABLET BY MOUTH ONCE DAILY     Last Written Prescription Date:  05/29/2018  Last Fill Quantity: 90 tablet,  # refills: 3   Last office visit: 10/3/2018 with prescribing provider:  10/03/2018   Future Office Visit:   Next 5 appointments (look out 90 days)    Jul 12, 2019  3:15 PM CDT  Return Visit with Gigi Ward MD  [facility] ([facility]) [facility]  [address]  [address]  [facility] [address]  [phone]               Calcium Channel Blockers Protocol  Failed - 6/4/2019  9:21 AM        Failed - Blood pressure under 140/90 in past 12 months     BP Readings from Last 3 Encounters:   04/05/19 143/77   04/05/19 139/80   01/04/19 142/81                 Passed - Recent (12 mo) or future (30 days) visit within the authorizing provider's specialty     Patient had office visit in the last 12 months or has a visit in the next 30 days with authorizing provider or within the authorizing provider's specialty.  See \"Patient Info\" tab in inbasket, or \"Choose Columns\" in Meds & Orders section of the refill encounter.              Passed - Medication is active on med list        Passed - Patient is age 18 or older        Passed - Normal serum creatinine on file in past 12 months     Recent Labs   Lab Test 03/29/19  0850  11/09/16  1008   CR 1.00   < >  --    CREAT  --   --  0.5*    < > = values in this interval not displayed.             Alfred Jennings XRT  "

## 2019-06-04 NOTE — TELEPHONE ENCOUNTER
/\\Prescription approved per [facility] Refill Protocol.    Dona Da Silva RN

## 2019-06-07 ENCOUNTER — OFFICE VISIT (OUTPATIENT)
Dept: PODIATRY | Facility: CLINIC | Age: 74
End: 2019-06-07
Payer: COMMERCIAL

## 2019-06-07 VITALS
HEIGHT: 69 IN | SYSTOLIC BLOOD PRESSURE: 128 MMHG | WEIGHT: 215.8 LBS | BODY MASS INDEX: 31.96 KG/M2 | DIASTOLIC BLOOD PRESSURE: 80 MMHG

## 2019-06-07 DIAGNOSIS — M20.5X2 HALLUX LIMITUS, LEFT: ICD-10-CM

## 2019-06-07 DIAGNOSIS — M72.2 PLANTAR FASCIITIS: Primary | ICD-10-CM

## 2019-06-07 DIAGNOSIS — L98.9 SKIN LESION: ICD-10-CM

## 2019-06-07 PROCEDURE — 99204 OFFICE O/P NEW MOD 45 MIN: CPT | Performed by: PODIATRIST

## 2019-06-07 ASSESSMENT — MIFFLIN-ST. JEOR: SCORE: 1709.24

## 2019-06-07 NOTE — PROGRESS NOTES
"Foot & Ankle Surgery  June 7, 2019    CC: \"foot problem\"    I was asked to see Wallace Zelaya regarding the chief complaint by:  self    HPI:  Pt is a 73 year old male who presents with above complaint.  Right foot pain x 2 weeks.  Describes shooting pain, 5/10 \"barefoot\", worse with \"jumping\".  He has tried inserts, which have helped.  No injury noted to the right heel.  No problem with shoes/inserts, only has pain when he's barefoot.  Describes morning pain and post-static dyskinesia.  Also has large L 1st MPJ bone spur.  Active without limitations regarding this.  Also has derm lesion lateral R ankle, has been present for years without any acute changes.  No initiating event, eg laceration/burn.      ROS:   Pos for CC.  The patient denies current nausea, vomiting, chills, fevers, belly pain, calf pain, chest pain or SOB.  Complete remainder of ROS is otherwise neg.    VITALS:    Vitals:    06/07/19 0931   BP: 128/80   Weight: 97.9 kg (215 lb 12.8 oz)   Height: 1.753 m (5' 9\")       PMH:    Past Medical History:   Diagnosis Date     Basal cell carcinoma      Hyperlipidemia LDL goal <130 10/31/2010     Hypertension goal BP (blood pressure) < 140/90 5/22/2014       SXHX:    Past Surgical History:   Procedure Laterality Date     BIOPSY  02/2011    skin tagged left arm     C NONSPECIFIC PROCEDURE      Lawnmower injury right foot-toe      C NONSPECIFIC PROCEDURE      Pilonidal cyst     COLONOSCOPY  8/19/2013    Procedure: COLONOSCOPY;  Colonoscopy ;  Surgeon: Emerson Martinez MD, MD;  Location:  GI     DAVINCI PROSTATECTOMY N/A 11/13/2015    Procedure: DAVINCI PROSTATECTOMY;  Surgeon: Rodolfo Connor MD;  Location:  OR        MEDS:    Current Outpatient Medications   Medication     acyclovir (ZOVIRAX) 5 % ointment     amLODIPine (NORVASC) 5 MG tablet     atorvastatin (LIPITOR) 20 MG tablet     ibuprofen (ADVIL/MOTRIN) 800 MG tablet     valsartan (DIOVAN) 160 MG tablet     valsartan (DIOVAN) 160 MG tablet "     No current facility-administered medications for this visit.        ALL:     Allergies   Allergen Reactions     Seasonal Allergies        FMH:    Family History   Problem Relation Age of Onset     Cancer Mother         88 YO AND HYPERTENSION     Hypertension Mother      Colon Cancer Mother      Heart Disease Father          88 YO MI     Prostate Cancer Brother 50        prostate cancer       SocHx:    Social History     Socioeconomic History     Marital status:      Spouse name: Sandra     Number of children: 3     Years of education: 14     Highest education level: Not on file   Occupational History     Occupation:      Comment: [employer]   Social Needs     Financial resource strain: Not on file     Food insecurity:     Worry: Not on file     Inability: Not on file     Transportation needs:     Medical: Not on file     Non-medical: Not on file   Tobacco Use     Smoking status: Former Smoker     Packs/day: 1.00     Years: 25.00     Pack years: 25.00     Last attempt to quit: 2009     Years since quittin.8     Smokeless tobacco: Never Used     Tobacco comment: 4-5 cigs qd /quit 2009   Substance and Sexual Activity     Alcohol use: Yes     Comment: socially,very little     Drug use: No     Sexual activity: Yes     Partners: Female   Lifestyle     Physical activity:     Days per week: Not on file     Minutes per session: Not on file     Stress: Not on file   Relationships     Social connections:     Talks on phone: Not on file     Gets together: Not on file     Attends Jehovah's witness service: Not on file     Active member of club or organization: Not on file     Attends meetings of clubs or organizations: Not on file     Relationship status: Not on file     Intimate partner violence:     Fear of current or ex partner: Not on file     Emotionally abused: Not on file     Physically abused: Not on file     Forced sexual activity: Not on file   Other Topics Concern      Parent/sibling w/ CABG, MI or angioplasty before 65F 55M? No   Social History Narrative     Not on file           EXAMINATION:  Gen:   No apparent distress  Neuro:   A&Ox3, no deficits  Psych:    Answering questions appropriately for age and situation with normal affect  Head:    NCAT  Eye:    Visual scanning without deficit  Ear:    Response to auditory stimuli wnl  Lung:    Non-labored breathing on RA noted  Abd:    NTND per patient report  Lymph:    Neg for pitting/non-pitting edema BLE  Vasc:    Pulses palpable, CFT minimally delayed  Neuro:    Light touch sensation intact to all sensory nerve distributions without paresthesias  Derm:    Raised skin lesion lateral R ankle without pigmentation changes, ulceration or SOI.  MSK:    Pain at plantar fascial insertion right lower extremity.  prominent bone spur over left 1st MPJ with underlying DJD.    Calf:    Neg for redness, swelling or tenderness    Assessment:  73 year old male with plantar fasciitis right lower extremity; hallux limitus with prominent dorsal bone spur left lower extremity; skin lesion lateral R ankle      Plan:  Discussed etiologies, anatomy and options  1.  Plantar fasciitis right lower extremity   -Regarding the heel pain, the Plantar Fasciitis handout was dispensed and discussed.  We talked about stretching, resting/activity modification, icing, NSAID/tylenol use as tolerated, inserts, supportive/comfortable shoes and minimizing shoeless walking.    -discussed Achilles, plantar fascial and hamstring stretches  -OTC insert information dispensed and discussed     2.  Hallux limitus with prominent dorsal bone spur left lower extremity   -comfortable accommodative shoe gear  -discussed padding options  -RICE/NSAID vs tylenol prn based on pain  -consider surgical excision if symptoms ever worsen    3.  Skin lesion lateral R ankle  -no acute concerning findings, and the patient states this has been stable without change for some time.  Advised he  simply monitor for now  -consider biopsy/excision if any acute changes ocur, including color, size, border, elevation      Follow up:   Prn or sooner with acute issues      Patient's medical history was reviewed today    Body mass index is 31.87 kg/m .  Weight management plan: Patient was referred to their PCP to discuss a diet and exercise plan.        Julian Courtney DPM FACFAS FACFAOM  Podiatric Foot & Ankle Surgeon  [facility]  [phone]

## 2019-06-18 ENCOUNTER — TELEPHONE (OUTPATIENT)
Dept: FAMILY MEDICINE | Facility: CLINIC | Age: 74
End: 2019-06-18

## 2019-06-18 DIAGNOSIS — E78.5 HYPERLIPIDEMIA LDL GOAL <130: ICD-10-CM

## 2019-06-18 RX ORDER — ATORVASTATIN CALCIUM 20 MG/1
TABLET, FILM COATED ORAL
Qty: 120 TABLET | Refills: 0 | Status: SHIPPED | OUTPATIENT
Start: 2019-06-18 | End: 2019-09-18

## 2019-06-18 NOTE — TELEPHONE ENCOUNTER
Pharmacy called and asked for refill.     Atorvastatin   Last Written Prescription Date:  4/30/18  Last Fill Quantity: 90,  # refills: 3  Last office visit: 10/3/2018 with prescribing provider:    Future Office Visit:   Next 5 appointments (look out 90 days)    Jul 12, 2019  3:15 PM CDT  Return Visit with Gigi Ward MD  [facility] ([facility]) [facility]  [address]  [address]  [facility] [id number] [phone]           Appointment 10/3/18    Hyperlipidemia LDL goal <130  Continue statin.    Instructions   Return in about 1 year (around 10/3/2019) for flu shot, advance care directive discussion with RN, routine physical.       Kirsten Castillo RN Flex

## 2019-07-05 DIAGNOSIS — C61 PROSTATE CANCER (H): ICD-10-CM

## 2019-07-05 LAB
ALBUMIN SERPL-MCNC: 3.6 G/DL (ref 3.4–5)
ALP SERPL-CCNC: 112 U/L (ref 40–150)
ALT SERPL W P-5'-P-CCNC: 29 U/L (ref 0–70)
ANION GAP SERPL CALCULATED.3IONS-SCNC: 7 MMOL/L (ref 3–14)
AST SERPL W P-5'-P-CCNC: 20 U/L (ref 0–45)
BASOPHILS # BLD AUTO: 0 10E9/L (ref 0–0.2)
BASOPHILS NFR BLD AUTO: 0.7 %
BILIRUB SERPL-MCNC: 0.5 MG/DL (ref 0.2–1.3)
BUN SERPL-MCNC: 17 MG/DL (ref 7–30)
CALCIUM SERPL-MCNC: 8.6 MG/DL (ref 8.5–10.1)
CHLORIDE SERPL-SCNC: 109 MMOL/L (ref 94–109)
CO2 SERPL-SCNC: 26 MMOL/L (ref 20–32)
CREAT SERPL-MCNC: 0.87 MG/DL (ref 0.66–1.25)
DIFFERENTIAL METHOD BLD: NORMAL
EOSINOPHIL # BLD AUTO: 0.4 10E9/L (ref 0–0.7)
EOSINOPHIL NFR BLD AUTO: 6 %
ERYTHROCYTE [DISTWIDTH] IN BLOOD BY AUTOMATED COUNT: 12.9 % (ref 10–15)
GFR SERPL CREATININE-BSD FRML MDRD: 84 ML/MIN/{1.73_M2}
GLUCOSE SERPL-MCNC: 92 MG/DL (ref 70–99)
HCT VFR BLD AUTO: 42.8 % (ref 40–53)
HGB BLD-MCNC: 14.8 G/DL (ref 13.3–17.7)
LDH SERPL L TO P-CCNC: 213 U/L (ref 85–227)
LYMPHOCYTES # BLD AUTO: 1.7 10E9/L (ref 0.8–5.3)
LYMPHOCYTES NFR BLD AUTO: 29.8 %
MCH RBC QN AUTO: 30.9 PG (ref 26.5–33)
MCHC RBC AUTO-ENTMCNC: 34.6 G/DL (ref 31.5–36.5)
MCV RBC AUTO: 89 FL (ref 78–100)
MONOCYTES # BLD AUTO: 0.5 10E9/L (ref 0–1.3)
MONOCYTES NFR BLD AUTO: 8.4 %
NEUTROPHILS # BLD AUTO: 3.2 10E9/L (ref 1.6–8.3)
NEUTROPHILS NFR BLD AUTO: 55.1 %
PLATELET # BLD AUTO: 181 10E9/L (ref 150–450)
POTASSIUM SERPL-SCNC: 4.3 MMOL/L (ref 3.4–5.3)
PROT SERPL-MCNC: 7.1 G/DL (ref 6.8–8.8)
PSA SERPL-MCNC: 0.15 UG/L (ref 0–4)
RBC # BLD AUTO: 4.79 10E12/L (ref 4.4–5.9)
SODIUM SERPL-SCNC: 142 MMOL/L (ref 133–144)
WBC # BLD AUTO: 5.8 10E9/L (ref 4–11)

## 2019-07-05 PROCEDURE — 80053 COMPREHEN METABOLIC PANEL: CPT | Performed by: INTERNAL MEDICINE

## 2019-07-05 PROCEDURE — 84153 ASSAY OF PSA TOTAL: CPT | Performed by: INTERNAL MEDICINE

## 2019-07-05 PROCEDURE — 36415 COLL VENOUS BLD VENIPUNCTURE: CPT | Performed by: INTERNAL MEDICINE

## 2019-07-05 PROCEDURE — 84403 ASSAY OF TOTAL TESTOSTERONE: CPT | Performed by: INTERNAL MEDICINE

## 2019-07-05 PROCEDURE — 85025 COMPLETE CBC W/AUTO DIFF WBC: CPT | Performed by: INTERNAL MEDICINE

## 2019-07-05 PROCEDURE — 83615 LACTATE (LD) (LDH) ENZYME: CPT | Performed by: INTERNAL MEDICINE

## 2019-07-09 LAB — TESTOST SERPL-MCNC: 6 NG/DL (ref 240–950)

## 2019-07-12 ENCOUNTER — ONCOLOGY VISIT (OUTPATIENT)
Dept: ONCOLOGY | Facility: CLINIC | Age: 74
End: 2019-07-12
Attending: INTERNAL MEDICINE
Payer: COMMERCIAL

## 2019-07-12 ENCOUNTER — HOSPITAL ENCOUNTER (OUTPATIENT)
Facility: CLINIC | Age: 74
Setting detail: SPECIMEN
End: 2019-07-12
Attending: INTERNAL MEDICINE
Payer: COMMERCIAL

## 2019-07-12 ENCOUNTER — ALLIED HEALTH/NURSE VISIT (OUTPATIENT)
Dept: INFUSION THERAPY | Facility: CLINIC | Age: 74
End: 2019-07-12
Attending: INTERNAL MEDICINE
Payer: COMMERCIAL

## 2019-07-12 VITALS
RESPIRATION RATE: 16 BRPM | HEIGHT: 69 IN | HEART RATE: 59 BPM | TEMPERATURE: 97.4 F | DIASTOLIC BLOOD PRESSURE: 74 MMHG | SYSTOLIC BLOOD PRESSURE: 130 MMHG | BODY MASS INDEX: 32.05 KG/M2 | OXYGEN SATURATION: 97 % | WEIGHT: 216.4 LBS

## 2019-07-12 DIAGNOSIS — C61 PROSTATE CANCER (H): Primary | ICD-10-CM

## 2019-07-12 DIAGNOSIS — C61 PROSTATE CANCER (H): ICD-10-CM

## 2019-07-12 PROCEDURE — 25000128 H RX IP 250 OP 636: Performed by: INTERNAL MEDICINE

## 2019-07-12 PROCEDURE — 96402 CHEMO HORMON ANTINEOPL SQ/IM: CPT

## 2019-07-12 PROCEDURE — G0463 HOSPITAL OUTPT CLINIC VISIT: HCPCS | Mod: 25

## 2019-07-12 PROCEDURE — 99213 OFFICE O/P EST LOW 20 MIN: CPT | Performed by: INTERNAL MEDICINE

## 2019-07-12 RX ADMIN — LEUPROLIDE ACETATE 22.5 MG: KIT at 15:33

## 2019-07-12 ASSESSMENT — MIFFLIN-ST. JEOR: SCORE: 1711.96

## 2019-07-12 ASSESSMENT — PAIN SCALES - GENERAL: PAINLEVEL: NO PAIN (0)

## 2019-07-12 NOTE — PROGRESS NOTES
Infusion Nursing Note:  Wallace Zelaya presents today for lupron.    Patient seen by provider today: Yes:    present during visit today: Not Applicable.    Note: N/A.    Intravenous Access:  No Intravenous access/labs at this visit.    Treatment Conditions:  Not Applicable.      Post Infusion Assessment:  Patient tolerated injection without incident.       Discharge Plan:   Patient discharged in stable condition accompanied by: self.  Departure Mode: Ambulatory to MD. Devorah Panda RN

## 2019-07-12 NOTE — LETTER
7/12/2019         RE: Wallace Zelaya  [address] [address]        Dear Colleague,    Thank you for referring your patient, Wallace Zelaya, to the [facility]. Please see a copy of my visit note below.    [facility]  FOLLOW-UP VISIT NOTE    PATIENT NAME: Wallace Zelaya MRN # [id number]  DATE OF VISIT: Jul 12, 2019 YOB: 1945    REFERRING PROVIDER: No referring provider defined for this encounter.    CANCER TYPE: Prostate cancer; Biochemical recurrence; Hormone sensitive disease  STAGE: Stage III - pT3b at diagnosis; M0    HISTORY OF PRESENTING ILLNESS:  Wallace was noted to have rising screening PSA from 2 - 4. He was referred to Dr. Rodolfo Connor. He had radical prostatectomy on 11/2015. Pathology from this revealed [place] 4+4 disease with extraprostatic extension, seminal vesicle extension and he was staged at pT3b. All of the 12 lymph nodes resected were negative for disease. Post operatively his PSA did not drop to undetectable levels and remained elevated at 0.56. It vladimir to 0.9 in April 2016 and he was referred to Dr. Newton for adjuvant radiation therapy. He completed radiation therapy but did not have any PSA response to this treatment.     TREATMENT SUMMARY:  11/13/2015 Radical prostatectomy  April 2016  Radiation therapy    Oncology Supportive Medications 1/10/2017 4/11/2017   leuprolide (LUPRON DEPOT) IM 22.5 mg 22.5 mg     CURRENT INTERVENTIONS:  Lupron -     SUBJECTIVE   Wallace is being seen for his prostate cancer    He is on lupron. He is pretty upset as he had a terrible golf game yesterday. Nothing seemed to work. He has put his golf clubs back in the closet and feels he would not go back golfing any time soon.     He has no new symptoms.       PAST MEDICAL HISTORY   1. HTN  2. Dyslipidemia  3. Prostate cancer as detailed above      CURRENT OUTPATIENT MEDICATIONS     Current Outpatient  "Medications   Medication Sig     amLODIPine (NORVASC) 5 MG tablet TAKE 1 TABLET BY MOUTH ONCE DAILY     atorvastatin (LIPITOR) 20 MG tablet TAKE 1 TABLET EVERY DAY     ibuprofen (ADVIL/MOTRIN) 800 MG tablet Take 1 tablet (800 mg) by mouth 3 times daily with food     valsartan (DIOVAN) 160 MG tablet TAKE ONE TABLET BY MOUTH ONCE DAILY     acyclovir (ZOVIRAX) 5 % ointment Apply topically 6 times daily (Patient not taking: Reported on 7/12/2019)     No current facility-administered medications for this visit.         ALLERGIES     Allergies   Allergen Reactions     Seasonal Allergies         REVIEW OF SYSTEMS   As above in the HPI, o/w complete 12-point ROS was negative.     PHYSICAL EXAM   /74   Pulse 59   Temp 97.4  F (36.3  C) (Oral)   Resp 16   Ht 1.753 m (5' 9\")   Wt 98.2 kg (216 lb 6.4 oz)   SpO2 97%   BMI 31.96 kg/m     SpO2 Readings from Last 4 Encounters:   09/21/18 96%   08/24/18 94%   06/22/18 95%   05/11/18 97%     Wt Readings from Last 3 Encounters:   07/12/19 98.2 kg (216 lb 6.4 oz)   06/07/19 97.9 kg (215 lb 12.8 oz)   04/05/19 97.9 kg (215 lb 12.8 oz)     GEN: NAD  HEENT: PERRL, EOMI, no icterus, injection or pallor. Oropharynx is clear.  NECK: no cervical or supraclavicular lymphadenopathy  LUNGS: clear bilaterally  CV: regular, no murmurs, rubs, or gallops  ABDOMEN: soft, non-tender, non-distended, normal bowel sounds, no hepatosplenomegaly by percussion or palpation  EXT: warm, well perfused, no edema  NEURO: alert  SKIN: no rashes     LABORATORY AND IMAGING STUDIES     Recent Labs   Lab Test 07/05/19  0858 03/29/19  0850 12/21/18  0825 09/18/18  0856 06/19/18  0909    141 139 141 143   POTASSIUM 4.3 4.3 4.2 4.3 4.2   CHLORIDE 109 107 106 107 109   CO2 26 31 26 24 26   ANIONGAP 7 3 7 10 8   BUN 17 14 15 18 16   CR 0.87 1.00 1.05 1.02 0.90   GLC 92 92 94 92 92   TY 8.6 9.0 9.3 8.9 9.0     No results for input(s): MAG, PHOS in the last 49341 hours.  Recent Labs   Lab Test " 07/05/19  0858 03/29/19  0850 12/21/18  0825 09/18/18  0856 06/19/18  0909   WBC 5.8 4.4 5.8 4.7 4.3   HGB 14.8 14.6 15.5 14.8 14.8    177 174 160 166   MCV 89 90 92 92 90   NEUTROPHIL 55.1 51.7 58.2 54.1 54.3     Recent Labs   Lab Test 07/05/19  0858 03/29/19  0850 12/21/18  0825   BILITOTAL 0.5 0.7 0.9   ALKPHOS 112 97 93   ALT 29 30 26   AST 20 26 19   ALBUMIN 3.6 3.7 3.8    216 215     No results found for: TSH  No results for input(s): CEA in the last 20504 hours.  Results for orders placed or performed during the hospital encounter of 01/20/17   CT Chest w Contrast    Narrative    CT CHEST WITH CONTRAST   1/20/2017 4:04 PM     HISTORY: Malignant neoplasm of the prostate gland.    COMPARISON: 11/9/2016 and 4/14/2016 - CT abdomen and pelvis.    TECHNIQUE: Following the uneventful administration of 80mL Isovue-370  intravenous contrast, helical sections were acquired through the  lungs. Coronal reconstructions were generated. Radiation dose for this  scan was reduced using automated exposure control, adjustment of the  mA and/or kV according to the patient's size, or iterative  reconstruction technique.    FINDINGS: Tiny 0.2 cm nodule in the posterolateral aspect of the left  upper lobe (series 3 image 13) and tiny 0.2 cm nodule in the  posteromedial aspect of the right upper lobe (series 3 image 19). A  few linear opacities in the lungs likely represent atelectasis or  scarring. The lungs are otherwise clear. No pleural or pericardial  effusion. No enlarged lymph nodes in the chest. Atherosclerotic  calcification in the thoracic aorta. Small hiatal hernia.    Scan through the upper abdomen is significant for a 1.3 cm right  adrenal nodule that is unchanged since 4/14/2016.      Impression    IMPRESSION:   1. No convincing evidence of metastatic neoplasm in the chest.  2. Two tiny indeterminate pulmonary nodules. These are likely benign  nodules. Recommend comparison with prior imaging studies,  if  available. If not available and the patient is a smoker or has other  significant risk factors, a followup CT scan could be considered in 12  months to confirm stability.   3. Indeterminate 1.3 cm right adrenal nodule, unchanged since  4/14/2016. 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    MANUEL MARQUEZ MD     Recent Labs   Lab Test 07/05/19  0858 03/29/19  0850 12/21/18  0825 09/18/18  0856 06/19/18  0909   PSA 0.15 0.46 5.41* 0.46 0.19   TESTOSTTOTAL 6* 9* 275 284 7*         ASSESSMENT AND PLAN   1. Biochemical PSA relapse post prostatectomy without any response to adjuvant radiation therapy  2. Rapid rise in PSA on observation phase of intermittent androgen deprivation therapy  3. HTN  4. ECOG PS1  5. No medical comorbidity    I had a lengthy discussion with Wallace. All his labs are within normal limits. His PSA has declined nicely on lupron therapy.     He wanted to take a break again from therapy this time. His PSA had previously increased from 0.46 to 5.4 within 3 months - doubled over 3 times during 3 months with doubling time of less than a month. He was wondering if he could stay off therapy. I do not feel he is a candidate for intermittent therapy and I will continue with continuous androgen deprivation therapy for him. We negotiated and I will administer 3 month injection every 4 months.     He was very upset over his golf game from yesterday. It happens to every player. This is worse in a game like golf where the mental framework is key component of the game. I encouraged him to just forget the day and start afresh as if he never had the bad day. He could stay competitive without taking his defeat to heart. His game has been stagnant for some time. He should consider coaching to help his game. Quitting is not the answer.     I will see him in 4 months and lupron after visit.       Gigi Ward  ,  Division of Hematology, Oncology & Transplantation  [facility].       Again,  thank you for allowing me to participate in the care of your patient.        Sincerely,        Gigi Ward MD

## 2019-07-12 NOTE — PROGRESS NOTES
[facility] CANCER CLINIC  FOLLOW-UP VISIT NOTE    PATIENT NAME: Wallace Zelaya MRN # [id number]  DATE OF VISIT: Jul 12, 2019 YOB: 1945    REFERRING PROVIDER: No referring provider defined for this encounter.    CANCER TYPE: Prostate cancer; Biochemical recurrence; Hormone sensitive disease  STAGE: Stage III - pT3b at diagnosis; M0    HISTORY OF PRESENTING ILLNESS:  Wallace was noted to have rising screening PSA from 2 - 4. He was referred to Dr. Rodolfo Connor. He had radical prostatectomy on 11/2015. Pathology from this revealed Cayla 4+4 disease with extraprostatic extension, seminal vesicle extension and he was staged at pT3b. All of the 12 lymph nodes resected were negative for disease. Post operatively his PSA did not drop to undetectable levels and remained elevated at 0.56. It vladimir to 0.9 in April 2016 and he was referred to Dr. Newton for adjuvant radiation therapy. He completed radiation therapy but did not have any PSA response to this treatment.     TREATMENT SUMMARY:  11/13/2015 Radical prostatectomy  April 2016  Radiation therapy    Oncology Supportive Medications 1/10/2017 4/11/2017   leuprolide (LUPRON DEPOT) IM 22.5 mg 22.5 mg     CURRENT INTERVENTIONS:  Lupron -     SUBJECTIVE   Wallace is being seen for his prostate cancer    He is on lupron. He is pretty upset as he had a terrible golf game yesterday. Nothing seemed to work. He has put his golf clubs back in the closet and feels he would not go back golfing any time soon.     He has no new symptoms.       PAST MEDICAL HISTORY   1. HTN  2. Dyslipidemia  3. Prostate cancer as detailed above      CURRENT OUTPATIENT MEDICATIONS     Current Outpatient Medications   Medication Sig     amLODIPine (NORVASC) 5 MG tablet TAKE 1 TABLET BY MOUTH ONCE DAILY     atorvastatin (LIPITOR) 20 MG tablet TAKE 1 TABLET EVERY DAY     ibuprofen (ADVIL/MOTRIN) 800 MG tablet Take 1 tablet (800 mg) by mouth 3 times daily with food  "    valsartan (DIOVAN) 160 MG tablet TAKE ONE TABLET BY MOUTH ONCE DAILY     acyclovir (ZOVIRAX) 5 % ointment Apply topically 6 times daily (Patient not taking: Reported on 7/12/2019)     No current facility-administered medications for this visit.         ALLERGIES     Allergies   Allergen Reactions     Seasonal Allergies         REVIEW OF SYSTEMS   As above in the HPI, o/w complete 12-point ROS was negative.     PHYSICAL EXAM   /74   Pulse 59   Temp 97.4  F (36.3  C) (Oral)   Resp 16   Ht 1.753 m (5' 9\")   Wt 98.2 kg (216 lb 6.4 oz)   SpO2 97%   BMI 31.96 kg/m    SpO2 Readings from Last 4 Encounters:   09/21/18 96%   08/24/18 94%   06/22/18 95%   05/11/18 97%     Wt Readings from Last 3 Encounters:   07/12/19 98.2 kg (216 lb 6.4 oz)   06/07/19 97.9 kg (215 lb 12.8 oz)   04/05/19 97.9 kg (215 lb 12.8 oz)     GEN: NAD  HEENT: PERRL, EOMI, no icterus, injection or pallor. Oropharynx is clear.  NECK: no cervical or supraclavicular lymphadenopathy  LUNGS: clear bilaterally  CV: regular, no murmurs, rubs, or gallops  ABDOMEN: soft, non-tender, non-distended, normal bowel sounds, no hepatosplenomegaly by percussion or palpation  EXT: warm, well perfused, no edema  NEURO: alert  SKIN: no rashes     LABORATORY AND IMAGING STUDIES     Recent Labs   Lab Test 07/05/19  0858 03/29/19  0850 12/21/18  0825 09/18/18  0856 06/19/18  0909    141 139 141 143   POTASSIUM 4.3 4.3 4.2 4.3 4.2   CHLORIDE 109 107 106 107 109   CO2 26 31 26 24 26   ANIONGAP 7 3 7 10 8   BUN 17 14 15 18 16   CR 0.87 1.00 1.05 1.02 0.90   GLC 92 92 94 92 92   TY 8.6 9.0 9.3 8.9 9.0     No results for input(s): MAG, PHOS in the last 39734 hours.  Recent Labs   Lab Test 07/05/19  0858 03/29/19  0850 12/21/18  0825 09/18/18  0856 06/19/18  0909   WBC 5.8 4.4 5.8 4.7 4.3   HGB 14.8 14.6 15.5 14.8 14.8    177 174 160 166   MCV 89 90 92 92 90   NEUTROPHIL 55.1 51.7 58.2 54.1 54.3     Recent Labs   Lab Test 07/05/19  0858 03/29/19  0850 " 12/21/18  0825   BILITOTAL 0.5 0.7 0.9   ALKPHOS 112 97 93   ALT 29 30 26   AST 20 26 19   ALBUMIN 3.6 3.7 3.8    216 215     No results found for: TSH  No results for input(s): CEA in the last 65331 hours.  Results for orders placed or performed during the hospital encounter of 01/20/17   CT Chest w Contrast    Narrative    CT CHEST WITH CONTRAST   1/20/2017 4:04 PM     HISTORY: Malignant neoplasm of the prostate gland.    COMPARISON: 11/9/2016 and 4/14/2016 - CT abdomen and pelvis.    TECHNIQUE: Following the uneventful administration of 80mL Isovue-370  intravenous contrast, helical sections were acquired through the  lungs. Coronal reconstructions were generated. Radiation dose for this  scan was reduced using automated exposure control, adjustment of the  mA and/or kV according to the patient's size, or iterative  reconstruction technique.    FINDINGS: Tiny 0.2 cm nodule in the posterolateral aspect of the left  upper lobe (series 3 image 13) and tiny 0.2 cm nodule in the  posteromedial aspect of the right upper lobe (series 3 image 19). A  few linear opacities in the lungs likely represent atelectasis or  scarring. The lungs are otherwise clear. No pleural or pericardial  effusion. No enlarged lymph nodes in the chest. Atherosclerotic  calcification in the thoracic aorta. Small hiatal hernia.    Scan through the upper abdomen is significant for a 1.3 cm right  adrenal nodule that is unchanged since 4/14/2016.      Impression    IMPRESSION:   1. No convincing evidence of metastatic neoplasm in the chest.  2. Two tiny indeterminate pulmonary nodules. These are likely benign  nodules. Recommend comparison with prior imaging studies, if  available. If not available and the patient is a smoker or has other  significant risk factors, a followup CT scan could be considered in 12  months to confirm stability.   3. Indeterminate 1.3 cm right adrenal nodule, unchanged since  4/14/2016. This is most likely an  adenoma.    MANUEL MARQUEZ MD     Recent Labs   Lab Test 07/05/19  0858 03/29/19  0850 12/21/18  0825 09/18/18  0856 06/19/18  0909   PSA 0.15 0.46 5.41* 0.46 0.19   TESTOSTTOTAL 6* 9* 275 284 7*         ASSESSMENT AND PLAN   1. Biochemical PSA relapse post prostatectomy without any response to adjuvant radiation therapy  2. Rapid rise in PSA on observation phase of intermittent androgen deprivation therapy  3. HTN  4. ECOG PS1  5. No medical comorbidity    I had a lengthy discussion with Wallace. All his labs are within normal limits. His PSA has declined nicely on lupron therapy.     He wanted to take a break again from therapy this time. His PSA had previously increased from 0.46 to 5.4 within 3 months - doubled over 3 times during 3 months with doubling time of less than a month. He was wondering if he could stay off therapy. I do not feel he is a candidate for intermittent therapy and I will continue with continuous androgen deprivation therapy for him. We negotiated and I will administer 3 month injection every 4 months.     He was very upset over his golf game from yesterday. It happens to every player. This is worse in a game like golf where the mental framework is key component of the game. I encouraged him to just forget the day and start afresh as if he never had the bad day. He could stay competitive without taking his defeat to heart. His game has been stagnant for some time. He should consider coaching to help his game. Quitting is not the answer.     I will see him in 4 months and lupron after visit.       Gigi Ward  ,  Division of Hematology, Oncology & Transplantation  [facility].

## 2019-09-17 DIAGNOSIS — E78.5 HYPERLIPIDEMIA LDL GOAL <130: ICD-10-CM

## 2019-09-18 RX ORDER — ATORVASTATIN CALCIUM 20 MG/1
TABLET, FILM COATED ORAL
Qty: 30 TABLET | Refills: 0 | Status: SHIPPED | OUTPATIENT
Start: 2019-09-18 | End: 2019-10-31

## 2019-09-18 NOTE — TELEPHONE ENCOUNTER
"Requested Prescriptions   Pending Prescriptions Disp Refills     atorvastatin (LIPITOR) 20 MG tablet  Last Written Prescription Date:  6/18/219  Last Fill Quantity: 120 tablet,  # refills: 0   Last office visit: 10/3/2018 with prescribing provider:  Sung   Future Office Visit:   Next 5 appointments (look out 90 days)    Nov 08, 2019  2:45 PM CST  Return Visit with Gigi Ward MD  [facility] ([facility]  [address]  [facility] [address]  [phone]          120 tablet 0     Sig: TAKE 1 TABLET EVERY DAY       Statins Protocol Failed - 9/17/2019  7:41 PM        Failed - LDL on file in past 12 months     Recent Labs   Lab Test 08/03/16  0920   *             Passed - No abnormal creatine kinase in past 12 months     No lab results found.             Passed - Recent (12 mo) or future (30 days) visit within the authorizing provider's specialty     Patient had office visit in the last 12 months or has a visit in the next 30 days with authorizing provider or within the authorizing provider's specialty.  See \"Patient Info\" tab in inbasket, or \"Choose Columns\" in Meds & Orders section of the refill encounter.              Passed - Medication is active on med list        Passed - Patient is age 18 or older          "

## 2019-09-18 NOTE — TELEPHONE ENCOUNTER
Medication is being filled for 1 time refill only due to:  Patient needs to be seen because needs OV and lab in October.  [employer] appt reminder sent.   Arturo Howard RN, BSN

## 2019-10-14 ENCOUNTER — ALLIED HEALTH/NURSE VISIT (OUTPATIENT)
Dept: NURSING | Facility: CLINIC | Age: 74
End: 2019-10-14
Payer: COMMERCIAL

## 2019-10-14 DIAGNOSIS — Z23 NEED FOR PROPHYLACTIC VACCINATION AND INOCULATION AGAINST INFLUENZA: Primary | ICD-10-CM

## 2019-10-14 PROCEDURE — 99207 ZZC NO CHARGE NURSE ONLY: CPT

## 2019-10-14 PROCEDURE — 90662 IIV NO PRSV INCREASED AG IM: CPT

## 2019-10-14 PROCEDURE — G0008 ADMIN INFLUENZA VIRUS VAC: HCPCS

## 2019-10-28 ASSESSMENT — ENCOUNTER SYMPTOMS
EYE PAIN: 0
CHILLS: 0
NERVOUS/ANXIOUS: 0
DIZZINESS: 0
DIARRHEA: 0
FREQUENCY: 1
SORE THROAT: 0
SHORTNESS OF BREATH: 0
JOINT SWELLING: 0
MYALGIAS: 0
HEMATURIA: 0
PARESTHESIAS: 0
NAUSEA: 0
PALPITATIONS: 0
FEVER: 0
CONSTIPATION: 0
HEARTBURN: 0
ARTHRALGIAS: 0
HEADACHES: 0
WEAKNESS: 0
ABDOMINAL PAIN: 0
COUGH: 0
DYSURIA: 0
HEMATOCHEZIA: 0

## 2019-10-28 ASSESSMENT — ACTIVITIES OF DAILY LIVING (ADL): CURRENT_FUNCTION: NO ASSISTANCE NEEDED

## 2019-10-31 ENCOUNTER — OFFICE VISIT (OUTPATIENT)
Dept: FAMILY MEDICINE | Facility: CLINIC | Age: 74
End: 2019-10-31
Payer: COMMERCIAL

## 2019-10-31 VITALS
SYSTOLIC BLOOD PRESSURE: 130 MMHG | HEART RATE: 62 BPM | OXYGEN SATURATION: 96 % | DIASTOLIC BLOOD PRESSURE: 76 MMHG | RESPIRATION RATE: 16 BRPM | TEMPERATURE: 98.6 F | WEIGHT: 215 LBS | HEIGHT: 68 IN | BODY MASS INDEX: 32.58 KG/M2

## 2019-10-31 DIAGNOSIS — Z00.00 ENCOUNTER FOR MEDICARE ANNUAL WELLNESS EXAM: Primary | ICD-10-CM

## 2019-10-31 DIAGNOSIS — I10 ESSENTIAL HYPERTENSION: ICD-10-CM

## 2019-10-31 DIAGNOSIS — C61 PROSTATE CANCER (H): ICD-10-CM

## 2019-10-31 DIAGNOSIS — E78.5 HYPERLIPIDEMIA LDL GOAL <130: ICD-10-CM

## 2019-10-31 DIAGNOSIS — I10 HYPERTENSION GOAL BP (BLOOD PRESSURE) < 140/90: ICD-10-CM

## 2019-10-31 LAB
BASOPHILS # BLD AUTO: 0 10E9/L (ref 0–0.2)
BASOPHILS NFR BLD AUTO: 0.2 %
DIFFERENTIAL METHOD BLD: NORMAL
EOSINOPHIL # BLD AUTO: 0.4 10E9/L (ref 0–0.7)
EOSINOPHIL NFR BLD AUTO: 8 %
ERYTHROCYTE [DISTWIDTH] IN BLOOD BY AUTOMATED COUNT: 13.5 % (ref 10–15)
HCT VFR BLD AUTO: 43.8 % (ref 40–53)
HGB BLD-MCNC: 15.1 G/DL (ref 13.3–17.7)
LDH SERPL L TO P-CCNC: 210 U/L (ref 85–227)
LYMPHOCYTES # BLD AUTO: 1.3 10E9/L (ref 0.8–5.3)
LYMPHOCYTES NFR BLD AUTO: 29.7 %
MCH RBC QN AUTO: 31.3 PG (ref 26.5–33)
MCHC RBC AUTO-ENTMCNC: 34.5 G/DL (ref 31.5–36.5)
MCV RBC AUTO: 91 FL (ref 78–100)
MONOCYTES # BLD AUTO: 0.5 10E9/L (ref 0–1.3)
MONOCYTES NFR BLD AUTO: 10 %
NEUTROPHILS # BLD AUTO: 2.4 10E9/L (ref 1.6–8.3)
NEUTROPHILS NFR BLD AUTO: 52.1 %
PLATELET # BLD AUTO: 182 10E9/L (ref 150–450)
RBC # BLD AUTO: 4.83 10E12/L (ref 4.4–5.9)
WBC # BLD AUTO: 4.5 10E9/L (ref 4–11)

## 2019-10-31 PROCEDURE — 80061 LIPID PANEL: CPT | Performed by: FAMILY MEDICINE

## 2019-10-31 PROCEDURE — 99397 PER PM REEVAL EST PAT 65+ YR: CPT | Performed by: FAMILY MEDICINE

## 2019-10-31 PROCEDURE — 36415 COLL VENOUS BLD VENIPUNCTURE: CPT | Performed by: FAMILY MEDICINE

## 2019-10-31 PROCEDURE — 83615 LACTATE (LD) (LDH) ENZYME: CPT | Performed by: INTERNAL MEDICINE

## 2019-10-31 PROCEDURE — 84153 ASSAY OF PSA TOTAL: CPT | Performed by: INTERNAL MEDICINE

## 2019-10-31 PROCEDURE — 84403 ASSAY OF TOTAL TESTOSTERONE: CPT | Performed by: INTERNAL MEDICINE

## 2019-10-31 PROCEDURE — 80053 COMPREHEN METABOLIC PANEL: CPT | Performed by: INTERNAL MEDICINE

## 2019-10-31 PROCEDURE — 85025 COMPLETE CBC W/AUTO DIFF WBC: CPT | Performed by: INTERNAL MEDICINE

## 2019-10-31 RX ORDER — VALSARTAN 160 MG/1
160 TABLET ORAL DAILY
Qty: 90 TABLET | Refills: 4 | Status: SHIPPED | OUTPATIENT
Start: 2019-10-31 | End: 2020-12-07

## 2019-10-31 RX ORDER — AMLODIPINE BESYLATE 5 MG/1
5 TABLET ORAL DAILY
Qty: 90 TABLET | Refills: 4 | Status: SHIPPED | OUTPATIENT
Start: 2019-10-31 | End: 2020-11-24

## 2019-10-31 RX ORDER — ATORVASTATIN CALCIUM 20 MG/1
TABLET, FILM COATED ORAL
Qty: 90 TABLET | Refills: 4 | Status: SHIPPED | OUTPATIENT
Start: 2019-10-31 | End: 2021-01-06

## 2019-10-31 ASSESSMENT — ENCOUNTER SYMPTOMS
HEMATURIA: 0
NERVOUS/ANXIOUS: 0
ARTHRALGIAS: 0
SHORTNESS OF BREATH: 0
ABDOMINAL PAIN: 0
DIARRHEA: 0
CHILLS: 0
HEARTBURN: 0
HEMATOCHEZIA: 0
MYALGIAS: 0
PARESTHESIAS: 0
SORE THROAT: 0
NAUSEA: 0
PALPITATIONS: 0
COUGH: 0
FREQUENCY: 1
JOINT SWELLING: 0
DIZZINESS: 0
FEVER: 0
WEAKNESS: 0
EYE PAIN: 0
DYSURIA: 0
CONSTIPATION: 0
HEADACHES: 0

## 2019-10-31 ASSESSMENT — ACTIVITIES OF DAILY LIVING (ADL): CURRENT_FUNCTION: NO ASSISTANCE NEEDED

## 2019-10-31 ASSESSMENT — MIFFLIN-ST. JEOR: SCORE: 1684.73

## 2019-10-31 NOTE — PROGRESS NOTES
"SUBJECTIVE:   Wallace Zelaya is a 74 year old male who presents for Preventive Visit  Are you in the first 12 months of your Medicare coverage?  No    Healthy Habits:     In general, how would you rate your overall health?  Good    Frequency of exercise:  6-7 days/week    Duration of exercise:  45-60 minutes    Do you usually eat at least 4 servings of fruit and vegetables a day, include whole grains    & fiber and avoid regularly eating high fat or \"junk\" foods?  No    Taking medications regularly:  Yes    Medication side effects:  None    Ability to successfully perform activities of daily living:  No assistance needed    Home Safety:  Lack of grab bars in the bathroom    Hearing Impairment:  No hearing concerns    In the past 6 months, have you been bothered by leaking of urine? Yes    In general, how would you rate your overall mental or emotional health?  Excellent      PHQ-2 Total Score: 0    Additional concerns today:  No    Patient with history of hypertension. Denies shortness or breath, chest pain, headache, vision change, or lower extremity edema.    History of hyperlipidemia, on statin.     Patient with prostate cancer initially treated by Dr. Rodolfo Connor with robotic radical prostatectomy and radiation 11/2015. Now off Lupron injections with declining PSA. Followed by Dr. Gigi Ward with oncology.     Do you feel safe in your environment? Yes    Have you ever done Advance Care Planning? (For example, a Health Directive, POLST, or a discussion with a medical provider about your wishes): Yes, patient states has an Advance Care Planning document and will bring a copy to the clinic.    No issues  Fall risk  Fallen 2 or more times in the past year?: No  Any fall with injury in the past year?: No    Cognitive Screening   1) Repeat 3 items (Leader, Season, Table)  2) Clock draw: ABNORMAL hand are a little off  3) 3 item recall: Recalls 1 object   Results: ABNORMAL clock, 1-2 items recalled: PROBABLE " COGNITIVE IMPAIRMENT, **INFORM PROVIDER**    Mini-CogTM Copyright ANETA Cole. Licensed by the author for use in [facility]; reprinted with permission (kindra@[facility]). All rights reserved.      Do you have sleep apnea, excessive snoring or daytime drowsiness?: no    Reviewed and updated as needed this visit by clinical staff  Tobacco  Allergies  Meds  Med Hx  Surg Hx  Fam Hx  Soc Hx      Reviewed and updated as needed this visit by Provider  Allergies        Social History     Tobacco Use     Smoking status: Former Smoker     Packs/day: 1.00     Years: 25.00     Pack years: 25.00     Last attempt to quit: 8/17/2009     Years since quitting: 10.2     Smokeless tobacco: Never Used     Tobacco comment: 4-5 cigs qd /quit July 1st 2009   Substance Use Topics     Alcohol use: Yes     Comment: socially,very little         Alcohol Use 10/28/2019   Prescreen: >3 drinks/day or >7 drinks/week? No   Prescreen: >3 drinks/day or >7 drinks/week? -         Current providers sharing in care for this patient include:   Patient Care Team:  Jd Almaraz MD as PCP - General (Family Practice)  Jd Almaraz MD as Referring Physician (Family Practice)  Rodolfo Connor MD as MD (Urology)  Jd Almaraz MD as Assigned PCP  Ashely Torres, RN as Registered Nurse (Hematology & Oncology)    The following health maintenance items are reviewed in Epic and correct as of today:  Health Maintenance   Topic Date Due     ZOSTER IMMUNIZATION (1 of 2) 07/24/1994     MEDICARE ANNUAL WELLNESS VISIT  10/03/2019     BMP  10/31/2020     FALL RISK ASSESSMENT  10/31/2020     COLONOSCOPY  08/24/2023     LIPID  10/31/2024     ADVANCE CARE PLANNING  10/31/2024     DTAP/TDAP/TD IMMUNIZATION (3 - Td) 03/09/2028     PHQ-2  Completed     INFLUENZA VACCINE  Completed     PNEUMOCOCCAL IMMUNIZATION 65+ LOW/MEDIUM RISK  Completed     AORTIC ANEURYSM SCREENING (SYSTEM ASSIGNED)  Completed     IPV IMMUNIZATION  Aged Out      "MENINGITIS IMMUNIZATION  Aged Out           Review of Systems   Constitutional: Negative for chills and fever.   HENT: Negative for congestion, ear pain, hearing loss and sore throat.    Eyes: Negative for pain and visual disturbance.   Respiratory: Negative for cough and shortness of breath.    Cardiovascular: Negative for chest pain, palpitations and peripheral edema.   Gastrointestinal: Negative for abdominal pain, constipation, diarrhea, heartburn, hematochezia and nausea.   Genitourinary: Positive for frequency and impotence. Negative for discharge, dysuria, genital sores, hematuria and urgency.   Musculoskeletal: Negative for arthralgias, joint swelling and myalgias.   Skin: Negative for rash.   Neurological: Negative for dizziness, weakness, headaches and paresthesias.   Psychiatric/Behavioral: Negative for mood changes. The patient is not nervous/anxious.          OBJECTIVE:   /76 (BP Location: Right arm, Patient Position: Chair, Cuff Size: Adult Regular)   Pulse 62   Temp 98.6  F (37  C) (Oral)   Resp 16   Ht 1.727 m (5' 8\")   Wt 97.5 kg (215 lb)   SpO2 96%   BMI 32.69 kg/m   Estimated body mass index is 32.69 kg/m  as calculated from the following:    Height as of this encounter: 1.727 m (5' 8\").    Weight as of this encounter: 97.5 kg (215 lb).  Physical Exam  GENERAL: healthy, alert and no distress  EYES: Eyes grossly normal to inspection, PERRL and conjunctivae and sclerae normal  HENT: ear canals and TM's normal, nose and mouth without ulcers or lesions  NECK: no adenopathy, no asymmetry, masses, or scars and thyroid normal to palpation  RESP: lungs clear to auscultation - no rales, rhonchi or wheezes  CV: regular rate and rhythm, normal S1 S2, no S3 or S4, no murmur, click or rub, no peripheral edema and peripheral pulses strong  ABDOMEN: soft, nontender, no hepatosplenomegaly, no masses and bowel sounds normal  MS: no gross musculoskeletal defects noted, no edema  SKIN: no suspicious " "lesions or rashes  NEURO: Normal strength and tone, mentation intact and speech normal  PSYCH: mentation appears normal, affect normal/bright        ASSESSMENT / PLAN:   1. Encounter for Medicare annual wellness exam    2. Prostate cancer (HCC)    3. Essential hypertension  - amLODIPine (NORVASC) 5 MG tablet; Take 1 tablet (5 mg) by mouth daily  Dispense: 90 tablet; Refill: 4    4. Hyperlipidemia LDL goal <130  - atorvastatin (LIPITOR) 20 MG tablet; TAKE 1 TABLET EVERY DAY  Dispense: 90 tablet; Refill: 4  - Lipid panel reflex to direct LDL Fasting    5. Hypertension goal BP (blood pressure) < 140/90  - valsartan (DIOVAN) 160 MG tablet; Take 1 tablet (160 mg) by mouth daily  Dispense: 90 tablet; Refill: 4    COUNSELING:  Reviewed preventive health counseling, as reflected in patient instructions       Regular exercise       Healthy diet/nutrition    Estimated body mass index is 32.69 kg/m  as calculated from the following:    Height as of this encounter: 1.727 m (5' 8\").    Weight as of this encounter: 97.5 kg (215 lb).    Weight management plan: Discussed healthy diet and exercise guidelines     reports that he quit smoking about 10 years ago. He has a 25.00 pack-year smoking history. He has never used smokeless tobacco.      Appropriate preventive services were discussed with this patient, including applicable screening as appropriate for cardiovascular disease, diabetes, osteopenia/osteoporosis, and glaucoma.  As appropriate for age/gender, discussed screening for colorectal cancer, prostate cancer, breast cancer, and cervical cancer. Checklist reviewing preventive services available has been given to the patient.    Reviewed patients plan of care and provided an AVS. The Intermediate Care Plan ( asthma action plan, low back pain action plan, and migraine action plan) for Wallace meets the Care Plan requirement. This Care Plan has been established and reviewed with the Patient.    Counseling Resources:  ATP IV " Guidelines  Pooled Cohorts Equation Calculator  Breast Cancer Risk Calculator  FRAX Risk Assessment  ICSI Preventive Guidelines  Dietary Guidelines for Americans, 2010  [employer]'s MyPlate  ASA Prophylaxis  Lung CA Screening    Jdsegundo Almaraz MD  [facility]    Identified Health Risks:

## 2019-10-31 NOTE — PATIENT INSTRUCTIONS
Patient Education   Personalized Prevention Plan  You are due for the preventive services outlined below.  Your care team is available to assist you in scheduling these services.  If you have already completed any of these items, please share that information with your care team to update in your medical record.  Health Maintenance Due   Topic Date Due     Zoster (Shingles) Vaccine (1 of 2) 07/24/1994     FALL RISK ASSESSMENT  03/09/2019     Annual Wellness Visit  10/03/2019

## 2019-11-01 LAB
ALBUMIN SERPL-MCNC: 3.8 G/DL (ref 3.4–5)
ALP SERPL-CCNC: 103 U/L (ref 40–150)
ALT SERPL W P-5'-P-CCNC: 32 U/L (ref 0–70)
ANION GAP SERPL CALCULATED.3IONS-SCNC: 6 MMOL/L (ref 3–14)
AST SERPL W P-5'-P-CCNC: 23 U/L (ref 0–45)
BILIRUB SERPL-MCNC: 0.6 MG/DL (ref 0.2–1.3)
BUN SERPL-MCNC: 14 MG/DL (ref 7–30)
CALCIUM SERPL-MCNC: 8.8 MG/DL (ref 8.5–10.1)
CHLORIDE SERPL-SCNC: 106 MMOL/L (ref 94–109)
CHOLEST SERPL-MCNC: 196 MG/DL
CO2 SERPL-SCNC: 27 MMOL/L (ref 20–32)
CREAT SERPL-MCNC: 0.94 MG/DL (ref 0.66–1.25)
GFR SERPL CREATININE-BSD FRML MDRD: 78 ML/MIN/{1.73_M2}
GLUCOSE SERPL-MCNC: 100 MG/DL (ref 70–99)
HDLC SERPL-MCNC: 46 MG/DL
LDLC SERPL CALC-MCNC: 128 MG/DL
NONHDLC SERPL-MCNC: 150 MG/DL
POTASSIUM SERPL-SCNC: 4.3 MMOL/L (ref 3.4–5.3)
PROT SERPL-MCNC: 7.1 G/DL (ref 6.8–8.8)
PSA SERPL-MCNC: 0.22 UG/L (ref 0–4)
SODIUM SERPL-SCNC: 139 MMOL/L (ref 133–144)
TRIGL SERPL-MCNC: 111 MG/DL

## 2019-11-05 LAB — TESTOST SERPL-MCNC: 8 NG/DL (ref 240–950)

## 2019-11-08 ENCOUNTER — ONCOLOGY VISIT (OUTPATIENT)
Dept: ONCOLOGY | Facility: CLINIC | Age: 74
End: 2019-11-08
Attending: INTERNAL MEDICINE
Payer: COMMERCIAL

## 2019-11-08 ENCOUNTER — HOSPITAL ENCOUNTER (OUTPATIENT)
Facility: CLINIC | Age: 74
Setting detail: SPECIMEN
End: 2019-11-08
Attending: INTERNAL MEDICINE
Payer: COMMERCIAL

## 2019-11-08 ENCOUNTER — ALLIED HEALTH/NURSE VISIT (OUTPATIENT)
Dept: INFUSION THERAPY | Facility: CLINIC | Age: 74
End: 2019-11-08
Attending: INTERNAL MEDICINE
Payer: COMMERCIAL

## 2019-11-08 VITALS
BODY MASS INDEX: 33.43 KG/M2 | WEIGHT: 220.6 LBS | SYSTOLIC BLOOD PRESSURE: 120 MMHG | HEIGHT: 68 IN | HEART RATE: 81 BPM | DIASTOLIC BLOOD PRESSURE: 65 MMHG | RESPIRATION RATE: 16 BRPM | OXYGEN SATURATION: 95 % | TEMPERATURE: 97.4 F

## 2019-11-08 DIAGNOSIS — C61 PROSTATE CANCER (H): Primary | ICD-10-CM

## 2019-11-08 PROCEDURE — G0463 HOSPITAL OUTPT CLINIC VISIT: HCPCS | Mod: 25

## 2019-11-08 PROCEDURE — 96402 CHEMO HORMON ANTINEOPL SQ/IM: CPT

## 2019-11-08 PROCEDURE — 25000128 H RX IP 250 OP 636: Performed by: INTERNAL MEDICINE

## 2019-11-08 PROCEDURE — 99213 OFFICE O/P EST LOW 20 MIN: CPT | Performed by: INTERNAL MEDICINE

## 2019-11-08 RX ADMIN — LEUPROLIDE ACETATE 22.5 MG: KIT at 15:33

## 2019-11-08 ASSESSMENT — MIFFLIN-ST. JEOR: SCORE: 1710.14

## 2019-11-08 ASSESSMENT — PAIN SCALES - GENERAL: PAINLEVEL: NO PAIN (0)

## 2019-11-08 NOTE — PROGRESS NOTES
[facility] CANCER CLINIC  FOLLOW-UP VISIT NOTE    PATIENT NAME: Wallace Zelaya MRN # [id number]  DATE OF VISIT: Nov 8, 2019 YOB: 1945    REFERRING PROVIDER: No referring provider defined for this encounter.    CANCER TYPE: Prostate cancer; Biochemical recurrence; Hormone sensitive disease  STAGE: Stage III - pT3b at diagnosis; M0    HISTORY OF PRESENTING ILLNESS:  Wallace was noted to have rising screening PSA from 2 - 4. He was referred to Dr. Rodolfo Connor. He had radical prostatectomy on 11/2015. Pathology from this revealed Cayla 4+4 disease with extraprostatic extension, seminal vesicle extension and he was staged at pT3b. All of the 12 lymph nodes resected were negative for disease. Post operatively his PSA did not drop to undetectable levels and remained elevated at 0.56. It vladimir to 0.9 in April 2016 and he was referred to Dr. Newton for adjuvant radiation therapy. He completed radiation therapy but did not have any PSA response to this treatment.     TREATMENT SUMMARY:  11/13/2015 Radical prostatectomy  April 2016  Radiation therapy    Oncology Supportive Medications 1/10/2017 4/11/2017   leuprolide (LUPRON DEPOT) IM 22.5 mg 22.5 mg     CURRENT INTERVENTIONS:  Lupron -     SUBJECTIVE   Wallace is being seen for his prostate cancer    He is on lupron. He is pretty upset as he had a terrible golf game yesterday. Nothing seemed to work. He has put his golf clubs back in the closet and feels he would not go back golfing any time soon.     He has no new symptoms.       PAST MEDICAL HISTORY   1. HTN  2. Dyslipidemia  3. Prostate cancer as detailed above      CURRENT OUTPATIENT MEDICATIONS     Current Outpatient Medications   Medication Sig     amLODIPine (NORVASC) 5 MG tablet Take 1 tablet (5 mg) by mouth daily     atorvastatin (LIPITOR) 20 MG tablet TAKE 1 TABLET EVERY DAY     ibuprofen (ADVIL/MOTRIN) 800 MG tablet Take 1 tablet (800 mg) by mouth 3 times daily with  "food     valsartan (DIOVAN) 160 MG tablet Take 1 tablet (160 mg) by mouth daily     No current facility-administered medications for this visit.         ALLERGIES     No Known Allergies     REVIEW OF SYSTEMS   As above in the HPI, o/w complete 12-point ROS was negative.     PHYSICAL EXAM   /65 (BP Location: Right arm, Patient Position: Sitting, Cuff Size: Adult Regular)   Pulse 81   Temp 97.4  F (36.3  C) (Oral)   Resp 16   Ht 1.727 m (5' 8\")   Wt 100.1 kg (220 lb 9.6 oz)   SpO2 95%   BMI 33.54 kg/m    SpO2 Readings from Last 4 Encounters:   09/21/18 96%   08/24/18 94%   06/22/18 95%   05/11/18 97%     Wt Readings from Last 3 Encounters:   11/08/19 100.1 kg (220 lb 9.6 oz)   10/31/19 97.5 kg (215 lb)   07/12/19 98.2 kg (216 lb 6.4 oz)     GEN: NAD  HEENT: PERRL, EOMI, no icterus, injection or pallor. Oropharynx is clear.  NECK: no cervical or supraclavicular lymphadenopathy  LUNGS: clear bilaterally  CV: regular, no murmurs, rubs, or gallops  ABDOMEN: soft, non-tender, non-distended, normal bowel sounds, no hepatosplenomegaly by percussion or palpation  EXT: warm, well perfused, no edema  NEURO: alert  SKIN: no rashes     LABORATORY AND IMAGING STUDIES     Recent Labs   Lab Test 10/31/19  0945 07/05/19  0858 03/29/19  0850 12/21/18  0825 09/18/18  0856    142 141 139 141   POTASSIUM 4.3 4.3 4.3 4.2 4.3   CHLORIDE 106 109 107 106 107   CO2 27 26 31 26 24   ANIONGAP 6 7 3 7 10   BUN 14 17 14 15 18   CR 0.94 0.87 1.00 1.05 1.02   * 92 92 94 92   TY 8.8 8.6 9.0 9.3 8.9     Recent Labs   Lab Test 10/31/19  0945 07/05/19  0858 03/29/19  0850 12/21/18  0825 09/18/18  0856   WBC 4.5 5.8 4.4 5.8 4.7   HGB 15.1 14.8 14.6 15.5 14.8    181 177 174 160   MCV 91 89 90 92 92   NEUTROPHIL 52.1 55.1 51.7 58.2 54.1     Recent Labs   Lab Test 10/31/19  0945 07/05/19  0858 03/29/19  0850   BILITOTAL 0.6 0.5 0.7   ALKPHOS 103 112 97   ALT 32 29 30   AST 23 20 26   ALBUMIN 3.8 3.6 3.7    213 216 "   \  Recent Labs   Lab Test 10/31/19  0945 07/05/19  0858 03/29/19  0850 12/21/18  0825 09/18/18  0856   PSA 0.22 0.15 0.46 5.41* 0.46   TESTOSTTOTAL 8* 6* 9* 275 284         ASSESSMENT AND PLAN   1. Biochemical PSA relapse post prostatectomy without any response to adjuvant radiation therapy  2. Rapid rise in PSA on observation phase of intermittent androgen deprivation therapy  3. HTN  4. ECOG PS1  5. No medical comorbidity    I had a lengthy discussion with Wallace. All his labs are within normal limits. His PSA has declined nicely on lupron therapy. His PSA is slightly higher than the last visit. It is essentially stable.     We will continue with lupron as in the past. He will get a dose of lupron today. He would like to wait for 5 months prior to the next dose of lupron. I do not feel that it will make a huge difference and we could always do that.     He has no side effects with his lupron.      I will see him in 5 months and lupron after visit.       Gigi Ward  ,  Division of Hematology, Oncology & Transplantation  [facility].

## 2019-11-08 NOTE — LETTER
11/8/2019         RE: Wallace Zelaya  [address] [facility] [address]        Dear Colleague,    Thank you for referring your patient, Wallace Zelaya, to the [facility] CAN[facility]ER [facility]. Please see a copy of my visit note below.    [facility] CANCER CLINIC  FOLLOW-UP VISIT NOTE    PATIENT NAME: Wallace Zelaya MRN # [id number]  DATE OF VISIT: Nov 8, 2019 YOB: 1945    REFERRING PROVIDER: No referring provider defined for this encounter.    CANCER TYPE: Prostate cancer; Biochemical recurrence; Hormone sensitive disease  STAGE: Stage III - pT3b at diagnosis; M0    HISTORY OF PRESENTING ILLNESS:  Wallace was noted to have rising screening PSA from 2 - 4. He was referred to Dr. Rodolfo Connor. He had radical prostatectomy on 11/2015. Pathology from this revealed [place] 4+4 disease with extraprostatic extension, seminal vesicle extension and he was staged at pT3b. All of the 12 lymph nodes resected were negative for disease. Post operatively his PSA did not drop to undetectable levels and remained elevated at 0.56. It vladimir to 0.9 in April 2016 and he was referred to Dr. Newton for adjuvant radiation therapy. He completed radiation therapy but did not have any PSA response to this treatment.     TREATMENT SUMMARY:  11/13/2015 Radical prostatectomy  April 2016  Radiation therapy    Oncology Supportive Medications 1/10/2017 4/11/2017   leuprolide (LUPRON DEPOT) IM 22.5 mg 22.5 mg     CURRENT INTERVENTIONS:  Lupron -     SUBJECTIVE   Wallace is being seen for his prostate cancer    He is on lupron. He is pretty upset as he had a terrible golf game yesterday. Nothing seemed to work. He has put his golf clubs back in the closet and feels he would not go back golfing any time soon.     He has no new symptoms.       PAST MEDICAL HISTORY   1. HTN  2. Dyslipidemia  3. Prostate cancer as detailed above      CURRENT OUTPATIENT MEDICATIONS     Current Outpatient Medications   Medication  "Sig     amLODIPine (NORVASC) 5 MG tablet Take 1 tablet (5 mg) by mouth daily     atorvastatin (LIPITOR) 20 MG tablet TAKE 1 TABLET EVERY DAY     ibuprofen (ADVIL/MOTRIN) 800 MG tablet Take 1 tablet (800 mg) by mouth 3 times daily with food     valsartan (DIOVAN) 160 MG tablet Take 1 tablet (160 mg) by mouth daily     No current facility-administered medications for this visit.         ALLERGIES     No Known Allergies     REVIEW OF SYSTEMS   As above in the HPI, o/w complete 12-point ROS was negative.     PHYSICAL EXAM   /65 (BP Location: Right arm, Patient Position: Sitting, Cuff Size: Adult Regular)   Pulse 81   Temp 97.4  F (36.3  C) (Oral)   Resp 16   Ht 1.727 m (5' 8\")   Wt 100.1 kg (220 lb 9.6 oz)   SpO2 95%   BMI 33.54 kg/m     SpO2 Readings from Last 4 Encounters:   09/21/18 96%   08/24/18 94%   06/22/18 95%   05/11/18 97%     Wt Readings from Last 3 Encounters:   11/08/19 100.1 kg (220 lb 9.6 oz)   10/31/19 97.5 kg (215 lb)   07/12/19 98.2 kg (216 lb 6.4 oz)     GEN: NAD  HEENT: PERRL, EOMI, no icterus, injection or pallor. Oropharynx is clear.  NECK: no cervical or supraclavicular lymphadenopathy  LUNGS: clear bilaterally  CV: regular, no murmurs, rubs, or gallops  ABDOMEN: soft, non-tender, non-distended, normal bowel sounds, no hepatosplenomegaly by percussion or palpation  EXT: warm, well perfused, no edema  NEURO: alert  SKIN: no rashes     LABORATORY AND IMAGING STUDIES     Recent Labs   Lab Test 10/31/19  0945 07/05/19  0858 03/29/19  0850 12/21/18  0825 09/18/18  0856    142 141 139 141   POTASSIUM 4.3 4.3 4.3 4.2 4.3   CHLORIDE 106 109 107 106 107   CO2 27 26 31 26 24   ANIONGAP 6 7 3 7 10   BUN 14 17 14 15 18   CR 0.94 0.87 1.00 1.05 1.02   * 92 92 94 92   TY 8.8 8.6 9.0 9.3 8.9     Recent Labs   Lab Test 10/31/19  0945 07/05/19  0858 03/29/19  0850 12/21/18  0825 09/18/18  0856   WBC 4.5 5.8 4.4 5.8 4.7   HGB 15.1 14.8 14.6 15.5 14.8    181 177 174 160   MCV 91 89 " 90 92 92   NEUTROPHIL 52.1 55.1 51.7 58.2 54.1     Recent Labs   Lab Test 10/31/19  0945 07/05/19  0858 03/29/19  0850   BILITOTAL 0.6 0.5 0.7   ALKPHOS 103 112 97   ALT 32 29 30   AST 23 20 26   ALBUMIN 3.8 3.6 3.7    213 216   \  Recent Labs   Lab Test 10/31/19  0945 07/05/19  0858 03/29/19  0850 12/21/18  0825 09/18/18  0856   PSA 0.22 0.15 0.46 5.41* 0.46   TESTOSTTOTAL 8* 6* 9* 275 284         ASSESSMENT AND PLAN   1. Biochemical PSA relapse post prostatectomy without any response to adjuvant radiation therapy  2. Rapid rise in PSA on observation phase of intermittent androgen deprivation therapy  3. HTN  4. ECOG PS1  5. No medical comorbidity    I had a lengthy discussion with Wallace. All his labs are within normal limits. His PSA has declined nicely on lupron therapy. His PSA is slightly higher than the last visit. It is essentially stable.     We will continue with lupron as in the past. He will get a dose of lupron today. He would like to wait for 5 months prior to the next dose of lupron. I do not feel that it will make a huge difference and we could always do that.     He has no side effects with his lupron.      I will see him in 5 months and lupron after visit.       Gigi Ward  ,  Division of Hematology, Oncology & Transplantation  [facility].       Again, thank you for allowing me to participate in the care of your patient.        Sincerely,        Gigi Ward MD

## 2019-11-08 NOTE — PROGRESS NOTES
Infusion Nursing Note:  Wallace Zelaya presents today for Lupron.    Patient seen by provider today: Yes: Ed   present during visit today: Not Applicable.    Note: Assessment done by MD at appointment.    Intravenous Access:  No Intravenous access/labs at this visit.    Treatment Conditions:  Not Applicable.      Post Infusion Assessment:  Patient tolerated injection without incident.       Discharge Plan:   Discharge instructions reviewed with: Patient.  Patient discharged in stable condition accompanied by: self.  Departure Mode: Ambulatory.  Scheduled for MD and injection on 4/20/20.    LAYNE CASTANON RN

## 2019-11-08 NOTE — NURSING NOTE
"Oncology Rooming Note    November 8, 2019 3:01 PM   Wallace Zelaya is a 74 year old male who presents for:    Chief Complaint   Patient presents with     Oncology Clinic Visit     Prostate cancer      Initial Vitals: /65 (BP Location: Right arm, Patient Position: Sitting, Cuff Size: Adult Regular)   Pulse 81   Temp 97.4  F (36.3  C) (Oral)   Resp 16   Ht 1.727 m (5' 8\")   Wt 100.1 kg (220 lb 9.6 oz)   SpO2 95%   BMI 33.54 kg/m   Estimated body mass index is 33.54 kg/m  as calculated from the following:    Height as of this encounter: 1.727 m (5' 8\").    Weight as of this encounter: 100.1 kg (220 lb 9.6 oz). Body surface area is 2.19 meters squared.  No Pain (0) Comment: Data Unavailable   No LMP for male patient.  Allergies reviewed: Yes  Medications reviewed: Yes    Medications: Medication refills not needed today.  Pharmacy name entered into [facility]:    [facility] - [place], [place] [facility] [address] [facility] [address]    Clinical concerns: F/U Doctor was notified.      Lisa Palomares CMA              "

## 2020-01-14 ENCOUNTER — OFFICE VISIT (OUTPATIENT)
Dept: FAMILY MEDICINE | Facility: CLINIC | Age: 75
End: 2020-01-14
Payer: COMMERCIAL

## 2020-01-14 VITALS
WEIGHT: 221 LBS | SYSTOLIC BLOOD PRESSURE: 142 MMHG | HEART RATE: 51 BPM | BODY MASS INDEX: 33.49 KG/M2 | HEIGHT: 68 IN | DIASTOLIC BLOOD PRESSURE: 70 MMHG | TEMPERATURE: 98.3 F

## 2020-01-14 DIAGNOSIS — H81.10 BENIGN PAROXYSMAL POSITIONAL VERTIGO, UNSPECIFIED LATERALITY: Primary | ICD-10-CM

## 2020-01-14 DIAGNOSIS — M54.50 CHRONIC MIDLINE LOW BACK PAIN WITHOUT SCIATICA: ICD-10-CM

## 2020-01-14 DIAGNOSIS — G89.29 CHRONIC MIDLINE LOW BACK PAIN WITHOUT SCIATICA: ICD-10-CM

## 2020-01-14 DIAGNOSIS — I10 ESSENTIAL HYPERTENSION: ICD-10-CM

## 2020-01-14 DIAGNOSIS — C61 PROSTATE CANCER (H): ICD-10-CM

## 2020-01-14 PROCEDURE — 99214 OFFICE O/P EST MOD 30 MIN: CPT | Performed by: FAMILY MEDICINE

## 2020-01-14 RX ORDER — MECLIZINE HYDROCHLORIDE 25 MG/1
25 TABLET ORAL 3 TIMES DAILY PRN
Qty: 30 TABLET | Refills: 0 | Status: SHIPPED | OUTPATIENT
Start: 2020-01-14 | End: 2021-01-06

## 2020-01-14 RX ORDER — IBUPROFEN 800 MG/1
800 TABLET, FILM COATED ORAL 3 TIMES DAILY
Qty: 90 TABLET | Refills: 3 | Status: SHIPPED | OUTPATIENT
Start: 2020-01-14 | End: 2022-06-07

## 2020-01-14 ASSESSMENT — MIFFLIN-ST. JEOR: SCORE: 1711.95

## 2020-01-14 NOTE — PROGRESS NOTES
Subjective     Wallace Zelaya is a 74 year old male who presents to clinic today for the following health issues:    HPI   Dizziness  Onset: 3 months ago on and off     Description:   Do you feel faint:  no   Does it feel like the surroundings (bed, room) are moving: YES  Unsteady/off balance: YES  Have you passed out or fallen: no     Intensity: moderate    Progression of Symptoms:  same    Accompanying Signs & Symptoms:  Heart palpitations: no   Nausea, vomiting: YES  Weakness in arms or legs: no   Fatigue: YES  Vision or speech changes: no   Ringing in ears (Tinnitus): no   Hearing Loss: no     History:   Head trauma/concussion hx: no   Previous similar symptoms: no   Recent bleeding history: no     Precipitating factors:   Worse with activity or head movement: YES  Any new medications (BP?): no   Alcohol/drug abuse/withdrawal: no     Alleviating factors:   Does staying in a fixed position give relief:  YES    Therapies Tried and outcome: nothing       Patient Active Problem List   Diagnosis     Colon polyp     Advanced directives, counseling/discussion     Hyperlipidemia LDL goal <130     Prostate cancer (HCC)     Past Surgical History:   Procedure Laterality Date     BIOPSY  02/2011    skin tagged left arm     C NONSPECIFIC PROCEDURE      Lawnmower injury right foot-toe      C NONSPECIFIC PROCEDURE      Pilonidal cyst     COLONOSCOPY  8/19/2013    Procedure: COLONOSCOPY;  Colonoscopy ;  Surgeon: Emerson Martinez MD, MD;  Location:  GI     DAVINCI PROSTATECTOMY N/A 11/13/2015    Procedure: DAVINCI PROSTATECTOMY;  Surgeon: Rodolfo Connor MD;  Location:  OR       Social History     Tobacco Use     Smoking status: Former Smoker     Packs/day: 1.00     Years: 25.00     Pack years: 25.00     Last attempt to quit: 8/17/2009     Years since quitting: 10.4     Smokeless tobacco: Never Used   Substance Use Topics     Alcohol use: Yes     Comment: socially,very little     Family History   Problem Relation Age  "of Onset     Cancer Mother         86 YO AND HYPERTENSION     Hypertension Mother      Colon Cancer Mother      Heart Disease Father          86 YO MI     Prostate Cancer Brother 50        prostate cancer           Reviewed and updated as needed this visit by Provider         Review of Systems   ROS COMP: ENT/MOUTH: no tinnitus or hearing loss  NEURO: NEGATIVE for numbness or weakness      Objective    BP (!) 142/70 (BP Location: Right arm, Patient Position: Chair, Cuff Size: Adult Regular)   Pulse 51   Temp 98.3  F (36.8  C) (Oral)   Ht 1.727 m (5' 8\")   Wt 100.2 kg (221 lb)   BMI 33.60 kg/m     Body mass index is 33.6 kg/m .  Physical Exam   GENERAL: healthy, alert and no distress  EYES: Eyes grossly normal to inspection, PERRL and conjunctivae and sclerae normal  HENT: ear canals and TM's normal, nose and mouth without ulcers or lesions  NECK: no adenopathy, no asymmetry, masses, or scars and thyroid normal to palpation  RESP: lungs clear to auscultation - no rales, rhonchi or wheezes  CV: frequent extasystoles with compensatory pause, normal S1 S2, no S3 or S4 and no murmur, click or rub  NEURO: Normal strength and tone, mentation intact and speech normal        1. Benign paroxysmal positional vertigo, unspecified laterality  Discussed benign course of symptoms and typical self-limited resolution. Discussed doing modified Epley maneuver and consider follow-up with PT to learn maneuvers.  Discussed meclizine as needed for vertigo.  Follow-up if vertigo ongoing or if other associated symptoms.  - meclizine (ANTIVERT) 25 MG tablet; Take 1 tablet (25 mg) by mouth 3 times daily as needed for dizziness  Dispense: 30 tablet; Refill: 0  - PHYSICAL THERAPY REFERRAL; Future    2. Essential hypertension  Not currently at goal although normal when checked at home.  Continue current medications and will have nurse blood pressure check in 1 to 2 months.    3. Prostate cancer (H)  Continue following with " oncology.    4. Chronic midline low back pain without sciatica  - ibuprofen (ADVIL/MOTRIN) 800 MG tablet; Take 1 tablet (800 mg) by mouth 3 times daily with food  Dispense: 90 tablet; Refill: 3

## 2020-01-14 NOTE — PATIENT INSTRUCTIONS
Patient Education     Benign Paroxysmal Positional Vertigo    Benign paroxysmal positional vertigo is a common condition. You feel as if the room is spinning after changing position, moving your head quickly, or even just rolling over in bed.  Vertigo is a false feeling of motion plus disorientation that makes it seem as though the room is spinning. A vertigo attack may cause sudden nausea, vomiting, and heavy sweating. Severe vertigo causes a loss of balance. You may even fall down.  Vertigo is caused by a problem with the inner ear. The inner ear is located behind the middle ear. It is a part of the balance center of the body. It contains small calcium particles within fluid-filled canals (semi-circular canals). These particles can move out of position. This may happen as a result of aging, head injury, or disease of the inner ear. Once that happens, moving your head in certain ways may cause the particles to stimulate the inner ear. This creates the feeling of vertigo.  An episode of vertigo may last seconds, minutes, or hours. Once you are over the first episode of vertigo, it may never return. Sometimes symptoms return off and on for several weeks or longer.  Home care  Follow these guidelines when caring for yourself at home:    Rest quietly in bed if your symptoms are severe. Change position slowly. There is usually 1 position that will feel best. This might be lying on 1 side or lying on your back with your head slightly raised on pillows. Until you have no symptoms, you are at a higher risk of falling. Let someone help you when you get up. Get rid of home hazards such as loose electrical cords and throw rugs. Don t walk in unfamiliar areas that are not lighted. Use night lights in bathrooms and kitchen areas.    Do not drive or work with dangerous machinery for 1 week after symptoms go away. This is in case symptoms return suddenly.    Take medicine as prescribed to relieve your symptoms. Unless another  medicine was prescribed for nausea, vomiting, and vertigo, you may use over-the-counter motion sickness medicine. Examples of this include meclizine and dimenhydrinate.  Follow-up care  Follow up with your healthcare provider, or as directed. Tell your provider about any ringing in your ear or hearing loss.  If you had a CT or MRI scan, a specialist will review it. You will be told of any new findings that may affect your care.  When to seek medical advice  Call your healthcare provider right away if any of these occur:    Vertigo gets worse even after taking prescribed medicine    Repeated vomiting even after taking prescribed medicine    Weakness that gets worse    Fainting    Severe headache or unusual drowsiness or confusion    Weakness of an arm or leg or 1 side of the face    Trouble walking    Trouble with speech or vision    Seizure    Trouble hearing    Fever of 100.4 F (38 C) or higher, or as directed by your healthcare provider    Fast heart rate    Chest pain   Date Last Reviewed: 11/1/2017 2000-2019 [employer]. [address] [place] [address], [place] [address]. All rights reserved. This information is not intended as a substitute for professional medical care. Always follow your healthcare professional's instructions.

## 2020-01-21 ENCOUNTER — HOSPITAL ENCOUNTER (OUTPATIENT)
Dept: PHYSICAL THERAPY | Facility: CLINIC | Age: 75
Setting detail: THERAPIES SERIES
End: 2020-01-21
Attending: FAMILY MEDICINE
Payer: COMMERCIAL

## 2020-01-21 ENCOUNTER — TELEPHONE (OUTPATIENT)
Dept: FAMILY MEDICINE | Facility: CLINIC | Age: 75
End: 2020-01-21

## 2020-01-21 ENCOUNTER — HOSPITAL ENCOUNTER (EMERGENCY)
Facility: CLINIC | Age: 75
Discharge: HOME | End: 2020-01-21
Attending: EMERGENCY MEDICINE | Admitting: EMERGENCY MEDICINE
Payer: COMMERCIAL

## 2020-01-21 VITALS
HEART RATE: 60 BPM | SYSTOLIC BLOOD PRESSURE: 144 MMHG | TEMPERATURE: 98.1 F | OXYGEN SATURATION: 96 % | RESPIRATION RATE: 8 BRPM | DIASTOLIC BLOOD PRESSURE: 75 MMHG

## 2020-01-21 DIAGNOSIS — I49.3 PVC'S (PREMATURE VENTRICULAR CONTRACTIONS): ICD-10-CM

## 2020-01-21 DIAGNOSIS — H81.10 BENIGN PAROXYSMAL POSITIONAL VERTIGO, UNSPECIFIED LATERALITY: ICD-10-CM

## 2020-01-21 DIAGNOSIS — I49.8 BIGEMINY: ICD-10-CM

## 2020-01-21 LAB
ANION GAP SERPL CALCULATED.3IONS-SCNC: 6 MMOL/L (ref 3–14)
BASOPHILS # BLD AUTO: 0.1 10E9/L (ref 0–0.2)
BASOPHILS NFR BLD AUTO: 1 %
BUN SERPL-MCNC: 16 MG/DL (ref 7–30)
CALCIUM SERPL-MCNC: 9.4 MG/DL (ref 8.5–10.1)
CHLORIDE SERPL-SCNC: 109 MMOL/L (ref 94–109)
CO2 SERPL-SCNC: 26 MMOL/L (ref 20–32)
CREAT SERPL-MCNC: 0.94 MG/DL (ref 0.66–1.25)
DIFFERENTIAL METHOD BLD: NORMAL
EOSINOPHIL # BLD AUTO: 0.2 10E9/L (ref 0–0.7)
EOSINOPHIL NFR BLD AUTO: 3.9 %
ERYTHROCYTE [DISTWIDTH] IN BLOOD BY AUTOMATED COUNT: 13.1 % (ref 10–15)
GFR SERPL CREATININE-BSD FRML MDRD: 78 ML/MIN/{1.73_M2}
GLUCOSE SERPL-MCNC: 101 MG/DL (ref 70–99)
HCT VFR BLD AUTO: 43.6 % (ref 40–53)
HGB BLD-MCNC: 14.7 G/DL (ref 13.3–17.7)
IMM GRANULOCYTES # BLD: 0 10E9/L (ref 0–0.4)
IMM GRANULOCYTES NFR BLD: 0.2 %
INTERPRETATION ECG - MUSE: NORMAL
LYMPHOCYTES # BLD AUTO: 1.7 10E9/L (ref 0.8–5.3)
LYMPHOCYTES NFR BLD AUTO: 26.9 %
MAGNESIUM SERPL-MCNC: 2 MG/DL (ref 1.6–2.3)
MCH RBC QN AUTO: 30.6 PG (ref 26.5–33)
MCHC RBC AUTO-ENTMCNC: 33.7 G/DL (ref 31.5–36.5)
MCV RBC AUTO: 91 FL (ref 78–100)
MONOCYTES # BLD AUTO: 0.5 10E9/L (ref 0–1.3)
MONOCYTES NFR BLD AUTO: 7.8 %
NEUTROPHILS # BLD AUTO: 3.7 10E9/L (ref 1.6–8.3)
NEUTROPHILS NFR BLD AUTO: 60.2 %
NRBC # BLD AUTO: 0 10*3/UL
NRBC BLD AUTO-RTO: 0 /100
PLATELET # BLD AUTO: 163 10E9/L (ref 150–450)
POTASSIUM SERPL-SCNC: 3.9 MMOL/L (ref 3.4–5.3)
RBC # BLD AUTO: 4.8 10E12/L (ref 4.4–5.9)
SODIUM SERPL-SCNC: 141 MMOL/L (ref 133–144)
TROPONIN I SERPL-MCNC: <0.015 UG/L (ref 0–0.04)
WBC # BLD AUTO: 6.2 10E9/L (ref 4–11)

## 2020-01-21 PROCEDURE — 99284 EMERGENCY DEPT VISIT MOD MDM: CPT

## 2020-01-21 PROCEDURE — 93005 ELECTROCARDIOGRAM TRACING: CPT

## 2020-01-21 PROCEDURE — 97162 PT EVAL MOD COMPLEX 30 MIN: CPT | Mod: 59,GP | Performed by: PHYSICAL THERAPIST

## 2020-01-21 PROCEDURE — 83735 ASSAY OF MAGNESIUM: CPT | Performed by: EMERGENCY MEDICINE

## 2020-01-21 PROCEDURE — 84484 ASSAY OF TROPONIN QUANT: CPT | Performed by: EMERGENCY MEDICINE

## 2020-01-21 PROCEDURE — 85025 COMPLETE CBC W/AUTO DIFF WBC: CPT | Performed by: EMERGENCY MEDICINE

## 2020-01-21 PROCEDURE — 80048 BASIC METABOLIC PNL TOTAL CA: CPT | Performed by: EMERGENCY MEDICINE

## 2020-01-21 PROCEDURE — 95992 CANALITH REPOSITIONING PROC: CPT | Mod: GP | Performed by: PHYSICAL THERAPIST

## 2020-01-21 ASSESSMENT — ENCOUNTER SYMPTOMS
SHORTNESS OF BREATH: 0
DIZZINESS: 1

## 2020-01-21 NOTE — ED AVS SNAPSHOT
[facility] Emergency Department  [address]  [facility] [address]  Phone:  [phone]  Fax:  [phone]                                    Wallace Zelaya   MRN: [id number]    Department:  [facility] Emergency Department   Date of Visit:  1/21/2020           After Visit Summary Signature Page    I have received my discharge instructions, and my questions have been answered. I have discussed any challenges I see with this plan with the nurse or doctor.    ..........................................................................................................................................  Patient/Patient Representative Signature      ..........................................................................................................................................  Patient Representative Print Name and Relationship to Patient    ..................................................               ................................................  Date                                   Time    ..........................................................................................................................................  Reviewed by Signature/Title    ...................................................              ..............................................  Date                                               Time          22EPIC Rev 08/18

## 2020-01-21 NOTE — ED PROVIDER NOTES
"  History   Chief Complaint:  Dizziness    HPI   Wallace Zelaya is a 74 year old male with a history of hypertension and hyperlipidemia who presents with dizziness. The patient reports that he has had intermittent \"spells\" of dizziness since October of 2019 and went into the clinic for evaluation of these episodes today. He was not dizzy at that time and they were unable to trigger an episode at the clinic. The patient was noted to have a heart rate in the 30's and a blood pressure of 172/74 while in the clinic therefore was sent to the ED for evaluation. In the ED, the patient states \"I feel great\" and denies any symptoms including chest pain and shortness of breath. He does have a history of hypertension for which he takes amlodipine but has no history of heart problems.    Allergies:  No known drug allergies    Medications:   Amlodipine  Atorvastatin   Meclizine  Valsartan    Past Medical History:    Basal cell carcinoma  Hyperlipidemia   Essential hypertension  Prostate cancer  Colon polyp     Past Surgical History:    Biopsy of skin tag on left arm  Surgery on toe of right foot  Pilonidal cyst  da [place] prostatectomy     Family History:    Hypertension  Colon cancer  Heart disease  Prostate cancer    Social History:  Smoking status: Former smoker  Alcohol use: Yes    Review of Systems   Respiratory: Negative for shortness of breath.    Cardiovascular: Negative for chest pain.   Neurological: Positive for dizziness (intermittent).   All other systems reviewed and are negative.      Physical Exam   Patient Vitals for the past 24 hrs:   BP Temp Pulse Heart Rate Resp SpO2   01/21/20 1315 (!) 144/75 -- 60 58 8 96 %   01/21/20 1247 (!) 180/83 -- -- -- -- 98 %   01/21/20 1245 (!) 180/83 -- (!) 40 -- -- 96 %   01/21/20 1217 -- -- -- 66 -- 97 %   01/21/20 1216 (!) 179/81 -- 60 -- -- --   01/21/20 1212 -- -- -- 51 -- 94 %   01/21/20 1139 -- -- -- 64 17 --   01/21/20 1132 (!) 190/89 98.1  F (36.7  C) (!) 34 -- 16 99 % " "      Physical Exam  Vital signs and nursing notes reviewed.     Constitutional: laying on gurney appears comfortable  HENT: Oropharynx is clear and moist  Eyes: Conjunctivae are normal bilaterally. Pupils equal  Neck: normal range of motion  Cardiovascular: Normal rate, regular rhythm, normal heart sounds.   Pulmonary/Chest: Effort normal and breath sounds normal. No respiratory distress.   Abdominal: Soft. Bowel sounds are normal. No tenderness to palpation. No rebound or guarding.   Musculoskeletal: No joint swelling or edema.   Neurological: Alert and oriented. No focal weakness  Skin: Skin is warm and dry. No rash noted.   Psych: normal affect    Emergency Department Course   ECG (11:43:17):  Rate 73 bpm. SD interval 148. QRS duration 86. QT/QTc 398/438. P-R-T axes 3 -1 33. Sinus rhythm with frequent PVCs. Interpreted at 1145 by Lex Stephens MD.    Laboratory:  Laboratory findings were communicated with the patient who voiced understanding of the findings.    CBC: WNL (WBC 6.2, HGB 14.7, )  BMP: Glucose 101 (H) o/w WNL (Creatinine 0.94)  Troponin: <0.015  Magnesium: 2.0    Emergency Department Course:  EKG obtained, results above. IV inserted and blood drawn. The patient was placed on continuous cardiac monitoring and pulse oximetry.    Past medical records, nursing notes, and vitals reviewed.   1208 I performed an exam of the patient and obtained history, as documented above. Explained findings to the patient.    Findings and plan explained to the patient. Patient discharged home with instructions regarding supportive care, medications, and reasons to return. The importance of close follow-up was reviewed.    Impression & Plan    Medical Decision Making:  Wallace Zelaya is a 74 year old male who presents to the emergency department after being deferred for \"heart rate in the 30's.\" however, it was noted that on arrival he actually had a heart rate in the 60's, was just having bigeminy and PVC's. " Patient was being evaluated for possible episode of vertigo intermittently over the past couple months. He is asymptomatic, he has no headache, chest pain, shortness of breath, abdominal pain, or any other complaints. His lab tests were unremarkable. Patient was kept on continuous cardiac monitoring, had no other signs of concerning dysrhythmia. He did have paroxysmal bigeminy but also had episodes of sinus rhythm for several beats with an occasional PVC. Patient is asymptomatic, there is no clear indication he is in need of any further evaluation at this time, I felt he would be safe for discharge home. Patient understands the plan, will follow up with primary care, discharged to home.    Diagnosis:    ICD-10-CM    1. PVC's (premature ventricular contractions) I49.3    2. Bigeminy I49.9     paroxysmal        Disposition:  Discharged to home.        1/21/2020   Adrien Isaac I, Adrien Isaac, am serving as a scribe at 12:08 PM on 1/21/2020 to document services personally performed by Lex Stephens MD based on my observations and the provider's statements to me.      Lex Stephens MD  01/21/20 1741

## 2020-01-21 NOTE — ED TRIAGE NOTES
Sent to the ED from clinic with bradycardia. Patient reports went to clinic today to be evaluated for episodes of dizziness has been having since October. Heart rated noted to be in the 30's at clinic.

## 2020-01-21 NOTE — PROGRESS NOTES
[facility]        OUTPATIENT PHYSICAL THERAPY FUNCTIONAL EVALUATION  PLAN OF TREATMENT FOR OUTPATIENT REHABILITATION  (COMPLETE FOR INITIAL CLAIMS ONLY)  Patient's Last Name, First Name, M.I.  YOB: 1945  Wallace Zelaya     Provider's Name   [facility]   Medical Record No.  [id number]     Start of Care Date:  01/21/20   Onset Date:  01/14/20(Referral date used)   Type:     _X__PT   ____OT  ____SLP Medical Diagnosis:  Benign paroxysmal positional vertigo, unspecified laterality H81.10     PT Diagnosis:  Possible peripheral dizziness however suspected central or cardiac origin/component Visits from SOC:  1                              __________________________________________________________________________________  Plan of Treatment/Functional Goals:  balance training, neuromuscular re-education(Canalith repositioning maneuvers as indicated)           GOALS  DHI  Patient will complete the DHI with a score of <4/100 to demonstrate decreased perception of handicap and improved quality of life.   03/03/20    Position changes  Patient will deny dizziness with change of body position for independent bed mobility and transfers for return to daily activities without limitation.  03/03/20                                                                      Therapy Frequency:  other (see comments)(4 visits as indicated)   Predicted Duration of Therapy Intervention:  6 weeks    Nohemy Santo, PT                                    I CERTIFY THE NEED FOR THESE SERVICES FURNISHED UNDER        THIS PLAN OF TREATMENT AND WHILE UNDER MY CARE     (Physician co-signature of this document indicates review and certification of the therapy plan).                Certification Date From:  01/21/20   Certification Date To:  03/03/20    Referring Provider:  Jd Almaraz MD    Initial  Assessment  See Epic Evaluation- Start of Care Date: 01/21/20

## 2020-01-21 NOTE — PROGRESS NOTES
01/21/20 1000   Quick Adds   Quick Adds Certification;Vestibular Eval   Type of Visit Initial OP PT Evaluation   General Information   Start of Care Date 01/21/20   Referring Physician Jd Almaraz MD   Orders Evaluate and Treat as Indicated   Order Date 01/14/20   Medical Diagnosis Benign paroxysmal positional vertigo, unspecified laterality H81.10   Onset of illness/injury or Date of Surgery 01/14/20  (Referral date used)   Precautions/Limitations fall precautions   Surgical/Medical history reviewed Yes   Pertinent history of current vestibular problem (include personal factors and/or comorbidities that impact the POC)  Motion sickness  (No history of dizziness)   Pertinent history of current problem (include personal factors and/or comorbidities that impact the POC) Patient has a PMH of prostate cancer with prostatectomy 2015. Patient presents with symptoms of dizziness that started about 3 months ago on and off, most recently about 3 weeks ago. Patient notes dizziness with getting out of bed occasionally, has to run and throw up, symptoms still stick around for 1/2 to a full day. Patient reports vertigo in the morning and throughout the day, improves with lying down and closing his eyes - usually goes back to bed for a while which helps with his symptoms. Patient reports no disturbance in sleep, but rolls onto his right side to sit in the morning. Has had 3 episodes of dizziness over the last 3 months. Patient is not taking any Meclizine.   Pertinent Visual History  Wears reading glasses, denies changes in vision   Prior level of function comment Previously IND with all ADLs and functional mobility, likes to golf in the summer. Currently debilitated during episodes of dizziness - no pattern of note. Meets friends to play basketball and HORSE every friday   Current Community Support Family/friend caregiver   Patient role/Employment history Retired  (1 year ago, owned an insurance agency)   Living  environment House/[facility]   Home/Community Accessibility Comments Lives with his spouse in a house, no access issues but increased instability during episodes of dizziness   Assistive Devices Comments None   Patient/Family Goals Statement Improve dizziness   Fall Risk Screen   Fall screen completed by PT   Have you fallen 2 or more times in the past year? No   Have you fallen and had an injury in the past year? No   Timed Up and Go score (seconds) NT, see 4-item DGI   Is patient a fall risk? No   Pain   Patient currently in pain No   Vitals Signs   Heart Rate 35  (Normally in the 50's)   SpO2 95   Blood Pressure 170/74  (Normally close to 120/80)   Vital Signs Comments Seated, resting, left arm, reports he took his HTN medication this morning - feeling normal this morning    Cognitive Status Examination   Orientation orientation to person, place and time   Level of Consciousness alert   Follows Commands and Answers Questions 100% of the time;able to follow multistep instructions   Personal Safety and Judgment intact   Memory intact   Integumentary   Integumentary No deficits were identified   Posture   Posture Forward head position   Strength   Strength Comments Not formally assessed, however appears to be WNL   Bed Mobility   Bed Mobility Comments IND   Transfer Skills   Transfer Comments IND   Gait   Gait Comments Patient ambulates with mild evidence of instability when moving his head   Gait Special Tests   Gait Special Tests DYNAMIC GAIT INDEX   Gait Special Tests Dynamic Gait Index   Score out of 24 12/12   Comments 4-Item DGI   Balance Special Tests   Balance Special Tests Modified CTSIB Conditions   Balance Special Tests Modified CTSIB Conditions   Condition 1, seconds 30 Seconds   Condition 2, seconds 30 Seconds   Condition 4, seconds 30 Seconds   Condition 5, seconds 30 Seconds   Modified CTSIB Comments Increased postural sway on condition 5   Sensory Examination   Sensory Perception no deficits were  identified   Coordination   Coordination no deficits were identified   Muscle Tone   Muscle Tone no deficits were identified   Cervicogenic Screen   Neck ROM WFL for positional testing   Oculomotor Exam   Smooth Pursuit Normal   Saccades Normal   VOR Normal   Rapid Head Thrust Normal   Convergence Testing Other   Convergence Testing Comments Loss of conjunctive eye movement at 10 cm, left eye abduction   Infrared Goggle Exam or Frenzel Lense Exam   Vestibular Suppressant in Last 24 Hours? No   Exam completed with Infrared Goggles   Spontaneous Nystagmus Negative   Gaze Evoked Nystagmus Negative   Head Shake Horizontal Nystagmus Downbeating   Head Shake Horizontal Nystagmus comments 5-6 beats, no symptoms   Bancroft-Hallpike (right) Negative   Omid-Hallpike (Left) Horizontal L   Omid-Hallpike (left) comments Possible slight torsion to nystagmus, exacerbation of nausea but no dizziness, 30 sec duration   HSCC Supine Roll Test (Right) Negative   HSCC Supine Roll Test (Left) Negative   BPPV Canal(s) L Posterior  (Possible)   BPPV Type Canalithasis   Modality Interventions   Planned Modality Interventions Comments Per therapist discretion   Planned Therapy Interventions   Planned Therapy Interventions balance training;neuromuscular re-education  (Canalith repositioning maneuvers as indicated)   Clinical Impression   Criteria for Skilled Therapeutic Interventions Met yes, treatment indicated;evaluation only  (See below in Clinical impression comments)   PT Diagnosis Possible peripheral dizziness however suspected central or cardiac origin/component   Influenced by the following impairments Vitals, DHI, positional testing   Functional limitations due to impairments Impaired safety and independence with bed mobility and functional mobility in the home.    Clinical Presentation Unstable/Unpredictable   Clinical Presentation Rationale Unstable vitals   Clinical Decision Making (Complexity) Moderate complexity   Therapy Frequency  other (see comments)  (4 visits as indicated)   Predicted Duration of Therapy Intervention (days/wks) 6 weeks   Risk & Benefits of therapy have been explained Yes   Patient, Family & other staff in agreement with plan of care Yes   Clinical Impression Comments Patient is a 74 year old male presenting to physical therapy with intermittent dizziness over the last 3 months. Patient presents with significantly elevated blood pressure and significant bradycardia, however is asymptomatic. Evaluation continued with frequent monitoring of vitals and symptoms which remained constant throughout session, only mild exacerbation of nausea with vestibular assessment. PT recommended follow up with ER secondary to abnormal vitals (verified by RN at Dr. Jd Almaraz's office).    GOALS   PT Eval Goals 1;2   Goal 1   Goal Identifier DHI   Goal Description Patient will complete the DHI with a score of <4/100 to demonstrate decreased perception of handicap and improved quality of life.    Target Date 03/03/20   Goal 2   Goal Identifier Position changes   Goal Description Patient will deny dizziness with change of body position for independent bed mobility and transfers for return to daily activities without limitation.   Target Date 03/03/20   Total Evaluation Time   PT Eval, Moderate Complexity Minutes (80473) 30   Therapy Certification   Certification date from 01/21/20   Certification date to 03/03/20   Medical Diagnosis Benign paroxysmal positional vertigo, unspecified laterality H81.10   Certification I certify the need for these services furnished under this plan of treatment and while under my care.  (Physician co-signature of this document indicates review and certification of the therapy plan).

## 2020-01-21 NOTE — TELEPHONE ENCOUNTER
Nohemy with [facility] calling stating pt is with her for evaluation of vestibular dizziness but his BP is 172/74 with a heart rate in the 30s.  Per her eval pt does not demonstrate a cause for the vestibular dizziness.  Advised that pt needs to be evaluated in the ER due to symptoms and HR.    Arturo Howard RN, BSN

## 2020-01-28 ENCOUNTER — TELEPHONE (OUTPATIENT)
Dept: FAMILY MEDICINE | Facility: CLINIC | Age: 75
End: 2020-01-28

## 2020-01-28 NOTE — LETTER
January 28, 2020      Wallace Zelaya  [id number] [address] [address]        Dear Wallace,     Our records indicate that you may be due for preventative health care services.  Please make an appointment or call to set up the following tests as recommended.  If you have already had this testing done at another clinic please contact us to help us update our records to reflect the preventative care that you have had.    MA Blood Pressure Check    Thank you for choosing [facility]. We appreciate the opportunity to serve you and look forward to supporting your healthcare needs in the future.    If you have any questions or concerns, please contact us at [phone].          Sincerely,      Jd Almaraz MD

## 2020-01-31 ENCOUNTER — TELEPHONE (OUTPATIENT)
Dept: FAMILY MEDICINE | Facility: CLINIC | Age: 75
End: 2020-01-31

## 2020-01-31 NOTE — TELEPHONE ENCOUNTER
Patient's wife Sandra calling, pt needs an ER f/u with Dr. Almaraz. Pt was seen on 1/21/2020 and they stated he needs to f/u with primary as soon as possible to review his bp medication. 's first available is on 2/10/2020 and she stated they can't wait that long. Please return call at [phone]         Norma Gagnon-Patient Rep

## 2020-01-31 NOTE — TELEPHONE ENCOUNTER
RN please review and advise, can patient wait until 2/10 or does he needs to be seen earlier?  Glenna Renee

## 2020-02-03 ENCOUNTER — OFFICE VISIT (OUTPATIENT)
Dept: FAMILY MEDICINE | Facility: CLINIC | Age: 75
End: 2020-02-03
Payer: COMMERCIAL

## 2020-02-03 VITALS
DIASTOLIC BLOOD PRESSURE: 68 MMHG | RESPIRATION RATE: 16 BRPM | TEMPERATURE: 96.2 F | OXYGEN SATURATION: 96 % | BODY MASS INDEX: 33.21 KG/M2 | HEART RATE: 63 BPM | SYSTOLIC BLOOD PRESSURE: 132 MMHG | WEIGHT: 218.4 LBS

## 2020-02-03 DIAGNOSIS — H81.10 BENIGN PAROXYSMAL VERTIGO, UNSPECIFIED LATERALITY: ICD-10-CM

## 2020-02-03 DIAGNOSIS — I49.3 PVC'S (PREMATURE VENTRICULAR CONTRACTIONS): ICD-10-CM

## 2020-02-03 DIAGNOSIS — I10 ESSENTIAL HYPERTENSION: Primary | ICD-10-CM

## 2020-02-03 PROCEDURE — 99213 OFFICE O/P EST LOW 20 MIN: CPT | Performed by: FAMILY MEDICINE

## 2020-02-03 NOTE — PROGRESS NOTES
Subjective     Wallace Zelaya is a 74 year old male who presents to clinic today for the following health issues:    HPI   Hypertension Follow-up      Do you check your blood pressure regularly outside of the clinic? Yes     Are you following a low salt diet? Yes    Are your blood pressures ever more than 140 on the top number (systolic) OR more   than 90 on the bottom number (diastolic), for example 140/90? Yes      How many servings of fruits and vegetables do you eat daily?  2-3    On average, how many sweetened beverages do you drink each day (Examples: soda, juice, sweet tea, etc.  Do NOT count diet or artificially sweetened beverages)?   2    How many days per week do you exercise enough to make your heart beat faster? 7    How many minutes a day do you exercise enough to make your heart beat faster? 10 - 19    How many days per week do you miss taking your medication? 0    Patient was having significantly elevated blood pressure after going to physical therapy for dizziness.  He is no longer having any of the vertigo symptoms at this point.  Denies any chest pain or shortness of breath.  He was routed to the ER with a regular heart rate and elevated blood pressure.  Found to have multiple PVCs but no other issue.  He is feeling well today.    Patient Active Problem List   Diagnosis     Essential hypertension     Colon polyp     Advanced directives, counseling/discussion     Hyperlipidemia LDL goal <130     Prostate cancer (HCC)     Past Surgical History:   Procedure Laterality Date     BIOPSY  02/2011    skin tagged left arm     C NONSPECIFIC PROCEDURE      Lawnmower injury right foot-toe      C NONSPECIFIC PROCEDURE      Pilonidal cyst     COLONOSCOPY  8/19/2013    Procedure: COLONOSCOPY;  Colonoscopy ;  Surgeon: Emerson Martinez MD, MD;  Location:  GI     DAVINCI PROSTATECTOMY N/A 11/13/2015    Procedure: DAVINCI PROSTATECTOMY;  Surgeon: Rodolfo Cononr MD;  Location:  OR       Social History      Tobacco Use     Smoking status: Former Smoker     Packs/day: 1.00     Years: 25.00     Pack years: 25.00     Last attempt to quit: 2009     Years since quitting: 10.4     Smokeless tobacco: Never Used   Substance Use Topics     Alcohol use: Yes     Comment: socially,very little     Family History   Problem Relation Age of Onset     Cancer Mother         88 YO AND HYPERTENSION     Hypertension Mother      Colon Cancer Mother      Heart Disease Father          88 YO MI     Prostate Cancer Brother 50        prostate cancer         Reviewed and updated as needed this visit by Provider  Tobacco  Allergies  Meds  Problems  Med Hx  Surg Hx  Fam Hx         Review of Systems   ROS COMP: RESP:NEGATIVE for significant cough or SOB  CV: NEGATIVE for chest pain, palpitations or peripheral edema  NEURO: NEGATIVE for dizziness recently      Objective    /68 (BP Location: Right arm, Patient Position: Chair, Cuff Size: Adult Regular)   Pulse 63   Temp 96.2  F (35.7  C) (Oral)   Resp 16   Wt 99.1 kg (218 lb 6.4 oz)   SpO2 96%   BMI 33.21 kg/m    Body mass index is 33.21 kg/m .  Physical Exam   GENERAL: healthy, alert and no distress  CV: regular rate and rhythm, normal S1 S2, no S3 or S4, no murmur, click or rub, no peripheral edema and peripheral pulses strong          Assessment & Plan   1. Essential hypertension  Home blood pressures slightly elevated.  Consider increasing amlodipine to 10 mg if he continues to have higher numbers.  We will have them bring in their blood pressure cuff from home to calibrate.  Blood pressure is adequate today so we will not make any changes today.    2. PVC's (premature ventricular contractions)  Just occasional premature beats today on exam and they are asymptomatic.  We will continue to monitor.    3. Benign paroxysmal vertigo, unspecified laterality  Recommend meclizine with recurrence.  No symptoms at this time.    Return in about 9 months (around 11/3/2020)  for Medicare Wellness visit.    Jd Almaraz MD  [facility]

## 2020-02-27 ENCOUNTER — ALLIED HEALTH/NURSE VISIT (OUTPATIENT)
Dept: NURSING | Facility: CLINIC | Age: 75
End: 2020-02-27
Payer: COMMERCIAL

## 2020-02-27 ENCOUNTER — TELEPHONE (OUTPATIENT)
Dept: FAMILY MEDICINE | Facility: CLINIC | Age: 75
End: 2020-02-27

## 2020-02-27 VITALS — HEART RATE: 55 BPM | OXYGEN SATURATION: 96 % | SYSTOLIC BLOOD PRESSURE: 185 MMHG | DIASTOLIC BLOOD PRESSURE: 83 MMHG

## 2020-02-27 DIAGNOSIS — R03.0 ELEVATED BLOOD-PRESSURE READING, WITHOUT DIAGNOSIS OF HYPERTENSION: ICD-10-CM

## 2020-02-27 DIAGNOSIS — Z01.30 BP CHECK: Primary | ICD-10-CM

## 2020-02-27 DIAGNOSIS — I10 ESSENTIAL HYPERTENSION: Primary | ICD-10-CM

## 2020-02-27 NOTE — TELEPHONE ENCOUNTER
VS w/ IP 2/27/2020 2/27/2020 2/3/2020 2/3/2020 1/21/2020   SYSTOLIC 185 162 132  144   DIASTOLIC 83 72 68  75   PULSE 55 54 63  60   TEMPERATURE   96.2     RESPIRATIONS   16  8   Wt.(Lbs.)   218.4     Wt. (Kg)   99.066 kg     Ht. (Feet./Inches)        Ht. (Cm)        BMI    33.21    O2 Sat  96 96  96     VS w/ IP 1/21/2020 1/21/2020 1/21/2020 1/21/2020 1/21/2020   SYSTOLIC 180 180  179    DIASTOLIC 83 83  81    PULSE  40  60    TEMPERATURE        RESPIRATIONS        Wt.(Lbs.)        Wt. (Kg)        Ht. (Feet./Inches)        Ht. (Cm)        BMI        O2 Sat 98 96 97  94     VS w/ IP 1/21/2020 1/21/2020   SYSTOLIC  190   DIASTOLIC  89   PULSE  34   TEMPERATURE  98.1   RESPIRATIONS 17 16   Wt.(Lbs.)     Wt. (Kg)     Ht. (Feet./Inches)     Ht. (Cm)     BMI     O2 Sat  99     Current reads are at the top, took 2x - manual 162/72 and auto 185/83.  With his machine - 168/107.  I talked to  Nara and she said to route to you and you MAY increase his BP  Meds. He would like a [employer] message 10am, 2/27/2020.Jacky Ramon CMA  He also  Said he felt fine today too

## 2020-02-28 RX ORDER — VALSARTAN 320 MG/1
320 TABLET ORAL DAILY
Qty: 30 TABLET | Refills: 0 | Status: SHIPPED | OUTPATIENT
Start: 2020-02-28 | End: 2020-03-09 | Stop reason: DRUGHIGH

## 2020-02-28 NOTE — TELEPHONE ENCOUNTER
Pt notified - will use up supply of the valsartan he has and is aware RX will be sent for new dosing.      Given contact information to schedule BP monitor.     Please sign orders.     Dona Da Silva RN

## 2020-02-28 NOTE — TELEPHONE ENCOUNTER
BP Readings from Last 3 Encounters:   02/27/20 (!) 185/83   02/03/20 132/68   01/21/20 (!) 144/75     Last BP very elevated - and not consistent with others prior.  Recommend increase valsartan to 320 mg daily and recommend 24 hour BP monitor.

## 2020-03-03 ENCOUNTER — HOSPITAL ENCOUNTER (OUTPATIENT)
Dept: CARDIOLOGY | Facility: CLINIC | Age: 75
Discharge: HOME | End: 2020-03-03
Attending: FAMILY MEDICINE | Admitting: FAMILY MEDICINE
Payer: COMMERCIAL

## 2020-03-03 DIAGNOSIS — R03.0 ELEVATED BLOOD-PRESSURE READING, WITHOUT DIAGNOSIS OF HYPERTENSION: ICD-10-CM

## 2020-03-03 DIAGNOSIS — I10 ESSENTIAL HYPERTENSION: ICD-10-CM

## 2020-03-03 PROCEDURE — 93790 AMBL BP MNTR W/SW I&R: CPT | Performed by: INTERNAL MEDICINE

## 2020-03-03 PROCEDURE — 93788 AMBL BP MNTR W/SW A/R: CPT

## 2020-03-05 ENCOUNTER — TELEPHONE (OUTPATIENT)
Dept: FAMILY MEDICINE | Facility: CLINIC | Age: 75
End: 2020-03-05

## 2020-03-05 NOTE — TELEPHONE ENCOUNTER
"Pt notified of results-  He states he did not increases dosing of the valsartan to 320 mg until 3/3/20 and then he took on am and pm and did the same yesterday 160 mg am and 160 mg pm.      \"My wife just put me on a major diet too\"      Please advise should pt take 320 mg valsartan or continue 160 mg daily?    Dona Da Silva RN    "

## 2020-03-05 NOTE — TELEPHONE ENCOUNTER
----- Message from Jd Almaraz MD sent at 3/5/2020 12:23 PM CST -----  Blood pressure is well controlled on 24-hour monitor.  Overall average 130/77 with waking periods 132/79.  Recommend continue current treatment.

## 2020-03-06 NOTE — TELEPHONE ENCOUNTER
Just continue with his current when 24 hour monitor was done.  If he was on the 160 mg at that time then that is fine to continue.

## 2020-03-07 NOTE — TELEPHONE ENCOUNTER
Message left for patient to return call to clinic and ask to speak to available triage nurse Monday Morning     Tegan Enamorado, Registered Nurse   [facility]

## 2020-04-03 DIAGNOSIS — C61 PROSTATE CANCER (H): ICD-10-CM

## 2020-04-03 DIAGNOSIS — C61 PROSTATE CANCER (H): Primary | ICD-10-CM

## 2020-04-03 LAB
ALBUMIN SERPL-MCNC: 3.7 G/DL (ref 3.4–5)
ALP SERPL-CCNC: 116 U/L (ref 40–150)
ALT SERPL W P-5'-P-CCNC: 30 U/L (ref 0–70)
ANION GAP SERPL CALCULATED.3IONS-SCNC: 5 MMOL/L (ref 3–14)
AST SERPL W P-5'-P-CCNC: 21 U/L (ref 0–45)
BASOPHILS # BLD AUTO: 0 10E9/L (ref 0–0.2)
BASOPHILS NFR BLD AUTO: 0.6 %
BILIRUB SERPL-MCNC: 0.6 MG/DL (ref 0.2–1.3)
BUN SERPL-MCNC: 14 MG/DL (ref 7–30)
CALCIUM SERPL-MCNC: 8.8 MG/DL (ref 8.5–10.1)
CHLORIDE SERPL-SCNC: 107 MMOL/L (ref 94–109)
CO2 SERPL-SCNC: 29 MMOL/L (ref 20–32)
CREAT SERPL-MCNC: 0.93 MG/DL (ref 0.66–1.25)
DIFFERENTIAL METHOD BLD: NORMAL
EOSINOPHIL # BLD AUTO: 0.3 10E9/L (ref 0–0.7)
EOSINOPHIL NFR BLD AUTO: 6.4 %
ERYTHROCYTE [DISTWIDTH] IN BLOOD BY AUTOMATED COUNT: 13.5 % (ref 10–15)
GFR SERPL CREATININE-BSD FRML MDRD: 79 ML/MIN/{1.73_M2}
GLUCOSE SERPL-MCNC: 92 MG/DL (ref 70–99)
HCT VFR BLD AUTO: 42.9 % (ref 40–53)
HGB BLD-MCNC: 14.6 G/DL (ref 13.3–17.7)
LDH SERPL L TO P-CCNC: 230 U/L (ref 85–227)
LYMPHOCYTES # BLD AUTO: 1.6 10E9/L (ref 0.8–5.3)
LYMPHOCYTES NFR BLD AUTO: 29.9 %
MCH RBC QN AUTO: 31 PG (ref 26.5–33)
MCHC RBC AUTO-ENTMCNC: 34 G/DL (ref 31.5–36.5)
MCV RBC AUTO: 91 FL (ref 78–100)
MONOCYTES # BLD AUTO: 0.5 10E9/L (ref 0–1.3)
MONOCYTES NFR BLD AUTO: 9.1 %
NEUTROPHILS # BLD AUTO: 2.8 10E9/L (ref 1.6–8.3)
NEUTROPHILS NFR BLD AUTO: 54 %
PLATELET # BLD AUTO: 185 10E9/L (ref 150–450)
POTASSIUM SERPL-SCNC: 4.3 MMOL/L (ref 3.4–5.3)
PROT SERPL-MCNC: 7.9 G/DL (ref 6.8–8.8)
PSA SERPL-MCNC: 1.56 UG/L (ref 0–4)
RBC # BLD AUTO: 4.71 10E12/L (ref 4.4–5.9)
SODIUM SERPL-SCNC: 141 MMOL/L (ref 133–144)
WBC # BLD AUTO: 5.2 10E9/L (ref 4–11)

## 2020-04-03 PROCEDURE — 36415 COLL VENOUS BLD VENIPUNCTURE: CPT | Performed by: INTERNAL MEDICINE

## 2020-04-03 PROCEDURE — 85025 COMPLETE CBC W/AUTO DIFF WBC: CPT | Performed by: INTERNAL MEDICINE

## 2020-04-03 PROCEDURE — 84403 ASSAY OF TOTAL TESTOSTERONE: CPT | Performed by: INTERNAL MEDICINE

## 2020-04-03 PROCEDURE — 83615 LACTATE (LD) (LDH) ENZYME: CPT | Performed by: INTERNAL MEDICINE

## 2020-04-03 PROCEDURE — 84270 ASSAY OF SEX HORMONE GLOBUL: CPT | Performed by: INTERNAL MEDICINE

## 2020-04-03 PROCEDURE — 80053 COMPREHEN METABOLIC PANEL: CPT | Performed by: INTERNAL MEDICINE

## 2020-04-03 PROCEDURE — 84153 ASSAY OF PSA TOTAL: CPT | Performed by: INTERNAL MEDICINE

## 2020-04-08 LAB
SHBG SERPL-SCNC: 53 NMOL/L (ref 11–80)
TESTOST FREE SERPL-MCNC: 0.16 NG/DL (ref 4.7–24.4)
TESTOST SERPL-MCNC: 13 NG/DL (ref 240–950)

## 2020-04-10 ENCOUNTER — VIRTUAL VISIT (OUTPATIENT)
Dept: ONCOLOGY | Facility: CLINIC | Age: 75
End: 2020-04-10
Attending: INTERNAL MEDICINE
Payer: COMMERCIAL

## 2020-04-10 DIAGNOSIS — C61 PROSTATE CANCER (H): Primary | ICD-10-CM

## 2020-04-10 PROCEDURE — 99213 OFFICE O/P EST LOW 20 MIN: CPT | Mod: 95 | Performed by: INTERNAL MEDICINE

## 2020-04-10 RX ORDER — BICALUTAMIDE 50 MG/1
50 TABLET, FILM COATED ORAL DAILY
Qty: 90 TABLET | Refills: 3 | Status: SHIPPED | OUTPATIENT
Start: 2020-04-10 | End: 2021-04-07

## 2020-04-10 NOTE — PROGRESS NOTES
"Wallace Zelaya is a 74 year old male who is being evaluated via a billable video visit.      The patient has been notified of following:     \"This video visit will be conducted via a call between you and your physician/provider. We have found that certain health care needs can be provided without the need for an in-person physical exam.  This service lets us provide the care you need with a video conversation.  If a prescription is necessary we can send it directly to your pharmacy.  If lab work is needed we can place an order for that and you can then stop by our lab to have the test done at a later time.    Video visits are billed at different rates depending on your insurance coverage.  Please reach out to your insurance provider with any questions.    If during the course of the call the physician/provider feels a video visit is not appropriate, you will not be charged for this service.\"    Patient has given verbal consent for Video visit? Yes    How would you like to obtain your AVS? MyChart    Patient would like the video invitation sent by: Text to cell phone: [phone]        Wallace Zelaya complains of    Chief Complaint   Patient presents with     Oncology Clinic Visit     Prostate cancer       I have reviewed and updated the patient's Past Medical History, Social History, Family History and Medication List.    ALLERGIES  Patient has no known allergies.     Olya Valenzuela CMA    [facility]  FOLLOW-UP VISIT NOTE    PATIENT NAME: Wallace Zelaya MRN # [id number]  DATE OF VISIT: Apr 10, 2020 YOB: 1945    REFERRING PROVIDER: No referring provider defined for this encounter.    CANCER TYPE: Prostate cancer; Biochemical recurrence; Castration resistant disease  STAGE: Stage III - pT3b at diagnosis; M0    HISTORY OF PRESENTING ILLNESS:  Wallace was noted to have rising screening PSA from 2 - 4. He was referred to Dr. Rodolfo Connor. He had radical " prostatectomy on 11/2015. Pathology from this revealed Cayla 4+4 disease with extraprostatic extension, seminal vesicle extension and he was staged at pT3b. All of the 12 lymph nodes resected were negative for disease. Post operatively his PSA did not drop to undetectable levels and remained elevated at 0.56. It vladimir to 0.9 in April 2016 and he was referred to Dr. Newton for adjuvant radiation therapy. He completed radiation therapy but did not have any PSA response to this treatment.     TREATMENT SUMMARY:  11/13/2015 Radical prostatectomy  April 2016  Radiation therapy    Oncology Supportive Medications 1/10/2017 4/11/2017   leuprolide (LUPRON DEPOT) IM 22.5 mg 22.5 mg     CURRENT INTERVENTIONS:  Lupron -     DELANEY Gonsalez is being seen for his prostate cancer    He is on lupron.  I reached him over the video for this telemedicine visit.  He has restricted himself to his house for the last 5 weeks.  He is coping well with the COVID 19 pandemic restrictions.  He has no new symptoms since last visit.      PAST MEDICAL HISTORY   1. HTN  2. Dyslipidemia  3. Prostate cancer as detailed above      CURRENT OUTPATIENT MEDICATIONS     Current Outpatient Medications   Medication Sig     amLODIPine (NORVASC) 5 MG tablet Take 1 tablet (5 mg) by mouth daily     atorvastatin (LIPITOR) 20 MG tablet TAKE 1 TABLET EVERY DAY     ibuprofen (ADVIL/MOTRIN) 800 MG tablet Take 1 tablet (800 mg) by mouth 3 times daily with food     meclizine (ANTIVERT) 25 MG tablet Take 1 tablet (25 mg) by mouth 3 times daily as needed for dizziness     valsartan (DIOVAN) 160 MG tablet Take 1 tablet (160 mg) by mouth daily     No current facility-administered medications for this visit.         ALLERGIES     No Known Allergies     REVIEW OF SYSTEMS   As above in the HPI, o/w complete 12-point ROS was negative.     PHYSICAL EXAM   There were no vitals taken for this visit.  SpO2 Readings from Last 4 Encounters:   09/21/18 96%   08/24/18 94%    06/22/18 95%   05/11/18 97%     Wt Readings from Last 3 Encounters:   02/03/20 99.1 kg (218 lb 6.4 oz)   01/14/20 100.2 kg (221 lb)   11/08/19 100.1 kg (220 lb 9.6 oz)        LABORATORY AND IMAGING STUDIES     Recent Labs   Lab Test 04/03/20  0909 01/21/20  1154 10/31/19  0945 07/05/19  0858 03/29/19  0850    141 139 142 141   POTASSIUM 4.3 3.9 4.3 4.3 4.3   CHLORIDE 107 109 106 109 107   CO2 29 26 27 26 31   ANIONGAP 5 6 6 7 3   BUN 14 16 14 17 14   CR 0.93 0.94 0.94 0.87 1.00   GLC 92 101* 100* 92 92   TY 8.8 9.4 8.8 8.6 9.0     Recent Labs   Lab Test 01/21/20  1154   MAG 2.0     Recent Labs   Lab Test 04/03/20  0909 01/21/20  1154 10/31/19  0945 07/05/19  0858 03/29/19  0850   WBC 5.2 6.2 4.5 5.8 4.4   HGB 14.6 14.7 15.1 14.8 14.6    163 182 181 177   MCV 91 91 91 89 90   NEUTROPHIL 54.0 60.2 52.1 55.1 51.7     Recent Labs   Lab Test 04/03/20  0909 10/31/19  0945 07/05/19  0858   BILITOTAL 0.6 0.6 0.5   ALKPHOS 116 103 112   ALT 30 32 29   AST 21 23 20   ALBUMIN 3.7 3.8 3.6   * 210 213     Recent Labs   Lab Test 04/03/20  0909 10/31/19  0945 07/05/19  0858 03/29/19  0850 12/21/18  0825   PSA 1.56 0.22 0.15 0.46 5.41*   TESTOSTTOTAL 13* 8* 6* 9* 275           ASSESSMENT AND PLAN   1. Biochemical PSA relapse post prostatectomy without any response to adjuvant radiation therapy  2. Rapid rise in PSA on observation phase of intermittent androgen deprivation therapy  3. HTN  4. ECOG PS1  5. No medical comorbidity    I had a lengthy discussion with Wallace during this video visit. All his labs are within normal limits -except for his PSA and testosterone. His PSA had declined nicely on lupron therapy initially but is rising again. His PSA has increased from 0.22 on 10/31/2019 2 1.56 ng/mL on 4/3/2020.     We should continue with lupron as in the past.  His last Lupron dose was on November 8.  He is overdue for his Lupron.  He would like to delay this by another 2 weeks.  That should be fine.  I  will coordinate a clinic visit for Lupron injection for him.    I would recommend that we initiate combined androgen blockade with androgen receptor blocker-bicalutamide.      Bicalutamide is androgen receptor blocker and is a competitive inhibitor of testosterone. He already received it at the time of starting therapy with lupron. It is very well tolerated. The side effects are essentially similar (but a little worse) to those from androgen ablation. Bicalutamide can also cause gynecomastia.       He has no side effects with his lupron.  I do not expect any worsening of his symptoms after starting bicalutamide.     I will see him in 4 months with labs prior to clinic visit and lupron after visit.     Video-Visit Details  Type of service:  Video Visit  Video End Time (time video stopped):   Originating Location (pt. Location): Home  Distant Location (provider location):  [facility]   Mode of Communication:  Video Conference via [employer]    Total time 15 min      Gigi Ward  ,  Division of Hematology, Oncology & Transplantation  [facility].

## 2020-05-04 ENCOUNTER — ALLIED HEALTH/NURSE VISIT (OUTPATIENT)
Dept: INFUSION THERAPY | Facility: CLINIC | Age: 75
End: 2020-05-04
Attending: INTERNAL MEDICINE
Payer: COMMERCIAL

## 2020-05-04 ENCOUNTER — PATIENT OUTREACH (OUTPATIENT)
Dept: ONCOLOGY | Facility: CLINIC | Age: 75
End: 2020-05-04

## 2020-05-04 VITALS
DIASTOLIC BLOOD PRESSURE: 97 MMHG | SYSTOLIC BLOOD PRESSURE: 161 MMHG | TEMPERATURE: 97 F | OXYGEN SATURATION: 94 % | RESPIRATION RATE: 16 BRPM | HEART RATE: 81 BPM

## 2020-05-04 DIAGNOSIS — C61 PROSTATE CANCER (H): Primary | ICD-10-CM

## 2020-05-04 PROCEDURE — 25000128 H RX IP 250 OP 636: Performed by: INTERNAL MEDICINE

## 2020-05-04 PROCEDURE — 96402 CHEMO HORMON ANTINEOPL SQ/IM: CPT

## 2020-05-04 RX ADMIN — LEUPROLIDE ACETATE 22.5 MG: KIT at 15:37

## 2020-05-04 NOTE — TELEPHONE ENCOUNTER
Pt medication education of Casodex completed. Reviewed side effects, administration and plan to continue medication as prescribed. Pt states he started Casodex 4 to 5 days prior to Lupron injection. Reviewed scheduled follow-up appointments. All questions answered to satisfaction.

## 2020-05-04 NOTE — PROGRESS NOTES
Nursing Note:  Wallace Zelaya presents today for Lupron.    Patient seen by provider today: No   present during visit today: Not Applicable.    Note: N/A.    Intravenous Access:  No Intravenous access/labs at this visit.    Discharge Plan:   Patient was discharged home.     Judie Aguirre RN

## 2020-07-31 ENCOUNTER — HOSPITAL ENCOUNTER (OUTPATIENT)
Facility: CLINIC | Age: 75
Setting detail: SPECIMEN
Discharge: HOME | End: 2020-07-31
Attending: INTERNAL MEDICINE | Admitting: INTERNAL MEDICINE
Payer: COMMERCIAL

## 2020-07-31 DIAGNOSIS — C61 PROSTATE CANCER (H): ICD-10-CM

## 2020-07-31 LAB
ALBUMIN SERPL-MCNC: 3.9 G/DL (ref 3.4–5)
ALP SERPL-CCNC: 112 U/L (ref 40–150)
ALT SERPL W P-5'-P-CCNC: 35 U/L (ref 0–70)
ANION GAP SERPL CALCULATED.3IONS-SCNC: 4 MMOL/L (ref 3–14)
AST SERPL W P-5'-P-CCNC: 27 U/L (ref 0–45)
BASOPHILS # BLD AUTO: 0.1 10E9/L (ref 0–0.2)
BASOPHILS NFR BLD AUTO: 0.9 %
BILIRUB SERPL-MCNC: 0.4 MG/DL (ref 0.2–1.3)
BUN SERPL-MCNC: 13 MG/DL (ref 7–30)
CALCIUM SERPL-MCNC: 8.9 MG/DL (ref 8.5–10.1)
CHLORIDE SERPL-SCNC: 109 MMOL/L (ref 94–109)
CO2 SERPL-SCNC: 29 MMOL/L (ref 20–32)
CREAT SERPL-MCNC: 0.99 MG/DL (ref 0.66–1.25)
DIFFERENTIAL METHOD BLD: NORMAL
EOSINOPHIL # BLD AUTO: 0.4 10E9/L (ref 0–0.7)
EOSINOPHIL NFR BLD AUTO: 7.1 %
ERYTHROCYTE [DISTWIDTH] IN BLOOD BY AUTOMATED COUNT: 13 % (ref 10–15)
GFR SERPL CREATININE-BSD FRML MDRD: 73 ML/MIN/{1.73_M2}
GLUCOSE SERPL-MCNC: 100 MG/DL (ref 70–99)
HCT VFR BLD AUTO: 42.5 % (ref 40–53)
HGB BLD-MCNC: 14 G/DL (ref 13.3–17.7)
IMM GRANULOCYTES # BLD: 0 10E9/L (ref 0–0.4)
IMM GRANULOCYTES NFR BLD: 0.2 %
LDH SERPL L TO P-CCNC: 257 U/L (ref 85–227)
LYMPHOCYTES # BLD AUTO: 1.5 10E9/L (ref 0.8–5.3)
LYMPHOCYTES NFR BLD AUTO: 27 %
MCH RBC QN AUTO: 30.4 PG (ref 26.5–33)
MCHC RBC AUTO-ENTMCNC: 32.9 G/DL (ref 31.5–36.5)
MCV RBC AUTO: 92 FL (ref 78–100)
MONOCYTES # BLD AUTO: 0.5 10E9/L (ref 0–1.3)
MONOCYTES NFR BLD AUTO: 8.7 %
NEUTROPHILS # BLD AUTO: 3.2 10E9/L (ref 1.6–8.3)
NEUTROPHILS NFR BLD AUTO: 56.1 %
NRBC # BLD AUTO: 0 10*3/UL
NRBC BLD AUTO-RTO: 0 /100
PLATELET # BLD AUTO: 159 10E9/L (ref 150–450)
POTASSIUM SERPL-SCNC: 4.2 MMOL/L (ref 3.4–5.3)
PROT SERPL-MCNC: 7.7 G/DL (ref 6.8–8.8)
PSA SERPL-MCNC: 0.54 UG/L (ref 0–4)
RBC # BLD AUTO: 4.6 10E12/L (ref 4.4–5.9)
SODIUM SERPL-SCNC: 142 MMOL/L (ref 133–144)
WBC # BLD AUTO: 5.6 10E9/L (ref 4–11)

## 2020-07-31 PROCEDURE — 80053 COMPREHEN METABOLIC PANEL: CPT | Performed by: INTERNAL MEDICINE

## 2020-07-31 PROCEDURE — 36415 COLL VENOUS BLD VENIPUNCTURE: CPT

## 2020-07-31 PROCEDURE — 85025 COMPLETE CBC W/AUTO DIFF WBC: CPT | Performed by: INTERNAL MEDICINE

## 2020-07-31 PROCEDURE — 83615 LACTATE (LD) (LDH) ENZYME: CPT | Performed by: INTERNAL MEDICINE

## 2020-07-31 PROCEDURE — 84153 ASSAY OF PSA TOTAL: CPT | Performed by: INTERNAL MEDICINE

## 2020-07-31 PROCEDURE — 84270 ASSAY OF SEX HORMONE GLOBUL: CPT | Performed by: INTERNAL MEDICINE

## 2020-07-31 PROCEDURE — 84403 ASSAY OF TOTAL TESTOSTERONE: CPT | Performed by: INTERNAL MEDICINE

## 2020-07-31 NOTE — PROGRESS NOTES
Medical Assistant Note:  Wallace Zelaya presents today for blood draw.    Patient seen by provider today: No.   present during visit today: Not Applicable.    Concerns: No Concerns.    Procedure:  Lab draw site: left antecub, Needle type: butterfly, Gauge: 21.    Post Assessment:  Labs drawn without difficulty: No. 2 attempts    Discharge Plan:  Departure Mode: Ambulatory.    Face to Face Time: 10.    Olya Valenzuela, CMA

## 2020-08-05 LAB
SHBG SERPL-SCNC: 41 NMOL/L (ref 11–80)
TESTOST FREE SERPL-MCNC: 0.07 NG/DL (ref 4.7–24.4)
TESTOST SERPL-MCNC: 5 NG/DL (ref 240–950)

## 2020-08-07 ENCOUNTER — ALLIED HEALTH/NURSE VISIT (OUTPATIENT)
Dept: INFUSION THERAPY | Facility: CLINIC | Age: 75
End: 2020-08-07
Attending: INTERNAL MEDICINE
Payer: COMMERCIAL

## 2020-08-07 ENCOUNTER — VIRTUAL VISIT (OUTPATIENT)
Dept: ONCOLOGY | Facility: CLINIC | Age: 75
End: 2020-08-07
Attending: INTERNAL MEDICINE
Payer: COMMERCIAL

## 2020-08-07 VITALS
DIASTOLIC BLOOD PRESSURE: 77 MMHG | OXYGEN SATURATION: 95 % | RESPIRATION RATE: 16 BRPM | SYSTOLIC BLOOD PRESSURE: 147 MMHG | TEMPERATURE: 98.4 F

## 2020-08-07 DIAGNOSIS — C61 PROSTATE CANCER (H): Primary | ICD-10-CM

## 2020-08-07 PROCEDURE — 96402 CHEMO HORMON ANTINEOPL SQ/IM: CPT

## 2020-08-07 PROCEDURE — 99214 OFFICE O/P EST MOD 30 MIN: CPT | Mod: 95 | Performed by: PHYSICIAN ASSISTANT

## 2020-08-07 PROCEDURE — 25000128 H RX IP 250 OP 636: Performed by: INTERNAL MEDICINE

## 2020-08-07 RX ADMIN — LEUPROLIDE ACETATE 22.5 MG: KIT at 14:38

## 2020-08-07 NOTE — LETTER
"    8/7/2020         RE: Wallace Zelaya  [address] [facility] [address]        Dear Colleague,    Thank you for referring your patient, Wallace Zelaya, to the [facility]CER [facility]. Please see a copy of my visit note below.    Wallace Zelaya is a 75 year old male who is being evaluated via a billable video visit.      The patient has been notified of following:     \"This video visit will be conducted via a call between you and your physician/provider. We have found that certain health care needs can be provided without the need for an in-person physical exam.  This service lets us provide the care you need with a video conversation.  If a prescription is necessary we can send it directly to your pharmacy.  If lab work is needed we can place an order for that and you can then stop by our lab to have the test done at a later time.    Video visits are billed at different rates depending on your insurance coverage.  Please reach out to your insurance provider with any questions.    If during the course of the call the physician/provider feels a video visit is not appropriate, you will not be charged for this service.\"    Patient has given verbal consent for Video visit? Yes  How would you like to obtain your AVS? MyChart  If you are dropped from the video visit, the video invite should be resent to: My Chart  Will anyone else be joining your video visit? no        Video-Visit Details    Type of service:  Video Visit    Video Start Time: 1:38 PM  Video End Time: 1:55 PM    Originating Location (pt. Location): Home    Distant Location (provider location):  [facility]     Platform used for Video Visit: Kelvin Amaya PA-C            Oncology/Hematology Visit Note    Aug 7, 2020    Reason for visit: Follow-up castration resistant prostate cancer    Oncology HPI: Wallace Zelaya is a 75 year old male with castration resistant prostate cancer.  Screening PSA was elevated and he underwent " radical prostatectomy 11/2015.  Pathology revealed [place] 4+4 disease, seminal vesicle extension and all 12 lymph nodes resected were negative.  His PSA did not drop after prostatectomy and remained elevated, therefore he completed adjuvant radiation therapy, still with no PSA response.  He established care with Dr. Ward and he started him on androgen deprivation therapy with Lupron.  He took a treatment break and his PSA started to rise dramatically, therefore started on Lupron again.  He was on this again for several months and was noted to have a rise in his PSA in April 2020 again.  Dr. Ward recommended bicalutamide (Casodex) 50mg, which she has been taking daily.    Video visit today for close follow-up.     Interval History: Steve is doing well. He continues to have hot flashes and some sweats with the Lupron, but tolerable. He has no new issues since starting casodex. No HA, vision changes, n/v/d, fever or chills.     Review of Systems: See interval hx. Denies fevers, chills, HA, dizziness, CP, SOB, abdominal pain, N/V, diarrhea, changes in urination.     PMHx and Social Hx reviewed per EPIC.      Medications:  Current Outpatient Medications   Medication Sig Dispense Refill     amLODIPine (NORVASC) 5 MG tablet Take 1 tablet (5 mg) by mouth daily 90 tablet 4     atorvastatin (LIPITOR) 20 MG tablet TAKE 1 TABLET EVERY DAY 90 tablet 4     bicalutamide (CASODEX) 50 MG tablet Take 1 tablet (50 mg) by mouth daily 90 tablet 3     ibuprofen (ADVIL/MOTRIN) 800 MG tablet Take 1 tablet (800 mg) by mouth 3 times daily with food 90 tablet 3     meclizine (ANTIVERT) 25 MG tablet Take 1 tablet (25 mg) by mouth 3 times daily as needed for dizziness 30 tablet 0     valsartan (DIOVAN) 160 MG tablet Take 1 tablet (160 mg) by mouth daily 90 tablet 4       No Known Allergies    EXAM:    There were no vitals taken for this visit. Video visit, therefore VS were not obtained.     GENERAL:  Male, in no acute distress.  Alert and  oriented x3. Well groomed.   HEENT:  Normocephalic, atraumatic. No conjunctival injection or eye swelling.   LUNGS:  Nonlabored breathing, no cough or audible wheezing, able to speak full sentences.  MSK: Full ROM UE.    SKIN: No rash on exposed skin.   NEURO: CN grossly intact, speech normal  PSYCH: Mentation appears normal, insight and judgement intact      Labs:   Results for ERNESTO DAY (MRN [id number]) as of 8/7/2020 14:14   7/31/2020 16:01   Sodium 142   Potassium 4.2   Chloride 109   Carbon Dioxide 29   Urea Nitrogen 13   Creatinine 0.99   GFR Estimate 73   GFR Estimate If Black 85   Calcium 8.9   Anion Gap 4   Albumin 3.9   Protein Total 7.7   Bilirubin Total 0.4   Alkaline Phosphatase 112   ALT 35   AST 27   Free Testosterone Calculated 0.07 (L)   Lactate Dehydrogenase 257 (H)   PSA 0.54   Testosterone Total 5 (L)   Glucose 100 (H)   WBC 5.6   Hemoglobin 14.0   Hematocrit 42.5   Platelet Count 159   RBC Count 4.60   MCV 92   MCH 30.4   MCHC 32.9   RDW 13.0   Diff Method Automated Method   % Neutrophils 56.1   % Lymphocytes 27.0   % Monocytes 8.7   % Eosinophils 7.1   % Basophils 0.9   % Immature Granulocytes 0.2   Nucleated RBCs 0   Absolute Neutrophil 3.2   Absolute Lymphocytes 1.5   Absolute Monocytes 0.5   Absolute Eosinophils 0.4   Absolute Basophils 0.1   Abs Immature Granulocytes 0.0   Absolute Nucleated RBC 0.0   Sex Hormone Binding Globulin 41       Imaging: n/a    Impression/Plan: Wallace Day is a 75 year old male with castration resistant prostate cancer currently on Lupron and Casodex.    Prostate cancer: Castration resistant, currently on Lupron every 3 months and Casodex 50 mg daily.  Aside from hot flashes and night sweats, he is actually tolerating this fairly well.  He is requesting to push the Lupron injections out every 4 months and Dr Ward is okay with the plan.  Testosterone is come on nicely to 5 and PSA 0.54, discussed with patient and he is thrilled.  He will come in for Lupron today  and we will schedule him again in 4 months with repeat testosterone/PSA.      Chart documentation with Dragon Voice recognition Software. Although reviewed after completion, some words and grammatical errors may remain.      Velma Amaya PA-C  Hematology/Oncology  [facility]                  Again, thank you for allowing me to participate in the care of your patient.        Sincerely,        Velma Amaya PA-C

## 2020-08-07 NOTE — PROGRESS NOTES
Nursing Note:  Wallace Zelaya presents today for Lupron.    Patient seen by provider today: Yes: Velma Amaya.   present during visit today: Not Applicable.    Note: N/A.    Intravenous Access:  No Intravenous access/labs at this visit.    Discharge Plan:   Next appointment: 12/11.    Amanda Sears RN

## 2020-08-07 NOTE — PROGRESS NOTES
"Wallace Zelaya is a 75 year old male who is being evaluated via a billable video visit.      The patient has been notified of following:     \"This video visit will be conducted via a call between you and your physician/provider. We have found that certain health care needs can be provided without the need for an in-person physical exam.  This service lets us provide the care you need with a video conversation.  If a prescription is necessary we can send it directly to your pharmacy.  If lab work is needed we can place an order for that and you can then stop by our lab to have the test done at a later time.    Video visits are billed at different rates depending on your insurance coverage.  Please reach out to your insurance provider with any questions.    If during the course of the call the physician/provider feels a video visit is not appropriate, you will not be charged for this service.\"    Patient has given verbal consent for Video visit? Yes  How would you like to obtain your AVS? MyChart  If you are dropped from the video visit, the video invite should be resent to: My Chart  Will anyone else be joining your video visit? no        Video-Visit Details    Type of service:  Video Visit    Video Start Time: 1:38 PM  Video End Time: 1:55 PM    Originating Location (pt. Location): Home    Distant Location (provider location):  [facility]     Platform used for Video Visit: Kelvin Amaya PA-C            Oncology/Hematology Visit Note    Aug 7, 2020    Reason for visit: Follow-up castration resistant prostate cancer    Oncology HPI: Wallace Zelaya is a 75 year old male with castration resistant prostate cancer.  Screening PSA was elevated and he underwent radical prostatectomy 11/2015.  Pathology revealed [place] 4+4 disease, seminal vesicle extension and all 12 lymph nodes resected were negative.  His PSA did not drop after prostatectomy and remained elevated, therefore he completed adjuvant " radiation therapy, still with no PSA response.  He established care with Dr. Ward and he started him on androgen deprivation therapy with Lupron.  He took a treatment break and his PSA started to rise dramatically, therefore started on Lupron again.  He was on this again for several months and was noted to have a rise in his PSA in April 2020 again.  Dr. Ward recommended bicalutamide (Casodex) 50mg, which she has been taking daily.    Video visit today for close follow-up.     Interval History: Steve is doing well. He continues to have hot flashes and some sweats with the Lupron, but tolerable. He has no new issues since starting casodex. No HA, vision changes, n/v/d, fever or chills.     Review of Systems: See interval hx. Denies fevers, chills, HA, dizziness, CP, SOB, abdominal pain, N/V, diarrhea, changes in urination.     PMHx and Social Hx reviewed per EPIC.      Medications:  Current Outpatient Medications   Medication Sig Dispense Refill     amLODIPine (NORVASC) 5 MG tablet Take 1 tablet (5 mg) by mouth daily 90 tablet 4     atorvastatin (LIPITOR) 20 MG tablet TAKE 1 TABLET EVERY DAY 90 tablet 4     bicalutamide (CASODEX) 50 MG tablet Take 1 tablet (50 mg) by mouth daily 90 tablet 3     ibuprofen (ADVIL/MOTRIN) 800 MG tablet Take 1 tablet (800 mg) by mouth 3 times daily with food 90 tablet 3     meclizine (ANTIVERT) 25 MG tablet Take 1 tablet (25 mg) by mouth 3 times daily as needed for dizziness 30 tablet 0     valsartan (DIOVAN) 160 MG tablet Take 1 tablet (160 mg) by mouth daily 90 tablet 4       No Known Allergies    EXAM:    There were no vitals taken for this visit. Video visit, therefore VS were not obtained.     GENERAL:  Male, in no acute distress.  Alert and oriented x3. Well groomed.   HEENT:  Normocephalic, atraumatic. No conjunctival injection or eye swelling.   LUNGS:  Nonlabored breathing, no cough or audible wheezing, able to speak full sentences.  MSK: Full ROM UE.    SKIN: No rash on exposed  skin.   NEURO: CN grossly intact, speech normal  PSYCH: Mentation appears normal, insight and judgement intact      Labs:   Results for ERNESTO DAY (MRN [id number]) as of 8/7/2020 14:14   7/31/2020 16:01   Sodium 142   Potassium 4.2   Chloride 109   Carbon Dioxide 29   Urea Nitrogen 13   Creatinine 0.99   GFR Estimate 73   GFR Estimate If Black 85   Calcium 8.9   Anion Gap 4   Albumin 3.9   Protein Total 7.7   Bilirubin Total 0.4   Alkaline Phosphatase 112   ALT 35   AST 27   Free Testosterone Calculated 0.07 (L)   Lactate Dehydrogenase 257 (H)   PSA 0.54   Testosterone Total 5 (L)   Glucose 100 (H)   WBC 5.6   Hemoglobin 14.0   Hematocrit 42.5   Platelet Count 159   RBC Count 4.60   MCV 92   MCH 30.4   MCHC 32.9   RDW 13.0   Diff Method Automated Method   % Neutrophils 56.1   % Lymphocytes 27.0   % Monocytes 8.7   % Eosinophils 7.1   % Basophils 0.9   % Immature Granulocytes 0.2   Nucleated RBCs 0   Absolute Neutrophil 3.2   Absolute Lymphocytes 1.5   Absolute Monocytes 0.5   Absolute Eosinophils 0.4   Absolute Basophils 0.1   Abs Immature Granulocytes 0.0   Absolute Nucleated RBC 0.0   Sex Hormone Binding Globulin 41       Imaging: n/a    Impression/Plan: Wallace Day is a 75 year old male with castration resistant prostate cancer currently on Lupron and Casodex.    Prostate cancer: Castration resistant, currently on Lupron every 3 months and Casodex 50 mg daily.  Aside from hot flashes and night sweats, he is actually tolerating this fairly well.  He is requesting to push the Lupron injections out every 4 months and Dr Ward is okay with the plan.  Testosterone is come on nicely to 5 and PSA 0.54, discussed with patient and he is thrilled.  He will come in for Lupron today and we will schedule him again in 4 months with repeat testosterone/PSA.      Chart documentation with Dragon Voice recognition Software. Although reviewed after completion, some words and grammatical errors may remain.      Velma Amaya  PALEN  Hematology/Oncology  [facility]

## 2020-09-15 ENCOUNTER — ALLIED HEALTH/NURSE VISIT (OUTPATIENT)
Dept: FAMILY MEDICINE | Facility: CLINIC | Age: 75
End: 2020-09-15
Payer: COMMERCIAL

## 2020-09-15 DIAGNOSIS — Z23 NEED FOR PROPHYLACTIC VACCINATION AND INOCULATION AGAINST INFLUENZA: Primary | ICD-10-CM

## 2020-09-15 PROCEDURE — G0008 ADMIN INFLUENZA VIRUS VAC: HCPCS

## 2020-09-15 PROCEDURE — 99207 ZZC NO CHARGE NURSE ONLY: CPT

## 2020-09-15 PROCEDURE — 90662 IIV NO PRSV INCREASED AG IM: CPT

## 2020-11-23 DIAGNOSIS — I10 ESSENTIAL HYPERTENSION: ICD-10-CM

## 2020-11-23 NOTE — TELEPHONE ENCOUNTER
Routing refill request to provider for review/approval because:  Labs out of range:    BP Readings from Last 3 Encounters:   08/07/20 (!) 147/77   05/04/20 (!) 161/97   02/27/20 (!) 185/83     Arturo Howard RN, BSN

## 2020-11-24 RX ORDER — AMLODIPINE BESYLATE 5 MG/1
TABLET ORAL
Qty: 90 TABLET | Refills: 0 | Status: SHIPPED | OUTPATIENT
Start: 2020-11-24 | End: 2021-01-06

## 2020-11-24 NOTE — TELEPHONE ENCOUNTER
1st attempt-sent mychart message, will postpone encounter for one week to see if he read the message. Glenna Renee

## 2020-12-07 ENCOUNTER — HOSPITAL ENCOUNTER (OUTPATIENT)
Facility: CLINIC | Age: 75
Setting detail: SPECIMEN
Discharge: HOME | End: 2020-12-07
Attending: INTERNAL MEDICINE | Admitting: INTERNAL MEDICINE
Payer: COMMERCIAL

## 2020-12-07 DIAGNOSIS — I10 HYPERTENSION GOAL BP (BLOOD PRESSURE) < 140/90: ICD-10-CM

## 2020-12-07 DIAGNOSIS — C61 PROSTATE CANCER (H): ICD-10-CM

## 2020-12-07 LAB
ALBUMIN SERPL-MCNC: 3.8 G/DL (ref 3.4–5)
ALP SERPL-CCNC: 118 U/L (ref 40–150)
ALT SERPL W P-5'-P-CCNC: 27 U/L (ref 0–70)
ANION GAP SERPL CALCULATED.3IONS-SCNC: 1 MMOL/L (ref 3–14)
AST SERPL W P-5'-P-CCNC: 26 U/L (ref 0–45)
BASOPHILS # BLD AUTO: 0.1 10E9/L (ref 0–0.2)
BASOPHILS NFR BLD AUTO: 0.9 %
BILIRUB SERPL-MCNC: 0.5 MG/DL (ref 0.2–1.3)
BUN SERPL-MCNC: 15 MG/DL (ref 7–30)
CALCIUM SERPL-MCNC: 8.9 MG/DL (ref 8.5–10.1)
CHLORIDE SERPL-SCNC: 108 MMOL/L (ref 94–109)
CO2 SERPL-SCNC: 32 MMOL/L (ref 20–32)
CREAT SERPL-MCNC: 1.03 MG/DL (ref 0.66–1.25)
DIFFERENTIAL METHOD BLD: NORMAL
EOSINOPHIL # BLD AUTO: 0.4 10E9/L (ref 0–0.7)
EOSINOPHIL NFR BLD AUTO: 5.5 %
ERYTHROCYTE [DISTWIDTH] IN BLOOD BY AUTOMATED COUNT: 12.7 % (ref 10–15)
GFR SERPL CREATININE-BSD FRML MDRD: 70 ML/MIN/{1.73_M2}
GLUCOSE SERPL-MCNC: 110 MG/DL (ref 70–99)
HCT VFR BLD AUTO: 43.5 % (ref 40–53)
HGB BLD-MCNC: 14.9 G/DL (ref 13.3–17.7)
IMM GRANULOCYTES # BLD: 0 10E9/L (ref 0–0.4)
IMM GRANULOCYTES NFR BLD: 0.4 %
LDH SERPL L TO P-CCNC: 262 U/L (ref 85–227)
LYMPHOCYTES # BLD AUTO: 1.9 10E9/L (ref 0.8–5.3)
LYMPHOCYTES NFR BLD AUTO: 28.2 %
MCH RBC QN AUTO: 31.6 PG (ref 26.5–33)
MCHC RBC AUTO-ENTMCNC: 34.3 G/DL (ref 31.5–36.5)
MCV RBC AUTO: 92 FL (ref 78–100)
MONOCYTES # BLD AUTO: 0.6 10E9/L (ref 0–1.3)
MONOCYTES NFR BLD AUTO: 8.6 %
NEUTROPHILS # BLD AUTO: 3.8 10E9/L (ref 1.6–8.3)
NEUTROPHILS NFR BLD AUTO: 56.4 %
NRBC # BLD AUTO: 0 10*3/UL
NRBC BLD AUTO-RTO: 0 /100
PLATELET # BLD AUTO: 191 10E9/L (ref 150–450)
POTASSIUM SERPL-SCNC: 4.2 MMOL/L (ref 3.4–5.3)
PROT SERPL-MCNC: 7.6 G/DL (ref 6.8–8.8)
PSA SERPL-MCNC: 0.34 UG/L (ref 0–4)
RBC # BLD AUTO: 4.71 10E12/L (ref 4.4–5.9)
SODIUM SERPL-SCNC: 141 MMOL/L (ref 133–144)
WBC # BLD AUTO: 6.7 10E9/L (ref 4–11)

## 2020-12-07 PROCEDURE — 84403 ASSAY OF TOTAL TESTOSTERONE: CPT | Performed by: INTERNAL MEDICINE

## 2020-12-07 PROCEDURE — 80053 COMPREHEN METABOLIC PANEL: CPT | Performed by: INTERNAL MEDICINE

## 2020-12-07 PROCEDURE — 85025 COMPLETE CBC W/AUTO DIFF WBC: CPT | Performed by: INTERNAL MEDICINE

## 2020-12-07 PROCEDURE — 84153 ASSAY OF PSA TOTAL: CPT | Performed by: INTERNAL MEDICINE

## 2020-12-07 PROCEDURE — 84270 ASSAY OF SEX HORMONE GLOBUL: CPT | Performed by: INTERNAL MEDICINE

## 2020-12-07 PROCEDURE — 83615 LACTATE (LD) (LDH) ENZYME: CPT | Performed by: INTERNAL MEDICINE

## 2020-12-07 PROCEDURE — 36415 COLL VENOUS BLD VENIPUNCTURE: CPT

## 2020-12-07 RX ORDER — VALSARTAN 160 MG/1
TABLET ORAL
Qty: 90 TABLET | Refills: 0 | Status: SHIPPED | OUTPATIENT
Start: 2020-12-07 | End: 2021-01-06

## 2020-12-07 NOTE — PROGRESS NOTES
Medical Assistant Note:  Wallace Zelaya presents today for blood draw.    Patient seen by provider today: No.   present during visit today: Not Applicable.    Concerns: No Concerns.    Procedure:  Labs drawn    Post Assessment:  Labs drawn without difficulty: Yes.    Discharge Plan:  Departure Mode: Ambulatory.    Face to Face Time: 10 min.    Judie Linton CMA

## 2020-12-09 LAB
SHBG SERPL-SCNC: 40 NMOL/L (ref 11–80)
TESTOST FREE SERPL-MCNC: 0.2 NG/DL (ref 4.7–24.4)
TESTOST SERPL-MCNC: 13 NG/DL (ref 240–950)

## 2020-12-11 ENCOUNTER — ALLIED HEALTH/NURSE VISIT (OUTPATIENT)
Dept: INFUSION THERAPY | Facility: CLINIC | Age: 75
End: 2020-12-11
Attending: INTERNAL MEDICINE
Payer: COMMERCIAL

## 2020-12-11 ENCOUNTER — VIRTUAL VISIT (OUTPATIENT)
Dept: ONCOLOGY | Facility: CLINIC | Age: 75
End: 2020-12-11
Attending: INTERNAL MEDICINE
Payer: COMMERCIAL

## 2020-12-11 VITALS
TEMPERATURE: 96 F | OXYGEN SATURATION: 96 % | HEART RATE: 64 BPM | SYSTOLIC BLOOD PRESSURE: 155 MMHG | DIASTOLIC BLOOD PRESSURE: 80 MMHG

## 2020-12-11 DIAGNOSIS — C61 PROSTATE CANCER (H): Primary | ICD-10-CM

## 2020-12-11 PROCEDURE — 96402 CHEMO HORMON ANTINEOPL SQ/IM: CPT

## 2020-12-11 PROCEDURE — 99213 OFFICE O/P EST LOW 20 MIN: CPT | Mod: 95 | Performed by: INTERNAL MEDICINE

## 2020-12-11 PROCEDURE — 250N000011 HC RX IP 250 OP 636: Performed by: INTERNAL MEDICINE

## 2020-12-11 PROCEDURE — 999N001193 HC VIDEO/TELEPHONE VISIT; NO CHARGE

## 2020-12-11 RX ADMIN — LEUPROLIDE ACETATE 45 MG: KIT at 15:06

## 2020-12-11 NOTE — LETTER
"    12/11/2020         RE: Wallace Zelaya  [address] [facility] [address]        Dear Colleague,    Thank you for referring your patient, Wallace Zelaya, to the [facility]. Please see a copy of my visit note below.    Wallace Zelaya is a 75 year old male who is being evaluated via a billable video visit.      The patient has been notified of following:     \"This video visit will be conducted via a call between you and your physician/provider. We have found that certain health care needs can be provided without the need for an in-person physical exam.  This service lets us provide the care you need with a video conversation.  If a prescription is necessary we can send it directly to your pharmacy.  If lab work is needed we can place an order for that and you can then stop by our lab to have the test done at a later time.    Video visits are billed at different rates depending on your insurance coverage.  Please reach out to your insurance provider with any questions.    If during the course of the call the physician/provider feels a video visit is not appropriate, you will not be charged for this service.\"    Patient has given verbal consent for Video visit? Yes  How would you like to obtain your AVS? MyChart  If you are dropped from the video visit, the video invite should be resent to: Text to cell phone: [phone]:TEXT  Will anyone else be joining your video visit? No       Judie Linton CMA on 12/11/2020 at 10:22 AM              [facility] CL[facility]IC  FOLLOW-UP VISIT NOTE    PATIENT NAME: Wallace Zelaya MRN # [id number]  DATE OF VISIT: Dec 11, 2020 YOB: 1945    REFERRING PROVIDER: No referring provider defined for this encounter.    CANCER TYPE: Prostate cancer; Biochemical recurrence; Castration resistant disease  STAGE: Stage III - pT3b at diagnosis; M0    HISTORY OF PRESENTING ILLNESS:  Wallace was noted to have rising " screening PSA from 2 - 4. He was referred to Dr. Rodolfo Connor. He had radical prostatectomy on 11/2015. Pathology from this revealed [place] 4+4 disease with extraprostatic extension, seminal vesicle extension and he was staged at pT3b. All of the 12 lymph nodes resected were negative for disease. Post operatively his PSA did not drop to undetectable levels and remained elevated at 0.56. It vladimir to 0.9 in April 2016 and he was referred to Dr. Newton for adjuvant radiation therapy. He completed radiation therapy but did not have any PSA response to this treatment.     TREATMENT SUMMARY:  11/13/2015 Radical prostatectomy  April 2016  Radiation therapy    Oncology Supportive Medications 1/10/2017 4/11/2017   leuprolide (LUPRON DEPOT) IM 22.5 mg 22.5 mg     CURRENT INTERVENTIONS:  Lupron with bicalutamide    DELANEY Gonsalez is being seen for his prostate cancer    He is on lupron. I reached him over the video for this telemedicine visit. He has restricted himself to his house since the start of the pandemic.  He has a house over 4 acres and has a 4-year-old dog which he can be seen. He is coping well with the COVID 19 pandemic restrictions.  He has no new symptoms since last visit.      PAST MEDICAL HISTORY   1. HTN  2. Dyslipidemia  3. Prostate cancer as detailed above      CURRENT OUTPATIENT MEDICATIONS     Current Outpatient Medications   Medication Sig     amLODIPine (NORVASC) 5 MG tablet Take 1 tablet by mouth once daily     atorvastatin (LIPITOR) 20 MG tablet TAKE 1 TABLET EVERY DAY     bicalutamide (CASODEX) 50 MG tablet Take 1 tablet (50 mg) by mouth daily     ibuprofen (ADVIL/MOTRIN) 800 MG tablet Take 1 tablet (800 mg) by mouth 3 times daily with food     valsartan (DIOVAN) 160 MG tablet Take 1 tablet by mouth once daily     meclizine (ANTIVERT) 25 MG tablet Take 1 tablet (25 mg) by mouth 3 times daily as needed for dizziness (Patient not taking: Reported on 12/11/2020)     No current  facility-administered medications for this visit.         ALLERGIES     No Known Allergies     REVIEW OF SYSTEMS   As above in the HPI, o/w complete 12-point ROS was negative.     PHYSICAL EXAM   There were no vitals taken for this visit.  SpO2 Readings from Last 4 Encounters:   09/21/18 96%   08/24/18 94%   06/22/18 95%   05/11/18 97%     Wt Readings from Last 3 Encounters:   02/03/20 99.1 kg (218 lb 6.4 oz)   01/14/20 100.2 kg (221 lb)   11/08/19 100.1 kg (220 lb 9.6 oz)        LABORATORY AND IMAGING STUDIES     Recent Labs   Lab Test 12/07/20  1544 07/31/20  1601 04/03/20  0909 01/21/20  1154 10/31/19  0945    142 141 141 139   POTASSIUM 4.2 4.2 4.3 3.9 4.3   CHLORIDE 108 109 107 109 106   CO2 32 29 29 26 27   ANIONGAP 1* 4 5 6 6   BUN 15 13 14 16 14   CR 1.03 0.99 0.93 0.94 0.94   * 100* 92 101* 100*   TY 8.9 8.9 8.8 9.4 8.8     Recent Labs   Lab Test 01/21/20  1154   MAG 2.0     Recent Labs   Lab Test 12/07/20  1544 07/31/20  1601 04/03/20  0909 01/21/20  1154 10/31/19  0945   WBC 6.7 5.6 5.2 6.2 4.5   HGB 14.9 14.0 14.6 14.7 15.1    159 185 163 182   MCV 92 92 91 91 91   NEUTROPHIL 56.4 56.1 54.0 60.2 52.1     Recent Labs   Lab Test 12/07/20  1544 07/31/20  1601 04/03/20  0909   BILITOTAL 0.5 0.4 0.6   ALKPHOS 118 112 116   ALT 27 35 30   AST 26 27 21   ALBUMIN 3.8 3.9 3.7   * 257* 230*     No results found for: TSH  No results for input(s): CEA in the last 61266 hours.  Results for orders placed or performed during the hospital encounter of 01/20/17   CT Chest w Contrast    Narrative    CT CHEST WITH CONTRAST   1/20/2017 4:04 PM     HISTORY: Malignant neoplasm of the prostate gland.    COMPARISON: 11/9/2016 and 4/14/2016 - CT abdomen and pelvis.    TECHNIQUE: Following the uneventful administration of 80mL Isovue-370  intravenous contrast, helical sections were acquired through the  lungs. Coronal reconstructions were generated. Radiation dose for this  scan was reduced using  automated exposure control, adjustment of the  mA and/or kV according to the patient's size, or iterative  reconstruction technique.    FINDINGS: Tiny 0.2 cm nodule in the posterolateral aspect of the left  upper lobe (series 3 image 13) and tiny 0.2 cm nodule in the  posteromedial aspect of the right upper lobe (series 3 image 19). A  few linear opacities in the lungs likely represent atelectasis or  scarring. The lungs are otherwise clear. No pleural or pericardial  effusion. No enlarged lymph nodes in the chest. Atherosclerotic  calcification in the thoracic aorta. Small hiatal hernia.    Scan through the upper abdomen is significant for a 1.3 cm right  adrenal nodule that is unchanged since 4/14/2016.      Impression    IMPRESSION:   1. No convincing evidence of metastatic neoplasm in the chest.  2. Two tiny indeterminate pulmonary nodules. These are likely benign  nodules. Recommend comparison with prior imaging studies, if  available. If not available and the patient is a smoker or has other  significant risk factors, a followup CT scan could be considered in 12  months to confirm stability.   3. Indeterminate 1.3 cm right adrenal nodule, unchanged since  4/14/2016. 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    MANUEL MARQUEZ MD     Recent Labs   Lab Test 12/07/20  1544 07/31/20  1601 04/03/20  0909 10/31/19  0945 07/05/19  0858   PSA 0.34 0.54 1.56 0.22 0.15   TESTOSTTOTAL 13* 5* 13* 8* 6*          ASSESSMENT AND PLAN   1. Biochemical PSA relapse post prostatectomy without any response to adjuvant radiation therapy  2. Rapid rise in PSA on observation phase of intermittent androgen deprivation therapy  3. HTN  4. ECOG PS1  5. No medical comorbidity    I had a lengthy discussion with Wallace during this video visit. All his labs are within normal limits -except for his LDH, PSA and testosterone. His PSA had progressed on single agent Lupron to 1.56 ng/mL on 4/3/2020.  He has been started on bicalutamide since then and  his PSA has dropped to 0.34 ng/mL.  This is good news and a good response.  We will continue with bicalutamide for now.  He has no symptoms on this new medication.    We should continue with lupron as in the past.  His last Lupron dose was on 10.  He is overdue for his Lupron by a couple of months.  However his testosterone remains in the castrate range at 13.  He would like to defer his next Lupron injection to 6 months.  I would give him a 6-month dose as it should not give him any additional symptoms.    I would recommend that we continue with combined androgen blockade with androgen receptor blocker-bicalutamide.         I will see him in 6 months with labs  and lupron prior to clinic visit.     Video-Visit Details  Type of service:  Video Visit  Video End Time (time video stopped):   Originating Location (pt. Location): Home  Distant Location (provider location):  [facility] CAN[facility]ER [facility]   Mode of Communication:  Video Conference via [employer]    Total time 15 min      Gigi Ward  ,  Division of Hematology, Oncology & Transplantation  [facility].              Again, thank you for allowing me to participate in the care of your patient.        Sincerely,        Gigi Ward MD

## 2020-12-11 NOTE — PROGRESS NOTES
[facility] CANCER CLINIC  FOLLOW-UP VISIT NOTE    PATIENT NAME: Wallace Zelaya MRN # [id number]  DATE OF VISIT: Dec 11, 2020 YOB: 1945    REFERRING PROVIDER: No referring provider defined for this encounter.    CANCER TYPE: Prostate cancer; Biochemical recurrence; Castration resistant disease  STAGE: Stage III - pT3b at diagnosis; M0    HISTORY OF PRESENTING ILLNESS:  Wallace was noted to have rising screening PSA from 2 - 4. He was referred to Dr. Rodolfo Connor. He had radical prostatectomy on 11/2015. Pathology from this revealed [place] 4+4 disease with extraprostatic extension, seminal vesicle extension and he was staged at pT3b. All of the 12 lymph nodes resected were negative for disease. Post operatively his PSA did not drop to undetectable levels and remained elevated at 0.56. It vladimir to 0.9 in April 2016 and he was referred to Dr. Newton for adjuvant radiation therapy. He completed radiation therapy but did not have any PSA response to this treatment.     TREATMENT SUMMARY:  11/13/2015 Radical prostatectomy  April 2016  Radiation therapy    Oncology Supportive Medications 1/10/2017 4/11/2017   leuprolide (LUPRON DEPOT) IM 22.5 mg 22.5 mg     CURRENT INTERVENTIONS:  Lupron with bicalutamide    SUBJECTIVE   Wallace is being seen for his prostate cancer    He is on lupron. I reached him over the video for this telemedicine visit. He has restricted himself to his house since the start of the pandemic.  He has a house over 4 acres and has a 4-year-old dog which he can be seen. He is coping well with the COVID 19 pandemic restrictions.  He has no new symptoms since last visit.      PAST MEDICAL HISTORY   1. HTN  2. Dyslipidemia  3. Prostate cancer as detailed above      CURRENT OUTPATIENT MEDICATIONS     Current Outpatient Medications   Medication Sig     amLODIPine (NORVASC) 5 MG tablet Take 1 tablet by mouth once daily     atorvastatin (LIPITOR) 20 MG tablet TAKE 1  TABLET EVERY DAY     bicalutamide (CASODEX) 50 MG tablet Take 1 tablet (50 mg) by mouth daily     ibuprofen (ADVIL/MOTRIN) 800 MG tablet Take 1 tablet (800 mg) by mouth 3 times daily with food     valsartan (DIOVAN) 160 MG tablet Take 1 tablet by mouth once daily     meclizine (ANTIVERT) 25 MG tablet Take 1 tablet (25 mg) by mouth 3 times daily as needed for dizziness (Patient not taking: Reported on 12/11/2020)     No current facility-administered medications for this visit.         ALLERGIES     No Known Allergies     REVIEW OF SYSTEMS   As above in the HPI, o/w complete 12-point ROS was negative.     PHYSICAL EXAM   There were no vitals taken for this visit.  SpO2 Readings from Last 4 Encounters:   09/21/18 96%   08/24/18 94%   06/22/18 95%   05/11/18 97%     Wt Readings from Last 3 Encounters:   02/03/20 99.1 kg (218 lb 6.4 oz)   01/14/20 100.2 kg (221 lb)   11/08/19 100.1 kg (220 lb 9.6 oz)        LABORATORY AND IMAGING STUDIES     Recent Labs   Lab Test 12/07/20  1544 07/31/20  1601 04/03/20  0909 01/21/20  1154 10/31/19  0945    142 141 141 139   POTASSIUM 4.2 4.2 4.3 3.9 4.3   CHLORIDE 108 109 107 109 106   CO2 32 29 29 26 27   ANIONGAP 1* 4 5 6 6   BUN 15 13 14 16 14   CR 1.03 0.99 0.93 0.94 0.94   * 100* 92 101* 100*   TY 8.9 8.9 8.8 9.4 8.8     Recent Labs   Lab Test 01/21/20  1154   MAG 2.0     Recent Labs   Lab Test 12/07/20  1544 07/31/20  1601 04/03/20  0909 01/21/20  1154 10/31/19  0945   WBC 6.7 5.6 5.2 6.2 4.5   HGB 14.9 14.0 14.6 14.7 15.1    159 185 163 182   MCV 92 92 91 91 91   NEUTROPHIL 56.4 56.1 54.0 60.2 52.1     Recent Labs   Lab Test 12/07/20  1544 07/31/20  1601 04/03/20  0909   BILITOTAL 0.5 0.4 0.6   ALKPHOS 118 112 116   ALT 27 35 30   AST 26 27 21   ALBUMIN 3.8 3.9 3.7   * 257* 230*     No results found for: TSH  No results for input(s): CEA in the last 58105 hours.  Results for orders placed or performed during the hospital encounter of 01/20/17   CT  Chest w Contrast    Narrative    CT CHEST WITH CONTRAST   1/20/2017 4:04 PM     HISTORY: Malignant neoplasm of the prostate gland.    COMPARISON: 11/9/2016 and 4/14/2016 - CT abdomen and pelvis.    TECHNIQUE: Following the uneventful administration of 80mL Isovue-370  intravenous contrast, helical sections were acquired through the  lungs. Coronal reconstructions were generated. Radiation dose for this  scan was reduced using automated exposure control, adjustment of the  mA and/or kV according to the patient's size, or iterative  reconstruction technique.    FINDINGS: Tiny 0.2 cm nodule in the posterolateral aspect of the left  upper lobe (series 3 image 13) and tiny 0.2 cm nodule in the  posteromedial aspect of the right upper lobe (series 3 image 19). A  few linear opacities in the lungs likely represent atelectasis or  scarring. The lungs are otherwise clear. No pleural or pericardial  effusion. No enlarged lymph nodes in the chest. Atherosclerotic  calcification in the thoracic aorta. Small hiatal hernia.    Scan through the upper abdomen is significant for a 1.3 cm right  adrenal nodule that is unchanged since 4/14/2016.      Impression    IMPRESSION:   1. No convincing evidence of metastatic neoplasm in the chest.  2. Two tiny indeterminate pulmonary nodules. These are likely benign  nodules. Recommend comparison with prior imaging studies, if  available. If not available and the patient is a smoker or has other  significant risk factors, a followup CT scan could be considered in 12  months to confirm stability.   3. Indeterminate 1.3 cm right adrenal nodule, unchanged since  4/14/2016. 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    MANUEL MARQUEZ MD     Recent Labs   Lab Test 12/07/20  1544 07/31/20  1601 04/03/20  0909 10/31/19  0945 07/05/19  0858   PSA 0.34 0.54 1.56 0.22 0.15   TESTOSTTOTAL 13* 5* 13* 8* 6*          ASSESSMENT AND PLAN   1. Biochemical PSA relapse post prostatectomy without any response to adjuvant  radiation therapy  2. Rapid rise in PSA on observation phase of intermittent androgen deprivation therapy  3. HTN  4. ECOG PS1  5. No medical comorbidity    I had a lengthy discussion with Wallace during this video visit. All his labs are within normal limits -except for his LDH, PSA and testosterone. His PSA had progressed on single agent Lupron to 1.56 ng/mL on 4/3/2020.  He has been started on bicalutamide since then and his PSA has dropped to 0.34 ng/mL.  This is good news and a good response.  We will continue with bicalutamide for now.  He has no symptoms on this new medication.    We should continue with lupron as in the past.  His last Lupron dose was on 10.  He is overdue for his Lupron by a couple of months.  However his testosterone remains in the castrate range at 13.  He would like to defer his next Lupron injection to 6 months.  I would give him a 6-month dose as it should not give him any additional symptoms.    I would recommend that we continue with combined androgen blockade with androgen receptor blocker-bicalutamide.         I will see him in 6 months with labs  and lupron prior to clinic visit.     Video-Visit Details  Type of service:  Video Visit  Video End Time (time video stopped):   Originating Location (pt. Location): Home  Distant Location (provider location):  [facility] CANCER [facility]   Mode of Communication:  Video Conference via [employer]    Total time 15 min      Gigi Wrad  ,  Division of Hematology, Oncology & Transplantation  [facility].

## 2020-12-11 NOTE — LETTER
"    12/11/2020         RE: Wallace Zelaya  [id number] [address] [id number]        Dear Colleague,    Thank you for referring your patient, Wallace Zelaya, to the [facility]. Please see a copy of my visit note below.    Wallace Zelaya is a 75 year old male who is being evaluated via a billable video visit.      The patient has been notified of following:     \"This video visit will be conducted via a call between you and your physician/provider. We have found that certain health care needs can be provided without the need for an in-person physical exam.  This service lets us provide the care you need with a video conversation.  If a prescription is necessary we can send it directly to your pharmacy.  If lab work is needed we can place an order for that and you can then stop by our lab to have the test done at a later time.    Video visits are billed at different rates depending on your insurance coverage.  Please reach out to your insurance provider with any questions.    If during the course of the call the physician/provider feels a video visit is not appropriate, you will not be charged for this service.\"    Patient has given verbal consent for Video visit? Yes  How would you like to obtain your AVS? MyChart  If you are dropped from the video visit, the video invite should be resent to: Text to cell phone: [phone]:TEXT  Will anyone else be joining your video visit? No       Judie Linton CMA on 12/11/2020 at 10:22 AM              [facility] CANCER CLINIC  FOLLOW-UP VISIT NOTE    PATIENT NAME: Wallace Zelaya MRN # [id number]  DATE OF VISIT: Dec 11, 2020 YOB: 1945    REFERRING PROVIDER: No referring provider defined for this encounter.    CANCER TYPE: Prostate cancer; Biochemical recurrence; Castration resistant disease  STAGE: Stage III - pT3b at diagnosis; M0    HISTORY OF PRESENTING ILLNESS:  Wallace was noted to have rising " screening PSA from 2 - 4. He was referred to Dr. Rodolfo Connor. He had radical prostatectomy on 11/2015. Pathology from this revealed [place] 4+4 disease with extraprostatic extension, seminal vesicle extension and he was staged at pT3b. All of the 12 lymph nodes resected were negative for disease. Post operatively his PSA did not drop to undetectable levels and remained elevated at 0.56. It vladimir to 0.9 in April 2016 and he was referred to Dr. Newton for adjuvant radiation therapy. He completed radiation therapy but did not have any PSA response to this treatment.     TREATMENT SUMMARY:  11/13/2015 Radical prostatectomy  April 2016  Radiation therapy    Oncology Supportive Medications 1/10/2017 4/11/2017   leuprolide (LUPRON DEPOT) IM 22.5 mg 22.5 mg     CURRENT INTERVENTIONS:  Lupron with bicalutamide    DELANEY Gonsalez is being seen for his prostate cancer    He is on lupron. I reached him over the video for this telemedicine visit. He has restricted himself to his house since the start of the pandemic.  He has a house over 4 acres and has a 4-year-old dog which he can be seen. He is coping well with the COVID 19 pandemic restrictions.  He has no new symptoms since last visit.      PAST MEDICAL HISTORY   1. HTN  2. Dyslipidemia  3. Prostate cancer as detailed above      CURRENT OUTPATIENT MEDICATIONS     Current Outpatient Medications   Medication Sig     amLODIPine (NORVASC) 5 MG tablet Take 1 tablet by mouth once daily     atorvastatin (LIPITOR) 20 MG tablet TAKE 1 TABLET EVERY DAY     bicalutamide (CASODEX) 50 MG tablet Take 1 tablet (50 mg) by mouth daily     ibuprofen (ADVIL/MOTRIN) 800 MG tablet Take 1 tablet (800 mg) by mouth 3 times daily with food     valsartan (DIOVAN) 160 MG tablet Take 1 tablet by mouth once daily     meclizine (ANTIVERT) 25 MG tablet Take 1 tablet (25 mg) by mouth 3 times daily as needed for dizziness (Patient not taking: Reported on 12/11/2020)     No current  facility-administered medications for this visit.         ALLERGIES     No Known Allergies     REVIEW OF SYSTEMS   As above in the HPI, o/w complete 12-point ROS was negative.     PHYSICAL EXAM   There were no vitals taken for this visit.  SpO2 Readings from Last 4 Encounters:   09/21/18 96%   08/24/18 94%   06/22/18 95%   05/11/18 97%     Wt Readings from Last 3 Encounters:   02/03/20 99.1 kg (218 lb 6.4 oz)   01/14/20 100.2 kg (221 lb)   11/08/19 100.1 kg (220 lb 9.6 oz)        LABORATORY AND IMAGING STUDIES     Recent Labs   Lab Test 12/07/20  1544 07/31/20  1601 04/03/20  0909 01/21/20  1154 10/31/19  0945    142 141 141 139   POTASSIUM 4.2 4.2 4.3 3.9 4.3   CHLORIDE 108 109 107 109 106   CO2 32 29 29 26 27   ANIONGAP 1* 4 5 6 6   BUN 15 13 14 16 14   CR 1.03 0.99 0.93 0.94 0.94   * 100* 92 101* 100*   TY 8.9 8.9 8.8 9.4 8.8     Recent Labs   Lab Test 01/21/20  1154   MAG 2.0     Recent Labs   Lab Test 12/07/20  1544 07/31/20  1601 04/03/20  0909 01/21/20  1154 10/31/19  0945   WBC 6.7 5.6 5.2 6.2 4.5   HGB 14.9 14.0 14.6 14.7 15.1    159 185 163 182   MCV 92 92 91 91 91   NEUTROPHIL 56.4 56.1 54.0 60.2 52.1     Recent Labs   Lab Test 12/07/20  1544 07/31/20  1601 04/03/20  0909   BILITOTAL 0.5 0.4 0.6   ALKPHOS 118 112 116   ALT 27 35 30   AST 26 27 21   ALBUMIN 3.8 3.9 3.7   * 257* 230*     No results found for: TSH  No results for input(s): CEA in the last 03393 hours.  Results for orders placed or performed during the hospital encounter of 01/20/17   CT Chest w Contrast    Narrative    CT CHEST WITH CONTRAST   1/20/2017 4:04 PM     HISTORY: Malignant neoplasm of the prostate gland.    COMPARISON: 11/9/2016 and 4/14/2016 - CT abdomen and pelvis.    TECHNIQUE: Following the uneventful administration of 80mL Isovue-370  intravenous contrast, helical sections were acquired through the  lungs. Coronal reconstructions were generated. Radiation dose for this  scan was reduced using  automated exposure control, adjustment of the  mA and/or kV according to the patient's size, or iterative  reconstruction technique.    FINDINGS: Tiny 0.2 cm nodule in the posterolateral aspect of the left  upper lobe (series 3 image 13) and tiny 0.2 cm nodule in the  posteromedial aspect of the right upper lobe (series 3 image 19). A  few linear opacities in the lungs likely represent atelectasis or  scarring. The lungs are otherwise clear. No pleural or pericardial  effusion. No enlarged lymph nodes in the chest. Atherosclerotic  calcification in the thoracic aorta. Small hiatal hernia.    Scan through the upper abdomen is significant for a 1.3 cm right  adrenal nodule that is unchanged since 4/14/2016.      Impression    IMPRESSION:   1. No convincing evidence of metastatic neoplasm in the chest.  2. Two tiny indeterminate pulmonary nodules. These are likely benign  nodules. Recommend comparison with prior imaging studies, if  available. If not available and the patient is a smoker or has other  significant risk factors, a followup CT scan could be considered in 12  months to confirm stability.   3. Indeterminate 1.3 cm right adrenal nodule, unchanged since  4/14/2016. 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    MANUEL MARQUEZ MD     Recent Labs   Lab Test 12/07/20  1544 07/31/20  1601 04/03/20  0909 10/31/19  0945 07/05/19  0858   PSA 0.34 0.54 1.56 0.22 0.15   TESTOSTTOTAL 13* 5* 13* 8* 6*          ASSESSMENT AND PLAN   1. Biochemical PSA relapse post prostatectomy without any response to adjuvant radiation therapy  2. Rapid rise in PSA on observation phase of intermittent androgen deprivation therapy  3. HTN  4. ECOG PS1  5. No medical comorbidity    I had a lengthy discussion with Wallace during this video visit. All his labs are within normal limits -except for his LDH, PSA and testosterone. His PSA had progressed on single agent Lupron to 1.56 ng/mL on 4/3/2020.  He has been started on bicalutamide since then and  his PSA has dropped to 0.34 ng/mL.  This is good news and a good response.  We will continue with bicalutamide for now.  He has no symptoms on this new medication.    We should continue with lupron as in the past.  His last Lupron dose was on 10.  He is overdue for his Lupron by a couple of months.  However his testosterone remains in the castrate range at 13.  He would like to defer his next Lupron injection to 6 months.  I would give him a 6-month dose as it should not give him any additional symptoms.    I would recommend that we continue with combined androgen blockade with androgen receptor blocker-bicalutamide.         I will see him in 6 months with labs  and lupron prior to clinic visit.     Video-Visit Details  Type of service:  Video Visit  Video End Time (time video stopped):   Originating Location (pt. Location): Home  Distant Location (provider location):  [facility] CANCER [facility]   Mode of Communication:  Video Conference via [employer]    Total time 15 min      Gigi Ward  ,  Division of Hematology, Oncology & Transplantation  [facility].              Again, thank you for allowing me to participate in the care of your patient.        Sincerely,        Gigi Ward MD

## 2020-12-11 NOTE — PROGRESS NOTES
Nursing Note:  Wallace eZlaya presents today for Lupron.    Patient seen by provider today: Yes: video visit with Ed   present during visit today: Not Applicable.    Note: Received 6 month dose of Lupron today    Intravenous Access:  No Intravenous access/labs at this visit.    Discharge Plan:   Patient was discharged to home.  Will RTC in 6 months for labs and [place]     Gala De La Rosa RN

## 2020-12-11 NOTE — PROGRESS NOTES
"Wallace Zelaya is a 75 year old male who is being evaluated via a billable video visit.      The patient has been notified of following:     \"This video visit will be conducted via a call between you and your physician/provider. We have found that certain health care needs can be provided without the need for an in-person physical exam.  This service lets us provide the care you need with a video conversation.  If a prescription is necessary we can send it directly to your pharmacy.  If lab work is needed we can place an order for that and you can then stop by our lab to have the test done at a later time.    Video visits are billed at different rates depending on your insurance coverage.  Please reach out to your insurance provider with any questions.    If during the course of the call the physician/provider feels a video visit is not appropriate, you will not be charged for this service.\"    Patient has given verbal consent for Video visit? Yes  How would you like to obtain your AVS? MyChart  If you are dropped from the video visit, the video invite should be resent to: Text to cell phone: [phone]:TEXT  Will anyone else be joining your video visit? No       Judie Linton CMA on 12/11/2020 at 10:22 AM            "

## 2021-01-05 NOTE — PROGRESS NOTES
Pre-Visit Planning     Appointment Notes for this encounter:   reviewed JQ // annual wellness/med recheck    Questionnaires Reviewed/Assigned  Additional questionnaires assigned   PHQ2     Patient contact not needed.     Dona Da Silva RN

## 2021-01-06 ENCOUNTER — VIRTUAL VISIT (OUTPATIENT)
Dept: FAMILY MEDICINE | Facility: CLINIC | Age: 76
End: 2021-01-06
Payer: COMMERCIAL

## 2021-01-06 VITALS — DIASTOLIC BLOOD PRESSURE: 86 MMHG | SYSTOLIC BLOOD PRESSURE: 139 MMHG

## 2021-01-06 DIAGNOSIS — Z00.00 MEDICARE ANNUAL WELLNESS VISIT, SUBSEQUENT: Primary | ICD-10-CM

## 2021-01-06 DIAGNOSIS — I10 ESSENTIAL HYPERTENSION: ICD-10-CM

## 2021-01-06 DIAGNOSIS — C61 PROSTATE CANCER (H): ICD-10-CM

## 2021-01-06 DIAGNOSIS — E78.5 HYPERLIPIDEMIA LDL GOAL <130: ICD-10-CM

## 2021-01-06 PROCEDURE — 99213 OFFICE O/P EST LOW 20 MIN: CPT | Mod: 25 | Performed by: FAMILY MEDICINE

## 2021-01-06 PROCEDURE — 99397 PER PM REEVAL EST PAT 65+ YR: CPT | Mod: 95 | Performed by: FAMILY MEDICINE

## 2021-01-06 RX ORDER — VALSARTAN 160 MG/1
160 TABLET ORAL DAILY
Qty: 90 TABLET | Refills: 3 | Status: SHIPPED | OUTPATIENT
Start: 2021-01-06 | End: 2022-01-26

## 2021-01-06 RX ORDER — AMLODIPINE BESYLATE 10 MG/1
10 TABLET ORAL DAILY
Qty: 90 TABLET | Refills: 3 | Status: SHIPPED | OUTPATIENT
Start: 2021-01-06 | End: 2022-01-24

## 2021-01-06 RX ORDER — ATORVASTATIN CALCIUM 20 MG/1
TABLET, FILM COATED ORAL
Qty: 90 TABLET | Refills: 4 | Status: SHIPPED | OUTPATIENT
Start: 2021-01-06 | End: 2022-01-12

## 2021-01-06 NOTE — PROGRESS NOTES
"Wallace Zelaya is a 75 year old male who is being evaluated via a billable video visit.      How would you like to obtain your AVS? MyChart  If the video visit is dropped, the invitation should be resent by: Email: [email or web address]  SEND E-MAIL LINK FOR VISIT TODAY  Will anyone else be joining your video visit? No      Video Start Time: 1:12 PM  Assessment & Plan     Medicare annual wellness visit, subsequent    Essential hypertension  Not controlled with home numbers, discussed increase to amlodipine 10 mg and monitor.  Continue valsartan.  - amLODIPine (NORVASC) 10 MG tablet; Take 1 tablet (10 mg) by mouth daily    Prostate cancer (HCC)  Following with Dr. Ward, oncology.    Hyperlipidemia LDL goal <130  Continue statin.  - atorvastatin (LIPITOR) 20 MG tablet; TAKE 1 TABLET EVERY DAY                       BMI:   Estimated body mass index is 33.21 kg/m  as calculated from the following:    Height as of 1/14/20: 1.727 m (5' 8\").    Weight as of 2/3/20: 99.1 kg (218 lb 6.4 oz).   Weight management plan: Discussed healthy diet and exercise guidelines          Return in about 1 year (around 1/6/2022) for medicare wellness check, medication recheck.    Jd Almaraz MD  [facility]    Subjective     Wallace Zelaya is a 75 year old who presents to clinic today for the following health issues     HPI        Hypertension Follow-up      Do you check your blood pressure regularly outside of the clinic? Yes     Are you following a low salt diet? Yes    Are your blood pressures ever more than 140 on the top number (systolic) OR more   than 90 on the bottom number (diastolic), for example 140/90? Yes      How many servings of fruits and vegetables do you eat daily?  2-3    On average, how many sweetened beverages do you drink each day (Examples: soda, juice, sweet tea, etc.  Do NOT count diet or artificially sweetened beverages)?   0    How many days per week do you exercise enough to make your heart " "beat faster? 5    How many minutes a day do you exercise enough to make your heart beat faster? 60 or more    How many days per week do you miss taking your medication? 0    Annual Wellness Visit    Patient has been advised of split billing requirements and indicates understanding: Yes     Are you in the first 12 months of your Medicare Part B coverage?  No    Physical Health:    In general, how would you rate your overall physical health? good    Outside of work, how many days during the week do you exercise?4-5 days/week    Outside of work, approximately how many minutes a day do you exercise?greater than 60 minutes    If you drink alcohol do you typically have >3 drinks per day or >7 drinks per week? No    Do you usually eat at least 4 servings of fruit and vegetables a day, include whole grains & fiber and avoid regularly eating high fat or \"junk\" foods? Yes    Do you have any problems taking medications regularly? No    Do you have any side effects from medications? none    Needs assistance for the following daily activities: no assistance needed    Which of the following safety concerns are present in your home?  lack of grab bars in the bathroom     Hearing impairment: No    In the past 6 months, have you been bothered by leaking of urine? yes    Mental Health:     In general, how would you rate your overall mental or emotional health? excellent  PHQ-2 Score:  0    Do you feel safe in your environment? Yes    Have you ever done Advance Care Planning? (For example, a Health Directive, POLST, or a discussion with a medical provider or your loved ones about your wishes)? Yes, patient states has an Advance Care Planning document and will bring a copy to the clinic.    Fall risk:  Fallen 2 or more times in the past year?: No  Any fall with injury in the past year?: No    Cognitive Screening:   Unable with video visit - will do in future visit    Mini-CogTM Copyright [employer]. Licensed by the author for use in " "[facility]; reprinted with permission ([email or web address]). All rights reserved.      Do you have sleep apnea, excessive snoring or daytime drowsiness?: no    Current providers sharing in care for this patient include:   Patient Care Team:  Jd Almaraz MD as PCP - General (Family Practice)  Jd Almaraz MD as Referring Physician ([facility])  Rodolfo Connor MD as MD (Urology)  Jd Almaraz MD as Assigned PCP  Ashely Torres RN as Registered Nurse (Hematology & Oncology)  Ed, Gigi Napier MD as Assigned Cancer Care Provider    Patient has been advised of split billing requirements and indicates understanding: Yes    Patient with history of hypertension taking amlodipine and valsartan.    History of hyperlipidemia, on statin.     Patient with prostate cancer initially treated by Dr. Rodolfo Connor with robotic radical prostatectomy and radiation 11/2015. Now off Lupron injections with declining PSA. Followed by Dr. Gigi Ward with oncology.     Review of Systems   Constitutional, HEENT, cardiovascular, pulmonary, gi and gu systems are negative, except as otherwise noted.      Objective    Vitals - Patient Reported  Weight (Patient Reported): 99.8 kg (220 lb)  Height (Patient Reported): 176.5 cm (5' 9.5\")  BMI (Based on Pt Reported Ht/Wt): 32.02      Vitals:  No vitals were obtained today due to virtual visit.    Physical Exam   GENERAL: Healthy, alert and no distress  EYES: Eyes grossly normal to inspection.  No discharge or erythema, or obvious scleral/conjunctival abnormalities.  RESP: No audible wheeze, cough, or visible cyanosis.  No visible retractions or increased work of breathing.     (male): urinary incontinence, impotence  SKIN: Visible skin clear. No significant rash, abnormal pigmentation or lesions.  NEURO: Cranial nerves grossly intact.  Mentation and speech appropriate for age.  PSYCH: Mentation appears normal, affect normal/bright, judgement and " insight intact, normal speech and appearance well-groomed.                Video-Visit Details    Type of service:  Video Visit    Video End Time:1:36 PM    Originating Location (pt. Location): Home    Distant Location (provider location):  [facility]     Platform used for Video Visit: Kelvin

## 2021-02-11 ENCOUNTER — MYC MEDICAL ADVICE (OUTPATIENT)
Dept: FAMILY MEDICINE | Facility: CLINIC | Age: 76
End: 2021-02-11

## 2021-02-19 ENCOUNTER — IMMUNIZATION (OUTPATIENT)
Dept: NURSING | Facility: CLINIC | Age: 76
End: 2021-02-19
Payer: COMMERCIAL

## 2021-02-19 PROCEDURE — 0001A PR COVID VAC PFIZER DIL RECON 30 MCG/0.3 ML IM: CPT

## 2021-02-19 PROCEDURE — 91300 PR COVID VAC PFIZER DIL RECON 30 MCG/0.3 ML IM: CPT

## 2021-03-12 ENCOUNTER — IMMUNIZATION (OUTPATIENT)
Dept: NURSING | Facility: CLINIC | Age: 76
End: 2021-03-12
Attending: INTERNAL MEDICINE
Payer: COMMERCIAL

## 2021-03-12 PROCEDURE — 0002A PR COVID VAC PFIZER DIL RECON 30 MCG/0.3 ML IM: CPT

## 2021-03-12 PROCEDURE — 91300 PR COVID VAC PFIZER DIL RECON 30 MCG/0.3 ML IM: CPT

## 2021-04-07 DIAGNOSIS — C61 PROSTATE CANCER (H): ICD-10-CM

## 2021-04-07 RX ORDER — BICALUTAMIDE 50 MG/1
TABLET, FILM COATED ORAL
Qty: 90 TABLET | Refills: 0 | Status: SHIPPED | OUTPATIENT
Start: 2021-04-07 | End: 2021-07-30

## 2021-04-07 NOTE — TELEPHONE ENCOUNTER
Medication Requested:  Casodex  Last date written and prescriber:  Dr. Ward 4/10/20    Last office visit: 12/11/20 Dr. Ward      I had a lengthy discussion with Wallace during this video visit. All his labs are within normal limits -except for his LDH, PSA and testosterone. His PSA had progressed on single agent Lupron to 1.56 ng/mL on 4/3/2020.  He has been started on bicalutamide since then and his PSA has dropped to 0.34 ng/mL.  This is good news and a good response.  We will continue with bicalutamide for now.  He has no symptoms on this new medication.    Next office visit:  6/4/21 with Dr. Ward

## 2021-05-24 ENCOUNTER — MYC MEDICAL ADVICE (OUTPATIENT)
Dept: ONCOLOGY | Facility: CLINIC | Age: 76
End: 2021-05-24

## 2021-05-28 ENCOUNTER — HOSPITAL ENCOUNTER (OUTPATIENT)
Facility: CLINIC | Age: 76
Setting detail: SPECIMEN
Discharge: HOME | End: 2021-05-28
Attending: INTERNAL MEDICINE | Admitting: INTERNAL MEDICINE
Payer: COMMERCIAL

## 2021-05-28 DIAGNOSIS — C61 PROSTATE CANCER (H): ICD-10-CM

## 2021-05-28 LAB
ALBUMIN SERPL-MCNC: 3.8 G/DL (ref 3.4–5)
ALP SERPL-CCNC: 122 U/L (ref 40–150)
ALT SERPL W P-5'-P-CCNC: 31 U/L (ref 0–70)
ANION GAP SERPL CALCULATED.3IONS-SCNC: 5 MMOL/L (ref 3–14)
AST SERPL W P-5'-P-CCNC: 21 U/L (ref 0–45)
BASOPHILS # BLD AUTO: 0.1 10E9/L (ref 0–0.2)
BASOPHILS NFR BLD AUTO: 1.1 %
BILIRUB SERPL-MCNC: 0.6 MG/DL (ref 0.2–1.3)
BUN SERPL-MCNC: 12 MG/DL (ref 7–30)
CALCIUM SERPL-MCNC: 8.8 MG/DL (ref 8.5–10.1)
CHLORIDE SERPL-SCNC: 107 MMOL/L (ref 94–109)
CO2 SERPL-SCNC: 28 MMOL/L (ref 20–32)
CREAT SERPL-MCNC: 0.92 MG/DL (ref 0.66–1.25)
DIFFERENTIAL METHOD BLD: NORMAL
EOSINOPHIL # BLD AUTO: 0.2 10E9/L (ref 0–0.7)
EOSINOPHIL NFR BLD AUTO: 5.1 %
ERYTHROCYTE [DISTWIDTH] IN BLOOD BY AUTOMATED COUNT: 12.7 % (ref 10–15)
GFR SERPL CREATININE-BSD FRML MDRD: 80 ML/MIN/{1.73_M2}
GLUCOSE SERPL-MCNC: 88 MG/DL (ref 70–99)
HCT VFR BLD AUTO: 43 % (ref 40–53)
HGB BLD-MCNC: 14.8 G/DL (ref 13.3–17.7)
IMM GRANULOCYTES # BLD: 0 10E9/L (ref 0–0.4)
IMM GRANULOCYTES NFR BLD: 0.2 %
LDH SERPL L TO P-CCNC: 269 U/L (ref 85–227)
LYMPHOCYTES # BLD AUTO: 1.3 10E9/L (ref 0.8–5.3)
LYMPHOCYTES NFR BLD AUTO: 28.6 %
MCH RBC QN AUTO: 31.5 PG (ref 26.5–33)
MCHC RBC AUTO-ENTMCNC: 34.4 G/DL (ref 31.5–36.5)
MCV RBC AUTO: 92 FL (ref 78–100)
MONOCYTES # BLD AUTO: 0.4 10E9/L (ref 0–1.3)
MONOCYTES NFR BLD AUTO: 9 %
NEUTROPHILS # BLD AUTO: 2.6 10E9/L (ref 1.6–8.3)
NEUTROPHILS NFR BLD AUTO: 56 %
NRBC # BLD AUTO: 0 10*3/UL
NRBC BLD AUTO-RTO: 0 /100
PLATELET # BLD AUTO: 187 10E9/L (ref 150–450)
POTASSIUM SERPL-SCNC: 4.3 MMOL/L (ref 3.4–5.3)
PROT SERPL-MCNC: 7.7 G/DL (ref 6.8–8.8)
PSA SERPL-MCNC: 1.01 UG/L (ref 0–4)
RBC # BLD AUTO: 4.7 10E12/L (ref 4.4–5.9)
SODIUM SERPL-SCNC: 140 MMOL/L (ref 133–144)
WBC # BLD AUTO: 4.7 10E9/L (ref 4–11)

## 2021-05-28 PROCEDURE — 85025 COMPLETE CBC W/AUTO DIFF WBC: CPT | Performed by: INTERNAL MEDICINE

## 2021-05-28 PROCEDURE — 83615 LACTATE (LD) (LDH) ENZYME: CPT | Performed by: INTERNAL MEDICINE

## 2021-05-28 PROCEDURE — 84270 ASSAY OF SEX HORMONE GLOBUL: CPT | Performed by: INTERNAL MEDICINE

## 2021-05-28 PROCEDURE — 84153 ASSAY OF PSA TOTAL: CPT | Performed by: INTERNAL MEDICINE

## 2021-05-28 PROCEDURE — 80053 COMPREHEN METABOLIC PANEL: CPT | Performed by: INTERNAL MEDICINE

## 2021-05-28 PROCEDURE — 84403 ASSAY OF TOTAL TESTOSTERONE: CPT | Performed by: INTERNAL MEDICINE

## 2021-05-28 PROCEDURE — 36415 COLL VENOUS BLD VENIPUNCTURE: CPT

## 2021-05-29 LAB
SHBG SERPL-SCNC: 40 NMOL/L (ref 11–80)
TESTOST FREE SERPL-MCNC: 0.09 NG/DL (ref 4.7–24.4)
TESTOST SERPL-MCNC: 6 NG/DL (ref 240–950)

## 2021-06-04 ENCOUNTER — TELEPHONE (OUTPATIENT)
Dept: PHARMACY | Facility: OTHER | Age: 76
End: 2021-06-04

## 2021-06-04 ENCOUNTER — ONCOLOGY VISIT (OUTPATIENT)
Dept: ONCOLOGY | Facility: CLINIC | Age: 76
End: 2021-06-04
Attending: INTERNAL MEDICINE
Payer: COMMERCIAL

## 2021-06-04 ENCOUNTER — INFUSION THERAPY VISIT (OUTPATIENT)
Dept: INFUSION THERAPY | Facility: CLINIC | Age: 76
End: 2021-06-04
Attending: INTERNAL MEDICINE
Payer: COMMERCIAL

## 2021-06-04 VITALS
RESPIRATION RATE: 18 BRPM | HEART RATE: 65 BPM | BODY MASS INDEX: 33.68 KG/M2 | OXYGEN SATURATION: 95 % | WEIGHT: 221.5 LBS | TEMPERATURE: 98.3 F | SYSTOLIC BLOOD PRESSURE: 132 MMHG | DIASTOLIC BLOOD PRESSURE: 67 MMHG

## 2021-06-04 DIAGNOSIS — C61 PROSTATE CANCER (H): Primary | ICD-10-CM

## 2021-06-04 PROCEDURE — 96402 CHEMO HORMON ANTINEOPL SQ/IM: CPT

## 2021-06-04 PROCEDURE — 250N000011 HC RX IP 250 OP 636: Performed by: INTERNAL MEDICINE

## 2021-06-04 PROCEDURE — G0463 HOSPITAL OUTPT CLINIC VISIT: HCPCS

## 2021-06-04 PROCEDURE — 99215 OFFICE O/P EST HI 40 MIN: CPT | Performed by: INTERNAL MEDICINE

## 2021-06-04 RX ADMIN — LEUPROLIDE ACETATE 45 MG: KIT at 14:45

## 2021-06-04 ASSESSMENT — PAIN SCALES - GENERAL: PAINLEVEL: NO PAIN (0)

## 2021-06-04 NOTE — PROGRESS NOTES
[facility] CANCER CLINIC  FOLLOW-UP VISIT NOTE    PATIENT NAME: Wallace Zelaya MRN # [id number]  DATE OF VISIT: Jun 4, 2021 YOB: 1945    REFERRING PROVIDER: No referring provider defined for this encounter.    CANCER TYPE: Prostate cancer; Biochemical recurrence; Castration resistant disease  STAGE: Stage III - pT3b at diagnosis; M0    HISTORY OF PRESENTING ILLNESS:  Wallace was noted to have rising screening PSA from 2 - 4. He was referred to Dr. Rodolfo Connor. He had radical prostatectomy on 11/2015. Pathology from this revealed [place] 4+4 disease with extraprostatic extension, seminal vesicle extension and he was staged at pT3b. All of the 12 lymph nodes resected were negative for disease. Post operatively his PSA did not drop to undetectable levels and remained elevated at 0.56. It vladimir to 0.9 in April 2016 and he was referred to Dr. Newton for adjuvant radiation therapy. He completed radiation therapy but did not have any PSA response to this treatment.     TREATMENT SUMMARY:  11/13/2015 Radical prostatectomy  April 2016  Radiation therapy    Oncology Supportive Medications 1/10/2017 4/11/2017   leuprolide (LUPRON DEPOT) IM 22.5 mg 22.5 mg     CURRENT INTERVENTIONS:  Lupron with bicalutamide    SUBJECTIVE   Wallace is being seen for his prostate cancer    Steve was followed in person at this scheduled visit.  He has been doing well for the most part.  He was in good mood at this visit.  He explained again how he had a bad game of golf yesterday.  He is unhappy with his performance overall but he has some good days and some days that are really poor.    He has been taking bicalutamide daily since the last visit for over 6 months.  He denies any new symptoms since starting bicalutamide.  He is aware of the rising PSA but was not overly concerned about this.      PAST MEDICAL HISTORY   1. HTN  2. Dyslipidemia  3. Prostate cancer as detailed above      CURRENT OUTPATIENT  MEDICATIONS     Current Outpatient Medications   Medication Sig     amLODIPine (NORVASC) 10 MG tablet Take 1 tablet (10 mg) by mouth daily     atorvastatin (LIPITOR) 20 MG tablet TAKE 1 TABLET EVERY DAY     bicalutamide (CASODEX) 50 MG tablet Take 1 tablet by mouth once daily     ibuprofen (ADVIL/MOTRIN) 800 MG tablet Take 1 tablet (800 mg) by mouth 3 times daily with food     valsartan (DIOVAN) 160 MG tablet Take 1 tablet (160 mg) by mouth daily     No current facility-administered medications for this visit.         ALLERGIES     No Known Allergies     REVIEW OF SYSTEMS   As above in the HPI, o/w complete 12-point ROS was negative.     PHYSICAL EXAM   /67   Pulse 65   Temp 98.3  F (36.8  C) (Tympanic)   Resp 18   Wt 100.5 kg (221 lb 8 oz)   SpO2 95%   BMI 33.68 kg/m    SpO2 Readings from Last 4 Encounters:   09/21/18 96%   08/24/18 94%   06/22/18 95%   05/11/18 97%     Wt Readings from Last 3 Encounters:   06/04/21 100.5 kg (221 lb 8 oz)   02/03/20 99.1 kg (218 lb 6.4 oz)   01/14/20 100.2 kg (221 lb)        LABORATORY AND IMAGING STUDIES     Recent Labs   Lab Test 05/28/21  0907 12/07/20  1544 07/31/20  1601 04/03/20  0909 01/21/20  1154    141 142 141 141   POTASSIUM 4.3 4.2 4.2 4.3 3.9   CHLORIDE 107 108 109 107 109   CO2 28 32 29 29 26   ANIONGAP 5 1* 4 5 6   BUN 12 15 13 14 16   CR 0.92 1.03 0.99 0.93 0.94   GLC 88 110* 100* 92 101*   TY 8.8 8.9 8.9 8.8 9.4     Recent Labs   Lab Test 01/21/20  1154   MAG 2.0     Recent Labs   Lab Test 05/28/21  0907 12/07/20  1544 07/31/20  1601 04/03/20  0909 01/21/20  1154   WBC 4.7 6.7 5.6 5.2 6.2   HGB 14.8 14.9 14.0 14.6 14.7    191 159 185 163   MCV 92 92 92 91 91   NEUTROPHIL 56.0 56.4 56.1 54.0 60.2     Recent Labs   Lab Test 05/28/21  0907 12/07/20  1544 07/31/20  1601   BILITOTAL 0.6 0.5 0.4   ALKPHOS 122 118 112   ALT 31 27 35   AST 21 26 27   ALBUMIN 3.8 3.8 3.9   * 262* 257*     Recent Labs   Lab Test 05/28/21  0907 12/07/20  1544  07/31/20  1601 04/03/20  0909 10/31/19  0945   PSA 1.01 0.34 0.54 1.56 0.22   TESTOSTTOTAL 6* 13* 5* 13* 8*     Recent Labs   Lab Test 05/28/21  0907 12/07/20  1544 07/31/20  1601 04/03/20  0909 10/31/19  0945   PSA 1.01 0.34 0.54 1.56 0.22   ALKPHOS 122 118 112 116 103   * 262* 257* 230* 210              ASSESSMENT AND PLAN   1. Biochemical PSA relapse post prostatectomy without any response to adjuvant radiation therapy  2. Rapid rise in PSA on observation phase of intermittent androgen deprivation therapy  3. HTN  4. ECOG PS1  5. No medical comorbidity    I had a lengthy discussion with Wallace during this visit. All his labs are within normal limits -except for his LDH, PSA and testosterone. His PSA had progressed on single agent Lupron to 1.56 ng/mL on 4/3/2020.  He has been started on bicalutamide and his PSA initially dropped to 0.34 ng/mL on 12/7/2020, but has again increased to 1 ng/mL.     This is concerning for progressive disease.  His PSA has doubled more than twice within the last 6 months.  I would recommend switching to a newer receptor blocker like enzalutamide, apalutamide or darolutamide.       Apalutamide, a newer generation competitive inhibitor of the androgen receptor like bicalutamide has been evaluated in men with nonmetastatic castration-resistant prostate cancer like him who were at high risk for the development of metastasis (N Engl J Med. 2018 Apr 12;378(15):8353-5158). A total of 1207 men underwent randomization (806 to the apalutamide group and 401 to the placebo group). Among men with nonmetastatic castration-resistant prostate cancer, metastasis-free survival and time to symptomatic progression were significantly longer with apalutamide than with placebo -  median metastasis-free survival was 40.5 months in the apalutamide group as compared with 16.2 months in the placebo group (hazard ratio for metastasis or death, 0.28; 95% confidence interval [CI], 0.23 to 0.35; P<0.001);   time to symptomatic progression was significantly longer with apalutamide than with placebo (hazard ratio, 0.45; 95% CI, 0.32 to 0.63; P<0.001). Apalutamide is well tolerated with no serious side effects except for a rash in 25% of patients.     Enzalutamide was tested in a similar study 'PROSPER' as apalutamide (N Engl J Med 2018; 378:8749-8564). In this double-blind, phase 3 trial men with nonmetastatic, castration-resistant prostate cancer and a PSA doubling time of 10 months or less who were continuing androgen-deprivation therapy we rerandomly assigned in a 2:1 ratio to receive enzalutamide (at a dose of 160 mg) or placebo once daily. The median metastasis-free survival was 36.6 months in the enzalutamide group versus 14.7 months in the placebo group (hazard ratio for metastasis or death, 0.29; 95% confidence interval, 0.24  0.35; P<0.001). There was a similar improvement in time to next anti-neoplastic therapy and time to PSA    Darolutamide was approved by FDA on 7/30/19 for non-metastatic castration-resistant prostate cancer based on SERGIO ([id number]), a multicenter, double-blind, placebo-controlled clinical trial in 1,509 patients with non-metastatic castration resistant prostate cancer. The median MFS was 40.4 months (95% CI: 34.3, not reached) for patients treated with darolutamide compared with 18.4 months (95% CI: 15.5, 22.3) for those receiving placebo (hazard ratio 0.41; 95% CI: 0.34, 0.50; p<0.0001). The most common adverse reactions (?2%) in patients who received darolutamide were fatigue, pain in extremity, and rash. Ischemic heart disease (4.3%) and heart failure (2.1%) were more common on the darolutamide arm. The seizure incidence was similar on the two arms (0.2%). The recommended darolutamide dose is 600 mg (two 300 mg tablets) administered orally twice daily with food.     Essentially all of the newer anti-androgens have shown a similar benefit in delaying metastasis free survival. Of the  3 agents, darolutamide does not cross the blood brain barrier and has a lower CNS side effects - possibly fatigue and dementia. With all of the data on efficacy being similar, I would recommend trying darolutamide for a preferably safety profile.     I would recommend s discontinuing bicalutamide and starting him on enzalutamide. I would get new restaging scans (CT chest, abdomen and pelvis and bone scan) to establish a baseline. We have not had a restaging scan for him in nearly 4 years. I have consulted our pharmacy team to follow along and help monitoring Steve while on therapy. Additional teaching for enzalutamide was provided by our clinical pharmacist.     I reviewed the COG-CAP study to monitor cognition while on therapy. He was a little worried about the possibility of dementia on androgen deprivation therapy. I explained him that the study utilizes testing to detect early dementia. He was open to participation in the clinical trial. I will reach out to our research nurse to enroll him on the study.     I will have him return in a month to review findings of baseline scan and follow on symptoms on enzalutamide.     60 minutes spent on the date of the encounter doing chart review, history and exam, documentation and further activities as noted above     Gigi Ward  ,  Division of Hematology, Oncology & Transplantation  [facility].

## 2021-06-04 NOTE — TELEPHONE ENCOUNTER
PA Initiation    Medication: Xtandi 40mg- pa pending   Insurance Company:  [employer]   REF#  [id number]  Pharmacy Filling the Rx:    Filling Pharmacy Phone:    Filling Pharmacy Fax:    Start Date: 6/4/2021

## 2021-06-04 NOTE — NURSING NOTE
"Oncology Rooming Note    June 4, 2021 1:51 PM   Wallace Zelaya is a 75 year old male who presents for:    Chief Complaint   Patient presents with     Oncology Clinic Visit     Prostate cancer (HCC)     Initial Vitals: /67   Pulse 65   Temp 98.3  F (36.8  C) (Tympanic)   Resp 18   Wt 100.5 kg (221 lb 8 oz)   SpO2 95%   BMI 33.68 kg/m   Estimated body mass index is 33.68 kg/m  as calculated from the following:    Height as of 1/14/20: 1.727 m (5' 8\").    Weight as of this encounter: 100.5 kg (221 lb 8 oz). Body surface area is 2.2 meters squared.  No Pain (0) Comment: Data Unavailable   No LMP for male patient.  Allergies reviewed: Yes  Medications reviewed: Yes    Medications: Medication refills not needed today.  Pharmacy name entered into [employer]:    [facility] [place], [place] - [address]  [facility] - [place], [place] - [address]    Clinical concerns: f/u       Olya Valenzuela CMA              "

## 2021-06-04 NOTE — PROGRESS NOTES
Infusion Nursing Note:  Wallace Zelaya presents today for Lupron.    Patient seen by provider today: Yes: Dr. Ward   present during visit today: Not Applicable.    Note: N/A.    Intravenous Access:  No Intravenous access/labs at this visit.    Treatment Conditions:  Not Applicable.      Post Infusion Assessment:  Patient tolerated injection without incident.  Site patent and intact, free from redness, edema or discomfort.       Discharge Plan:   Waiting for scheduling to make appt from check out from provider after today's visit  Patient discharged in stable condition accompanied by: self.  Departure Mode: Ambulatory.      Gala Dawson RN

## 2021-06-04 NOTE — LETTER
6/4/2021         RE: Wallace Zelaya  [address] [facility] [address]        Dear Colleague,    Thank you for referring your patient, Wallace Zelaya, to the [facility]. Please see a copy of my visit note below.    [facility] CANCER CLINIC  FOLLOW-UP VISIT NOTE    PATIENT NAME: Wallace Zelaya MRN # [id number]  DATE OF VISIT: Jun 4, 2021 YOB: 1945    REFERRING PROVIDER: No referring provider defined for this encounter.    CANCER TYPE: Prostate cancer; Biochemical recurrence; Castration resistant disease  STAGE: Stage III - pT3b at diagnosis; M0    HISTORY OF PRESENTING ILLNESS:  Wallace was noted to have rising screening PSA from 2 - 4. He was referred to Dr. Rodolfo Connor. He had radical prostatectomy on 11/2015. Pathology from this revealed [place] 4+4 disease with extraprostatic extension, seminal vesicle extension and he was staged at pT3b. All of the 12 lymph nodes resected were negative for disease. Post operatively his PSA did not drop to undetectable levels and remained elevated at 0.56. It vladimir to 0.9 in April 2016 and he was referred to Dr. Newton for adjuvant radiation therapy. He completed radiation therapy but did not have any PSA response to this treatment.     TREATMENT SUMMARY:  11/13/2015 Radical prostatectomy  April 2016  Radiation therapy    Oncology Supportive Medications 1/10/2017 4/11/2017   leuprolide (LUPRON DEPOT) IM 22.5 mg 22.5 mg     CURRENT INTERVENTIONS:  Lupron with bicalutamide    SUBJECTIVE   Wallace is being seen for his prostate cancer    Steve was followed in person at this scheduled visit.  He has been doing well for the most part.  He was in good mood at this visit.  He explained again how he had a bad game of golf yesterday.  He is unhappy with his performance overall but he has some good days and some days that are really poor.    He has been taking bicalutamide daily since the last visit for  over 6 months.  He denies any new symptoms since starting bicalutamide.  He is aware of the rising PSA but was not overly concerned about this.      PAST MEDICAL HISTORY   1. HTN  2. Dyslipidemia  3. Prostate cancer as detailed above      CURRENT OUTPATIENT MEDICATIONS     Current Outpatient Medications   Medication Sig     amLODIPine (NORVASC) 10 MG tablet Take 1 tablet (10 mg) by mouth daily     atorvastatin (LIPITOR) 20 MG tablet TAKE 1 TABLET EVERY DAY     bicalutamide (CASODEX) 50 MG tablet Take 1 tablet by mouth once daily     ibuprofen (ADVIL/MOTRIN) 800 MG tablet Take 1 tablet (800 mg) by mouth 3 times daily with food     valsartan (DIOVAN) 160 MG tablet Take 1 tablet (160 mg) by mouth daily     No current facility-administered medications for this visit.         ALLERGIES     No Known Allergies     REVIEW OF SYSTEMS   As above in the HPI, o/w complete 12-point ROS was negative.     PHYSICAL EXAM   /67   Pulse 65   Temp 98.3  F (36.8  C) (Tympanic)   Resp 18   Wt 100.5 kg (221 lb 8 oz)   SpO2 95%   BMI 33.68 kg/m    SpO2 Readings from Last 4 Encounters:   09/21/18 96%   08/24/18 94%   06/22/18 95%   05/11/18 97%     Wt Readings from Last 3 Encounters:   06/04/21 100.5 kg (221 lb 8 oz)   02/03/20 99.1 kg (218 lb 6.4 oz)   01/14/20 100.2 kg (221 lb)        LABORATORY AND IMAGING STUDIES     Recent Labs   Lab Test 05/28/21  0907 12/07/20  1544 07/31/20  1601 04/03/20  0909 01/21/20  1154    141 142 141 141   POTASSIUM 4.3 4.2 4.2 4.3 3.9   CHLORIDE 107 108 109 107 109   CO2 28 32 29 29 26   ANIONGAP 5 1* 4 5 6   BUN 12 15 13 14 16   CR 0.92 1.03 0.99 0.93 0.94   GLC 88 110* 100* 92 101*   TY 8.8 8.9 8.9 8.8 9.4     Recent Labs   Lab Test 01/21/20  1154   MAG 2.0     Recent Labs   Lab Test 05/28/21  0907 12/07/20  1544 07/31/20  1601 04/03/20  0909 01/21/20  1154   WBC 4.7 6.7 5.6 5.2 6.2   HGB 14.8 14.9 14.0 14.6 14.7    191 159 185 163   MCV 92 92 92 91 91   NEUTROPHIL 56.0 56.4 56.1  54.0 60.2     Recent Labs   Lab Test 05/28/21  0907 12/07/20  1544 07/31/20  1601   BILITOTAL 0.6 0.5 0.4   ALKPHOS 122 118 112   ALT 31 27 35   AST 21 26 27   ALBUMIN 3.8 3.8 3.9   * 262* 257*     Recent Labs   Lab Test 05/28/21  0907 12/07/20  1544 07/31/20  1601 04/03/20  0909 10/31/19  0945   PSA 1.01 0.34 0.54 1.56 0.22   TESTOSTTOTAL 6* 13* 5* 13* 8*     Recent Labs   Lab Test 05/28/21  0907 12/07/20  1544 07/31/20  1601 04/03/20  0909 10/31/19  0945   PSA 1.01 0.34 0.54 1.56 0.22   ALKPHOS 122 118 112 116 103   * 262* 257* 230* 210              ASSESSMENT AND PLAN   1. Biochemical PSA relapse post prostatectomy without any response to adjuvant radiation therapy  2. Rapid rise in PSA on observation phase of intermittent androgen deprivation therapy  3. HTN  4. ECOG PS1  5. No medical comorbidity    I had a lengthy discussion with Wallace during this visit. All his labs are within normal limits -except for his LDH, PSA and testosterone. His PSA had progressed on single agent Lupron to 1.56 ng/mL on 4/3/2020.  He has been started on bicalutamide and his PSA initially dropped to 0.34 ng/mL on 12/7/2020, but has again increased to 1 ng/mL.     This is concerning for progressive disease.  His PSA has doubled more than twice within the last 6 months.  I would recommend switching to a newer receptor blocker like enzalutamide, apalutamide or darolutamide.       Apalutamide, a newer generation competitive inhibitor of the androgen receptor like bicalutamide has been evaluated in men with nonmetastatic castration-resistant prostate cancer like him who were at high risk for the development of metastasis (N Engl J Med. 2018 Apr 12;378(15):1682-8832). A total of 1207 men underwent randomization (806 to the apalutamide group and 401 to the placebo group). Among men with nonmetastatic castration-resistant prostate cancer, metastasis-free survival and time to symptomatic progression were significantly longer with  apalutamide than with placebo -  median metastasis-free survival was 40.5 months in the apalutamide group as compared with 16.2 months in the placebo group (hazard ratio for metastasis or death, 0.28; 95% confidence interval [CI], 0.23 to 0.35; P<0.001);  time to symptomatic progression was significantly longer with apalutamide than with placebo (hazard ratio, 0.45; 95% CI, 0.32 to 0.63; P<0.001). Apalutamide is well tolerated with no serious side effects except for a rash in 25% of patients.     Enzalutamide was tested in a similar study 'PROSPER' as apalutamide (N Engl J Med 2018; 378:5912-5979). In this double-blind, phase 3 trial men with nonmetastatic, castration-resistant prostate cancer and a PSA doubling time of 10 months or less who were continuing androgen-deprivation therapy we rerandomly assigned in a 2:1 ratio to receive enzalutamide (at a dose of 160 mg) or placebo once daily. The median metastasis-free survival was 36.6 months in the enzalutamide group versus 14.7 months in the placebo group (hazard ratio for metastasis or death, 0.29; 95% confidence interval, 0.24  0.35; P<0.001). There was a similar improvement in time to next anti-neoplastic therapy and time to PSA    Darolutamide was approved by FDA on 7/30/19 for non-metastatic castration-resistant prostate cancer based on SERGIO ([id number]), a multicenter, double-blind, placebo-controlled clinical trial in 1,509 patients with non-metastatic castration resistant prostate cancer. The median MFS was 40.4 months (95% CI: 34.3, not reached) for patients treated with darolutamide compared with 18.4 months (95% CI: 15.5, 22.3) for those receiving placebo (hazard ratio 0.41; 95% CI: 0.34, 0.50; p<0.0001). The most common adverse reactions (?2%) in patients who received darolutamide were fatigue, pain in extremity, and rash. Ischemic heart disease (4.3%) and heart failure (2.1%) were more common on the darolutamide arm. The seizure incidence was  similar on the two arms (0.2%). The recommended darolutamide dose is 600 mg (two 300 mg tablets) administered orally twice daily with food.     Essentially all of the newer anti-androgens have shown a similar benefit in delaying metastasis free survival. Of the 3 agents, darolutamide does not cross the blood brain barrier and has a lower CNS side effects - possibly fatigue and dementia. With all of the data on efficacy being similar, I would recommend trying darolutamide for a preferably safety profile.     I would recommend s discontinuing bicalutamide and starting him on enzalutamide. I would get new restaging scans (CT chest, abdomen and pelvis and bone scan) to establish a baseline. We have not had a restaging scan for him in nearly 4 years. I have consulted our pharmacy team to follow along and help monitoring Steve while on therapy. Additional teaching for enzalutamide was provided by our clinical pharmacist.     I reviewed the COG-CAP study to monitor cognition while on therapy. He was a little worried about the possibility of dementia on androgen deprivation therapy. I explained him that the study utilizes testing to detect early dementia. He was open to participation in the clinical trial. I will reach out to our research nurse to enroll him on the study.     I will have him return in a month to review findings of baseline scan and follow on symptoms on enzalutamide.     60 minutes spent on the date of the encounter doing chart review, history and exam, documentation and further activities as noted above     Gigi Ward  ,  Division of Hematology, Oncology & Transplantation  [facility].              Again, thank you for allowing me to participate in the care of your patient.        Sincerely,        Gigi Ward MD

## 2021-06-04 NOTE — ORAL ONC MGMT
"Oral Chemotherapy Monitoring Program    Lab Monitoring Plan: Monthly CBC/CMP for 3 months, the as clinically needed     Subjective/Objective:  Wallace Zelaya is a 75 year old male seen in clinic for an initial visit for oral chemotherapy education.      ORAL CHEMOTHERAPY 6/4/2021   Assessment Type New Teach   Diagnosis Code Prostate Cancer   Providers Dr. Ward   Clinic Name/Location [place]   Drug Name Xtandi (enzalutamide)   Dose 160 mg   Current Schedule Daily   Cycle Details Continuous       Last PHQ-2 Score on record:   PHQ-2 ( 1999 [employer]) 1/6/2021 10/28/2019   Q1: Little interest or pleasure in doing things 0 0   Q2: Feeling down, depressed or hopeless 0 0   PHQ-2 Score 0 0   Q1: Little interest or pleasure in doing things - Not at all   Q2: Feeling down, depressed or hopeless - Not at all   PHQ-2 Score - 0       Vitals:  BP:   BP Readings from Last 1 Encounters:   06/04/21 132/67     Wt Readings from Last 1 Encounters:   06/04/21 100.5 kg (221 lb 8 oz)     Estimated body surface area is 2.2 meters squared as calculated from the following:    Height as of 1/14/20: 1.727 m (5' 8\").    Weight as of an earlier encounter on 6/4/21: 100.5 kg (221 lb 8 oz).    Labs:  _  Result Component Current Result Ref Range   Sodium 140 (5/28/2021) 133 - 144 mmol/L     _  Result Component Current Result Ref Range   Potassium 4.3 (5/28/2021) 3.4 - 5.3 mmol/L     _  Result Component Current Result Ref Range   Calcium 8.8 (5/28/2021) 8.5 - 10.1 mg/dL     No results found for Mag within last 30 days.     No results found for Phos within last 30 days.     _  Result Component Current Result Ref Range   Albumin 3.8 (5/28/2021) 3.4 - 5.0 g/dL     _  Result Component Current Result Ref Range   Urea Nitrogen 12 (5/28/2021) 7 - 30 mg/dL     _  Result Component Current Result Ref Range   Creatinine 0.92 (5/28/2021) 0.66 - 1.25 mg/dL     _  Result Component Current Result Ref Range   AST 21 (5/28/2021) 0 - 45 U/L     _  Result Component " Current Result Ref Range   ALT 31 (5/28/2021) 0 - 70 U/L     _  Result Component Current Result Ref Range   Bilirubin Total 0.6 (5/28/2021) 0.2 - 1.3 mg/dL     _  Result Component Current Result Ref Range   WBC 4.7 (5/28/2021) 4.0 - 11.0 10e9/L     _  Result Component Current Result Ref Range   Hemoglobin 14.8 (5/28/2021) 13.3 - 17.7 g/dL     _  Result Component Current Result Ref Range   Platelet Count 187 (5/28/2021) 150 - 450 10e9/L     _  Result Component Current Result Ref Range   Absolute Neutrophil 2.6 (5/28/2021) 1.6 - 8.3 10e9/L       Assessment:  Patient is appropriate to start therapy.    Plan:  Basic chemotherapy teaching was reviewed with the patient including indication, start date of therapy, dose, administration, adverse effects, missed doses, food and drug interactions, monitoring, side effect management, office contact information, and safe handling. Written materials were provided and all questions answered.    Follow-Up:  1 month     Shanna De La RosaD  Hematology/Oncology Clinical Pharmacist  [facility], [facility]  [phone]

## 2021-06-05 ENCOUNTER — MYC MEDICAL ADVICE (OUTPATIENT)
Dept: ONCOLOGY | Facility: CLINIC | Age: 76
End: 2021-06-05

## 2021-06-07 ENCOUNTER — MYC MEDICAL ADVICE (OUTPATIENT)
Dept: ONCOLOGY | Facility: CLINIC | Age: 76
End: 2021-06-07

## 2021-06-07 NOTE — TELEPHONE ENCOUNTER
Spoke to Steve about getting set up for free drug from manufacture and he said at this time he will not be taking Xtandi. He and his wife read up on side effects and at this point he will not take Xtandi for these reasons. Will send message to Dr. Ward for him to follow up with patient.

## 2021-06-07 NOTE — TELEPHONE ENCOUNTER
Prior Authorization Approval    Authorization Effective Date: 3/6/2021  Authorization Expiration Date: 6/4/2022  Medication: Xtandi 40mg- pa approved  Approved Dose/Quantity: 120-30 days  Reference #: [id number]   Insurance Company: [employer] [place] - Phone [phone] Fax [phone]  Expected CoPay:   $2,798.25    CoPay Card Available:  na  Foundation Assistance Needed:  Closed but free drug is open from manufacture   Which Pharmacy is filling the prescription (Not needed for infusion/clinic administered):    Pharmacy Notified:    Patient Notified:

## 2021-06-10 ENCOUNTER — TELEPHONE (OUTPATIENT)
Dept: ONCOLOGY | Facility: CLINIC | Age: 76
End: 2021-06-10

## 2021-06-10 NOTE — TELEPHONE ENCOUNTER
LVM to reschedule video visit with Dr. Ward per Dominique's staff message. Offered tomorrow (Friday 6/11) afternoon at [facility].

## 2021-06-11 ENCOUNTER — ONCOLOGY VISIT (OUTPATIENT)
Dept: ONCOLOGY | Facility: CLINIC | Age: 76
End: 2021-06-11
Attending: INTERNAL MEDICINE
Payer: COMMERCIAL

## 2021-06-11 VITALS
WEIGHT: 221 LBS | OXYGEN SATURATION: 94 % | TEMPERATURE: 97.6 F | SYSTOLIC BLOOD PRESSURE: 143 MMHG | DIASTOLIC BLOOD PRESSURE: 71 MMHG | HEART RATE: 63 BPM | BODY MASS INDEX: 33.6 KG/M2 | RESPIRATION RATE: 16 BRPM

## 2021-06-11 DIAGNOSIS — C61 PROSTATE CANCER (H): Primary | ICD-10-CM

## 2021-06-11 PROCEDURE — 99215 OFFICE O/P EST HI 40 MIN: CPT | Performed by: INTERNAL MEDICINE

## 2021-06-11 PROCEDURE — G0463 HOSPITAL OUTPT CLINIC VISIT: HCPCS

## 2021-06-11 ASSESSMENT — PAIN SCALES - GENERAL: PAINLEVEL: NO PAIN (0)

## 2021-06-11 NOTE — PROGRESS NOTES
Pharmacy Note: ita Wilson met with Dr. Ward today and has decided to start xtandi.  Rx was released.  Will f/u next with with the rx.    Marty Meese, Pharm.D., [facility]

## 2021-06-11 NOTE — PROGRESS NOTES
"Oncology Rooming Note    June 11, 2021 4:04 PM   Wallace Zelaya is a 75 year old male who presents for:    Chief Complaint   Patient presents with     Oncology Clinic Visit     Prostate cancer     Initial Vitals: BP (!) 143/71   Pulse 63   Temp 97.6  F (36.4  C) (Tympanic)   Resp 16   Wt 100.2 kg (221 lb)   SpO2 94%   BMI 33.60 kg/m   Estimated body mass index is 33.6 kg/m  as calculated from the following:    Height as of 1/14/20: 1.727 m (5' 8\").    Weight as of this encounter: 100.2 kg (221 lb). Body surface area is 2.19 meters squared.  No Pain (0) Comment: Data Unavailable   No LMP for male patient.  Allergies reviewed: Yes  Medications reviewed: Yes    Medications: Medication refills not needed today.  Pharmacy name entered into TriPlay:    [facility] - [place], [place] - [address]  [facility] - [place], [place] - [address]  [facility] - [place], [place] - [address] CMA              "

## 2021-06-11 NOTE — LETTER
"    6/11/2021         RE: Wallace Zelaya  [address] [address] [facility] [address]        Dear Colleague,    Thank you for referring your patient, Wallace Zelaya, to the [facility]. Please see a copy of my visit note below.    Oncology Rooming Note    June 11, 2021 4:04 PM   Wallace Zelaya is a 75 year old male who presents for:    Chief Complaint   Patient presents with     Oncology Clinic Visit     Prostate cancer     Initial Vitals: BP (!) 143/71   Pulse 63   Temp 97.6  F (36.4  C) (Tympanic)   Resp 16   Wt 100.2 kg (221 lb)   SpO2 94%   BMI 33.60 kg/m   Estimated body mass index is 33.6 kg/m  as calculated from the following:    Height as of 1/14/20: 1.727 m (5' 8\").    Weight as of this encounter: 100.2 kg (221 lb). Body surface area is 2.19 meters squared.  No Pain (0) Comment: Data Unavailable   No LMP for male patient.  Allergies reviewed: Yes  Medications reviewed: Yes    Medications: Medication refills not needed today.  Pharmacy name entered into MedioTr[employer]:    [facility] - [place], [place] - [address] [facility] - [place], [place] - [address] [facility] - [place], [place] - [address] [facility] CANCER CLINIC  FOLLOW-UP VISIT NOTE    PATIENT NAME: Wallace Zelaya MRN # [id number]  DATE OF VISIT: Jun 11, 2021 YOB: 1945    REFERRING PROVIDER: No referring provider defined for this encounter.    CANCER TYPE: Prostate cancer; Biochemical recurrence; Castration resistant disease  STAGE: Stage III - pT3b at diagnosis; M0    HISTORY OF PRESENTING ILLNESS:  Wallace was noted to have rising screening PSA from 2 - 4. He was referred to Dr. Rodolfo Connor. He had radical prostatectomy on 11/2015. Pathology from this revealed [place] 4+4 disease with extraprostatic extension, seminal vesicle extension and he " was staged at pT3b. All of the 12 lymph nodes resected were negative for disease. Post operatively his PSA did not drop to undetectable levels and remained elevated at 0.56. It vladimir to 0.9 in April 2016 and he was referred to Dr. Newton for adjuvant radiation therapy. He completed radiation therapy but did not have any PSA response to this treatment.     TREATMENT SUMMARY:  11/13/2015 Radical prostatectomy  April 2016  Radiation therapy    Oncology Supportive Medications 1/10/2017 4/11/2017   leuprolide (LUPRON DEPOT) IM 22.5 mg 22.5 mg     CURRENT INTERVENTIONS:  Lupron with bicalutamide    DELANEY Gonsalez is being seen for his prostate cancer    Steve was followed in person and is accompanied by his wife at this visit. He has been on bicalutamide and at the last visit I recommended novel anti-androgen therapy due to disease progression which got him very worried. He is here with his wife to review more.       PAST MEDICAL HISTORY   1. HTN  2. Dyslipidemia  3. Prostate cancer as detailed above      CURRENT OUTPATIENT MEDICATIONS     Current Outpatient Medications   Medication Sig     amLODIPine (NORVASC) 10 MG tablet Take 1 tablet (10 mg) by mouth daily     atorvastatin (LIPITOR) 20 MG tablet TAKE 1 TABLET EVERY DAY     bicalutamide (CASODEX) 50 MG tablet Take 1 tablet by mouth once daily     ibuprofen (ADVIL/MOTRIN) 800 MG tablet Take 1 tablet (800 mg) by mouth 3 times daily with food     valsartan (DIOVAN) 160 MG tablet Take 1 tablet (160 mg) by mouth daily     enzalutamide (XTANDI) 40 MG capsule Take 4 capsules (160 mg) by mouth daily     No current facility-administered medications for this visit.        ALLERGIES     No Known Allergies     REVIEW OF SYSTEMS   As above in the HPI, o/w complete 12-point ROS was negative.     PHYSICAL EXAM   BP (!) 143/71   Pulse 63   Temp 97.6  F (36.4  C) (Tympanic)   Resp 16   Wt 100.2 kg (221 lb)   SpO2 94%   BMI 33.60 kg/m    SpO2 Readings from Last 4 Encounters:    09/21/18 96%   08/24/18 94%   06/22/18 95%   05/11/18 97%     Wt Readings from Last 3 Encounters:   06/11/21 100.2 kg (221 lb)   06/04/21 100.5 kg (221 lb 8 oz)   02/03/20 99.1 kg (218 lb 6.4 oz)        LABORATORY AND IMAGING STUDIES     Recent Labs   Lab Test 05/28/21  0907 12/07/20  1544 07/31/20  1601 04/03/20  0909 01/21/20  1154    141 142 141 141   POTASSIUM 4.3 4.2 4.2 4.3 3.9   CHLORIDE 107 108 109 107 109   CO2 28 32 29 29 26   ANIONGAP 5 1* 4 5 6   BUN 12 15 13 14 16   CR 0.92 1.03 0.99 0.93 0.94   GLC 88 110* 100* 92 101*   TY 8.8 8.9 8.9 8.8 9.4     Recent Labs   Lab Test 01/21/20  1154   MAG 2.0     Recent Labs   Lab Test 05/28/21  0907 12/07/20  1544 07/31/20  1601 04/03/20  0909 01/21/20  1154   WBC 4.7 6.7 5.6 5.2 6.2   HGB 14.8 14.9 14.0 14.6 14.7    191 159 185 163   MCV 92 92 92 91 91   NEUTROPHIL 56.0 56.4 56.1 54.0 60.2     Recent Labs   Lab Test 05/28/21  0907 12/07/20  1544 07/31/20  1601   BILITOTAL 0.6 0.5 0.4   ALKPHOS 122 118 112   ALT 31 27 35   AST 21 26 27   ALBUMIN 3.8 3.8 3.9   * 262* 257*     Recent Labs   Lab Test 05/28/21  0907 12/07/20  1544 07/31/20  1601 04/03/20  0909 10/31/19  0945   PSA 1.01 0.34 0.54 1.56 0.22   TESTOSTTOTAL 6* 13* 5* 13* 8*     Recent Labs   Lab Test 05/28/21  0907 12/07/20  1544 07/31/20  1601 04/03/20  0909 10/31/19  0945   PSA 1.01 0.34 0.54 1.56 0.22   ALKPHOS 122 118 112 116 103   * 262* 257* 230* 210              ASSESSMENT AND PLAN   1. Biochemical PSA relapse post prostatectomy without any response to adjuvant radiation therapy  2. Rapid rise in PSA on observation phase of intermittent androgen deprivation therapy  3. HTN  4. ECOG PS1  5. No medical comorbidity    I had a lengthy discussion with Steve who is accompanied by his wife at this visit. All his labs are within normal limits -except for his LDH, PSA and testosterone. His PSA had progressed on single agent Lupron to 1.56 ng/mL on 4/3/2020.  He has been started on  bicalutamide and his PSA initially dropped to 0.34 ng/mL on 12/7/2020, but has again increased to 1 ng/mL.     This is concerning for progressive disease.  His PSA has doubled more than twice within the last 6 months.  I would recommend switching to a newer receptor blocker like enzalutamide, apalutamide or darolutamide. We reviewed these last week and focussed on enzalutamide.      Enzalutamide was tested in a similar study 'PROSPER' as apalutamide (N Engl J Med 2018; 378:6314-7760). In this double-blind, phase 3 trial men with nonmetastatic, castration-resistant prostate cancer and a PSA doubling time of 10 months or less who were continuing androgen-deprivation therapy we rerandomly assigned in a 2:1 ratio to receive enzalutamide (at a dose of 160 mg) or placebo once daily. The median metastasis-free survival was 36.6 months in the enzalutamide group versus 14.7 months in the placebo group (hazard ratio for metastasis or death, 0.29; 95% confidence interval, 0.24  0.35; P<0.001). There was a similar improvement in time to next anti-neoplastic therapy and time to PSA    He has been very worried after reading the list of side effects for enzalutamide. I tried to explain him that enzalutamide is very well tolerated. The long list of side effects actually reflects the patient population in which this medication was tested. The incidence of majority of side effects is similarly high in the control arm of patients.      Essentially all of the newer anti-androgens have shown a similar benefit in delaying metastasis free survival. I have suggested enzalutamide given our longest experience with this agent.     I would recommend discontinuing bicalutamide and starting him on enzalutamide. I would get new restaging scans (CT chest, abdomen and pelvis and bone scan) to establish a baseline. We have not had a restaging scan for him in nearly 4 years. I have consulted our pharmacy team to follow along and help monitoring Steve  while on therapy. Additional teaching for enzalutamide was provided by our clinical pharmacist.     I reviewed the COG-CAP study to monitor cognition while on therapy. He was a little worried about the possibility of dementia on androgen deprivation therapy. I explained him that the study utilizes testing to detect early dementia. He was open to participation in the clinical trial. I will reach out to our research nurse to enroll him on the study.     I will have him return in a month to review findings of baseline scan and follow on symptoms on enzalutamide.     45 minutes spent on the date of the encounter doing chart review, history and exam, documentation and further activities as noted above     Gigi Ward  ,  Division of Hematology, Oncology & Transplantation  [facility].              Again, thank you for allowing me to participate in the care of your patient.        Sincerely,        Gigi Ward MD

## 2021-06-14 NOTE — TELEPHONE ENCOUNTER
Dani or Assistance Initiated  Medication: Xtandi  Assistance type (copay card, dani, etc.) Xtandi support solutions  Date submitted: 06/14/2021 waiting for Dr. Ward to send back application

## 2021-06-15 ENCOUNTER — RESEARCH ENCOUNTER (OUTPATIENT)
Dept: ONCOLOGY | Facility: CLINIC | Age: 76
End: 2021-06-15

## 2021-06-16 ENCOUNTER — RESEARCH ENCOUNTER (OUTPATIENT)
Dept: ONCOLOGY | Facility: CLINIC | Age: 76
End: 2021-06-16

## 2021-06-16 NOTE — PROGRESS NOTES
[id number]: Telephone Contact     I sent the consent form and HIPAA form today (6/16/2021) for the COGCAP study in the mail for the patient's review and included my business card.     Clinical Research Coordinator  Kelly Colbert  [phone]

## 2021-06-16 NOTE — PROGRESS NOTES
"[id number]: Telephone Contact      Called patient on 6/15/2021 to see whether or not he would be interested in participating in Dr. Crouch's study \"Cognitive Effects of Androgen Receptor (AR) Directed Therapies for Advanced Prostate Cancer.\" Patient is unsure and liked the idea of me sending him the consent form in the mail to look through it with his wife.    Will send the consent form in the mail tomorrow (6/16/2021).    Clinical Research Coordinator  Kelly Colbert  [phone]  "

## 2021-06-18 ENCOUNTER — PATIENT OUTREACH (OUTPATIENT)
Dept: ONCOLOGY | Facility: CLINIC | Age: 76
End: 2021-06-18

## 2021-06-18 NOTE — PROGRESS NOTES
Received call from pt who states he is waiting to hear about medication approval due to cost and inability to afford.  Pt cannot recall name of medication (xtandi as prescribed per Dr Ward).  Pt informed that writer will inquire about this and call him back with update.  Discussed call with [facility] Rn who states she received application for assistance for Dr Ward to sign from pharmacy liaison Brandie.  Called pt back and left detailed message on personal VM stating we are in process of applying for assistance/josué for coverage and either RN Care Coordinator or Brandie will call pt back when update obtained.      Rosa Ramirez, RN, BSN, OCN

## 2021-06-22 NOTE — PROGRESS NOTES
06/22/21 awaiting further update from Brandie.     Ashely Torres, BSN, RN, OCN  RN Cancer Care Coordinator  [facility]  [phone]

## 2021-06-22 NOTE — TELEPHONE ENCOUNTER
Xtandi [email or web address] ph: [phone] spoke to Meg. Input info missing page 2 needs Dr. Ward to sign in prescribe signature spot

## 2021-06-25 ENCOUNTER — DOCUMENTATION ONLY (OUTPATIENT)
Dept: INFUSION THERAPY | Facility: CLINIC | Age: 76
End: 2021-06-25

## 2021-06-28 NOTE — PROGRESS NOTES
Spoke to Gala at [employer], still pending and need to resend page 2 again to [phone], sat on phone with her and she saw them come in now. 6/28Marc leon

## 2021-06-30 ENCOUNTER — HOSPITAL ENCOUNTER (OUTPATIENT)
Dept: NUCLEAR MEDICINE | Facility: CLINIC | Age: 76
Setting detail: NUCLEAR MEDICINE
Discharge: HOME | End: 2021-06-30
Attending: INTERNAL MEDICINE | Admitting: INTERNAL MEDICINE
Payer: COMMERCIAL

## 2021-06-30 ENCOUNTER — HOSPITAL ENCOUNTER (OUTPATIENT)
Facility: CLINIC | Age: 76
Setting detail: SPECIMEN
Discharge: HOME | End: 2021-06-30
Attending: FAMILY MEDICINE | Admitting: FAMILY MEDICINE
Payer: COMMERCIAL

## 2021-06-30 ENCOUNTER — HOSPITAL ENCOUNTER (OUTPATIENT)
Dept: NUCLEAR MEDICINE | Facility: CLINIC | Age: 76
Setting detail: NUCLEAR MEDICINE
End: 2021-06-30
Attending: INTERNAL MEDICINE
Payer: COMMERCIAL

## 2021-06-30 ENCOUNTER — HOSPITAL ENCOUNTER (OUTPATIENT)
Dept: CT IMAGING | Facility: CLINIC | Age: 76
Discharge: HOME | End: 2021-06-30
Attending: INTERNAL MEDICINE | Admitting: INTERNAL MEDICINE
Payer: COMMERCIAL

## 2021-06-30 ENCOUNTER — INFUSION THERAPY VISIT (OUTPATIENT)
Dept: INFUSION THERAPY | Facility: CLINIC | Age: 76
End: 2021-06-30
Attending: FAMILY MEDICINE
Payer: COMMERCIAL

## 2021-06-30 DIAGNOSIS — C61 PROSTATE CANCER (H): ICD-10-CM

## 2021-06-30 LAB
ALBUMIN SERPL-MCNC: 3.7 G/DL (ref 3.4–5)
ALP SERPL-CCNC: 120 U/L (ref 40–150)
ALT SERPL W P-5'-P-CCNC: 32 U/L (ref 0–70)
ANION GAP SERPL CALCULATED.3IONS-SCNC: 3 MMOL/L (ref 3–14)
AST SERPL W P-5'-P-CCNC: 41 U/L (ref 0–45)
BASOPHILS # BLD AUTO: 0.1 10E9/L (ref 0–0.2)
BASOPHILS NFR BLD AUTO: 1.2 %
BILIRUB SERPL-MCNC: 0.8 MG/DL (ref 0.2–1.3)
BUN SERPL-MCNC: 15 MG/DL (ref 7–30)
CALCIUM SERPL-MCNC: 8.8 MG/DL (ref 8.5–10.1)
CHLORIDE SERPL-SCNC: 106 MMOL/L (ref 94–109)
CO2 SERPL-SCNC: 27 MMOL/L (ref 20–32)
CREAT SERPL-MCNC: 0.96 MG/DL (ref 0.66–1.25)
DIFFERENTIAL METHOD BLD: NORMAL
EOSINOPHIL # BLD AUTO: 0.4 10E9/L (ref 0–0.7)
EOSINOPHIL NFR BLD AUTO: 6.9 %
ERYTHROCYTE [DISTWIDTH] IN BLOOD BY AUTOMATED COUNT: 12.8 % (ref 10–15)
GFR SERPL CREATININE-BSD FRML MDRD: 76 ML/MIN/{1.73_M2}
GLUCOSE SERPL-MCNC: 89 MG/DL (ref 70–99)
HCT VFR BLD AUTO: 41.2 % (ref 40–53)
HGB BLD-MCNC: 14.4 G/DL (ref 13.3–17.7)
IMM GRANULOCYTES # BLD: 0 10E9/L (ref 0–0.4)
IMM GRANULOCYTES NFR BLD: 0 %
LYMPHOCYTES # BLD AUTO: 1.7 10E9/L (ref 0.8–5.3)
LYMPHOCYTES NFR BLD AUTO: 29.4 %
MCH RBC QN AUTO: 31.9 PG (ref 26.5–33)
MCHC RBC AUTO-ENTMCNC: 35 G/DL (ref 31.5–36.5)
MCV RBC AUTO: 91 FL (ref 78–100)
MONOCYTES # BLD AUTO: 0.6 10E9/L (ref 0–1.3)
MONOCYTES NFR BLD AUTO: 9.5 %
NEUTROPHILS # BLD AUTO: 3 10E9/L (ref 1.6–8.3)
NEUTROPHILS NFR BLD AUTO: 53 %
NRBC # BLD AUTO: 0 10*3/UL
NRBC BLD AUTO-RTO: 0 /100
PLATELET # BLD AUTO: 170 10E9/L (ref 150–450)
PLATELET # BLD EST: NORMAL 10*3/UL
POTASSIUM SERPL-SCNC: 4.6 MMOL/L (ref 3.4–5.3)
PROT SERPL-MCNC: 7.7 G/DL (ref 6.8–8.8)
RBC # BLD AUTO: 4.51 10E12/L (ref 4.4–5.9)
RBC MORPH BLD: NORMAL
SODIUM SERPL-SCNC: 136 MMOL/L (ref 133–144)
WBC # BLD AUTO: 5.8 10E9/L (ref 4–11)

## 2021-06-30 PROCEDURE — 250N000011 HC RX IP 250 OP 636: Performed by: INTERNAL MEDICINE

## 2021-06-30 PROCEDURE — 343N000001 HC RX 343: Performed by: INTERNAL MEDICINE

## 2021-06-30 PROCEDURE — 85025 COMPLETE CBC W/AUTO DIFF WBC: CPT | Performed by: INTERNAL MEDICINE

## 2021-06-30 PROCEDURE — 71260 CT THORAX DX C+: CPT

## 2021-06-30 PROCEDURE — 78306 BONE IMAGING WHOLE BODY: CPT

## 2021-06-30 PROCEDURE — A9503 TC99M MEDRONATE: HCPCS | Performed by: INTERNAL MEDICINE

## 2021-06-30 PROCEDURE — 36591 DRAW BLOOD OFF VENOUS DEVICE: CPT

## 2021-06-30 PROCEDURE — 250N000009 HC RX 250: Performed by: INTERNAL MEDICINE

## 2021-06-30 PROCEDURE — 80053 COMPREHEN METABOLIC PANEL: CPT | Performed by: INTERNAL MEDICINE

## 2021-06-30 RX ORDER — IOPAMIDOL 755 MG/ML
500 INJECTION, SOLUTION INTRAVASCULAR ONCE
Status: COMPLETED | OUTPATIENT
Start: 2021-06-30 | End: 2021-06-30

## 2021-06-30 RX ORDER — TC 99M MEDRONATE 20 MG/10ML
25 INJECTION, POWDER, LYOPHILIZED, FOR SOLUTION INTRAVENOUS ONCE
Status: COMPLETED | OUTPATIENT
Start: 2021-06-30 | End: 2021-06-30

## 2021-06-30 RX ADMIN — TC 99M MEDRONATE 25 MCI.: 20 INJECTION, POWDER, LYOPHILIZED, FOR SOLUTION INTRAVENOUS at 10:47

## 2021-06-30 RX ADMIN — SODIUM CHLORIDE 65 ML: 9 INJECTION, SOLUTION INTRAVENOUS at 11:00

## 2021-06-30 RX ADMIN — IOPAMIDOL 85 ML: 755 INJECTION, SOLUTION INTRAVENOUS at 11:00

## 2021-06-30 NOTE — PROGRESS NOTES
Nursing Note:  Wallace Zelaya presents today for PIV start, labs.    Patient seen by provider today: No   present during visit today: Not Applicable.    Note: N/A.    Intravenous Access:  Peripheral IV placed.  IV left in place for CT scan.    Discharge Plan:   Patient was sent to imaging for CT scan appointment.    Judie Aguirre RN

## 2021-07-02 NOTE — PROGRESS NOTES
Spoke Dolly at [employer], application still pending can't get insurance to verify. Maybe Wednesday or Thursday

## 2021-07-07 ENCOUNTER — VIRTUAL VISIT (OUTPATIENT)
Dept: ONCOLOGY | Facility: CLINIC | Age: 76
End: 2021-07-07
Attending: INTERNAL MEDICINE
Payer: COMMERCIAL

## 2021-07-07 DIAGNOSIS — C79.51 BONE METASTASIS: ICD-10-CM

## 2021-07-07 DIAGNOSIS — C61 PROSTATE CANCER (H): Primary | ICD-10-CM

## 2021-07-07 PROCEDURE — 99214 OFFICE O/P EST MOD 30 MIN: CPT | Mod: 95 | Performed by: PHYSICIAN ASSISTANT

## 2021-07-07 NOTE — PROGRESS NOTES
Steve is a 75 year old who is being evaluated via a billable video visit.      How would you like to obtain your AVS? MyChart  If the video visit is dropped, the invitation should be resent by: Text to cell phone: [phone]  Will anyone else be joining your video visit? No       Velma Amaya PA-C on 7/7/2021 at 3:38 PM      Video Start Time: 4:02 PM  Video-Visit Details    Type of service:  Video Visit    Video End Time:4:18 PM    Originating Location (pt. 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  Screening PSA was elevated and he underwent radical prostatectomy 11/2015.  Pathology revealed Cayla 4+4 disease, seminal vesicle extension and all 12 lymph nodes resected were negative.  His PSA did not drop after prostatectomy and remained elevated, therefore he completed adjuvant radiation therapy, still with no PSA response.  He established care with Dr. Ward and he started him on androgen deprivation therapy with Lupron.  He took a treatment break and his PSA started to rise dramatically, therefore started on Lupron again.  He was on this again for several months and was noted to have a rise in his PSA in April 2020 again.  Dr. Ward recommended bicalutamide (Casodex) 50mg which he was taking for a little over a year, but then PSA started to rise again.  Dr. Ward recommended enzalutamide, but he has not started this yet..    Video visit today for close follow-up and imaging results.     Interval History: Steve is on video with his wife, Sandra.  He has not started the enzalutamide and actually continues with the bicalutamide.  No fever chills, vomiting diarrhea, bleeding, skin changes.    Review of  Systems: See interval hx. Denies fevers, chills, HA, dizziness, CP, SOB, abdominal pain, N/V, diarrhea, changes in urination.     PMHx and Social Hx reviewed per EPIC.      Medications:  Current Outpatient Medications   Medication Sig Dispense Refill     amLODIPine (NORVASC) 10 MG tablet Take 1 tablet (10 mg) by mouth daily 90 tablet 3     atorvastatin (LIPITOR) 20 MG tablet TAKE 1 TABLET EVERY DAY 90 tablet 4     bicalutamide (CASODEX) 50 MG tablet Take 1 tablet by mouth once daily 90 tablet 0     enzalutamide (XTANDI) 40 MG capsule Take 4 capsules (160 mg) by mouth daily 120 capsule 0     ibuprofen (ADVIL/MOTRIN) 800 MG tablet Take 1 tablet (800 mg) by mouth 3 times daily with food 90 tablet 3     valsartan (DIOVAN) 160 MG tablet Take 1 tablet (160 mg) by mouth daily 90 tablet 3       No Known Allergies    EXAM:    There were no vitals taken for this visit. Video visit, therefore VS were not obtained.     GENERAL:  Male, in no acute distress.  Alert and oriented x3. Well groomed.   HEENT:  Normocephalic, atraumatic. No conjunctival injection or eye swelling.   LUNGS:  Nonlabored breathing, no cough or audible wheezing, able to speak full sentences.  MSK: Full ROM UE.    SKIN: No rash on exposed skin.   NEURO: CN grossly intact, speech normal  PSYCH: Mentation appears normal, insight and judgement intact      Labs:   Results for ERNESTO DAY (MRN [id number]) as of 7/7/2021 16:51   6/30/2021 10:29   Sodium 136   Potassium 4.6   Chloride 106   Carbon Dioxide 27   Urea Nitrogen 15   Creatinine 0.96   GFR Estimate 76   GFR Estimate If Black 88   Calcium 8.8   Anion Gap 3   Albumin 3.7   Protein Total 7.7   Bilirubin Total 0.8   Alkaline Phosphatase 120   ALT 32   AST 41   Glucose 89   WBC 5.8   Hemoglobin 14.4   Hematocrit 41.2   Platelet Count 170   RBC Count 4.51   MCV 91   MCH 31.9   MCHC 35.0   RDW 12.8   Diff Method Automated Method   % Neutrophils 53.0   % Lymphocytes 29.4   % Monocytes 9.5   % Eosinophils 6.9   %  Basophils 1.2   % Immature Granulocytes 0.0   Nucleated RBCs 0   Absolute Neutrophil 3.0   Absolute Lymphocytes 1.7   Absolute Monocytes 0.6   Absolute Eosinophils 0.4   Absolute Basophils 0.1   Abs Immature Granulocytes 0.0   Absolute Nucleated RBC 0.0   RBC Morphology Consistent with reported results   Platelet Estimate Automated count confirmed.  Platelet morphology is normal.         Imaging:   CT CHEST/ABDOMEN/PELVIS WITH CONTRAST 6/30/2021 11:31 AM     CLINICAL HISTORY: Prostate cancer with rising PSA; Prostate cancer  (H).     TECHNIQUE: CT scan of the chest, abdomen, and pelvis was performed  following injection of IV contrast. Multiplanar reformats were  obtained. Dose reduction techniques were used.      CONTRAST: 85mL Isovue-370     COMPARISON: CT chest 1/20/2017. CT abdomen and pelvis 11/9/2016.     FINDINGS:   LUNGS AND PLEURA: No acute airspace disease or effusion. Stable  posterior left upper lobe 0.3 cm nodule series 12 image 71. Stable 0.2  cm posterior medial right upper lobe nodule image 97. There is a solid  anterior right lower lobe nodule abutting the right major fissure that  is 0.7 cm series 12 image 218. This is present on the prior CT but it  previously measured approximately 0.4 cm. New small posterior right  lower lobe subpleural 0.4 cm nodule image 233. Stable right middle  lobe 0.2 cm nodule image 193.     MEDIASTINUM/AXILLAE: Moderate scattered thoracic aortic  calcifications. No acute mediastinal abnormality. No enlarged lymph  nodes identified. A few stable small thoracic lymph nodes are present.     CORONARY ARTERY CALCIFICATION: Moderate.     HEPATOBILIARY: No new focal hepatic lesion. Unremarkable gallbladder.     PANCREAS: Normal.     SPLEEN: Subcapsular faint hypodensity that appears mildly nodular is  again noted without convincing change compared to an older CT from  4/14/2016. This could be visualized on series 4 image 127 image 137.     ADRENAL GLANDS: Stable small right  adrenal nodule that is 1.2 cm  series 4 image 154. Stable left adrenal.     KIDNEYS/BLADDER: No significant abnormality. No hydronephrosis. No  bladder lesion.     BOWEL: No acute abnormality.     PELVIC ORGANS: Prostate is absent. No new mass at the prostatectomy  bed.     ADDITIONAL FINDINGS: No new enlarged lymph node can be seen. Small  retroperitoneal lymph nodes appear stable.     MUSCULOSKELETAL: Increasing sclerosis at the right T6 level now  measuring 3.5 x 1.7 cm, previously 1.2 x 0.6 cm series 4 image 67.  Stable right lateral sixth rib sclerosis image 89, right pubic  sclerosis image 299. New sclerotic bone lesion at the mid sacrum that  is 3.6 x 3.1 cm on coronal series 6 image 141.        NM BONE SCAN WHOLE BODY  6/30/2021 3:01 PM     HISTORY: Prostate cancer with rising PSA; Prostate cancer (H).     TECHNIQUE:  25.0mCI Tc99m MDP.  Anterior and posterior images.  Selected oblique images.     COMPARISON:  Prior bone scan: 11/9/2016   Relevant imaging study: None.     FINDINGS: Focal uptake at T6 on the right. Body CT demonstrates a  sclerotic lesion in this area. Increased uptake of the lower sacrum,  centered left parasagittal. CT demonstrates a sclerotic lesion. There  are some scattered areas of presumed degenerative/arthritic type  activity.                                                                       IMPRESSION: Metastatic lesions of T6 and the sacrum.                                                                      IMPRESSION:  1.  New sacral bone lesion and larger T6 vertebral body sclerotic bone  lesion consistent with sites of bony metastatic disease progression.  Bone scanning would provide more sensitive assessment. Stable other  areas of sclerosis as above.  2.  A few new or larger pulmonary nodules are indeterminant. Cannot  exclude possibility of metastatic disease. Recommend further workup.  The dominant nodule is at the right lower lobe near the major fissure.  Recommend  short interval follow-up CT chest in 3 months.  3.  Other stable findings as above.      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    Prostate cancer: Castration resistant with rising PSA, 1.01 on 5/28/2021.  He had been on Lupron and Casodex and Dr. Ward recently recommended enzalutamide, but he has not started this medication.  Bone scan and CT CAP with progression disease, but previous scans were 4+ years ago.  This is a new baseline before starting enzalutamide.  We are looking for assistance/josué to help with the cost.  I have a message out to Dr. Ward and pharmacy.    Bony metastasis: T6 lesion and sacral lesion, discussed with patient today.  No pain and we will likely start Zometa in the future.      Chart documentation with Dragon Voice recognition Software. Although reviewed after completion, some words and grammatical errors may remain.      Velma Amaya PA-C  Hematology/Oncology  [facility]

## 2021-07-07 NOTE — LETTER
7/7/2021         RE: Wallace Zelaya  [id number] [address] [address]        Dear Colleague,    Thank you for referring your patient, Wallace Zelaya, to the [facility]. Please see a copy of my visit note below.    Steve is a 75 year old who is being evaluated via a billable video visit.      How would you like to obtain your AVS? MyChart  If the video visit is dropped, the invitation should be resent by: Text to cell phone: [phone]  Will anyone else be joining your video visit? No       Velma Amaya PA-C on 7/7/2021 at 3:38 PM      Video Start Time: 4:02 PM  Video-Visit Details    Type of service:  Video Visit    Video End Time:4:18 PM    Originating Location (pt. Location): Home    Distant Location (provider location):  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  Screening PSA was elevated and he underwent radical prostatectomy 11/2015.  Pathology revealed Cayla 4+4 disease, seminal vesicle extension and all 12 lymph nodes resected were negative.  His PSA did not drop after prostatectomy and remained elevated, therefore he completed adjuvant radiation therapy, still with no PSA response.  He established care with Dr. Ward and he started him on androgen deprivation therapy with Lupron.  He took a treatment break and his PSA started to rise dramatically, therefore started on Lupron again.  He was on this again for several months and was noted to have a rise in his PSA in April 2020 again.  Dr. Ward recommended bicalutamide (Casodex) 50mg which he was taking for a little over a year, but then PSA started to rise again.  Dr. Ward recommended enzalutamide, but he has not started this yet..    Video visit today  for close follow-up and imaging results.     Interval History: Steve is on video with his wife, Sandra.  He has not started the enzalutamide and actually continues with the bicalutamide.  No fever chills, vomiting diarrhea, bleeding, skin changes.    Review of Systems: See interval hx. Denies fevers, chills, HA, dizziness, CP, SOB, abdominal pain, N/V, diarrhea, changes in urination.     PMHx and Social Hx reviewed per EPIC.      Medications:  Current Outpatient Medications   Medication Sig Dispense Refill     amLODIPine (NORVASC) 10 MG tablet Take 1 tablet (10 mg) by mouth daily 90 tablet 3     atorvastatin (LIPITOR) 20 MG tablet TAKE 1 TABLET EVERY DAY 90 tablet 4     bicalutamide (CASODEX) 50 MG tablet Take 1 tablet by mouth once daily 90 tablet 0     enzalutamide (XTANDI) 40 MG capsule Take 4 capsules (160 mg) by mouth daily 120 capsule 0     ibuprofen (ADVIL/MOTRIN) 800 MG tablet Take 1 tablet (800 mg) by mouth 3 times daily with food 90 tablet 3     valsartan (DIOVAN) 160 MG tablet Take 1 tablet (160 mg) by mouth daily 90 tablet 3       No Known Allergies    EXAM:    There were no vitals taken for this visit. Video visit, therefore VS were not obtained.     GENERAL:  Male, in no acute distress.  Alert and oriented x3. Well groomed.   HEENT:  Normocephalic, atraumatic. No conjunctival injection or eye swelling.   LUNGS:  Nonlabored breathing, no cough or audible wheezing, able to speak full sentences.  MSK: Full ROM UE.    SKIN: No rash on exposed skin.   NEURO: CN grossly intact, speech normal  PSYCH: Mentation appears normal, insight and judgement intact      Labs:   Results for STEVE DAY (MRN [id number]) as of 7/7/2021 16:51   6/30/2021 10:29   Sodium 136   Potassium 4.6   Chloride 106   Carbon Dioxide 27   Urea Nitrogen 15   Creatinine 0.96   GFR Estimate 76   GFR Estimate If Black 88   Calcium 8.8   Anion Gap 3   Albumin 3.7   Protein Total 7.7   Bilirubin Total 0.8   Alkaline Phosphatase 120   ALT 32   AST  41   Glucose 89   WBC 5.8   Hemoglobin 14.4   Hematocrit 41.2   Platelet Count 170   RBC Count 4.51   MCV 91   MCH 31.9   MCHC 35.0   RDW 12.8   Diff Method Automated Method   % Neutrophils 53.0   % Lymphocytes 29.4   % Monocytes 9.5   % Eosinophils 6.9   % Basophils 1.2   % Immature Granulocytes 0.0   Nucleated RBCs 0   Absolute Neutrophil 3.0   Absolute Lymphocytes 1.7   Absolute Monocytes 0.6   Absolute Eosinophils 0.4   Absolute Basophils 0.1   Abs Immature Granulocytes 0.0   Absolute Nucleated RBC 0.0   RBC Morphology Consistent with reported results   Platelet Estimate Automated count confirmed.  Platelet morphology is normal.         Imaging:   CT CHEST/ABDOMEN/PELVIS WITH CONTRAST 6/30/2021 11:31 AM     CLINICAL HISTORY: Prostate cancer with rising PSA; Prostate cancer  (H).     TECHNIQUE: CT scan of the chest, abdomen, and pelvis was performed  following injection of IV contrast. Multiplanar reformats were  obtained. Dose reduction techniques were used.      CONTRAST: 85mL Isovue-370     COMPARISON: CT chest 1/20/2017. CT abdomen and pelvis 11/9/2016.     FINDINGS:   LUNGS AND PLEURA: No acute airspace disease or effusion. Stable  posterior left upper lobe 0.3 cm nodule series 12 image 71. Stable 0.2  cm posterior medial right upper lobe nodule image 97. There is a solid  anterior right lower lobe nodule abutting the right major fissure that  is 0.7 cm series 12 image 218. This is present on the prior CT but it  previously measured approximately 0.4 cm. New small posterior right  lower lobe subpleural 0.4 cm nodule image 233. Stable right middle  lobe 0.2 cm nodule image 193.     MEDIASTINUM/AXILLAE: Moderate scattered thoracic aortic  calcifications. No acute mediastinal abnormality. No enlarged lymph  nodes identified. A few stable small thoracic lymph nodes are present.     CORONARY ARTERY CALCIFICATION: Moderate.     HEPATOBILIARY: No new focal hepatic lesion. Unremarkable gallbladder.     PANCREAS:  Normal.     SPLEEN: Subcapsular faint hypodensity that appears mildly nodular is  again noted without convincing change compared to an older CT from  4/14/2016. This could be visualized on series 4 image 127 image 137.     ADRENAL GLANDS: Stable small right adrenal nodule that is 1.2 cm  series 4 image 154. Stable left adrenal.     KIDNEYS/BLADDER: No significant abnormality. No hydronephrosis. No  bladder lesion.     BOWEL: No acute abnormality.     PELVIC ORGANS: Prostate is absent. No new mass at the prostatectomy  bed.     ADDITIONAL FINDINGS: No new enlarged lymph node can be seen. Small  retroperitoneal lymph nodes appear stable.     MUSCULOSKELETAL: Increasing sclerosis at the right T6 level now  measuring 3.5 x 1.7 cm, previously 1.2 x 0.6 cm series 4 image 67.  Stable right lateral sixth rib sclerosis image 89, right pubic  sclerosis image 299. New sclerotic bone lesion at the mid sacrum that  is 3.6 x 3.1 cm on coronal series 6 image 141.        NM BONE SCAN WHOLE BODY  6/30/2021 3:01 PM     HISTORY: Prostate cancer with rising PSA; Prostate cancer (H).     TECHNIQUE:  25.0mCI Tc99m MDP.  Anterior and posterior images.  Selected oblique images.     COMPARISON:  Prior bone scan: 11/9/2016   Relevant imaging study: None.     FINDINGS: Focal uptake at T6 on the right. Body CT demonstrates a  sclerotic lesion in this area. Increased uptake of the lower sacrum,  centered left parasagittal. CT demonstrates a sclerotic lesion. There  are some scattered areas of presumed degenerative/arthritic type  activity.                                                                       IMPRESSION: Metastatic lesions of T6 and the sacrum.                                                                      IMPRESSION:  1.  New sacral bone lesion and larger T6 vertebral body sclerotic bone  lesion consistent with sites of bony metastatic disease progression.  Bone scanning would provide more sensitive assessment. Stable  other  areas of sclerosis as above.  2.  A few new or larger pulmonary nodules are indeterminant. Cannot  exclude possibility of metastatic disease. Recommend further workup.  The dominant nodule is at the right lower lobe near the major fissure.  Recommend short interval follow-up CT chest in 3 months.  3.  Other stable findings as above.      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    Prostate cancer: Castration resistant with rising PSA, 1.01 on 5/28/2021.  He had been on Lupron and Casodex and Dr. Ward recently recommended enzalutamide, but he has not started this medication.  Bone scan and CT CAP with progression disease, but previous scans were 4+ years ago.  This is a new baseline before starting enzalutamide.  We are looking for assistance/josué to help with the cost.  I have a message out to Dr. Ward and pharmacy.    Bony metastasis: T6 lesion and sacral lesion, discussed with patient today.  No pain and we will likely start Zometa in the future.      Chart documentation with Dragon Voice recognition Software. Although reviewed after completion, some words and grammatical errors may remain.      Velma Amaya PA-C  Hematology/Oncology  [facility]                  Again, thank you for allowing me to participate in the care of your patient.        Sincerely,        Velma Amaya PA-C

## 2021-07-07 NOTE — LETTER
7/7/2021         RE: Wallace Zelaya  [address] [facility] [id number]        Dear Colleague,    Thank you for referring your patient, Wallace Zelaya, to the [facility]. Please see a copy of my visit note below.    Steve is a 75 year old who is being evaluated via a billable video visit.      How would you like to obtain your AVS? MyChart  If the video visit is dropped, the invitation should be resent by: Text to cell phone: [phone]  Will anyone else be joining your video visit? No       Velma Amaya PA-C on 7/7/2021 at 3:38 PM      Video Start Time: 4:02 PM  Video-Visit Details    Type of service:  Video Visit    Video End Time:4:18 PM    Originating Location (pt. 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  Screening PSA was elevated and he underwent radical prostatectomy 11/2015.  Pathology revealed Cayla 4+4 disease, seminal vesicle extension and all 12 lymph nodes resected were negative.  His PSA did not drop after prostatectomy and remained elevated, therefore he completed adjuvant radiation therapy, still with no PSA response.  He established care with Dr. Ward and he started him on androgen deprivation therapy with Lupron.  He took a treatment break and his PSA started to rise dramatically, therefore started on Lupron again.  He was on this again for several months and was noted to have a rise in his PSA in April 2020 again.  Dr. Ward recommended bicalutamide (Casodex) 50mg which he was taking for a little over a year, but then PSA started to rise again.  Dr. Ward recommended enzalutamide, but he has not started this yet..    Video visit today  for close follow-up and imaging results.     Interval History: Steve is on video with his wife, Sandra.  He has not started the enzalutamide and actually continues with the bicalutamide.  No fever chills, vomiting diarrhea, bleeding, skin changes.    Review of Systems: See interval hx. Denies fevers, chills, HA, dizziness, CP, SOB, abdominal pain, N/V, diarrhea, changes in urination.     PMHx and Social Hx reviewed per EPIC.      Medications:  Current Outpatient Medications   Medication Sig Dispense Refill     amLODIPine (NORVASC) 10 MG tablet Take 1 tablet (10 mg) by mouth daily 90 tablet 3     atorvastatin (LIPITOR) 20 MG tablet TAKE 1 TABLET EVERY DAY 90 tablet 4     bicalutamide (CASODEX) 50 MG tablet Take 1 tablet by mouth once daily 90 tablet 0     enzalutamide (XTANDI) 40 MG capsule Take 4 capsules (160 mg) by mouth daily 120 capsule 0     ibuprofen (ADVIL/MOTRIN) 800 MG tablet Take 1 tablet (800 mg) by mouth 3 times daily with food 90 tablet 3     valsartan (DIOVAN) 160 MG tablet Take 1 tablet (160 mg) by mouth daily 90 tablet 3       No Known Allergies    EXAM:    There were no vitals taken for this visit. Video visit, therefore VS were not obtained.     GENERAL:  Male, in no acute distress.  Alert and oriented x3. Well groomed.   HEENT:  Normocephalic, atraumatic. No conjunctival injection or eye swelling.   LUNGS:  Nonlabored breathing, no cough or audible wheezing, able to speak full sentences.  MSK: Full ROM UE.    SKIN: No rash on exposed skin.   NEURO: CN grossly intact, speech normal  PSYCH: Mentation appears normal, insight and judgement intact      Labs:   Results for STEVE DAY (MRN [id number]) as of 7/7/2021 16:51   6/30/2021 10:29   Sodium 136   Potassium 4.6   Chloride 106   Carbon Dioxide 27   Urea Nitrogen 15   Creatinine 0.96   GFR Estimate 76   GFR Estimate If Black 88   Calcium 8.8   Anion Gap 3   Albumin 3.7   Protein Total 7.7   Bilirubin Total 0.8   Alkaline Phosphatase 120   ALT 32   AST  41   Glucose 89   WBC 5.8   Hemoglobin 14.4   Hematocrit 41.2   Platelet Count 170   RBC Count 4.51   MCV 91   MCH 31.9   MCHC 35.0   RDW 12.8   Diff Method Automated Method   % Neutrophils 53.0   % Lymphocytes 29.4   % Monocytes 9.5   % Eosinophils 6.9   % Basophils 1.2   % Immature Granulocytes 0.0   Nucleated RBCs 0   Absolute Neutrophil 3.0   Absolute Lymphocytes 1.7   Absolute Monocytes 0.6   Absolute Eosinophils 0.4   Absolute Basophils 0.1   Abs Immature Granulocytes 0.0   Absolute Nucleated RBC 0.0   RBC Morphology Consistent with reported results   Platelet Estimate Automated count confirmed.  Platelet morphology is normal.         Imaging:   CT CHEST/ABDOMEN/PELVIS WITH CONTRAST 6/30/2021 11:31 AM     CLINICAL HISTORY: Prostate cancer with rising PSA; Prostate cancer  (H).     TECHNIQUE: CT scan of the chest, abdomen, and pelvis was performed  following injection of IV contrast. Multiplanar reformats were  obtained. Dose reduction techniques were used.      CONTRAST: 85mL Isovue-370     COMPARISON: CT chest 1/20/2017. CT abdomen and pelvis 11/9/2016.     FINDINGS:   LUNGS AND PLEURA: No acute airspace disease or effusion. Stable  posterior left upper lobe 0.3 cm nodule series 12 image 71. Stable 0.2  cm posterior medial right upper lobe nodule image 97. There is a solid  anterior right lower lobe nodule abutting the right major fissure that  is 0.7 cm series 12 image 218. This is present on the prior CT but it  previously measured approximately 0.4 cm. New small posterior right  lower lobe subpleural 0.4 cm nodule image 233. Stable right middle  lobe 0.2 cm nodule image 193.     MEDIASTINUM/AXILLAE: Moderate scattered thoracic aortic  calcifications. No acute mediastinal abnormality. No enlarged lymph  nodes identified. A few stable small thoracic lymph nodes are present.     CORONARY ARTERY CALCIFICATION: Moderate.     HEPATOBILIARY: No new focal hepatic lesion. Unremarkable gallbladder.     PANCREAS:  Normal.     SPLEEN: Subcapsular faint hypodensity that appears mildly nodular is  again noted without convincing change compared to an older CT from  4/14/2016. This could be visualized on series 4 image 127 image 137.     ADRENAL GLANDS: Stable small right adrenal nodule that is 1.2 cm  series 4 image 154. Stable left adrenal.     KIDNEYS/BLADDER: No significant abnormality. No hydronephrosis. No  bladder lesion.     BOWEL: No acute abnormality.     PELVIC ORGANS: Prostate is absent. No new mass at the prostatectomy  bed.     ADDITIONAL FINDINGS: No new enlarged lymph node can be seen. Small  retroperitoneal lymph nodes appear stable.     MUSCULOSKELETAL: Increasing sclerosis at the right T6 level now  measuring 3.5 x 1.7 cm, previously 1.2 x 0.6 cm series 4 image 67.  Stable right lateral sixth rib sclerosis image 89, right pubic  sclerosis image 299. New sclerotic bone lesion at the mid sacrum that  is 3.6 x 3.1 cm on coronal series 6 image 141.        NM BONE SCAN WHOLE BODY  6/30/2021 3:01 PM     HISTORY: Prostate cancer with rising PSA; Prostate cancer (H).     TECHNIQUE:  25.0mCI Tc99m MDP.  Anterior and posterior images.  Selected oblique images.     COMPARISON:  Prior bone scan: 11/9/2016   Relevant imaging study: None.     FINDINGS: Focal uptake at T6 on the right. Body CT demonstrates a  sclerotic lesion in this area. Increased uptake of the lower sacrum,  centered left parasagittal. CT demonstrates a sclerotic lesion. There  are some scattered areas of presumed degenerative/arthritic type  activity.                                                                       IMPRESSION: Metastatic lesions of T6 and the sacrum.                                                                      IMPRESSION:  1.  New sacral bone lesion and larger T6 vertebral body sclerotic bone  lesion consistent with sites of bony metastatic disease progression.  Bone scanning would provide more sensitive assessment. Stable  other  areas of sclerosis as above.  2.  A few new or larger pulmonary nodules are indeterminant. Cannot  exclude possibility of metastatic disease. Recommend further workup.  The dominant nodule is at the right lower lobe near the major fissure.  Recommend short interval follow-up CT chest in 3 months.  3.  Other stable findings as above.      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    Prostate cancer: Castration resistant with rising PSA, 1.01 on 5/28/2021.  He had been on Lupron and Casodex and Dr. Ward recently recommended enzalutamide, but he has not started this medication.  Bone scan and CT CAP with progression disease, but previous scans were 4+ years ago.  This is a new baseline before starting enzalutamide.  We are looking for assistance/josué to help with the cost.  I have a message out to Dr. Ward and pharmacy.    Bony metastasis: T6 lesion and sacral lesion, discussed with patient today.  No pain and we will likely start Zometa in the future.      Chart documentation with Dragon Voice recognition Software. Although reviewed after completion, some words and grammatical errors may remain.      Velma Amaya PA-C  Hematology/Oncology  [facility]                  Again, thank you for allowing me to participate in the care of your patient.        Sincerely,        Velma Amaya PA-C

## 2021-07-08 ENCOUNTER — TELEPHONE (OUTPATIENT)
Dept: PHARMACY | Facility: OTHER | Age: 76
End: 2021-07-08

## 2021-07-08 NOTE — PROGRESS NOTES
Spoke Shayla at [employer], application has been denied due to making too much income. [employer] support solutions goes by 300% below [employer] which is about $51,720 patient makes $4000 over this amount. I have sent a message to Dr. Ward to see what steps he would like us to do next. I will also keep checking grants to see if anything opens up for Steve also.

## 2021-07-08 NOTE — PROGRESS NOTES
PA Initiation    Medication: Abiraterone 250mg  REF.# [id number]  Insurance Company:  [facility] Part D  Pharmacy Filling the Rx:    Filling Pharmacy Phone:    Filling Pharmacy Fax:    Start Date:

## 2021-07-09 NOTE — PROGRESS NOTES
Free Drug Application Initiated  Medication: Zytiga  Sponsor: [employer]  Phone # [phone]  Fax # [phone]  Additional Information: Waiting for patient and provider to sign and copy of page 1 and 2 of tax return of last year.

## 2021-07-09 NOTE — PROGRESS NOTES
Prior Authorization Approval    Authorization Effective Date:  04/09/2021  Authorization Expiration Date:  07/08/2022  Medication: Abiraterone 250mg-  Approved Dose/Quantity: 120/30 days  Reference #:   [id number]  Insurance Company:  [employer]  Expected CoPay: $876.28      CoPay Card Available:    no  Foundation Assistance Needed:  No grants open at this time. Working with [employer] to get free drug  Which Pharmacy is filling the prescription (Not needed for infusion/clinic administered):  [facility]  Pharmacy Notified:    Patient Notified:  Yes, he would like us to see if we can get free drug for this one too.

## 2021-07-12 NOTE — PROGRESS NOTES
[facility] CANCER CLINIC  FOLLOW-UP VISIT NOTE    PATIENT NAME: Wallace Zelaya MRN # [id number]  DATE OF VISIT: Jun 11, 2021 YOB: 1945    REFERRING PROVIDER: No referring provider defined for this encounter.    CANCER TYPE: Prostate cancer; Biochemical recurrence; Castration resistant disease  STAGE: Stage III - pT3b at diagnosis; M0    HISTORY OF PRESENTING ILLNESS:  Wallace was noted to have rising screening PSA from 2 - 4. He was referred to Dr. Rodolfo Connor. He had radical prostatectomy on 11/2015. Pathology from this revealed Cayla 4+4 disease with extraprostatic extension, seminal vesicle extension and he was staged at pT3b. All of the 12 lymph nodes resected were negative for disease. Post operatively his PSA did not drop to undetectable levels and remained elevated at 0.56. It vladimir to 0.9 in April 2016 and he was referred to Dr. Newton for adjuvant radiation therapy. He completed radiation therapy but did not have any PSA response to this treatment.     TREATMENT SUMMARY:  11/13/2015 Radical prostatectomy  April 2016  Radiation therapy    Oncology Supportive Medications 1/10/2017 4/11/2017   leuprolide (LUPRON DEPOT) IM 22.5 mg 22.5 mg     CURRENT INTERVENTIONS:  Lupron with bicalutamide    SUBJECTIVE   Wallace is being seen for his prostate cancer    Steev was followed in person and is accompanied by his wife at this visit. He has been on bicalutamide and at the last visit I recommended novel anti-androgen therapy due to disease progression which got him very worried. He is here with his wife to review more.       PAST MEDICAL HISTORY   1. HTN  2. Dyslipidemia  3. Prostate cancer as detailed above      CURRENT OUTPATIENT MEDICATIONS     Current Outpatient Medications   Medication Sig     amLODIPine (NORVASC) 10 MG tablet Take 1 tablet (10 mg) by mouth daily     atorvastatin (LIPITOR) 20 MG tablet TAKE 1 TABLET EVERY DAY     bicalutamide (CASODEX) 50 MG tablet Take  1 tablet by mouth once daily     ibuprofen (ADVIL/MOTRIN) 800 MG tablet Take 1 tablet (800 mg) by mouth 3 times daily with food     valsartan (DIOVAN) 160 MG tablet Take 1 tablet (160 mg) by mouth daily     enzalutamide (XTANDI) 40 MG capsule Take 4 capsules (160 mg) by mouth daily     No current facility-administered medications for this visit.        ALLERGIES     No Known Allergies     REVIEW OF SYSTEMS   As above in the HPI, o/w complete 12-point ROS was negative.     PHYSICAL EXAM   BP (!) 143/71   Pulse 63   Temp 97.6  F (36.4  C) (Tympanic)   Resp 16   Wt 100.2 kg (221 lb)   SpO2 94%   BMI 33.60 kg/m    SpO2 Readings from Last 4 Encounters:   09/21/18 96%   08/24/18 94%   06/22/18 95%   05/11/18 97%     Wt Readings from Last 3 Encounters:   06/11/21 100.2 kg (221 lb)   06/04/21 100.5 kg (221 lb 8 oz)   02/03/20 99.1 kg (218 lb 6.4 oz)        LABORATORY AND IMAGING STUDIES     Recent Labs   Lab Test 05/28/21  0907 12/07/20  1544 07/31/20  1601 04/03/20  0909 01/21/20  1154    141 142 141 141   POTASSIUM 4.3 4.2 4.2 4.3 3.9   CHLORIDE 107 108 109 107 109   CO2 28 32 29 29 26   ANIONGAP 5 1* 4 5 6   BUN 12 15 13 14 16   CR 0.92 1.03 0.99 0.93 0.94   GLC 88 110* 100* 92 101*   TY 8.8 8.9 8.9 8.8 9.4     Recent Labs   Lab Test 01/21/20  1154   MAG 2.0     Recent Labs   Lab Test 05/28/21  0907 12/07/20  1544 07/31/20  1601 04/03/20  0909 01/21/20  1154   WBC 4.7 6.7 5.6 5.2 6.2   HGB 14.8 14.9 14.0 14.6 14.7    191 159 185 163   MCV 92 92 92 91 91   NEUTROPHIL 56.0 56.4 56.1 54.0 60.2     Recent Labs   Lab Test 05/28/21  0907 12/07/20  1544 07/31/20  1601   BILITOTAL 0.6 0.5 0.4   ALKPHOS 122 118 112   ALT 31 27 35   AST 21 26 27   ALBUMIN 3.8 3.8 3.9   * 262* 257*     Recent Labs   Lab Test 05/28/21  0907 12/07/20  1544 07/31/20  1601 04/03/20  0909 10/31/19  0945   PSA 1.01 0.34 0.54 1.56 0.22   TESTOSTTOTAL 6* 13* 5* 13* 8*     Recent Labs   Lab Test 05/28/21  0907 12/07/20  1544  07/31/20  1601 04/03/20  0909 10/31/19  0945   PSA 1.01 0.34 0.54 1.56 0.22   ALKPHOS 122 118 112 116 103   * 262* 257* 230* 210              ASSESSMENT AND PLAN   1. Biochemical PSA relapse post prostatectomy without any response to adjuvant radiation therapy  2. Rapid rise in PSA on observation phase of intermittent androgen deprivation therapy  3. HTN  4. ECOG PS1  5. No medical comorbidity    I had a lengthy discussion with Steve who is accompanied by his wife at this visit. All his labs are within normal limits -except for his LDH, PSA and testosterone. His PSA had progressed on single agent Lupron to 1.56 ng/mL on 4/3/2020.  He has been started on bicalutamide and his PSA initially dropped to 0.34 ng/mL on 12/7/2020, but has again increased to 1 ng/mL.     This is concerning for progressive disease.  His PSA has doubled more than twice within the last 6 months.  I would recommend switching to a newer receptor blocker like enzalutamide, apalutamide or darolutamide. We reviewed these last week and focussed on enzalutamide.      Enzalutamide was tested in a similar study 'PROSPER' as apalutamide (N Engl J Med 2018; 378:6081-6272). In this double-blind, phase 3 trial men with nonmetastatic, castration-resistant prostate cancer and a PSA doubling time of 10 months or less who were continuing androgen-deprivation therapy we rerandomly assigned in a 2:1 ratio to receive enzalutamide (at a dose of 160 mg) or placebo once daily. The median metastasis-free survival was 36.6 months in the enzalutamide group versus 14.7 months in the placebo group (hazard ratio for metastasis or death, 0.29; 95% confidence interval, 0.24  0.35; P<0.001). There was a similar improvement in time to next anti-neoplastic therapy and time to PSA    He has been very worried after reading the list of side effects for enzalutamide. I tried to explain him that enzalutamide is very well tolerated. The long list of side effects actually  reflects the patient population in which this medication was tested. The incidence of majority of side effects is similarly high in the control arm of patients.      Essentially all of the newer anti-androgens have shown a similar benefit in delaying metastasis free survival. I have suggested enzalutamide given our longest experience with this agent.     I would recommend discontinuing bicalutamide and starting him on enzalutamide. I would get new restaging scans (CT chest, abdomen and pelvis and bone scan) to establish a baseline. We have not had a restaging scan for him in nearly 4 years. I have consulted our pharmacy team to follow along and help monitoring Steve while on therapy. Additional teaching for enzalutamide was provided by our clinical pharmacist.     I reviewed the COG-CAP study to monitor cognition while on therapy. He was a little worried about the possibility of dementia on androgen deprivation therapy. I explained him that the study utilizes testing to detect early dementia. He was open to participation in the clinical trial. I will reach out to our research nurse to enroll him on the study.     I will have him return in a month to review findings of baseline scan and follow on symptoms on enzalutamide.     45 minutes spent on the date of the encounter doing chart review, history and exam, documentation and further activities as noted above     Gigi Ward  ,  Division of Hematology, Oncology & Transplantation  [facility].

## 2021-07-12 NOTE — PROGRESS NOTES
Dani opened up for Assistance Funds    Dani/ Assistance Approved    Medication Abiraterone acetate  Amount/ $ ?  Foundation: The assistance funds  Phone # [phone]  Fax #   Effective Dates 07/12/2021-08/12/2021 till paperwork is in  Additional Information needs to sign paperwork   Patient notified? Yes, patient needs to send back form within 30 days conditionally approved till after patient sends his paperwork back.

## 2021-07-28 NOTE — NURSING NOTE
Spoke to Aby at the Assistance Funds, patient is fully approved till 12/31/2021   Amount $ unlimited till end of year.

## 2021-07-30 DIAGNOSIS — C61 PROSTATE CANCER (H): Primary | ICD-10-CM

## 2021-07-30 DIAGNOSIS — C61 PROSTATE CANCER (H): ICD-10-CM

## 2021-07-30 RX ORDER — ABIRATERONE ACETATE 250 MG/1
500 TABLET ORAL
Qty: 60 TABLET | Refills: 0 | Status: SHIPPED | OUTPATIENT
Start: 2021-07-30 | End: 2021-08-29

## 2021-07-30 RX ORDER — PROCHLORPERAZINE MALEATE 10 MG
5 TABLET ORAL EVERY 6 HOURS PRN
Qty: 30 TABLET | Refills: 2 | Status: SHIPPED | OUTPATIENT
Start: 2021-07-30 | End: 2022-10-19

## 2021-07-30 RX ORDER — PREDNISONE 5 MG/1
5 TABLET ORAL
Qty: 30 TABLET | Refills: 0 | Status: SHIPPED | OUTPATIENT
Start: 2021-07-30 | End: 2021-08-29

## 2021-07-30 RX ORDER — LORAZEPAM 0.5 MG/1
0.5 TABLET ORAL EVERY 4 HOURS PRN
Qty: 30 TABLET | Refills: 2 | Status: SHIPPED | OUTPATIENT
Start: 2021-07-30 | End: 2022-10-19

## 2021-08-06 ENCOUNTER — LAB (OUTPATIENT)
Dept: ONCOLOGY | Facility: CLINIC | Age: 76
End: 2021-08-06
Attending: PHYSICIAN ASSISTANT
Payer: COMMERCIAL

## 2021-08-06 ENCOUNTER — RESEARCH ENCOUNTER (OUTPATIENT)
Dept: ONCOLOGY | Facility: CLINIC | Age: 76
End: 2021-08-06

## 2021-08-06 VITALS
SYSTOLIC BLOOD PRESSURE: 145 MMHG | HEART RATE: 72 BPM | BODY MASS INDEX: 31.85 KG/M2 | HEIGHT: 70 IN | WEIGHT: 222.5 LBS | TEMPERATURE: 97.6 F | RESPIRATION RATE: 16 BRPM | DIASTOLIC BLOOD PRESSURE: 79 MMHG | OXYGEN SATURATION: 94 %

## 2021-08-06 DIAGNOSIS — C61 PROSTATE CANCER (H): Primary | ICD-10-CM

## 2021-08-06 DIAGNOSIS — C79.51 BONE METASTASIS: ICD-10-CM

## 2021-08-06 LAB
ALBUMIN SERPL-MCNC: 4 G/DL (ref 3.4–5)
ALP SERPL-CCNC: 118 U/L (ref 40–150)
ALT SERPL W P-5'-P-CCNC: 29 U/L (ref 0–70)
ANION GAP SERPL CALCULATED.3IONS-SCNC: 3 MMOL/L (ref 3–14)
AST SERPL W P-5'-P-CCNC: 22 U/L (ref 0–45)
BASOPHILS # BLD AUTO: 0.1 10E3/UL (ref 0–0.2)
BASOPHILS NFR BLD AUTO: 1 %
BILIRUB SERPL-MCNC: 0.7 MG/DL (ref 0.2–1.3)
BUN SERPL-MCNC: 12 MG/DL (ref 7–30)
CALCIUM SERPL-MCNC: 9.1 MG/DL (ref 8.5–10.1)
CHLORIDE BLD-SCNC: 107 MMOL/L (ref 94–109)
CO2 SERPL-SCNC: 29 MMOL/L (ref 20–32)
CREAT SERPL-MCNC: 0.99 MG/DL (ref 0.66–1.25)
EOSINOPHIL # BLD AUTO: 0.4 10E3/UL (ref 0–0.7)
EOSINOPHIL NFR BLD AUTO: 6 %
ERYTHROCYTE [DISTWIDTH] IN BLOOD BY AUTOMATED COUNT: 12.8 % (ref 10–15)
GFR SERPL CREATININE-BSD FRML MDRD: 73 ML/MIN/1.73M2
GLUCOSE BLD-MCNC: 87 MG/DL (ref 70–99)
HCT VFR BLD AUTO: 43.4 % (ref 40–53)
HGB BLD-MCNC: 14.9 G/DL (ref 13.3–17.7)
IMM GRANULOCYTES # BLD: 0 10E3/UL
IMM GRANULOCYTES NFR BLD: 0 %
LDH SERPL L TO P-CCNC: 248 U/L (ref 85–227)
LYMPHOCYTES # BLD AUTO: 1.7 10E3/UL (ref 0.8–5.3)
LYMPHOCYTES NFR BLD AUTO: 31 %
MCH RBC QN AUTO: 31 PG (ref 26.5–33)
MCHC RBC AUTO-ENTMCNC: 34.3 G/DL (ref 31.5–36.5)
MCV RBC AUTO: 90 FL (ref 78–100)
MONOCYTES # BLD AUTO: 0.5 10E3/UL (ref 0–1.3)
MONOCYTES NFR BLD AUTO: 9 %
NEUTROPHILS # BLD AUTO: 3 10E3/UL (ref 1.6–8.3)
NEUTROPHILS NFR BLD AUTO: 53 %
NRBC # BLD AUTO: 0 10E3/UL
NRBC BLD AUTO-RTO: 0 /100
PLATELET # BLD AUTO: 197 10E3/UL (ref 150–450)
POTASSIUM BLD-SCNC: 4.5 MMOL/L (ref 3.4–5.3)
PROT SERPL-MCNC: 7.8 G/DL (ref 6.8–8.8)
PSA SERPL-MCNC: 2.57 UG/L (ref 0–4)
RBC # BLD AUTO: 4.8 10E6/UL (ref 4.4–5.9)
SODIUM SERPL-SCNC: 139 MMOL/L (ref 133–144)
WBC # BLD AUTO: 5.6 10E3/UL (ref 4–11)

## 2021-08-06 PROCEDURE — 83615 LACTATE (LD) (LDH) ENZYME: CPT | Performed by: INTERNAL MEDICINE

## 2021-08-06 PROCEDURE — G0463 HOSPITAL OUTPT CLINIC VISIT: HCPCS

## 2021-08-06 PROCEDURE — 84153 ASSAY OF PSA TOTAL: CPT | Performed by: INTERNAL MEDICINE

## 2021-08-06 PROCEDURE — 99213 OFFICE O/P EST LOW 20 MIN: CPT | Performed by: PHYSICIAN ASSISTANT

## 2021-08-06 PROCEDURE — 85025 COMPLETE CBC W/AUTO DIFF WBC: CPT | Performed by: INTERNAL MEDICINE

## 2021-08-06 PROCEDURE — 36415 COLL VENOUS BLD VENIPUNCTURE: CPT

## 2021-08-06 PROCEDURE — 84270 ASSAY OF SEX HORMONE GLOBUL: CPT | Performed by: INTERNAL MEDICINE

## 2021-08-06 PROCEDURE — 82040 ASSAY OF SERUM ALBUMIN: CPT | Performed by: INTERNAL MEDICINE

## 2021-08-06 ASSESSMENT — PAIN SCALES - GENERAL: PAINLEVEL: NO PAIN (0)

## 2021-08-06 ASSESSMENT — MIFFLIN-ST. JEOR: SCORE: 1732.56

## 2021-08-06 NOTE — PROGRESS NOTES
[id number]: Telephone contact     Called Steve to ask if he received my package and to answer any questions he had. Patient did not have any questions and decided not to participate in the Cognitive Effects of AR Directed Therapies for Advanced Prostate Cancer study.     Kelly Colbert  Clinical Research Coordinator  [phone]

## 2021-08-06 NOTE — PROGRESS NOTES
Oncology/Hematology Visit Note    Aug 6, 2021    Reason for visit: Follow-up castration resistant prostate cancer    Oncology HPI: Wallace Zelaya is a 76 year old male with castration resistant prostate cancer.  Screening PSA was elevated and he underwent radical prostatectomy 11/2015.  Pathology revealed [place] 4+4 disease, seminal vesicle extension and all 12 lymph nodes resected were negative.  His PSA did not drop after prostatectomy and remained elevated, therefore he completed adjuvant radiation therapy, still with no PSA response.  He established care with Dr. Ward and he started him on androgen deprivation therapy with Lupron.  He took a treatment break and his PSA started to rise dramatically, therefore started on Lupron again.  He was on this again for several months and was noted to have a rise in his PSA in April 2020 again.  Dr. Ward recommended bicalutamide (Casodex) 50mg which he was taking for a little over a year, but then PSA started to rise again.  Dr. Ward recommended abiraterone and he has not started it yet.    Video visit today for close follow-up and imaging results.     Interval History: Steve is here in person today.  He has abiraterone at home, but he has not started it yet.  He is waiting to meet with myself or Dr Ward.  He was volunteered that his PSA increased, but he has accepted it.  Continues to be active with golfing and he has daytime later today.  Appetite is been okay.  No other new complaints.    Review of Systems: See interval hx. Denies fevers, chills, HA, dizziness, CP, SOB, abdominal pain, N/V, diarrhea, changes in urination.     PMHx and Social Hx reviewed per EPIC.      Medications:  Current Outpatient Medications   Medication Sig Dispense Refill     abiraterone (ZYTIGA) 250 MG tablet Take 2 tablets (500 mg) by mouth daily (with breakfast) for 30 doses Take with Low-Fat Meal 60 tablet 0     amLODIPine (NORVASC) 10 MG tablet Take 1 tablet (10 mg) by mouth daily 90 tablet 3      "atorvastatin (LIPITOR) 20 MG tablet TAKE 1 TABLET EVERY DAY 90 tablet 4     ibuprofen (ADVIL/MOTRIN) 800 MG tablet Take 1 tablet (800 mg) by mouth 3 times daily with food 90 tablet 3     LORazepam (ATIVAN) 0.5 MG tablet Take 1 tablet (0.5 mg) by mouth every 4 hours as needed (Anxiety, Nausea/Vomiting or Sleep) 30 tablet 2     predniSONE (DELTASONE) 5 MG tablet Take 1 tablet (5 mg) by mouth daily (with breakfast) 30 tablet 0     prochlorperazine (COMPAZINE) 10 MG tablet Take 0.5 tablets (5 mg) by mouth every 6 hours as needed (Nausea/Vomiting) 30 tablet 2     valsartan (DIOVAN) 160 MG tablet Take 1 tablet (160 mg) by mouth daily 90 tablet 3       No Known Allergies    EXAM:    BP (!) 145/79   Pulse 72   Temp 97.6  F (36.4  C) (Oral)   Resp 16   Ht 1.765 m (5' 9.5\")   Wt 100.9 kg (222 lb 8 oz)   SpO2 94%   BMI 32.39 kg/m       GENERAL:  Male, in no acute distress.  Alert and oriented x3.   HEENT:  Normocephalic, atraumatic.  PERRL, oropharynx clear with no sores or thrush.   LYMPH NODES:  No palpable pre/post-auricular, cervical, axillary lymphadenopathy appreciated.  CV:  RRR, No murmurs, gallops, or rubs.   LUNGS:  Clear to auscultation bilaterally.   ABDOMEN:  Soft, nontender and nondistended.  Bowel sounds heard x4.  No apparent hepatosplenomegaly.   EXTREMITIES:  No clubbing, cyanosis. Trace edema.  SKIN: No rash  PSYCH: Mood stable      Labs:   Results for ERNESTO DAY (MRN [id number]) as of 8/6/2021 16:50   8/6/2021 09:06   Sodium 139   Potassium 4.5   Chloride 107   Carbon Dioxide 29   Urea Nitrogen 12   Creatinine 0.99   GFR Estimate 73   Calcium 9.1   Anion Gap 3   Albumin 4.0   Protein Total 7.8   Bilirubin Total 0.7   Alkaline Phosphatase 118   ALT 29   AST 22   Lactate Dehydrogenase 248 (H)   PSA 2.57   Glucose 87   WBC 5.6   Hemoglobin 14.9   Hematocrit 43.4   Platelet Count 197   RBC Count 4.80   MCV 90   MCH 31.0   MCHC 34.3   RDW 12.8   % Neutrophils 53   % Lymphocytes 31   % Monocytes 9   % " Eosinophils 6   Absolute Basophils 0.1   % Basophils 1   Absolute Eosinophils 0.4   Absolute Immature Granulocytes 0.0   Absolute Lymphocytes 1.7   Absolute Monocytes 0.5   % Immature Granulocytes 0   Absolute Neutrophils 3.0   Absolute NRBCs 0.0   NRBCs per 100 WBC 0       Imaging:   n/a    Impression/Plan: Wallace eZlaya is a 75 year old male with 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    Prostate cancer: Castration resistant with rising PSA, 1.01 on 5/28/2021.  He had been on Lupron and Casodex and Dr. Ward recommended enzalutamide after PSA continued to rise. It was eventually changed to abiraterone and he has it at home, but he has not started the medication as he was waiting to discuss during her appointment today.  PSA is up to 2.57, which is concerning, but again, he has not started the abiraterone and I recommended he start it today.  We will monitor him closely and I will see him in about 3 to 4 weeks with close follow-up.  --Velma 3-4 weeks    Bony metastasis: T6 lesion and sacral lesion.  We will likely start bisphosphonate at the next visit.    Chart documentation with Dragon Voice recognition Software. Although reviewed after completion, some words and grammatical errors may remain.      Velma Amaya PA-C  Hematology/Oncology  [facility]

## 2021-08-06 NOTE — NURSING NOTE
"Oncology Rooming Note    August 6, 2021 9:46 AM   Wallace Zelaya is a 76 year old male who presents for:    Chief Complaint   Patient presents with     Oncology Clinic Visit     Prostate cancer      Initial Vitals: BP (!) 145/79   Pulse 72   Temp 97.6  F (36.4  C) (Oral)   Resp 16   Ht 1.765 m (5' 9.5\")   Wt 100.9 kg (222 lb 8 oz)   SpO2 94%   BMI 32.39 kg/m   Estimated body mass index is 32.39 kg/m  as calculated from the following:    Height as of this encounter: 1.765 m (5' 9.5\").    Weight as of this encounter: 100.9 kg (222 lb 8 oz). Body surface area is 2.22 meters squared.  No Pain (0) Comment: Data Unavailable   No LMP for male patient.  Allergies reviewed: Yes  Medications reviewed: Yes    Medications: Medication refills not needed today.  Pharmacy name entered into [employer]:    [facility] - [place], [place] [address]  [facility] - [place], [place] - [address]  [facility] - [place], [place] - [address]    Clinical concerns: follow up       Tammy Mortensen CMA            "

## 2021-08-06 NOTE — LETTER
8/6/2021         RE: Wallace Zelaya  [address] [facility] [id number]        Dear Colleague,    Thank you for referring your patient, Wallace Zelaya, to the [facility]. Please see a copy of my visit note below.    Oncology/Hematology Visit Note    Aug 6, 2021    Reason for visit: Follow-up castration resistant prostate cancer    Oncology HPI: Wallace Zelaya is a 76 year old male with castration resistant prostate cancer.  Screening PSA was elevated and he underwent radical prostatectomy 11/2015.  Pathology revealed [place]la 4+4 disease, seminal vesicle extension and all 12 lymph nodes resected were negative.  His PSA did not drop after prostatectomy and remained elevated, therefore he completed adjuvant radiation therapy, still with no PSA response.  He established care with Dr. Ward and he started him on androgen deprivation therapy with Lupron.  He took a treatment break and his PSA started to rise dramatically, therefore started on Lupron again.  He was on this again for several months and was noted to have a rise in his PSA in April 2020 again.  Dr. Ward recommended bicalutamide (Casodex) 50mg which he was taking for a little over a year, but then PSA started to rise again.  Dr. Ward recommended abiraterone and he has not started it yet.    Video visit today for close follow-up and imaging results.     Interval History: Steve is here in person today.  He has abiraterone at home, but he has not started it yet.  He is waiting to meet with myself or Dr Ward.  He was volunteered that his PSA increased, but he has accepted it.  Continues to be active with golfing and he has daytime later today.  Appetite is been okay.  No other new complaints.    Review of Systems: See interval hx. Denies fevers, chills, HA, dizziness, CP, SOB, abdominal pain, N/V, diarrhea, changes in urination.     PMHx and Social Hx reviewed per EPIC.      Medications:  Current Outpatient Medications  "  Medication Sig Dispense Refill     abiraterone (ZYTIGA) 250 MG tablet Take 2 tablets (500 mg) by mouth daily (with breakfast) for 30 doses Take with Low-Fat Meal 60 tablet 0     amLODIPine (NORVASC) 10 MG tablet Take 1 tablet (10 mg) by mouth daily 90 tablet 3     atorvastatin (LIPITOR) 20 MG tablet TAKE 1 TABLET EVERY DAY 90 tablet 4     ibuprofen (ADVIL/MOTRIN) 800 MG tablet Take 1 tablet (800 mg) by mouth 3 times daily with food 90 tablet 3     LORazepam (ATIVAN) 0.5 MG tablet Take 1 tablet (0.5 mg) by mouth every 4 hours as needed (Anxiety, Nausea/Vomiting or Sleep) 30 tablet 2     predniSONE (DELTASONE) 5 MG tablet Take 1 tablet (5 mg) by mouth daily (with breakfast) 30 tablet 0     prochlorperazine (COMPAZINE) 10 MG tablet Take 0.5 tablets (5 mg) by mouth every 6 hours as needed (Nausea/Vomiting) 30 tablet 2     valsartan (DIOVAN) 160 MG tablet Take 1 tablet (160 mg) by mouth daily 90 tablet 3       No Known Allergies    EXAM:    BP (!) 145/79   Pulse 72   Temp 97.6  F (36.4  C) (Oral)   Resp 16   Ht 1.765 m (5' 9.5\")   Wt 100.9 kg (222 lb 8 oz)   SpO2 94%   BMI 32.39 kg/m       GENERAL:  Male, in no acute distress.  Alert and oriented x3.   HEENT:  Normocephalic, atraumatic.  PERRL, oropharynx clear with no sores or thrush.   LYMPH NODES:  No palpable pre/post-auricular, cervical, axillary lymphadenopathy appreciated.  CV:  RRR, No murmurs, gallops, or rubs.   LUNGS:  Clear to auscultation bilaterally.   ABDOMEN:  Soft, nontender and nondistended.  Bowel sounds heard x4.  No apparent hepatosplenomegaly.   EXTREMITIES:  No clubbing, cyanosis. Trace edema.  SKIN: No rash  PSYCH: Mood stable      Labs:   Results for ERNESTO DAY (MRN [id number]) as of 8/6/2021 16:50   8/6/2021 09:06   Sodium 139   Potassium 4.5   Chloride 107   Carbon Dioxide 29   Urea Nitrogen 12   Creatinine 0.99   GFR Estimate 73   Calcium 9.1   Anion Gap 3   Albumin 4.0   Protein Total 7.8   Bilirubin Total 0.7   Alkaline Phosphatase " 118   ALT 29   AST 22   Lactate Dehydrogenase 248 (H)   PSA 2.57   Glucose 87   WBC 5.6   Hemoglobin 14.9   Hematocrit 43.4   Platelet Count 197   RBC Count 4.80   MCV 90   MCH 31.0   MCHC 34.3   RDW 12.8   % Neutrophils 53   % Lymphocytes 31   % Monocytes 9   % Eosinophils 6   Absolute Basophils 0.1   % Basophils 1   Absolute Eosinophils 0.4   Absolute Immature Granulocytes 0.0   Absolute Lymphocytes 1.7   Absolute Monocytes 0.5   % Immature Granulocytes 0   Absolute Neutrophils 3.0   Absolute NRBCs 0.0   NRBCs per 100 WBC 0       Imaging:   n/a    Impression/Plan: Wallace Zelaya is a 75 year old male with 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    Prostate cancer: Castration resistant with rising PSA, 1.01 on 5/28/2021.  He had been on Lupron and Casodex and Dr. Ward recommended enzalutamide after PSA continued to rise. It was eventually changed to abiraterone and he has it at home, but he has not started the medication as he was waiting to discuss during her appointment today.  PSA is up to 2.57, which is concerning, but again, he has not started the abiraterone and I recommended he start it today.  We will monitor him closely and I will see him in about 3 to 4 weeks with close follow-up.  --Velma 3-4 weeks    Bony metastasis: T6 lesion and sacral lesion.  We will likely start bisphosphonate at the next visit.    Chart documentation with Dragon Voice recognition Software. Although reviewed after completion, some words and grammatical errors may remain.      Velma Amaya PA-C  Hematology/Oncology  [facility]                Again, thank you for allowing me to participate in the care of your patient.        Sincerely,        Velma Amaya PA-C

## 2021-08-06 NOTE — PROGRESS NOTES
Medical Assistant Note:  Wallace Zelaya presents today for blood draw.    Patient seen by provider today: Yes: Velma Amaya (afterwards).   present during visit today: Not Applicable.    Concerns: No Concerns.    Procedure:  Lab draw site: left antecub, Needle type: butterfly, Gauge: 21.    Post Assessment:  Labs drawn without difficulty: Yes.    Discharge Plan:  Departure Mode: Ambulatory.    Face to Face Time: 10.    Olya Valenzuela, CMA

## 2021-08-10 LAB
SHBG SERPL-SCNC: 44 NMOL/L (ref 11–80)
TESTOST FREE SERPL-MCNC: 0.09 NG/DL
TESTOST SERPL-MCNC: 6 NG/DL (ref 240–950)

## 2021-08-18 NOTE — PROGRESS NOTES
Pt was seen in clinic by Velma Amaya on 8/6/2021 and medication recommendation changed to abiraterone.    Rosa Ramirez RN, BSN, OCN

## 2021-08-20 ENCOUNTER — TELEPHONE (OUTPATIENT)
Dept: INFUSION THERAPY | Facility: CLINIC | Age: 76
End: 2021-08-20

## 2021-08-20 ENCOUNTER — ONCOLOGY VISIT (OUTPATIENT)
Dept: ONCOLOGY | Facility: CLINIC | Age: 76
End: 2021-08-20
Attending: PHYSICIAN ASSISTANT
Payer: COMMERCIAL

## 2021-08-20 VITALS
TEMPERATURE: 97.5 F | DIASTOLIC BLOOD PRESSURE: 75 MMHG | BODY MASS INDEX: 32.11 KG/M2 | OXYGEN SATURATION: 95 % | WEIGHT: 220.6 LBS | RESPIRATION RATE: 16 BRPM | SYSTOLIC BLOOD PRESSURE: 130 MMHG | HEART RATE: 71 BPM

## 2021-08-20 DIAGNOSIS — C61 PROSTATE CANCER (H): Primary | ICD-10-CM

## 2021-08-20 DIAGNOSIS — C61 PROSTATE CANCER (H): ICD-10-CM

## 2021-08-20 DIAGNOSIS — C79.51 BONE METASTASIS: ICD-10-CM

## 2021-08-20 LAB
ALBUMIN SERPL-MCNC: 3.8 G/DL (ref 3.4–5)
ALP SERPL-CCNC: 119 U/L (ref 40–150)
ALT SERPL W P-5'-P-CCNC: 29 U/L (ref 0–70)
ANION GAP SERPL CALCULATED.3IONS-SCNC: 5 MMOL/L (ref 3–14)
AST SERPL W P-5'-P-CCNC: 20 U/L (ref 0–45)
BASOPHILS # BLD AUTO: 0.1 10E3/UL (ref 0–0.2)
BASOPHILS NFR BLD AUTO: 1 %
BILIRUB SERPL-MCNC: 0.6 MG/DL (ref 0.2–1.3)
BUN SERPL-MCNC: 14 MG/DL (ref 7–30)
CALCIUM SERPL-MCNC: 9 MG/DL (ref 8.5–10.1)
CHLORIDE BLD-SCNC: 110 MMOL/L (ref 94–109)
CO2 SERPL-SCNC: 27 MMOL/L (ref 20–32)
CREAT SERPL-MCNC: 1.02 MG/DL (ref 0.66–1.25)
EOSINOPHIL # BLD AUTO: 0.1 10E3/UL (ref 0–0.7)
EOSINOPHIL NFR BLD AUTO: 1 %
ERYTHROCYTE [DISTWIDTH] IN BLOOD BY AUTOMATED COUNT: 13 % (ref 10–15)
GFR SERPL CREATININE-BSD FRML MDRD: 71 ML/MIN/1.73M2
GLUCOSE BLD-MCNC: 135 MG/DL (ref 70–99)
HCT VFR BLD AUTO: 42.7 % (ref 40–53)
HGB BLD-MCNC: 14.4 G/DL (ref 13.3–17.7)
IMM GRANULOCYTES # BLD: 0 10E3/UL
IMM GRANULOCYTES NFR BLD: 0 %
LDH SERPL L TO P-CCNC: 223 U/L (ref 85–227)
LYMPHOCYTES # BLD AUTO: 1.3 10E3/UL (ref 0.8–5.3)
LYMPHOCYTES NFR BLD AUTO: 21 %
MCH RBC QN AUTO: 31.8 PG (ref 26.5–33)
MCHC RBC AUTO-ENTMCNC: 33.7 G/DL (ref 31.5–36.5)
MCV RBC AUTO: 94 FL (ref 78–100)
MONOCYTES # BLD AUTO: 0.3 10E3/UL (ref 0–1.3)
MONOCYTES NFR BLD AUTO: 5 %
NEUTROPHILS # BLD AUTO: 4.5 10E3/UL (ref 1.6–8.3)
NEUTROPHILS NFR BLD AUTO: 72 %
NRBC # BLD AUTO: 0 10E3/UL
NRBC BLD AUTO-RTO: 0 /100
PLATELET # BLD AUTO: 182 10E3/UL (ref 150–450)
POTASSIUM BLD-SCNC: 4 MMOL/L (ref 3.4–5.3)
PROT SERPL-MCNC: 7.3 G/DL (ref 6.8–8.8)
PSA SERPL-MCNC: 1.26 UG/L (ref 0–4)
RBC # BLD AUTO: 4.53 10E6/UL (ref 4.4–5.9)
SODIUM SERPL-SCNC: 142 MMOL/L (ref 133–144)
WBC # BLD AUTO: 6.2 10E3/UL (ref 4–11)

## 2021-08-20 PROCEDURE — G0463 HOSPITAL OUTPT CLINIC VISIT: HCPCS

## 2021-08-20 PROCEDURE — 84153 ASSAY OF PSA TOTAL: CPT | Performed by: INTERNAL MEDICINE

## 2021-08-20 PROCEDURE — 84270 ASSAY OF SEX HORMONE GLOBUL: CPT | Performed by: INTERNAL MEDICINE

## 2021-08-20 PROCEDURE — 36415 COLL VENOUS BLD VENIPUNCTURE: CPT

## 2021-08-20 PROCEDURE — 83615 LACTATE (LD) (LDH) ENZYME: CPT | Performed by: INTERNAL MEDICINE

## 2021-08-20 PROCEDURE — 85025 COMPLETE CBC W/AUTO DIFF WBC: CPT | Performed by: INTERNAL MEDICINE

## 2021-08-20 PROCEDURE — 80053 COMPREHEN METABOLIC PANEL: CPT | Performed by: INTERNAL MEDICINE

## 2021-08-20 PROCEDURE — 99213 OFFICE O/P EST LOW 20 MIN: CPT | Performed by: PHYSICIAN ASSISTANT

## 2021-08-20 ASSESSMENT — PAIN SCALES - GENERAL: PAINLEVEL: NO PAIN (0)

## 2021-08-20 NOTE — ORAL ONC MGMT
"Oral Chemotherapy Monitoring Program    Subjective/Objective:  Wallace Zelaya is a 76 year old male contacted by phone for a follow-up visit for oral chemotherapy. Patient denies any side effects and missing dose. Steve indicated he is not taken any new medication in the past two weeks. Patient is tolerating well with the Zytiga at this time.     ORAL CHEMOTHERAPY 6/4/2021 8/20/2021   Assessment Type New Teach Initial Follow up   Diagnosis Code Prostate Cancer Prostate Cancer   Providers Dr. Ed Ward   Clinic Name/Location [facility]   Drug Name Xtandi (enzalutamide) Xtandi (enzalutamide)   Dose 160 mg 160 mg   Current Schedule Daily Daily   Cycle Details Continuous Continuous   Start Date of Last Cycle - 8/7/2021   Planned next cycle start date - 9/6/2021   Doses missed in last 2 weeks - 0   Adherence Assessment - Adherent   Adverse Effects - No AE identified during assessment   Any new drug interactions? - No   Is the dose as ordered appropriate for the patient? - Yes   Is the patient currently in pain? - No   Has the patient missed any days of school, work, or other routine activity? - No   Since the last time we talked, have you been hospitalized or used the emergency room? - No       Last PHQ-2 Score on record:   PHQ-2 ( 1999 [employer]) 1/6/2021 10/28/2019   Q1: Little interest or pleasure in doing things 0 0   Q2: Feeling down, depressed or hopeless 0 0   PHQ-2 Score 0 0   Q1: Little interest or pleasure in doing things - Not at all   Q2: Feeling down, depressed or hopeless - Not at all   PHQ-2 Score - 0       Vitals:  BP:   BP Readings from Last 1 Encounters:   08/20/21 130/75     Wt Readings from Last 1 Encounters:   08/20/21 100.1 kg (220 lb 9.6 oz)     Estimated body surface area is 2.22 meters squared as calculated from the following:    Height as of 8/6/21: 1.765 m (5' 9.5\").    Weight as of an earlier encounter on 8/20/21: 100.1 kg (220 lb 9.6 oz).    Labs:  _  Result Component Current Result " Ref Range   Sodium 142 (8/20/2021) 133 - 144 mmol/L     _  Result Component Current Result Ref Range   Potassium 4.0 (8/20/2021) 3.4 - 5.3 mmol/L     _  Result Component Current Result Ref Range   Calcium 9.0 (8/20/2021) 8.5 - 10.1 mg/dL     No results found for Mag within last 30 days.     No results found for Phos within last 30 days.     _  Result Component Current Result Ref Range   Albumin 3.8 (8/20/2021) 3.4 - 5.0 g/dL     _  Result Component Current Result Ref Range   Urea Nitrogen 14 (8/20/2021) 7 - 30 mg/dL     _  Result Component Current Result Ref Range   Creatinine 1.02 (8/20/2021) 0.66 - 1.25 mg/dL     _  Result Component Current Result Ref Range   AST 20 (8/20/2021) 0 - 45 U/L     _  Result Component Current Result Ref Range   ALT 29 (8/20/2021) 0 - 70 U/L     _  Result Component Current Result Ref Range   Bilirubin Total 0.6 (8/20/2021) 0.2 - 1.3 mg/dL     _  Result Component Current Result Ref Range   WBC Count 6.2 (8/20/2021) 4.0 - 11.0 10e3/uL     _  Result Component Current Result Ref Range   Hemoglobin 14.4 (8/20/2021) 13.3 - 17.7 g/dL     _  Result Component Current Result Ref Range   Platelet Count 182 (8/20/2021) 150 - 450 10e3/uL     No results found for ANC within last 30 days.         Assessment/Plan:  Patient is tolerating well with the Zytiga at this time, no intervention is needed during this visit.    Follow-Up:  09/03/2021 Lab Assessment    Refill Due:  09/06/2021    Laly Bullard, PharmD-IV Student

## 2021-08-20 NOTE — PROGRESS NOTES
"Oncology Rooming Note    August 20, 2021 2:06 PM   Wallace Zelaya is a 76 year old male who presents for:    Chief Complaint   Patient presents with     Oncology Clinic Visit     Prostate cancer      Initial Vitals: /75   Pulse 71   Temp 97.5  F (36.4  C) (Tympanic)   Resp 16   Wt 100.1 kg (220 lb 9.6 oz)   SpO2 95%   BMI 32.11 kg/m   Estimated body mass index is 32.11 kg/m  as calculated from the following:    Height as of 8/6/21: 1.765 m (5' 9.5\").    Weight as of this encounter: 100.1 kg (220 lb 9.6 oz). Body surface area is 2.22 meters squared.  No Pain (0) Comment: Data Unavailable   No LMP for male patient.  Allergies reviewed: Yes  Medications reviewed: Yes    Medications: Medication refills not needed today.  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  Screening PSA was elevated and he underwent radical prostatectomy 11/2015.  Pathology revealed [place] 4+4 disease, seminal vesicle extension and all 12 lymph nodes resected were negative.  His PSA did not drop after prostatectomy and remained elevated, therefore he completed adjuvant radiation therapy, still with no PSA response.  He established care with Dr. Ward and he started him on androgen deprivation therapy with Lupron.  He took a treatment break and his PSA started to rise dramatically, therefore started on Lupron again.  He was on this again for several months and was noted to have a rise in his PSA in April 2020 again.  Dr. Ward recommended bicalutamide (Casodex) 50mg which he was taking " for a little over a year, but then PSA started to rise again.  Dr. Ward recommended abiraterone and first dose 8/7/21.     He is here today for close follow-up.    Interval History: Steve is doing really well.  He has been on abiraterone now for about with intermittent hot flashes, but otherwise doing okay.  No fever chills, vomiting diarrhea, leg swelling.  Appetite is been really good.  No other complaints.    Review of Systems: See interval hx. Denies fevers, chills, HA, dizziness, CP, SOB, abdominal pain, N/V, diarrhea, changes in urination.     PMHx and Social Hx reviewed per EPIC.      Medications:  Current Outpatient Medications   Medication Sig Dispense Refill     abiraterone (ZYTIGA) 250 MG tablet Take 2 tablets (500 mg) by mouth daily (with breakfast) for 30 doses Take with Low-Fat Meal 60 tablet 0     amLODIPine (NORVASC) 10 MG tablet Take 1 tablet (10 mg) by mouth daily 90 tablet 3     atorvastatin (LIPITOR) 20 MG tablet TAKE 1 TABLET EVERY DAY 90 tablet 4     ibuprofen (ADVIL/MOTRIN) 800 MG tablet Take 1 tablet (800 mg) by mouth 3 times daily with food 90 tablet 3     LORazepam (ATIVAN) 0.5 MG tablet Take 1 tablet (0.5 mg) by mouth every 4 hours as needed (Anxiety, Nausea/Vomiting or Sleep) 30 tablet 2     predniSONE (DELTASONE) 5 MG tablet Take 1 tablet (5 mg) by mouth daily (with breakfast) 30 tablet 0     prochlorperazine (COMPAZINE) 10 MG tablet Take 0.5 tablets (5 mg) by mouth every 6 hours as needed (Nausea/Vomiting) 30 tablet 2     valsartan (DIOVAN) 160 MG tablet Take 1 tablet (160 mg) by mouth daily 90 tablet 3       No Known Allergies    EXAM:    /75   Pulse 71   Temp 97.5  F (36.4  C) (Tympanic)   Resp 16   Wt 100.1 kg (220 lb 9.6 oz)   SpO2 95%   BMI 32.11 kg/m       GENERAL:  Male, in no acute distress.  Alert and oriented x3.   HEENT:  Normocephalic, atraumatic.  PERRL, oropharynx clear with no sores or thrush.   LYMPH NODES:  No palpable pre/post-auricular, cervical, axillary  lymphadenopathy appreciated.  CV:  RRR, No murmurs, gallops, or rubs.   LUNGS:  Clear to auscultation bilaterally.   ABDOMEN:  Soft, nontender and nondistended.  Bowel sounds heard x4.  No apparent hepatosplenomegaly.   EXTREMITIES:  No clubbing, cyanosis. Trace edema.  SKIN: No rash  PSYCH: Mood stable      Labs:   Results for ERNESTO DAY (MRN [id number]) as of 8/20/2021 17:11   8/20/2021 14:01   Sodium 142   Potassium 4.0   Chloride 110 (H)   Carbon Dioxide 27   Urea Nitrogen 14   Creatinine 1.02   GFR Estimate 71   Calcium 9.0   Anion Gap 5   Albumin 3.8   Protein Total 7.3   Bilirubin Total 0.6   Alkaline Phosphatase 119   ALT 29   AST 20   Lactate Dehydrogenase 223   Glucose 135 (H)   WBC 6.2   Hemoglobin 14.4   Hematocrit 42.7   Platelet Count 182   RBC Count 4.53   MCV 94   MCH 31.8   MCHC 33.7   RDW 13.0   % Neutrophils 72   % Lymphocytes 21   % Monocytes 5   % Eosinophils 1   Absolute Basophils 0.1   % Basophils 1   Absolute Eosinophils 0.1   Absolute Immature Granulocytes 0.0   Absolute Lymphocytes 1.3   Absolute Monocytes 0.3   % Immature Granulocytes 0   Absolute Neutrophils 4.5   Absolute NRBCs 0.0   NRBCs per 100 WBC 0       Imaging:   n/a    Impression/Plan: Wallace Day is a 75 year old male with 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    Prostate cancer: Castration resistant with rising PSA, 1.01 on 5/28/2021.  He had been on Lupron and Casodex and Dr. Ward recommended enzalutamide after PSA continued to rise. It was eventually changed to abiraterone and first dose 2 weeks ago.  He is tolerating this pretty well.  PSA and testosterone pending.  Discussed with pharmacy and we will continue abiraterone.  I will see him again in about 6 weeks for repeat labs.  --Velma with labs in 6 weeks    Bony metastasis: T6 lesion and sacral lesion.  We will likely start bisphosphonate at the next visit.    Chart documentation with Dragon Voice  recognition Software. Although reviewed after completion, some words and grammatical errors may remain.      Velma Amaya PA-C  Hematology/Oncology  [facility]

## 2021-08-20 NOTE — LETTER
"    8/20/2021         RE: Wallace Zelaya  [address] [facility] [address]        Dear Colleague,    Thank you for referring your patient, Wallace Zelaya, to the [facility]. Please see a copy of my visit note below.    Oncology Rooming Note    August 20, 2021 2:06 PM   Wallace Zelaya is a 76 year old male who presents for:    Chief Complaint   Patient presents with     Oncology Clinic Visit     Prostate cancer      Initial Vitals: /75   Pulse 71   Temp 97.5  F (36.4  C) (Tympanic)   Resp 16   Wt 100.1 kg (220 lb 9.6 oz)   SpO2 95%   BMI 32.11 kg/m   Estimated body mass index is 32.11 kg/m  as calculated from the following:    Height as of 8/6/21: 1.765 m (5' 9.5\").    Weight as of this encounter: 100.1 kg (220 lb 9.6 oz). Body surface area is 2.22 meters squared.  No Pain (0) Comment: Data Unavailable   No LMP for male patient.  Allergies reviewed: Yes  Medications reviewed: Yes    Medications: Medication refills not needed today.  Pharmacy name entered into [employer]:    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  Screening PSA was elevated and he underwent radical prostatectomy 11/2015.  Pathology revealed Donie 4+4 disease, seminal vesicle extension and all 12 lymph nodes resected were negative.  His PSA did not drop after prostatectomy and remained elevated, therefore he completed adjuvant radiation therapy, still with no PSA response.  He established care with Dr. Ward and he started him on androgen deprivation therapy with Lupron.  He took a " treatment break and his PSA started to rise dramatically, therefore started on Lupron again.  He was on this again for several months and was noted to have a rise in his PSA in April 2020 again.  Dr. Ward recommended bicalutamide (Casodex) 50mg which he was taking for a little over a year, but then PSA started to rise again.  Dr. Ward recommended abiraterone and first dose 8/7/21.     He is here today for close follow-up.    Interval History: Steve is doing really well.  He has been on abiraterone now for about with intermittent hot flashes, but otherwise doing okay.  No fever chills, vomiting diarrhea, leg swelling.  Appetite is been really good.  No other complaints.    Review of Systems: See interval hx. Denies fevers, chills, HA, dizziness, CP, SOB, abdominal pain, N/V, diarrhea, changes in urination.     PMHx and Social Hx reviewed per EPIC.      Medications:  Current Outpatient Medications   Medication Sig Dispense Refill     abiraterone (ZYTIGA) 250 MG tablet Take 2 tablets (500 mg) by mouth daily (with breakfast) for 30 doses Take with Low-Fat Meal 60 tablet 0     amLODIPine (NORVASC) 10 MG tablet Take 1 tablet (10 mg) by mouth daily 90 tablet 3     atorvastatin (LIPITOR) 20 MG tablet TAKE 1 TABLET EVERY DAY 90 tablet 4     ibuprofen (ADVIL/MOTRIN) 800 MG tablet Take 1 tablet (800 mg) by mouth 3 times daily with food 90 tablet 3     LORazepam (ATIVAN) 0.5 MG tablet Take 1 tablet (0.5 mg) by mouth every 4 hours as needed (Anxiety, Nausea/Vomiting or Sleep) 30 tablet 2     predniSONE (DELTASONE) 5 MG tablet Take 1 tablet (5 mg) by mouth daily (with breakfast) 30 tablet 0     prochlorperazine (COMPAZINE) 10 MG tablet Take 0.5 tablets (5 mg) by mouth every 6 hours as needed (Nausea/Vomiting) 30 tablet 2     valsartan (DIOVAN) 160 MG tablet Take 1 tablet (160 mg) by mouth daily 90 tablet 3       No Known Allergies    EXAM:    /75   Pulse 71   Temp 97.5  F (36.4  C) (Tympanic)   Resp 16   Wt 100.1 kg (220 lb  9.6 oz)   SpO2 95%   BMI 32.11 kg/m       GENERAL:  Male, in no acute distress.  Alert and oriented x3.   HEENT:  Normocephalic, atraumatic.  PERRL, oropharynx clear with no sores or thrush.   LYMPH NODES:  No palpable pre/post-auricular, cervical, axillary lymphadenopathy appreciated.  CV:  RRR, No murmurs, gallops, or rubs.   LUNGS:  Clear to auscultation bilaterally.   ABDOMEN:  Soft, nontender and nondistended.  Bowel sounds heard x4.  No apparent hepatosplenomegaly.   EXTREMITIES:  No clubbing, cyanosis. Trace edema.  SKIN: No rash  PSYCH: Mood stable      Labs:   Results for ERNESTO DAY (MRN [id number]) as of 8/20/2021 17:11   8/20/2021 14:01   Sodium 142   Potassium 4.0   Chloride 110 (H)   Carbon Dioxide 27   Urea Nitrogen 14   Creatinine 1.02   GFR Estimate 71   Calcium 9.0   Anion Gap 5   Albumin 3.8   Protein Total 7.3   Bilirubin Total 0.6   Alkaline Phosphatase 119   ALT 29   AST 20   Lactate Dehydrogenase 223   Glucose 135 (H)   WBC 6.2   Hemoglobin 14.4   Hematocrit 42.7   Platelet Count 182   RBC Count 4.53   MCV 94   MCH 31.8   MCHC 33.7   RDW 13.0   % Neutrophils 72   % Lymphocytes 21   % Monocytes 5   % Eosinophils 1   Absolute Basophils 0.1   % Basophils 1   Absolute Eosinophils 0.1   Absolute Immature Granulocytes 0.0   Absolute Lymphocytes 1.3   Absolute Monocytes 0.3   % Immature Granulocytes 0   Absolute Neutrophils 4.5   Absolute NRBCs 0.0   NRBCs per 100 WBC 0       Imaging:   n/a    Impression/Plan: Wallace Day is a 75 year old male with 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    Prostate cancer: Castration resistant with rising PSA, 1.01 on 5/28/2021.  He had been on Lupron and Casodex and Dr. Ward recommended enzalutamide after PSA continued to rise. It was eventually changed to abiraterone and first dose 2 weeks ago.  He is tolerating this pretty well.  PSA and testosterone pending.  Discussed with pharmacy and we will  continue abiraterone.  I will see him again in about 6 weeks for repeat labs.  --Velma with labs in 6 weeks    Bony metastasis: T6 lesion and sacral lesion.  We will likely start bisphosphonate at the next visit.    Chart documentation with Dragon Voice recognition Software. Although reviewed after completion, some words and grammatical errors may remain.      Velma Amaya PA-C  Hematology/Oncology  [facility]                Again, thank you for allowing me to participate in the care of your patient.        Sincerely,        Velma Amaya PA-C

## 2021-08-20 NOTE — PROGRESS NOTES
Medical Assistant Note:  Wallace Zelaya presents today for blood draw.    Patient seen by provider today: Yes: Velma Amaya.   present during visit today: Not Applicable.    Concerns: No Concerns.    Procedure:  Labs drawn    Post Assessment:  Labs drawn without difficulty: Yes.    Discharge Plan:  Departure Mode: Ambulatory.    Face to Face Time: 10 min.    Judie Linton CMA

## 2021-08-23 LAB — SHBG SERPL-SCNC: 41 NMOL/L (ref 11–80)

## 2021-08-24 LAB
SHBG SERPL-SCNC: 41 NMOL/L (ref 11–80)
TESTOST FREE SERPL-MCNC: ABNORMAL PG/ML
TESTOST SERPL-MCNC: <2 NG/DL (ref 240–950)

## 2021-08-30 DIAGNOSIS — C61 PROSTATE CANCER (H): Primary | ICD-10-CM

## 2021-08-30 RX ORDER — PREDNISONE 5 MG/1
5 TABLET ORAL
Qty: 30 TABLET | Refills: 0 | Status: SHIPPED | OUTPATIENT
Start: 2021-08-30 | End: 2021-09-29

## 2021-08-30 RX ORDER — ABIRATERONE ACETATE 250 MG/1
500 TABLET ORAL
Qty: 60 TABLET | Refills: 0 | Status: SHIPPED | OUTPATIENT
Start: 2021-08-30 | End: 2021-09-29

## 2021-09-03 ENCOUNTER — LAB (OUTPATIENT)
Dept: ONCOLOGY | Facility: CLINIC | Age: 76
End: 2021-09-03
Attending: INTERNAL MEDICINE
Payer: COMMERCIAL

## 2021-09-03 DIAGNOSIS — C61 PROSTATE CANCER (H): ICD-10-CM

## 2021-09-03 LAB
ALBUMIN SERPL-MCNC: 3.7 G/DL (ref 3.4–5)
ALP SERPL-CCNC: 115 U/L (ref 40–150)
ALT SERPL W P-5'-P-CCNC: 31 U/L (ref 0–70)
ANION GAP SERPL CALCULATED.3IONS-SCNC: 1 MMOL/L (ref 3–14)
AST SERPL W P-5'-P-CCNC: 20 U/L (ref 0–45)
BILIRUB SERPL-MCNC: 0.7 MG/DL (ref 0.2–1.3)
BUN SERPL-MCNC: 18 MG/DL (ref 7–30)
CALCIUM SERPL-MCNC: 8.6 MG/DL (ref 8.5–10.1)
CHLORIDE BLD-SCNC: 110 MMOL/L (ref 94–109)
CO2 SERPL-SCNC: 28 MMOL/L (ref 20–32)
CREAT SERPL-MCNC: 1.03 MG/DL (ref 0.66–1.25)
GFR SERPL CREATININE-BSD FRML MDRD: 70 ML/MIN/1.73M2
GLUCOSE BLD-MCNC: 101 MG/DL (ref 70–99)
POTASSIUM BLD-SCNC: 3.6 MMOL/L (ref 3.4–5.3)
PROT SERPL-MCNC: 7.1 G/DL (ref 6.8–8.8)
SODIUM SERPL-SCNC: 139 MMOL/L (ref 133–144)

## 2021-09-03 PROCEDURE — 36415 COLL VENOUS BLD VENIPUNCTURE: CPT

## 2021-09-03 PROCEDURE — 80053 COMPREHEN METABOLIC PANEL: CPT | Performed by: INTERNAL MEDICINE

## 2021-09-03 NOTE — PROGRESS NOTES
Medical Assistant Note:  Wallace Zelaya presents today for lab draw.    Patient seen by provider today: No.   present during visit today: Not Applicable.    Concerns: No Concerns.    Procedure:  Labs drawn: .    Post Assessment:  Labs drawn without difficulty: Yes.    Discharge Plan:  Departure Mode: Ambulatory.    Face to Face Time: 10 minutes.    Tammy Mortensen, CMA

## 2021-09-04 ENCOUNTER — HEALTH MAINTENANCE LETTER (OUTPATIENT)
Age: 76
End: 2021-09-04

## 2021-09-17 ENCOUNTER — LAB (OUTPATIENT)
Dept: ONCOLOGY | Facility: CLINIC | Age: 76
End: 2021-09-17
Attending: INTERNAL MEDICINE
Payer: COMMERCIAL

## 2021-09-17 DIAGNOSIS — C61 PROSTATE CANCER (H): ICD-10-CM

## 2021-09-17 LAB
ALBUMIN SERPL-MCNC: 3.6 G/DL (ref 3.4–5)
ALP SERPL-CCNC: 108 U/L (ref 40–150)
ALT SERPL W P-5'-P-CCNC: 36 U/L (ref 0–70)
ANION GAP SERPL CALCULATED.3IONS-SCNC: 3 MMOL/L (ref 3–14)
AST SERPL W P-5'-P-CCNC: 25 U/L (ref 0–45)
BILIRUB SERPL-MCNC: 0.8 MG/DL (ref 0.2–1.3)
BUN SERPL-MCNC: 17 MG/DL (ref 7–30)
CALCIUM SERPL-MCNC: 9 MG/DL (ref 8.5–10.1)
CHLORIDE BLD-SCNC: 107 MMOL/L (ref 94–109)
CO2 SERPL-SCNC: 31 MMOL/L (ref 20–32)
CREAT SERPL-MCNC: 1.13 MG/DL (ref 0.66–1.25)
GFR SERPL CREATININE-BSD FRML MDRD: 62 ML/MIN/1.73M2
GLUCOSE BLD-MCNC: 101 MG/DL (ref 70–99)
POTASSIUM BLD-SCNC: 4.4 MMOL/L (ref 3.4–5.3)
PROT SERPL-MCNC: 7 G/DL (ref 6.8–8.8)
SODIUM SERPL-SCNC: 141 MMOL/L (ref 133–144)

## 2021-09-17 PROCEDURE — 82040 ASSAY OF SERUM ALBUMIN: CPT | Performed by: INTERNAL MEDICINE

## 2021-09-17 PROCEDURE — 36415 COLL VENOUS BLD VENIPUNCTURE: CPT

## 2021-09-17 NOTE — ORAL ONC MGMT
Oral Chemotherapy Monitoring Program  Lab Follow Up    Reviewed lab results from 9/17/21.    ORAL CHEMOTHERAPY 6/4/2021 8/20/2021 9/17/2021   Assessment Type New Teach Initial Follow up Lab Monitoring   Diagnosis Code Prostate Cancer Prostate Cancer Prostate Cancer   Providers Dr. Ed Ward   Clinic Name/Location [facility]   Drug Name Xtandi (enzalutamide) Xtandi (enzalutamide) Xtandi (enzalutamide)   Dose 160 mg 160 mg 160 mg   Current Schedule Daily Daily Daily   Cycle Details Continuous Continuous Continuous   Start Date of Last Cycle - 8/7/2021 9/6/2021   Planned next cycle start date - 9/6/2021 -   Doses missed in last 2 weeks - 0 -   Adherence Assessment - Adherent -   Adverse Effects - No AE identified during assessment -   Any new drug interactions? - No -   Is the dose as ordered appropriate for the patient? - Yes -   Is the patient currently in pain? - No -   Has the patient missed any days of school, work, or other routine activity? - No -   Since the last time we talked, have you been hospitalized or used the emergency room? - No -       Labs:  _  Result Component Current Result Ref Range   Sodium 141 (9/17/2021) 133 - 144 mmol/L     _  Result Component Current Result Ref Range   Potassium 4.4 (9/17/2021) 3.4 - 5.3 mmol/L     _  Result Component Current Result Ref Range   Calcium 9.0 (9/17/2021) 8.5 - 10.1 mg/dL     No results found for Mag within last 30 days.     No results found for Phos within last 30 days.     _  Result Component Current Result Ref Range   Albumin 3.6 (9/17/2021) 3.4 - 5.0 g/dL     _  Result Component Current Result Ref Range   Urea Nitrogen 17 (9/17/2021) 7 - 30 mg/dL     _  Result Component Current Result Ref Range   Creatinine 1.13 (9/17/2021) 0.66 - 1.25 mg/dL     _  Result Component Current Result Ref Range   AST 25 (9/17/2021) 0 - 45 U/L     _  Result Component Current Result Ref Range   ALT 36 (9/17/2021) 0 - 70 U/L     _  Result Component  Current Result Ref Range   Bilirubin Total 0.8 (9/17/2021) 0.2 - 1.3 mg/dL     _  Result Component Current Result Ref Range   WBC Count 6.2 (8/20/2021) 4.0 - 11.0 10e3/uL     _  Result Component Current Result Ref Range   Hemoglobin 14.4 (8/20/2021) 13.3 - 17.7 g/dL     _  Result Component Current Result Ref Range   Platelet Count 182 (8/20/2021) 150 - 450 10e3/uL     No results found for ANC within last 30 days.       Assessment & Plan:  There were no concerning abnormalities.        Follow-Up:  10/1/21 labs    Marty Meese, Pharm.ALDA., BCOP

## 2021-09-18 ENCOUNTER — IMMUNIZATION (OUTPATIENT)
Dept: FAMILY MEDICINE | Facility: CLINIC | Age: 76
End: 2021-09-18
Payer: COMMERCIAL

## 2021-09-18 PROCEDURE — 90471 IMMUNIZATION ADMIN: CPT

## 2021-09-18 PROCEDURE — 90662 IIV NO PRSV INCREASED AG IM: CPT

## 2021-10-01 ENCOUNTER — ONCOLOGY VISIT (OUTPATIENT)
Dept: ONCOLOGY | Facility: CLINIC | Age: 76
End: 2021-10-01
Attending: PHYSICIAN ASSISTANT
Payer: COMMERCIAL

## 2021-10-01 VITALS
OXYGEN SATURATION: 94 % | SYSTOLIC BLOOD PRESSURE: 135 MMHG | BODY MASS INDEX: 31.58 KG/M2 | DIASTOLIC BLOOD PRESSURE: 76 MMHG | WEIGHT: 220.6 LBS | RESPIRATION RATE: 16 BRPM | HEIGHT: 70 IN | HEART RATE: 71 BPM | TEMPERATURE: 97.2 F

## 2021-10-01 DIAGNOSIS — C61 PROSTATE CANCER (H): ICD-10-CM

## 2021-10-01 DIAGNOSIS — C79.51 BONE METASTASIS: ICD-10-CM

## 2021-10-01 DIAGNOSIS — C61 PROSTATE CANCER (H): Primary | ICD-10-CM

## 2021-10-01 LAB
ALBUMIN SERPL-MCNC: 3.7 G/DL (ref 3.4–5)
ALP SERPL-CCNC: 114 U/L (ref 40–150)
ALT SERPL W P-5'-P-CCNC: 83 U/L (ref 0–70)
ANION GAP SERPL CALCULATED.3IONS-SCNC: 5 MMOL/L (ref 3–14)
AST SERPL W P-5'-P-CCNC: 39 U/L (ref 0–45)
BASOPHILS # BLD AUTO: 0.1 10E3/UL (ref 0–0.2)
BASOPHILS NFR BLD AUTO: 1 %
BILIRUB SERPL-MCNC: 0.6 MG/DL (ref 0.2–1.3)
BUN SERPL-MCNC: 19 MG/DL (ref 7–30)
CALCIUM SERPL-MCNC: 8.8 MG/DL (ref 8.5–10.1)
CHLORIDE BLD-SCNC: 108 MMOL/L (ref 94–109)
CO2 SERPL-SCNC: 27 MMOL/L (ref 20–32)
CREAT SERPL-MCNC: 1.01 MG/DL (ref 0.66–1.25)
EOSINOPHIL # BLD AUTO: 0.1 10E3/UL (ref 0–0.7)
EOSINOPHIL NFR BLD AUTO: 1 %
ERYTHROCYTE [DISTWIDTH] IN BLOOD BY AUTOMATED COUNT: 13.2 % (ref 10–15)
GFR SERPL CREATININE-BSD FRML MDRD: 71 ML/MIN/1.73M2
GLUCOSE BLD-MCNC: 129 MG/DL (ref 70–99)
HCT VFR BLD AUTO: 42 % (ref 40–53)
HGB BLD-MCNC: 14.1 G/DL (ref 13.3–17.7)
IMM GRANULOCYTES # BLD: 0 10E3/UL
IMM GRANULOCYTES NFR BLD: 0 %
LYMPHOCYTES # BLD AUTO: 1.2 10E3/UL (ref 0.8–5.3)
LYMPHOCYTES NFR BLD AUTO: 20 %
MCH RBC QN AUTO: 31.3 PG (ref 26.5–33)
MCHC RBC AUTO-ENTMCNC: 33.6 G/DL (ref 31.5–36.5)
MCV RBC AUTO: 93 FL (ref 78–100)
MONOCYTES # BLD AUTO: 0.4 10E3/UL (ref 0–1.3)
MONOCYTES NFR BLD AUTO: 6 %
NEUTROPHILS # BLD AUTO: 4.4 10E3/UL (ref 1.6–8.3)
NEUTROPHILS NFR BLD AUTO: 72 %
NRBC # BLD AUTO: 0 10E3/UL
NRBC BLD AUTO-RTO: 0 /100
PLATELET # BLD AUTO: 201 10E3/UL (ref 150–450)
POTASSIUM BLD-SCNC: 4.3 MMOL/L (ref 3.4–5.3)
PROT SERPL-MCNC: 7.2 G/DL (ref 6.8–8.8)
RBC # BLD AUTO: 4.5 10E6/UL (ref 4.4–5.9)
SODIUM SERPL-SCNC: 140 MMOL/L (ref 133–144)
WBC # BLD AUTO: 6.1 10E3/UL (ref 4–11)

## 2021-10-01 PROCEDURE — 84403 ASSAY OF TOTAL TESTOSTERONE: CPT | Performed by: PHYSICIAN ASSISTANT

## 2021-10-01 PROCEDURE — 82040 ASSAY OF SERUM ALBUMIN: CPT | Performed by: PHYSICIAN ASSISTANT

## 2021-10-01 PROCEDURE — G0463 HOSPITAL OUTPT CLINIC VISIT: HCPCS

## 2021-10-01 PROCEDURE — 99214 OFFICE O/P EST MOD 30 MIN: CPT | Performed by: PHYSICIAN ASSISTANT

## 2021-10-01 PROCEDURE — 82374 ASSAY BLOOD CARBON DIOXIDE: CPT | Performed by: PHYSICIAN ASSISTANT

## 2021-10-01 PROCEDURE — 36415 COLL VENOUS BLD VENIPUNCTURE: CPT

## 2021-10-01 PROCEDURE — 84153 ASSAY OF PSA TOTAL: CPT | Performed by: PHYSICIAN ASSISTANT

## 2021-10-01 PROCEDURE — 85025 COMPLETE CBC W/AUTO DIFF WBC: CPT | Performed by: PHYSICIAN ASSISTANT

## 2021-10-01 RX ORDER — HEPARIN SODIUM (PORCINE) LOCK FLUSH IV SOLN 100 UNIT/ML 100 UNIT/ML
5 SOLUTION INTRAVENOUS
Status: CANCELLED | OUTPATIENT
Start: 2021-10-01

## 2021-10-01 RX ORDER — MEPERIDINE HYDROCHLORIDE 25 MG/ML
25 INJECTION INTRAMUSCULAR; INTRAVENOUS; SUBCUTANEOUS EVERY 30 MIN PRN
Status: CANCELLED | OUTPATIENT
Start: 2021-10-01

## 2021-10-01 RX ORDER — ALBUTEROL SULFATE 0.83 MG/ML
2.5 SOLUTION RESPIRATORY (INHALATION)
Status: CANCELLED | OUTPATIENT
Start: 2021-10-01

## 2021-10-01 RX ORDER — METHYLPREDNISOLONE SODIUM SUCCINATE 125 MG/2ML
125 INJECTION, POWDER, LYOPHILIZED, FOR SOLUTION INTRAMUSCULAR; INTRAVENOUS
Status: CANCELLED
Start: 2021-10-01

## 2021-10-01 RX ORDER — NALOXONE HYDROCHLORIDE 0.4 MG/ML
0.2 INJECTION, SOLUTION INTRAMUSCULAR; INTRAVENOUS; SUBCUTANEOUS
Status: CANCELLED | OUTPATIENT
Start: 2021-10-01

## 2021-10-01 RX ORDER — ALBUTEROL SULFATE 90 UG/1
1-2 AEROSOL, METERED RESPIRATORY (INHALATION)
Status: CANCELLED
Start: 2021-10-01

## 2021-10-01 RX ORDER — ZOLEDRONIC ACID 0.04 MG/ML
4 INJECTION, SOLUTION INTRAVENOUS ONCE
Status: CANCELLED
Start: 2021-10-01 | End: 2021-10-01

## 2021-10-01 RX ORDER — EPINEPHRINE 1 MG/ML
0.3 INJECTION, SOLUTION INTRAMUSCULAR; SUBCUTANEOUS EVERY 5 MIN PRN
Status: CANCELLED | OUTPATIENT
Start: 2021-10-01

## 2021-10-01 RX ORDER — ONDANSETRON 4 MG/1
4 TABLET, FILM COATED ORAL EVERY 8 HOURS PRN
Qty: 30 TABLET | Refills: 1 | Status: SHIPPED | OUTPATIENT
Start: 2021-10-01 | End: 2023-03-27

## 2021-10-01 RX ORDER — DIPHENHYDRAMINE HYDROCHLORIDE 50 MG/ML
50 INJECTION INTRAMUSCULAR; INTRAVENOUS
Status: CANCELLED
Start: 2021-10-01

## 2021-10-01 RX ORDER — HEPARIN SODIUM,PORCINE 10 UNIT/ML
5 VIAL (ML) INTRAVENOUS
Status: CANCELLED | OUTPATIENT
Start: 2021-10-01

## 2021-10-01 ASSESSMENT — PAIN SCALES - GENERAL: PAINLEVEL: NO PAIN (0)

## 2021-10-01 ASSESSMENT — MIFFLIN-ST. JEOR: SCORE: 1723.95

## 2021-10-01 NOTE — NURSING NOTE
"Oncology Rooming Note    October 1, 2021 2:32 PM   Wallace Zelaya is a 76 year old male who presents for:    Chief Complaint   Patient presents with     Oncology Clinic Visit     Prostate cancer      Initial Vitals: /76   Pulse 71   Temp 97.2  F (36.2  C) (Oral)   Resp 16   Ht 1.765 m (5' 9.5\")   Wt 100.1 kg (220 lb 9.6 oz)   SpO2 94%   BMI 32.11 kg/m   Estimated body mass index is 32.11 kg/m  as calculated from the following:    Height as of this encounter: 1.765 m (5' 9.5\").    Weight as of this encounter: 100.1 kg (220 lb 9.6 oz). Body surface area is 2.22 meters squared.  No Pain (0) Comment: Data Unavailable   No LMP for male patient.  Allergies reviewed: Yes  Medications reviewed: Yes    Medications:   Pharmacy name entered into [employer]:    [facility] - [place], [place] [facility] [address] [facility] [address]  [facility] - [place], [place] - [address]  [place] - [place], [place] - [address]    Clinical concerns: follow up        Tammy Mortensen, TATA              "

## 2021-10-01 NOTE — PROGRESS NOTES
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  Screening PSA was elevated and he underwent radical prostatectomy 11/2015.  Pathology revealed Cayla 4+4 disease, seminal vesicle extension and all 12 lymph nodes resected were negative.  His PSA did not drop after prostatectomy and remained elevated, therefore he completed adjuvant radiation therapy, still with no PSA response.  He established care with Dr. Ward and he started him on androgen deprivation therapy with Lupron.  He took a treatment break and his PSA started to rise dramatically, therefore started on Lupron again.  He was on this again for several months and was noted to have a rise in his PSA in April 2020 again.  Dr. Ward recommended bicalutamide (Casodex) 50mg which he was taking for a little over a year, but then PSA started to rise again.  Dr. Ward recommended abiraterone and first dose 8/7/21.     He is here today for close follow-up.    Interval History: Steve is doing really well. He continues on abiraterone with occasional hot flashes and sweats, but tolerating it really well. No fever, chills, vomiting, diarrhea, leg swelling or urinary changes.    Review of Systems: See interval hx. Denies fevers, chills, HA, dizziness, CP, SOB, abdominal pain, N/V, diarrhea, changes in urination.     PMHx and Social Hx reviewed per EPIC.      Medications:  Current Outpatient Medications   Medication Sig Dispense Refill     abiraterone (ZYTIGA) 250 MG tablet Take 2 tablets (500 mg) by mouth daily (with breakfast) for 30 doses Take with Low-Fat Meal 60 tablet 0     amLODIPine (NORVASC) 10 MG tablet Take 1 tablet (10 mg) by mouth daily 90 tablet 3     atorvastatin (LIPITOR) 20 MG tablet TAKE 1 TABLET EVERY DAY 90 tablet 4     ibuprofen (ADVIL/MOTRIN) 800 MG tablet Take 1 tablet (800 mg) by mouth 3 times  "daily with food 90 tablet 3     LORazepam (ATIVAN) 0.5 MG tablet Take 1 tablet (0.5 mg) by mouth every 4 hours as needed (Anxiety, Nausea/Vomiting or Sleep) 30 tablet 2     predniSONE (DELTASONE) 5 MG tablet Take 1 tablet (5 mg) by mouth daily (with breakfast) 30 tablet 0     valsartan (DIOVAN) 160 MG tablet Take 1 tablet (160 mg) by mouth daily 90 tablet 3     prochlorperazine (COMPAZINE) 10 MG tablet Take 0.5 tablets (5 mg) by mouth every 6 hours as needed (Nausea/Vomiting) (Patient not taking: Reported on 10/1/2021) 30 tablet 2       No Known Allergies    EXAM:    /76   Pulse 71   Temp 97.2  F (36.2  C) (Oral)   Resp 16   Ht 1.765 m (5' 9.5\")   Wt 100.1 kg (220 lb 9.6 oz)   SpO2 94%   BMI 32.11 kg/m       GENERAL:  Male, in no acute distress.  Alert and oriented x3.   HEENT:  Normocephalic, atraumatic.  PERRL, oropharynx clear with no sores or thrush.   LYMPH NODES:  No palpable pre/post-auricular, cervical, axillary lymphadenopathy appreciated.  CV:  RRR, No murmurs, gallops, or rubs.   LUNGS:  Clear to auscultation bilaterally.   ABDOMEN:  Soft, nontender and nondistended.  Bowel sounds heard x4.  No apparent hepatosplenomegaly.   EXTREMITIES:  No clubbing, cyanosis. Trace edema.  SKIN: No rash  PSYCH: Mood stable      Labs:   Results for ERNESTO DAY (MRN [id number]) as of 10/1/2021 16:23   10/1/2021 14:25   Sodium 140   Potassium 4.3   Chloride 108   Carbon Dioxide 27   Urea Nitrogen 19   Creatinine 1.01   GFR Estimate 71   Calcium 8.8   Anion Gap 5   Albumin 3.7   Protein Total 7.2   Bilirubin Total 0.6   Alkaline Phosphatase 114   ALT 83 (H)   AST 39   Glucose 129 (H)   WBC 6.1   Hemoglobin 14.1   Hematocrit 42.0   Platelet Count 201   RBC Count 4.50   MCV 93   MCH 31.3   MCHC 33.6   RDW 13.2   % Neutrophils 72   % Lymphocytes 20   % Monocytes 6   % Eosinophils 1   Absolute Basophils 0.1   % Basophils 1   Absolute Eosinophils 0.1   Absolute Immature Granulocytes 0.0   Absolute Lymphocytes 1.2 "   Absolute Monocytes 0.4   % Immature Granulocytes 0   Absolute Neutrophils 4.4   Absolute NRBCs 0.0   NRBCs per 100 WBC 0       Imaging:   n/a    Impression/Plan: Wallace Zelaya is a 75 year old male with 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    Prostate cancer: Castration resistant with rising PSA, 1.01 on 5/28/2021.  He had been on Lupron and Casodex and Dr. Ward recommended enzalutamide after PSA continued to rise. It was eventually changed to abiraterone and first dose 8/7/2021.  He is tolerating this well.  We have had a nice response in his PSA and testosterone is undetectable.  Both are pending today.   --12/10 Dr Ward    Bony metastasis: T6 lesion and sacral lesion.  Zometa has been ordered and we will try to get him in next week.  I left a voicemail message.    Chart documentation with [employer]. Although reviewed after completion, some words and grammatical errors may remain.      Velma Amaya PA-C  Hematology/Oncology  [facility]

## 2021-10-01 NOTE — LETTER
10/1/2021         RE: Wallace Zelaya  [address] [address] [facility] [address]        Dear Colleague,    Thank you for referring your patient, Wallace Zelaya, to the [facility]. Please see a copy of my visit note below.    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  Screening PSA was elevated and he underwent radical prostatectomy 11/2015.  Pathology revealed [place] 4+4 disease, seminal vesicle extension and all 12 lymph nodes resected were negative.  His PSA did not drop after prostatectomy and remained elevated, therefore he completed adjuvant radiation therapy, still with no PSA response.  He established care with Dr. Ward and he started him on androgen deprivation therapy with Lupron.  He took a treatment break and his PSA started to rise dramatically, therefore started on Lupron again.  He was on this again for several months and was noted to have a rise in his PSA in April 2020 again.  Dr. Ward recommended bicalutamide (Casodex) 50mg which he was taking for a little over a year, but then PSA started to rise again.  Dr. Ward recommended abiraterone and first dose 8/7/21.     He is here today for close follow-up.    Interval History: Steve is doing really well. He continues on abiraterone with occasional hot flashes and sweats, but tolerating it really well. No fever, chills, vomiting, diarrhea, leg swelling or urinary changes.    Review of Systems: See interval hx. Denies fevers, chills, HA, dizziness, CP, SOB, abdominal pain, N/V, diarrhea, changes in urination.     PMHx and Social Hx reviewed per EPIC.      Medications:  Current Outpatient Medications   Medication Sig Dispense Refill     abiraterone (ZYTIGA) 250 MG tablet Take 2 tablets (500 mg) by mouth daily (with breakfast) for 30 doses Take with Low-Fat Meal  "60 tablet 0     amLODIPine (NORVASC) 10 MG tablet Take 1 tablet (10 mg) by mouth daily 90 tablet 3     atorvastatin (LIPITOR) 20 MG tablet TAKE 1 TABLET EVERY DAY 90 tablet 4     ibuprofen (ADVIL/MOTRIN) 800 MG tablet Take 1 tablet (800 mg) by mouth 3 times daily with food 90 tablet 3     LORazepam (ATIVAN) 0.5 MG tablet Take 1 tablet (0.5 mg) by mouth every 4 hours as needed (Anxiety, Nausea/Vomiting or Sleep) 30 tablet 2     predniSONE (DELTASONE) 5 MG tablet Take 1 tablet (5 mg) by mouth daily (with breakfast) 30 tablet 0     valsartan (DIOVAN) 160 MG tablet Take 1 tablet (160 mg) by mouth daily 90 tablet 3     prochlorperazine (COMPAZINE) 10 MG tablet Take 0.5 tablets (5 mg) by mouth every 6 hours as needed (Nausea/Vomiting) (Patient not taking: Reported on 10/1/2021) 30 tablet 2       No Known Allergies    EXAM:    /76   Pulse 71   Temp 97.2  F (36.2  C) (Oral)   Resp 16   Ht 1.765 m (5' 9.5\")   Wt 100.1 kg (220 lb 9.6 oz)   SpO2 94%   BMI 32.11 kg/m       GENERAL:  Male, in no acute distress.  Alert and oriented x3.   HEENT:  Normocephalic, atraumatic.  PERRL, oropharynx clear with no sores or thrush.   LYMPH NODES:  No palpable pre/post-auricular, cervical, axillary lymphadenopathy appreciated.  CV:  RRR, No murmurs, gallops, or rubs.   LUNGS:  Clear to auscultation bilaterally.   ABDOMEN:  Soft, nontender and nondistended.  Bowel sounds heard x4.  No apparent hepatosplenomegaly.   EXTREMITIES:  No clubbing, cyanosis. Trace edema.  SKIN: No rash  PSYCH: Mood stable      Labs:   Results for ERNESTO DAY (MRN [id number]) as of 10/1/2021 16:23   10/1/2021 14:25   Sodium 140   Potassium 4.3   Chloride 108   Carbon Dioxide 27   Urea Nitrogen 19   Creatinine 1.01   GFR Estimate 71   Calcium 8.8   Anion Gap 5   Albumin 3.7   Protein Total 7.2   Bilirubin Total 0.6   Alkaline Phosphatase 114   ALT 83 (H)   AST 39   Glucose 129 (H)   WBC 6.1   Hemoglobin 14.1   Hematocrit 42.0   Platelet Count 201   RBC " Count 4.50   MCV 93   MCH 31.3   MCHC 33.6   RDW 13.2   % Neutrophils 72   % Lymphocytes 20   % Monocytes 6   % Eosinophils 1   Absolute Basophils 0.1   % Basophils 1   Absolute Eosinophils 0.1   Absolute Immature Granulocytes 0.0   Absolute Lymphocytes 1.2   Absolute Monocytes 0.4   % Immature Granulocytes 0   Absolute Neutrophils 4.4   Absolute NRBCs 0.0   NRBCs per 100 WBC 0       Imaging:   n/a    Impression/Plan: Wallace Zelaya is a 75 year old male with 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    Prostate cancer: Castration resistant with rising PSA, 1.01 on 5/28/2021.  He had been on Lupron and Casodex and Dr. Ward recommended enzalutamide after PSA continued to rise. It was eventually changed to abiraterone and first dose 8/7/2021.  He is tolerating this well.  We have had a nice response in his PSA and testosterone is undetectable.  Both are pending today.   --12/10 Dr Ward    Bony metastasis: T6 lesion and sacral lesion.  Zometa has been ordered and we will try to get him in next week.  I left a voicemail message.    Chart documentation with Dragon Voice recognition Software. Although reviewed after completion, some words and grammatical errors may remain.      Velma Amaya PA-C  Hematology/Oncology  [facility]              Again, thank you for allowing me to participate in the care of your patient.        Sincerely,        Velma Amaya PA-C

## 2021-10-01 NOTE — ORAL ONC MGMT
Oral Chemotherapy Monitoring Program  Lab Follow Up    Reviewed lab results from 10/1/21.    ORAL CHEMOTHERAPY 6/4/2021 8/20/2021 9/17/2021 10/1/2021   Assessment Type New Teach Initial Follow up Lab Monitoring Lab Monitoring   Diagnosis Code Prostate Cancer Prostate Cancer Prostate Cancer Prostate Cancer   Providers Dr. Ed Ward   Clinic Name/Location [facility]   Drug Name Xtandi (enzalutamide) Xtandi (enzalutamide) Xtandi (enzalutamide) Xtandi (enzalutamide)   Dose 160 mg 160 mg 160 mg 160 mg   Current Schedule Daily Daily Daily Daily   Cycle Details Continuous Continuous Continuous Continuous   Start Date of Last Cycle - 8/7/2021 9/6/2021 -   Planned next cycle start date - 9/6/2021 - -   Doses missed in last 2 weeks - 0 - -   Adherence Assessment - Adherent - -   Adverse Effects - No AE identified during assessment - Increased AST/ALT/T BILI   Increased AST/ALT/T BILI - - - Grade 1   Pharmacist Intervention(increased ast/alt/t bili) - - - Yes   Intervention(s) - - - Increased lab monitoring   Any new drug interactions? - No - -   Is the dose as ordered appropriate for the patient? - Yes - -   Is the patient currently in pain? - No - -   Has the patient missed any days of school, work, or other routine activity? - No - -   Since the last time we talked, have you been hospitalized or used the emergency room? - No - -       Labs:  _  Result Component Current Result Ref Range   Sodium 140 (10/1/2021) 133 - 144 mmol/L     _  Result Component Current Result Ref Range   Potassium 4.3 (10/1/2021) 3.4 - 5.3 mmol/L     _  Result Component Current Result Ref Range   Calcium 8.8 (10/1/2021) 8.5 - 10.1 mg/dL     No results found for Mag within last 30 days.     No results found for Phos within last 30 days.     _  Result Component Current Result Ref Range   Albumin 3.7 (10/1/2021) 3.4 - 5.0 g/dL     _  Result Component Current Result Ref Range   Urea Nitrogen 19  (10/1/2021) 7 - 30 mg/dL     _  Result Component Current Result Ref Range   Creatinine 1.01 (10/1/2021) 0.66 - 1.25 mg/dL     _  Result Component Current Result Ref Range   AST 39 (10/1/2021) 0 - 45 U/L     _  Result Component Current Result Ref Range   ALT 83 (H) (10/1/2021) 0 - 70 U/L     _  Result Component Current Result Ref Range   Bilirubin Total 0.6 (10/1/2021) 0.2 - 1.3 mg/dL     _  Result Component Current Result Ref Range   WBC Count 6.1 (10/1/2021) 4.0 - 11.0 10e3/uL     _  Result Component Current Result Ref Range   Hemoglobin 14.1 (10/1/2021) 13.3 - 17.7 g/dL     _  Result Component Current Result Ref Range   Platelet Count 201 (10/1/2021) 150 - 450 10e3/uL     No results found for ANC within last 30 days.       Assessment & Plan:  Grade 1 ALT elevation to 83.  We will repeat labs in 1-2 weeks to verify any trends. He is coming in next week for zometa and we can repeat labs at that time.        Follow-Up:  10/4 to schedule.    Marty Meese, Pharm.D., BCOP

## 2021-10-02 LAB — PSA SERPL-MCNC: 0.51 UG/L (ref 0–4)

## 2021-10-05 LAB — TESTOST SERPL-MCNC: <2 NG/DL (ref 240–950)

## 2021-10-21 ENCOUNTER — TELEPHONE (OUTPATIENT)
Dept: ONCOLOGY | Facility: CLINIC | Age: 76
End: 2021-10-21

## 2021-10-21 NOTE — ORAL ONC MGMT
Pharmacy note:     I left a message on 10/15 and 10/22 for patient to call us back to schedule a f/u lab (LFT).  He hasn't yet returned our call.    Marty Meese, Pharm.D.

## 2021-10-26 DIAGNOSIS — C61 PROSTATE CANCER (H): Primary | ICD-10-CM

## 2021-10-26 DIAGNOSIS — C79.51 BONE METASTASIS: ICD-10-CM

## 2021-10-27 ENCOUNTER — LAB (OUTPATIENT)
Dept: ONCOLOGY | Facility: CLINIC | Age: 76
End: 2021-10-27
Attending: INTERNAL MEDICINE
Payer: COMMERCIAL

## 2021-10-27 DIAGNOSIS — C61 PROSTATE CANCER (H): Primary | ICD-10-CM

## 2021-10-27 DIAGNOSIS — C79.51 BONE METASTASIS: ICD-10-CM

## 2021-10-27 LAB
ALBUMIN SERPL-MCNC: 3.8 G/DL (ref 3.4–5)
ALP SERPL-CCNC: 114 U/L (ref 40–150)
ALT SERPL W P-5'-P-CCNC: 79 U/L (ref 0–70)
ANION GAP SERPL CALCULATED.3IONS-SCNC: 9 MMOL/L (ref 3–14)
AST SERPL W P-5'-P-CCNC: 39 U/L (ref 0–45)
BILIRUB SERPL-MCNC: 1 MG/DL (ref 0.2–1.3)
BUN SERPL-MCNC: 14 MG/DL (ref 7–30)
CALCIUM SERPL-MCNC: 8.7 MG/DL (ref 8.5–10.1)
CHLORIDE BLD-SCNC: 107 MMOL/L (ref 94–109)
CO2 SERPL-SCNC: 25 MMOL/L (ref 20–32)
CREAT SERPL-MCNC: 0.91 MG/DL (ref 0.66–1.25)
GFR SERPL CREATININE-BSD FRML MDRD: 81 ML/MIN/1.73M2
GLUCOSE BLD-MCNC: 112 MG/DL (ref 70–99)
POTASSIUM BLD-SCNC: 3.8 MMOL/L (ref 3.4–5.3)
PROT SERPL-MCNC: 7.4 G/DL (ref 6.8–8.8)
SODIUM SERPL-SCNC: 141 MMOL/L (ref 133–144)

## 2021-10-27 PROCEDURE — 36415 COLL VENOUS BLD VENIPUNCTURE: CPT | Performed by: INTERNAL MEDICINE

## 2021-10-27 PROCEDURE — 80053 COMPREHEN METABOLIC PANEL: CPT | Performed by: INTERNAL MEDICINE

## 2021-10-27 RX ORDER — ABIRATERONE ACETATE 250 MG/1
500 TABLET ORAL
Qty: 60 TABLET | Refills: 0 | Status: SHIPPED | OUTPATIENT
Start: 2021-10-27 | End: 2021-11-26

## 2021-10-27 RX ORDER — PREDNISONE 5 MG/1
5 TABLET ORAL
Qty: 30 TABLET | Refills: 0 | Status: SHIPPED | OUTPATIENT
Start: 2021-10-27 | End: 2021-11-26

## 2021-10-28 ENCOUNTER — MYC MEDICAL ADVICE (OUTPATIENT)
Dept: ONCOLOGY | Facility: CLINIC | Age: 76
End: 2021-10-28

## 2021-10-29 ENCOUNTER — ONCOLOGY VISIT (OUTPATIENT)
Dept: ONCOLOGY | Facility: CLINIC | Age: 76
End: 2021-10-29
Attending: INTERNAL MEDICINE
Payer: COMMERCIAL

## 2021-10-29 VITALS
HEIGHT: 70 IN | WEIGHT: 216.7 LBS | BODY MASS INDEX: 31.02 KG/M2 | DIASTOLIC BLOOD PRESSURE: 71 MMHG | TEMPERATURE: 98.4 F | HEART RATE: 59 BPM | SYSTOLIC BLOOD PRESSURE: 143 MMHG | RESPIRATION RATE: 16 BRPM | OXYGEN SATURATION: 95 %

## 2021-10-29 DIAGNOSIS — C79.51 BONE METASTASIS: ICD-10-CM

## 2021-10-29 DIAGNOSIS — C61 PROSTATE CANCER (H): Primary | ICD-10-CM

## 2021-10-29 PROCEDURE — 99214 OFFICE O/P EST MOD 30 MIN: CPT | Performed by: INTERNAL MEDICINE

## 2021-10-29 ASSESSMENT — PAIN SCALES - GENERAL: PAINLEVEL: NO PAIN (0)

## 2021-10-29 ASSESSMENT — MIFFLIN-ST. JEOR: SCORE: 1706.25

## 2021-10-29 NOTE — LETTER
10/29/2021         RE: Wallace Zelaya  [id number] [address] [id number]        Dear Colleague,    Thank you for referring your patient, Wallace Zelaya, to the [facility]. Please see a copy of my visit note below.    [facility]ER [facility]IC  FOLLOW-UP VISIT NOTE    PATIENT NAME: Wallace Zelaya MRN # [id number]  DATE OF VISIT: Oct 29, 2021 YOB: 1945    REFERRING PROVIDER: No referring provider defined for this encounter.    CANCER TYPE: Prostate cancer; Biochemical recurrence; Castration resistant disease  STAGE: Stage III - pT3b at diagnosis; M0    HISTORY OF PRESENTING ILLNESS:  Wallace was noted to have rising screening PSA from 2 - 4. He was referred to Dr. Rodolfo Connor. He had radical prostatectomy on 11/2015. Pathology from this revealed [place] 4+4 disease with extraprostatic extension, seminal vesicle extension and he was staged at pT3b. All of the 12 lymph nodes resected were negative for disease. Post operatively his PSA did not drop to undetectable levels and remained elevated at 0.56. It vladimir to 0.9 in April 2016 and he was referred to Dr. Newton for adjuvant radiation therapy. He completed radiation therapy but did not have any PSA response to this treatment.     TREATMENT SUMMARY:  11/13/2015 Radical prostatectomy  April 2016  Radiation therapy  He was started on intermittent androgen deprivation therapy which had to be changed to continuous with rapid rise in his PSA.      April 2020 - he was started on bicalutamide for complete androgen blockade  He had rising PSA in May 2021. His bone scan done on 6/30/21 revealed metastatic lesions of T6 and the sacrum. He was switched to abiraterone with prednisone on 8/7/21    CURRENT INTERVENTIONS:  Abiraterone with prednisone starting 8/7/21    SUBJECTIVE   Wallace is being seen for his prostate cancer    Steve was followed in person and is accompanied by his wife at this  "visit. He has recently started on abiraterone. He has been tolerating this well. He has no new complains on therapy. His last restaging scan did show evidence of bony metastasis and he was recommended Zometa. He has been anxious about this.        PAST MEDICAL HISTORY   1. HTN  2. Dyslipidemia  3. Prostate cancer as detailed above      CURRENT OUTPATIENT MEDICATIONS     Current Outpatient Medications   Medication Sig     abiraterone (ZYTIGA) 250 MG tablet Take 2 tablets (500 mg) by mouth daily (with breakfast) for 30 doses Take with Low-Fat Meal     amLODIPine (NORVASC) 10 MG tablet Take 1 tablet (10 mg) by mouth daily     atorvastatin (LIPITOR) 20 MG tablet TAKE 1 TABLET EVERY DAY     ibuprofen (ADVIL/MOTRIN) 800 MG tablet Take 1 tablet (800 mg) by mouth 3 times daily with food     LORazepam (ATIVAN) 0.5 MG tablet Take 1 tablet (0.5 mg) by mouth every 4 hours as needed (Anxiety, Nausea/Vomiting or Sleep)     ondansetron (ZOFRAN) 4 MG tablet Take 1 tablet (4 mg) by mouth every 8 hours as needed for nausea     predniSONE (DELTASONE) 5 MG tablet Take 1 tablet (5 mg) by mouth daily (with breakfast)     valsartan (DIOVAN) 160 MG tablet Take 1 tablet (160 mg) by mouth daily     prochlorperazine (COMPAZINE) 10 MG tablet Take 0.5 tablets (5 mg) by mouth every 6 hours as needed (Nausea/Vomiting) (Patient not taking: Reported on 10/1/2021)     No current facility-administered medications for this visit.        ALLERGIES     No Known Allergies     REVIEW OF SYSTEMS   As above in the HPI, o/w complete 12-point ROS was negative.     PHYSICAL EXAM   BP (!) 143/71   Pulse 59   Temp 98.4  F (36.9  C) (Tympanic)   Resp 16   Ht 1.765 m (5' 9.5\")   Wt 98.3 kg (216 lb 11.2 oz)   SpO2 95%   BMI 31.54 kg/m    SpO2 Readings from Last 4 Encounters:   09/21/18 96%   08/24/18 94%   06/22/18 95%   05/11/18 97%     Wt Readings from Last 3 Encounters:   10/29/21 98.3 kg (216 lb 11.2 oz)   10/01/21 100.1 kg (220 lb 9.6 oz)   08/20/21 " 100.1 kg (220 lb 9.6 oz)        LABORATORY AND IMAGING STUDIES     Recent Labs   Lab Test 10/27/21  1053 10/01/21  1425 09/17/21  0910 09/03/21  0912 08/20/21  1401    140 141 139 142   POTASSIUM 3.8 4.3 4.4 3.6 4.0   CHLORIDE 107 108 107 110* 110*   CO2 25 27 31 28 27   ANIONGAP 9 5 3 1* 5   BUN 14 19 17 18 14   CR 0.91 1.01 1.13 1.03 1.02   * 129* 101* 101* 135*   TY 8.7 8.8 9.0 8.6 9.0     Recent Labs   Lab Test 01/21/20  1154   MAG 2.0     Recent Labs   Lab Test 10/01/21  1425 08/20/21  1401 08/06/21  0906 06/30/21  1029 05/28/21  0907   WBC 6.1 6.2 5.6 5.8 4.7   HGB 14.1 14.4 14.9 14.4 14.8    182 197 170 187   MCV 93 94 90 91 92   NEUTROPHIL 72 72 53 53.0 56.0     Recent Labs   Lab Test 10/27/21  1053 10/01/21  1425 09/17/21  0910 09/03/21  0912 08/20/21  1401 08/06/21  0906 08/06/21  0906 06/30/21  1029 05/28/21  0907   BILITOTAL 1.0 0.6 0.8   < > 0.6   < > 0.7   < > 0.6   ALKPHOS 114 114 108   < > 119   < > 118   < > 122   ALT 79* 83* 36   < > 29   < > 29   < > 31   AST 39 39 25   < > 20   < > 22   < > 21   ALBUMIN 3.8 3.7 3.6   < > 3.8   < > 4.0   < > 3.8   LDH  --   --   --   --  223  --  248*  --  269*    < > = values in this interval not displayed.     No results found for: TSH  No results for input(s): CEA in the last [id number] hours.  Results for orders placed or performed during the hospital encounter of 06/30/21   CT Chest/Abdomen/Pelvis w Contrast    Narrative    CT CHEST/ABDOMEN/PELVIS WITH CONTRAST 6/30/2021 11:31 AM    CLINICAL HISTORY: Prostate cancer with rising PSA; Prostate cancer  (H).    TECHNIQUE: CT scan of the chest, abdomen, and pelvis was performed  following injection of IV contrast. Multiplanar reformats were  obtained. Dose reduction techniques were used.     CONTRAST: 85mL Isovue-[id number]    COMPARISON: CT chest 1/20/2017. CT abdomen and pelvis 11/9/2016.    FINDINGS:   LUNGS AND PLEURA: No acute airspace disease or effusion. Stable  posterior left upper lobe 0.3 cm  nodule series 12 image 71. Stable 0.2  cm posterior medial right upper lobe nodule image 97. There is a solid  anterior right lower lobe nodule abutting the right major fissure that  is 0.7 cm series 12 image 218. This is present on the prior CT but it  previously measured approximately 0.4 cm. New small posterior right  lower lobe subpleural 0.4 cm nodule image 233. Stable right middle  lobe 0.2 cm nodule image 193.    MEDIASTINUM/AXILLAE: Moderate scattered thoracic aortic  calcifications. No acute mediastinal abnormality. No enlarged lymph  nodes identified. A few stable small thoracic lymph nodes are present.    CORONARY ARTERY CALCIFICATION: Moderate.    HEPATOBILIARY: No new focal hepatic lesion. Unremarkable gallbladder.    PANCREAS: Normal.    SPLEEN: Subcapsular faint hypodensity that appears mildly nodular is  again noted without convincing change compared to an older CT from  4/14/2016. This could be visualized on series 4 image 127 image 137.    ADRENAL GLANDS: Stable small right adrenal nodule that is 1.2 cm  series 4 image 154. Stable left adrenal.    KIDNEYS/BLADDER: No significant abnormality. No hydronephrosis. No  bladder lesion.    BOWEL: No acute abnormality.    PELVIC ORGANS: Prostate is absent. No new mass at the prostatectomy  bed.    ADDITIONAL FINDINGS: No new enlarged lymph node can be seen. Small  retroperitoneal lymph nodes appear stable.    MUSCULOSKELETAL: Increasing sclerosis at the right T6 level now  measuring 3.5 x 1.7 cm, previously 1.2 x 0.6 cm series 4 image 67.  Stable right lateral sixth rib sclerosis image 89, right pubic  sclerosis image 299. New sclerotic bone lesion at the mid sacrum that  is 3.6 x 3.1 cm on coronal series 6 image 141.      Impression    IMPRESSION:  1.  New sacral bone lesion and larger T6 vertebral body sclerotic bone  lesion consistent with sites of bony metastatic disease progression.  Bone scanning would provide more sensitive assessment. Stable  other  areas of sclerosis as above.  2.  A few new or larger pulmonary nodules are indeterminant. Cannot  exclude possibility of metastatic disease. Recommend further workup.  The dominant nodule is at the right lower lobe near the major fissure.  Recommend short interval follow-up CT chest in 3 months.  3.  Other stable findings as above.    HARSH OROZCO MD          SYSTEM ID:  [id number]     Recent Labs   Lab Test 10/01/21  1425 08/20/21  1401 08/06/21  0906 05/28/21  0907 12/07/20  1544   PSA 0.51 1.26 2.57 1.01 0.34   TESTOSTTOTAL <2* <2* 6* 6* 13*     PSA Trend over time             ASSESSMENT AND PLAN   1. Biochemical PSA relapse post prostatectomy without any response to adjuvant radiation therapy  2. Rapid rise in PSA on observation phase of intermittent androgen deprivation therapy  3. HTN  4. ECOG PS1  5. No medical comorbidity    I had a lengthy discussion with Steve who is accompanied by his wife at this visit. All his labs are within normal limits -except for his LDH, PSA and testosterone. His PSA had progressed on single agent Lupron to 1.56 ng/mL on 4/3/2020.  He has been started on bicalutamide and his PSA initially dropped to 0.34 ng/mL on 12/7/2020, but has again increased to 2.57 ng/mL on 8/6/21.     This is concerning for progressive disease.  His PSA has more than doubled within the last 3 months. We reviewed options of enzalutamide or abiraterone with him. He has been started on abiraterone on 8/7/21. It seems that he is already responding to therapy. His PSA is trending down as expected. This is a good response. His LFT's show mild elevation in ALT which can be safely monitored. It is only marginally above the baseline.     I reviewed with him that his recent bone scan did suggest presence of bony metastasis. I reviewed with him the benefit of starting infusional Zometa or denosumab for decreasing the likelihood of fracture and bone pain from malignancy and countering  Osteopenia from androgen  deprivation. This is standard of care for patients with skeletal metastasis from breast, prostate and other solid tumors. However he has oligometastatic disease and it definitely not mandatory that we start this right now. We could continue to monitor him and would consider it at a later date if he has more extensive disease progression.     Steve would like to avoid starting a new medication.     We would continue with abiraterone and prednisone as current. I will follow him in December as currently scheduled.      25 minutes spent on the date of the encounter doing chart review, history and exam, documentation and further activities as noted above     Gigi Ward  ,  Division of Hematology, Oncology & Transplantation  [facility].              Again, thank you for allowing me to participate in the care of your patient.        Sincerely,        Gigi Ward MD

## 2021-10-29 NOTE — NURSING NOTE
"Oncology Rooming Note    October 29, 2021 2:36 PM   Wallace Zelaya is a 76 year old male who presents for:    Chief Complaint   Patient presents with     Oncology Clinic Visit     Prostate cancer      Initial Vitals: BP (!) 143/71   Pulse 59   Temp 98.4  F (36.9  C) (Tympanic)   Resp 16   Ht 1.765 m (5' 9.5\")   Wt 98.3 kg (216 lb 11.2 oz)   SpO2 95%   BMI 31.54 kg/m   Estimated body mass index is 31.54 kg/m  as calculated from the following:    Height as of this encounter: 1.765 m (5' 9.5\").    Weight as of this encounter: 98.3 kg (216 lb 11.2 oz). Body surface area is 2.2 meters squared.  No Pain (0) Comment: Data Unavailable   No LMP for male patient.  Allergies reviewed: Yes  Medications reviewed: Yes    Medications: Medication refills not needed today.  Pharmacy name entered into [facility]:    [facility] [place], [place] - [address]  [id number] - [place], [place] - [id number] [facility] - [place], [place] - [address]    Clinical concerns: follow up        Tammy Mortensen CMA              "

## 2021-10-29 NOTE — PROGRESS NOTES
[facility] CANCER CLINIC  FOLLOW-UP VISIT NOTE    PATIENT NAME: Wallace Zelaya MRN # [id number]  DATE OF VISIT: Oct 29, 2021 YOB: 1945    REFERRING PROVIDER: No referring provider defined for this encounter.    CANCER TYPE: Prostate cancer; Biochemical recurrence; Castration resistant disease  STAGE: Stage III - pT3b at diagnosis; M0    HISTORY OF PRESENTING ILLNESS:  Wallace was noted to have rising screening PSA from 2 - 4. He was referred to Dr. Rodolfo Connor. He had radical prostatectomy on 11/2015. Pathology from this revealed Cayla 4+4 disease with extraprostatic extension, seminal vesicle extension and he was staged at pT3b. All of the 12 lymph nodes resected were negative for disease. Post operatively his PSA did not drop to undetectable levels and remained elevated at 0.56. It vladimir to 0.9 in April 2016 and he was referred to Dr. Newton for adjuvant radiation therapy. He completed radiation therapy but did not have any PSA response to this treatment.     TREATMENT SUMMARY:  11/13/2015 Radical prostatectomy  April 2016  Radiation therapy  He was started on intermittent androgen deprivation therapy which had to be changed to continuous with rapid rise in his PSA.      April 2020 - he was started on bicalutamide for complete androgen blockade  He had rising PSA in May 2021. His bone scan done on 6/30/21 revealed metastatic lesions of T6 and the sacrum. He was switched to abiraterone with prednisone on 8/7/21    CURRENT INTERVENTIONS:  Abiraterone with prednisone starting 8/7/21    SUBJECTIVE   Wallace is being seen for his prostate cancer    Steve was followed in person and is accompanied by his wife at this visit. He has recently started on abiraterone. He has been tolerating this well. He has no new complains on therapy. His last restaging scan did show evidence of bony metastasis and he was recommended Zometa. He has been anxious about this.        PAST MEDICAL  "HISTORY   1. HTN  2. Dyslipidemia  3. Prostate cancer as detailed above      CURRENT OUTPATIENT MEDICATIONS     Current Outpatient Medications   Medication Sig     abiraterone (ZYTIGA) 250 MG tablet Take 2 tablets (500 mg) by mouth daily (with breakfast) for 30 doses Take with Low-Fat Meal     amLODIPine (NORVASC) 10 MG tablet Take 1 tablet (10 mg) by mouth daily     atorvastatin (LIPITOR) 20 MG tablet TAKE 1 TABLET EVERY DAY     ibuprofen (ADVIL/MOTRIN) 800 MG tablet Take 1 tablet (800 mg) by mouth 3 times daily with food     LORazepam (ATIVAN) 0.5 MG tablet Take 1 tablet (0.5 mg) by mouth every 4 hours as needed (Anxiety, Nausea/Vomiting or Sleep)     ondansetron (ZOFRAN) 4 MG tablet Take 1 tablet (4 mg) by mouth every 8 hours as needed for nausea     predniSONE (DELTASONE) 5 MG tablet Take 1 tablet (5 mg) by mouth daily (with breakfast)     valsartan (DIOVAN) 160 MG tablet Take 1 tablet (160 mg) by mouth daily     prochlorperazine (COMPAZINE) 10 MG tablet Take 0.5 tablets (5 mg) by mouth every 6 hours as needed (Nausea/Vomiting) (Patient not taking: Reported on 10/1/2021)     No current facility-administered medications for this visit.        ALLERGIES     No Known Allergies     REVIEW OF SYSTEMS   As above in the HPI, o/w complete 12-point ROS was negative.     PHYSICAL EXAM   BP (!) 143/71   Pulse 59   Temp 98.4  F (36.9  C) (Tympanic)   Resp 16   Ht 1.765 m (5' 9.5\")   Wt 98.3 kg (216 lb 11.2 oz)   SpO2 95%   BMI 31.54 kg/m    SpO2 Readings from Last 4 Encounters:   09/21/18 96%   08/24/18 94%   06/22/18 95%   05/11/18 97%     Wt Readings from Last 3 Encounters:   10/29/21 98.3 kg (216 lb 11.2 oz)   10/01/21 100.1 kg (220 lb 9.6 oz)   08/20/21 100.1 kg (220 lb 9.6 oz)        LABORATORY AND IMAGING STUDIES     Recent Labs   Lab Test 10/27/21  1053 10/01/21  1425 09/17/21  0910 09/03/21  0912 08/20/21  1401    140 141 139 142   POTASSIUM 3.8 4.3 4.4 3.6 4.0   CHLORIDE 107 108 107 110* 110*   CO2 25 " 27 31 28 27   ANIONGAP 9 5 3 1* 5   BUN 14 19 17 18 14   CR 0.91 1.01 1.13 1.03 1.02   * 129* 101* 101* 135*   TY 8.7 8.8 9.0 8.6 9.0     Recent Labs   Lab Test 01/21/20  1154   MAG 2.0     Recent Labs   Lab Test 10/01/21  1425 08/20/21  1401 08/06/21  0906 06/30/21  1029 05/28/21  0907   WBC 6.1 6.2 5.6 5.8 4.7   HGB 14.1 14.4 14.9 14.4 14.8    182 197 170 187   MCV 93 94 90 91 92   NEUTROPHIL 72 72 53 53.0 56.0     Recent Labs   Lab Test 10/27/21  1053 10/01/21  1425 09/17/21  0910 09/03/21  0912 08/20/21  1401 08/06/21  0906 08/06/21  0906 06/30/21  1029 05/28/21  0907   BILITOTAL 1.0 0.6 0.8   < > 0.6   < > 0.7   < > 0.6   ALKPHOS 114 114 108   < > 119   < > 118   < > 122   ALT 79* 83* 36   < > 29   < > 29   < > 31   AST 39 39 25   < > 20   < > 22   < > 21   ALBUMIN 3.8 3.7 3.6   < > 3.8   < > 4.0   < > 3.8   LDH  --   --   --   --  223  --  248*  --  269*    < > = values in this interval not displayed.     No results found for: TSH  No results for input(s): CEA in the last 49053 hours.  Results for orders placed or performed during the hospital encounter of 06/30/21   CT Chest/Abdomen/Pelvis w Contrast    Narrative    CT CHEST/ABDOMEN/PELVIS WITH CONTRAST 6/30/2021 11:31 AM    CLINICAL HISTORY: Prostate cancer with rising PSA; Prostate cancer  (H).    TECHNIQUE: CT scan of the chest, abdomen, and pelvis was performed  following injection of IV contrast. Multiplanar reformats were  obtained. Dose reduction techniques were used.     CONTRAST: 85mL Isovue-370    COMPARISON: CT chest 1/20/2017. CT abdomen and pelvis 11/9/2016.    FINDINGS:   LUNGS AND PLEURA: No acute airspace disease or effusion. Stable  posterior left upper lobe 0.3 cm nodule series 12 image 71. Stable 0.2  cm posterior medial right upper lobe nodule image 97. There is a solid  anterior right lower lobe nodule abutting the right major fissure that  is 0.7 cm series 12 image 218. This is present on the prior CT but it  previously  measured approximately 0.4 cm. New small posterior right  lower lobe subpleural 0.4 cm nodule image 233. Stable right middle  lobe 0.2 cm nodule image 193.    MEDIASTINUM/AXILLAE: Moderate scattered thoracic aortic  calcifications. No acute mediastinal abnormality. No enlarged lymph  nodes identified. A few stable small thoracic lymph nodes are present.    CORONARY ARTERY CALCIFICATION: Moderate.    HEPATOBILIARY: No new focal hepatic lesion. Unremarkable gallbladder.    PANCREAS: Normal.    SPLEEN: Subcapsular faint hypodensity that appears mildly nodular is  again noted without convincing change compared to an older CT from  4/14/2016. This could be visualized on series 4 image 127 image 137.    ADRENAL GLANDS: Stable small right adrenal nodule that is 1.2 cm  series 4 image 154. Stable left adrenal.    KIDNEYS/BLADDER: No significant abnormality. No hydronephrosis. No  bladder lesion.    BOWEL: No acute abnormality.    PELVIC ORGANS: Prostate is absent. No new mass at the prostatectomy  bed.    ADDITIONAL FINDINGS: No new enlarged lymph node can be seen. Small  retroperitoneal lymph nodes appear stable.    MUSCULOSKELETAL: Increasing sclerosis at the right T6 level now  measuring 3.5 x 1.7 cm, previously 1.2 x 0.6 cm series 4 image 67.  Stable right lateral sixth rib sclerosis image 89, right pubic  sclerosis image 299. New sclerotic bone lesion at the mid sacrum that  is 3.6 x 3.1 cm on coronal series 6 image 141.      Impression    IMPRESSION:  1.  New sacral bone lesion and larger T6 vertebral body sclerotic bone  lesion consistent with sites of bony metastatic disease progression.  Bone scanning would provide more sensitive assessment. Stable other  areas of sclerosis as above.  2.  A few new or larger pulmonary nodules are indeterminant. Cannot  exclude possibility of metastatic disease. Recommend further workup.  The dominant nodule is at the right lower lobe near the major fissure.  Recommend short  interval follow-up CT chest in 3 months.  3.  Other stable findings as above.    HARSH OROZCO MD          SYSTEM ID:  [id number]     Recent Labs   Lab Test 10/01/21  1425 08/20/21  1401 08/06/21  0906 05/28/21  0907 12/07/20  1544   PSA 0.51 1.26 2.57 1.01 0.34   TESTOSTTOTAL <2* <2* 6* 6* 13*     PSA Trend over time             ASSESSMENT AND PLAN   1. Biochemical PSA relapse post prostatectomy without any response to adjuvant radiation therapy  2. Rapid rise in PSA on observation phase of intermittent androgen deprivation therapy  3. HTN  4. ECOG PS1  5. No medical comorbidity    I had a lengthy discussion with Steve who is accompanied by his wife at this visit. All his labs are within normal limits -except for his LDH, PSA and testosterone. His PSA had progressed on single agent Lupron to 1.56 ng/mL on 4/3/2020.  He has been started on bicalutamide and his PSA initially dropped to 0.34 ng/mL on 12/7/2020, but has again increased to 2.57 ng/mL on 8/6/21.     This is concerning for progressive disease.  His PSA has more than doubled within the last 3 months. We reviewed options of enzalutamide or abiraterone with him. He has been started on abiraterone on 8/7/21. It seems that he is already responding to therapy. His PSA is trending down as expected. This is a good response. His LFT's show mild elevation in ALT which can be safely monitored. It is only marginally above the baseline.     I reviewed with him that his recent bone scan did suggest presence of bony metastasis. I reviewed with him the benefit of starting infusional Zometa or denosumab for decreasing the likelihood of fracture and bone pain from malignancy and countering  Osteopenia from androgen deprivation. This is standard of care for patients with skeletal metastasis from breast, prostate and other solid tumors. However he has oligometastatic disease and it definitely not mandatory that we start this right now. We could continue to monitor him and would  consider it at a later date if he has more extensive disease progression.     Steve would like to avoid starting a new medication.     We would continue with abiraterone and prednisone as current. I will follow him in December as currently scheduled.      25 minutes spent on the date of the encounter doing chart review, history and exam, documentation and further activities as noted above     Gigi Ward  ,  Division of Hematology, Oncology & Transplantation  [facility].

## 2021-11-22 ENCOUNTER — TELEPHONE (OUTPATIENT)
Dept: PHARMACY | Facility: CLINIC | Age: 76
End: 2021-11-22
Payer: COMMERCIAL

## 2021-11-22 DIAGNOSIS — C61 PROSTATE CANCER (H): Primary | ICD-10-CM

## 2021-11-22 DIAGNOSIS — C79.51 BONE METASTASIS: ICD-10-CM

## 2021-11-22 RX ORDER — ABIRATERONE ACETATE 250 MG/1
500 TABLET ORAL
Qty: 60 TABLET | Refills: 0 | Status: SHIPPED | OUTPATIENT
Start: 2021-11-22 | End: 2021-12-22

## 2021-11-22 RX ORDER — PREDNISONE 5 MG/1
5 TABLET ORAL
Qty: 30 TABLET | Refills: 0 | Status: SHIPPED | OUTPATIENT
Start: 2021-11-22 | End: 2021-12-22

## 2021-12-03 ENCOUNTER — LAB (OUTPATIENT)
Dept: ONCOLOGY | Facility: CLINIC | Age: 76
End: 2021-12-03
Attending: INTERNAL MEDICINE
Payer: COMMERCIAL

## 2021-12-03 DIAGNOSIS — C79.51 BONE METASTASIS: ICD-10-CM

## 2021-12-03 DIAGNOSIS — C61 PROSTATE CANCER (H): ICD-10-CM

## 2021-12-03 LAB
ALBUMIN SERPL-MCNC: 3.7 G/DL (ref 3.4–5)
ALP SERPL-CCNC: 108 U/L (ref 40–150)
ALT SERPL W P-5'-P-CCNC: 98 U/L (ref 0–70)
ANION GAP SERPL CALCULATED.3IONS-SCNC: 3 MMOL/L (ref 3–14)
AST SERPL W P-5'-P-CCNC: ABNORMAL U/L
BILIRUB SERPL-MCNC: 0.9 MG/DL (ref 0.2–1.3)
BUN SERPL-MCNC: 11 MG/DL (ref 7–30)
CALCIUM SERPL-MCNC: 9.1 MG/DL (ref 8.5–10.1)
CHLORIDE BLD-SCNC: 108 MMOL/L (ref 94–109)
CO2 SERPL-SCNC: 26 MMOL/L (ref 20–32)
CREAT SERPL-MCNC: 0.84 MG/DL (ref 0.66–1.25)
GFR SERPL CREATININE-BSD FRML MDRD: 84 ML/MIN/1.73M2
GLUCOSE BLD-MCNC: 109 MG/DL (ref 70–99)
POTASSIUM BLD-SCNC: 4.3 MMOL/L (ref 3.4–5.3)
PROT SERPL-MCNC: 7.7 G/DL (ref 6.8–8.8)
SODIUM SERPL-SCNC: 137 MMOL/L (ref 133–144)

## 2021-12-03 PROCEDURE — 84155 ASSAY OF PROTEIN SERUM: CPT | Performed by: INTERNAL MEDICINE

## 2021-12-03 PROCEDURE — 36415 COLL VENOUS BLD VENIPUNCTURE: CPT

## 2021-12-03 PROCEDURE — 84460 ALANINE AMINO (ALT) (SGPT): CPT | Performed by: INTERNAL MEDICINE

## 2021-12-03 NOTE — PROGRESS NOTES
Medical Assistant Note:  Wallace Zelaya presents today for blood draw.    Patient seen by provider today: No.   present during visit today: Not Applicable.    Concerns: No Concerns.    Procedure:  Lab draw site: right antecub, Needle type: butterfly, Gauge: 23.    Post Assessment:  Labs drawn without difficulty: Yes.    Discharge Plan:  Departure Mode: Ambulatory.    Face to Face Time: 10.    Olya Valenzuela, CMA

## 2021-12-09 ENCOUNTER — TELEPHONE (OUTPATIENT)
Dept: ONCOLOGY | Facility: CLINIC | Age: 76
End: 2021-12-09
Payer: COMMERCIAL

## 2021-12-09 NOTE — TELEPHONE ENCOUNTER
Dani or Assistance Initiated  Medication: [facility]  Assistance type (copay card, dani, etc.) [employer]  Date submitted:     Pin:    Member id: [id number]    Left vm to call back to see if patient had already enrolled or if he needs help 12/9

## 2021-12-10 ENCOUNTER — LAB (OUTPATIENT)
Dept: ONCOLOGY | Facility: CLINIC | Age: 76
End: 2021-12-10
Attending: INTERNAL MEDICINE
Payer: COMMERCIAL

## 2021-12-10 VITALS
RESPIRATION RATE: 14 BRPM | SYSTOLIC BLOOD PRESSURE: 143 MMHG | HEART RATE: 67 BPM | OXYGEN SATURATION: 96 % | HEIGHT: 70 IN | BODY MASS INDEX: 31.78 KG/M2 | DIASTOLIC BLOOD PRESSURE: 79 MMHG | WEIGHT: 222 LBS | TEMPERATURE: 96.5 F

## 2021-12-10 DIAGNOSIS — C79.51 BONE METASTASIS: ICD-10-CM

## 2021-12-10 DIAGNOSIS — C61 PROSTATE CANCER (H): ICD-10-CM

## 2021-12-10 DIAGNOSIS — C61 PROSTATE CANCER (H): Primary | ICD-10-CM

## 2021-12-10 LAB
ALBUMIN SERPL-MCNC: 3.8 G/DL (ref 3.4–5)
ALP SERPL-CCNC: 112 U/L (ref 40–150)
ALT SERPL W P-5'-P-CCNC: 110 U/L (ref 0–70)
ANION GAP SERPL CALCULATED.3IONS-SCNC: 4 MMOL/L (ref 3–14)
AST SERPL W P-5'-P-CCNC: 50 U/L (ref 0–45)
BILIRUB SERPL-MCNC: 0.9 MG/DL (ref 0.2–1.3)
BUN SERPL-MCNC: 15 MG/DL (ref 7–30)
CALCIUM SERPL-MCNC: 9.2 MG/DL (ref 8.5–10.1)
CHLORIDE BLD-SCNC: 106 MMOL/L (ref 94–109)
CO2 SERPL-SCNC: 30 MMOL/L (ref 20–32)
CREAT SERPL-MCNC: 0.95 MG/DL (ref 0.66–1.25)
GFR SERPL CREATININE-BSD FRML MDRD: 77 ML/MIN/1.73M2
GLUCOSE BLD-MCNC: 94 MG/DL (ref 70–99)
LDH SERPL L TO P-CCNC: 274 U/L (ref 85–227)
POTASSIUM BLD-SCNC: 4.7 MMOL/L (ref 3.4–5.3)
PROT SERPL-MCNC: 7.6 G/DL (ref 6.8–8.8)
PSA SERPL-MCNC: 0.78 UG/L (ref 0–4)
SODIUM SERPL-SCNC: 140 MMOL/L (ref 133–144)

## 2021-12-10 PROCEDURE — 80053 COMPREHEN METABOLIC PANEL: CPT | Performed by: INTERNAL MEDICINE

## 2021-12-10 PROCEDURE — 83615 LACTATE (LD) (LDH) ENZYME: CPT | Performed by: INTERNAL MEDICINE

## 2021-12-10 PROCEDURE — 99214 OFFICE O/P EST MOD 30 MIN: CPT | Performed by: INTERNAL MEDICINE

## 2021-12-10 PROCEDURE — G0463 HOSPITAL OUTPT CLINIC VISIT: HCPCS | Mod: 25

## 2021-12-10 PROCEDURE — 36415 COLL VENOUS BLD VENIPUNCTURE: CPT

## 2021-12-10 PROCEDURE — 84153 ASSAY OF PSA TOTAL: CPT | Performed by: INTERNAL MEDICINE

## 2021-12-10 ASSESSMENT — MIFFLIN-ST. JEOR: SCORE: 1730.3

## 2021-12-10 NOTE — NURSING NOTE
"Oncology Rooming Note    December 10, 2021 10:23 AM   Wallace Zelaya is a 76 year old male who presents for:    Chief Complaint   Patient presents with     Oncology Clinic Visit     Prostate cancer     Initial Vitals: BP (!) 143/79 (Cuff Size: Adult Large)   Pulse 67   Temp (!) 96.5  F (35.8  C) (Tympanic)   Resp 14   Ht 1.765 m (5' 9.5\")   Wt 100.7 kg (222 lb)   SpO2 96%   BMI 32.31 kg/m   Estimated body mass index is 32.31 kg/m  as calculated from the following:    Height as of this encounter: 1.765 m (5' 9.5\").    Weight as of this encounter: 100.7 kg (222 lb). Body surface area is 2.22 meters squared.  Data Unavailable Comment: Data Unavailable   No LMP for male patient.  Allergies reviewed: Yes  Medications reviewed: Yes    Medications: Medication refills not needed today.  Pharmacy name entered into [employer]:    [facility] - [place], [place] - [address]  [facility] - [place], [place] - [address]  [facility] - [place], [place] - [address]    Clinical concerns: f/u       Tess Horton, TATA              "

## 2021-12-10 NOTE — TELEPHONE ENCOUNTER
Called pt and had to LM, he called me back shortly after.     Let him know the reason for Brandie's call, his josué expires at the end of this month. Provided him with [employer] phone# to see if they'll allow him to renew the josué over the phone. I believe typically they don't allow patients to renew their grants until its , so patient may not be able to renew josué until 2022-- pt will call us back with what he finds out.     [employer] phone# [phone]

## 2021-12-10 NOTE — PROGRESS NOTES
[facility] CANCER CLINIC  FOLLOW-UP VISIT NOTE    PATIENT NAME: Wallace Zelaya MRN # [id number]  DATE OF VISIT: Dec 10, 2021 YOB: 1945    REFERRING PROVIDER: No referring provider defined for this encounter.    CANCER TYPE: Prostate cancer; Biochemical recurrence; Castration resistant disease  STAGE: Stage III - pT3b at diagnosis; M0    HISTORY OF PRESENTING ILLNESS:  Wallace was noted to have rising screening PSA from 2 - 4. He was referred to Dr. Rodolfo Connor. He had radical prostatectomy on 11/2015. Pathology from this revealed Cayla 4+4 disease with extraprostatic extension, seminal vesicle extension and he was staged at pT3b. All of the 12 lymph nodes resected were negative for disease. Post operatively his PSA did not drop to undetectable levels and remained elevated at 0.56. It vladimir to 0.9 in April 2016 and he was referred to Dr. Newton for adjuvant radiation therapy. He completed radiation therapy but did not have any PSA response to this treatment.     TREATMENT SUMMARY:  11/13/2015 Radical prostatectomy  April 2016  Radiation therapy  He was started on intermittent androgen deprivation therapy which had to be changed to continuous with rapid rise in his PSA.      April 2020 - he was started on bicalutamide for complete androgen blockade  He had rising PSA in May 2021. His bone scan done on 6/30/21 revealed metastatic lesions of T6 and the sacrum. He was switched to abiraterone with prednisone on 8/7/21    CURRENT INTERVENTIONS:  Abiraterone with prednisone starting 8/7/21    SUBJECTIVE   Wallace is being seen for his prostate cancer    Steve was followed in person and is alone at this visit. He has recently started on abiraterone. He has been tolerating this well. He has no new complains on therapy. His last restaging scan did show evidence of bony metastasis. He has been anxious about this.        PAST MEDICAL HISTORY   1. HTN  2. Dyslipidemia  3. Prostate  cancer as detailed above      CURRENT OUTPATIENT MEDICATIONS     Current Outpatient Medications   Medication Sig     abiraterone (ZYTIGA) 250 MG tablet Take 2 tablets (500 mg) by mouth daily (with breakfast) for 30 doses Take with Low-Fat Meal     amLODIPine (NORVASC) 10 MG tablet Take 1 tablet (10 mg) by mouth daily     atorvastatin (LIPITOR) 20 MG tablet TAKE 1 TABLET EVERY DAY     ibuprofen (ADVIL/MOTRIN) 800 MG tablet Take 1 tablet (800 mg) by mouth 3 times daily with food     LORazepam (ATIVAN) 0.5 MG tablet Take 1 tablet (0.5 mg) by mouth every 4 hours as needed (Anxiety, Nausea/Vomiting or Sleep)     ondansetron (ZOFRAN) 4 MG tablet Take 1 tablet (4 mg) by mouth every 8 hours as needed for nausea     predniSONE (DELTASONE) 5 MG tablet Take 1 tablet (5 mg) by mouth daily (with breakfast)     prochlorperazine (COMPAZINE) 10 MG tablet Take 0.5 tablets (5 mg) by mouth every 6 hours as needed (Nausea/Vomiting) (Patient not taking: Reported on 10/1/2021)     valsartan (DIOVAN) 160 MG tablet Take 1 tablet (160 mg) by mouth daily     No current facility-administered medications for this visit.        ALLERGIES     No Known Allergies     REVIEW OF SYSTEMS   As above in the HPI, o/w complete 12-point ROS was negative.     PHYSICAL EXAM   There were no vitals taken for this visit.  SpO2 Readings from Last 4 Encounters:   09/21/18 96%   08/24/18 94%   06/22/18 95%   05/11/18 97%     Wt Readings from Last 3 Encounters:   10/29/21 98.3 kg (216 lb 11.2 oz)   10/01/21 100.1 kg (220 lb 9.6 oz)   08/20/21 100.1 kg (220 lb 9.6 oz)        LABORATORY AND IMAGING STUDIES     Recent Labs   Lab Test 12/03/21  1005 10/27/21  1053 10/01/21  1425 09/17/21  0910 09/03/21  0912    141 140 141 139   POTASSIUM 4.3 3.8 4.3 4.4 3.6   CHLORIDE 108 107 108 107 110*   CO2 26 25 27 31 28   ANIONGAP 3 9 5 3 1*   BUN 11 14 19 17 18   CR 0.84 0.91 1.01 1.13 1.03   * 112* 129* 101* 101*   TY 9.1 8.7 8.8 9.0 8.6     Recent Labs   Lab  Test 01/21/20  1154   MAG 2.0     Recent Labs   Lab Test 10/01/21  1425 08/20/21  1401 08/06/21  0906 06/30/21  1029 05/28/21  0907   WBC 6.1 6.2 5.6 5.8 4.7   HGB 14.1 14.4 14.9 14.4 14.8    182 197 170 187   MCV 93 94 90 91 92   NEUTROPHIL 72 72 53 53.0 56.0     Recent Labs   Lab Test 12/03/21  1005 10/27/21  1053 10/01/21  1425 09/17/21  0910 09/03/21  0912 08/20/21  1401 08/06/21  0906 06/30/21  1029 05/28/21  0907   BILITOTAL 0.9 1.0 0.6 0.8   < > 0.6 0.7   < > 0.6   ALKPHOS 108 114 114 108   < > 119 118   < > 122   ALT 98* 79* 83* 36   < > 29 29   < > 31   AST  --  39 39 25   < > 20 22   < > 21   ALBUMIN 3.7 3.8 3.7 3.6   < > 3.8 4.0   < > 3.8   LDH  --   --   --   --   --  223 248*  --  269*    < > = values in this interval not displayed.     No results found for: TSH  No results for input(s): CEA in the last 88687 hours.  Results for orders placed or performed during the hospital encounter of 06/30/21   CT Chest/Abdomen/Pelvis w Contrast    Narrative    CT CHEST/ABDOMEN/PELVIS WITH CONTRAST 6/30/2021 11:31 AM    CLINICAL HISTORY: Prostate cancer with rising PSA; Prostate cancer  (H).    TECHNIQUE: CT scan of the chest, abdomen, and pelvis was performed  following injection of IV contrast. Multiplanar reformats were  obtained. Dose reduction techniques were used.     CONTRAST: 85mL Isovue-370    COMPARISON: CT chest 1/20/2017. CT abdomen and pelvis 11/9/2016.    FINDINGS:   LUNGS AND PLEURA: No acute airspace disease or effusion. Stable  posterior left upper lobe 0.3 cm nodule series 12 image 71. Stable 0.2  cm posterior medial right upper lobe nodule image 97. There is a solid  anterior right lower lobe nodule abutting the right major fissure that  is 0.7 cm series 12 image 218. This is present on the prior CT but it  previously measured approximately 0.4 cm. New small posterior right  lower lobe subpleural 0.4 cm nodule image 233. Stable right middle  lobe 0.2 cm nodule image  193.    MEDIASTINUM/AXILLAE: Moderate scattered thoracic aortic  calcifications. No acute mediastinal abnormality. No enlarged lymph  nodes identified. A few stable small thoracic lymph nodes are present.    CORONARY ARTERY CALCIFICATION: Moderate.    HEPATOBILIARY: No new focal hepatic lesion. Unremarkable gallbladder.    PANCREAS: Normal.    SPLEEN: Subcapsular faint hypodensity that appears mildly nodular is  again noted without convincing change compared to an older CT from  4/14/2016. This could be visualized on series 4 image 127 image 137.    ADRENAL GLANDS: Stable small right adrenal nodule that is 1.2 cm  series 4 image 154. Stable left adrenal.    KIDNEYS/BLADDER: No significant abnormality. No hydronephrosis. No  bladder lesion.    BOWEL: No acute abnormality.    PELVIC ORGANS: Prostate is absent. No new mass at the prostatectomy  bed.    ADDITIONAL FINDINGS: No new enlarged lymph node can be seen. Small  retroperitoneal lymph nodes appear stable.    MUSCULOSKELETAL: Increasing sclerosis at the right T6 level now  measuring 3.5 x 1.7 cm, previously 1.2 x 0.6 cm series 4 image 67.  Stable right lateral sixth rib sclerosis image 89, right pubic  sclerosis image 299. New sclerotic bone lesion at the mid sacrum that  is 3.6 x 3.1 cm on coronal series 6 image 141.      Impression    IMPRESSION:  1.  New sacral bone lesion and larger T6 vertebral body sclerotic bone  lesion consistent with sites of bony metastatic disease progression.  Bone scanning would provide more sensitive assessment. Stable other  areas of sclerosis as above.  2.  A few new or larger pulmonary nodules are indeterminant. Cannot  exclude possibility of metastatic disease. Recommend further workup.  The dominant nodule is at the right lower lobe near the major fissure.  Recommend short interval follow-up CT chest in 3 months.  3.  Other stable findings as above.    HARSH OROZCO MD          SYSTEM ID:  [id number]     Recent Labs   Lab Test  10/01/21  1425 08/20/21  1401 08/06/21  0906 05/28/21  0907 12/07/20  1544   PSA 0.51 1.26 2.57 1.01 0.34   TESTOSTTOTAL <2* <2* 6* 6* 13*     Recent Labs   Lab Test 12/03/21  1005 10/27/21  1053 10/01/21  1425 09/17/21  0910 09/03/21  0912 08/20/21  1401 08/06/21  0906 06/30/21  1029 05/28/21  0907 12/07/20  1544 07/31/20  1601   PSA  --   --  0.51  --   --  1.26 2.57  --  1.01 0.34 0.54   ALKPHOS 108 114 114 108 115 119 118   < > 122 118 112   LDH  --   --   --   --   --  223 248*  --  269* 262* 257*    < > = values in this interval not displayed.           PSA Trend over time             ASSESSMENT AND PLAN   1. Biochemical PSA relapse post prostatectomy without any response to adjuvant radiation therapy  2. Rapid rise in PSA on observation phase of intermittent androgen deprivation therapy  3. HTN  4. ECOG PS1  5. No medical comorbidity    I had a lengthy discussion with Steve who is alone at this visit. All his labs are within normal limits -except for his LDH, PSA and testosterone.     He has been started on abiraterone on 8/7/21. It seems that he is already responding to therapy. His PSA is trending down as expected. This is a good response. His LFT's show mild elevation in his transaminases ALT/AST which can be safely monitored. It is only marginally above the baseline.     His PSA was not available at the time of clinic visit. It has declined as would be expected on therapy from 2.57 ng/ml to 0.78 ng/mL.    I again reviewed with him that his recent bone scan did suggest presence of bony metastasis. Treatment with either zoledronic acid or denosumab is recommended for patients with bony metastasis. However he has oligometastatic disease and it definitely not mandatory that we start this right now. We could continue to monitor him and would consider it at a later date if he has more extensive disease progression.     Steve would like to avoid starting a new medication and I assured him that there is no hurry to  get it started at this time.     We would continue with abiraterone and prednisone as current. I will follow him personally in 3 months. I will have him follow-up in follow-up by nurse practitioners in 6 weeks to monitor his liver function tests. I have reviewed his case with our clinical pharmacist-Marty Meese and Velma who have been following him along with me.     25 minutes spent on the date of the encounter doing chart review, history and exam, documentation and further activities as noted above     Gigi Ward  ,  Division of Hematology, Oncology & Transplantation  [facility].

## 2021-12-10 NOTE — LETTER
12/10/2021         RE: Wallace Zelaya  [id number] [facility] [id number]        Dear Colleague,    Thank you for referring your patient, Wallace Zelaya, to the [facility] CANCER [facility]. Please see a copy of my visit note below.    [facility] CANCER CLINIC  FOLLOW-UP VISIT NOTE    PATIENT NAME: Wallace Zelaya MRN # [id number]  DATE OF VISIT: Dec 10, 2021 YOB: 1945    REFERRING PROVIDER: No referring provider defined for this encounter.    CANCER TYPE: Prostate cancer; Biochemical recurrence; Castration resistant disease  STAGE: Stage III - pT3b at diagnosis; M0    HISTORY OF PRESENTING ILLNESS:  Wallace was noted to have rising screening PSA from 2 - 4. He was referred to Dr. Rodolfo Connor. He had radical prostatectomy on 11/2015. Pathology from this revealed [place] 4+4 disease with extraprostatic extension, seminal vesicle extension and he was staged at pT3b. All of the 12 lymph nodes resected were negative for disease. Post operatively his PSA did not drop to undetectable levels and remained elevated at 0.56. It vladimir to 0.9 in April 2016 and he was referred to Dr. Newton for adjuvant radiation therapy. He completed radiation therapy but did not have any PSA response to this treatment.     TREATMENT SUMMARY:  11/13/2015 Radical prostatectomy  April 2016  Radiation therapy  He was started on intermittent androgen deprivation therapy which had to be changed to continuous with rapid rise in his PSA.      April 2020 - he was started on bicalutamide for complete androgen blockade  He had rising PSA in May 2021. His bone scan done on 6/30/21 revealed metastatic lesions of T6 and the sacrum. He was switched to abiraterone with prednisone on 8/7/21    CURRENT INTERVENTIONS:  Abiraterone with prednisone starting 8/7/21    SUBJECTIVE   Wallace is being seen for his prostate cancer    Steve was followed in person and is alone at this visit. He has  recently started on abiraterone. He has been tolerating this well. He has no new complains on therapy. His last restaging scan did show evidence of bony metastasis. He has been anxious about this.        PAST MEDICAL HISTORY   1. HTN  2. Dyslipidemia  3. Prostate cancer as detailed above      CURRENT OUTPATIENT MEDICATIONS     Current Outpatient Medications   Medication Sig     abiraterone (ZYTIGA) 250 MG tablet Take 2 tablets (500 mg) by mouth daily (with breakfast) for 30 doses Take with Low-Fat Meal     amLODIPine (NORVASC) 10 MG tablet Take 1 tablet (10 mg) by mouth daily     atorvastatin (LIPITOR) 20 MG tablet TAKE 1 TABLET EVERY DAY     ibuprofen (ADVIL/MOTRIN) 800 MG tablet Take 1 tablet (800 mg) by mouth 3 times daily with food     LORazepam (ATIVAN) 0.5 MG tablet Take 1 tablet (0.5 mg) by mouth every 4 hours as needed (Anxiety, Nausea/Vomiting or Sleep)     ondansetron (ZOFRAN) 4 MG tablet Take 1 tablet (4 mg) by mouth every 8 hours as needed for nausea     predniSONE (DELTASONE) 5 MG tablet Take 1 tablet (5 mg) by mouth daily (with breakfast)     prochlorperazine (COMPAZINE) 10 MG tablet Take 0.5 tablets (5 mg) by mouth every 6 hours as needed (Nausea/Vomiting) (Patient not taking: Reported on 10/1/2021)     valsartan (DIOVAN) 160 MG tablet Take 1 tablet (160 mg) by mouth daily     No current facility-administered medications for this visit.        ALLERGIES     No Known Allergies     REVIEW OF SYSTEMS   As above in the HPI, o/w complete 12-point ROS was negative.     PHYSICAL EXAM   There were no vitals taken for this visit.  SpO2 Readings from Last 4 Encounters:   09/21/18 96%   08/24/18 94%   06/22/18 95%   05/11/18 97%     Wt Readings from Last 3 Encounters:   10/29/21 98.3 kg (216 lb 11.2 oz)   10/01/21 100.1 kg (220 lb 9.6 oz)   08/20/21 100.1 kg (220 lb 9.6 oz)        LABORATORY AND IMAGING STUDIES     Recent Labs   Lab Test 12/03/21  1005 10/27/21  1053 10/01/21  1425 09/17/21  0910 09/03/21  0912     141 140 141 139   POTASSIUM 4.3 3.8 4.3 4.4 3.6   CHLORIDE 108 107 108 107 110*   CO2 26 25 27 31 28   ANIONGAP 3 9 5 3 1*   BUN 11 14 19 17 18   CR 0.84 0.91 1.01 1.13 1.03   * 112* 129* 101* 101*   TY 9.1 8.7 8.8 9.0 8.6     Recent Labs   Lab Test 01/21/20  1154   MAG 2.0     Recent Labs   Lab Test 10/01/21  1425 08/20/21  1401 08/06/21  0906 06/30/21  1029 05/28/21  0907   WBC 6.1 6.2 5.6 5.8 4.7   HGB 14.1 14.4 14.9 14.4 14.8    182 197 170 187   MCV 93 94 90 91 92   NEUTROPHIL 72 72 53 53.0 56.0     Recent Labs   Lab Test 12/03/21  1005 10/27/21  1053 10/01/21  1425 09/17/21  0910 09/03/21  0912 08/20/21  1401 08/06/21  0906 06/30/21  1029 05/28/21  0907   BILITOTAL 0.9 1.0 0.6 0.8   < > 0.6 0.7   < > 0.6   ALKPHOS 108 114 114 108   < > 119 118   < > 122   ALT 98* 79* 83* 36   < > 29 29   < > 31   AST  --  39 39 25   < > 20 22   < > 21   ALBUMIN 3.7 3.8 3.7 3.6   < > 3.8 4.0   < > 3.8   LDH  --   --   --   --   --  223 248*  --  269*    < > = values in this interval not displayed.     No results found for: TSH  No results for input(s): CEA in the last 21814 hours.  Results for orders placed or performed during the hospital encounter of 06/30/21   CT Chest/Abdomen/Pelvis w Contrast    Narrative    CT CHEST/ABDOMEN/PELVIS WITH CONTRAST 6/30/2021 11:31 AM    CLINICAL HISTORY: Prostate cancer with rising PSA; Prostate cancer  (H).    TECHNIQUE: CT scan of the chest, abdomen, and pelvis was performed  following injection of IV contrast. Multiplanar reformats were  obtained. Dose reduction techniques were used.     CONTRAST: 85mL Isovue-370    COMPARISON: CT chest 1/20/2017. CT abdomen and pelvis 11/9/2016.    FINDINGS:   LUNGS AND PLEURA: No acute airspace disease or effusion. Stable  posterior left upper lobe 0.3 cm nodule series 12 image 71. Stable 0.2  cm posterior medial right upper lobe nodule image 97. There is a solid  anterior right lower lobe nodule abutting the right major fissure  that  is 0.7 cm series 12 image 218. This is present on the prior CT but it  previously measured approximately 0.4 cm. New small posterior right  lower lobe subpleural 0.4 cm nodule image 233. Stable right middle  lobe 0.2 cm nodule image 193.    MEDIASTINUM/AXILLAE: Moderate scattered thoracic aortic  calcifications. No acute mediastinal abnormality. No enlarged lymph  nodes identified. A few stable small thoracic lymph nodes are present.    CORONARY ARTERY CALCIFICATION: Moderate.    HEPATOBILIARY: No new focal hepatic lesion. Unremarkable gallbladder.    PANCREAS: Normal.    SPLEEN: Subcapsular faint hypodensity that appears mildly nodular is  again noted without convincing change compared to an older CT from  4/14/2016. This could be visualized on series 4 image 127 image 137.    ADRENAL GLANDS: Stable small right adrenal nodule that is 1.2 cm  series 4 image 154. Stable left adrenal.    KIDNEYS/BLADDER: No significant abnormality. No hydronephrosis. No  bladder lesion.    BOWEL: No acute abnormality.    PELVIC ORGANS: Prostate is absent. No new mass at the prostatectomy  bed.    ADDITIONAL FINDINGS: No new enlarged lymph node can be seen. Small  retroperitoneal lymph nodes appear stable.    MUSCULOSKELETAL: Increasing sclerosis at the right T6 level now  measuring 3.5 x 1.7 cm, previously 1.2 x 0.6 cm series 4 image 67.  Stable right lateral sixth rib sclerosis image 89, right pubic  sclerosis image 299. New sclerotic bone lesion at the mid sacrum that  is 3.6 x 3.1 cm on coronal series 6 image 141.      Impression    IMPRESSION:  1.  New sacral bone lesion and larger T6 vertebral body sclerotic bone  lesion consistent with sites of bony metastatic disease progression.  Bone scanning would provide more sensitive assessment. Stable other  areas of sclerosis as above.  2.  A few new or larger pulmonary nodules are indeterminant. Cannot  exclude possibility of metastatic disease. Recommend further workup.  The  dominant nodule is at the right lower lobe near the major fissure.  Recommend short interval follow-up CT chest in 3 months.  3.  Other stable findings as above.    HARSH OROZCO MD          SYSTEM ID:  [id number]     Recent Labs   Lab Test 10/01/21  1425 08/20/21  1401 08/06/21  0906 05/28/21  0907 12/07/20  1544   PSA 0.51 1.26 2.57 1.01 0.34   TESTOSTTOTAL <2* <2* 6* 6* 13*     Recent Labs   Lab Test 12/03/21  1005 10/27/21  1053 10/01/21  1425 09/17/21  0910 09/03/21  0912 08/20/21  1401 08/06/21  0906 06/30/21  1029 05/28/21  0907 12/07/20  1544 07/31/20  1601   PSA  --   --  0.51  --   --  1.26 2.57  --  1.01 0.34 0.54   ALKPHOS 108 114 114 108 115 119 118   < > 122 118 112   LDH  --   --   --   --   --  223 248*  --  269* 262* 257*    < > = values in this interval not displayed.           PSA Trend over time             ASSESSMENT AND PLAN   1. Biochemical PSA relapse post prostatectomy without any response to adjuvant radiation therapy  2. Rapid rise in PSA on observation phase of intermittent androgen deprivation therapy  3. HTN  4. ECOG PS1  5. No medical comorbidity    I had a lengthy discussion with Steve who is alone at this visit. All his labs are within normal limits -except for his LDH, PSA and testosterone.     He has been started on abiraterone on 8/7/21. It seems that he is already responding to therapy. His PSA is trending down as expected. This is a good response. His LFT's show mild elevation in his transaminases ALT/AST which can be safely monitored. It is only marginally above the baseline.     His PSA was not available at the time of clinic visit. It has declined as would be expected on therapy from 2.57 ng/ml to 0.78 ng/mL.    I again reviewed with him that his recent bone scan did suggest presence of bony metastasis. Treatment with either zoledronic acid or denosumab is recommended for patients with bony metastasis. However he has oligometastatic disease and it definitely not mandatory that we  start this right now. We could continue to monitor him and would consider it at a later date if he has more extensive disease progression.     Steve would like to avoid starting a new medication and I assured him that there is no hurry to get it started at this time.     We would continue with abiraterone and prednisone as current. I will follow him personally in 3 months. I will have him follow-up in follow-up by nurse practitioners in 6 weeks to monitor his liver function tests. I have reviewed his case with our clinical pharmacist-Marty Meese and Allison who have been following him along with me.     25 minutes spent on the date of the encounter doing chart review, history and exam, documentation and further activities as noted above     Gigi Ward  ,  Division of Hematology, Oncology & Transplantation  [facility].              Again, thank you for allowing me to participate in the care of your patient.        Sincerely,        Gigi Ward MD

## 2021-12-14 NOTE — TELEPHONE ENCOUNTER
Spoke to Steve, he did the online TAF patient re enrollment hub and put his pin in and is pending till 1/21/22 will check again close to that date to see if approved.

## 2021-12-23 DIAGNOSIS — C79.51 BONE METASTASIS: ICD-10-CM

## 2021-12-23 DIAGNOSIS — C61 PROSTATE CANCER (H): Primary | ICD-10-CM

## 2021-12-23 RX ORDER — PREDNISONE 5 MG/1
5 TABLET ORAL
Qty: 30 TABLET | Refills: 0 | Status: SHIPPED | OUTPATIENT
Start: 2021-12-23 | End: 2022-01-22

## 2021-12-23 RX ORDER — ABIRATERONE ACETATE 250 MG/1
500 TABLET ORAL
Qty: 60 TABLET | Refills: 0 | Status: SHIPPED | OUTPATIENT
Start: 2021-12-23 | End: 2022-01-22

## 2021-12-29 ENCOUNTER — TELEPHONE (OUTPATIENT)
Dept: PHARMACY | Facility: CLINIC | Age: 76
End: 2021-12-29

## 2021-12-29 ENCOUNTER — ALLIED HEALTH/NURSE VISIT (OUTPATIENT)
Dept: FAMILY MEDICINE | Facility: CLINIC | Age: 76
End: 2021-12-29
Payer: COMMERCIAL

## 2021-12-29 ENCOUNTER — LAB (OUTPATIENT)
Dept: LAB | Facility: CLINIC | Age: 76
End: 2021-12-29
Payer: COMMERCIAL

## 2021-12-29 VITALS — DIASTOLIC BLOOD PRESSURE: 70 MMHG | SYSTOLIC BLOOD PRESSURE: 152 MMHG

## 2021-12-29 DIAGNOSIS — C79.51 BONE METASTASIS: ICD-10-CM

## 2021-12-29 DIAGNOSIS — C61 PROSTATE CANCER (H): ICD-10-CM

## 2021-12-29 DIAGNOSIS — I10 ESSENTIAL HYPERTENSION: Primary | ICD-10-CM

## 2021-12-29 LAB
ALBUMIN SERPL-MCNC: 3.9 G/DL (ref 3.4–5)
ALP SERPL-CCNC: 118 U/L (ref 40–150)
ALT SERPL W P-5'-P-CCNC: 165 U/L (ref 0–70)
ANION GAP SERPL CALCULATED.3IONS-SCNC: 4 MMOL/L (ref 3–14)
AST SERPL W P-5'-P-CCNC: 66 U/L (ref 0–45)
BASOPHILS # BLD AUTO: 0 10E3/UL (ref 0–0.2)
BASOPHILS NFR BLD AUTO: 1 %
BILIRUB SERPL-MCNC: 0.9 MG/DL (ref 0.2–1.3)
BUN SERPL-MCNC: 14 MG/DL (ref 7–30)
CALCIUM SERPL-MCNC: 9.1 MG/DL (ref 8.5–10.1)
CHLORIDE BLD-SCNC: 107 MMOL/L (ref 94–109)
CO2 SERPL-SCNC: 30 MMOL/L (ref 20–32)
CREAT SERPL-MCNC: 0.98 MG/DL (ref 0.66–1.25)
EOSINOPHIL # BLD AUTO: 0.3 10E3/UL (ref 0–0.7)
EOSINOPHIL NFR BLD AUTO: 5 %
ERYTHROCYTE [DISTWIDTH] IN BLOOD BY AUTOMATED COUNT: 13.2 % (ref 10–15)
GFR SERPL CREATININE-BSD FRML MDRD: 79 ML/MIN/1.73M2
GLUCOSE BLD-MCNC: 107 MG/DL (ref 70–99)
HCT VFR BLD AUTO: 45.2 % (ref 40–53)
HGB BLD-MCNC: 15.2 G/DL (ref 13.3–17.7)
LDH SERPL L TO P-CCNC: 262 U/L (ref 85–227)
LYMPHOCYTES # BLD AUTO: 1.6 10E3/UL (ref 0.8–5.3)
LYMPHOCYTES NFR BLD AUTO: 29 %
MCH RBC QN AUTO: 30.9 PG (ref 26.5–33)
MCHC RBC AUTO-ENTMCNC: 33.6 G/DL (ref 31.5–36.5)
MCV RBC AUTO: 92 FL (ref 78–100)
MONOCYTES # BLD AUTO: 0.6 10E3/UL (ref 0–1.3)
MONOCYTES NFR BLD AUTO: 10 %
NEUTROPHILS # BLD AUTO: 3 10E3/UL (ref 1.6–8.3)
NEUTROPHILS NFR BLD AUTO: 55 %
PLATELET # BLD AUTO: 192 10E3/UL (ref 150–450)
POTASSIUM BLD-SCNC: 4.5 MMOL/L (ref 3.4–5.3)
PROT SERPL-MCNC: 7.6 G/DL (ref 6.8–8.8)
PSA SERPL-MCNC: 0.99 UG/L (ref 0–4)
RBC # BLD AUTO: 4.92 10E6/UL (ref 4.4–5.9)
SODIUM SERPL-SCNC: 141 MMOL/L (ref 133–144)
WBC # BLD AUTO: 5.5 10E3/UL (ref 4–11)

## 2021-12-29 PROCEDURE — 84153 ASSAY OF PSA TOTAL: CPT

## 2021-12-29 PROCEDURE — 83615 LACTATE (LD) (LDH) ENZYME: CPT

## 2021-12-29 PROCEDURE — 80053 COMPREHEN METABOLIC PANEL: CPT | Performed by: INTERNAL MEDICINE

## 2021-12-29 PROCEDURE — 99207 PR NO CHARGE NURSE ONLY: CPT

## 2021-12-29 PROCEDURE — 84403 ASSAY OF TOTAL TESTOSTERONE: CPT

## 2021-12-29 PROCEDURE — 85025 COMPLETE CBC W/AUTO DIFF WBC: CPT

## 2021-12-29 NOTE — ORAL ONC MGMT
Oral Chemotherapy Monitoring Program  Lab Follow Up    Reviewed lab results from 12/29.    ORAL CHEMOTHERAPY 6/4/2021 8/20/2021 9/17/2021 10/1/2021 10/21/2021 12/23/2021   Assessment Type New Teach Initial Follow up Lab Monitoring Lab Monitoring Left Voicemail Refill   Diagnosis Code Prostate Cancer Prostate Cancer Prostate Cancer Prostate Cancer Prostate Cancer Prostate Cancer   Providers Dr. Ed Ward   Clinic Name/Location [facility]   Drug Name Xtandi (enzalutamide) Xtandi (enzalutamide) Xtandi (enzalutamide) Xtandi (enzalutamide) Xtandi (enzalutamide) Xtandi (enzalutamide)   Dose 160 mg 160 mg 160 mg 160 mg 160 mg 160 mg   Current Schedule Daily Daily Daily Daily Daily Daily   Cycle Details Continuous Continuous Continuous Continuous Continuous Continuous   Start Date of Last Cycle - 8/7/2021 9/6/2021 - - -   Planned next cycle start date - 9/6/2021 - - - 12/28/2021   Doses missed in last 2 weeks - 0 - - - -   Adherence Assessment - Adherent - - - -   Adverse Effects - No AE identified during assessment - Increased AST/ALT/T BILI - -   Increased AST/ALT/T BILI - - - Grade 1 - -   Pharmacist Intervention(increased ast/alt/t bili) - - - Yes - -   Intervention(s) - - - Increased lab monitoring - -   Any new drug interactions? - No - - - -   Is the dose as ordered appropriate for the patient? - Yes - - - -   Is the patient currently in pain? - No - - - -   Has the patient missed any days of school, work, or other routine activity? - No - - - -   Since the last time we talked, have you been hospitalized or used the emergency room? - No - - - -       Labs:  _  Result Component Current Result Ref Range   Sodium 141 (12/29/2021) 133 - 144 mmol/L     _  Result Component Current Result Ref Range   Potassium 4.5 (12/29/2021) 3.4 - 5.3 mmol/L     _  Result Component Current Result Ref Range   Calcium 9.1 (12/29/2021) 8.5 - 10.1 mg/dL      No results found for Mag within last 30 days.     No results found for Phos within last 30 days.     _  Result Component Current Result Ref Range   Albumin 3.9 (12/29/2021) 3.4 - 5.0 g/dL     _  Result Component Current Result Ref Range   Urea Nitrogen 14 (12/29/2021) 7 - 30 mg/dL     _  Result Component Current Result Ref Range   Creatinine 0.98 (12/29/2021) 0.66 - 1.25 mg/dL     _  Result Component Current Result Ref Range   AST 66 (H) (12/29/2021) 0 - 45 U/L     _  Result Component Current Result Ref Range    (H) (12/29/2021) 0 - 70 U/L     _  Result Component Current Result Ref Range   Bilirubin Total 0.9 (12/29/2021) 0.2 - 1.3 mg/dL     _  Result Component Current Result Ref Range   WBC Count 5.5 (12/29/2021) 4.0 - 11.0 10e3/uL     _  Result Component Current Result Ref Range   Hemoglobin 15.2 (12/29/2021) 13.3 - 17.7 g/dL     _  Result Component Current Result Ref Range   Platelet Count 192 (12/29/2021) 150 - 450 10e3/uL     No results found for ANC within last 30 days.       Assessment & Plan:  Results are concerning for increasing LFT enzymes. While this can be seen with Abiraterone. Steve has shown a trend continual rise for past few months. Attempted to contact patient to discuss holding medication  till Dr. Ward was consulting and a monitoring plan was solidified. However, patient was not available via phone call.     Sanjay Kim, PharmD  Hematology/Oncology Clinical Pharmacist  [facility]  [phone]

## 2021-12-29 NOTE — PROGRESS NOTES
Wallace Zelaya is a 76 year old year old patient who comes in today for a Blood Pressure check because of ongoing blood pressure monitoring.  Vital Signs as repeated by /72  Patient is taking medication as prescribed  Patient is tolerating medications well. Patient notes he has had some hot flashes once in awhile, unsure if related to medication or current diagnosis.   Patient is monitoring Blood Pressure at home.  Average readings if yes are 132/70.   Current complaints: none  Disposition:  patient to continue with the same medication. Advised to follow up in scheduled visit 1/26/22. Routing to PCP to update.     Jose FLORES RN

## 2021-12-31 LAB — TESTOST SERPL-MCNC: <2 NG/DL (ref 240–950)

## 2022-01-03 ENCOUNTER — PATIENT OUTREACH (OUTPATIENT)
Dept: ONCOLOGY | Facility: CLINIC | Age: 77
End: 2022-01-03
Payer: COMMERCIAL

## 2022-01-03 DIAGNOSIS — Z53.9 ERRONEOUS ENCOUNTER--DISREGARD: ICD-10-CM

## 2022-01-03 DIAGNOSIS — C61 PROSTATE CANCER (H): Primary | ICD-10-CM

## 2022-01-03 DIAGNOSIS — C79.51 BONE METASTASIS: ICD-10-CM

## 2022-01-03 NOTE — PROGRESS NOTES
Gigi Ward MD Bile, Abdinasir [facility]  Cc: Meese, Martin, [facility]; Christiano Barnett, RN  He is very anxious. Lets give him 10 days off and then restart at same dose     Gigi               Writer called patient to confirm patient should hold Zytiga for 10 days. Patient did not answer the phone so voicemail was left for patient to return call and confirm hold date of medication.     Writer will await phone call to update patient on Dr. Ward recommendations.     Christiano Barnett, RN, BSN.  RN Care Coordinator     [facility]   [phone]

## 2022-01-10 ENCOUNTER — TELEPHONE (OUTPATIENT)
Dept: ONCOLOGY | Facility: CLINIC | Age: 77
End: 2022-01-10

## 2022-01-10 ENCOUNTER — LAB (OUTPATIENT)
Dept: ONCOLOGY | Facility: CLINIC | Age: 77
End: 2022-01-10
Attending: INTERNAL MEDICINE
Payer: COMMERCIAL

## 2022-01-10 DIAGNOSIS — C61 PROSTATE CANCER (H): ICD-10-CM

## 2022-01-10 DIAGNOSIS — C79.51 BONE METASTASIS: ICD-10-CM

## 2022-01-10 LAB
ALBUMIN SERPL-MCNC: 3.7 G/DL (ref 3.4–5)
ALP SERPL-CCNC: 122 U/L (ref 40–150)
ALT SERPL W P-5'-P-CCNC: 80 U/L (ref 0–70)
ANION GAP SERPL CALCULATED.3IONS-SCNC: 4 MMOL/L (ref 3–14)
AST SERPL W P-5'-P-CCNC: 36 U/L (ref 0–45)
BASOPHILS # BLD AUTO: 0.1 10E3/UL (ref 0–0.2)
BASOPHILS NFR BLD AUTO: 1 %
BILIRUB SERPL-MCNC: 0.5 MG/DL (ref 0.2–1.3)
BUN SERPL-MCNC: 15 MG/DL (ref 7–30)
CALCIUM SERPL-MCNC: 9.3 MG/DL (ref 8.5–10.1)
CHLORIDE BLD-SCNC: 107 MMOL/L (ref 94–109)
CO2 SERPL-SCNC: 27 MMOL/L (ref 20–32)
CREAT SERPL-MCNC: 0.88 MG/DL (ref 0.66–1.25)
EOSINOPHIL # BLD AUTO: 0.2 10E3/UL (ref 0–0.7)
EOSINOPHIL NFR BLD AUTO: 4 %
ERYTHROCYTE [DISTWIDTH] IN BLOOD BY AUTOMATED COUNT: 12.6 % (ref 10–15)
GFR SERPL CREATININE-BSD FRML MDRD: 89 ML/MIN/1.73M2
GLUCOSE BLD-MCNC: 103 MG/DL (ref 70–99)
HCT VFR BLD AUTO: 46.8 % (ref 40–53)
HGB BLD-MCNC: 15.4 G/DL (ref 13.3–17.7)
IMM GRANULOCYTES # BLD: 0 10E3/UL
IMM GRANULOCYTES NFR BLD: 0 %
LYMPHOCYTES # BLD AUTO: 1.5 10E3/UL (ref 0.8–5.3)
LYMPHOCYTES NFR BLD AUTO: 25 %
MCH RBC QN AUTO: 30.5 PG (ref 26.5–33)
MCHC RBC AUTO-ENTMCNC: 32.9 G/DL (ref 31.5–36.5)
MCV RBC AUTO: 93 FL (ref 78–100)
MONOCYTES # BLD AUTO: 0.5 10E3/UL (ref 0–1.3)
MONOCYTES NFR BLD AUTO: 9 %
NEUTROPHILS # BLD AUTO: 3.8 10E3/UL (ref 1.6–8.3)
NEUTROPHILS NFR BLD AUTO: 61 %
NRBC # BLD AUTO: 0 10E3/UL
NRBC BLD AUTO-RTO: 0 /100
PLATELET # BLD AUTO: 185 10E3/UL (ref 150–450)
POTASSIUM BLD-SCNC: 4.3 MMOL/L (ref 3.4–5.3)
PROT SERPL-MCNC: 7.6 G/DL (ref 6.8–8.8)
RBC # BLD AUTO: 5.05 10E6/UL (ref 4.4–5.9)
SODIUM SERPL-SCNC: 138 MMOL/L (ref 133–144)
WBC # BLD AUTO: 6.2 10E3/UL (ref 4–11)

## 2022-01-10 PROCEDURE — 85025 COMPLETE CBC W/AUTO DIFF WBC: CPT | Performed by: INTERNAL MEDICINE

## 2022-01-10 PROCEDURE — 80053 COMPREHEN METABOLIC PANEL: CPT | Performed by: INTERNAL MEDICINE

## 2022-01-10 PROCEDURE — 36415 COLL VENOUS BLD VENIPUNCTURE: CPT

## 2022-01-10 NOTE — TELEPHONE ENCOUNTER
Writer calling patient to confirm labs back to [facility]NL for resuming Abiraterone. Writer left voice message for patient in regards that he may resume medication tomorrow morning with low fat breakfast.     Christiano Barnett RN, BSN.  RN Care Coordinator     [facility]   [phone]

## 2022-01-11 DIAGNOSIS — E78.5 HYPERLIPIDEMIA LDL GOAL <130: ICD-10-CM

## 2022-01-12 RX ORDER — ATORVASTATIN CALCIUM 20 MG/1
TABLET, FILM COATED ORAL
Qty: 90 TABLET | Refills: 0 | Status: SHIPPED | OUTPATIENT
Start: 2022-01-12 | End: 2022-01-26

## 2022-01-12 NOTE — TELEPHONE ENCOUNTER
Patient has upcoming appointment w/ PCP 1/26/22. Prescription approved per [facility] Refill Protocol.  Jose FLORES RN

## 2022-01-17 ENCOUNTER — TELEPHONE (OUTPATIENT)
Dept: PHARMACY | Facility: CLINIC | Age: 77
End: 2022-01-17
Payer: COMMERCIAL

## 2022-01-17 NOTE — TELEPHONE ENCOUNTER
Free Drug Application Initiated  Medication: Zytiga 250mg  Sponsor: [employer]   Phone # [phone]  Fax # [phone] or [phone]  Additional Information: patient has enough medication for 4 weeks. He will send over page 1 tax form 1/17

## 2022-01-20 DIAGNOSIS — C79.51 BONE METASTASIS: ICD-10-CM

## 2022-01-20 DIAGNOSIS — C61 PROSTATE CANCER (H): Primary | ICD-10-CM

## 2022-01-20 SDOH — HEALTH STABILITY: PHYSICAL HEALTH: ON AVERAGE, HOW MANY MINUTES DO YOU ENGAGE IN EXERCISE AT THIS LEVEL?: 20 MIN

## 2022-01-20 SDOH — ECONOMIC STABILITY: TRANSPORTATION INSECURITY
IN THE PAST 12 MONTHS, HAS LACK OF TRANSPORTATION KEPT YOU FROM MEETINGS, WORK, OR FROM GETTING THINGS NEEDED FOR DAILY LIVING?: NO

## 2022-01-20 SDOH — ECONOMIC STABILITY: FOOD INSECURITY: WITHIN THE PAST 12 MONTHS, THE FOOD YOU BOUGHT JUST DIDN'T LAST AND YOU DIDN'T HAVE MONEY TO GET MORE.: NEVER TRUE

## 2022-01-20 SDOH — ECONOMIC STABILITY: INCOME INSECURITY: HOW HARD IS IT FOR YOU TO PAY FOR THE VERY BASICS LIKE FOOD, HOUSING, MEDICAL CARE, AND HEATING?: NOT HARD AT ALL

## 2022-01-20 SDOH — HEALTH STABILITY: PHYSICAL HEALTH: ON AVERAGE, HOW MANY DAYS PER WEEK DO YOU ENGAGE IN MODERATE TO STRENUOUS EXERCISE (LIKE A BRISK WALK)?: 7 DAYS

## 2022-01-20 SDOH — ECONOMIC STABILITY: FOOD INSECURITY: WITHIN THE PAST 12 MONTHS, YOU WORRIED THAT YOUR FOOD WOULD RUN OUT BEFORE YOU GOT MONEY TO BUY MORE.: NEVER TRUE

## 2022-01-20 SDOH — ECONOMIC STABILITY: INCOME INSECURITY: IN THE LAST 12 MONTHS, WAS THERE A TIME WHEN YOU WERE NOT ABLE TO PAY THE MORTGAGE OR RENT ON TIME?: NO

## 2022-01-20 SDOH — ECONOMIC STABILITY: TRANSPORTATION INSECURITY
IN THE PAST 12 MONTHS, HAS THE LACK OF TRANSPORTATION KEPT YOU FROM MEDICAL APPOINTMENTS OR FROM GETTING MEDICATIONS?: NO

## 2022-01-20 ASSESSMENT — ENCOUNTER SYMPTOMS
SHORTNESS OF BREATH: 0
HEARTBURN: 0
FREQUENCY: 1
ABDOMINAL PAIN: 0
DIARRHEA: 0
DIZZINESS: 0
HEMATOCHEZIA: 0
WEAKNESS: 0
EYE PAIN: 0
FEVER: 0
NERVOUS/ANXIOUS: 0
ARTHRALGIAS: 0
HEMATURIA: 0
MYALGIAS: 0
JOINT SWELLING: 0
PARESTHESIAS: 0
HEADACHES: 0
COUGH: 0
PALPITATIONS: 0
DYSURIA: 0
NAUSEA: 0
SORE THROAT: 0
CHILLS: 0
CONSTIPATION: 0

## 2022-01-20 ASSESSMENT — SOCIAL DETERMINANTS OF HEALTH (SDOH)
IN A TYPICAL WEEK, HOW MANY TIMES DO YOU TALK ON THE PHONE WITH FAMILY, FRIENDS, OR NEIGHBORS?: TWICE A WEEK
HOW OFTEN DO YOU GET TOGETHER WITH FRIENDS OR RELATIVES?: NEVER
DO YOU BELONG TO ANY CLUBS OR ORGANIZATIONS SUCH AS CHURCH GROUPS UNIONS, FRATERNAL OR ATHLETIC GROUPS, OR SCHOOL GROUPS?: YES
HOW OFTEN DO YOU ATTEND CHURCH OR RELIGIOUS SERVICES?: MORE THAN 4 TIMES PER YEAR

## 2022-01-20 ASSESSMENT — LIFESTYLE VARIABLES
HOW OFTEN DO YOU HAVE SIX OR MORE DRINKS ON ONE OCCASION: NEVER
HOW MANY STANDARD DRINKS CONTAINING ALCOHOL DO YOU HAVE ON A TYPICAL DAY: 1 OR 2
HOW OFTEN DO YOU HAVE A DRINK CONTAINING ALCOHOL: MONTHLY OR LESS

## 2022-01-20 ASSESSMENT — ACTIVITIES OF DAILY LIVING (ADL): CURRENT_FUNCTION: NO ASSISTANCE NEEDED

## 2022-01-20 NOTE — PROGRESS NOTES
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  Screening PSA was elevated and he underwent radical prostatectomy 11/2015.  Pathology revealed [place] 4+4 disease, seminal vesicle extension and all 12 lymph nodes resected were negative.  His PSA did not drop after prostatectomy and remained elevated, therefore he completed adjuvant radiation therapy, still with no PSA response.  He established care with Dr. Ward and he started him on androgen deprivation therapy with Lupron.  He took a treatment break and his PSA started to rise dramatically, therefore started on Lupron again.  He was on this again for several months and was noted to have a rise in his PSA in April 2020 again.  Dr. Ward recommended bicalutamide (Casodex) 50mg which he was taking for a little over a year, but then PSA started to rise again.  Dr. Ward recommended abiraterone and first dose 8/7/21.  He had a brief 10-day treatment break due to rising LFTs, but then restarted.    He is here today for close follow-up and repeat labs.    Interval History: Steve continues to do well.  He is on abiraterone and he did have some rising LFTs, therefore he was on a treatment break for about 10 days, but has now resumed. He feels good on this treatment.  Appetite has been really good.  No recent fever or chills, vomiting or diarrhea, bleeding.    Review of Systems: See interval hx. Denies fevers, chills, HA, dizziness, CP, SOB, abdominal pain, N/V, diarrhea, changes in urination.     PMHx and Social Hx reviewed per EPIC.      Medications:  Current Outpatient Medications   Medication Sig Dispense Refill     abiraterone (ZYTIGA) 250 MG tablet Take 2 tablets (500 mg) by mouth daily (with breakfast) for 30 doses Take with Low-Fat Meal 60 tablet 0     amLODIPine (NORVASC) 10 MG tablet Take 1 tablet (10 mg) by  "mouth daily 90 tablet 3     atorvastatin (LIPITOR) 20 MG tablet Take 1 tablet by mouth once daily 90 tablet 0     ibuprofen (ADVIL/MOTRIN) 800 MG tablet Take 1 tablet (800 mg) by mouth 3 times daily with food 90 tablet 3     LORazepam (ATIVAN) 0.5 MG tablet Take 1 tablet (0.5 mg) by mouth every 4 hours as needed (Anxiety, Nausea/Vomiting or Sleep) 30 tablet 2     ondansetron (ZOFRAN) 4 MG tablet Take 1 tablet (4 mg) by mouth every 8 hours as needed for nausea 30 tablet 1     predniSONE (DELTASONE) 5 MG tablet Take 1 tablet (5 mg) by mouth daily (with breakfast) 30 tablet 0     valsartan (DIOVAN) 160 MG tablet Take 1 tablet (160 mg) by mouth daily 90 tablet 3     prochlorperazine (COMPAZINE) 10 MG tablet Take 0.5 tablets (5 mg) by mouth every 6 hours as needed (Nausea/Vomiting) (Patient not taking: Reported on 10/1/2021) 30 tablet 2       No Known Allergies    EXAM:    /76   Pulse 71   Temp 97  F (36.1  C)   Resp 16   Ht 1.765 m (5' 9.5\")   Wt 99.3 kg (219 lb)   SpO2 95%   BMI 31.88 kg/m       GENERAL:  Male, in no acute distress.  Alert and oriented x3.   HEENT:  Normocephalic, atraumatic.  PERRL, oropharynx clear with no sores or thrush.   LYMPH NODES:  No palpable pre/post-auricular, cervical, axillary lymphadenopathy appreciated.  CV:  RRR, No murmurs, gallops, or rubs.   LUNGS:  Clear to auscultation bilaterally.   ABDOMEN:  Soft, nontender and nondistended.  Bowel sounds heard x4.  No apparent hepatosplenomegaly.   EXTREMITIES:  No clubbing, cyanosis. Trace edema.  SKIN: No rash  PSYCH: Mood stable      Labs:   Results for ERNESTO DAY (MRN [id number]) as of 1/20/2022 15:30   12/29/2021 09:23   Sodium 141   Potassium 4.5   Chloride 107   Carbon Dioxide 30   Urea Nitrogen 14   Creatinine 0.98   GFR Estimate 79   Calcium 9.1   Anion Gap 4   Albumin 3.9   Protein Total 7.6   Bilirubin Total 0.9   Alkaline Phosphatase 118    (H)   AST 66 (H)   Lactate Dehydrogenase 262 (H)   PSA 0.99 "   Testosterone Total <2 (L)   Glucose 107 (H)   WBC 5.5   Hemoglobin 15.2   Hematocrit 45.2   Platelet Count 192   RBC Count 4.92   MCV 92   MCH 30.9   MCHC 33.6   RDW 13.2   % Neutrophils 55   % Lymphocytes 29   % Monocytes 10   % Eosinophils 5   % Basophils 1   Absolute Basophils 0.0   Absolute Eosinophils 0.3   Absolute Lymphocytes 1.6   Absolute Monocytes 0.6   Absolute Neutrophils 3.0       Imaging:   n/a    Impression/Plan: Wallace Zelaya is a 75 year old male with 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    Prostate cancer: Castration resistant with rising PSA, 1.01 on 5/28/2021.  He had been on Lupron and Casodex and then started abiraterone.  He has been tolerating this really well.  His LFTs started to rise, therefore he took a 10-day break and labs improved.  He has now restarted the abiraterone.  His PSA has a double time of about 3 months and I discussed this with Dr Ward.  Possibly candidate for upcoming prostate cancer clinical trial at the [facility].  Dr Ward I recommend he continue the abiraterone until we can possibly enroll him in this trial.  Steve is aware of the plan.  Labs from 1/10/2022 look really good and I will see him again in 1 month with repeat labs.  -- Velma in 1 month with repeat labs    LFTs: His LFTs started to rise and ALT/AST were more elevated on 12/29/2021.  He took a 10-day treatment break from the abiraterone and LFTs improved.  He has not restarted and we will follow closely.    Bony metastasis: T6 lesion and sacral lesion.  Zometa plan was in place, not sure why he has not been receiving it.  We will schedule to have it done next month when he sees me.    Chart documentation with Dragon Voice recognition Software. Although reviewed after completion, some words and grammatical errors may remain.      Velma Amaya PA-C  Hematology/Oncology  [facility]

## 2022-01-21 ENCOUNTER — ONCOLOGY VISIT (OUTPATIENT)
Dept: ONCOLOGY | Facility: CLINIC | Age: 77
End: 2022-01-21
Attending: INTERNAL MEDICINE
Payer: COMMERCIAL

## 2022-01-21 VITALS
OXYGEN SATURATION: 95 % | TEMPERATURE: 97 F | WEIGHT: 219 LBS | DIASTOLIC BLOOD PRESSURE: 76 MMHG | HEIGHT: 70 IN | RESPIRATION RATE: 16 BRPM | SYSTOLIC BLOOD PRESSURE: 136 MMHG | BODY MASS INDEX: 31.35 KG/M2 | HEART RATE: 71 BPM

## 2022-01-21 DIAGNOSIS — C79.51 BONE METASTASIS: ICD-10-CM

## 2022-01-21 DIAGNOSIS — C61 PROSTATE CANCER (H): Primary | ICD-10-CM

## 2022-01-21 PROCEDURE — G0463 HOSPITAL OUTPT CLINIC VISIT: HCPCS

## 2022-01-21 PROCEDURE — 99214 OFFICE O/P EST MOD 30 MIN: CPT | Performed by: PHYSICIAN ASSISTANT

## 2022-01-21 ASSESSMENT — MIFFLIN-ST. JEOR: SCORE: 1716.69

## 2022-01-21 ASSESSMENT — PAIN SCALES - GENERAL: PAINLEVEL: NO PAIN (0)

## 2022-01-21 NOTE — NURSING NOTE
"Oncology Rooming Note    January 21, 2022 1:34 PM   Wallace Zelaya is a 76 year old male who presents for:    Chief Complaint   Patient presents with     Oncology Clinic Visit     Prostate cancer (HCC)     Initial Vitals: /76   Pulse 71   Temp 97  F (36.1  C)   Resp 16   Ht 1.765 m (5' 9.5\")   Wt 99.3 kg (219 lb)   SpO2 95%   BMI 31.88 kg/m   Estimated body mass index is 31.88 kg/m  as calculated from the following:    Height as of this encounter: 1.765 m (5' 9.5\").    Weight as of this encounter: 99.3 kg (219 lb). Body surface area is 2.21 meters squared.  No Pain (0) Comment: Data Unavailable   No LMP for male patient.  Allergies reviewed: Yes  Medications reviewed: Yes    Medications: Medication refills not needed today.  Pharmacy name entered into [employer]:    [facility] - [place], [place] - [address]  [facility] - [place], [place] - [address]  [facility] - [place], [place] - [address]    Clinical concerns: follow up       Tammy Mortensen CMA              "

## 2022-01-21 NOTE — LETTER
1/21/2022         RE: Wallace Zelaya  [address] [place] [address]        Dear Colleague,    Thank you for referring your patient, Wallace Zelaya, to the [facility]. Please see a copy of my visit note below.    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  Screening PSA was elevated and he underwent radical prostatectomy 11/2015.  Pathology revealed Cayla 4+4 disease, seminal vesicle extension and all 12 lymph nodes resected were negative.  His PSA did not drop after prostatectomy and remained elevated, therefore he completed adjuvant radiation therapy, still with no PSA response.  He established care with Dr. Ward and he started him on androgen deprivation therapy with Lupron.  He took a treatment break and his PSA started to rise dramatically, therefore started on Lupron again.  He was on this again for several months and was noted to have a rise in his PSA in April 2020 again.  Dr. Ward recommended bicalutamide (Casodex) 50mg which he was taking for a little over a year, but then PSA started to rise again.  Dr. Ward recommended abiraterone and first dose 8/7/21.  He had a brief 10-day treatment break due to rising LFTs, but then restarted.    He is here today for close follow-up and repeat labs.    Interval History: Steve continues to do well.  He is on abiraterone and he did have some rising LFTs, therefore he was on a treatment break for about 10 days, but has now resumed. He feels good on this treatment.  Appetite has been really good.  No recent fever or chills, vomiting or diarrhea, bleeding.    Review of Systems: See interval hx. Denies fevers, chills, HA, dizziness, CP, SOB, abdominal pain, N/V, diarrhea, changes in urination.     PMHx and Social Hx reviewed per EPIC.      Medications:  Current  "Outpatient Medications   Medication Sig Dispense Refill     abiraterone (ZYTIGA) 250 MG tablet Take 2 tablets (500 mg) by mouth daily (with breakfast) for 30 doses Take with Low-Fat Meal 60 tablet 0     amLODIPine (NORVASC) 10 MG tablet Take 1 tablet (10 mg) by mouth daily 90 tablet 3     atorvastatin (LIPITOR) 20 MG tablet Take 1 tablet by mouth once daily 90 tablet 0     ibuprofen (ADVIL/MOTRIN) 800 MG tablet Take 1 tablet (800 mg) by mouth 3 times daily with food 90 tablet 3     LORazepam (ATIVAN) 0.5 MG tablet Take 1 tablet (0.5 mg) by mouth every 4 hours as needed (Anxiety, Nausea/Vomiting or Sleep) 30 tablet 2     ondansetron (ZOFRAN) 4 MG tablet Take 1 tablet (4 mg) by mouth every 8 hours as needed for nausea 30 tablet 1     predniSONE (DELTASONE) 5 MG tablet Take 1 tablet (5 mg) by mouth daily (with breakfast) 30 tablet 0     valsartan (DIOVAN) 160 MG tablet Take 1 tablet (160 mg) by mouth daily 90 tablet 3     prochlorperazine (COMPAZINE) 10 MG tablet Take 0.5 tablets (5 mg) by mouth every 6 hours as needed (Nausea/Vomiting) (Patient not taking: Reported on 10/1/2021) 30 tablet 2       No Known Allergies    EXAM:    /76   Pulse 71   Temp 97  F (36.1  C)   Resp 16   Ht 1.765 m (5' 9.5\")   Wt 99.3 kg (219 lb)   SpO2 95%   BMI 31.88 kg/m       GENERAL:  Male, in no acute distress.  Alert and oriented x3.   HEENT:  Normocephalic, atraumatic.  PERRL, oropharynx clear with no sores or thrush.   LYMPH NODES:  No palpable pre/post-auricular, cervical, axillary lymphadenopathy appreciated.  CV:  RRR, No murmurs, gallops, or rubs.   LUNGS:  Clear to auscultation bilaterally.   ABDOMEN:  Soft, nontender and nondistended.  Bowel sounds heard x4.  No apparent hepatosplenomegaly.   EXTREMITIES:  No clubbing, cyanosis. Trace edema.  SKIN: No rash  PSYCH: Mood stable      Labs:   Results for ERNESTO DAY (MRN [id number]) as of 1/20/2022 15:30   12/29/2021 09:23   Sodium 141   Potassium 4.5   Chloride 107 "   Carbon Dioxide 30   Urea Nitrogen 14   Creatinine 0.98   GFR Estimate 79   Calcium 9.1   Anion Gap 4   Albumin 3.9   Protein Total 7.6   Bilirubin Total 0.9   Alkaline Phosphatase 118    (H)   AST 66 (H)   Lactate Dehydrogenase 262 (H)   PSA 0.99   Testosterone Total <2 (L)   Glucose 107 (H)   WBC 5.5   Hemoglobin 15.2   Hematocrit 45.2   Platelet Count 192   RBC Count 4.92   MCV 92   MCH 30.9   MCHC 33.6   RDW 13.2   % Neutrophils 55   % Lymphocytes 29   % Monocytes 10   % Eosinophils 5   % Basophils 1   Absolute Basophils 0.0   Absolute Eosinophils 0.3   Absolute Lymphocytes 1.6   Absolute Monocytes 0.6   Absolute Neutrophils 3.0       Imaging:   n/a    Impression/Plan: Wallace Zelaya is a 75 year old male with 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    Prostate cancer: Castration resistant with rising PSA, 1.01 on 5/28/2021.  He had been on Lupron and Casodex and then started abiraterone.  He has been tolerating this really well.  His LFTs started to rise, therefore he took a 10-day break and labs improved.  He has now restarted the abiraterone.  His PSA has a double time of about 3 months and I discussed this with Dr Ward.  Possibly candidate for upcoming prostate cancer clinical trial at the [facility].  Dr Ward I recommend he continue the abiraterone until we can possibly enroll him in this trial.  Steve is aware of the plan.  Labs from 1/10/2022 look really good and I will see him again in 1 month with repeat labs.  -- Velma in 1 month with repeat labs    LFTs: His LFTs started to rise and ALT/AST were more elevated on 12/29/2021.  He took a 10-day treatment break from the abiraterone and LFTs improved.  He has not restarted and we will follow closely.    Bony metastasis: T6 lesion and sacral lesion.  Zometa plan was in place, not sure why he has not been receiving it.  We will schedule to have it done next month when he sees  me.    Chart documentation with Dragon Voice recognition Software. Although reviewed after completion, some words and grammatical errors may remain.      Velma Amaya PA-C  Hematology/Oncology  [facility]              Again, thank you for allowing me to participate in the care of your patient.        Sincerely,        Velma Amaya PA-C

## 2022-01-22 DIAGNOSIS — I10 ESSENTIAL HYPERTENSION: ICD-10-CM

## 2022-01-24 RX ORDER — AMLODIPINE BESYLATE 10 MG/1
TABLET ORAL
Qty: 90 TABLET | Refills: 0 | Status: SHIPPED | OUTPATIENT
Start: 2022-01-24 | End: 2022-01-26

## 2022-01-25 NOTE — TELEPHONE ENCOUNTER
Spoke to Inessa, at [employer]. Application only Dr. Ward page came over so had to refax ins cards, page one tax return, and patient page 1 of application to [phone]

## 2022-01-25 NOTE — PATIENT INSTRUCTIONS
Labs 2/21/22  Velma BERG 2/23/22  Labs 3/28/22  Return visit with Dr. Ward 4/1/22    Christiano Barnett, RN, BSN.  RN Care Coordinator     [facility]   [phone]

## 2022-01-26 ENCOUNTER — OFFICE VISIT (OUTPATIENT)
Dept: FAMILY MEDICINE | Facility: CLINIC | Age: 77
End: 2022-01-26
Payer: COMMERCIAL

## 2022-01-26 VITALS
HEIGHT: 70 IN | WEIGHT: 216 LBS | SYSTOLIC BLOOD PRESSURE: 154 MMHG | HEART RATE: 66 BPM | BODY MASS INDEX: 30.92 KG/M2 | TEMPERATURE: 98.2 F | RESPIRATION RATE: 16 BRPM | OXYGEN SATURATION: 97 % | DIASTOLIC BLOOD PRESSURE: 80 MMHG

## 2022-01-26 DIAGNOSIS — C79.51 BONE METASTASIS: ICD-10-CM

## 2022-01-26 DIAGNOSIS — E78.5 HYPERLIPIDEMIA LDL GOAL <130: ICD-10-CM

## 2022-01-26 DIAGNOSIS — I10 ESSENTIAL HYPERTENSION: ICD-10-CM

## 2022-01-26 DIAGNOSIS — Z11.59 NEED FOR HEPATITIS C SCREENING TEST: ICD-10-CM

## 2022-01-26 DIAGNOSIS — C61 PROSTATE CANCER (H): ICD-10-CM

## 2022-01-26 DIAGNOSIS — Z00.00 ENCOUNTER FOR MEDICARE ANNUAL WELLNESS EXAM: Primary | ICD-10-CM

## 2022-01-26 PROCEDURE — 0053A COVID-19,PF,PFIZER (12+ YRS): CPT | Performed by: FAMILY MEDICINE

## 2022-01-26 PROCEDURE — 99397 PER PM REEVAL EST PAT 65+ YR: CPT | Performed by: FAMILY MEDICINE

## 2022-01-26 PROCEDURE — 91305 COVID-19,PF,PFIZER (12+ YRS): CPT | Performed by: FAMILY MEDICINE

## 2022-01-26 RX ORDER — AMLODIPINE BESYLATE 10 MG/1
10 TABLET ORAL DAILY
Qty: 90 TABLET | Refills: 3 | Status: SHIPPED | OUTPATIENT
Start: 2022-01-26 | End: 2023-04-18

## 2022-01-26 RX ORDER — VALSARTAN 160 MG/1
160 TABLET ORAL DAILY
Qty: 90 TABLET | Refills: 3 | Status: SHIPPED | OUTPATIENT
Start: 2022-01-26 | End: 2023-03-07

## 2022-01-26 RX ORDER — ATORVASTATIN CALCIUM 20 MG/1
TABLET, FILM COATED ORAL
Qty: 90 TABLET | Refills: 3 | Status: SHIPPED | OUTPATIENT
Start: 2022-01-26 | End: 2023-04-13

## 2022-01-26 ASSESSMENT — MIFFLIN-ST. JEOR: SCORE: 1703.08

## 2022-01-26 ASSESSMENT — ENCOUNTER SYMPTOMS
PALPITATIONS: 0
JOINT SWELLING: 0
EYE PAIN: 0
WEAKNESS: 0
HEMATURIA: 0
CONSTIPATION: 0
MYALGIAS: 0
FEVER: 0
HEMATOCHEZIA: 0
CHILLS: 0
SHORTNESS OF BREATH: 0
SORE THROAT: 0
NERVOUS/ANXIOUS: 0
DIARRHEA: 0
PARESTHESIAS: 0
ABDOMINAL PAIN: 0
ARTHRALGIAS: 0
HEADACHES: 0
HEARTBURN: 0
DIZZINESS: 0
COUGH: 0
NAUSEA: 0
FREQUENCY: 1
DYSURIA: 0

## 2022-01-26 ASSESSMENT — ACTIVITIES OF DAILY LIVING (ADL): CURRENT_FUNCTION: NO ASSISTANCE NEEDED

## 2022-01-26 NOTE — PROGRESS NOTES
"SUBJECTIVE:   Wallace Zelaya is a 76 year old male who presents for Preventive Visit.    Patient has been advised of split billing requirements and indicates understanding: Yes  Are you in the first 12 months of your Medicare coverage?  No    Healthy Habits:     In general, how would you rate your overall health?  Good    Frequency of exercise:  4-5 days/week    Duration of exercise:  15-30 minutes    Do you usually eat at least 4 servings of fruit and vegetables a day, include whole grains    & fiber and avoid regularly eating high fat or \"junk\" foods?  No    Taking medications regularly:  Yes    Medication side effects:  None    Ability to successfully perform activities of daily living:  No assistance needed    Home Safety:  Lack of grab bars in the bathroom    Hearing Impairment:  No hearing concerns    In the past 6 months, have you been bothered by leaking of urine? Yes    In general, how would you rate your overall mental or emotional health?  Good      PHQ-2 Total Score: 0    Additional concerns today:  No    Do you feel safe in your environment? Yes    Have you ever done Advance Care Planning? (For example, a Health Directive, POLST, or a discussion with a medical provider or your loved ones about your wishes): No, advance care planning information given to patient to review.  Patient plans to discuss their wishes with loved ones or provider.         Fall risk  Fallen 2 or more times in the past year?: No  Any fall with injury in the past year?: No    Cognitive Screening   1) Repeat 3 items (Leader, Season, Table)    2) Clock draw: NORMAL  3) 3 item recall: Recalls 3 objects  Results: NORMAL clock, 1-2 items recalled: COGNITIVE IMPAIRMENT LESS LIKELY    Mini-CogTM Copyright ANETA Cole. Licensed by the author for use in [facility]; reprinted with permission (kindra@.[facility]). All rights reserved.      Do you have sleep apnea, excessive snoring or daytime drowsiness?: no    Reviewed and updated as " needed this visit by clinical staff  Tobacco  Allergies              Reviewed and updated as needed this visit by Provider  Tobacco              Social History     Tobacco Use     Smoking status: Former Smoker     Packs/day: 1.00     Years: 25.00     Pack years: 25.00     Quit date: 2009     Years since quittin.4     Smokeless tobacco: Never Used   Substance Use Topics     Alcohol use: Yes     Comment: socially,very little     Alcohol Use 2022   Prescreen: >3 drinks/day or >7 drinks/week? No   Prescreen: >3 drinks/day or >7 drinks/week? -     Current providers sharing in care for this patient include:   Patient Care Team:  Jd Almaraz MD as PCP - General (Family Practice)  Jd Almaraz MD as Referring Physician (Family Practice)  Rodolfo Connor MD as MD (Urology)  Jd Almaraz MD as Assigned PCP  Ashely Torres RN as Registered Nurse (Hematology & Oncology)  Ed, Gigi Napier MD as Assigned Cancer Care Provider    The following health maintenance items are reviewed in Epic and correct as of today:  Health Maintenance Due   Topic Date Due     HEPATITIS C SCREENING  Never done     ZOSTER IMMUNIZATION (1 of 2) Never done     FALL RISK ASSESSMENT  2022     COVID-19 Vaccine (4 - Booster for Pfizer series) 2022     Patient with history of hypertension taking amlodipine and valsartan.    History of hyperlipidemia, on statin.     Patient with prostate cancer initially treated by Dr. Rodolfo Connor with robotic radical prostatectomy and radiation 2015. Now off Lupron injections with declining PSA. Followed by Dr. Gigi Ward with oncology.     Review of Systems   Constitutional: Negative for chills and fever.   HENT: Negative for congestion, ear pain, hearing loss and sore throat.    Eyes: Negative for pain and visual disturbance.   Respiratory: Negative for cough and shortness of breath.    Cardiovascular: Negative for chest pain, palpitations and  "peripheral edema.   Gastrointestinal: Negative for abdominal pain, constipation, diarrhea, heartburn, hematochezia and nausea.   Genitourinary: Positive for frequency, impotence and urgency. Negative for dysuria, genital sores, hematuria and penile discharge.   Musculoskeletal: Negative for arthralgias, joint swelling and myalgias.   Skin: Negative for rash.   Neurological: Negative for dizziness, weakness, headaches and paresthesias.   Psychiatric/Behavioral: Negative for mood changes. The patient is not nervous/anxious.      OBJECTIVE:   BP (!) 154/80   Pulse 66   Temp 98.2  F (36.8  C)   Resp 16   Ht 1.765 m (5' 9.5\")   Wt 98 kg (216 lb)   SpO2 97%   BMI 31.44 kg/m   Estimated body mass index is 31.44 kg/m  as calculated from the following:    Height as of this encounter: 1.765 m (5' 9.5\").    Weight as of this encounter: 98 kg (216 lb).  Physical Exam  GENERAL: healthy, alert and no distress  EYES: Eyes grossly normal to inspection, PERRL and conjunctivae and sclerae normal  HENT: ear canals and TM's normal, nose and mouth without ulcers or lesions  NECK: no adenopathy, no asymmetry, masses, or scars and thyroid normal to palpation  RESP: lungs clear to auscultation - no rales, rhonchi or wheezes  CV: regular rate and rhythm, normal S1 S2, no S3 or S4, no murmur, click or rub, no peripheral edema and peripheral pulses strong  ABDOMEN: soft, nontender, no hepatosplenomegaly, no masses and bowel sounds normal  MS: no gross musculoskeletal defects noted, no edema  SKIN: no suspicious lesions or rashes  NEURO: Normal strength and tone, mentation intact and speech normal  PSYCH: mentation appears normal, affect normal/bright    Diagnostic Test Results:  Labs reviewed in Epic    ASSESSMENT / PLAN:       ICD-10-CM    1. Encounter for Medicare annual wellness exam  Z00.00    2. Essential hypertension  I10 amLODIPine (NORVASC) 10 MG tablet     valsartan (DIOVAN) 160 MG tablet   3. Hyperlipidemia LDL goal <130  " "E78.5 atorvastatin (LIPITOR) 20 MG tablet   4. Prostate cancer (HCC)  C61    5. Bone metastasis (H)  C79.51    6. Need for hepatitis C screening test  Z11.59          COUNSELING:  Reviewed preventive health counseling, as reflected in patient instructions    Estimated body mass index is 31.44 kg/m  as calculated from the following:    Height as of this encounter: 1.765 m (5' 9.5\").    Weight as of this encounter: 98 kg (216 lb).    Weight management plan: Discussed healthy diet and exercise guidelines    He reports that he quit smoking about 12 years ago. He has a 25.00 pack-year smoking history. He has never used smokeless tobacco.    Appropriate preventive services were discussed with this patient, including applicable screening as appropriate for cardiovascular disease, diabetes, osteopenia/osteoporosis, and glaucoma.  As appropriate for age/gender, discussed screening for colorectal cancer, prostate cancer, breast cancer, and cervical cancer. Checklist reviewing preventive services available has been given to the patient.    Reviewed patients plan of care and provided an AVS. The Intermediate Care Plan ( asthma action plan, low back pain action plan, and migraine action plan) for Wallace meets the Care Plan requirement. This Care Plan has been established and reviewed with the Patient.    Counseling Resources:  ATP IV Guidelines  Pooled Cohorts Equation Calculator  Breast Cancer Risk Calculator  Breast Cancer: Medication to Reduce Risk  FRAX Risk Assessment  ICSI Preventive Guidelines  Dietary Guidelines for Americans, 2010  USDA's MyPlate  ASA Prophylaxis  Lung CA Screening    Jd Almaraz MD  [facility]    Identified Health Risks:  "

## 2022-01-26 NOTE — PATIENT INSTRUCTIONS
Patient Education   Personalized Prevention Plan  You are due for the preventive services outlined below.  Your care team is available to assist you in scheduling these services.  If you have already completed any of these items, please share that information with your care team to update in your medical record.  Health Maintenance Due   Topic Date Due     Hepatitis C Screening  Never done     Zoster (Shingles) Vaccine (1 of 2) Never done     ANNUAL REVIEW OF HM ORDERS  01/06/2022     FALL RISK ASSESSMENT  01/06/2022     COVID-19 Vaccine (4 - Booster for Pfizer series) 01/25/2022     Annual Wellness Visit  01/06/2022

## 2022-01-27 DIAGNOSIS — C61 PROSTATE CANCER (H): Primary | ICD-10-CM

## 2022-01-27 DIAGNOSIS — C79.51 BONE METASTASIS: ICD-10-CM

## 2022-01-28 RX ORDER — PREDNISONE 5 MG/1
5 TABLET ORAL
Qty: 30 TABLET | Refills: 0 | Status: SHIPPED | OUTPATIENT
Start: 2022-01-28 | End: 2022-02-09

## 2022-01-28 RX ORDER — ABIRATERONE ACETATE 250 MG/1
500 TABLET ORAL
Qty: 60 TABLET | Refills: 0 | Status: SHIPPED | OUTPATIENT
Start: 2022-01-28 | End: 2022-02-27

## 2022-01-28 NOTE — TELEPHONE ENCOUNTER
Spoke to Mihaela at [employer]. Application approved 01/28/2022-03/26/2022 once attestation letter comes back patient will be approved for the year. Fax will be here this afternoon    Filling pharmacy: [facility] ph: [phone], [phone]    Free Drug Application Approved  Effective Dates: 01/28/2022- 12/31/22  Patient notified? yes  Additional Information: attestation letter must be signed by 03/26/2022 to get approved for whole year.

## 2022-02-09 ENCOUNTER — MYC REFILL (OUTPATIENT)
Dept: ONCOLOGY | Facility: CLINIC | Age: 77
End: 2022-02-09
Payer: COMMERCIAL

## 2022-02-09 DIAGNOSIS — C79.51 BONE METASTASIS: ICD-10-CM

## 2022-02-09 DIAGNOSIS — C61 PROSTATE CANCER (H): ICD-10-CM

## 2022-02-09 RX ORDER — PREDNISONE 5 MG/1
5 TABLET ORAL
Qty: 30 TABLET | Refills: 0 | Status: SHIPPED | OUTPATIENT
Start: 2022-02-09 | End: 2022-03-16

## 2022-02-09 NOTE — TELEPHONE ENCOUNTER
Pending Prescriptions:                       Disp   Refills    predniSONE (DELTASONE) 5 MG tablet        30 tab*0            Sig: Take 1 tablet (5 mg) by mouth daily (with           breakfast)          Last Written Prescription Date: 1/28/22 Last Fill Quantity: 30,   # refills: 0  Last Office Visit: 1/21/22  Future Office visit:    Next 5 appointments (look out 90 days)    Feb 23, 2022  2:30 PM  Return Visit with Velma Hanna PA-C  [facility] [address]  [facility] [address]  [phone]   Apr 01, 2022  3:30 PM  Return Visit with Gigi Ward MD  [facility] ) [address] [place] [address] [address]  [facility] [address]  [phone]           Routing refill request to provider.     Christiano Barnett, RN, BSN.  RN Care Coordinator     [facility]   [phone]

## 2022-02-15 ENCOUNTER — PATIENT OUTREACH (OUTPATIENT)
Dept: ONCOLOGY | Facility: CLINIC | Age: 77
End: 2022-02-15
Payer: COMMERCIAL

## 2022-02-15 NOTE — TELEPHONE ENCOUNTER
Writer left patient voicemail message to return call back to clinic regarding upcoming Zometa infusion.     Christiano Barnett, RN, BSN.  RN Care Coordinator     [facility]   [phone]

## 2022-02-15 NOTE — TELEPHONE ENCOUNTER
Writer spoke with patient regarding Zometa infusion. Patient had questions on how often will he need Zometa and how long infusion would be. Patient informed Zometa is given every 180 days and is a 60 minute infusion. Patient also had quesitions what the medication does and used for. Patient educated Zometa is a supportive medication for patient with cancer that has spread to the bone. Zometa helps decrease complications  Such as pain and fractures and helps lower calcium levels if elevated to abnormal levels.     Patient thanked writer for the education and had no further questions or concerns at this time and was thankful for the quick reply from writer.     Christiano Barnett, RN, BSN.  RN Care Coordinator     [facility]   [phone]     no

## 2022-02-17 NOTE — TELEPHONE ENCOUNTER
Spoke to Nickolas at [employer]. Attestation letter has made it over there and good till 2022.   Called Steve to let him know letter made it there.

## 2022-02-22 NOTE — PROGRESS NOTES
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  Screening PSA was elevated and he underwent radical prostatectomy 11/2015.  Pathology revealed Cayla 4+4 disease, seminal vesicle extension and all 12 lymph nodes resected were negative.  His PSA did not drop after prostatectomy and remained elevated, therefore he completed adjuvant radiation therapy, still with no PSA response.  He established care with Dr. Ward and he started him on androgen deprivation therapy with Lupron.  He took a treatment break and his PSA started to rise dramatically, therefore started on Lupron again.  He was on this again for several months and was noted to have a rise in his PSA in April 2020 again.  Dr. Ward recommended bicalutamide (Casodex) 50mg which he was taking for a little over a year, but then PSA started to rise again.  Dr. Ward recommended abiraterone and first dose 8/7/21.  He had a brief 10-day treatment break due to rising LFTs, but then restarted.    He is here today for close follow-up and repeat labs.    Interval History: Steve continues to do really well on abiraterone.  He is active and is able to do all the activities that he wants to do.  No fever, chills, vomiting, diarrhea, bleeding or leg swelling.    Review of Systems: See interval hx. Denies fevers, chills, HA, dizziness, CP, SOB, abdominal pain, N/V, diarrhea, changes in urination.     PMHx and Social Hx reviewed per EPIC.      Medications:  Current Outpatient Medications   Medication Sig Dispense Refill     abiraterone (ZYTIGA) 250 MG tablet Take 2 tablets (500 mg) by mouth daily (with breakfast) for 30 doses Take with Low-Fat Meal 60 tablet 0     amLODIPine (NORVASC) 10 MG tablet Take 1 tablet (10 mg) by mouth daily 90 tablet 3     atorvastatin (LIPITOR) 20 MG tablet Take 1 tablet by mouth once daily 90  "tablet 3     ibuprofen (ADVIL/MOTRIN) 800 MG tablet Take 1 tablet (800 mg) by mouth 3 times daily with food 90 tablet 3     LORazepam (ATIVAN) 0.5 MG tablet Take 1 tablet (0.5 mg) by mouth every 4 hours as needed (Anxiety, Nausea/Vomiting or Sleep) 30 tablet 2     ondansetron (ZOFRAN) 4 MG tablet Take 1 tablet (4 mg) by mouth every 8 hours as needed for nausea 30 tablet 1     predniSONE (DELTASONE) 5 MG tablet Take 1 tablet (5 mg) by mouth daily (with breakfast) 30 tablet 0     prochlorperazine (COMPAZINE) 10 MG tablet Take 0.5 tablets (5 mg) by mouth every 6 hours as needed (Nausea/Vomiting) 30 tablet 2     valsartan (DIOVAN) 160 MG tablet Take 1 tablet (160 mg) by mouth daily 90 tablet 3       No Known Allergies    EXAM:    /74   Pulse 67   Temp 97.2  F (36.2  C) (Tympanic)   Resp 16   Ht 1.765 m (5' 9.5\")   Wt 99.1 kg (218 lb 8 oz)   SpO2 95%   BMI 31.80 kg/m       GENERAL:  Male, in no acute distress.  Alert and oriented x3.   HEENT:  Normocephalic, atraumatic.  PERRL, oropharynx clear with no sores or thrush.   LYMPH NODES:  No palpable pre/post-auricular, cervical, axillary lymphadenopathy appreciated.  CV:  RRR, No murmurs, gallops, or rubs.   LUNGS:  Clear to auscultation bilaterally.   ABDOMEN:  Soft, nontender and nondistended.  Bowel sounds heard x4.  No apparent hepatosplenomegaly.   EXTREMITIES:  No clubbing, cyanosis.   SKIN: No rash  PSYCH: Mood stable      Labs:   Results for ERNESTO DAY (MRN [id number]) as of 2/23/2022 16:47   2/23/2022 14:19   Sodium 138   Potassium 4.3   Chloride 108   Carbon Dioxide 27   Urea Nitrogen 20   Creatinine 1.09   GFR Estimate 70   Calcium 8.5   Anion Gap 3   Albumin 3.4   Protein Total 7.0   Bilirubin Total 0.8   Alkaline Phosphatase 113   ALT 88 (H)   AST 46 (H)   Lactate Dehydrogenase 246 (H)   Glucose 111 (H)   WBC 6.1   Hemoglobin 14.1   Hematocrit 42.6   Platelet Count 171   RBC Count 4.55   MCV 94   MCH 31.0   MCHC 33.1   RDW 13.0   % Neutrophils 71 "   % Lymphocytes 20   % Monocytes 7   % Eosinophils 1   % Basophils 1   Absolute Basophils 0.0   Absolute Eosinophils 0.1   Absolute Immature Granulocytes 0.0   Absolute Lymphocytes 1.2   Absolute Monocytes 0.4   % Immature Granulocytes 0   Absolute Neutrophils 4.4   Absolute NRBCs 0.0   NRBCs per 100 WBC 0         Imaging:   n/a    Impression/Plan: Wallace Zelaya is a 75 year old male with 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    Prostate cancer: Castration resistant with rising PSA, 1.01 on 5/28/2021.  He had been on Lupron and Casodex and then started abiraterone.  He has been tolerating this really well.  His LFTs started to rise, therefore he took a 10-day break and labs improved.  He has now restarted the abiraterone and LFTs stable. PSA and testosterone are pending today. Possibly candidate for upcoming prostate cancer clinical trial at the [facility].  Dr Ward recommend he continue the abiraterone until we can possibly enroll him in a clinical trial.  Steve is aware of the plan.   --4/1/22 Dr Ward, labs    LFTs: His LFTs started to rise and ALT/AST were more elevated on 12/29/2021.  He took a 10-day treatment break from the abiraterone and LFTs improved. They are stable today even when restaging abiraterone. We will monitor.     Bony metastasis: T6 lesion and sacral lesion. He received Zometa today and plan for repeat 4/1/22.    Chart documentation with Dragon Voice recognition Software. Although reviewed after completion, some words and grammatical errors may remain.      Velma Amaya PA-C  Hematology/Oncology  [facility]

## 2022-02-23 ENCOUNTER — ONCOLOGY VISIT (OUTPATIENT)
Dept: ONCOLOGY | Facility: CLINIC | Age: 77
End: 2022-02-23
Attending: PHYSICIAN ASSISTANT
Payer: COMMERCIAL

## 2022-02-23 ENCOUNTER — INFUSION THERAPY VISIT (OUTPATIENT)
Dept: INFUSION THERAPY | Facility: CLINIC | Age: 77
End: 2022-02-23
Attending: PHYSICIAN ASSISTANT
Payer: COMMERCIAL

## 2022-02-23 VITALS
BODY MASS INDEX: 31.28 KG/M2 | RESPIRATION RATE: 16 BRPM | WEIGHT: 218.5 LBS | HEART RATE: 67 BPM | DIASTOLIC BLOOD PRESSURE: 74 MMHG | SYSTOLIC BLOOD PRESSURE: 133 MMHG | HEIGHT: 70 IN | TEMPERATURE: 97.2 F | OXYGEN SATURATION: 95 %

## 2022-02-23 DIAGNOSIS — C61 PROSTATE CANCER (H): ICD-10-CM

## 2022-02-23 DIAGNOSIS — C61 PROSTATE CANCER (H): Primary | ICD-10-CM

## 2022-02-23 DIAGNOSIS — C79.51 BONE METASTASIS: ICD-10-CM

## 2022-02-23 DIAGNOSIS — C79.51 BONE METASTASIS: Primary | ICD-10-CM

## 2022-02-23 LAB
ALBUMIN SERPL-MCNC: 3.4 G/DL (ref 3.4–5)
ALP SERPL-CCNC: 113 U/L (ref 40–150)
ALT SERPL W P-5'-P-CCNC: 88 U/L (ref 0–70)
ANION GAP SERPL CALCULATED.3IONS-SCNC: 3 MMOL/L (ref 3–14)
AST SERPL W P-5'-P-CCNC: 46 U/L (ref 0–45)
BASOPHILS # BLD AUTO: 0 10E3/UL (ref 0–0.2)
BASOPHILS NFR BLD AUTO: 1 %
BILIRUB SERPL-MCNC: 0.8 MG/DL (ref 0.2–1.3)
BUN SERPL-MCNC: 20 MG/DL (ref 7–30)
CALCIUM SERPL-MCNC: 8.5 MG/DL (ref 8.5–10.1)
CHLORIDE BLD-SCNC: 108 MMOL/L (ref 94–109)
CO2 SERPL-SCNC: 27 MMOL/L (ref 20–32)
CREAT SERPL-MCNC: 1.09 MG/DL (ref 0.66–1.25)
EOSINOPHIL # BLD AUTO: 0.1 10E3/UL (ref 0–0.7)
EOSINOPHIL NFR BLD AUTO: 1 %
ERYTHROCYTE [DISTWIDTH] IN BLOOD BY AUTOMATED COUNT: 13 % (ref 10–15)
GFR SERPL CREATININE-BSD FRML MDRD: 70 ML/MIN/1.73M2
GLUCOSE BLD-MCNC: 111 MG/DL (ref 70–99)
HCT VFR BLD AUTO: 42.6 % (ref 40–53)
HGB BLD-MCNC: 14.1 G/DL (ref 13.3–17.7)
IMM GRANULOCYTES # BLD: 0 10E3/UL
IMM GRANULOCYTES NFR BLD: 0 %
LDH SERPL L TO P-CCNC: 246 U/L (ref 85–227)
LYMPHOCYTES # BLD AUTO: 1.2 10E3/UL (ref 0.8–5.3)
LYMPHOCYTES NFR BLD AUTO: 20 %
MCH RBC QN AUTO: 31 PG (ref 26.5–33)
MCHC RBC AUTO-ENTMCNC: 33.1 G/DL (ref 31.5–36.5)
MCV RBC AUTO: 94 FL (ref 78–100)
MONOCYTES # BLD AUTO: 0.4 10E3/UL (ref 0–1.3)
MONOCYTES NFR BLD AUTO: 7 %
NEUTROPHILS # BLD AUTO: 4.4 10E3/UL (ref 1.6–8.3)
NEUTROPHILS NFR BLD AUTO: 71 %
NRBC # BLD AUTO: 0 10E3/UL
NRBC BLD AUTO-RTO: 0 /100
PLATELET # BLD AUTO: 171 10E3/UL (ref 150–450)
POTASSIUM BLD-SCNC: 4.3 MMOL/L (ref 3.4–5.3)
PROT SERPL-MCNC: 7 G/DL (ref 6.8–8.8)
PSA SERPL-MCNC: 2.11 UG/L (ref 0–4)
RBC # BLD AUTO: 4.55 10E6/UL (ref 4.4–5.9)
SODIUM SERPL-SCNC: 138 MMOL/L (ref 133–144)
WBC # BLD AUTO: 6.1 10E3/UL (ref 4–11)

## 2022-02-23 PROCEDURE — 84403 ASSAY OF TOTAL TESTOSTERONE: CPT | Performed by: INTERNAL MEDICINE

## 2022-02-23 PROCEDURE — 83615 LACTATE (LD) (LDH) ENZYME: CPT | Performed by: INTERNAL MEDICINE

## 2022-02-23 PROCEDURE — 96365 THER/PROPH/DIAG IV INF INIT: CPT

## 2022-02-23 PROCEDURE — 85025 COMPLETE CBC W/AUTO DIFF WBC: CPT | Performed by: INTERNAL MEDICINE

## 2022-02-23 PROCEDURE — 36415 COLL VENOUS BLD VENIPUNCTURE: CPT

## 2022-02-23 PROCEDURE — 84153 ASSAY OF PSA TOTAL: CPT | Performed by: INTERNAL MEDICINE

## 2022-02-23 PROCEDURE — 258N000003 HC RX IP 258 OP 636: Performed by: PHYSICIAN ASSISTANT

## 2022-02-23 PROCEDURE — 250N000011 HC RX IP 250 OP 636: Performed by: PHYSICIAN ASSISTANT

## 2022-02-23 PROCEDURE — 99214 OFFICE O/P EST MOD 30 MIN: CPT | Performed by: PHYSICIAN ASSISTANT

## 2022-02-23 PROCEDURE — 80053 COMPREHEN METABOLIC PANEL: CPT | Performed by: INTERNAL MEDICINE

## 2022-02-23 RX ORDER — DIPHENHYDRAMINE HYDROCHLORIDE 50 MG/ML
50 INJECTION INTRAMUSCULAR; INTRAVENOUS
Status: CANCELLED
Start: 2022-08-22

## 2022-02-23 RX ORDER — EPINEPHRINE 1 MG/ML
0.3 INJECTION, SOLUTION INTRAMUSCULAR; SUBCUTANEOUS EVERY 5 MIN PRN
Status: CANCELLED | OUTPATIENT
Start: 2022-08-22

## 2022-02-23 RX ORDER — METHYLPREDNISOLONE SODIUM SUCCINATE 125 MG/2ML
125 INJECTION, POWDER, LYOPHILIZED, FOR SOLUTION INTRAMUSCULAR; INTRAVENOUS
Status: CANCELLED
Start: 2022-08-22

## 2022-02-23 RX ORDER — ZOLEDRONIC ACID 0.04 MG/ML
4 INJECTION, SOLUTION INTRAVENOUS ONCE
Status: CANCELLED
Start: 2022-08-22 | End: 2022-08-22

## 2022-02-23 RX ORDER — ZOLEDRONIC ACID 0.04 MG/ML
4 INJECTION, SOLUTION INTRAVENOUS ONCE
Status: COMPLETED | OUTPATIENT
Start: 2022-02-23 | End: 2022-02-23

## 2022-02-23 RX ORDER — HEPARIN SODIUM,PORCINE 10 UNIT/ML
5 VIAL (ML) INTRAVENOUS
Status: CANCELLED | OUTPATIENT
Start: 2022-08-22

## 2022-02-23 RX ORDER — HEPARIN SODIUM (PORCINE) LOCK FLUSH IV SOLN 100 UNIT/ML 100 UNIT/ML
5 SOLUTION INTRAVENOUS
Status: CANCELLED | OUTPATIENT
Start: 2022-08-22

## 2022-02-23 RX ORDER — MEPERIDINE HYDROCHLORIDE 25 MG/ML
25 INJECTION INTRAMUSCULAR; INTRAVENOUS; SUBCUTANEOUS EVERY 30 MIN PRN
Status: CANCELLED | OUTPATIENT
Start: 2022-08-22

## 2022-02-23 RX ORDER — NALOXONE HYDROCHLORIDE 0.4 MG/ML
0.2 INJECTION, SOLUTION INTRAMUSCULAR; INTRAVENOUS; SUBCUTANEOUS
Status: CANCELLED | OUTPATIENT
Start: 2022-08-22

## 2022-02-23 RX ORDER — ALBUTEROL SULFATE 0.83 MG/ML
2.5 SOLUTION RESPIRATORY (INHALATION)
Status: CANCELLED | OUTPATIENT
Start: 2022-08-22

## 2022-02-23 RX ORDER — ALBUTEROL SULFATE 90 UG/1
1-2 AEROSOL, METERED RESPIRATORY (INHALATION)
Status: CANCELLED
Start: 2022-08-22

## 2022-02-23 RX ADMIN — SODIUM CHLORIDE 250 ML: 9 INJECTION, SOLUTION INTRAVENOUS at 15:31

## 2022-02-23 RX ADMIN — ZOLEDRONIC ACID 4 MG: 4 SOLUTION INTRAVENOUS at 15:40

## 2022-02-23 ASSESSMENT — PAIN SCALES - GENERAL: PAINLEVEL: NO PAIN (0)

## 2022-02-23 NOTE — ORAL ONC MGMT
Oral Chemotherapy Monitoring Program    Subjective/Objective:  Wallace Zelaya is a 76 year old male seen in clinic for a follow-up visit for oral chemotherapy.  He denies any side effects.    ORAL CHEMOTHERAPY 8/20/2021 9/17/2021 10/1/2021 10/21/2021 12/23/2021 1/17/2022 2/23/2022   Assessment Type Initial Follow up Lab Monitoring Lab Monitoring Left Voicemail Refill Other Lab Monitoring;Monthly Follow up   Diagnosis Code Prostate Cancer Prostate Cancer Prostate Cancer Prostate Cancer Prostate Cancer Prostate Cancer Prostate Cancer   Providers Dr. Ed Ward   [place] [facility] [place] Xtandi (enzalutamide) Xtandi (enzalutamide) Xtandi (enzalutamide) Xtandi (enzalutamide) Xtandi (enzalutamide) Zytiga (abiraterone) Zytiga (abiraterone)   Dose 160 mg 160 mg 160 mg 160 mg 160 mg 500 mg 500 mg   Current Schedule Daily Daily Daily Daily Daily Daily Daily   Cycle Details Continuous Continuous Continuous Continuous Continuous Continuous Continuous   Start Date of Last Cycle 8/7/2021 9/6/2021 - - - - -   Planned next cycle start date 9/6/2021 - - - 12/28/2021 1/17/2022 3/3/2022   Doses missed in last 2 weeks 0 - - - - - 0   Adherence Assessment Adherent - - - - - Adherent   Adverse Effects No AE identified during assessment - Increased AST/ALT/T BILI - - - Increased AST/ALT/T BILI   Increased AST/ALT/T BILI - - Grade 1 - - - Grade 1   Pharmacist Intervention(increased ast/alt/t bili) - - Yes - - - No   Intervention(s) - - Increased lab monitoring - - - -   Any new drug interactions? No - - - - - No   Is the dose as ordered appropriate for the patient? Yes - - - - - Yes   Is the patient currently in pain? No - - - - - No   Has the patient been assessed within the past 6 months for depression? - - - - - - Yes   Has the patient missed any days of school, work, or other routine activity? No - - - - - No  "  Since the last time we talked, have you been hospitalized or used the emergency room? No - - - - - No       Last PHQ-2 Score on record:   PHQ-2 ( 1999 [employer]) 1/20/2022 1/6/2021   Q1: Little interest or pleasure in doing things 0 0   Q2: Feeling down, depressed or hopeless 0 0   PHQ-2 Score 0 0   PHQ-2 Total Score (12-17 Years)- Positive if 3 or more points; Administer PHQ-A if positive - 0   Q1: Little interest or pleasure in doing things Not at all -   Q2: Feeling down, depressed or hopeless Not at all -   PHQ-2 Score 0 -       Vitals:  BP:   BP Readings from Last 1 Encounters:   02/23/22 133/74     Wt Readings from Last 1 Encounters:   02/23/22 99.1 kg (218 lb 8 oz)     Estimated body surface area is 2.2 meters squared as calculated from the following:    Height as of an earlier encounter on 2/23/22: 1.765 m (5' 9.5\").    Weight as of an earlier encounter on 2/23/22: 99.1 kg (218 lb 8 oz).    Labs:  _  Result Component Current Result Ref Range   Sodium 138 (2/23/2022) 133 - 144 mmol/L     _  Result Component Current Result Ref Range   Potassium 4.3 (2/23/2022) 3.4 - 5.3 mmol/L     _  Result Component Current Result Ref Range   Calcium 8.5 (2/23/2022) 8.5 - 10.1 mg/dL     No results found for Mag within last 30 days.     No results found for Phos within last 30 days.     _  Result Component Current Result Ref Range   Albumin 3.4 (2/23/2022) 3.4 - 5.0 g/dL     _  Result Component Current Result Ref Range   Urea Nitrogen 20 (2/23/2022) 7 - 30 mg/dL     _  Result Component Current Result Ref Range   Creatinine 1.09 (2/23/2022) 0.66 - 1.25 mg/dL     _  Result Component Current Result Ref Range   AST 46 (H) (2/23/2022) 0 - 45 U/L     _  Result Component Current Result Ref Range   ALT 88 (H) (2/23/2022) 0 - 70 U/L     _  Result Component Current Result Ref Range   Bilirubin Total 0.8 (2/23/2022) 0.2 - 1.3 mg/dL     _  Result Component Current Result Ref Range   WBC Count 6.1 (2/23/2022) 4.0 - 11.0 10e3/uL "     _  Result Component Current Result Ref Range   Hemoglobin 14.1 (2/23/2022) 13.3 - 17.7 g/dL     _  Result Component Current Result Ref Range   Platelet Count 171 (2/23/2022) 150 - 450 10e3/uL     No results found for ANC within last 30 days.     _  Result Component Current Result Ref Range   Absolute Neutrophils 4.4 (2/23/2022) 1.6 - 8.3 10e3/uL          Assessment/Plan:  Grade 1 AST/ALT elevation: stable. Patient is tolerating his zytiga without significant side effects.  Continue with current regimen.      Follow-Up:  3/28/22 10:45 labs    Refill Due:  3/3/22    Marty Meese, Pharm.D., BCOP

## 2022-02-23 NOTE — PROGRESS NOTES
Infusion Nursing Note:  Wallace Zelaya presents today for Zometa # 1.    Patient seen by provider today: Yes: Velma Amaya PA-C.   present during visit today: Not Applicable.    Note:  Appointment with Velma Amaya PA-C today.  Patient denies fevers, chills or signs of infection.  Patient denies tooth, mouth or jaw pain.  Dental Work:  Patient reports that he underwent a tooth extraction and bridge about 2-3 months ago.  Patient reports no issues with healing after dental work.  Writer updated Velma Amaya PA-C.  VORB Velma Amaya PA-C/Khushbu Lewis RN:  Okay to proceed with Zometa today.  Please review with pharmacy Calcium and Vitamin D dose recommendations.  -Patient confirms is taking Citracal 1 tablet daily but is not taking Vitamin D.  -Martin Meese,Pharm D spoke with patient with the following recommendations:  Take Citracal 600mg PO Daily. Take Vitamin D 800 international unit(s)-1000 international unit(s) daily OR 2000 international unit(s) every other day (if can only find 2000 international unit(s) tablets).  -Patient verbalized understanding of plan.    Zometa information sheet reviewed and copy given to patient.  Writer instructed patient to let his dentist know that he started Zometa.  Writer instructed patient to contact our clinic if experiences mouth, tooth or jaw pain OR requires any dental work.  Patient verbalized understanding of plan.    Intravenous Access:  PIV + Labs today per Dieter Richardson RN.  PIV site C/D/I.  PIV flushes well + excellent blood return.    CR = 1.09  CA = 8.5    Treatment Conditions:  CR and CA lab results meet parameters to proceed with Zometa today.      Post Infusion Assessment:  Patient tolerated infusion without incident.  Blood return noted pre and post infusion.  Site patent and intact, free from redness, edema or discomfort.  No evidence of extravasations.  Access discontinued per protocol.       Discharge Plan:   Discharge instructions reviewed with:  Patient.  Patient and/or family verbalized understanding of discharge instructions and all questions answered.  Patient discharged in stable condition accompanied by: self.  Departure Mode: Ambulatory.  3/28/22: Labs.  4/1/22: Appointment with Dr. Ward.  Zometa is ordered for every 180 days per Therapy Plan.    Khushbu Lewis RN, BSN, OCN on 2/23/2022 at 4:57 PM

## 2022-02-23 NOTE — PROGRESS NOTES
Nursing Note:  Wallace Zelaya presents today for labs.    Patient seen by provider today: Yes: in person with Amaya   present during visit today: Not Applicable.    Note: N/A.    Intravenous Access:  Labs drawn without difficulty.  Peripheral IV placed.    Discharge Plan:   Patient was sent to clinic for PA appointment.    Gala De La Rosa RN

## 2022-02-23 NOTE — NURSING NOTE
"Oncology Rooming Note    February 23, 2022 2:36 PM   Wallace Zelaya is a 76 year old male who presents for:    Chief Complaint   Patient presents with     Oncology Clinic Visit     Bone metastasis      Initial Vitals: /74   Pulse 67   Temp 97.2  F (36.2  C) (Tympanic)   Resp 16   Ht 1.765 m (5' 9.5\")   Wt 99.1 kg (218 lb 8 oz)   SpO2 95%   BMI 31.80 kg/m   Estimated body mass index is 31.8 kg/m  as calculated from the following:    Height as of this encounter: 1.765 m (5' 9.5\").    Weight as of this encounter: 99.1 kg (218 lb 8 oz). Body surface area is 2.2 meters squared.  No Pain (0) Comment: Data Unavailable   No LMP for male patient.  Allergies reviewed: Yes  Medications reviewed: Yes    Medications: Medication refills not needed today.  Pharmacy name entered into [employer]:    [facility] - [place], [place] - [address]  [facility] - [facility] [address]  [facility] - [place], [place] - [address]   [place], [place] - [address] [place] [address] [address]    Clinical concerns: follow up      Tammy Mortensen, CMA            68  "

## 2022-02-23 NOTE — LETTER
2/23/2022         RE: Wallace Zelaya  [address] [facility] [address]        Dear Colleague,    Thank you for referring your patient, Wallace Zelaya, to the [facility]. Please see a copy of my visit note below.    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  Screening PSA was elevated and he underwent radical prostatectomy 11/2015.  Pathology revealed Cayla 4+4 disease, seminal vesicle extension and all 12 lymph nodes resected were negative.  His PSA did not drop after prostatectomy and remained elevated, therefore he completed adjuvant radiation therapy, still with no PSA response.  He established care with Dr. Ward and he started him on androgen deprivation therapy with Lupron.  He took a treatment break and his PSA started to rise dramatically, therefore started on Lupron again.  He was on this again for several months and was noted to have a rise in his PSA in April 2020 again.  Dr. Ward recommended bicalutamide (Casodex) 50mg which he was taking for a little over a year, but then PSA started to rise again.  Dr. Ward recommended abiraterone and first dose 8/7/21.  He had a brief 10-day treatment break due to rising LFTs, but then restarted.    He is here today for close follow-up and repeat labs.    Interval History: Steve continues to do really well on abiraterone.  He is active and is able to do all the activities that he wants to do.  No fever, chills, vomiting, diarrhea, bleeding or leg swelling.    Review of Systems: See interval hx. Denies fevers, chills, HA, dizziness, CP, SOB, abdominal pain, N/V, diarrhea, changes in urination.     PMHx and Social Hx reviewed per EPIC.      Medications:  Current Outpatient Medications   Medication Sig Dispense Refill     abiraterone (ZYTIGA) 250 MG tablet Take 2  "tablets (500 mg) by mouth daily (with breakfast) for 30 doses Take with Low-Fat Meal 60 tablet 0     amLODIPine (NORVASC) 10 MG tablet Take 1 tablet (10 mg) by mouth daily 90 tablet 3     atorvastatin (LIPITOR) 20 MG tablet Take 1 tablet by mouth once daily 90 tablet 3     ibuprofen (ADVIL/MOTRIN) 800 MG tablet Take 1 tablet (800 mg) by mouth 3 times daily with food 90 tablet 3     LORazepam (ATIVAN) 0.5 MG tablet Take 1 tablet (0.5 mg) by mouth every 4 hours as needed (Anxiety, Nausea/Vomiting or Sleep) 30 tablet 2     ondansetron (ZOFRAN) 4 MG tablet Take 1 tablet (4 mg) by mouth every 8 hours as needed for nausea 30 tablet 1     predniSONE (DELTASONE) 5 MG tablet Take 1 tablet (5 mg) by mouth daily (with breakfast) 30 tablet 0     prochlorperazine (COMPAZINE) 10 MG tablet Take 0.5 tablets (5 mg) by mouth every 6 hours as needed (Nausea/Vomiting) 30 tablet 2     valsartan (DIOVAN) 160 MG tablet Take 1 tablet (160 mg) by mouth daily 90 tablet 3       No Known Allergies    EXAM:    /74   Pulse 67   Temp 97.2  F (36.2  C) (Tympanic)   Resp 16   Ht 1.765 m (5' 9.5\")   Wt 99.1 kg (218 lb 8 oz)   SpO2 95%   BMI 31.80 kg/m       GENERAL:  Male, in no acute distress.  Alert and oriented x3.   HEENT:  Normocephalic, atraumatic.  PERRL, oropharynx clear with no sores or thrush.   LYMPH NODES:  No palpable pre/post-auricular, cervical, axillary lymphadenopathy appreciated.  CV:  RRR, No murmurs, gallops, or rubs.   LUNGS:  Clear to auscultation bilaterally.   ABDOMEN:  Soft, nontender and nondistended.  Bowel sounds heard x4.  No apparent hepatosplenomegaly.   EXTREMITIES:  No clubbing, cyanosis.   SKIN: No rash  PSYCH: Mood stable      Labs:   Results for ERNESTO DAY (MRN [id number]) as of 2/23/2022 16:47   2/23/2022 14:19   Sodium 138   Potassium 4.3   Chloride 108   Carbon Dioxide 27   Urea Nitrogen 20   Creatinine 1.09   GFR Estimate 70   Calcium 8.5   Anion Gap 3   Albumin 3.4   Protein Total 7.0 "   Bilirubin Total 0.8   Alkaline Phosphatase 113   ALT 88 (H)   AST 46 (H)   Lactate Dehydrogenase 246 (H)   Glucose 111 (H)   WBC 6.1   Hemoglobin 14.1   Hematocrit 42.6   Platelet Count 171   RBC Count 4.55   MCV 94   MCH 31.0   MCHC 33.1   RDW 13.0   % Neutrophils 71   % Lymphocytes 20   % Monocytes 7   % Eosinophils 1   % Basophils 1   Absolute Basophils 0.0   Absolute Eosinophils 0.1   Absolute Immature Granulocytes 0.0   Absolute Lymphocytes 1.2   Absolute Monocytes 0.4   % Immature Granulocytes 0   Absolute Neutrophils 4.4   Absolute NRBCs 0.0   NRBCs per 100 WBC 0         Imaging:   n/a    Impression/Plan: Wallace Zelaya is a 75 year old male with 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    Prostate cancer: Castration resistant with rising PSA, 1.01 on 5/28/2021.  He had been on Lupron and Casodex and then started abiraterone.  He has been tolerating this really well.  His LFTs started to rise, therefore he took a 10-day break and labs improved.  He has now restarted the abiraterone and LFTs stable. PSA and testosterone are pending today. Possibly candidate for upcoming prostate cancer clinical trial at the [facility].  Dr Ward recommend he continue the abiraterone until we can possibly enroll him in a clinical trial.  Steve is aware of the plan.   --4/1/22 Dr Ward, labs    LFTs: His LFTs started to rise and ALT/AST were more elevated on 12/29/2021.  He took a 10-day treatment break from the abiraterone and LFTs improved. They are stable today even when restaging abiraterone. We will monitor.     Bony metastasis: T6 lesion and sacral lesion. He received Zometa today and plan for repeat 4/1/22.    Chart documentation with Dragon Voice recognition Software. Although reviewed after completion, some words and grammatical errors may remain.      Velma Amaya PA-C  Hematology/Oncology  [facility]              Again, thank  you for allowing me to participate in the care of your patient.        Sincerely,        Velma Amaya PA-C

## 2022-02-25 LAB — TESTOST SERPL-MCNC: <2 NG/DL (ref 240–950)

## 2022-03-15 DIAGNOSIS — C61 PROSTATE CANCER (H): ICD-10-CM

## 2022-03-15 DIAGNOSIS — C79.51 BONE METASTASIS: ICD-10-CM

## 2022-03-16 RX ORDER — PREDNISONE 5 MG/1
TABLET ORAL
Qty: 30 TABLET | Refills: 0 | Status: SHIPPED | OUTPATIENT
Start: 2022-03-16 | End: 2022-04-14

## 2022-03-28 ENCOUNTER — LAB (OUTPATIENT)
Dept: ONCOLOGY | Facility: CLINIC | Age: 77
End: 2022-03-28
Attending: INTERNAL MEDICINE
Payer: COMMERCIAL

## 2022-03-28 DIAGNOSIS — C79.51 BONE METASTASIS: ICD-10-CM

## 2022-03-28 DIAGNOSIS — C61 PROSTATE CANCER (H): ICD-10-CM

## 2022-03-28 LAB
ALBUMIN SERPL-MCNC: 3.6 G/DL (ref 3.4–5)
ALP SERPL-CCNC: 108 U/L (ref 40–150)
ALT SERPL W P-5'-P-CCNC: 80 U/L (ref 0–70)
ANION GAP SERPL CALCULATED.3IONS-SCNC: 2 MMOL/L (ref 3–14)
AST SERPL W P-5'-P-CCNC: 43 U/L (ref 0–45)
BASOPHILS # BLD AUTO: 0.1 10E3/UL (ref 0–0.2)
BASOPHILS NFR BLD AUTO: 1 %
BILIRUB SERPL-MCNC: 0.7 MG/DL (ref 0.2–1.3)
BUN SERPL-MCNC: 18 MG/DL (ref 7–30)
CALCIUM SERPL-MCNC: 8.9 MG/DL (ref 8.5–10.1)
CHLORIDE BLD-SCNC: 107 MMOL/L (ref 94–109)
CO2 SERPL-SCNC: 31 MMOL/L (ref 20–32)
CREAT SERPL-MCNC: 0.9 MG/DL (ref 0.66–1.25)
EOSINOPHIL # BLD AUTO: 0.3 10E3/UL (ref 0–0.7)
EOSINOPHIL NFR BLD AUTO: 5 %
ERYTHROCYTE [DISTWIDTH] IN BLOOD BY AUTOMATED COUNT: 13.5 % (ref 10–15)
GFR SERPL CREATININE-BSD FRML MDRD: 88 ML/MIN/1.73M2
GLUCOSE BLD-MCNC: 103 MG/DL (ref 70–99)
HCT VFR BLD AUTO: 45.9 % (ref 40–53)
HGB BLD-MCNC: 15.3 G/DL (ref 13.3–17.7)
IMM GRANULOCYTES # BLD: 0 10E3/UL
IMM GRANULOCYTES NFR BLD: 0 %
LDH SERPL L TO P-CCNC: 259 U/L (ref 85–227)
LYMPHOCYTES # BLD AUTO: 1.6 10E3/UL (ref 0.8–5.3)
LYMPHOCYTES NFR BLD AUTO: 26 %
MCH RBC QN AUTO: 31 PG (ref 26.5–33)
MCHC RBC AUTO-ENTMCNC: 33.3 G/DL (ref 31.5–36.5)
MCV RBC AUTO: 93 FL (ref 78–100)
MONOCYTES # BLD AUTO: 0.6 10E3/UL (ref 0–1.3)
MONOCYTES NFR BLD AUTO: 9 %
NEUTROPHILS # BLD AUTO: 3.6 10E3/UL (ref 1.6–8.3)
NEUTROPHILS NFR BLD AUTO: 59 %
NRBC # BLD AUTO: 0 10E3/UL
NRBC BLD AUTO-RTO: 0 /100
PLATELET # BLD AUTO: 181 10E3/UL (ref 150–450)
POTASSIUM BLD-SCNC: 4.7 MMOL/L (ref 3.4–5.3)
PROT SERPL-MCNC: 7.5 G/DL (ref 6.8–8.8)
PSA SERPL-MCNC: 2 UG/L (ref 0–4)
RBC # BLD AUTO: 4.93 10E6/UL (ref 4.4–5.9)
SODIUM SERPL-SCNC: 140 MMOL/L (ref 133–144)
WBC # BLD AUTO: 6.1 10E3/UL (ref 4–11)

## 2022-03-28 PROCEDURE — 85025 COMPLETE CBC W/AUTO DIFF WBC: CPT | Performed by: INTERNAL MEDICINE

## 2022-03-28 PROCEDURE — 84153 ASSAY OF PSA TOTAL: CPT | Performed by: INTERNAL MEDICINE

## 2022-03-28 PROCEDURE — 80053 COMPREHEN METABOLIC PANEL: CPT | Performed by: INTERNAL MEDICINE

## 2022-03-28 PROCEDURE — 82040 ASSAY OF SERUM ALBUMIN: CPT | Performed by: INTERNAL MEDICINE

## 2022-03-28 PROCEDURE — 83615 LACTATE (LD) (LDH) ENZYME: CPT | Performed by: INTERNAL MEDICINE

## 2022-03-28 PROCEDURE — 84403 ASSAY OF TOTAL TESTOSTERONE: CPT | Performed by: INTERNAL MEDICINE

## 2022-03-28 PROCEDURE — 36415 COLL VENOUS BLD VENIPUNCTURE: CPT

## 2022-03-30 LAB — TESTOST SERPL-MCNC: <2 NG/DL (ref 240–950)

## 2022-04-01 ENCOUNTER — ONCOLOGY VISIT (OUTPATIENT)
Dept: ONCOLOGY | Facility: CLINIC | Age: 77
End: 2022-04-01
Attending: INTERNAL MEDICINE
Payer: COMMERCIAL

## 2022-04-01 VITALS
BODY MASS INDEX: 31.5 KG/M2 | TEMPERATURE: 97 F | SYSTOLIC BLOOD PRESSURE: 141 MMHG | OXYGEN SATURATION: 95 % | DIASTOLIC BLOOD PRESSURE: 70 MMHG | RESPIRATION RATE: 16 BRPM | HEART RATE: 60 BPM | WEIGHT: 220 LBS | HEIGHT: 70 IN

## 2022-04-01 DIAGNOSIS — C79.51 BONE METASTASIS: ICD-10-CM

## 2022-04-01 DIAGNOSIS — C61 PROSTATE CANCER (H): Primary | ICD-10-CM

## 2022-04-01 PROCEDURE — G0463 HOSPITAL OUTPT CLINIC VISIT: HCPCS

## 2022-04-01 PROCEDURE — 99214 OFFICE O/P EST MOD 30 MIN: CPT | Performed by: INTERNAL MEDICINE

## 2022-04-01 ASSESSMENT — PAIN SCALES - GENERAL: PAINLEVEL: NO PAIN (0)

## 2022-04-01 NOTE — LETTER
4/1/2022         RE: Wallace Zelaya  [address] [facility] [address]        Dear Colleague,    Thank you for referring your patient, Wallace Zelaya, to the [facility]. Please see a copy of my visit note below.    [facility]  FOLLOW-UP VISIT NOTE    PATIENT NAME: Wallace Zelaya MRN # [id number]  DATE OF VISIT: Apr 1, 2022 YOB: 1945    REFERRING PROVIDER: No referring provider defined for this encounter.    CANCER TYPE: Prostate cancer; Biochemical recurrence; Castration resistant disease  STAGE: Stage III - pT3b at diagnosis; M0    HISTORY OF PRESENTING ILLNESS:  Wallace was noted to have rising screening PSA from 2 - 4. He was referred to Dr. Rodolfo Connor. He had radical prostatectomy on 11/2015. Pathology from this revealed Aspen 4+4 disease with extraprostatic extension, seminal vesicle extension and he was staged at pT3b. All of the 12 lymph nodes resected were negative for disease. Post operatively his PSA did not drop to undetectable levels and remained elevated at 0.56. It vladimir to 0.9 in April 2016 and he was referred to Dr. Newton for adjuvant radiation therapy. He completed radiation therapy but did not have any PSA response to this treatment.     TREATMENT SUMMARY:  11/13/2015 Radical prostatectomy  April 2016  Radiation therapy  He was started on intermittent androgen deprivation therapy which had to be changed to continuous with rapid rise in his PSA.      April 2020 - he was started on bicalutamide for complete androgen blockade  He had rising PSA in May 2021. His bone scan done on 6/30/21 revealed metastatic lesions of T6 and the sacrum. He was switched to abiraterone with prednisone on 8/7/21    CURRENT INTERVENTIONS:  Abiraterone with prednisone starting 8/7/21    SUBJECTIVE   Wallace is being seen for his prostate cancer    Steve was followed in person and is alone at this visit. He has been on  "abiraterone. He has been tolerating this well. He has no new complains on therapy.        PAST MEDICAL HISTORY   1. HTN  2. Dyslipidemia  3. Prostate cancer as detailed above      CURRENT OUTPATIENT MEDICATIONS     Current Outpatient Medications   Medication Sig     amLODIPine (NORVASC) 10 MG tablet Take 1 tablet (10 mg) by mouth daily     atorvastatin (LIPITOR) 20 MG tablet Take 1 tablet by mouth once daily     ibuprofen (ADVIL/MOTRIN) 800 MG tablet Take 1 tablet (800 mg) by mouth 3 times daily with food     LORazepam (ATIVAN) 0.5 MG tablet Take 1 tablet (0.5 mg) by mouth every 4 hours as needed (Anxiety, Nausea/Vomiting or Sleep)     ondansetron (ZOFRAN) 4 MG tablet Take 1 tablet (4 mg) by mouth every 8 hours as needed for nausea     predniSONE (DELTASONE) 5 MG tablet Take 1 tablet by mouth once daily with breakfast     prochlorperazine (COMPAZINE) 10 MG tablet Take 0.5 tablets (5 mg) by mouth every 6 hours as needed (Nausea/Vomiting)     valsartan (DIOVAN) 160 MG tablet Take 1 tablet (160 mg) by mouth daily     No current facility-administered medications for this visit.        ALLERGIES     No Known Allergies     REVIEW OF SYSTEMS   As above in the HPI, o/w complete 12-point ROS was negative.     PHYSICAL EXAM   BP (!) 141/70 (Cuff Size: Adult Large)   Pulse 60   Temp 97  F (36.1  C) (Tympanic)   Resp 16   Ht 1.765 m (5' 9.5\")   Wt 99.8 kg (220 lb)   SpO2 95%   BMI 32.02 kg/m    SpO2 Readings from Last 4 Encounters:   09/21/18 96%   08/24/18 94%   06/22/18 95%   05/11/18 97%     Wt Readings from Last 3 Encounters:   04/01/22 99.8 kg (220 lb)   02/23/22 99.1 kg (218 lb 8 oz)   01/26/22 98 kg (216 lb)        LABORATORY AND IMAGING STUDIES     Recent Labs   Lab Test 03/28/22  1050 02/23/22  1419 01/10/22  1120 12/29/21  0923 12/10/21  1018    138 138 141 140   POTASSIUM 4.7 4.3 4.3 4.5 4.7   CHLORIDE 107 108 107 107 106   CO2 31 27 27 30 30   ANIONGAP 2* 3 4 4 4   BUN 18 20 15 14 15   CR 0.90 1.09 0.88 " 0.98 0.95   * 111* 103* 107* 94   TY 8.9 8.5 9.3 9.1 9.2     Recent Labs   Lab Test 01/21/20  1154   MAG 2.0     Recent Labs   Lab Test 03/28/22  1050 02/23/22  1419 01/10/22  1120 12/29/21  0923 10/01/21  1425   WBC 6.1 6.1 6.2 5.5 6.1   HGB 15.3 14.1 15.4 15.2 14.1    171 185 192 201   MCV 93 94 93 92 93   NEUTROPHIL 59 71 61 55 72     Recent Labs   Lab Test 03/28/22  1050 02/23/22  1419 01/10/22  1120 12/29/21  0923   BILITOTAL 0.7 0.8 0.5 0.9   ALKPHOS 108 113 122 118   ALT 80* 88* 80* 165*   AST 43 46* 36 66*   ALBUMIN 3.6 3.4 3.7 3.9   * 246*  --  262*      Recent Labs   Lab Test 03/28/22  1050 02/23/22  1419 12/29/21  0923 12/10/21  1018 10/01/21  1425 08/20/21  1401   PSA 2.00 2.11 0.99 0.78 0.51 1.26   TESTOSTTOTAL <2* <2* <2*  --  <2* <2*        PSA Trend over time             ASSESSMENT AND PLAN   1. Biochemical PSA relapse post prostatectomy without any response to adjuvant radiation therapy  2. Rapid rise in PSA on observation phase of intermittent androgen deprivation therapy  3. HTN  4. ECOG PS1  5. No medical comorbidity    I had a lengthy discussion with Steve who is alone at this visit. All his labs are within normal limits -except for his LDH, PSA and testosterone.     He has been started on abiraterone on 8/7/21. It seems that he is already responding to therapy. His PSA is trending down as expected. His LFT's show mild elevation in his transaminases ALT (AST is close to upper limits of normal) which can be safely monitored. It is only marginally above the baseline.     His PSA is stable at 2 ng/ml.     He has been recommended Zometa due to his bony metastasis and is planning to have it twice a year.     We would continue with abiraterone and prednisone as current. I will follow him personally in 3 months.  I have reviewed his case with our clinical pharmacist-Marty Meese and Rodolfo who have been following him along with me.     25 minutes spent on the date of the encounter  doing chart review, history and exam, documentation and further activities as noted above     Gigi Ward  ,  Division of Hematology, Oncology & Transplantation  [facility].              Again, thank you for allowing me to participate in the care of your patient.        Sincerely,        Gigi Ward MD

## 2022-04-01 NOTE — PROGRESS NOTES
[facility] CANCER CLINIC  FOLLOW-UP VISIT NOTE    PATIENT NAME: Wallace Zelaya MRN # [id number]  DATE OF VISIT: Apr 1, 2022 YOB: 1945    REFERRING PROVIDER: No referring provider defined for this encounter.    CANCER TYPE: Prostate cancer; Biochemical recurrence; Castration resistant disease  STAGE: Stage III - pT3b at diagnosis; M0    HISTORY OF PRESENTING ILLNESS:  Wallace was noted to have rising screening PSA from 2 - 4. He was referred to Dr. Rodolfo Connor. He had radical prostatectomy on 11/2015. Pathology from this revealed [place] 4+4 disease with extraprostatic extension, seminal vesicle extension and he was staged at pT3b. All of the 12 lymph nodes resected were negative for disease. Post operatively his PSA did not drop to undetectable levels and remained elevated at 0.56. It vladimir to 0.9 in April 2016 and he was referred to Dr. Newton for adjuvant radiation therapy. He completed radiation therapy but did not have any PSA response to this treatment.     TREATMENT SUMMARY:  11/13/2015 Radical prostatectomy  April 2016  Radiation therapy  He was started on intermittent androgen deprivation therapy which had to be changed to continuous with rapid rise in his PSA.      April 2020 - he was started on bicalutamide for complete androgen blockade  He had rising PSA in May 2021. His bone scan done on 6/30/21 revealed metastatic lesions of T6 and the sacrum. He was switched to abiraterone with prednisone on 8/7/21    CURRENT INTERVENTIONS:  Abiraterone with prednisone starting 8/7/21    SUBJECTIVE   Wallace is being seen for his prostate cancer    Steve was followed in person and is alone at this visit. He has been on abiraterone. He has been tolerating this well. He has no new complains on therapy.        PAST MEDICAL HISTORY   1. HTN  2. Dyslipidemia  3. Prostate cancer as detailed above      CURRENT OUTPATIENT MEDICATIONS     Current Outpatient Medications   Medication Sig  "    amLODIPine (NORVASC) 10 MG tablet Take 1 tablet (10 mg) by mouth daily     atorvastatin (LIPITOR) 20 MG tablet Take 1 tablet by mouth once daily     ibuprofen (ADVIL/MOTRIN) 800 MG tablet Take 1 tablet (800 mg) by mouth 3 times daily with food     LORazepam (ATIVAN) 0.5 MG tablet Take 1 tablet (0.5 mg) by mouth every 4 hours as needed (Anxiety, Nausea/Vomiting or Sleep)     ondansetron (ZOFRAN) 4 MG tablet Take 1 tablet (4 mg) by mouth every 8 hours as needed for nausea     predniSONE (DELTASONE) 5 MG tablet Take 1 tablet by mouth once daily with breakfast     prochlorperazine (COMPAZINE) 10 MG tablet Take 0.5 tablets (5 mg) by mouth every 6 hours as needed (Nausea/Vomiting)     valsartan (DIOVAN) 160 MG tablet Take 1 tablet (160 mg) by mouth daily     No current facility-administered medications for this visit.        ALLERGIES     No Known Allergies     REVIEW OF SYSTEMS   As above in the HPI, o/w complete 12-point ROS was negative.     PHYSICAL EXAM   BP (!) 141/70 (Cuff Size: Adult Large)   Pulse 60   Temp 97  F (36.1  C) (Tympanic)   Resp 16   Ht 1.765 m (5' 9.5\")   Wt 99.8 kg (220 lb)   SpO2 95%   BMI 32.02 kg/m    SpO2 Readings from Last 4 Encounters:   09/21/18 96%   08/24/18 94%   06/22/18 95%   05/11/18 97%     Wt Readings from Last 3 Encounters:   04/01/22 99.8 kg (220 lb)   02/23/22 99.1 kg (218 lb 8 oz)   01/26/22 98 kg (216 lb)        LABORATORY AND IMAGING STUDIES     Recent Labs   Lab Test 03/28/22  1050 02/23/22  1419 01/10/22  1120 12/29/21  0923 12/10/21  1018    138 138 141 140   POTASSIUM 4.7 4.3 4.3 4.5 4.7   CHLORIDE 107 108 107 107 106   CO2 31 27 27 30 30   ANIONGAP 2* 3 4 4 4   BUN 18 20 15 14 15   CR 0.90 1.09 0.88 0.98 0.95   * 111* 103* 107* 94   TY 8.9 8.5 9.3 9.1 9.2     Recent Labs   Lab Test 01/21/20  1154   MAG 2.0     Recent Labs   Lab Test 03/28/22  1050 02/23/22  1419 01/10/22  1120 12/29/21  0923 10/01/21  1425   WBC 6.1 6.1 6.2 5.5 6.1   HGB 15.3 14.1 " 15.4 15.2 14.1    171 185 192 201   MCV 93 94 93 92 93   NEUTROPHIL 59 71 61 55 72     Recent Labs   Lab Test 03/28/22  1050 02/23/22  1419 01/10/22  1120 12/29/21  0923   BILITOTAL 0.7 0.8 0.5 0.9   ALKPHOS 108 113 122 118   ALT 80* 88* 80* 165*   AST 43 46* 36 66*   ALBUMIN 3.6 3.4 3.7 3.9   * 246*  --  262*      Recent Labs   Lab Test 03/28/22  1050 02/23/22  1419 12/29/21  0923 12/10/21  1018 10/01/21  1425 08/20/21  1401   PSA 2.00 2.11 0.99 0.78 0.51 1.26   TESTOSTTOTAL <2* <2* <2*  --  <2* <2*        PSA Trend over time             ASSESSMENT AND PLAN   1. Biochemical PSA relapse post prostatectomy without any response to adjuvant radiation therapy  2. Rapid rise in PSA on observation phase of intermittent androgen deprivation therapy  3. HTN  4. ECOG PS1  5. No medical comorbidity    I had a lengthy discussion with Steve who is alone at this visit. All his labs are within normal limits -except for his LDH, PSA and testosterone.     He has been started on abiraterone on 8/7/21. It seems that he is already responding to therapy. His PSA is trending down as expected. His LFT's show mild elevation in his transaminases ALT (AST is close to upper limits of normal) which can be safely monitored. It is only marginally above the baseline.     His PSA is stable at 2 ng/ml.     He has been recommended Zometa due to his bony metastasis and is planning to have it twice a year.     We would continue with abiraterone and prednisone as current. I will follow him personally in 3 months.  I have reviewed his case with our clinical pharmacist-Marty Meese and Rodolfo who have been following him along with me.     25 minutes spent on the date of the encounter doing chart review, history and exam, documentation and further activities as noted above     Gigi Ward  ,  Division of Hematology, Oncology & Transplantation  [facility].

## 2022-04-01 NOTE — NURSING NOTE
"Oncology Rooming Note    April 1, 2022 3:34 PM   Wallace Zelaya is a 76 year old male who presents for:    Chief Complaint   Patient presents with     Oncology Clinic Visit     Prostate cancer     Initial Vitals: BP (!) 141/70 (Cuff Size: Adult Large)   Pulse 60   Temp 97  F (36.1  C) (Tympanic)   Resp 16   Ht 1.765 m (5' 9.5\")   Wt 99.8 kg (220 lb)   SpO2 95%   BMI 32.02 kg/m   Estimated body mass index is 32.02 kg/m  as calculated from the following:    Height as of this encounter: 1.765 m (5' 9.5\").    Weight as of this encounter: 99.8 kg (220 lb). Body surface area is 2.21 meters squared.  No Pain (0) Comment: Data Unavailable   No LMP for male patient.  Allergies reviewed: Yes  Medications reviewed: Yes    Medications: Medication refills not needed today.  Pharmacy name entered into [employer]:    [facility] - [place], [place] - [address] [facility] - [place], [place] - [address] [facility]SPECIAL[facility] - [place], [place] - [address] [place], [place] - [facility] [address]    Clinical concerns: f/u       Tess Horton, TATA              "

## 2022-04-11 NOTE — PATIENT INSTRUCTIONS
6/27/22 Labs  7/1/22 Return visit with Dr. Ward + Brayan Banrett, RN, BSN.  RN Care Coordinator     [facility]   [phone]

## 2022-04-14 DIAGNOSIS — C61 PROSTATE CANCER (H): ICD-10-CM

## 2022-04-14 DIAGNOSIS — C79.51 BONE METASTASIS: ICD-10-CM

## 2022-04-14 RX ORDER — PREDNISONE 5 MG/1
TABLET ORAL
Qty: 30 TABLET | Refills: 0 | Status: SHIPPED | OUTPATIENT
Start: 2022-04-14 | End: 2022-05-18

## 2022-04-14 NOTE — TELEPHONE ENCOUNTER
Pending Prescriptions:                       Disp   Refills    predniSONE (DELTASONE) 5 MG tablet [Pharm*30 tab*0            Sig: Take 1 tablet by mouth once daily with breakfast      Last Written Prescription Date: 3/16/22  Last Fill Quantity: 30 ,   # refills: 0  Last Office Visit: 4/1/22  Future Office visit:    Next 5 appointments (look out 90 days)    Jul 01, 2022  3:00 PM  Return Visit with Gigi Ward MD  [facility] ([facility] ) [address]  [address]  [facility] [id number] [phone]           Routing refill request to provider.     Christiano Barnett, RN, BSN.  RN Care Coordinator     [facility]   [phone]

## 2022-04-14 NOTE — CONFIDENTIAL NOTE
Signed Prescriptions:                        Disp   Refills    predniSONE (DELTASONE) 5 MG tablet         30 tab*0        Sig: Take 1 tablet by mouth once daily with breakfast  Authorizing Provider: AYALA KITCHEN, RN, BSN, OCN  Nurse Care Coordinator  [facility]  P: [phone]     F: [phone]

## 2022-04-15 DIAGNOSIS — E78.5 HYPERLIPIDEMIA LDL GOAL <130: ICD-10-CM

## 2022-04-18 RX ORDER — ATORVASTATIN CALCIUM 20 MG/1
TABLET, FILM COATED ORAL
Qty: 90 TABLET | Refills: 0 | OUTPATIENT
Start: 2022-04-18

## 2022-05-10 ENCOUNTER — MYC MEDICAL ADVICE (OUTPATIENT)
Dept: FAMILY MEDICINE | Facility: CLINIC | Age: 77
End: 2022-05-10
Payer: COMMERCIAL

## 2022-05-18 DIAGNOSIS — C61 PROSTATE CANCER (H): ICD-10-CM

## 2022-05-18 DIAGNOSIS — C79.51 BONE METASTASIS: ICD-10-CM

## 2022-05-18 RX ORDER — PREDNISONE 5 MG/1
TABLET ORAL
Qty: 30 TABLET | Refills: 0 | Status: SHIPPED | OUTPATIENT
Start: 2022-05-18 | End: 2022-06-20

## 2022-05-18 NOTE — TELEPHONE ENCOUNTER
Last prescribing provider:     Last clinic visit date: 4/1 Dr. Ward    Any missed appointments or no-shows since last clinic visit?: none    Recommendations for requested medication (if none, N/A): take one tablet by mouth once daily with breakfast    Next clinic visit date: 7/1 Dr. Ward    Any other pertinent information (if none, N/A): N/A

## 2022-06-05 DIAGNOSIS — G89.29 CHRONIC MIDLINE LOW BACK PAIN WITHOUT SCIATICA: ICD-10-CM

## 2022-06-05 DIAGNOSIS — M54.50 CHRONIC MIDLINE LOW BACK PAIN WITHOUT SCIATICA: ICD-10-CM

## 2022-06-07 RX ORDER — IBUPROFEN 800 MG/1
TABLET, FILM COATED ORAL
Qty: 90 TABLET | Refills: 0 | Status: SHIPPED | OUTPATIENT
Start: 2022-06-07 | End: 2023-08-21

## 2022-06-07 NOTE — TELEPHONE ENCOUNTER
"Called and spoke with patient. Last prescribed 2 years ago. Confirmed patient is requesting script. Had a large supply to start, uses for sciatica when it \"acts up\". Patient is a golfer. Uses approx. 1-2x per month.   Routing refill request to provider for review/approval because:  Labs out of range:  BP, ALT  Patient is age 6-64 years    BP Readings from Last 3 Encounters:   04/01/22 (!) 141/70   02/23/22 133/74   01/26/22 (!) 154/80     Lab Results   Component Value Date    ALT 80 03/28/2022    ALT 32 06/30/2021       Jose FLORES RN    "

## 2022-06-18 DIAGNOSIS — C61 PROSTATE CANCER (H): ICD-10-CM

## 2022-06-18 DIAGNOSIS — C79.51 BONE METASTASIS: ICD-10-CM

## 2022-06-19 NOTE — CONFIDENTIAL NOTE
predniSONE (DELTASONE) 5 MG tablet Refill   Last prescribing provider: 5/18/22 DR Ward     Last clinic visit date: 4/1/22 Dr Ward     Any missed appointments or no-shows since last clinic visit?: No     Recommendations for requested medication (if none, N/A): Copied from chart note 4/1/22 Dr Ward   We would continue with abiraterone and prednisone as current. I will follow him personally in 3 months. I have reviewed his case with our clinical pharmacist-Marty Meese and Rodolfo who have been following him along with me.      Next clinic visit date: 7/1/22 Dr Ward     Any other pertinent information (if none, N/A): N/A

## 2022-06-20 ENCOUNTER — PATIENT OUTREACH (OUTPATIENT)
Dept: ONCOLOGY | Facility: CLINIC | Age: 77
End: 2022-06-20

## 2022-06-20 RX ORDER — PREDNISONE 5 MG/1
TABLET ORAL
Qty: 30 TABLET | Refills: 0 | Status: SHIPPED | OUTPATIENT
Start: 2022-06-20 | End: 2022-08-20

## 2022-06-20 NOTE — TELEPHONE ENCOUNTER
Writer left message with patient that pharmacy will be giving him a call regarding shipment of Zytiga from [employer]. If he has any further questions to call clinic back.     Christiano Barnett, RN, BSN.  RN Care Coordinator     [facility]   [phone]

## 2022-06-20 NOTE — PROGRESS NOTES
10:41 am  - Voicemail received from pt reporting he is getting low on his Zytiga and only has 4 days left. He states he gets his med through [facility]. He is requesting a call back at [phone].     Writer routing to Dr. Ward's RNCC, Christiano.     Mary Grace Resendiz, RN, BSN, KYLAH  RN Care Coordinator  [facility]  [phone]

## 2022-06-22 NOTE — PROGRESS NOTES
Writer spoke with patient and confirmed Zytiga 6 month refill has been filled and patient has medication for further treatment. Patient had no questions or concerns at this time.     Patient was appreciate of the call and quick follow up on medication refill.     Christiano Barnett, RN, BSN.  RN Care Coordinator     [facility]   [phone]

## 2022-06-27 ENCOUNTER — LAB (OUTPATIENT)
Dept: ONCOLOGY | Facility: CLINIC | Age: 77
End: 2022-06-27
Attending: INTERNAL MEDICINE
Payer: COMMERCIAL

## 2022-06-27 DIAGNOSIS — C79.51 BONE METASTASIS: ICD-10-CM

## 2022-06-27 DIAGNOSIS — C61 PROSTATE CANCER (H): ICD-10-CM

## 2022-06-27 LAB
ALBUMIN SERPL-MCNC: 3.7 G/DL (ref 3.4–5)
ALP SERPL-CCNC: 94 U/L (ref 40–150)
ALT SERPL W P-5'-P-CCNC: 49 U/L (ref 0–70)
ANION GAP SERPL CALCULATED.3IONS-SCNC: 5 MMOL/L (ref 3–14)
AST SERPL W P-5'-P-CCNC: 4 U/L (ref 0–45)
BASOPHILS # BLD AUTO: 0.1 10E3/UL (ref 0–0.2)
BASOPHILS NFR BLD AUTO: 1 %
BILIRUB SERPL-MCNC: 0.9 MG/DL (ref 0.2–1.3)
BUN SERPL-MCNC: 17 MG/DL (ref 7–30)
CALCIUM SERPL-MCNC: 8.8 MG/DL (ref 8.5–10.1)
CHLORIDE BLD-SCNC: 109 MMOL/L (ref 94–109)
CO2 SERPL-SCNC: 26 MMOL/L (ref 20–32)
CREAT SERPL-MCNC: 0.91 MG/DL (ref 0.66–1.25)
EOSINOPHIL # BLD AUTO: 0.2 10E3/UL (ref 0–0.7)
EOSINOPHIL NFR BLD AUTO: 4 %
ERYTHROCYTE [DISTWIDTH] IN BLOOD BY AUTOMATED COUNT: 13 % (ref 10–15)
GFR SERPL CREATININE-BSD FRML MDRD: 87 ML/MIN/1.73M2
GLUCOSE BLD-MCNC: 107 MG/DL (ref 70–99)
HCT VFR BLD AUTO: 45.6 % (ref 40–53)
HGB BLD-MCNC: 15.4 G/DL (ref 13.3–17.7)
IMM GRANULOCYTES # BLD: 0 10E3/UL
IMM GRANULOCYTES NFR BLD: 0 %
LDH SERPL L TO P-CCNC: 246 U/L (ref 85–227)
LYMPHOCYTES # BLD AUTO: 1 10E3/UL (ref 0.8–5.3)
LYMPHOCYTES NFR BLD AUTO: 18 %
MCH RBC QN AUTO: 31.2 PG (ref 26.5–33)
MCHC RBC AUTO-ENTMCNC: 33.8 G/DL (ref 31.5–36.5)
MCV RBC AUTO: 93 FL (ref 78–100)
MONOCYTES # BLD AUTO: 0.4 10E3/UL (ref 0–1.3)
MONOCYTES NFR BLD AUTO: 6 %
NEUTROPHILS # BLD AUTO: 4 10E3/UL (ref 1.6–8.3)
NEUTROPHILS NFR BLD AUTO: 71 %
NRBC # BLD AUTO: 0 10E3/UL
NRBC BLD AUTO-RTO: 0 /100
PLATELET # BLD AUTO: 158 10E3/UL (ref 150–450)
POTASSIUM BLD-SCNC: 4.1 MMOL/L (ref 3.4–5.3)
PROT SERPL-MCNC: 8.5 G/DL (ref 6.8–8.8)
PSA SERPL-MCNC: 5.17 NG/ML (ref 0–6.5)
RBC # BLD AUTO: 4.93 10E6/UL (ref 4.4–5.9)
SODIUM SERPL-SCNC: 140 MMOL/L (ref 133–144)
WBC # BLD AUTO: 5.7 10E3/UL (ref 4–11)

## 2022-06-27 PROCEDURE — 84153 ASSAY OF PSA TOTAL: CPT | Performed by: INTERNAL MEDICINE

## 2022-06-27 PROCEDURE — 85025 COMPLETE CBC W/AUTO DIFF WBC: CPT | Performed by: INTERNAL MEDICINE

## 2022-06-27 PROCEDURE — 80053 COMPREHEN METABOLIC PANEL: CPT | Performed by: INTERNAL MEDICINE

## 2022-06-27 PROCEDURE — 84403 ASSAY OF TOTAL TESTOSTERONE: CPT | Performed by: INTERNAL MEDICINE

## 2022-06-27 PROCEDURE — 83615 LACTATE (LD) (LDH) ENZYME: CPT | Performed by: INTERNAL MEDICINE

## 2022-06-27 PROCEDURE — 36415 COLL VENOUS BLD VENIPUNCTURE: CPT

## 2022-06-29 LAB — TESTOST SERPL-MCNC: <2 NG/DL (ref 240–950)

## 2022-07-01 ENCOUNTER — ONCOLOGY VISIT (OUTPATIENT)
Dept: ONCOLOGY | Facility: CLINIC | Age: 77
End: 2022-07-01
Attending: INTERNAL MEDICINE
Payer: COMMERCIAL

## 2022-07-01 ENCOUNTER — INFUSION THERAPY VISIT (OUTPATIENT)
Dept: INFUSION THERAPY | Facility: CLINIC | Age: 77
End: 2022-07-01
Attending: INTERNAL MEDICINE
Payer: COMMERCIAL

## 2022-07-01 VITALS
OXYGEN SATURATION: 96 % | WEIGHT: 219.9 LBS | HEART RATE: 69 BPM | BODY MASS INDEX: 31.48 KG/M2 | HEIGHT: 70 IN | RESPIRATION RATE: 18 BRPM | TEMPERATURE: 97.6 F | DIASTOLIC BLOOD PRESSURE: 73 MMHG | SYSTOLIC BLOOD PRESSURE: 141 MMHG

## 2022-07-01 VITALS
SYSTOLIC BLOOD PRESSURE: 153 MMHG | RESPIRATION RATE: 16 BRPM | HEART RATE: 57 BPM | TEMPERATURE: 96.7 F | DIASTOLIC BLOOD PRESSURE: 77 MMHG

## 2022-07-01 DIAGNOSIS — C79.51 BONE METASTASIS: ICD-10-CM

## 2022-07-01 DIAGNOSIS — C61 PROSTATE CANCER (H): Primary | ICD-10-CM

## 2022-07-01 PROCEDURE — 96402 CHEMO HORMON ANTINEOPL SQ/IM: CPT

## 2022-07-01 PROCEDURE — G0463 HOSPITAL OUTPT CLINIC VISIT: HCPCS | Mod: 25

## 2022-07-01 PROCEDURE — 250N000011 HC RX IP 250 OP 636: Performed by: INTERNAL MEDICINE

## 2022-07-01 PROCEDURE — 99215 OFFICE O/P EST HI 40 MIN: CPT | Performed by: INTERNAL MEDICINE

## 2022-07-01 RX ADMIN — LEUPROLIDE ACETATE 45 MG: KIT at 16:05

## 2022-07-01 ASSESSMENT — PAIN SCALES - GENERAL: PAINLEVEL: NO PAIN (0)

## 2022-07-01 NOTE — NURSING NOTE
"Oncology Rooming Note    July 1, 2022 2:55 PM   Wallace Zelaya is a 76 year old male who presents for:    Chief Complaint   Patient presents with     Oncology Clinic Visit     Prostate cancer (HCC)     Initial Vitals: BP (!) 141/73   Pulse 69   Temp 97.6  F (36.4  C) (Tympanic)   Resp 18   Ht 1.765 m (5' 9.5\")   Wt 99.7 kg (219 lb 14.4 oz)   SpO2 96%   BMI 32.01 kg/m   Estimated body mass index is 32.01 kg/m  as calculated from the following:    Height as of this encounter: 1.765 m (5' 9.5\").    Weight as of this encounter: 99.7 kg (219 lb 14.4 oz). Body surface area is 2.21 meters squared.  No Pain (0) Comment: Data Unavailable   No LMP for male patient.  Allergies reviewed: Yes  Medications reviewed: Yes    Medications: Medication refills not needed today.  Pharmacy name entered into [employer]:    [facility] (NOW [facility]) - [place], [place] - [address] [facility] - [place], [place] - [address]  [facility] - [place], [place] - [address]   [facility] - [place], [place] - [facility] [address]    Clinical concerns: f/u       Olya Valenzuela, TATA              "

## 2022-07-01 NOTE — PROGRESS NOTES
Infusion Nursing Note:  Wallace Zelaya presents today for Lupron IM injection.    Patient seen by provider today: Yes: Dr Ward   present during visit today: Not Applicable.    Note: Denies pain or any s/s being ill this visit.    Intravenous Access:  No Intravenous access/labs at this visit.    Treatment Conditions:  Not Applicable.    Post Infusion Assessment:  Patient tolerated injection without incident.     Discharge Plan:   Patient and/or family verbalized understanding of discharge instructions and all questions answered.  AVS to patient via [employer].    Patient discharged in stable condition accompanied by: self.  Departure Mode: Ambulatory.      Faustina Childs RN

## 2022-07-01 NOTE — PROGRESS NOTES
[facility] CANCER CLINIC  FOLLOW-UP VISIT NOTE    PATIENT NAME: Wallace Zelaya MRN # [id number]  DATE OF VISIT: Jul 1, 2022 YOB: 1945    REFERRING PROVIDER: No referring provider defined for this encounter.    CANCER TYPE: Prostate cancer; Biochemical recurrence; Castration resistant disease  STAGE: Stage III - pT3b at diagnosis; M0    HISTORY OF PRESENTING ILLNESS:  Wallace was noted to have rising screening PSA from 2 - 4. He was referred to Dr. Rodolfo Connor. He had radical prostatectomy on 11/2015. Pathology from this revealed [place] 4+4 disease with extraprostatic extension, seminal vesicle extension and he was staged at pT3b. All of the 12 lymph nodes resected were negative for disease. Post operatively his PSA did not drop to undetectable levels and remained elevated at 0.56. It vladimir to 0.9 in April 2016 and he was referred to Dr. Newton for adjuvant radiation therapy. He completed radiation therapy but did not have any PSA response to this treatment.     TREATMENT SUMMARY:  11/13/2015 Radical prostatectomy  April 2016  Radiation therapy  He was started on intermittent androgen deprivation therapy which had to be changed to continuous with rapid rise in his PSA.      April 2020 - he was started on bicalutamide for complete androgen blockade  He had rising PSA in May 2021. His bone scan done on 6/30/21 revealed metastatic lesions of T6 and the sacrum. He was switched to abiraterone with prednisone on 8/7/21    CURRENT INTERVENTIONS:  Abiraterone with prednisone starting 8/7/21    SUBJECTIVE   Wallace is being seen for his prostate cancer    Steve was followed in person and is alone at this visit. He has been on abiraterone. He has been tolerating this well. He has no new complains on therapy.        PAST MEDICAL HISTORY   1. HTN  2. Dyslipidemia  3. Prostate cancer as detailed above      CURRENT OUTPATIENT MEDICATIONS     Current Outpatient Medications   Medication Sig  "    amLODIPine (NORVASC) 10 MG tablet Take 1 tablet (10 mg) by mouth daily     atorvastatin (LIPITOR) 20 MG tablet Take 1 tablet by mouth once daily     ibuprofen (ADVIL/MOTRIN) 800 MG tablet TAKE 1 TABLET BY MOUTH THREE TIMES DAILY WITH FOOD     LORazepam (ATIVAN) 0.5 MG tablet Take 1 tablet (0.5 mg) by mouth every 4 hours as needed (Anxiety, Nausea/Vomiting or Sleep)     ondansetron (ZOFRAN) 4 MG tablet Take 1 tablet (4 mg) by mouth every 8 hours as needed for nausea     predniSONE (DELTASONE) 5 MG tablet Take 1 tablet by mouth once daily with breakfast     prochlorperazine (COMPAZINE) 10 MG tablet Take 0.5 tablets (5 mg) by mouth every 6 hours as needed (Nausea/Vomiting)     valsartan (DIOVAN) 160 MG tablet Take 1 tablet (160 mg) by mouth daily     No current facility-administered medications for this visit.        ALLERGIES     No Known Allergies     REVIEW OF SYSTEMS   As above in the HPI, o/w complete 12-point ROS was negative.     PHYSICAL EXAM   BP (!) 141/73   Pulse 69   Temp 97.6  F (36.4  C) (Tympanic)   Resp 18   Ht 1.765 m (5' 9.5\")   Wt 99.7 kg (219 lb 14.4 oz)   SpO2 96%   BMI 32.01 kg/m    SpO2 Readings from Last 4 Encounters:   09/21/18 96%   08/24/18 94%   06/22/18 95%   05/11/18 97%     Wt Readings from Last 3 Encounters:   07/01/22 99.7 kg (219 lb 14.4 oz)   04/01/22 99.8 kg (220 lb)   02/23/22 99.1 kg (218 lb 8 oz)        LABORATORY AND IMAGING STUDIES     Recent Labs   Lab Test 06/27/22  0827 03/28/22  1050 02/23/22  1419 01/10/22  1120 12/29/21  0923    140 138 138 141   POTASSIUM 4.1 4.7 4.3 4.3 4.5   CHLORIDE 109 107 108 107 107   CO2 26 31 27 27 30   ANIONGAP 5 2* 3 4 4   BUN 17 18 20 15 14   CR 0.91 0.90 1.09 0.88 0.98   * 103* 111* 103* 107*   TY 8.8 8.9 8.5 9.3 9.1     Recent Labs   Lab Test 01/21/20  1154   MAG 2.0     Recent Labs   Lab Test 06/27/22  0827 03/28/22  1050 02/23/22  1419 01/10/22  1120 12/29/21  0923   WBC 5.7 6.1 6.1 6.2 5.5   HGB 15.4 15.3 14.1 15.4 " 15.2    181 171 185 192   MCV 93 93 94 93 92   NEUTROPHIL 71 59 71 61 55     Recent Labs   Lab Test 06/27/22  0827 03/28/22  1050 02/23/22  1419   BILITOTAL 0.9 0.7 0.8   ALKPHOS 94 108 113   ALT 49 80* 88*   AST 4 43 46*   ALBUMIN 3.7 3.6 3.4   * 259* 246*     No results found for: TSH  No results for input(s): CEA in the last 09454 hours.  Results for orders placed or performed during the hospital encounter of 06/30/21   CT Chest/Abdomen/Pelvis w Contrast    Narrative    CT CHEST/ABDOMEN/PELVIS WITH CONTRAST 6/30/2021 11:31 AM    CLINICAL HISTORY: Prostate cancer with rising PSA; Prostate cancer  (H).    TECHNIQUE: CT scan of the chest, abdomen, and pelvis was performed  following injection of IV contrast. Multiplanar reformats were  obtained. Dose reduction techniques were used.     CONTRAST: 85mL Isovue-370    COMPARISON: CT chest 1/20/2017. CT abdomen and pelvis 11/9/2016.    FINDINGS:   LUNGS AND PLEURA: No acute airspace disease or effusion. Stable  posterior left upper lobe 0.3 cm nodule series 12 image 71. Stable 0.2  cm posterior medial right upper lobe nodule image 97. There is a solid  anterior right lower lobe nodule abutting the right major fissure that  is 0.7 cm series 12 image 218. This is present on the prior CT but it  previously measured approximately 0.4 cm. New small posterior right  lower lobe subpleural 0.4 cm nodule image 233. Stable right middle  lobe 0.2 cm nodule image 193.    MEDIASTINUM/AXILLAE: Moderate scattered thoracic aortic  calcifications. No acute mediastinal abnormality. No enlarged lymph  nodes identified. A few stable small thoracic lymph nodes are present.    CORONARY ARTERY CALCIFICATION: Moderate.    HEPATOBILIARY: No new focal hepatic lesion. Unremarkable gallbladder.    PANCREAS: Normal.    SPLEEN: Subcapsular faint hypodensity that appears mildly nodular is  again noted without convincing change compared to an older CT from  4/14/2016. This could be  visualized on series 4 image 127 image 137.    ADRENAL GLANDS: Stable small right adrenal nodule that is 1.2 cm  series 4 image 154. Stable left adrenal.    KIDNEYS/BLADDER: No significant abnormality. No hydronephrosis. No  bladder lesion.    BOWEL: No acute abnormality.    PELVIC ORGANS: Prostate is absent. No new mass at the prostatectomy  bed.    ADDITIONAL FINDINGS: No new enlarged lymph node can be seen. Small  retroperitoneal lymph nodes appear stable.    MUSCULOSKELETAL: Increasing sclerosis at the right T6 level now  measuring 3.5 x 1.7 cm, previously 1.2 x 0.6 cm series 4 image 67.  Stable right lateral sixth rib sclerosis image 89, right pubic  sclerosis image 299. New sclerotic bone lesion at the mid sacrum that  is 3.6 x 3.1 cm on coronal series 6 image 141.      Impression    IMPRESSION:  1.  New sacral bone lesion and larger T6 vertebral body sclerotic bone  lesion consistent with sites of bony metastatic disease progression.  Bone scanning would provide more sensitive assessment. Stable other  areas of sclerosis as above.  2.  A few new or larger pulmonary nodules are indeterminant. Cannot  exclude possibility of metastatic disease. Recommend further workup.  The dominant nodule is at the right lower lobe near the major fissure.  Recommend short interval follow-up CT chest in 3 months.  3.  Other stable findings as above.    HARSH OROZCO MD          SYSTEM ID:  [id number]     Recent Labs   Lab Test 06/27/22  0827 03/28/22  1050 02/23/22  1419 12/29/21  0923 12/10/21  1018 10/01/21  1425   PSA 5.17 2.00 2.11 0.99 0.78 0.51   TESTOSTTOTAL <2* <2* <2* <2*  --  <2*      PSA Trend over time              ASSESSMENT AND PLAN   1. Biochemical PSA relapse post prostatectomy without any response to adjuvant radiation therapy  2. Rapid rise in PSA on observation phase of intermittent androgen deprivation therapy  3. HTN  4. ECOG PS1  5. No medical comorbidity    I had a lengthy discussion with Steve who is alone at  this visit. I have reviewed all of the labs done prior to this clinic visit.  Labs are all completely normal including electrolytes, renal function, hepatic panel, complete blood count and differential except for his LDH, PSA and testosterone.     He has been started on abiraterone on 8/7/21.  He had initial PSA response and he is PSA dropped to 2 ng/ml at last evaluation on 3/28/2022.  It has gone up to 5.17 ng/ml at this visit within 3 months.  He has a doubling time of less than 3 months.  This is more consistent with progressive metastatic castration resistant prostate cancer.    I reviewed next treatment options including enzalutamide, chemotherapy with docetaxel and ECLIPSE clinical trial.   I reviewed lutetium 177 bound to PSMA antibody which is still in clinical trials. Prostate-specific membrane antigen (PSMA) is highly expressed in metastatic castration-resistant prostate cancer. Lutetium-177 (177Lu)-PSMA-617 is a radioligand therapy that delivers beta-particle radiation to PSMA-expressing cells and the surrounding microenvironment. We would soon have a trial with this agent open up in castration resistant metastatic prostate cancer setting after treatment with one novel anti-androgen agent and no previous chemotherapy.  He would be an ideal candidate for this clinical trial.    I reviewed the rationale, mechanism of action of this therapy. In a single arm phase II study in heavily pretreated patients therapy with lutetium showed objective response in 14 of 17 patients with measurable disease - Lancet Oncol. 2018 [place];19(6):825-833. In a phase III study (VISION) evaluating 177Lu-PSMA-617 in patients who had metastatic castration-resistant prostate cancer previously treated with at least one novel anti-androgen receptor agent and one or two taxane regimens, it improved progression free survival (median 8.7 vs 3.4 months; HR 0.4) and overall survival (15.3 vs  11.3 months; HR 0.62).  N Engl J Med 2021;  385:6510-7850.         The trial is anticipated to open for enrollment within the next month.  I would like to see him in a month with labs a few days prior to clinic visit to review enrollment on this clinical trial.    He has been recommended Zometa due to his bony metastasis and is planning to have it twice a year.     We would continue with abiraterone and prednisone as current. I will follow him personally in 3 months.  I have reviewed his case with our clinical pharmacist-Marty Meese and Rodolfo who have been following him along with me.     45 minutes spent on the date of the encounter doing chart review, history and exam, documentation and further activities as noted above     Gigi Ward  ,  Division of Hematology, Oncology & Transplantation  [facility].

## 2022-07-01 NOTE — LETTER
7/1/2022         RE: Wallace Zelaya  [address] [facility] [id number]        Dear Colleague,    Thank you for referring your patient, Wallace Zelaya, to the [facility]. Please see a copy of my visit note below.    [facility]  FOLLOW-UP VISIT NOTE    PATIENT NAME: Wallace Zelaya MRN # [id number]  DATE OF VISIT: Jul 1, 2022 YOB: 1945    REFERRING PROVIDER: No referring provider defined for this encounter.    CANCER TYPE: Prostate cancer; Biochemical recurrence; Castration resistant disease  STAGE: Stage III - pT3b at diagnosis; M0    HISTORY OF PRESENTING ILLNESS:  Wallace was noted to have rising screening PSA from 2 - 4. He was referred to Dr. Rodolfo Connor. He had radical prostatectomy on 11/2015. Pathology from this revealed Yutan 4+4 disease with extraprostatic extension, seminal vesicle extension and he was staged at pT3b. All of the 12 lymph nodes resected were negative for disease. Post operatively his PSA did not drop to undetectable levels and remained elevated at 0.56. It vladimir to 0.9 in April 2016 and he was referred to Dr. Newton for adjuvant radiation therapy. He completed radiation therapy but did not have any PSA response to this treatment.     TREATMENT SUMMARY:  11/13/2015 Radical prostatectomy  April 2016  Radiation therapy  He was started on intermittent androgen deprivation therapy which had to be changed to continuous with rapid rise in his PSA.      April 2020 - he was started on bicalutamide for complete androgen blockade  He had rising PSA in May 2021. His bone scan done on 6/30/21 revealed metastatic lesions of T6 and the sacrum. He was switched to abiraterone with prednisone on 8/7/21    CURRENT INTERVENTIONS:  Abiraterone with prednisone starting 8/7/21    SUBJECTIVE   Wallace is being seen for his prostate cancer    Steve was followed in person and is alone at this visit. He has been on  "abiraterone. He has been tolerating this well. He has no new complains on therapy.        PAST MEDICAL HISTORY   1. HTN  2. Dyslipidemia  3. Prostate cancer as detailed above      CURRENT OUTPATIENT MEDICATIONS     Current Outpatient Medications   Medication Sig     amLODIPine (NORVASC) 10 MG tablet Take 1 tablet (10 mg) by mouth daily     atorvastatin (LIPITOR) 20 MG tablet Take 1 tablet by mouth once daily     ibuprofen (ADVIL/MOTRIN) 800 MG tablet TAKE 1 TABLET BY MOUTH THREE TIMES DAILY WITH FOOD     LORazepam (ATIVAN) 0.5 MG tablet Take 1 tablet (0.5 mg) by mouth every 4 hours as needed (Anxiety, Nausea/Vomiting or Sleep)     ondansetron (ZOFRAN) 4 MG tablet Take 1 tablet (4 mg) by mouth every 8 hours as needed for nausea     predniSONE (DELTASONE) 5 MG tablet Take 1 tablet by mouth once daily with breakfast     prochlorperazine (COMPAZINE) 10 MG tablet Take 0.5 tablets (5 mg) by mouth every 6 hours as needed (Nausea/Vomiting)     valsartan (DIOVAN) 160 MG tablet Take 1 tablet (160 mg) by mouth daily     No current facility-administered medications for this visit.        ALLERGIES     No Known Allergies     REVIEW OF SYSTEMS   As above in the HPI, o/w complete 12-point ROS was negative.     PHYSICAL EXAM   BP (!) 141/73   Pulse 69   Temp 97.6  F (36.4  C) (Tympanic)   Resp 18   Ht 1.765 m (5' 9.5\")   Wt 99.7 kg (219 lb 14.4 oz)   SpO2 96%   BMI 32.01 kg/m    SpO2 Readings from Last 4 Encounters:   09/21/18 96%   08/24/18 94%   06/22/18 95%   05/11/18 97%     Wt Readings from Last 3 Encounters:   07/01/22 99.7 kg (219 lb 14.4 oz)   04/01/22 99.8 kg (220 lb)   02/23/22 99.1 kg (218 lb 8 oz)        LABORATORY AND IMAGING STUDIES     Recent Labs   Lab Test 06/27/22  0827 03/28/22  1050 02/23/22  1419 01/10/22  1120 12/29/21  0923    140 138 138 141   POTASSIUM 4.1 4.7 4.3 4.3 4.5   CHLORIDE 109 107 108 107 107   CO2 26 31 27 27 30   ANIONGAP 5 2* 3 4 4   BUN 17 18 20 15 14   CR 0.91 0.90 1.09 0.88 0.98 "   * 103* 111* 103* 107*   YT 8.8 8.9 8.5 9.3 9.1     Recent Labs   Lab Test 01/21/20  1154   MAG 2.0     Recent Labs   Lab Test 06/27/22  0827 03/28/22  1050 02/23/22  1419 01/10/22  1120 12/29/21  0923   WBC 5.7 6.1 6.1 6.2 5.5   HGB 15.4 15.3 14.1 15.4 15.2    181 171 185 192   MCV 93 93 94 93 92   NEUTROPHIL 71 59 71 61 55     Recent Labs   Lab Test 06/27/22  0827 03/28/22  1050 02/23/22  1419   BILITOTAL 0.9 0.7 0.8   ALKPHOS 94 108 113   ALT 49 80* 88*   AST 4 43 46*   ALBUMIN 3.7 3.6 3.4   * 259* 246*     No results found for: TSH  No results for input(s): CEA in the last 51991 hours.  Results for orders placed or performed during the hospital encounter of 06/30/21   CT Chest/Abdomen/Pelvis w Contrast    Narrative    CT CHEST/ABDOMEN/PELVIS WITH CONTRAST 6/30/2021 11:31 AM    CLINICAL HISTORY: Prostate cancer with rising PSA; Prostate cancer  (H).    TECHNIQUE: CT scan of the chest, abdomen, and pelvis was performed  following injection of IV contrast. Multiplanar reformats were  obtained. Dose reduction techniques were used.     CONTRAST: 85mL Isovue-370    COMPARISON: CT chest 1/20/2017. CT abdomen and pelvis 11/9/2016.    FINDINGS:   LUNGS AND PLEURA: No acute airspace disease or effusion. Stable  posterior left upper lobe 0.3 cm nodule series 12 image 71. Stable 0.2  cm posterior medial right upper lobe nodule image 97. There is a solid  anterior right lower lobe nodule abutting the right major fissure that  is 0.7 cm series 12 image 218. This is present on the prior CT but it  previously measured approximately 0.4 cm. New small posterior right  lower lobe subpleural 0.4 cm nodule image 233. Stable right middle  lobe 0.2 cm nodule image 193.    MEDIASTINUM/AXILLAE: Moderate scattered thoracic aortic  calcifications. No acute mediastinal abnormality. No enlarged lymph  nodes identified. A few stable small thoracic lymph nodes are present.    CORONARY ARTERY CALCIFICATION:  Moderate.    HEPATOBILIARY: No new focal hepatic lesion. Unremarkable gallbladder.    PANCREAS: Normal.    SPLEEN: Subcapsular faint hypodensity that appears mildly nodular is  again noted without convincing change compared to an older CT from  4/14/2016. This could be visualized on series 4 image 127 image 137.    ADRENAL GLANDS: Stable small right adrenal nodule that is 1.2 cm  series 4 image 154. Stable left adrenal.    KIDNEYS/BLADDER: No significant abnormality. No hydronephrosis. No  bladder lesion.    BOWEL: No acute abnormality.    PELVIC ORGANS: Prostate is absent. No new mass at the prostatectomy  bed.    ADDITIONAL FINDINGS: No new enlarged lymph node can be seen. Small  retroperitoneal lymph nodes appear stable.    MUSCULOSKELETAL: Increasing sclerosis at the right T6 level now  measuring 3.5 x 1.7 cm, previously 1.2 x 0.6 cm series 4 image 67.  Stable right lateral sixth rib sclerosis image 89, right pubic  sclerosis image 299. New sclerotic bone lesion at the mid sacrum that  is 3.6 x 3.1 cm on coronal series 6 image 141.      Impression    IMPRESSION:  1.  New sacral bone lesion and larger T6 vertebral body sclerotic bone  lesion consistent with sites of bony metastatic disease progression.  Bone scanning would provide more sensitive assessment. Stable other  areas of sclerosis as above.  2.  A few new or larger pulmonary nodules are indeterminant. Cannot  exclude possibility of metastatic disease. Recommend further workup.  The dominant nodule is at the right lower lobe near the major fissure.  Recommend short interval follow-up CT chest in 3 months.  3.  Other stable findings as above.    HARSH OROZCO MD          SYSTEM ID:  [id number]     Recent Labs   Lab Test 06/27/22  0827 03/28/22  1050 02/23/22  1419 12/29/21  0923 12/10/21  1018 10/01/21  1425   PSA 5.17 2.00 2.11 0.99 0.78 0.51   TESTOSTTOTAL <2* <2* <2* <2*  --  <2*      PSA Trend over time              ASSESSMENT AND PLAN   1. Biochemical PSA  relapse post prostatectomy without any response to adjuvant radiation therapy  2. Rapid rise in PSA on observation phase of intermittent androgen deprivation therapy  3. HTN  4. ECOG PS1  5. No medical comorbidity    I had a lengthy discussion with Steve who is alone at this visit. I have reviewed all of the labs done prior to this clinic visit.  Labs are all completely normal including electrolytes, renal function, hepatic panel, complete blood count and differential except for his LDH, PSA and testosterone.     He has been started on abiraterone on 8/7/21.  He had initial PSA response and he is PSA dropped to 2 ng/ml at last evaluation on 3/28/2022.  It has gone up to 5.17 ng/ml at this visit within 3 months.  He has a doubling time of less than 3 months.  This is more consistent with progressive metastatic castration resistant prostate cancer.    I reviewed next treatment options including enzalutamide, chemotherapy with docetaxel and ECLIPSE clinical trial.   I reviewed lutetium 177 bound to PSMA antibody which is still in clinical trials. Prostate-specific membrane antigen (PSMA) is highly expressed in metastatic castration-resistant prostate cancer. Lutetium-177 (177Lu)-PSMA-617 is a radioligand therapy that delivers beta-particle radiation to PSMA-expressing cells and the surrounding microenvironment. We would soon have a trial with this agent open up in castration resistant metastatic prostate cancer setting after treatment with one novel anti-androgen agent and no previous chemotherapy.  He would be an ideal candidate for this clinical trial.    I reviewed the rationale, mechanism of action of this therapy. In a single arm phase II study in heavily pretreated patients therapy with lutetium showed objective response in 14 of 17 patients with measurable disease - Lancet Oncol. 2018 [place]ash;19(6):825-833. In a phase III study (VISION) evaluating 177Lu-PSMA-617 in patients who had metastatic castration-resistant  prostate cancer previously treated with at least one novel anti-androgen receptor agent and one or two taxane regimens, it improved progression free survival (median 8.7 vs 3.4 months; HR 0.4) and overall survival (15.3 vs  11.3 months; HR 0.62).  N Engl J Med 2021; 385:6400-0462.         The trial is anticipated to open for enrollment within the next month.  I would like to see him in a month with labs a few days prior to clinic visit to review enrollment on this clinical trial.    He has been recommended Zometa due to his bony metastasis and is planning to have it twice a year.     We would continue with abiraterone and prednisone as current. I will follow him personally in 3 months.  I have reviewed his case with our clinical pharmacist-Marty Meese and Rodolfo who have been following him along with me.     45 minutes spent on the date of the encounter doing chart review, history and exam, documentation and further activities as noted above     Gigi Ward  ,  Division of Hematology, Oncology & Transplantation  [facility].              Again, thank you for allowing me to participate in the care of your patient.        Sincerely,        Gigi Ward MD

## 2022-07-05 ENCOUNTER — PATIENT OUTREACH (OUTPATIENT)
Dept: ONCOLOGY | Facility: CLINIC | Age: 77
End: 2022-07-05

## 2022-07-05 NOTE — PROGRESS NOTES
Patient had questions on wether he should begin 1000mg of his Zytiga as suggested by Dr. Ward last Friday 7/1/22. Writer will message Dr. Ward on plan and follow up with pharmacy and the patient if changes were made to current Zytiga plan.     Christiano Barnett, RN, BSN.  RN Care Coordinator     [facility]   [phone]

## 2022-07-11 DIAGNOSIS — C79.51 BONE METASTASIS: ICD-10-CM

## 2022-07-11 DIAGNOSIS — C61 PROSTATE CANCER (H): Primary | ICD-10-CM

## 2022-07-11 RX ORDER — PREDNISONE 5 MG/1
5 TABLET ORAL
Qty: 30 TABLET | Refills: 0 | Status: SHIPPED | OUTPATIENT
Start: 2022-07-11 | End: 2022-08-10

## 2022-07-11 RX ORDER — ABIRATERONE ACETATE 250 MG/1
1000 TABLET ORAL
Qty: 120 TABLET | Refills: 0 | OUTPATIENT
Start: 2022-07-11 | End: 2022-07-11

## 2022-07-11 RX ORDER — ABIRATERONE ACETATE 250 MG/1
1000 TABLET ORAL
Qty: 120 TABLET | Refills: 11 | Status: SHIPPED | OUTPATIENT
Start: 2022-07-11 | End: 2022-10-19

## 2022-07-11 NOTE — PROGRESS NOTES
Writer received call from Steve looking for any updates available from Dr. Ward. Will route to Dr. Ward's RNCC, Christiano, for follow-up.    Glenna Duffy, RN, BSN, OCN  Nurse Care Coordinator  [facility]  P: [phone]     F: [phone]

## 2022-07-14 NOTE — PROGRESS NOTES
Writer updated patient he should be taking 1000 mg of his Zytiga with low fat breakfast. Patient had no further questions or concerns. Pharmacy updated of request patient may need refill sooner and more tablets with dose change.     Christiano Barnett, RN, BSN.  RN Care Coordinator     [facility]   [phone]

## 2022-08-01 ENCOUNTER — OFFICE VISIT (OUTPATIENT)
Dept: FAMILY MEDICINE | Facility: CLINIC | Age: 77
End: 2022-08-01
Payer: COMMERCIAL

## 2022-08-01 VITALS
OXYGEN SATURATION: 95 % | BODY MASS INDEX: 31.88 KG/M2 | SYSTOLIC BLOOD PRESSURE: 136 MMHG | RESPIRATION RATE: 16 BRPM | HEART RATE: 70 BPM | DIASTOLIC BLOOD PRESSURE: 76 MMHG | WEIGHT: 219 LBS

## 2022-08-01 DIAGNOSIS — Z76.89 ENCOUNTER TO ESTABLISH CARE: Primary | ICD-10-CM

## 2022-08-01 PROCEDURE — 99213 OFFICE O/P EST LOW 20 MIN: CPT | Performed by: FAMILY MEDICINE

## 2022-08-01 ASSESSMENT — ENCOUNTER SYMPTOMS
SHORTNESS OF BREATH: 0
DIFFICULTY URINATING: 0

## 2022-08-01 NOTE — PROGRESS NOTES
Assessment & Plan     Encounter to establish care  Updated PCP, 30 minutes spent total face-to-face and with review of the patient's chart.        Return in about 1 year (around 8/1/2023) for Annual Preventative Visit..    Cedric Ball MD  [facility] A Wilson is a 77 year old, presenting for the following health issues:  Establish Care (New patient establishing care) and Hypertension (Follow-up meds, BP check)      History of Present Illness       Reason for visit:  Meet Dr Ball, get shingles vaccination    He eats 2-3 servings of fruits and vegetables daily.He consumes 2 sweetened beverage(s) daily.He exercises with enough effort to increase his heart rate 30 to 60 minutes per day.  He exercises with enough effort to increase his heart rate 6 days per week.   He is taking medications regularly.     Patient is a pleasant 77-year-old male with history of prostate cancer who presents to establish care.    Review of Systems   Respiratory: Negative for shortness of breath.    Cardiovascular: Negative for chest pain.   Genitourinary: Positive for impotence. Negative for difficulty urinating.            Objective    /76   Pulse 70   Resp 16   Wt 99.3 kg (219 lb)   SpO2 95%   BMI 31.88 kg/m    Body mass index is 31.88 kg/m .  Physical Exam  Vitals reviewed.   Constitutional:       Appearance: He is not ill-appearing.   Cardiovascular:      Rate and Rhythm: Normal rate and regular rhythm.   Pulmonary:      Effort: Pulmonary effort is normal.      Breath sounds: Normal breath sounds.                            .  ..

## 2022-08-05 NOTE — PATIENT INSTRUCTIONS
Postpartum Note    Obey Aragon is a 29 year old  on post partum day 1.   has no complaints          Physical Exam:    Looks well.  Patient Vitals for the past 24 hrs:   BP Temp Temp src Pulse Resp SpO2   22 0433 122/71 98.1 °F (36.7 °C) Oral 61 18 97 %   22 0100 111/67 98.2 °F (36.8 °C) Oral 64 18 97 %   22 2047 117/70 98.2 °F (36.8 °C) Oral 71 18 97 %   22 1945 104/62 -- -- 78 -- --   22 1930 107/58 -- -- 76 -- --   22 1915 106/55 -- -- 83 -- --   22 1900 101/66 -- -- -- -- --   22 1845 112/58 99.5 °F (37.5 °C) Temporal 98 17 100 %   22 1830 (!) 128/36 -- -- 89 -- 100 %   22 1815 -- -- -- (!) 104 -- 100 %   22 1800 114/62 -- -- 90 -- 100 %   22 1745 -- -- -- 86 -- 100 %   22 1730 104/50 -- -- 91 -- 99 %   22 1715 -- -- -- 90 -- 99 %   22 1700 109/57 -- -- 89 -- 100 %   22 1630 107/45 98.6 °F (37 °C) Oral 84 -- 97 %   22 1615 -- -- -- 91 -- 98 %   22 1600 104/47 -- -- 80 -- 98 %   22 1545 113/47 -- -- 86 -- 97 %   22 1530 (!) 88/40 -- -- 83 -- 98 %   22 1515 -- -- -- -- -- 98 %   22 1500 97/58 98.4 °F (36.9 °C) Oral 80 20 97 %   22 1200 96/53 -- -- 76 -- 100 %   22 1100 107/70 98.6 °F (37 °C) Oral 79 -- 100 %     Uterus:  Fundus firm at firm and at umbilicus  Extremities:  nontender    Labs:  Lab Results   Component Value Date    HGB 11.9 (L) 2022    HCT 35.9 (L) 2022    WBC 9.2 2022    CREATININE 0.49 (L) 2022    AST 22 2022    GPT 14 2022       Recent Labs   Lab 22  0437 22  0226 22  1122 22  1120   WBC 9.2 7.4   < >  --    HCT 35.9* 31.7*   < >  --    HGB 11.9* 10.4*   < >  --    * 124*   < >  --    CREATININE  --   --   --  0.49*   POTASSIUM  --   --   --  3.9   GLUCOSE  --   --   --  80   BUN  --   --   --  7   SODIUM  --   --   --  135   CHLORIDE  --   --   --  104   AST  --   --   --  22  Thank you for choosing [facility]!    DR. DOMINGUEZ'S CLINIC LOCATIONS:   MONDAY - [place] TUESDAY - [place]   [address] [facility]  [address]   [place] [address] [place] [address]   [phone]       THURSDAY AM - [place] THURSDAY PM - [place]   [address]   [place] [address] [place] [address]   [phone]       FRIDAY AM - [place] SET UP SURGERY: [phone] [address] APPOINTMENTS: [phone]   [place] [address] BILLING QUESTIONS: [phone] FAX NUMBER: [phone]     Follow Up: as needed    PLANTAR FASCIITIS    Plantar fasciitis is often referred to as heel spurs or heel pain. Plantar fasciitis is a very common problem that affects people of all foot shapes, age, weight and activity level. Pain may be in the arch or on the weight-bearing surface of the heel. The pain may come on without injury or identifiable cause. Pain is generally present when first getting out of bed in the morning or up from a seated break.     CAUSES  The plantar fascia is a dense fibrous band of tissue that stretches across the bottom surface of the foot. The fascia helps support the foot muscles and arch. Plantar fasciitis is thought to be caused by mechanical strain or overload. Frequent walking without shoes or wearing unsupportive shoes is thought to cause structural overload and ultimately inflammation of the plantar fascia. Some people have heel spurs that can be seen on x-ray. The heel spur is actually a minor component of plantar fascitis and is largely ignored.       SELF TREATMENT   The easiest solution is to stop walking around your home without shoes. Plantar fasciitis is largely a shoe problem. Shoes are either not being worn often enough or your current shoes are inadequate for your weight, foot structure or activity level. The majority of shoes on the market today are    BILIRUBIN  --   --   --  0.9   CO2  --   --   --  23   ALBUMIN  --   --   --  2.8*    < > = values in this interval not displayed.       Assessment/Plan:  Routine Care:   Anticipate discharge tomorrow .    Natasha Ward MD  Date:  8/5/2022             not sufficient to resist development of plantar fasciitis or to promote healing. Assume that your current shoes are inadequate and will need to be replaced. Even high quality shoes wear out with 6 months to one year of frequent use. Weight loss is another option. Losing ten pounds in the next two months may be enough to resolve the problem. Ice applied to the area of pain two to three times per day for ten minutes each session can be very helpful. This should continue until the problem resolves. Achilles tendon stretching is essential. Stretch multiple times daily to promote healing and to prevent recurrence in the future.     MEDICAL TREATMENT  Medical treatments often include custom arch supports, cortisone injections, physical therapy, splints to be worn in bed, prescription medications and surgery. The home treatments listed above will be necessary regardless of these advanced medical treatments. Surgery is rarely needed but is very helpful in selected cases.     PROGNOSIS  Plantar fasciitis can last from one day to a lifetime. Some people get intermittent fascitis that is very short-lived. Others suffer daily for years. Excessive body weight, frequent bare foot walking, long hours on the feet, inadequate shoes, predisposing foot structures and excessive activity such as running are all potential issues that lead to chronic and/or recurring plantar fascitis. Having plantar fasciitis means that you are forever prone to this problem and will require modification of some of the above factors. Most people seek treatment within one to four months. Healing usually requires a similar one to four month time frame. Healing time is relative to the amount of effort spent treating the problem.   Plantar fasciitis is highly recurrent. Risk factors often continue, including return to bare foot walking, inadequate shoes, excessive body weight, excessive activities, etc. Your life style and foot structure may predispose you to  recurrent plantar fasciitis. A daily prevention regimen can be very helpful. Ongoing use of shoe inserts, careful attention to appropriate shoes, daily Achilles stretching, etc. may prevent recurrence. Prompt attention at the earliest warning signs of heel pain can resolve the problem in as short as a few days.     EXERCISES    Stair Exercise: Step on the stairs with the ball of your foot and hold your position for at least 15 seconds, then slowly step down with the heels of your foot. You can do this daily and as often as you want.   Picking the Towel: Sit comfortably and then pick the towel up with your toes. You can use any object other than a towel as long as the material can be soft and you can pick it up with your toes.  Rolling the Bottle: Use a small ball or frozen water bottle and then roll it around with your foot.   Flex the Toes: Sit comfortably and then flex your toes by pointing it towards the floor or towards your body. This will relax and flex your foot and exercise your plantar fascia, the calf, and the Achilles tendon. The inability of the foot to stretch often causes the bunching up of the plantar fascia area leading to the pain.  Calf/Achilles Stretching: Lay on you back and raise one foot, then point your toes towards the floor. See photo below:               Hold each stretch for 10 seconds. Stretch 10 times per set, three sets per day. Morning, afternoon and evening. If your heel pain is very severe in the morning, consider doing the first set of stretches before you get out of bed.    THERAPIES DISCUSSED:  1.  Supportive Shoes: minimizing barefoot ambulation helps to provide cushion, padding and support to the ligament that is inflamed. Socks, flip flops, flats and some slippers are not typically sufficient to provide support. Shoes should be worn even indoors  2.  Insert/Orthotics: ones with an arch support built in to them provide further stress relief for the ligament. See the information  below on recommended inserts.  3. Icing: using a frozen water bottle or orange, and rolling it along the bottom of the arch/heel can help to alleviate discomfort, and can act as a tissue massage to the painful, inflamed ligament.  There is evidence that shows icing at least three times daily can be beneficial  4.  Antiinflammatory (NSAID): Ibuprofen, Aleve, as well as Tylenol can be used to help decrease symptoms and improve pain levels. If you have high blood pressure, heart disease, stomach or kidney problems, use antiinflammatories sparingly. Tylenol should not be used if you have liver problems.   5. Activity Modifications: if there are certain things that you do, whether it's going barefoot or certain shoes/activities, you should try to minimize those activities as much as possible until your symptoms are sufficiently resolved. Certainly, some activities, such as running on the treadmill, are easier to take a break from versus others, such as work or chores at home. If there are certain activities that hurt your heel, and you keep doing those activities that hurt your heel, your heel will keep hurting.  **If these initial therapies are insufficient, we have our tier 2 therapies that can more aggressively work to improve your symptoms and get you back to the activities that you enjoy!    OVER THE COUNTER INSERTS    SuperFeet   Sofsole Fit Spenco   Power Step   Walk-Fit Arch Cradles     Most of these can be found at your local [employer], sporting [employer], or online.  **A good high quality over the counter insert should cost around $40-$50      Robert Applebaum MD LOCATIONS    [address]  [phone]   [address]  [phone] [place]  [address] [place] [address]  [phone]   [place]  [address] [place] [address] [address].  [phone]   [place]  [address]  [phone] [place] [address]  [phone]   [place]  [address] [place] [address]  [phone]    [place]  [address] [facility]  [phone] [place]  [address] [facility] [phone]           BODY WEIGHT AND YOUR FEET  The following information is included in the after visit summary for all patients. Body weight can be a sensitive issue to discuss in clinic, but we think the following information is very important. Although we focus on the feet and ankles, we do support the overall health of our patients.     Many things can cause foot and ankle problems. Foot structure, activity level, foot mechanics and injuries are common causes of pain. One very important issue that often goes unmentioned, is body weight. Extra weight can cause increased stress on muscles, ligaments, bones and tendons. Sometimes just a few extra pounds is all it takes to put one over her/his threshold. Without reducing that stress, it can be difficult to alleviate pain. As Foot & Ankle specialists, our job is addressing the lower extremity problem and possible causes. Regarding extra body weight, we encourage patients to discuss diet and weight management plans with their primary care doctors. It is this team approach that gives you the best opportunity for pain relief and getting you back on your feet.      [place] has a Comprehensive Weight Management Program. This program includes counseling, education, non-surgical and surgical approaches to weight loss. If you are interested in learning more either talk to you primary care provider or call [phone].

## 2022-08-11 ENCOUNTER — TELEPHONE (OUTPATIENT)
Dept: INFUSION THERAPY | Facility: CLINIC | Age: 77
End: 2022-08-11

## 2022-08-11 NOTE — TELEPHONE ENCOUNTER
Pharmacy note:  Lab appt    We have made several attempts to schedule his monthly lab appt but it goes right to voicemail.  I left a message for him to call us back to get it schedule.    Marty Meese, Pharm.D., [facility]

## 2022-08-15 ENCOUNTER — LAB (OUTPATIENT)
Dept: ONCOLOGY | Facility: CLINIC | Age: 77
End: 2022-08-15
Attending: INTERNAL MEDICINE
Payer: COMMERCIAL

## 2022-08-15 DIAGNOSIS — C61 PROSTATE CANCER (H): ICD-10-CM

## 2022-08-15 DIAGNOSIS — C79.51 BONE METASTASIS: ICD-10-CM

## 2022-08-15 LAB
ALBUMIN SERPL BCG-MCNC: 4.2 G/DL (ref 3.5–5.2)
ALP SERPL-CCNC: 101 U/L (ref 40–129)
ALT SERPL W P-5'-P-CCNC: 34 U/L (ref 10–50)
ANION GAP SERPL CALCULATED.3IONS-SCNC: 8 MMOL/L (ref 7–15)
AST SERPL W P-5'-P-CCNC: 31 U/L (ref 10–50)
BASOPHILS # BLD AUTO: 0.1 10E3/UL (ref 0–0.2)
BASOPHILS NFR BLD AUTO: 1 %
BILIRUB SERPL-MCNC: 0.7 MG/DL
BUN SERPL-MCNC: 12.7 MG/DL (ref 8–23)
CALCIUM SERPL-MCNC: 9.1 MG/DL (ref 8.8–10.2)
CHLORIDE SERPL-SCNC: 104 MMOL/L (ref 98–107)
CREAT SERPL-MCNC: 0.93 MG/DL (ref 0.67–1.17)
DEPRECATED HCO3 PLAS-SCNC: 28 MMOL/L (ref 22–29)
EOSINOPHIL # BLD AUTO: 0.3 10E3/UL (ref 0–0.7)
EOSINOPHIL NFR BLD AUTO: 4 %
ERYTHROCYTE [DISTWIDTH] IN BLOOD BY AUTOMATED COUNT: 13.1 % (ref 10–15)
GFR SERPL CREATININE-BSD FRML MDRD: 84 ML/MIN/1.73M2
GLUCOSE SERPL-MCNC: 97 MG/DL (ref 70–99)
HCT VFR BLD AUTO: 45.3 % (ref 40–53)
HGB BLD-MCNC: 15.1 G/DL (ref 13.3–17.7)
IMM GRANULOCYTES # BLD: 0 10E3/UL
IMM GRANULOCYTES NFR BLD: 0 %
LYMPHOCYTES # BLD AUTO: 2.1 10E3/UL (ref 0.8–5.3)
LYMPHOCYTES NFR BLD AUTO: 27 %
MCH RBC QN AUTO: 31.3 PG (ref 26.5–33)
MCHC RBC AUTO-ENTMCNC: 33.3 G/DL (ref 31.5–36.5)
MCV RBC AUTO: 94 FL (ref 78–100)
MONOCYTES # BLD AUTO: 0.6 10E3/UL (ref 0–1.3)
MONOCYTES NFR BLD AUTO: 8 %
NEUTROPHILS # BLD AUTO: 4.7 10E3/UL (ref 1.6–8.3)
NEUTROPHILS NFR BLD AUTO: 60 %
NRBC # BLD AUTO: 0 10E3/UL
NRBC BLD AUTO-RTO: 0 /100
PLATELET # BLD AUTO: 200 10E3/UL (ref 150–450)
POTASSIUM SERPL-SCNC: 4.1 MMOL/L (ref 3.4–5.3)
PROT SERPL-MCNC: 7.1 G/DL (ref 6.4–8.3)
PSA SERPL-MCNC: 7.81 NG/ML (ref 0–6.5)
RBC # BLD AUTO: 4.82 10E6/UL (ref 4.4–5.9)
SODIUM SERPL-SCNC: 140 MMOL/L (ref 136–145)
WBC # BLD AUTO: 7.8 10E3/UL (ref 4–11)

## 2022-08-15 PROCEDURE — 85004 AUTOMATED DIFF WBC COUNT: CPT | Performed by: INTERNAL MEDICINE

## 2022-08-15 PROCEDURE — 84153 ASSAY OF PSA TOTAL: CPT | Performed by: INTERNAL MEDICINE

## 2022-08-15 PROCEDURE — 80053 COMPREHEN METABOLIC PANEL: CPT | Performed by: INTERNAL MEDICINE

## 2022-08-15 PROCEDURE — 36415 COLL VENOUS BLD VENIPUNCTURE: CPT

## 2022-08-15 NOTE — PROGRESS NOTES
Medical Assistant Note:  Wallace Zelaya presents today for lab work.    Patient seen by provider today: No.   present during visit today: Not Applicable.    Concerns: No Concerns.    Procedure:  Lab draw site: lt antecub, Needle type: butterfly, Gauge: 23.    Post Assessment:  Labs drawn without difficulty: Yes.    Discharge Plan:  Departure Mode: Ambulatory.    Face to Face Time: 10 mins.    Tess Horton CMA

## 2022-08-16 NOTE — ORAL ONC MGMT
Oral Chemotherapy Monitoring Program  Lab Follow Up    Reviewed lab results from 8/16/22.    ORAL CHEMOTHERAPY 9/17/2021 10/1/2021 10/21/2021 12/23/2021 1/17/2022 2/23/2022 8/16/2022   Assessment Type Lab Monitoring Lab Monitoring Left Voicemail Refill Other Lab Monitoring;Monthly Follow up Lab Monitoring   Diagnosis Code Prostate Cancer Prostate Cancer Prostate Cancer Prostate Cancer Prostate Cancer Prostate Cancer Prostate Cancer   Providers Dr. Ed Ward   [facility] [facility]   Drug Name Xtandi (enzalutamide) Xtandi (enzalutamide) Xtandi (enzalutamide) Xtandi (enzalutamide) Zytiga (abiraterone) Zytiga (abiraterone) Zytiga (abiraterone)   Dose 160 mg 160 mg 160 mg 160 mg 500 mg 500 mg 1,000 mg   Current Schedule Daily Daily Daily Daily Daily Daily Daily   Cycle Details Continuous Continuous Continuous Continuous Continuous Continuous Continuous   Start Date of Last Cycle 9/6/2021 - - - - - -   Planned next cycle start date - - - 12/28/2021 1/17/2022 3/3/2022 -   Doses missed in last 2 weeks - - - - - 0 -   Adherence Assessment - - - - - Adherent -   Adverse Effects - Increased AST/ALT/T BILI - - - Increased AST/ALT/T BILI -   Increased AST/ALT/T BILI - Grade 1 - - - Grade 1 -   Pharmacist Intervention(increased ast/alt/t bili) - Yes - - - No -   Intervention(s) - Increased lab monitoring - - - - -   Any new drug interactions? - - - - - No -   Is the dose as ordered appropriate for the patient? - - - - - Yes -   Is the patient currently in pain? - - - - - No -   Has the patient been assessed within the past 6 months for depression? - - - - - Yes -   Has the patient missed any days of school, work, or other routine activity? - - - - - No -   Since the last time we talked, have you been hospitalized or used the emergency room? - - - - - No -       Labs:  _  Result Component Current Result Ref Range    Sodium 140 (8/15/2022) 136 - 145 mmol/L     _  Result Component Current Result Ref Range   Potassium 4.1 (8/15/2022) 3.4 - 5.3 mmol/L     _  Result Component Current Result Ref Range   Calcium 9.1 (8/15/2022) 8.8 - 10.2 mg/dL     No results found for Mag within last 30 days.     No results found for Phos within last 30 days.     _  Result Component Current Result Ref Range   Albumin 4.2 (8/15/2022) 3.5 - 5.2 g/dL     _  Result Component Current Result Ref Range   Urea Nitrogen 12.7 (8/15/2022) 8.0 - 23.0 mg/dL     _  Result Component Current Result Ref Range   Creatinine 0.93 (8/15/2022) 0.67 - 1.17 mg/dL     _  Result Component Current Result Ref Range   AST 31 (8/15/2022) 10 - 50 U/L     _  Result Component Current Result Ref Range   ALT 34 (8/15/2022) 10 - 50 U/L     _  Result Component Current Result Ref Range   Bilirubin Total 0.7 (8/15/2022) <=1.2 mg/dL     _  Result Component Current Result Ref Range   WBC Count 7.8 (8/15/2022) 4.0 - 11.0 10e3/uL     _  Result Component Current Result Ref Range   Hemoglobin 15.1 (8/15/2022) 13.3 - 17.7 g/dL     _  Result Component Current Result Ref Range   Platelet Count 200 (8/15/2022) 150 - 450 10e3/uL     No results found for ANC within last 30 days.     _  Result Component Current Result Ref Range   Absolute Neutrophils 4.7 (8/15/2022) 1.6 - 8.3 10e3/uL        Assessment & Plan:  There were no concerning abnormalities from a CMP and CBC standpoint.  His PSA has risen to 7.81 from 5.17 on 6/27/22 despite increasing his dose to 1000 mg daily.   He is considering a clinical trial.  Continue with the current regimen for now.      Follow-Up:  8/17/22    Marty Meese, Shanna.D., [facility]

## 2022-08-17 ENCOUNTER — VIRTUAL VISIT (OUTPATIENT)
Dept: ONCOLOGY | Facility: CLINIC | Age: 77
End: 2022-08-17
Attending: INTERNAL MEDICINE
Payer: COMMERCIAL

## 2022-08-17 DIAGNOSIS — C79.51 BONE METASTASIS: ICD-10-CM

## 2022-08-17 DIAGNOSIS — R53.83 OTHER FATIGUE: ICD-10-CM

## 2022-08-17 DIAGNOSIS — C61 PROSTATE CANCER (H): Primary | ICD-10-CM

## 2022-08-17 PROCEDURE — G0463 HOSPITAL OUTPT CLINIC VISIT: HCPCS | Mod: PN,RTG | Performed by: INTERNAL MEDICINE

## 2022-08-17 PROCEDURE — 99215 OFFICE O/P EST HI 40 MIN: CPT | Mod: 95 | Performed by: INTERNAL MEDICINE

## 2022-08-17 RX ORDER — TAMSULOSIN HYDROCHLORIDE 0.4 MG/1
0.4 CAPSULE ORAL DAILY
Qty: 30 CAPSULE | Refills: 11 | Status: SHIPPED | OUTPATIENT
Start: 2022-08-17 | End: 2023-07-06

## 2022-08-17 NOTE — PROGRESS NOTES
Steve is a 77 year old who is being evaluated via a billable video visit.      Patient stated he is in the state of [place] for the visit today.    How would you like to obtain your AVS? MyChart  If the video visit is dropped, the invitation should be resent by: Text to cell phone: [phone]  Will anyone else be joining your video visit? Yes, wife        Video-Visit Details     Originating Location (pt. Location): Home    Distant Location (provider location):  [facility]     Platform used for Video Visit: Kelvin Gonsales, Virtual Visit Facilitator

## 2022-08-17 NOTE — PROGRESS NOTES
[facility]  FOLLOW-UP VISIT NOTE    PATIENT NAME: Wallace Zelaya MRN # [id number]  DATE OF VISIT: Aug 17, 2022 YOB: 1945    REFERRING PROVIDER: No referring provider defined for this encounter.    CANCER TYPE: Prostate cancer; Biochemical recurrence; Castration resistant disease  STAGE: Stage III - pT3b at diagnosis; M0    HISTORY OF PRESENTING ILLNESS:  Wallace was noted to have rising screening PSA from 2 - 4. He was referred to Dr. Rodolfo Connor. He had radical prostatectomy on 11/2015. Pathology from this revealed [place] 4+4 disease with extraprostatic extension, seminal vesicle extension and he was staged at pT3b. All of the 12 lymph nodes resected were negative for disease. Post operatively his PSA did not drop to undetectable levels and remained elevated at 0.56. It vladimir to 0.9 in April 2016 and he was referred to Dr. Newton for adjuvant radiation therapy. He completed radiation therapy but did not have any PSA response to this treatment.     TREATMENT SUMMARY:  11/13/2015 Radical prostatectomy  April 2016  Radiation therapy  He was started on intermittent androgen deprivation therapy which had to be changed to continuous with rapid rise in his PSA.      April 2020 - he was started on bicalutamide for complete androgen blockade  He had rising PSA in May 2021. His bone scan done on 6/30/21 revealed metastatic lesions of T6 and the sacrum. He was switched to abiraterone with prednisone on 8/7/21    CURRENT INTERVENTIONS:  Abiraterone with prednisone starting 8/7/21    SUBJECTIVE   Wallace is being seen for his prostate cancer    Steve was followed over video and was joined by his wife at this visit. He has been on abiraterone. He has been tolerating this well. He has no new complains on therapy.        PAST MEDICAL HISTORY   1. HTN  2. Dyslipidemia  3. Prostate cancer as detailed above      CURRENT OUTPATIENT MEDICATIONS     Current Outpatient Medications    Medication Sig     abiraterone (ZYTIGA) 250 MG tablet Take 4 tablets (1,000 mg) by mouth daily (with breakfast) Take with Low-Fat Meal     amLODIPine (NORVASC) 10 MG tablet Take 1 tablet (10 mg) by mouth daily     atorvastatin (LIPITOR) 20 MG tablet Take 1 tablet by mouth once daily     ibuprofen (ADVIL/MOTRIN) 800 MG tablet TAKE 1 TABLET BY MOUTH THREE TIMES DAILY WITH FOOD     LORazepam (ATIVAN) 0.5 MG tablet Take 1 tablet (0.5 mg) by mouth every 4 hours as needed (Anxiety, Nausea/Vomiting or Sleep)     ondansetron (ZOFRAN) 4 MG tablet Take 1 tablet (4 mg) by mouth every 8 hours as needed for nausea     predniSONE (DELTASONE) 5 MG tablet Take 1 tablet by mouth once daily with breakfast     prochlorperazine (COMPAZINE) 10 MG tablet Take 0.5 tablets (5 mg) by mouth every 6 hours as needed (Nausea/Vomiting)     valsartan (DIOVAN) 160 MG tablet Take 1 tablet (160 mg) by mouth daily     No current facility-administered medications for this visit.        ALLERGIES     No Known Allergies     REVIEW OF SYSTEMS   As above in the HPI, o/w complete 12-point ROS was negative.     PHYSICAL EXAM   There were no vitals taken for this visit.  SpO2 Readings from Last 4 Encounters:   09/21/18 96%   08/24/18 94%   06/22/18 95%   05/11/18 97%     Wt Readings from Last 3 Encounters:   08/01/22 99.3 kg (219 lb)   07/01/22 99.7 kg (219 lb 14.4 oz)   04/01/22 99.8 kg (220 lb)        LABORATORY AND IMAGING STUDIES     Recent Labs   Lab Test 08/15/22  0810 06/27/22  0827 03/28/22  1050 02/23/22  1419 01/10/22  1120    140 140 138 138   POTASSIUM 4.1 4.1 4.7 4.3 4.3   CHLORIDE 104 109 107 108 107   CO2 28 26 31 27 27   ANIONGAP 8 5 2* 3 4   BUN 12.7 17 18 20 15   CR 0.93 0.91 0.90 1.09 0.88   GLC 97 107* 103* 111* 103*   TY 9.1 8.8 8.9 8.5 9.3     Recent Labs   Lab Test 01/21/20  1154   MAG 2.0     Recent Labs   Lab Test 08/15/22  0810 06/27/22  0827 03/28/22  1050 02/23/22  1419 01/10/22  1120   WBC 7.8 5.7 6.1 6.1 6.2   HGB 15.1  15.4 15.3 14.1 15.4    158 181 171 185   MCV 94 93 93 94 93   NEUTROPHIL 60 71 59 71 61     Recent Labs   Lab Test 08/15/22  0810 06/27/22  0827 03/28/22  1050 02/23/22  1419   BILITOTAL 0.7 0.9 0.7 0.8   ALKPHOS 101 94 108 113   ALT 34 49 80* 88*   AST 31 4 43 46*   ALBUMIN 4.2 3.7 3.6 3.4   LDH  --  246* 259* 246*     No results found for: TSH  No results for input(s): CEA in the last 73281 hours.  Results for orders placed or performed during the hospital encounter of 06/30/21   CT Chest/Abdomen/Pelvis w Contrast    Narrative    CT CHEST/ABDOMEN/PELVIS WITH CONTRAST 6/30/2021 11:31 AM    CLINICAL HISTORY: Prostate cancer with rising PSA; Prostate cancer  (H).    TECHNIQUE: CT scan of the chest, abdomen, and pelvis was performed  following injection of IV contrast. Multiplanar reformats were  obtained. Dose reduction techniques were used.     CONTRAST: 85mL Isovue-370    COMPARISON: CT chest 1/20/2017. CT abdomen and pelvis 11/9/2016.    FINDINGS:   LUNGS AND PLEURA: No acute airspace disease or effusion. Stable  posterior left upper lobe 0.3 cm nodule series 12 image 71. Stable 0.2  cm posterior medial right upper lobe nodule image 97. There is a solid  anterior right lower lobe nodule abutting the right major fissure that  is 0.7 cm series 12 image 218. This is present on the prior CT but it  previously measured approximately 0.4 cm. New small posterior right  lower lobe subpleural 0.4 cm nodule image 233. Stable right middle  lobe 0.2 cm nodule image 193.    MEDIASTINUM/AXILLAE: Moderate scattered thoracic aortic  calcifications. No acute mediastinal abnormality. No enlarged lymph  nodes identified. A few stable small thoracic lymph nodes are present.    CORONARY ARTERY CALCIFICATION: Moderate.    HEPATOBILIARY: No new focal hepatic lesion. Unremarkable gallbladder.    PANCREAS: Normal.    SPLEEN: Subcapsular faint hypodensity that appears mildly nodular is  again noted without convincing change compared  to an older CT from  4/14/2016. This could be visualized on series 4 image 127 image 137.    ADRENAL GLANDS: Stable small right adrenal nodule that is 1.2 cm  series 4 image 154. Stable left adrenal.    KIDNEYS/BLADDER: No significant abnormality. No hydronephrosis. No  bladder lesion.    BOWEL: No acute abnormality.    PELVIC ORGANS: Prostate is absent. No new mass at the prostatectomy  bed.    ADDITIONAL FINDINGS: No new enlarged lymph node can be seen. Small  retroperitoneal lymph nodes appear stable.    MUSCULOSKELETAL: Increasing sclerosis at the right T6 level now  measuring 3.5 x 1.7 cm, previously 1.2 x 0.6 cm series 4 image 67.  Stable right lateral sixth rib sclerosis image 89, right pubic  sclerosis image 299. New sclerotic bone lesion at the mid sacrum that  is 3.6 x 3.1 cm on coronal series 6 image 141.      Impression    IMPRESSION:  1.  New sacral bone lesion and larger T6 vertebral body sclerotic bone  lesion consistent with sites of bony metastatic disease progression.  Bone scanning would provide more sensitive assessment. Stable other  areas of sclerosis as above.  2.  A few new or larger pulmonary nodules are indeterminant. Cannot  exclude possibility of metastatic disease. Recommend further workup.  The dominant nodule is at the right lower lobe near the major fissure.  Recommend short interval follow-up CT chest in 3 months.  3.  Other stable findings as above.    HARSH OROZCO MD          SYSTEM ID:  [id number]     Recent Labs   Lab Test 08/15/22  0810 06/27/22  0827 03/28/22  1050 02/23/22  1419 12/29/21  0923 12/10/21  1018 10/01/21  1425   PSA 7.81* 5.17 2.00 2.11 0.99   < > 0.51   TESTOSTTOTAL  --  <2* <2* <2* <2*  --  <2*    < > = values in this interval not displayed.         PSA Trend over time              ASSESSMENT AND PLAN   1. 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 ECOG PS 0   3. HTN  4. ECOG PS1  5. No medical comorbidity    I  had a lengthy discussion with Steve who is alone at this visit. I have reviewed all of the labs done prior to this clinic visit.  Labs are all completely normal including electrolytes, renal function, hepatic panel, complete blood count and differential except for his PSA and testosterone.     He has nocturia. He has to wake up several times at night and does not feel rested. I will start him on tamsulosin to see if this helps. If he does not benefit from tamsulosin, I will refer him to urology.     He has been started on abiraterone on 8/7/21.  He had initial PSA response and he is PSA dropped to 2 ng/ml on 3/28/2022.  It has gone up to 5.17 ng/ml on 6/27/22.  It has continued to rise and is higher at 7.81 ng/ml on 8/15/22. He has a doubling time of less than 3 months. This is more consistent with progressive metastatic castration resistant prostate cancer.    I reviewed next treatment options including enzalutamide, chemotherapy with docetaxel and ECLIPSE clinical trial.   I reviewed lutetium 177 bound to PSMA antibody which is still in clinical trials. Prostate-specific membrane antigen (PSMA) is highly expressed in metastatic castration-resistant prostate cancer. Lutetium-177 (177Lu)-PSMA-617 is a radioligand therapy that delivers beta-particle radiation to PSMA-expressing cells and the surrounding microenvironment. We have a trial with this agent open up in castration resistant metastatic prostate cancer setting after treatment with one novel anti-androgen agent and no previous chemotherapy.  He would be an ideal candidate for this clinical trial.    I reviewed the rationale, mechanism of action of this therapy. Study drug would be administered intravenous every 6 weeks for up to 6 doses. Patients would be randomized 2:1 in the trial to the study drug vs standard of care which would be enzalutamide for him. He would be a candidate to receive study drug if he progresses on enzalutamide.     In a single arm phase  II study in heavily pretreated patients therapy with lutetium showed objective response in 14 of 17 patients with measurable disease - Lancet Oncol. 2018 Akash;19(6):825-833. In a phase III study (VISION) evaluating 177Lu-PSMA-617 in patients who had metastatic castration-resistant prostate cancer previously treated with at least one novel anti-androgen receptor agent and one or two taxane regimens, it improved progression free survival (median 8.7 vs 3.4 months; HR 0.4) and overall survival (15.3 vs  11.3 months; HR 0.62).  N Engl J Med 2021; 385:3951-5901. It is well tolerated and has manageable side effects.           I did invite our study nurse, Kala Arcos to join in the visit and she further explained the consent form, the logistics of trial.      Steve is interested in the trial. Kala would send him the consent form by mail for his review. He could come in this Friday to review the material and consent to participate in the study.      Video-Visit Details    Type of service:  Video Visit  Originating Location (pt. Location): Home  Distant Location (provider location):  [facility]   Platform used for Video Visit: [email or web address]    45 minutes spent on the date of the encounter doing chart review, history and exam, documentation and further activities as noted above      Gigi Ward    Hematologist and Medical Oncologist  [facility]

## 2022-08-17 NOTE — LETTER
8/17/2022         RE: Wallace Zelaya  [address] [address] [facility] [id number]        Dear Colleague,    Thank you for referring your patient, Wallace Zelaya, to the [facility]. Please see a copy of my visit note below.    [facility]  FOLLOW-UP VISIT NOTE    PATIENT NAME: Wallace Zelaya MRN # [id number]  DATE OF VISIT: Aug 17, 2022 YOB: 1945    REFERRING PROVIDER: No referring provider defined for this encounter.    CANCER TYPE: Prostate cancer; Biochemical recurrence; Castration resistant disease  STAGE: Stage III - pT3b at diagnosis; M0    HISTORY OF PRESENTING ILLNESS:  Wallace was noted to have rising screening PSA from 2 - 4. He was referred to Dr. Rodolfo Connor. He had radical prostatectomy on 11/2015. Pathology from this revealed [place] 4+4 disease with extraprostatic extension, seminal vesicle extension and he was staged at pT3b. All of the 12 lymph nodes resected were negative for disease. Post operatively his PSA did not drop to undetectable levels and remained elevated at 0.56. It vladimir to 0.9 in April 2016 and he was referred to Dr. Newton for adjuvant radiation therapy. He completed radiation therapy but did not have any PSA response to this treatment.     TREATMENT SUMMARY:  11/13/2015 Radical prostatectomy  April 2016  Radiation therapy  He was started on intermittent androgen deprivation therapy which had to be changed to continuous with rapid rise in his PSA.      April 2020 - he was started on bicalutamide for complete androgen blockade  He had rising PSA in May 2021. His bone scan done on 6/30/21 revealed metastatic lesions of T6 and the sacrum. He was switched to abiraterone with prednisone on 8/7/21    CURRENT INTERVENTIONS:  Abiraterone with prednisone starting 8/7/21    SUBJECTIVE   Wallace is being seen for his prostate cancer    Steve was followed over video and was joined by his wife at this visit. He  has been on abiraterone. He has been tolerating this well. He has no new complains on therapy.        PAST MEDICAL HISTORY   1. HTN  2. Dyslipidemia  3. Prostate cancer as detailed above      CURRENT OUTPATIENT MEDICATIONS     Current Outpatient Medications   Medication Sig     abiraterone (ZYTIGA) 250 MG tablet Take 4 tablets (1,000 mg) by mouth daily (with breakfast) Take with Low-Fat Meal     amLODIPine (NORVASC) 10 MG tablet Take 1 tablet (10 mg) by mouth daily     atorvastatin (LIPITOR) 20 MG tablet Take 1 tablet by mouth once daily     ibuprofen (ADVIL/MOTRIN) 800 MG tablet TAKE 1 TABLET BY MOUTH THREE TIMES DAILY WITH FOOD     LORazepam (ATIVAN) 0.5 MG tablet Take 1 tablet (0.5 mg) by mouth every 4 hours as needed (Anxiety, Nausea/Vomiting or Sleep)     ondansetron (ZOFRAN) 4 MG tablet Take 1 tablet (4 mg) by mouth every 8 hours as needed for nausea     predniSONE (DELTASONE) 5 MG tablet Take 1 tablet by mouth once daily with breakfast     prochlorperazine (COMPAZINE) 10 MG tablet Take 0.5 tablets (5 mg) by mouth every 6 hours as needed (Nausea/Vomiting)     valsartan (DIOVAN) 160 MG tablet Take 1 tablet (160 mg) by mouth daily     No current facility-administered medications for this visit.        ALLERGIES     No Known Allergies     REVIEW OF SYSTEMS   As above in the HPI, o/w complete 12-point ROS was negative.     PHYSICAL EXAM   There were no vitals taken for this visit.  SpO2 Readings from Last 4 Encounters:   09/21/18 96%   08/24/18 94%   06/22/18 95%   05/11/18 97%     Wt Readings from Last 3 Encounters:   08/01/22 99.3 kg (219 lb)   07/01/22 99.7 kg (219 lb 14.4 oz)   04/01/22 99.8 kg (220 lb)        LABORATORY AND IMAGING STUDIES     Recent Labs   Lab Test 08/15/22  0810 06/27/22  0827 03/28/22  1050 02/23/22  1419 01/10/22  1120    140 140 138 138   POTASSIUM 4.1 4.1 4.7 4.3 4.3   CHLORIDE 104 109 107 108 107   CO2 28 26 31 27 27   ANIONGAP 8 5 2* 3 4   BUN 12.7 17 18 20 15   CR 0.93 0.91  0.90 1.09 0.88   GLC 97 107* 103* 111* 103*   TY 9.1 8.8 8.9 8.5 9.3     Recent Labs   Lab Test 01/21/20  1154   MAG 2.0     Recent Labs   Lab Test 08/15/22  0810 06/27/22  0827 03/28/22  1050 02/23/22  1419 01/10/22  1120   WBC 7.8 5.7 6.1 6.1 6.2   HGB 15.1 15.4 15.3 14.1 15.4    158 181 171 185   MCV 94 93 93 94 93   NEUTROPHIL 60 71 59 71 61     Recent Labs   Lab Test 08/15/22  0810 06/27/22  0827 03/28/22  1050 02/23/22  1419   BILITOTAL 0.7 0.9 0.7 0.8   ALKPHOS 101 94 108 113   ALT 34 49 80* 88*   AST 31 4 43 46*   ALBUMIN 4.2 3.7 3.6 3.4   LDH  --  246* 259* 246*     No results found for: TSH  No results for input(s): CEA in the last 83962 hours.  Results for orders placed or performed during the hospital encounter of 06/30/21   CT Chest/Abdomen/Pelvis w Contrast    Narrative    CT CHEST/ABDOMEN/PELVIS WITH CONTRAST 6/30/2021 11:31 AM    CLINICAL HISTORY: Prostate cancer with rising PSA; Prostate cancer  (H).    TECHNIQUE: CT scan of the chest, abdomen, and pelvis was performed  following injection of IV contrast. Multiplanar reformats were  obtained. Dose reduction techniques were used.     CONTRAST: 85mL Isovue-370    COMPARISON: CT chest 1/20/2017. CT abdomen and pelvis 11/9/2016.    FINDINGS:   LUNGS AND PLEURA: No acute airspace disease or effusion. Stable  posterior left upper lobe 0.3 cm nodule series 12 image 71. Stable 0.2  cm posterior medial right upper lobe nodule image 97. There is a solid  anterior right lower lobe nodule abutting the right major fissure that  is 0.7 cm series 12 image 218. This is present on the prior CT but it  previously measured approximately 0.4 cm. New small posterior right  lower lobe subpleural 0.4 cm nodule image 233. Stable right middle  lobe 0.2 cm nodule image 193.    MEDIASTINUM/AXILLAE: Moderate scattered thoracic aortic  calcifications. No acute mediastinal abnormality. No enlarged lymph  nodes identified. A few stable small thoracic lymph nodes are  present.    CORONARY ARTERY CALCIFICATION: Moderate.    HEPATOBILIARY: No new focal hepatic lesion. Unremarkable gallbladder.    PANCREAS: Normal.    SPLEEN: Subcapsular faint hypodensity that appears mildly nodular is  again noted without convincing change compared to an older CT from  4/14/2016. This could be visualized on series 4 image 127 image 137.    ADRENAL GLANDS: Stable small right adrenal nodule that is 1.2 cm  series 4 image 154. Stable left adrenal.    KIDNEYS/BLADDER: No significant abnormality. No hydronephrosis. No  bladder lesion.    BOWEL: No acute abnormality.    PELVIC ORGANS: Prostate is absent. No new mass at the prostatectomy  bed.    ADDITIONAL FINDINGS: No new enlarged lymph node can be seen. Small  retroperitoneal lymph nodes appear stable.    MUSCULOSKELETAL: Increasing sclerosis at the right T6 level now  measuring 3.5 x 1.7 cm, previously 1.2 x 0.6 cm series 4 image 67.  Stable right lateral sixth rib sclerosis image 89, right pubic  sclerosis image 299. New sclerotic bone lesion at the mid sacrum that  is 3.6 x 3.1 cm on coronal series 6 image 141.      Impression    IMPRESSION:  1.  New sacral bone lesion and larger T6 vertebral body sclerotic bone  lesion consistent with sites of bony metastatic disease progression.  Bone scanning would provide more sensitive assessment. Stable other  areas of sclerosis as above.  2.  A few new or larger pulmonary nodules are indeterminant. Cannot  exclude possibility of metastatic disease. Recommend further workup.  The dominant nodule is at the right lower lobe near the major fissure.  Recommend short interval follow-up CT chest in 3 months.  3.  Other stable findings as above.    HARSH OROZCO MD          SYSTEM ID:  [id number]     Recent Labs   Lab Test 08/15/22  0810 06/27/22  0827 03/28/22  1050 02/23/22  1419 12/29/21  0923 12/10/21  1018 10/01/21  1425   PSA 7.81* 5.17 2.00 2.11 0.99   < > 0.51   TESTOSTTOTAL  --  <2* <2* <2* <2*  --  <2*    < > =  values in this interval not displayed.         PSA Trend over time              ASSESSMENT AND PLAN   1. 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 ECOG PS 0   3. HTN  4. ECOG PS1  5. No medical comorbidity    I had a lengthy discussion with Steve who is alone at this visit. I have reviewed all of the labs done prior to this clinic visit.  Labs are all completely normal including electrolytes, renal function, hepatic panel, complete blood count and differential except for his PSA and testosterone.     He has nocturia. He has to wake up several times at night and does not feel rested. I will start him on tamsulosin to see if this helps. If he does not benefit from tamsulosin, I will refer him to urology.     He has been started on abiraterone on 8/7/21.  He had initial PSA response and he is PSA dropped to 2 ng/ml on 3/28/2022.  It has gone up to 5.17 ng/ml on 6/27/22.  It has continued to rise and is higher at 7.81 ng/ml on 8/15/22. He has a doubling time of less than 3 months. This is more consistent with progressive metastatic castration resistant prostate cancer.    I reviewed next treatment options including enzalutamide, chemotherapy with docetaxel and ECLIPSE clinical trial.   I reviewed lutetium 177 bound to PSMA antibody which is still in clinical trials. Prostate-specific membrane antigen (PSMA) is highly expressed in metastatic castration-resistant prostate cancer. Lutetium-177 (177Lu)-PSMA-617 is a radioligand therapy that delivers beta-particle radiation to PSMA-expressing cells and the surrounding microenvironment. We have a trial with this agent open up in castration resistant metastatic prostate cancer setting after treatment with one novel anti-androgen agent and no previous chemotherapy.  He would be an ideal candidate for this clinical trial.    I reviewed the rationale, mechanism of action of this therapy. Study drug would be administered  intravenous every 6 weeks for up to 6 doses. Patients would be randomized 2:1 in the trial to the study drug vs standard of care which would be enzalutamide for him. He would be a candidate to receive study drug if he progresses on enzalutamide.     In a single arm phase II study in heavily pretreated patients therapy with lutetium showed objective response in 14 of 17 patients with measurable disease - Lancet Oncol. 2018 [place];19(6):825-833. In a phase III study (VISION) evaluating 177Lu-PSMA-617 in patients who had metastatic castration-resistant prostate cancer previously treated with at least one novel anti-androgen receptor agent and one or two taxane regimens, it improved progression free survival (median 8.7 vs 3.4 months; HR 0.4) and overall survival (15.3 vs  11.3 months; HR 0.62).  N Engl J Med 2021; 385:9498-4231. It is well tolerated and has manageable side effects.           I did invite our study nurse, Kala Arcos to join in the visit and she further explained the consent form, the logistics of trial.      Steve is interested in the trial. Kala would send him the consent form by mail for his review. He could come in this Friday to review the material and consent to participate in the study.      45 minutes spent on the date of the encounter doing chart review, history and exam, documentation and further activities as noted above      Gigi Ward  Hematologist and Medical Oncologist  [facility]

## 2022-08-17 NOTE — NURSING NOTE
Patient verified medications and allergies are correct via eCheck-in. Patient confirms no changes at this time and/or since last reviewed by clinic staff.    Mary Grace Gonsales, Virtual Facilitator

## 2022-08-20 ENCOUNTER — MYC REFILL (OUTPATIENT)
Dept: ONCOLOGY | Facility: CLINIC | Age: 77
End: 2022-08-20

## 2022-08-20 DIAGNOSIS — C79.51 BONE METASTASIS: ICD-10-CM

## 2022-08-20 DIAGNOSIS — C61 PROSTATE CANCER (H): ICD-10-CM

## 2022-08-22 DIAGNOSIS — C79.51 BONE METASTASIS: ICD-10-CM

## 2022-08-22 DIAGNOSIS — C61 PROSTATE CANCER (H): ICD-10-CM

## 2022-08-22 RX ORDER — PREDNISONE 5 MG/1
5 TABLET ORAL DAILY
Qty: 30 TABLET | Refills: 0 | Status: SHIPPED | OUTPATIENT
Start: 2022-08-22 | End: 2022-09-23

## 2022-08-22 RX ORDER — PREDNISONE 5 MG/1
TABLET ORAL
Qty: 30 TABLET | Refills: 0 | Status: SHIPPED | OUTPATIENT
Start: 2022-08-22 | End: 2022-08-30

## 2022-08-22 NOTE — TELEPHONE ENCOUNTER
predniSONE (DELTASONE)  Refill   Last prescribing provider: Dr Ward     Last clinic visit date: 8/17/22 Dr ward     Any missed appointments or no-shows since last clinic visit?: no     Recommendations for requested medication (if none, N/A): Copied from chart note 8/17/22 Dr Ward   CURRENT INTERVENTIONS:  Abiraterone with prednisone starting 8/7/21      Next clinic visit date: 8/24/22 Dr Ward     Any other pertinent information (if none, N/A): N/A

## 2022-08-22 NOTE — TELEPHONE ENCOUNTER
Signed Prescriptions:                        Disp   Refills    predniSONE (DELTASONE) 5 MG tablet         30 tab*0        Sig: Take 1 tablet (5 mg) by mouth daily With Breakfast.  Authorizing Provider: AYALA KITCHEN, RN, BSN, OCN  Nurse Care Coordinator  [facility]  P: [phone]     F: [phone]

## 2022-08-22 NOTE — CONFIDENTIAL NOTE
Pending Prescriptions:                       Disp   Refills    predniSONE (DELTASONE) 5 MG tablet        30 tab*0            Sig: Take 1 tablet (5 mg) by mouth daily With           Breakfast.      Last Written Prescription Date:  6/20/22  Last Fill Quantity: 30,   # refills: 0  Last Office Visit: 8/17/22  Future Office visit: 8/24/22      Routing refill request to provider.    Christiano Barnett, RN, BSN.  RN Care Coordinator     [facility]   [phone]

## 2022-08-24 ENCOUNTER — VIRTUAL VISIT (OUTPATIENT)
Dept: ONCOLOGY | Facility: CLINIC | Age: 77
End: 2022-08-24
Attending: INTERNAL MEDICINE
Payer: COMMERCIAL

## 2022-08-24 DIAGNOSIS — C61 PROSTATE CANCER (H): Primary | ICD-10-CM

## 2022-08-24 DIAGNOSIS — C79.51 BONE METASTASIS: ICD-10-CM

## 2022-08-24 PROCEDURE — 99215 OFFICE O/P EST HI 40 MIN: CPT | Mod: 95 | Performed by: INTERNAL MEDICINE

## 2022-08-24 PROCEDURE — G0463 HOSPITAL OUTPT CLINIC VISIT: HCPCS | Mod: PN,RTG | Performed by: INTERNAL MEDICINE

## 2022-08-24 NOTE — PROGRESS NOTES
Steve is a 77 year old who is being evaluated via a billable video visit.      How would you like to obtain your AVS? [employer]  If the video visit is dropped, the invitation should be resent by: Text to cell phone: [phone]  Will anyone else be joining your video visit? Karishma Stewart

## 2022-08-24 NOTE — PROGRESS NOTES
[facility] CANCER CLINIC  FOLLOW-UP VISIT NOTE    PATIENT NAME: Wallace Zelaya MRN # [id number]  DATE OF VISIT: Aug 24, 2022 YOB: 1945    REFERRING PROVIDER: No referring provider defined for this encounter.    CANCER TYPE: Prostate cancer; Biochemical recurrence; Castration resistant disease  STAGE: Stage III - pT3b at diagnosis; M0    HISTORY OF PRESENTING ILLNESS:  Wallace was noted to have rising screening PSA from 2 - 4. He was referred to Dr. Rodolfo Connor. He had radical prostatectomy on 11/2015. Pathology from this revealed Cayla 4+4 disease with extraprostatic extension, seminal vesicle extension and he was staged at pT3b. All of the 12 lymph nodes resected were negative for disease. Post operatively his PSA did not drop to undetectable levels and remained elevated at 0.56. It vladimir to 0.9 in April 2016 and he was referred to Dr. Newton for adjuvant radiation therapy. He completed radiation therapy but did not have any PSA response to this treatment.     TREATMENT SUMMARY:  11/13/2015 Radical prostatectomy  April 2016  Radiation therapy  He was started on intermittent androgen deprivation therapy which had to be changed to continuous with rapid rise in his PSA.      April 2020 - he was started on bicalutamide for complete androgen blockade  He had rising PSA in May 2021. His bone scan done on 6/30/21 revealed metastatic lesions of T6 and the sacrum. He was switched to abiraterone with prednisone on 8/7/21    CURRENT INTERVENTIONS:  Abiraterone with prednisone starting 8/7/21    SUBJECTIVE   Wallace is being seen for his prostate cancer    Steve was followed over video and was joined by his wife at this visit. He has been on abiraterone. He has been tolerating this well. He has no new complains on therapy.  We discussed ECLIPSE clinical trial at our last visit about 10 days ago. He still has a number of questions for me prompting this visit.       PAST MEDICAL  HISTORY   1. HTN  2. Dyslipidemia  3. Prostate cancer as detailed above      CURRENT OUTPATIENT MEDICATIONS     Current Outpatient Medications   Medication Sig     abiraterone (ZYTIGA) 250 MG tablet Take 4 tablets (1,000 mg) by mouth daily (with breakfast) Take with Low-Fat Meal     amLODIPine (NORVASC) 10 MG tablet Take 1 tablet (10 mg) by mouth daily     atorvastatin (LIPITOR) 20 MG tablet Take 1 tablet by mouth once daily     ibuprofen (ADVIL/MOTRIN) 800 MG tablet TAKE 1 TABLET BY MOUTH THREE TIMES DAILY WITH FOOD     LORazepam (ATIVAN) 0.5 MG tablet Take 1 tablet (0.5 mg) by mouth every 4 hours as needed (Anxiety, Nausea/Vomiting or Sleep)     ondansetron (ZOFRAN) 4 MG tablet Take 1 tablet (4 mg) by mouth every 8 hours as needed for nausea     predniSONE (DELTASONE) 5 MG tablet Take 1 tablet (5 mg) by mouth daily With Breakfast.     predniSONE (DELTASONE) 5 MG tablet Take 1 tablet by mouth once daily with breakfast     prochlorperazine (COMPAZINE) 10 MG tablet Take 0.5 tablets (5 mg) by mouth every 6 hours as needed (Nausea/Vomiting)     tamsulosin (FLOMAX) 0.4 MG capsule Take 1 capsule (0.4 mg) by mouth daily     valsartan (DIOVAN) 160 MG tablet Take 1 tablet (160 mg) by mouth daily     No current facility-administered medications for this visit.        ALLERGIES     No Known Allergies     REVIEW OF SYSTEMS   As above in the HPI, o/w complete 12-point ROS was negative.     PHYSICAL EXAM   There were no vitals taken for this visit.  SpO2 Readings from Last 4 Encounters:   09/21/18 96%   08/24/18 94%   06/22/18 95%   05/11/18 97%     Wt Readings from Last 3 Encounters:   08/01/22 99.3 kg (219 lb)   07/01/22 99.7 kg (219 lb 14.4 oz)   04/01/22 99.8 kg (220 lb)        LABORATORY AND IMAGING STUDIES     Recent Labs   Lab Test 08/15/22  0810 06/27/22  0827 03/28/22  1050 02/23/22  1419 01/10/22  1120    140 140 138 138   POTASSIUM 4.1 4.1 4.7 4.3 4.3   CHLORIDE 104 109 107 108 107   CO2 28 26 31 27 27    ANIONGAP 8 5 2* 3 4   BUN 12.7 17 18 20 15   CR 0.93 0.91 0.90 1.09 0.88   GLC 97 107* 103* 111* 103*   TY 9.1 8.8 8.9 8.5 9.3     Recent Labs   Lab Test 01/21/20  1154   MAG 2.0     Recent Labs   Lab Test 08/15/22  0810 06/27/22  0827 03/28/22  1050 02/23/22  1419 01/10/22  1120   WBC 7.8 5.7 6.1 6.1 6.2   HGB 15.1 15.4 15.3 14.1 15.4    158 181 171 185   MCV 94 93 93 94 93   NEUTROPHIL 60 71 59 71 61     Recent Labs   Lab Test 08/15/22  0810 06/27/22  0827 03/28/22  1050 02/23/22  1419   BILITOTAL 0.7 0.9 0.7 0.8   ALKPHOS 101 94 108 113   ALT 34 49 80* 88*   AST 31 4 43 46*   ALBUMIN 4.2 3.7 3.6 3.4   LDH  --  246* 259* 246*     No results found for: TSH  No results for input(s): CEA in the last 15076 hours.  Results for orders placed or performed during the hospital encounter of 06/30/21   CT Chest/Abdomen/Pelvis w Contrast    Narrative    CT CHEST/ABDOMEN/PELVIS WITH CONTRAST 6/30/2021 11:31 AM    CLINICAL HISTORY: Prostate cancer with rising PSA; Prostate cancer  (H).    TECHNIQUE: CT scan of the chest, abdomen, and pelvis was performed  following injection of IV contrast. Multiplanar reformats were  obtained. Dose reduction techniques were used.     CONTRAST: 85mL Isovue-370    COMPARISON: CT chest 1/20/2017. CT abdomen and pelvis 11/9/2016.    FINDINGS:   LUNGS AND PLEURA: No acute airspace disease or effusion. Stable  posterior left upper lobe 0.3 cm nodule series 12 image 71. Stable 0.2  cm posterior medial right upper lobe nodule image 97. There is a solid  anterior right lower lobe nodule abutting the right major fissure that  is 0.7 cm series 12 image 218. This is present on the prior CT but it  previously measured approximately 0.4 cm. New small posterior right  lower lobe subpleural 0.4 cm nodule image 233. Stable right middle  lobe 0.2 cm nodule image 193.    MEDIASTINUM/AXILLAE: Moderate scattered thoracic aortic  calcifications. No acute mediastinal abnormality. No enlarged lymph  nodes  identified. A few stable small thoracic lymph nodes are present.    CORONARY ARTERY CALCIFICATION: Moderate.    HEPATOBILIARY: No new focal hepatic lesion. Unremarkable gallbladder.    PANCREAS: Normal.    SPLEEN: Subcapsular faint hypodensity that appears mildly nodular is  again noted without convincing change compared to an older CT from  4/14/2016. This could be visualized on series 4 image 127 image 137.    ADRENAL GLANDS: Stable small right adrenal nodule that is 1.2 cm  series 4 image 154. Stable left adrenal.    KIDNEYS/BLADDER: No significant abnormality. No hydronephrosis. No  bladder lesion.    BOWEL: No acute abnormality.    PELVIC ORGANS: Prostate is absent. No new mass at the prostatectomy  bed.    ADDITIONAL FINDINGS: No new enlarged lymph node can be seen. Small  retroperitoneal lymph nodes appear stable.    MUSCULOSKELETAL: Increasing sclerosis at the right T6 level now  measuring 3.5 x 1.7 cm, previously 1.2 x 0.6 cm series 4 image 67.  Stable right lateral sixth rib sclerosis image 89, right pubic  sclerosis image 299. New sclerotic bone lesion at the mid sacrum that  is 3.6 x 3.1 cm on coronal series 6 image 141.      Impression    IMPRESSION:  1.  New sacral bone lesion and larger T6 vertebral body sclerotic bone  lesion consistent with sites of bony metastatic disease progression.  Bone scanning would provide more sensitive assessment. Stable other  areas of sclerosis as above.  2.  A few new or larger pulmonary nodules are indeterminant. Cannot  exclude possibility of metastatic disease. Recommend further workup.  The dominant nodule is at the right lower lobe near the major fissure.  Recommend short interval follow-up CT chest in 3 months.  3.  Other stable findings as above.    HARSH OROZCO MD          SYSTEM ID:  [id number]     Recent Labs   Lab Test 08/15/22  0810 06/27/22  0827 03/28/22  1050 02/23/22  1419 12/29/21  0923 12/10/21  1018 10/01/21  1425   PSA 7.81* 5.17 2.00 2.11 0.99   < >  0.51   TESTOSTTOTAL  --  <2* <2* <2* <2*  --  <2*    < > = values in this interval not displayed.         PSA Trend over time              ASSESSMENT AND PLAN   1. 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 ECOG PS 0   3. HTN  4. ECOG PS1  5. No medical comorbidity    I had a lengthy discussion with Steve who is alone at this visit. I have reviewed all of the labs done prior to this clinic visit.  Labs are all completely normal including electrolytes, renal function, hepatic panel, complete blood count and differential except for his PSA and testosterone.      He has been started on abiraterone since 8/7/21 when his PSA had increased to 2.57 ng/ml on 8/6/2022. He had initial PSA response as his PSA dropped to 0.5 ng/ml by 10/1/21. But it has steadily gone since then and vladimir to 5.17 ng/ml on 6/27/22.  It has continued to rise and is higher at 7.81 ng/ml on 8/15/22. He has a doubling time of less than 3 months. This is more consistent with progressive metastatic castration resistant prostate cancer.    I again reviewed next treatment options including enzalutamide, chemotherapy with docetaxel and ECLIPSE clinical trial.   I reviewed lutetium 177 bound to PSMA antibody which is still in clinical trials. Prostate-specific membrane antigen (PSMA) is highly expressed in metastatic castration-resistant prostate cancer. Lutetium-177 (177Lu)-PSMA-617 is a radioligand therapy that delivers beta-particle radiation to PSMA-expressing cells and the surrounding microenvironment. We have a trial with this agent open up in castration resistant metastatic prostate cancer setting after treatment with one novel anti-androgen agent and no previous chemotherapy.  He would be an ideal candidate for this clinical trial.    I reviewed the rationale, mechanism of action of this therapy. Study drug would be administered intravenous every 6 weeks for up to 6 doses. Patients would be  randomized 2:1 in the trial to the study drug vs standard of care which would be enzalutamide for him. He would be a candidate to receive study drug if he progresses on enzalutamide.     He is little apprehensive about participating in the trial after hearing about the side effects of lutetium. Outside of a clinical trial I would prefer docetaxel and would choose enzalutamide after that. It is more about sequence of therapy rather than if he gets one therapy vs other. I tried to explain him that lutetium tagged to PSMA antibody is already approved for castration resistant metastatic prostate cancer post chemotherapy and one novel anti-androgen. It has been found to be well tolerated. It will certainly be lot easier than docetaxel. While we would surely not want him to have any toxicity, his progressive disease would mean that we would have to proceed with next lines of therapy. Usually enzalutamide after abiraterone has a very poor response. Besides this strategy leaves us with all myelosuppressive regimens - chemotherapies ( docetaxel and cabazitaxel) and radionucleotides (lutetium and radium).     He was not convinced. He would like to think some more and talk about it again next time soon. At his request I am scheduling a follow up visit for him in 10 days.     Video-Visit Details    Type of service:  Video Visit  Originating Location (pt. Location): Home  Distant Location (provider location):  [facility] CANCER [place]   Platform used for Video Visit: Adly    45 minutes spent on the date of the encounter doing chart review, history and exam, documentation and further activities as noted above      Gigi Ward    Hematologist and Medical Oncologist  [facility]

## 2022-08-24 NOTE — LETTER
8/24/2022         RE: Wallace Zelaya  [address] [id number]        Dear Colleague,    Thank you for referring your patient, Wallace Zelaya, to the [facility]. Please see a copy of my visit note below.    Steve is a 77 year old who is being evaluated via a billable video visit.      How would you like to obtain your AVS? MyChart  If the video visit is dropped, the invitation should be resent by: Text to cell phone: [phone]  Will anyone else be joining your video visit? Karishma Stewart      [facility]CER CLINIC  FOLLOW-UP VISIT NOTE    PATIENT NAME: Wallace Zelaya MRN # [id number]  DATE OF VISIT: Aug 24, 2022 YOB: 1945    REFERRING PROVIDER: No referring provider defined for this encounter.    CANCER TYPE: Prostate cancer; Biochemical recurrence; Castration resistant disease  STAGE: Stage III - pT3b at diagnosis; M0    HISTORY OF PRESENTING ILLNESS:  Wallace was noted to have rising screening PSA from 2 - 4. He was referred to Dr. Rodolfo Connor. He had radical prostatectomy on 11/2015. Pathology from this revealed Cayla 4+4 disease with extraprostatic extension, seminal vesicle extension and he was staged at pT3b. All of the 12 lymph nodes resected were negative for disease. Post operatively his PSA did not drop to undetectable levels and remained elevated at 0.56. It vladimir to 0.9 in April 2016 and he was referred to Dr. Newton for adjuvant radiation therapy. He completed radiation therapy but did not have any PSA response to this treatment.     TREATMENT SUMMARY:  11/13/2015 Radical prostatectomy  April 2016  Radiation therapy  He was started on intermittent androgen deprivation therapy which had to be changed to continuous with rapid rise in his PSA.      April 2020 - he was started on bicalutamide for complete androgen blockade  He had rising PSA in May 2021. His bone scan done on 6/30/21 revealed  metastatic lesions of T6 and the sacrum. He was switched to abiraterone with prednisone on 8/7/21    CURRENT INTERVENTIONS:  Abiraterone with prednisone starting 8/7/21    DELANEY Gonsalez is being seen for his prostate cancer    Steve was followed over video and was joined by his wife at this visit. He has been on abiraterone. He has been tolerating this well. He has no new complains on therapy.  We discussed ECLIPSE clinical trial at our last visit about 10 days ago. He still has a number of questions for me prompting this visit.       PAST MEDICAL HISTORY   1. HTN  2. Dyslipidemia  3. Prostate cancer as detailed above      CURRENT OUTPATIENT MEDICATIONS     Current Outpatient Medications   Medication Sig     abiraterone (ZYTIGA) 250 MG tablet Take 4 tablets (1,000 mg) by mouth daily (with breakfast) Take with Low-Fat Meal     amLODIPine (NORVASC) 10 MG tablet Take 1 tablet (10 mg) by mouth daily     atorvastatin (LIPITOR) 20 MG tablet Take 1 tablet by mouth once daily     ibuprofen (ADVIL/MOTRIN) 800 MG tablet TAKE 1 TABLET BY MOUTH THREE TIMES DAILY WITH FOOD     LORazepam (ATIVAN) 0.5 MG tablet Take 1 tablet (0.5 mg) by mouth every 4 hours as needed (Anxiety, Nausea/Vomiting or Sleep)     ondansetron (ZOFRAN) 4 MG tablet Take 1 tablet (4 mg) by mouth every 8 hours as needed for nausea     predniSONE (DELTASONE) 5 MG tablet Take 1 tablet (5 mg) by mouth daily With Breakfast.     predniSONE (DELTASONE) 5 MG tablet Take 1 tablet by mouth once daily with breakfast     prochlorperazine (COMPAZINE) 10 MG tablet Take 0.5 tablets (5 mg) by mouth every 6 hours as needed (Nausea/Vomiting)     tamsulosin (FLOMAX) 0.4 MG capsule Take 1 capsule (0.4 mg) by mouth daily     valsartan (DIOVAN) 160 MG tablet Take 1 tablet (160 mg) by mouth daily     No current facility-administered medications for this visit.        ALLERGIES     No Known Allergies     REVIEW OF SYSTEMS   As above in the HPI, o/w complete 12-point ROS was  negative.     PHYSICAL EXAM   There were no vitals taken for this visit.  SpO2 Readings from Last 4 Encounters:   09/21/18 96%   08/24/18 94%   06/22/18 95%   05/11/18 97%     Wt Readings from Last 3 Encounters:   08/01/22 99.3 kg (219 lb)   07/01/22 99.7 kg (219 lb 14.4 oz)   04/01/22 99.8 kg (220 lb)        LABORATORY AND IMAGING STUDIES     Recent Labs   Lab Test 08/15/22  0810 06/27/22  0827 03/28/22  1050 02/23/22  1419 01/10/22  1120    140 140 138 138   POTASSIUM 4.1 4.1 4.7 4.3 4.3   CHLORIDE 104 109 107 108 107   CO2 28 26 31 27 27   ANIONGAP 8 5 2* 3 4   BUN 12.7 17 18 20 15   CR 0.93 0.91 0.90 1.09 0.88   GLC 97 107* 103* 111* 103*   TY 9.1 8.8 8.9 8.5 9.3     Recent Labs   Lab Test 01/21/20  1154   MAG 2.0     Recent Labs   Lab Test 08/15/22  0810 06/27/22  0827 03/28/22  1050 02/23/22  1419 01/10/22  1120   WBC 7.8 5.7 6.1 6.1 6.2   HGB 15.1 15.4 15.3 14.1 15.4    158 181 171 185   MCV 94 93 93 94 93   NEUTROPHIL 60 71 59 71 61     Recent Labs   Lab Test 08/15/22  0810 06/27/22  0827 03/28/22  1050 02/23/22  1419   BILITOTAL 0.7 0.9 0.7 0.8   ALKPHOS 101 94 108 113   ALT 34 49 80* 88*   AST 31 4 43 46*   ALBUMIN 4.2 3.7 3.6 3.4   LDH  --  246* 259* 246*     No results found for: TSH  No results for input(s): CEA in the last 47075 hours.  Results for orders placed or performed during the hospital encounter of 06/30/21   CT Chest/Abdomen/Pelvis w Contrast    Narrative    CT CHEST/ABDOMEN/PELVIS WITH CONTRAST 6/30/2021 11:31 AM    CLINICAL HISTORY: Prostate cancer with rising PSA; Prostate cancer  (H).    TECHNIQUE: CT scan of the chest, abdomen, and pelvis was performed  following injection of IV contrast. Multiplanar reformats were  obtained. Dose reduction techniques were used.     CONTRAST: 85mL Isovue-370    COMPARISON: CT chest 1/20/2017. CT abdomen and pelvis 11/9/2016.    FINDINGS:   LUNGS AND PLEURA: No acute airspace disease or effusion. Stable  posterior left upper lobe 0.3 cm  nodule series 12 image 71. Stable 0.2  cm posterior medial right upper lobe nodule image 97. There is a solid  anterior right lower lobe nodule abutting the right major fissure that  is 0.7 cm series 12 image 218. This is present on the prior CT but it  previously measured approximately 0.4 cm. New small posterior right  lower lobe subpleural 0.4 cm nodule image 233. Stable right middle  lobe 0.2 cm nodule image 193.    MEDIASTINUM/AXILLAE: Moderate scattered thoracic aortic  calcifications. No acute mediastinal abnormality. No enlarged lymph  nodes identified. A few stable small thoracic lymph nodes are present.    CORONARY ARTERY CALCIFICATION: Moderate.    HEPATOBILIARY: No new focal hepatic lesion. Unremarkable gallbladder.    PANCREAS: Normal.    SPLEEN: Subcapsular faint hypodensity that appears mildly nodular is  again noted without convincing change compared to an older CT from  4/14/2016. This could be visualized on series 4 image 127 image 137.    ADRENAL GLANDS: Stable small right adrenal nodule that is 1.2 cm  series 4 image 154. Stable left adrenal.    KIDNEYS/BLADDER: No significant abnormality. No hydronephrosis. No  bladder lesion.    BOWEL: No acute abnormality.    PELVIC ORGANS: Prostate is absent. No new mass at the prostatectomy  bed.    ADDITIONAL FINDINGS: No new enlarged lymph node can be seen. Small  retroperitoneal lymph nodes appear stable.    MUSCULOSKELETAL: Increasing sclerosis at the right T6 level now  measuring 3.5 x 1.7 cm, previously 1.2 x 0.6 cm series 4 image 67.  Stable right lateral sixth rib sclerosis image 89, right pubic  sclerosis image 299. New sclerotic bone lesion at the mid sacrum that  is 3.6 x 3.1 cm on coronal series 6 image 141.      Impression    IMPRESSION:  1.  New sacral bone lesion and larger T6 vertebral body sclerotic bone  lesion consistent with sites of bony metastatic disease progression.  Bone scanning would provide more sensitive assessment. Stable  other  areas of sclerosis as above.  2.  A few new or larger pulmonary nodules are indeterminant. Cannot  exclude possibility of metastatic disease. Recommend further workup.  The dominant nodule is at the right lower lobe near the major fissure.  Recommend short interval follow-up CT chest in 3 months.  3.  Other stable findings as above.    HARSH OROZCO MD          SYSTEM ID:  [id number]     Recent Labs   Lab Test 08/15/22  0810 06/27/22  0827 03/28/22  1050 02/23/22  1419 12/29/21  0923 12/10/21  1018 10/01/21  1425   PSA 7.81* 5.17 2.00 2.11 0.99   < > 0.51   TESTOSTTOTAL  --  <2* <2* <2* <2*  --  <2*    < > = values in this interval not displayed.         PSA Trend over time              ASSESSMENT AND PLAN   1. 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 ECOG PS 0   3. HTN  4. ECOG PS1  5. No medical comorbidity    I had a lengthy discussion with Steve who is alone at this visit. I have reviewed all of the labs done prior to this clinic visit.  Labs are all completely normal including electrolytes, renal function, hepatic panel, complete blood count and differential except for his PSA and testosterone.      He has been started on abiraterone since 8/7/21 when his PSA had increased to 2.57 ng/ml on 8/6/2022. He had initial PSA response as his PSA dropped to 0.5 ng/ml by 10/1/21. But it has steadily gone since then and vladimir to 5.17 ng/ml on 6/27/22.  It has continued to rise and is higher at 7.81 ng/ml on 8/15/22. He has a doubling time of less than 3 months. This is more consistent with progressive metastatic castration resistant prostate cancer.    I again reviewed next treatment options including enzalutamide, chemotherapy with docetaxel and ECLIPSE clinical trial.   I reviewed lutetium 177 bound to PSMA antibody which is still in clinical trials. Prostate-specific membrane antigen (PSMA) is highly expressed in metastatic castration-resistant prostate cancer.  Lutetium-177 ([id number])-PSMA-[id number] is a radioligand therapy that delivers beta-particle radiation to PSMA-expressing cells and the surrounding microenvironment. We have a trial with this agent open up in castration resistant metastatic prostate cancer setting after treatment with one novel anti-androgen agent and no previous chemotherapy.  He would be an ideal candidate for this clinical trial.    I reviewed the rationale, mechanism of action of this therapy. Study drug would be administered intravenous every 6 weeks for up to 6 doses. Patients would be randomized 2:1 in the trial to the study drug vs standard of care which would be enzalutamide for him. He would be a candidate to receive study drug if he progresses on enzalutamide.     He is little apprehensive about participating in the trial after hearing about the side effects of lutetium. Outside of a clinical trial I would prefer docetaxel and would choose enzalutamide after that. It is more about sequence of therapy rather than if he gets one therapy vs other. I tried to explain him that lutetium tagged to PSMA antibody is already approved for castration resistant metastatic prostate cancer post chemotherapy and one novel anti-androgen. It has been found to be well tolerated. It will certainly be lot easier than docetaxel. While we would surely not want him to have any toxicity, his progressive disease would mean that we would have to proceed with next lines of therapy. Usually enzalutamide after abiraterone has a very poor response. Besides this strategy leaves us with all myelosuppressive regimens - chemotherapies ( docetaxel and cabazitaxel) and radionucleotides (lutetium and radium).     He was not convinced. He would like to think some more and talk about it again next time soon. At his request I am scheduling a follow up visit for him in 10 days.       45 minutes spent on the date of the encounter doing chart review, history and exam, documentation and  further activities as noted above             Again, thank you for allowing me to participate in the care of your patient.      Sincerely,    Gigi Ward MD

## 2022-08-30 ENCOUNTER — ONCOLOGY VISIT (OUTPATIENT)
Dept: ONCOLOGY | Facility: CLINIC | Age: 77
End: 2022-08-30
Attending: INTERNAL MEDICINE
Payer: COMMERCIAL

## 2022-08-30 VITALS
HEART RATE: 68 BPM | BODY MASS INDEX: 31.47 KG/M2 | DIASTOLIC BLOOD PRESSURE: 80 MMHG | OXYGEN SATURATION: 97 % | TEMPERATURE: 98.2 F | WEIGHT: 216.2 LBS | SYSTOLIC BLOOD PRESSURE: 156 MMHG | RESPIRATION RATE: 16 BRPM

## 2022-08-30 DIAGNOSIS — C61 PROSTATE CANCER (H): Primary | ICD-10-CM

## 2022-08-30 DIAGNOSIS — C79.51 BONE METASTASIS: ICD-10-CM

## 2022-08-30 PROCEDURE — G0463 HOSPITAL OUTPT CLINIC VISIT: HCPCS

## 2022-08-30 PROCEDURE — 99215 OFFICE O/P EST HI 40 MIN: CPT | Performed by: INTERNAL MEDICINE

## 2022-08-30 ASSESSMENT — PAIN SCALES - GENERAL: PAINLEVEL: NO PAIN (0)

## 2022-08-30 NOTE — NURSING NOTE
"Oncology Rooming Note    August 30, 2022 9:02 AM   Wallace Zelaya is a 77 year old male who presents for:    Chief Complaint   Patient presents with     Oncology Clinic Visit     Prostate cancer (HCC) (Primary Dx); Bone metastasis (H)      Initial Vitals: BP (!) 156/80   Pulse 68   Temp 98.2  F (36.8  C) (Oral)   Resp 16   Wt 98.1 kg (216 lb 3.2 oz)   SpO2 97%   BMI 31.47 kg/m   Estimated body mass index is 31.47 kg/m  as calculated from the following:    Height as of 7/1/22: 1.765 m (5' 9.5\").    Weight as of this encounter: 98.1 kg (216 lb 3.2 oz). Body surface area is 2.19 meters squared.  No Pain (0) Comment: Data Unavailable   No LMP for male patient.  Allergies reviewed: Yes  Medications reviewed: Yes    Medications: Medication refills not needed today.  Pharmacy name entered into [employer]:    [facility] (NOW [facility]) - [place], [place] - [address]  [id number] - [place] - [address]  [facility] - [place], [place] - [address]   [facility] - [place], [place] - [address] [place] [address] [address]        Arline Herman CMA            "

## 2022-08-30 NOTE — LETTER
8/30/2022     RE: Wallace Zelaya  [address] [facility] [address] [facility] [id number]    Dear Colleague,    Thank you for referring your patient, Wallace Zelaya, to the [facility]CER [facility]. Please see a copy of my visit note below.    [facility]INIC  FOLLOW-UP VISIT NOTE    PATIENT NAME: Wallace Zelaya MRN # [id number]  DATE OF VISIT: Aug 30, 2022 YOB: 1945    REFERRING PROVIDER: No referring provider defined for this encounter.    CANCER TYPE: Prostate cancer; Biochemical recurrence; Castration resistant disease  STAGE: Stage III - pT3b at diagnosis; M0    HISTORY OF PRESENTING ILLNESS:  Wallace was noted to have rising screening PSA from 2 - 4. He was referred to Dr. Rodolfo Connor. He had radical prostatectomy on 11/2015. Pathology from this revealed [place] 4+4 disease with extraprostatic extension, seminal vesicle extension and he was staged at pT3b. All of the 12 lymph nodes resected were negative for disease. Post operatively his PSA did not drop to undetectable levels and remained elevated at 0.56. It vladimir to 0.9 in April 2016 and he was referred to Dr. Newton for adjuvant radiation therapy. He completed radiation therapy but did not have any PSA response to this treatment.     TREATMENT SUMMARY:  11/13/2015 Radical prostatectomy  April 2016  Radiation therapy  He was started on intermittent androgen deprivation therapy which had to be changed to continuous with rapid rise in his PSA.      April 2020 - he was started on bicalutamide for complete androgen blockade  He had rising PSA in May 2021. His bone scan done on 6/30/21 revealed metastatic lesions of T6 and the sacrum. He was switched to abiraterone with prednisone on 8/7/21    CURRENT INTERVENTIONS:  Abiraterone with prednisone starting 8/7/21    SUBJECTIVE   Wallace is being seen for his prostate cancer    Steve was followed in person at this visit. He has been on abiraterone. He has been  tolerating this well. He has no new complains on therapy.  We discussed ECLIPSE clinical trial at our last visit about 10 days ago. He still has a number of questions for me prompting this visit.       PAST MEDICAL HISTORY   1. HTN  2. Dyslipidemia  3. Prostate cancer as detailed above      CURRENT OUTPATIENT MEDICATIONS     Current Outpatient Medications   Medication Sig     abiraterone (ZYTIGA) 250 MG tablet Take 4 tablets (1,000 mg) by mouth daily (with breakfast) Take with Low-Fat Meal     amLODIPine (NORVASC) 10 MG tablet Take 1 tablet (10 mg) by mouth daily     atorvastatin (LIPITOR) 20 MG tablet Take 1 tablet by mouth once daily     ibuprofen (ADVIL/MOTRIN) 800 MG tablet TAKE 1 TABLET BY MOUTH THREE TIMES DAILY WITH FOOD     LORazepam (ATIVAN) 0.5 MG tablet Take 1 tablet (0.5 mg) by mouth every 4 hours as needed (Anxiety, Nausea/Vomiting or Sleep)     ondansetron (ZOFRAN) 4 MG tablet Take 1 tablet (4 mg) by mouth every 8 hours as needed for nausea     predniSONE (DELTASONE) 5 MG tablet Take 1 tablet (5 mg) by mouth daily With Breakfast.     prochlorperazine (COMPAZINE) 10 MG tablet Take 0.5 tablets (5 mg) by mouth every 6 hours as needed (Nausea/Vomiting)     tamsulosin (FLOMAX) 0.4 MG capsule Take 1 capsule (0.4 mg) by mouth daily     valsartan (DIOVAN) 160 MG tablet Take 1 tablet (160 mg) by mouth daily     No current facility-administered medications for this visit.        ALLERGIES     No Known Allergies     REVIEW OF SYSTEMS   As above in the HPI, o/w complete 12-point ROS was negative.     PHYSICAL EXAM   BP (!) 156/80   Pulse 68   Temp 98.2  F (36.8  C) (Oral)   Resp 16   Wt 98.1 kg (216 lb 3.2 oz)   SpO2 97%   BMI 31.47 kg/m    SpO2 Readings from Last 4 Encounters:   09/21/18 96%   08/24/18 94%   06/22/18 95%   05/11/18 97%     Wt Readings from Last 3 Encounters:   08/30/22 98.1 kg (216 lb 3.2 oz)   08/01/22 99.3 kg (219 lb)   07/01/22 99.7 kg (219 lb 14.4 oz)      LABORATORY AND IMAGING STUDIES      Recent Labs   Lab Test 08/15/22  0810 06/27/22  0827 03/28/22  1050 02/23/22  1419 01/10/22  1120    140 140 138 138   POTASSIUM 4.1 4.1 4.7 4.3 4.3   CHLORIDE 104 109 107 108 107   CO2 28 26 31 27 27   ANIONGAP 8 5 2* 3 4   BUN 12.7 17 18 20 15   CR 0.93 0.91 0.90 1.09 0.88   GLC 97 107* 103* 111* 103*   TY 9.1 8.8 8.9 8.5 9.3     Recent Labs   Lab Test 01/21/20  1154   MAG 2.0     Recent Labs   Lab Test 08/15/22  0810 06/27/22  0827 03/28/22  1050 02/23/22  1419 01/10/22  1120   WBC 7.8 5.7 6.1 6.1 6.2   HGB 15.1 15.4 15.3 14.1 15.4    158 181 171 185   MCV 94 93 93 94 93   NEUTROPHIL 60 71 59 71 61     Recent Labs   Lab Test 08/15/22  0810 06/27/22  0827 03/28/22  1050 02/23/22  1419   BILITOTAL 0.7 0.9 0.7 0.8   ALKPHOS 101 94 108 113   ALT 34 49 80* 88*   AST 31 4 43 46*   ALBUMIN 4.2 3.7 3.6 3.4   LDH  --  246* 259* 246*     No results found for: TSH  No results for input(s): CEA in the last 24343 hours.  Results for orders placed or performed during the hospital encounter of 06/30/21   CT Chest/Abdomen/Pelvis w Contrast    Narrative    CT CHEST/ABDOMEN/PELVIS WITH CONTRAST 6/30/2021 11:31 AM    CLINICAL HISTORY: Prostate cancer with rising PSA; Prostate cancer  (H).    TECHNIQUE: CT scan of the chest, abdomen, and pelvis was performed  following injection of IV contrast. Multiplanar reformats were  obtained. Dose reduction techniques were used.     CONTRAST: 85mL Isovue-370    COMPARISON: CT chest 1/20/2017. CT abdomen and pelvis 11/9/2016.    FINDINGS:   LUNGS AND PLEURA: No acute airspace disease or effusion. Stable  posterior left upper lobe 0.3 cm nodule series 12 image 71. Stable 0.2  cm posterior medial right upper lobe nodule image 97. There is a solid  anterior right lower lobe nodule abutting the right major fissure that  is 0.7 cm series 12 image 218. This is present on the prior CT but it  previously measured approximately 0.4 cm. New small posterior right  lower lobe subpleural  0.4 cm nodule image 233. Stable right middle  lobe 0.2 cm nodule image 193.    MEDIASTINUM/AXILLAE: Moderate scattered thoracic aortic  calcifications. No acute mediastinal abnormality. No enlarged lymph  nodes identified. A few stable small thoracic lymph nodes are present.    CORONARY ARTERY CALCIFICATION: Moderate.    HEPATOBILIARY: No new focal hepatic lesion. Unremarkable gallbladder.    PANCREAS: Normal.    SPLEEN: Subcapsular faint hypodensity that appears mildly nodular is  again noted without convincing change compared to an older CT from  4/14/2016. This could be visualized on series 4 image 127 image 137.    ADRENAL GLANDS: Stable small right adrenal nodule that is 1.2 cm  series 4 image 154. Stable left adrenal.    KIDNEYS/BLADDER: No significant abnormality. No hydronephrosis. No  bladder lesion.    BOWEL: No acute abnormality.    PELVIC ORGANS: Prostate is absent. No new mass at the prostatectomy  bed.    ADDITIONAL FINDINGS: No new enlarged lymph node can be seen. Small  retroperitoneal lymph nodes appear stable.    MUSCULOSKELETAL: Increasing sclerosis at the right T6 level now  measuring 3.5 x 1.7 cm, previously 1.2 x 0.6 cm series 4 image 67.  Stable right lateral sixth rib sclerosis image 89, right pubic  sclerosis image 299. New sclerotic bone lesion at the mid sacrum that  is 3.6 x 3.1 cm on coronal series 6 image 141.      Impression    IMPRESSION:  1.  New sacral bone lesion and larger T6 vertebral body sclerotic bone  lesion consistent with sites of bony metastatic disease progression.  Bone scanning would provide more sensitive assessment. Stable other  areas of sclerosis as above.  2.  A few new or larger pulmonary nodules are indeterminant. Cannot  exclude possibility of metastatic disease. Recommend further workup.  The dominant nodule is at the right lower lobe near the major fissure.  Recommend short interval follow-up CT chest in 3 months.  3.  Other stable findings as above.    HARSH  MD PAM     SYSTEM ID:  [id number]     Recent Labs   Lab Test 08/15/22  0810 06/27/22  0827 03/28/22  1050 02/23/22  1419 12/29/21  0923 12/10/21  1018 10/01/21  1425   PSA 7.81* 5.17 2.00 2.11 0.99   < > 0.51   TESTOSTTOTAL  --  <2* <2* <2* <2*  --  <2*    < > = values in this interval not displayed.       PSA Trend over time              ASSESSMENT AND PLAN   1. 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 ECOG PS 0   3. HTN  4. ECOG PS1  5. No medical comorbidity    I had a lengthy discussion with Steve who is alone at this visit.     I focussed on his primary concerns at this visit. I explained him that he has progressive disease though the disease volume is not high. His current PSA values are the highest for him. This is natural course of disease where every therapy responds for a period of time and disease eventually progresses. We would keep moving to next line of therapy as the disease progresses. Subsequent line of therapies can have more side effects - particularly chemotherapy or even targeted therapies. The goal is not to inflict side effects to him. Despite the side effects all of these therapies have been shown to improve the quality of life for patients including chemotherapy. Lutetium is very well tolerated for the most part.     Outside of clinical trial, I would have recommended a biopsy and chemotherapy in the absence of targetable mutations. He does not have any obvious lesions to biopsy. Bone biopsies are not very helpful for NGS testing. I was hoping for the PET-CT scan to show us better spots to biopsy.     The study will cover for all evaluations, therapies not covered as a standard of care treatment and the standard of care activities would be covered by his insurance. His quite concerned that his insurance will not cover his care. He has called insurance company and they have informed him that only MEDICARE covered studies will be covered.  Further all non-SOC outside of study will not be covered. We will not be doing anything outside of SOC unless it is strictly per trial and will be covered. He might have additional costs of copay.     His refusal to participate in clinical trial will not affect our relationship. He is quite apprehensive about participating in clinical trial. I personally believe that most if not all oncology care should be delivered in form of clinical trials. We should be learning from every patient and every therapy to make progress in the field and for every one to gain benefit. Indeed clinical trials are essential part of cancer care more so than any other disease state.     He wondered next steps if her were to agree for trial. He would have to sign consent, have screening labs, EKG and PSMA-PET scan done. If he is deemed eligible, he would be enrolled in the clinical trial. He had questions on number of visit to [facility]. He would have one visit for PET scan and if he is randomized to treatment arm 6 visits for lutetium (every 6 weeks).     He is going review all information with his wife. He will let us know. I will have my research nurse - Janel Gee reach out to him.     45 minutes spent on the date of the encounter doing chart review, history and exam, documentation and further activities as noted above      Gigi Ward  Hematologist and Medical Oncologist  [facility]

## 2022-08-31 NOTE — PROGRESS NOTES
[facility] CANCER CLINIC  FOLLOW-UP VISIT NOTE    PATIENT NAME: Wallace Zelaya MRN # [id number]  DATE OF VISIT: Aug 30, 2022 YOB: 1945    REFERRING PROVIDER: No referring provider defined for this encounter.    CANCER TYPE: Prostate cancer; Biochemical recurrence; Castration resistant disease  STAGE: Stage III - pT3b at diagnosis; M0    HISTORY OF PRESENTING ILLNESS:  Wallace was noted to have rising screening PSA from 2 - 4. He was referred to Dr. Rodolfo Connor. He had radical prostatectomy on 11/2015. Pathology from this revealed [place] 4+4 disease with extraprostatic extension, seminal vesicle extension and he was staged at pT3b. All of the 12 lymph nodes resected were negative for disease. Post operatively his PSA did not drop to undetectable levels and remained elevated at 0.56. It vladimir to 0.9 in April 2016 and he was referred to Dr. Newton for adjuvant radiation therapy. He completed radiation therapy but did not have any PSA response to this treatment.     TREATMENT SUMMARY:  11/13/2015 Radical prostatectomy  April 2016  Radiation therapy  He was started on intermittent androgen deprivation therapy which had to be changed to continuous with rapid rise in his PSA.      April 2020 - he was started on bicalutamide for complete androgen blockade  He had rising PSA in May 2021. His bone scan done on 6/30/21 revealed metastatic lesions of T6 and the sacrum. He was switched to abiraterone with prednisone on 8/7/21    CURRENT INTERVENTIONS:  Abiraterone with prednisone starting 8/7/21    SUBJECTIVE   Wallace is being seen for his prostate cancer    Steve was followed in person at this visit. He has been on abiraterone. He has been tolerating this well. He has no new complains on therapy.  We discussed ECLIPSE clinical trial at our last visit about 10 days ago. He still has a number of questions for me prompting this visit.       PAST MEDICAL HISTORY    1. HTN  2. Dyslipidemia  3. Prostate cancer as detailed above      CURRENT OUTPATIENT MEDICATIONS     Current Outpatient Medications   Medication Sig     abiraterone (ZYTIGA) 250 MG tablet Take 4 tablets (1,000 mg) by mouth daily (with breakfast) Take with Low-Fat Meal     amLODIPine (NORVASC) 10 MG tablet Take 1 tablet (10 mg) by mouth daily     atorvastatin (LIPITOR) 20 MG tablet Take 1 tablet by mouth once daily     ibuprofen (ADVIL/MOTRIN) 800 MG tablet TAKE 1 TABLET BY MOUTH THREE TIMES DAILY WITH FOOD     LORazepam (ATIVAN) 0.5 MG tablet Take 1 tablet (0.5 mg) by mouth every 4 hours as needed (Anxiety, Nausea/Vomiting or Sleep)     ondansetron (ZOFRAN) 4 MG tablet Take 1 tablet (4 mg) by mouth every 8 hours as needed for nausea     predniSONE (DELTASONE) 5 MG tablet Take 1 tablet (5 mg) by mouth daily With Breakfast.     prochlorperazine (COMPAZINE) 10 MG tablet Take 0.5 tablets (5 mg) by mouth every 6 hours as needed (Nausea/Vomiting)     tamsulosin (FLOMAX) 0.4 MG capsule Take 1 capsule (0.4 mg) by mouth daily     valsartan (DIOVAN) 160 MG tablet Take 1 tablet (160 mg) by mouth daily     No current facility-administered medications for this visit.        ALLERGIES     No Known Allergies     REVIEW OF SYSTEMS   As above in the HPI, o/w complete 12-point ROS was negative.     PHYSICAL EXAM   BP (!) 156/80   Pulse 68   Temp 98.2  F (36.8  C) (Oral)   Resp 16   Wt 98.1 kg (216 lb 3.2 oz)   SpO2 97%   BMI 31.47 kg/m    SpO2 Readings from Last 4 Encounters:   09/21/18 96%   08/24/18 94%   06/22/18 95%   05/11/18 97%     Wt Readings from Last 3 Encounters:   08/30/22 98.1 kg (216 lb 3.2 oz)   08/01/22 99.3 kg (219 lb)   07/01/22 99.7 kg (219 lb 14.4 oz)        LABORATORY AND IMAGING STUDIES     Recent Labs   Lab Test 08/15/22  0810 06/27/22  0827 03/28/22  1050 02/23/22  1419 01/10/22  1120    140 140 138 138   POTASSIUM 4.1 4.1 4.7 4.3 4.3   CHLORIDE 104 109 107 108 107   CO2 28 26 31 27 27    ANIONGAP 8 5 2* 3 4   BUN 12.7 17 18 20 15   CR 0.93 0.91 0.90 1.09 0.88   GLC 97 107* 103* 111* 103*   TY 9.1 8.8 8.9 8.5 9.3     Recent Labs   Lab Test 01/21/20  1154   MAG 2.0     Recent Labs   Lab Test 08/15/22  0810 06/27/22  0827 03/28/22  1050 02/23/22  1419 01/10/22  1120   WBC 7.8 5.7 6.1 6.1 6.2   HGB 15.1 15.4 15.3 14.1 15.4    158 181 171 185   MCV 94 93 93 94 93   NEUTROPHIL 60 71 59 71 61     Recent Labs   Lab Test 08/15/22  0810 06/27/22  0827 03/28/22  1050 02/23/22  1419   BILITOTAL 0.7 0.9 0.7 0.8   ALKPHOS 101 94 108 113   ALT 34 49 80* 88*   AST 31 4 43 46*   ALBUMIN 4.2 3.7 3.6 3.4   LDH  --  246* 259* 246*     No results found for: TSH  No results for input(s): CEA in the last 36626 hours.  Results for orders placed or performed during the hospital encounter of 06/30/21   CT Chest/Abdomen/Pelvis w Contrast    Narrative    CT CHEST/ABDOMEN/PELVIS WITH CONTRAST 6/30/2021 11:31 AM    CLINICAL HISTORY: Prostate cancer with rising PSA; Prostate cancer  (H).    TECHNIQUE: CT scan of the chest, abdomen, and pelvis was performed  following injection of IV contrast. Multiplanar reformats were  obtained. Dose reduction techniques were used.     CONTRAST: 85mL Isovue-370    COMPARISON: CT chest 1/20/2017. CT abdomen and pelvis 11/9/2016.    FINDINGS:   LUNGS AND PLEURA: No acute airspace disease or effusion. Stable  posterior left upper lobe 0.3 cm nodule series 12 image 71. Stable 0.2  cm posterior medial right upper lobe nodule image 97. There is a solid  anterior right lower lobe nodule abutting the right major fissure that  is 0.7 cm series 12 image 218. This is present on the prior CT but it  previously measured approximately 0.4 cm. New small posterior right  lower lobe subpleural 0.4 cm nodule image 233. Stable right middle  lobe 0.2 cm nodule image 193.    MEDIASTINUM/AXILLAE: Moderate scattered thoracic aortic  calcifications. No acute mediastinal abnormality. No enlarged lymph  nodes  identified. A few stable small thoracic lymph nodes are present.    CORONARY ARTERY CALCIFICATION: Moderate.    HEPATOBILIARY: No new focal hepatic lesion. Unremarkable gallbladder.    PANCREAS: Normal.    SPLEEN: Subcapsular faint hypodensity that appears mildly nodular is  again noted without convincing change compared to an older CT from  4/14/2016. This could be visualized on series 4 image 127 image 137.    ADRENAL GLANDS: Stable small right adrenal nodule that is 1.2 cm  series 4 image 154. Stable left adrenal.    KIDNEYS/BLADDER: No significant abnormality. No hydronephrosis. No  bladder lesion.    BOWEL: No acute abnormality.    PELVIC ORGANS: Prostate is absent. No new mass at the prostatectomy  bed.    ADDITIONAL FINDINGS: No new enlarged lymph node can be seen. Small  retroperitoneal lymph nodes appear stable.    MUSCULOSKELETAL: Increasing sclerosis at the right T6 level now  measuring 3.5 x 1.7 cm, previously 1.2 x 0.6 cm series 4 image 67.  Stable right lateral sixth rib sclerosis image 89, right pubic  sclerosis image 299. New sclerotic bone lesion at the mid sacrum that  is 3.6 x 3.1 cm on coronal series 6 image 141.      Impression    IMPRESSION:  1.  New sacral bone lesion and larger T6 vertebral body sclerotic bone  lesion consistent with sites of bony metastatic disease progression.  Bone scanning would provide more sensitive assessment. Stable other  areas of sclerosis as above.  2.  A few new or larger pulmonary nodules are indeterminant. Cannot  exclude possibility of metastatic disease. Recommend further workup.  The dominant nodule is at the right lower lobe near the major fissure.  Recommend short interval follow-up CT chest in 3 months.  3.  Other stable findings as above.    HARSH OROZCO MD          SYSTEM ID:  [id number]     Recent Labs   Lab Test 08/15/22  0810 06/27/22  0827 03/28/22  1050 02/23/22  1419 12/29/21  0923 12/10/21  1018 10/01/21  1425   PSA 7.81* 5.17 2.00 2.11 0.99   < >  0.51   TESTOSTTOTAL  --  <2* <2* <2* <2*  --  <2*    < > = values in this interval not displayed.         PSA Trend over time              ASSESSMENT AND PLAN   1. 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 ECOG PS 0   3. HTN  4. ECOG PS1  5. No medical comorbidity    I had a lengthy discussion with Steve who is alone at this visit.     I focussed on his primary concerns at this visit. I explained him that he has progressive disease though the disease volume is not high. His current PSA values are the highest for him. This is natural course of disease where every therapy responds for a period of time and disease eventually progresses. We would keep moving to next line of therapy as the disease progresses. Subsequent line of therapies can have more side effects - particularly chemotherapy or even targeted therapies. The goal is not to inflict side effects to him. Despite the side effects all of these therapies have been shown to improve the quality of life for patients including chemotherapy. Lutetium is very well tolerated for the most part.     Outside of clinical trial, I would have recommended a biopsy and chemotherapy in the absence of targetable mutations. He does not have any obvious lesions to biopsy. Bone biopsies are not very helpful for NGS testing. I was hoping for the PET-CT scan to show us better spots to biopsy.     The study will cover for all evaluations, therapies not covered as a standard of care treatment and the standard of care activities would be covered by his insurance. His quite concerned that his insurance will not cover his care. He has called insurance company and they have informed him that only MEDICARE covered studies will be covered. Further all non-SOC outside of study will not be covered. We will not be doing anything outside of SOC unless it is strictly per trial and will be covered. He might have additional costs of copay.     His  refusal to participate in clinical trial will not affect our relationship. He is quite apprehensive about participating in clinical trial. I personally believe that most if not all oncology care should be delivered in form of clinical trials. We should be learning from every patient and every therapy to make progress in the field and for every one to gain benefit. Indeed clinical trials are essential part of cancer care more so than any other disease state.     He wondered next steps if her were to agree for trial. He would have to sign consent, have screening labs, EKG and PSMA-PET scan done. If he is deemed eligible, he would be enrolled in the clinical trial. He had questions on number of visit to [facility]. He would have one visit for PET scan and if he is randomized to treatment arm 6 visits for lutetium (every 6 weeks).     He is going review all information with his wife. He will let us know. I will have my research nurse - Janel Gee reach out to him.       45 minutes spent on the date of the encounter doing chart review, history and exam, documentation and further activities as noted above      Gigi Ward    Hematologist and Medical Oncologist  [facility]

## 2022-09-07 ENCOUNTER — TELEPHONE (OUTPATIENT)
Dept: ONCOLOGY | Facility: CLINIC | Age: 77
End: 2022-09-07

## 2022-09-07 NOTE — TELEPHONE ENCOUNTER
Pt is agreeable to consent to ECLIPSE clinical trial.   We will meet on Friday, 9/9/22 at 1300 ([facility]) to review and sign consent.   Pt is aware that it can take up to 4 weeks for screening procedures.

## 2022-09-09 ENCOUNTER — RESEARCH ENCOUNTER (OUTPATIENT)
Dept: ONCOLOGY | Facility: CLINIC | Age: 77
End: 2022-09-09

## 2022-09-09 NOTE — NURSING NOTE
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     The consent form, including purpose, risks and benefits, was reviewed with Wallace Zelaya, and all questions were answered before he signed the consent form. The patient understands that the study involves a screening phase, active treatment phase, and a post-treatment follow up phase.   He also understands he will be randomized 2:1 (treatment:hormone therapy) and has the option to crossover to treatment arm if/when he fails hormone therapy (as applicable).      Present during the discussion were Steve and myself. Prior to this meeting Dr. Ward met (x2) with patient and wife, Sandra,  to discuss trial in depth.  A copy of the signed form was provided to the patient and a copy was placed in his EMR. No procedures specific to this study were performed prior to the patient signing the consent form.     Consent Version 3.0, Date: 12OCT2021  Consent obtained by: Kala Arcos RN    Date: 09/Sept/2022  HIPAA authorization signed?: yes  HIPAA authorization version date: [id number]  Steve is aware that all screening procedures (Labs, imaging, ECG, etc) will be scheduled over the next four weeks.   He is in agreement.

## 2022-09-13 ENCOUNTER — DOCUMENTATION ONLY (OUTPATIENT)
Dept: INFUSION THERAPY | Facility: CLINIC | Age: 77
End: 2022-09-13

## 2022-09-13 NOTE — PROGRESS NOTES
[facility] Oral Chemotherapy Monitoring Program    Patient started zytiga 8/7/21.  He had disease progression and now has enrolled in the lutathera trial.  Thank you for the opportunity to be a part in the care of this patient's oral chemotherapy. The oncology pharmacy will no longer be following this patient for oral chemotherapy. If there are any questions or the plan changes, feel free to contact us.    ORAL CHEMOTHERAPY 10/1/2021 10/21/2021 12/23/2021 1/17/2022 2/23/2022 8/16/2022 9/13/2022   Assessment Type Lab Monitoring Left Voicemail Refill Other Lab Monitoring;Monthly Follow up Lab Monitoring Discontinuation   Stop Date - - - - - - 9/13/2022   Reason for Discontinuation - - - - - - Disease progression   Other: - - - - - - enrolled in clinical trial   Diagnosis Code Prostate Cancer Prostate Cancer Prostate Cancer Prostate Cancer Prostate Cancer Prostate Cancer Prostate Cancer   Providers Dr. Ed Ward   [place] [facility] [place] Name Xtandi (enzalutamide) Xtandi (enzalutamide) Xtandi (enzalutamide) Zytiga (abiraterone) Zytiga (abiraterone) Zytiga (abiraterone) Zytiga (abiraterone)   Dose 160 mg 160 mg 160 mg 500 mg 500 mg 1,000 mg 500 mg   Current Schedule Daily Daily Daily Daily Daily Daily Daily   Cycle Details Continuous Continuous Continuous Continuous Continuous Continuous Continuous   Start Date of Last Cycle - - - - - - -   Planned next cycle start date - - 12/28/2021 1/17/2022 3/3/2022 - -   Doses missed in last 2 weeks - - - - 0 - -   Adherence Assessment - - - - Adherent - -   Adverse Effects Increased AST/ALT/T BILI - - - Increased AST/ALT/T BILI - -   Increased AST/ALT/T BILI Grade 1 - - - Grade 1 - -   Pharmacist Intervention(increased ast/alt/t bili) Yes - - - No - -   Intervention(s) Increased lab monitoring - - - - - -   Any new drug  interactions? - - - - No - -   Is the dose as ordered appropriate for the patient? - - - - Yes - -   Is the patient currently in pain? - - - - No - -   Has the patient been assessed within the past 6 months for depression? - - - - Yes - -   Has the patient missed any days of school, work, or other routine activity? - - - - No - -   Since the last time we talked, have you been hospitalized or used the emergency room? - - - - No - -       Martin Meese, [facility]

## 2022-09-21 DIAGNOSIS — Z00.6 EXAMINATION OF PARTICIPANT IN CLINICAL TRIAL: Primary | ICD-10-CM

## 2022-09-21 NOTE — PROGRESS NOTES
Oncology/Hematology Visit Note  Sep 23, 2022    Reason for Visit: Follow up of prostate cancer with biochemical recurrence     History of Present Illness:   Wallace was noted to have rising screening PSA from 2 - 4. He was referred to Dr. Rodolfo Connor. He had radical prostatectomy on 11/2015. Pathology from this revealed Cayla 4+4 disease with extraprostatic extension, seminal vesicle extension and he was staged at pT3b. All of the 12 lymph nodes resected were negative for disease. Post operatively his PSA did not drop to undetectable levels and remained elevated at 0.56. It vladimir to 0.9 in April 2016 and he was referred to Dr. Newton for adjuvant radiation therapy. He completed radiation therapy but did not have any PSA response to this treatment.      TREATMENT SUMMARY:  11/13/2015 Radical prostatectomy  April 2016  Radiation therapy  He was started on intermittent androgen deprivation therapy which had to be changed to continuous with rapid rise in his PSA.       April 2020 - he was started on bicalutamide for complete androgen blockade  He had rising PSA in May 2021. His bone scan done on 6/30/21 revealed metastatic lesions of T6 and the sacrum. He was switched to abiraterone with prednisone on 8/7/21     He is now to start the ECLIPSE trial.     Interval History:  Steve is doing well. He has urinary frequency that is unchanged, has not tried tamsulosin yet. ROS negative other than very occasional episodes of vertigo (<1 per year).    Review of Systems:  Patient denies fevers, chills, night sweats, unexplained weight changes, headaches, dizziness, vision or hearing changes, new lumps or bumps, chest pain, shortness of breath, cough, abdominal pain, nausea, vomiting, changes to bowel or bladder, swelling of extremities, bleeding issues, or rash.    Current Outpatient Medications   Medication Sig Dispense Refill     abiraterone (ZYTIGA) 250 MG tablet Take 4 tablets (1,000 mg) by mouth daily (with breakfast)  Take with Low-Fat Meal 120 tablet 11     amLODIPine (NORVASC) 10 MG tablet Take 1 tablet (10 mg) by mouth daily 90 tablet 3     atorvastatin (LIPITOR) 20 MG tablet Take 1 tablet by mouth once daily 90 tablet 3     ibuprofen (ADVIL/MOTRIN) 800 MG tablet TAKE 1 TABLET BY MOUTH THREE TIMES DAILY WITH FOOD 90 tablet 0     LORazepam (ATIVAN) 0.5 MG tablet Take 1 tablet (0.5 mg) by mouth every 4 hours as needed (Anxiety, Nausea/Vomiting or Sleep) 30 tablet 2     ondansetron (ZOFRAN) 4 MG tablet Take 1 tablet (4 mg) by mouth every 8 hours as needed for nausea 30 tablet 1     predniSONE (DELTASONE) 5 MG tablet Take 1 tablet (5 mg) by mouth daily With Breakfast. 30 tablet 0     prochlorperazine (COMPAZINE) 10 MG tablet Take 0.5 tablets (5 mg) by mouth every 6 hours as needed (Nausea/Vomiting) 30 tablet 2     tamsulosin (FLOMAX) 0.4 MG capsule Take 1 capsule (0.4 mg) by mouth daily 30 capsule 11     valsartan (DIOVAN) 160 MG tablet Take 1 tablet (160 mg) by mouth daily 90 tablet 3       Past Medical History  Past Medical History:   Diagnosis Date     Basal cell carcinoma      Essential hypertension 08/24/2006     Problem list name updated by automated process. Provider to review     Hyperlipidemia LDL goal <130 10/31/2010     Prostate cancer (HCC) 11/13/2015    Cayla 8 prostate cancer [id number], s/p robotic radical prostatectomy and adjuvant radiation      Past Surgical History:   Procedure Laterality Date     BIOPSY  02/2011    skin tagged left arm     COLONOSCOPY  8/19/2013    Procedure: COLONOSCOPY;  Colonoscopy ;  Surgeon: Emerson Martinez MD, MD;  Location: [facility] GI     DAVINCI PROSTATECTOMY N/A 11/13/2015    Procedure: DAVINCI PROSTATECTOMY;  Surgeon: Rodolfo Connor MD;  Location: [facility] OR     ZZC NONSPECIFIC PROCEDURE      Lawnmower injury right foot-toe      ZZC NONSPECIFIC PROCEDURE      Pilonidal cyst     No Known Allergies  Social History   Social History     Tobacco Use     Smoking status: Former Smoker      Packs/day: 1.00     Years: 25.00     Pack years: 25.00     Quit date: 2009     Years since quittin.1     Smokeless tobacco: Never Used   Vaping Use     Vaping Use: Never used   Substance Use Topics     Alcohol use: Yes     Comment: socially,very little     Drug use: No      Past medical history and social history were reviewed.    Physical Examination:  BP (!) 152/86   Pulse 67   Temp 97  F (36.1  C) (Tympanic)   Resp 16   Wt 99.4 kg (219 lb 1.6 oz)   SpO2 94%   BMI 31.89 kg/m    Wt Readings from Last 10 Encounters:   22 98.1 kg (216 lb 3.2 oz)   22 99.3 kg (219 lb)   22 99.7 kg (219 lb 14.4 oz)   22 99.8 kg (220 lb)   22 99.1 kg (218 lb 8 oz)   22 98 kg (216 lb)   22 99.3 kg (219 lb)   12/10/21 100.7 kg (222 lb)   10/29/21 98.3 kg (216 lb 11.2 oz)   10/01/21 100.1 kg (220 lb 9.6 oz)     Constitutional: Well-appearing male in no acute distress.  Eyes: EOMI, PERRL. No scleral icterus.  ENT: Oral mucosa is moist without lesions or thrush.   Lymphatic: Neck is supple without cervical or supraclavicular lymphadenopathy.   Cardiovascular: Regular rate and rhythm. No murmurs, gallops, or rubs. No peripheral edema.  Respiratory: Clear to auscultation bilaterally. No wheezes or crackles.  Gastrointestinal: Bowel sounds present. Abdomen soft, non-tender. No palpable hepatosplenomegaly or masses.   Neurologic: Cranial nerves II through XII are grossly intact.  Skin: No rashes, petechiae, or bruising noted on exposed skin.    Laboratory Data:   Latest Reference Range & Units 22 10:12   Sodium 136 - 145 mmol/L 143   Potassium 3.4 - 5.3 mmol/L 4.4   Chloride 98 - 107 mmol/L 104   Carbon Dioxide (CO2) 22 - 29 mmol/L 29   Urea Nitrogen 8.0 - 23.0 mg/dL 11.6   Creatinine 0.67 - 1.17 mg/dL 0.86   GFR Estimate >60 mL/min/1.73m2 89   Calcium 8.8 - 10.2 mg/dL 9.2   Anion Gap 7 - 15 mmol/L 10   Magnesium 1.7 - 2.3 mg/dL 2.1   Albumin 3.5 - 5.2 g/dL 4.1   Protein Total 6.4 -  8.3 g/dL 7.1   Alkaline Phosphatase 40 - 129 U/L 95   ALT 10 - 50 U/L 30   AST 10 - 50 U/L 31   Bilirubin Total <=1.2 mg/dL 0.6   Cholesterol <200 mg/dL 197   Glucose 70 - 99 mg/dL 118 (H)   HDL Cholesterol >=40 mg/dL 48   LDL Cholesterol Calculated <=100 mg/dL 122 (H)   Non HDL Cholesterol <130 mg/dL 149 (H)   PSA Tumor Marker 0.00 - 6.50 ng/mL 9.12 (H)   Triglycerides <150 mg/dL 136   TSH 0.30 - 4.20 uIU/mL 0.86   WBC 4.0 - 11.0 10e3/uL 6.6   Hemoglobin 13.3 - 17.7 g/dL 15.2   Hematocrit 40.0 - 53.0 % 45.7   Platelet Count 150 - 450 10e3/uL 167   RBC Count 4.40 - 5.90 10e6/uL 4.86   MCV 78 - 100 fL 94   MCH 26.5 - 33.0 pg 31.3   MCHC 31.5 - 36.5 g/dL 33.3   RDW 10.0 - 15.0 % 13.2   % Neutrophils % 70   % Lymphocytes % 19   % Monocytes % 7   % Eosinophils % 3   % Basophils % 1   Absolute Basophils 0.0 - 0.2 10e3/uL 0.1   Absolute Eosinophils 0.0 - 0.7 10e3/uL 0.2   Absolute Immature Granulocytes <=0.4 10e3/uL 0.0   Absolute Lymphocytes 0.8 - 5.3 10e3/uL 1.2   Absolute Monocytes 0.0 - 1.3 10e3/uL 0.4   % Immature Granulocytes % 0   Absolute Neutrophils 1.6 - 8.3 10e3/uL 4.7   Absolute NRBCs 10e3/uL 0.0   NRBCs per 100 WBC <1 /100 0   (H): Data is abnormally high      Assessment and Plan:  1. Metastatic Castration Resistant Prostate Cancer  Currently on abiraterone + prednisone with ongoing rise in PSA. Plan to enroll in ECLIPSE trial once PET PSMA completed (scheduled next week 9/29/22).    Steve is in excellent shape with minimal health issues. ECOG 0. Labs reviewed and other than mild hyperlipidemia no concerns. He is going to try to lose weight.     He will continue abiraterone until he is approved for trial and then we will discuss next steps.    20 minutes spent on the date of the encounter doing chart review, review of test results, interpretation of tests, patient visit and documentation     Rodolfo Logan PA-C  Department of Hematology and Oncology  [facility]

## 2022-09-22 ENCOUNTER — HOSPITAL ENCOUNTER (OUTPATIENT)
Dept: NUCLEAR MEDICINE | Facility: CLINIC | Age: 77
Setting detail: NUCLEAR MEDICINE
Discharge: HOME | End: 2022-09-22
Attending: INTERNAL MEDICINE
Payer: COMMERCIAL

## 2022-09-22 ENCOUNTER — LAB (OUTPATIENT)
Dept: ONCOLOGY | Facility: CLINIC | Age: 77
End: 2022-09-22
Attending: INTERNAL MEDICINE
Payer: COMMERCIAL

## 2022-09-22 DIAGNOSIS — R53.83 OTHER FATIGUE: ICD-10-CM

## 2022-09-22 DIAGNOSIS — C79.51 BONE METASTASIS: ICD-10-CM

## 2022-09-22 DIAGNOSIS — Z00.6 EXAMINATION OF PARTICIPANT IN CLINICAL TRIAL: ICD-10-CM

## 2022-09-22 DIAGNOSIS — C61 PROSTATE CANCER (H): ICD-10-CM

## 2022-09-22 LAB
ALBUMIN SERPL BCG-MCNC: 4.1 G/DL (ref 3.5–5.2)
ALP SERPL-CCNC: 95 U/L (ref 40–129)
ALT SERPL W P-5'-P-CCNC: 30 U/L (ref 10–50)
ANION GAP SERPL CALCULATED.3IONS-SCNC: 10 MMOL/L (ref 7–15)
AST SERPL W P-5'-P-CCNC: 31 U/L (ref 10–50)
BASOPHILS # BLD AUTO: 0.1 10E3/UL (ref 0–0.2)
BASOPHILS NFR BLD AUTO: 1 %
BILIRUB SERPL-MCNC: 0.6 MG/DL
BUN SERPL-MCNC: 11.6 MG/DL (ref 8–23)
CALCIUM SERPL-MCNC: 9.2 MG/DL (ref 8.8–10.2)
CHLORIDE SERPL-SCNC: 104 MMOL/L (ref 98–107)
CHOLEST SERPL-MCNC: 197 MG/DL
CREAT SERPL-MCNC: 0.86 MG/DL (ref 0.67–1.17)
DEPRECATED HCO3 PLAS-SCNC: 29 MMOL/L (ref 22–29)
EOSINOPHIL # BLD AUTO: 0.2 10E3/UL (ref 0–0.7)
EOSINOPHIL NFR BLD AUTO: 3 %
ERYTHROCYTE [DISTWIDTH] IN BLOOD BY AUTOMATED COUNT: 13.2 % (ref 10–15)
GFR SERPL CREATININE-BSD FRML MDRD: 89 ML/MIN/1.73M2
GLUCOSE SERPL-MCNC: 118 MG/DL (ref 70–99)
HCT VFR BLD AUTO: 45.7 % (ref 40–53)
HDLC SERPL-MCNC: 48 MG/DL
HGB BLD-MCNC: 15.2 G/DL (ref 13.3–17.7)
IMM GRANULOCYTES # BLD: 0 10E3/UL
IMM GRANULOCYTES NFR BLD: 0 %
LDLC SERPL CALC-MCNC: 122 MG/DL
LYMPHOCYTES # BLD AUTO: 1.2 10E3/UL (ref 0.8–5.3)
LYMPHOCYTES NFR BLD AUTO: 19 %
MAGNESIUM SERPL-MCNC: 2.1 MG/DL (ref 1.7–2.3)
MCH RBC QN AUTO: 31.3 PG (ref 26.5–33)
MCHC RBC AUTO-ENTMCNC: 33.3 G/DL (ref 31.5–36.5)
MCV RBC AUTO: 94 FL (ref 78–100)
MONOCYTES # BLD AUTO: 0.4 10E3/UL (ref 0–1.3)
MONOCYTES NFR BLD AUTO: 7 %
NEUTROPHILS # BLD AUTO: 4.7 10E3/UL (ref 1.6–8.3)
NEUTROPHILS NFR BLD AUTO: 70 %
NONHDLC SERPL-MCNC: 149 MG/DL
NRBC # BLD AUTO: 0 10E3/UL
NRBC BLD AUTO-RTO: 0 /100
PLATELET # BLD AUTO: 167 10E3/UL (ref 150–450)
POTASSIUM SERPL-SCNC: 4.4 MMOL/L (ref 3.4–5.3)
PROT SERPL-MCNC: 7.1 G/DL (ref 6.4–8.3)
PSA SERPL-MCNC: 9.12 NG/ML (ref 0–6.5)
RBC # BLD AUTO: 4.86 10E6/UL (ref 4.4–5.9)
SODIUM SERPL-SCNC: 143 MMOL/L (ref 136–145)
TRIGL SERPL-MCNC: 136 MG/DL
TSH SERPL DL<=0.005 MIU/L-ACNC: 0.86 UIU/ML (ref 0.3–4.2)
WBC # BLD AUTO: 6.6 10E3/UL (ref 4–11)

## 2022-09-22 PROCEDURE — 80061 LIPID PANEL: CPT | Performed by: INTERNAL MEDICINE

## 2022-09-22 PROCEDURE — 78306 BONE IMAGING WHOLE BODY: CPT

## 2022-09-22 PROCEDURE — 84443 ASSAY THYROID STIM HORMONE: CPT | Performed by: INTERNAL MEDICINE

## 2022-09-22 PROCEDURE — 93005 ELECTROCARDIOGRAM TRACING: CPT

## 2022-09-22 PROCEDURE — 36415 COLL VENOUS BLD VENIPUNCTURE: CPT

## 2022-09-22 PROCEDURE — 84153 ASSAY OF PSA TOTAL: CPT | Performed by: INTERNAL MEDICINE

## 2022-09-22 PROCEDURE — 93010 ELECTROCARDIOGRAM REPORT: CPT

## 2022-09-22 PROCEDURE — 343N000001 HC RX 343: Performed by: INTERNAL MEDICINE

## 2022-09-22 PROCEDURE — 83735 ASSAY OF MAGNESIUM: CPT | Performed by: INTERNAL MEDICINE

## 2022-09-22 PROCEDURE — 85025 COMPLETE CBC W/AUTO DIFF WBC: CPT | Performed by: INTERNAL MEDICINE

## 2022-09-22 PROCEDURE — 80053 COMPREHEN METABOLIC PANEL: CPT | Performed by: INTERNAL MEDICINE

## 2022-09-22 PROCEDURE — A9561 TC99M OXIDRONATE: HCPCS | Performed by: INTERNAL MEDICINE

## 2022-09-22 PROCEDURE — 84403 ASSAY OF TOTAL TESTOSTERONE: CPT | Performed by: INTERNAL MEDICINE

## 2022-09-22 RX ADMIN — Medication 26 MILLICURIE: at 11:01

## 2022-09-22 NOTE — PROGRESS NOTES
Medical Assistant Note:  Wallace Zelaya presents today for blood draw.    Patient seen by provider today: No.   present during visit today: Not Applicable.    Concerns: No Concerns.    Procedure:  Lab draw site: left antecub, Needle type: butterfly, Gauge: 21.  EKG performed and called to be read at 10:18 AM on 09/22/22.     Post Assessment:  Labs drawn without difficulty: Yes.    Discharge Plan:  Departure Mode: Ambulatory.    Face to Face Time: 20.    Olya Valenzuela, CMA

## 2022-09-23 ENCOUNTER — ONCOLOGY VISIT (OUTPATIENT)
Dept: ONCOLOGY | Facility: CLINIC | Age: 77
End: 2022-09-23
Attending: PHYSICIAN ASSISTANT
Payer: COMMERCIAL

## 2022-09-23 VITALS
BODY MASS INDEX: 31.89 KG/M2 | OXYGEN SATURATION: 94 % | WEIGHT: 219.1 LBS | TEMPERATURE: 97 F | DIASTOLIC BLOOD PRESSURE: 86 MMHG | RESPIRATION RATE: 16 BRPM | HEART RATE: 67 BPM | SYSTOLIC BLOOD PRESSURE: 152 MMHG

## 2022-09-23 DIAGNOSIS — C79.51 BONE METASTASIS: ICD-10-CM

## 2022-09-23 DIAGNOSIS — C61 PROSTATE CANCER (H): ICD-10-CM

## 2022-09-23 DIAGNOSIS — C61 PROSTATE CANCER (H): Primary | ICD-10-CM

## 2022-09-23 PROCEDURE — 99214 OFFICE O/P EST MOD 30 MIN: CPT | Performed by: PHYSICIAN ASSISTANT

## 2022-09-23 PROCEDURE — G0463 HOSPITAL OUTPT CLINIC VISIT: HCPCS

## 2022-09-23 RX ORDER — PREDNISONE 5 MG/1
5 TABLET ORAL DAILY
Qty: 30 TABLET | Refills: 0 | Status: SHIPPED | OUTPATIENT
Start: 2022-09-23 | End: 2022-10-19

## 2022-09-23 ASSESSMENT — PAIN SCALES - GENERAL: PAINLEVEL: NO PAIN (0)

## 2022-09-23 NOTE — LETTER
9/23/2022         RE: Wallace Zelaya  [id number] [address] [facility] [id number]        Dear Colleague,    Thank you for referring your patient, Wallace Zelaya, to the [facility]. Please see a copy of my visit note below.    Oncology/Hematology Visit Note  Sep 23, 2022    Reason for Visit: Follow up of prostate cancer with biochemical recurrence     History of Present Illness:   Wallace was noted to have rising screening PSA from 2 - 4. He was referred to Dr. Rodolfo Connor. He had radical prostatectomy on 11/2015. Pathology from this revealed Viola 4+4 disease with extraprostatic extension, seminal vesicle extension and he was staged at pT3b. All of the 12 lymph nodes resected were negative for disease. Post operatively his PSA did not drop to undetectable levels and remained elevated at 0.56. It vladimir to 0.9 in April 2016 and he was referred to Dr. Newton for adjuvant radiation therapy. He completed radiation therapy but did not have any PSA response to this treatment.      TREATMENT SUMMARY:  11/13/2015 Radical prostatectomy  April 2016  Radiation therapy  He was started on intermittent androgen deprivation therapy which had to be changed to continuous with rapid rise in his PSA.       April 2020 - he was started on bicalutamide for complete androgen blockade  He had rising PSA in May 2021. His bone scan done on 6/30/21 revealed metastatic lesions of T6 and the sacrum. He was switched to abiraterone with prednisone on 8/7/21     He is now to start the ECLIPSE trial.     Interval History:  Steve is doing well. He has urinary frequency that is unchanged, has not tried tamsulosin yet. ROS negative other than very occasional episodes of vertigo (<1 per year).    Review of Systems:  Patient denies fevers, chills, night sweats, unexplained weight changes, headaches, dizziness, vision or hearing changes, new lumps or bumps, chest pain, shortness of breath, cough, abdominal pain,  nausea, vomiting, changes to bowel or bladder, swelling of extremities, bleeding issues, or rash.    Current Outpatient Medications   Medication Sig Dispense Refill     abiraterone (ZYTIGA) 250 MG tablet Take 4 tablets (1,000 mg) by mouth daily (with breakfast) Take with Low-Fat Meal 120 tablet 11     amLODIPine (NORVASC) 10 MG tablet Take 1 tablet (10 mg) by mouth daily 90 tablet 3     atorvastatin (LIPITOR) 20 MG tablet Take 1 tablet by mouth once daily 90 tablet 3     ibuprofen (ADVIL/MOTRIN) 800 MG tablet TAKE 1 TABLET BY MOUTH THREE TIMES DAILY WITH FOOD 90 tablet 0     LORazepam (ATIVAN) 0.5 MG tablet Take 1 tablet (0.5 mg) by mouth every 4 hours as needed (Anxiety, Nausea/Vomiting or Sleep) 30 tablet 2     ondansetron (ZOFRAN) 4 MG tablet Take 1 tablet (4 mg) by mouth every 8 hours as needed for nausea 30 tablet 1     predniSONE (DELTASONE) 5 MG tablet Take 1 tablet (5 mg) by mouth daily With Breakfast. 30 tablet 0     prochlorperazine (COMPAZINE) 10 MG tablet Take 0.5 tablets (5 mg) by mouth every 6 hours as needed (Nausea/Vomiting) 30 tablet 2     tamsulosin (FLOMAX) 0.4 MG capsule Take 1 capsule (0.4 mg) by mouth daily 30 capsule 11     valsartan (DIOVAN) 160 MG tablet Take 1 tablet (160 mg) by mouth daily 90 tablet 3       Past Medical History  Past Medical History:   Diagnosis Date     Basal cell carcinoma      Essential hypertension 08/24/2006     Problem list name updated by automated process. Provider to review     Hyperlipidemia LDL goal <130 10/31/2010     Prostate cancer (HCC) 11/13/2015    Stearns 8 prostate cancer [id number], s/p robotic radical prostatectomy and adjuvant radiation      Past Surgical History:   Procedure Laterality Date     BIOPSY  02/2011    skin tagged left arm     COLONOSCOPY  8/19/2013    Procedure: COLONOSCOPY;  Colonoscopy ;  Surgeon: Emerson Martinez MD, MD;  Location:  GI     DAVINCI PROSTATECTOMY N/A 11/13/2015    Procedure: DAVINCI PROSTATECTOMY;  Surgeon: Weight,  Rodolfo Jurado MD;  Location:  OR     [facility] NONSPECIFIC PROCEDURE      Lawnmower injury right foot-toe      [facility] NONSPECIFIC PROCEDURE      Pilonidal cyst     No Known Allergies  Social History   Social History     Tobacco Use     Smoking status: Former Smoker     Packs/day: 1.00     Years: 25.00     Pack years: 25.00     Quit date: 2009     Years since quittin.1     Smokeless tobacco: Never Used   Vaping Use     Vaping Use: Never used   Substance Use Topics     Alcohol use: Yes     Comment: socially,very little     Drug use: No      Past medical history and social history were reviewed.    Physical Examination:  BP (!) 152/86   Pulse 67   Temp 97  F (36.1  C) (Tympanic)   Resp 16   Wt 99.4 kg (219 lb 1.6 oz)   SpO2 94%   BMI 31.89 kg/m    Wt Readings from Last 10 Encounters:   22 98.1 kg (216 lb 3.2 oz)   22 99.3 kg (219 lb)   22 99.7 kg (219 lb 14.4 oz)   22 99.8 kg (220 lb)   22 99.1 kg (218 lb 8 oz)   22 98 kg (216 lb)   22 99.3 kg (219 lb)   12/10/21 100.7 kg (222 lb)   10/29/21 98.3 kg (216 lb 11.2 oz)   10/01/21 100.1 kg (220 lb 9.6 oz)     Constitutional: Well-appearing male in no acute distress.  Eyes: EOMI, PERRL. No scleral icterus.  ENT: Oral mucosa is moist without lesions or thrush.   Lymphatic: Neck is supple without cervical or supraclavicular lymphadenopathy.   Cardiovascular: Regular rate and rhythm. No murmurs, gallops, or rubs. No peripheral edema.  Respiratory: Clear to auscultation bilaterally. No wheezes or crackles.  Gastrointestinal: Bowel sounds present. Abdomen soft, non-tender. No palpable hepatosplenomegaly or masses.   Neurologic: Cranial nerves II through XII are grossly intact.  Skin: No rashes, petechiae, or bruising noted on exposed skin.    Laboratory Data:   Latest Reference Range & Units 22 10:12   Sodium 136 - 145 mmol/L 143   Potassium 3.4 - 5.3 mmol/L 4.4   Chloride 98 - 107 mmol/L 104   Carbon Dioxide (CO2) 22  - 29 mmol/L 29   Urea Nitrogen 8.0 - 23.0 mg/dL 11.6   Creatinine 0.67 - 1.17 mg/dL 0.86   GFR Estimate >60 mL/min/1.73m2 89   Calcium 8.8 - 10.2 mg/dL 9.2   Anion Gap 7 - 15 mmol/L 10   Magnesium 1.7 - 2.3 mg/dL 2.1   Albumin 3.5 - 5.2 g/dL 4.1   Protein Total 6.4 - 8.3 g/dL 7.1   Alkaline Phosphatase 40 - 129 U/L 95   ALT 10 - 50 U/L 30   AST 10 - 50 U/L 31   Bilirubin Total <=1.2 mg/dL 0.6   Cholesterol <200 mg/dL 197   Glucose 70 - 99 mg/dL 118 (H)   HDL Cholesterol >=40 mg/dL 48   LDL Cholesterol Calculated <=100 mg/dL 122 (H)   Non HDL Cholesterol <130 mg/dL 149 (H)   PSA Tumor Marker 0.00 - 6.50 ng/mL 9.12 (H)   Triglycerides <150 mg/dL 136   TSH 0.30 - 4.20 uIU/mL 0.86   WBC 4.0 - 11.0 10e3/uL 6.6   Hemoglobin 13.3 - 17.7 g/dL 15.2   Hematocrit 40.0 - 53.0 % 45.7   Platelet Count 150 - 450 10e3/uL 167   RBC Count 4.40 - 5.90 10e6/uL 4.86   MCV 78 - 100 fL 94   MCH 26.5 - 33.0 pg 31.3   MCHC 31.5 - 36.5 g/dL 33.3   RDW 10.0 - 15.0 % 13.2   % Neutrophils % 70   % Lymphocytes % 19   % Monocytes % 7   % Eosinophils % 3   % Basophils % 1   Absolute Basophils 0.0 - 0.2 10e3/uL 0.1   Absolute Eosinophils 0.0 - 0.7 10e3/uL 0.2   Absolute Immature Granulocytes <=0.4 10e3/uL 0.0   Absolute Lymphocytes 0.8 - 5.3 10e3/uL 1.2   Absolute Monocytes 0.0 - 1.3 10e3/uL 0.4   % Immature Granulocytes % 0   Absolute Neutrophils 1.6 - 8.3 10e3/uL 4.7   Absolute NRBCs 10e3/uL 0.0   NRBCs per 100 WBC <1 /100 0   (H): Data is abnormally high      Assessment and Plan:  1. Metastatic Castration Resistant Prostate Cancer  Currently on abiraterone + prednisone with ongoing rise in PSA. Plan to enroll in ECLIPSE trial once PET PSMA completed (scheduled next week 9/29/22).    Steve is in excellent shape with minimal health issues. ECOG 0. Labs reviewed and other than mild hyperlipidemia no concerns. He is going to try to lose weight.     He will continue abiraterone until he is approved for trial and then we will discuss next steps.    20  minutes spent on the date of the encounter doing chart review, review of test results, interpretation of tests, patient visit and documentation     Rodolfo Logan PA-C  Department of Hematology and Oncology  [facility]         Again, thank you for allowing me to participate in the care of your patient.        Sincerely,        OTIS Bah

## 2022-09-23 NOTE — NURSING NOTE
"Oncology Rooming Note    September 23, 2022 8:03 AM   Wallace Zelaya is a 77 year old male who presents for:    Chief Complaint   Patient presents with     Oncology Clinic Visit     Prostate cancer      Initial Vitals: BP (!) 152/86   Pulse 67   Temp 97  F (36.1  C) (Tympanic)   Resp 16   Wt 99.4 kg (219 lb 1.6 oz)   SpO2 94%   BMI 31.89 kg/m   Estimated body mass index is 31.89 kg/m  as calculated from the following:    Height as of 7/1/22: 1.765 m (5' 9.5\").    Weight as of this encounter: 99.4 kg (219 lb 1.6 oz). Body surface area is 2.21 meters squared.  No Pain (0) Comment: Data Unavailable   No LMP for male patient.  Allergies reviewed: Yes  Medications reviewed: Yes    Medications: Medication refills not needed today.  Pharmacy name entered into Car reviews:    [facility] - [place], [place] - [address]  [facility] - [place] - [address]  [facility] - [place], [place] - [address]  [facility] - [place], [place] - [address]      Judie Linton CMA              "

## 2022-09-23 NOTE — TELEPHONE ENCOUNTER
Medication: Prednisone 5 mg tablet  Last prescribing provider: Dr. Ward on 8/22/22    Last clinic visit date: 8/30/22 with Dr. Ward    Any missed appointments or no-shows since last clinic visit?: no    Recommendations for requested medication (if none, N/A): NA    Next clinic visit date: today with Rodolfo THORNTON    Any other pertinent information (if none, N/A): Pt is being seen today.     Pended and Routed to Provider.

## 2022-09-25 LAB — TESTOST SERPL-MCNC: <2 NG/DL (ref 240–950)

## 2022-09-28 ENCOUNTER — DOCUMENTATION ONLY (OUTPATIENT)
Dept: PHARMACY | Facility: CLINIC | Age: 77
End: 2022-09-28

## 2022-09-29 ENCOUNTER — HOSPITAL ENCOUNTER (OUTPATIENT)
Dept: PET IMAGING | Facility: CLINIC | Age: 77
Discharge: HOME | End: 2022-09-29
Attending: INTERNAL MEDICINE | Admitting: INTERNAL MEDICINE
Payer: COMMERCIAL

## 2022-09-29 DIAGNOSIS — R53.83 OTHER FATIGUE: ICD-10-CM

## 2022-09-29 DIAGNOSIS — C61 PROSTATE CANCER (H): ICD-10-CM

## 2022-09-29 DIAGNOSIS — C79.51 BONE METASTASIS: ICD-10-CM

## 2022-09-29 PROCEDURE — 250N000011 HC RX IP 250 OP 636: Performed by: INTERNAL MEDICINE

## 2022-09-29 PROCEDURE — 74177 CT ABD & PELVIS W/CONTRAST: CPT | Mod: 26

## 2022-09-29 PROCEDURE — A9596 HC RX 343: HCPCS | Performed by: INTERNAL MEDICINE

## 2022-09-29 PROCEDURE — 71260 CT THORAX DX C+: CPT | Mod: 26

## 2022-09-29 PROCEDURE — 74177 CT ABD & PELVIS W/CONTRAST: CPT

## 2022-09-29 PROCEDURE — 78815 PET IMAGE W/CT SKULL-THIGH: CPT | Mod: 26

## 2022-09-29 PROCEDURE — 78815 PET IMAGE W/CT SKULL-THIGH: CPT | Mod: PS

## 2022-09-29 PROCEDURE — 343N000001 HC RX 343: Performed by: INTERNAL MEDICINE

## 2022-09-29 RX ORDER — IOPAMIDOL 755 MG/ML
10-135 INJECTION, SOLUTION INTRAVASCULAR ONCE
Status: COMPLETED | OUTPATIENT
Start: 2022-09-29 | End: 2022-09-29

## 2022-09-29 RX ADMIN — IOPAMIDOL 121 ML: 755 INJECTION, SOLUTION INTRAVENOUS at 14:13

## 2022-09-29 RX ADMIN — KIT FOR THE PREPARATION OF GALLIUM GA 68 GOZETOTIDE INJECTION 5.4 MILLICURIE: KIT INTRAVENOUS at 13:29

## 2022-09-29 NOTE — PROGRESS NOTES
"Pharmacy note: Correction of documentation    Upon reviewing his oral chemotherapy notes, it was suggested that Wallace start enzalutamide in June 2021 but he never started it as he was concernced about the side effects.    It was changed to abiraterone and he started that on 8/7/21.  We had inadvertently documented enzalutamide from June through December 2021 but it should have read, \"abiraterone\".      He has only taken abiraterone and not enzalutamide.        Marty Meese, Pharm.D., BCOP    "

## 2022-10-18 ENCOUNTER — ALLIED HEALTH/NURSE VISIT (OUTPATIENT)
Dept: FAMILY MEDICINE | Facility: CLINIC | Age: 77
End: 2022-10-18
Payer: COMMERCIAL

## 2022-10-18 DIAGNOSIS — Z23 NEED FOR VACCINATION: Primary | ICD-10-CM

## 2022-10-18 PROCEDURE — G0008 ADMIN INFLUENZA VIRUS VAC: HCPCS

## 2022-10-18 PROCEDURE — 90662 IIV NO PRSV INCREASED AG IM: CPT

## 2022-10-18 PROCEDURE — 99207 PR NO CHARGE NURSE ONLY: CPT

## 2022-10-26 ENCOUNTER — TELEPHONE (OUTPATIENT)
Dept: ONCOLOGY | Facility: CLINIC | Age: 77
End: 2022-10-26

## 2022-10-26 DIAGNOSIS — C61 PROSTATE CANCER (H): ICD-10-CM

## 2022-10-26 DIAGNOSIS — C79.51 BONE METASTASIS: Primary | ICD-10-CM

## 2022-10-26 NOTE — TELEPHONE ENCOUNTER
PA Initiation    Medication: Xtandi 40mg capsules  Insurance Company: [employer] - Phone [phone] Fax [phone]  Pharmacy Filling the Rx:    Filling Pharmacy Phone:    Filling Pharmacy Fax:    Start Date: 10/26/2022

## 2022-10-27 ENCOUNTER — ONCOLOGY VISIT (OUTPATIENT)
Dept: ONCOLOGY | Facility: CLINIC | Age: 77
End: 2022-10-27
Attending: INTERNAL MEDICINE
Payer: COMMERCIAL

## 2022-10-27 ENCOUNTER — LAB (OUTPATIENT)
Dept: INFUSION THERAPY | Facility: CLINIC | Age: 77
End: 2022-10-27
Attending: INTERNAL MEDICINE
Payer: COMMERCIAL

## 2022-10-27 ENCOUNTER — ALLIED HEALTH/NURSE VISIT (OUTPATIENT)
Dept: ONCOLOGY | Facility: CLINIC | Age: 77
End: 2022-10-27

## 2022-10-27 VITALS
WEIGHT: 220 LBS | OXYGEN SATURATION: 95 % | HEART RATE: 83 BPM | SYSTOLIC BLOOD PRESSURE: 149 MMHG | DIASTOLIC BLOOD PRESSURE: 83 MMHG | BODY MASS INDEX: 32.02 KG/M2 | TEMPERATURE: 98.6 F | RESPIRATION RATE: 20 BRPM

## 2022-10-27 DIAGNOSIS — C61 PROSTATE CANCER (H): ICD-10-CM

## 2022-10-27 DIAGNOSIS — C79.51 BONE METASTASIS: Primary | ICD-10-CM

## 2022-10-27 DIAGNOSIS — Z00.6 EXAMINATION OF PARTICIPANT IN CLINICAL TRIAL: Primary | ICD-10-CM

## 2022-10-27 DIAGNOSIS — C61 PROSTATE CANCER (H): Primary | ICD-10-CM

## 2022-10-27 DIAGNOSIS — C79.51 BONE METASTASIS: ICD-10-CM

## 2022-10-27 LAB
ALBUMIN SERPL-MCNC: 3.8 G/DL (ref 3.4–5)
ALP SERPL-CCNC: 94 U/L (ref 40–150)
ALT SERPL W P-5'-P-CCNC: 34 U/L (ref 0–70)
ANION GAP SERPL CALCULATED.3IONS-SCNC: 7 MMOL/L (ref 3–14)
AST SERPL W P-5'-P-CCNC: 27 U/L (ref 0–45)
BASOPHILS # BLD AUTO: 0 10E3/UL (ref 0–0.2)
BASOPHILS NFR BLD AUTO: 1 %
BILIRUB SERPL-MCNC: 0.8 MG/DL (ref 0.2–1.3)
BUN SERPL-MCNC: 13 MG/DL (ref 7–30)
CALCIUM SERPL-MCNC: 9 MG/DL (ref 8.5–10.1)
CHLORIDE BLD-SCNC: 106 MMOL/L (ref 94–109)
CHOLEST SERPL-MCNC: 188 MG/DL
CO2 SERPL-SCNC: 27 MMOL/L (ref 20–32)
CREAT SERPL-MCNC: 0.84 MG/DL (ref 0.66–1.25)
EOSINOPHIL # BLD AUTO: 0.3 10E3/UL (ref 0–0.7)
EOSINOPHIL NFR BLD AUTO: 4 %
ERYTHROCYTE [DISTWIDTH] IN BLOOD BY AUTOMATED COUNT: 13.2 % (ref 10–15)
FASTING STATUS PATIENT QL REPORTED: YES
GFR SERPL CREATININE-BSD FRML MDRD: 89 ML/MIN/1.73M2
GLUCOSE BLD-MCNC: 103 MG/DL (ref 70–99)
HCT VFR BLD AUTO: 44.5 % (ref 40–53)
HDLC SERPL-MCNC: 49 MG/DL
HGB BLD-MCNC: 15.1 G/DL (ref 13.3–17.7)
IMM GRANULOCYTES # BLD: 0 10E3/UL
IMM GRANULOCYTES NFR BLD: 0 %
LDLC SERPL CALC-MCNC: 117 MG/DL
LYMPHOCYTES # BLD AUTO: 1.2 10E3/UL (ref 0.8–5.3)
LYMPHOCYTES NFR BLD AUTO: 19 %
MAGNESIUM SERPL-MCNC: 2.3 MG/DL (ref 1.6–2.3)
MCH RBC QN AUTO: 31.3 PG (ref 26.5–33)
MCHC RBC AUTO-ENTMCNC: 33.9 G/DL (ref 31.5–36.5)
MCV RBC AUTO: 92 FL (ref 78–100)
MONOCYTES # BLD AUTO: 0.4 10E3/UL (ref 0–1.3)
MONOCYTES NFR BLD AUTO: 6 %
NEUTROPHILS # BLD AUTO: 4.5 10E3/UL (ref 1.6–8.3)
NEUTROPHILS NFR BLD AUTO: 70 %
NONHDLC SERPL-MCNC: 139 MG/DL
NRBC # BLD AUTO: 0 10E3/UL
NRBC BLD AUTO-RTO: 0 /100
PLATELET # BLD AUTO: 166 10E3/UL (ref 150–450)
POTASSIUM BLD-SCNC: 4 MMOL/L (ref 3.4–5.3)
PROT SERPL-MCNC: 7.6 G/DL (ref 6.8–8.8)
PSA SERPL-MCNC: 10.3 NG/ML (ref 0–6.5)
RBC # BLD AUTO: 4.82 10E6/UL (ref 4.4–5.9)
SODIUM SERPL-SCNC: 140 MMOL/L (ref 133–144)
TRIGL SERPL-MCNC: 110 MG/DL
TSH SERPL DL<=0.005 MIU/L-ACNC: 0.65 MU/L (ref 0.4–4)
WBC # BLD AUTO: 6.4 10E3/UL (ref 4–11)

## 2022-10-27 PROCEDURE — G0463 HOSPITAL OUTPT CLINIC VISIT: HCPCS

## 2022-10-27 PROCEDURE — 80061 LIPID PANEL: CPT | Performed by: INTERNAL MEDICINE

## 2022-10-27 PROCEDURE — 84403 ASSAY OF TOTAL TESTOSTERONE: CPT | Performed by: INTERNAL MEDICINE

## 2022-10-27 PROCEDURE — 36415 COLL VENOUS BLD VENIPUNCTURE: CPT | Performed by: INTERNAL MEDICINE

## 2022-10-27 PROCEDURE — 83735 ASSAY OF MAGNESIUM: CPT | Performed by: INTERNAL MEDICINE

## 2022-10-27 PROCEDURE — 80053 COMPREHEN METABOLIC PANEL: CPT | Performed by: INTERNAL MEDICINE

## 2022-10-27 PROCEDURE — 84153 ASSAY OF PSA TOTAL: CPT | Performed by: INTERNAL MEDICINE

## 2022-10-27 PROCEDURE — 84443 ASSAY THYROID STIM HORMONE: CPT | Performed by: INTERNAL MEDICINE

## 2022-10-27 PROCEDURE — 99215 OFFICE O/P EST HI 40 MIN: CPT | Performed by: INTERNAL MEDICINE

## 2022-10-27 PROCEDURE — 85025 COMPLETE CBC W/AUTO DIFF WBC: CPT | Performed by: INTERNAL MEDICINE

## 2022-10-27 ASSESSMENT — PAIN SCALES - GENERAL: PAINLEVEL: NO PAIN (0)

## 2022-10-27 NOTE — TELEPHONE ENCOUNTER
Free Drug Application Initiated  Medication: Xtandi  Sponsor: [employer]  Phone #:   Fax #:   Additional Information:   Faxed :

## 2022-10-27 NOTE — PROGRESS NOTES
"Oncology Rooming Note    October 27, 2022 11:10 AM   Wallace Zelaya is a 77 year old male who presents for:    Chief Complaint   Patient presents with     Oncology Clinic Visit     Bone metastasis (H)     Initial Vitals: BP (!) 149/83 (BP Location: Left arm, Patient Position: Left side, Cuff Size: Adult Large)   Pulse 83   Temp 98.6  F (37  C) (Oral)   Resp 20   Wt 99.8 kg (220 lb)   SpO2 95%   BMI 32.02 kg/m   Estimated body mass index is 32.02 kg/m  as calculated from the following:    Height as of 7/1/22: 1.765 m (5' 9.5\").    Weight as of this encounter: 99.8 kg (220 lb). Body surface area is 2.21 meters squared.  No Pain (0) Comment: Data Unavailable   No LMP for male patient.  Allergies reviewed: Yes  Medications reviewed: Yes    Medications: Medication refills not needed today.  Pharmacy name entered into [employer]:    [facility] - [place], [place] - [address]  [facility] - [place], [place] - [address]  [facility] - [place], [place] - [address]   [facility] [place] [place] [facility] [address]    Clinical concerns: no       Amanda An CMA              "

## 2022-10-27 NOTE — TELEPHONE ENCOUNTER
Prior Authorization Approval    Authorization Effective Date: 7/28/2022  Authorization Expiration Date: 10/26/2023  Medication: Xtandi 40mg capsules  Approved Dose/Quantity: 120 for 30 days  Reference #:     Insurance Company: [employer] - Phone [phone] Fax [phone]  Expected CoPay: $2884.09     CoPay Card Available: No    Foundation Assistance Needed: no grants are open, applying for free drug  Which Pharmacy is filling the prescription (Not needed for infusion/clinic administered): [facility] - [place], [place] - [address]  Pharmacy Notified: No  Patient Notified: Yes

## 2022-10-27 NOTE — LETTER
"    10/27/2022         RE: Wallace Zelaya  [address] [facility] [address] [facility] [id number]        Dear Colleague,    Thank you for referring your patient, Wallace Zelaya, to the [facility]. Please see a copy of my visit note below.    Oncology Rooming Note    October 27, 2022 11:10 AM   Wallace Zelaya is a 77 year old male who presents for:    Chief Complaint   Patient presents with     Oncology Clinic Visit     Bone metastasis (H)     Initial Vitals: BP (!) 149/83 (BP Location: Left arm, Patient Position: Left side, Cuff Size: Adult Large)   Pulse 83   Temp 98.6  F (37  C) (Oral)   Resp 20   Wt 99.8 kg (220 lb)   SpO2 95%   BMI 32.02 kg/m   Estimated body mass index is 32.02 kg/m  as calculated from the following:    Height as of 7/1/22: 1.765 m (5' 9.5\").    Weight as of this encounter: 99.8 kg (220 lb). Body surface area is 2.21 meters squared.  No Pain (0) Comment: Data Unavailable   No LMP for male patient.  Allergies reviewed: Yes  Medications reviewed: Yes    Medications: Medication refills not needed today.  Pharmacy name entered into ideasoft:    [facility] - [facility] [address] [facility] - [place], [place] - [address] [facility] - [place], [place] - [address] [place], [place] - [facility] [address]    Clinical concerns: no       Amanda An CMA                [facility]CER CLINIC  FOLLOW-UP VISIT NOTE    PATIENT NAME: Wallace Zelaya MRN # [id number]  DATE OF VISIT: Oct 27, 2022 YOB: 1945    REFERRING PROVIDER: No referring provider defined for this encounter.    CANCER TYPE: Prostate cancer; Biochemical recurrence; Castration resistant disease  STAGE: Stage III - pT3b at diagnosis; M0    HISTORY OF PRESENTING ILLNESS:  Wallace was noted to have rising screening PSA from 2 - 4. He was referred to Dr. Reynolds " Weight. He had radical prostatectomy on 11/2015. Pathology from this revealed Brewster 4+4 disease with extraprostatic extension, seminal vesicle extension and he was staged at pT3b. All of the 12 lymph nodes resected were negative for disease. Post operatively his PSA did not drop to undetectable levels and remained elevated at 0.56. It vladimir to 0.9 in April 2016 and he was referred to Dr. Newton for adjuvant radiation therapy. He completed radiation therapy but did not have any PSA response to this treatment.     TREATMENT SUMMARY:  11/13/2015 Radical prostatectomy  April 2016  Radiation therapy  He was started on intermittent androgen deprivation therapy which had to be changed to continuous with rapid rise in his PSA.      April 2020 - he was started on bicalutamide for complete androgen blockade  He had rising PSA in May 2021. His bone scan done on 6/30/21 revealed metastatic lesions of T6 and the sacrum. He was switched to abiraterone with prednisone on 8/7/21    CURRENT INTERVENTIONS:  Abiraterone with prednisone starting 8/7/21    DELANEY Gonsalez is being seen for his prostate cancer    Steve was followed in person at this visit. He has been on abiraterone. He has been tolerating this well. He has no new complains on therapy.  We discussed ECLIPSE clinical trial at our last visit about 10 days ago. He still has a number of questions for me prompting this visit.       PAST MEDICAL HISTORY   1. HTN  2. Dyslipidemia  3. Prostate cancer as detailed above      CURRENT OUTPATIENT MEDICATIONS     Current Outpatient Medications   Medication Sig     amLODIPine (NORVASC) 10 MG tablet Take 1 tablet (10 mg) by mouth daily     atorvastatin (LIPITOR) 20 MG tablet Take 1 tablet by mouth once daily     ibuprofen (ADVIL/MOTRIN) 800 MG tablet TAKE 1 TABLET BY MOUTH THREE TIMES DAILY WITH FOOD     ondansetron (ZOFRAN) 4 MG tablet Take 1 tablet (4 mg) by mouth every 8 hours as needed for nausea     tamsulosin (FLOMAX) 0.4 MG  capsule Take 1 capsule (0.4 mg) by mouth daily     valsartan (DIOVAN) 160 MG tablet Take 1 tablet (160 mg) by mouth daily     No current facility-administered medications for this visit.        ALLERGIES     No Known Allergies     REVIEW OF SYSTEMS   As above in the HPI, o/w complete 12-point ROS was negative.     PHYSICAL EXAM   BP (!) 149/83 (BP Location: Left arm, Patient Position: Left side, Cuff Size: Adult Large)   Pulse 83   Temp 98.6  F (37  C) (Oral)   Resp 20   Wt 99.8 kg (220 lb)   SpO2 95%   BMI 32.02 kg/m    SpO2 Readings from Last 4 Encounters:   09/21/18 96%   08/24/18 94%   06/22/18 95%   05/11/18 97%     Wt Readings from Last 3 Encounters:   10/27/22 99.8 kg (220 lb)   09/23/22 99.4 kg (219 lb 1.6 oz)   08/30/22 98.1 kg (216 lb 3.2 oz)     GENERAL/CONSTITUTIONAL: No acute distress.  EYES: Pupils are equal, round, and react to light and accommodation. Extraocular movements intact.  No scleral icterus.  ENT/MOUTH: Neck supple. Oropharynx clear, no mucositis.  LYMPH: No anterior cervical, posterior cervical, supraclavicular, axillary or inguinal adenopathy.   RESPIRATORY: Clear to auscultation bilaterally. No crackles or wheezing.   CARDIOVASCULAR: Regular rate and rhythm without murmurs, gallops, or rubs.  GASTROINTESTINAL: No hepatosplenomegaly, masses, or tenderness. The patient has normal bowel sounds. No guarding.  No distention.  : Deferred  MUSCULOSKELETAL: Warm and well-perfused, no cyanosis, clubbing, or edema.  NEUROLOGIC: Cranial nerves II-XII are intact. Alert, oriented, answers questions appropriately. No focal neurologic deficit  INTEGUMENTARY: No rashes or jaundice.  GAIT: Normal     LABORATORY AND IMAGING STUDIES     Recent Labs   Lab Test 10/27/22  1046 09/22/22  1012 08/15/22  0810 06/27/22  0827 03/28/22  1050    143 140 140 140   POTASSIUM 4.0 4.4 4.1 4.1 4.7   CHLORIDE 106 104 104 109 107   CO2 27 29 28 26 31   ANIONGAP 7 10 8 5 2*   BUN 13 11.6 12.7 17 18   CR 0.84  0.86 0.93 0.91 0.90   * 118* 97 107* 103*   TY 9.0 9.2 9.1 8.8 8.9     Recent Labs   Lab Test 10/27/22  1046 09/22/22  1012 01/21/20  1154   MAG 2.3 2.1 2.0     Recent Labs   Lab Test 10/27/22  1046 09/22/22  1012 08/15/22  0810 06/27/22  0827 03/28/22  1050   WBC 6.4 6.6 7.8 5.7 6.1   HGB 15.1 15.2 15.1 15.4 15.3    167 200 158 181   MCV 92 94 94 93 93   NEUTROPHIL 70 70 60 71 59     Recent Labs   Lab Test 10/27/22  1046 09/22/22  1012 08/15/22  0810 06/27/22  0827 03/28/22  1050 02/23/22  1419   BILITOTAL 0.8 0.6 0.7 0.9 0.7 0.8   ALKPHOS 94 95 101 94 108 113   ALT 34 30 34 49 80* 88*   AST 27 31 31 4 43 46*   ALBUMIN 3.8 4.1 4.2 3.7 3.6 3.4   LDH  --   --   --  246* 259* 246*     TSH   Date Value Ref Range Status   10/27/2022 0.65 0.40 - 4.00 mU/L Final   09/22/2022 0.86 0.30 - 4.20 uIU/mL Final     No results for input(s): CEA in the last 54559 hours.  Results for orders placed or performed during the hospital encounter of 09/29/22   PET PSMA Eyes to Thighs    Narrative    Combined Report of: PET and CT on 9/29/2022 2:46 PM :    1. PET of the neck, chest, abdomen, and pelvis.  2. PET CT Fusion for Attenuation Correction and Anatomical  Localization:    3. Diagnostic CT scan of the chest, abdomen, and pelvis with  intravenous contrast for interpretation.  3. CT of the chest, abdomen and pelvis obtained for diagnostic  interpretation.  4. 3D MIP and PET-CT fused images were processed on an independent  workstation and archived to PACS and reviewed by a radiologist.    Technique:    1. PET: The patient received 5.4 mCi of Ga-68 PSMA, body weight was  99.4 kg. Images were evaluated in the axial, sagittal, and coronal  planes as well as the rotational whole body MIP. Images were acquired  from the Vertex to the proximal thighs.    2. CT: Volumetric acquisition for clinical interpretation of the  chest, abdomen, and pelvis acquired at 3 mm sections . The chest,  abdomen, and pelvis were evaluated at 5  mm sections in bone, soft  tissue, and lung windows.      The patient received 121 cc of Isovue 370 intravenously and 500 mL  Breeza oral contrast for the examination.      3. 3D MIP and PET-CT fused images were processed on an independent  workstation and archived to PACS and reviewed by a radiologist.    INDICATION: Prostate cancer with metastasis to the bones and  progressing disease -need PET CT scan prior to enrollment on the  Eclipse clinical trial with Pluvicto; Prostate cancer (H); Bone  metastasis (H); Other fatigue    ADDITIONAL INFORMATION OBTAINED FROM EMR: PSA: 9.12 (9/22/2022)  History of prior prostatectomy on 11/13/2015.    COMPARISON: Bone scan 9/22/2022 and CT chest abdomen and pelvis  6/30/2021    FINDINGS:     Liver SUV= 7.2,  Aorta SUV: 3.1    HEAD/NECK:  There is no suspicious uptake in the neck.    The paranasal sinuses are clear. The mastoid air cells are clear.     The mucosal pharyngeal space, the , prevertebral and carotid  spaces are within normal limits.     No masses, mass effect or pathologically enlarged lymph nodes are  evident. The thyroid gland is unremarkable.      CHEST:  There is no suspicious PSMA uptake in the chest.     The heart is at the upper limits of normal for size. No pericardial  effusion. Moderate coronary artery calcifications. Normal caliber  thoracic vasculature. Atherosclerotic calcifications of the aortic  arch. No axillary or mediastinal lymphadenopathy. Small hiatal hernia.  The central airways are patent. Mild central bronchial wall  thickening. Multiple scattered calcified granulomas. Multiple sub-6 mm  pulmonary nodules are either unchanged or decreased in size, for  example a previously demonstrated 7 mm pulmonary nodule in the right  lower lobe now measures approximately 2 mm (series 11 image 71). No  pleural effusion, pneumothorax, focal airspace consolidations.      ABDOMEN AND PELVIS:  There is no suspicious PSMA uptake in the abdomen or  pelvis.    The liver, gallbladder, spleen, and left adrenal gland are normal in  appearance. Unchanged 10 mm right adrenal nodule. Diffuse fatty  atrophy of the pancreas. Symmetric renal cortical enhancement. No  hydronephrosis or nephrolithiasis. Urinary bladder is unremarkable.  Small bilateral fat-containing inguinal hernias, greater on the right.  Post surgical changes of prostatectomy. Calcified pelvic phlebolith.  No significant free fluid within the pelvis. No abnormally dilated  loops of large or small bowel. Very mild colonic diverticulosis  without evidence of acute diverticulitis. No abdominal or pelvic  lymphadenopathy by size criteria. Mild to moderate stenosis at the  origin of the superior mesenteric artery. Moderate calcified and  noncalcified atherosclerotic disease involving the intra-abdominal  aorta and iliac vasculature. Unchanged 3.4 cm infrarenal abdominal  aortic aneurysm. Unchanged 1.9 cm right and 1.8 cm common iliac artery  aneurysms.    BONES:   Small sclerotic focus with PSMA-avidity within the left posterior  fourth rib (series 3 image 185) SUV max 5.4. Sclerotic focus with  PSMA-avidity within the T6 vertebral body, SUV max 35.6 (series 3  image 209). Diffuse sclerosis involving the S2, S3, and S4 vertebral  bodies with associated PSMA-avidity SUV max of 18.0. Additionally,  there is sclerosis and PSMA-avidity within the coccyx, SUV max 14.0.  Areas of sclerosis are more pronounced, most notably within the sacrum  as compared to prior CT on 6/30/2021.    Multilevel degenerative changes of the spine. L5-S1 spondylolisthesis.  Degenerative changes of the hips and sacroiliac joints.      Impression    IMPRESSION:  In this patient with history of prostate cancer status  post prostatectomy, there is evidence of slightly progressed osseous  metastatic disease compared to prior CT from 6/30/2021:    1. Multifocal sclerotic foci with associated PSMA-avidity as described  above, consistent with  osseous metastatic disease.   2. No evidence of nonosseous metastatic disease.  3. Multiple subcentimeter pulmonary nodules have either decreased in  size or are unchanged compared to prior CT from 6/30/2021. No new or  suspicious pulmonary nodules.  4. Unchanged 3.3 cm infrarenal abdominal aortic aneurysm and bilateral  common iliac artery aneurysms.        I have personally reviewed the examination and initial interpretation  and I agree with the findings.    EUGENE WILDER MD         SYSTEM ID:  [id number]   CT Chest/Abdomen/Pelvis w Contrast    Narrative    Combined Report of: PET and CT on 9/29/2022 2:46 PM :    1. PET of the neck, chest, abdomen, and pelvis.  2. PET CT Fusion for Attenuation Correction and Anatomical  Localization:    3. Diagnostic CT scan of the chest, abdomen, and pelvis with  intravenous contrast for interpretation.  3. CT of the chest, abdomen and pelvis obtained for diagnostic  interpretation.  4. 3D MIP and PET-CT fused images were processed on an independent  workstation and archived to PACS and reviewed by a radiologist.    Technique:    1. PET: The patient received 5.4 mCi of Ga-68 PSMA, body weight was  99.4 kg. Images were evaluated in the axial, sagittal, and coronal  planes as well as the rotational whole body MIP. Images were acquired  from the Vertex to the proximal thighs.    2. CT: Volumetric acquisition for clinical interpretation of the  chest, abdomen, and pelvis acquired at 3 mm sections . The chest,  abdomen, and pelvis were evaluated at 5 mm sections in bone, soft  tissue, and lung windows.      The patient received 121 cc of Isovue 370 intravenously and 500 mL  Breeza oral contrast for the examination.      3. 3D MIP and PET-CT fused images were processed on an independent  workstation and archived to PACS and reviewed by a radiologist.    INDICATION: Prostate cancer with metastasis to the bones and  progressing disease -need PET CT scan prior to enrollment on  the  Eclipse clinical trial with Pluvicto; Prostate cancer (H); Bone  metastasis (H); Other fatigue    ADDITIONAL INFORMATION OBTAINED FROM EMR: PSA: 9.12 (9/22/2022)  History of prior prostatectomy on 11/13/2015.    COMPARISON: Bone scan 9/22/2022 and CT chest abdomen and pelvis  6/30/2021    FINDINGS:     Liver SUV= 7.2,  Aorta SUV: 3.1    HEAD/NECK:  There is no suspicious uptake in the neck.    The paranasal sinuses are clear. The mastoid air cells are clear.     The mucosal pharyngeal space, the , prevertebral and carotid  spaces are within normal limits.     No masses, mass effect or pathologically enlarged lymph nodes are  evident. The thyroid gland is unremarkable.      CHEST:  There is no suspicious PSMA uptake in the chest.     The heart is at the upper limits of normal for size. No pericardial  effusion. Moderate coronary artery calcifications. Normal caliber  thoracic vasculature. Atherosclerotic calcifications of the aortic  arch. No axillary or mediastinal lymphadenopathy. Small hiatal hernia.  The central airways are patent. Mild central bronchial wall  thickening. Multiple scattered calcified granulomas. Multiple sub-6 mm  pulmonary nodules are either unchanged or decreased in size, for  example a previously demonstrated 7 mm pulmonary nodule in the right  lower lobe now measures approximately 2 mm (series 11 image 71). No  pleural effusion, pneumothorax, focal airspace consolidations.      ABDOMEN AND PELVIS:  There is no suspicious PSMA uptake in the abdomen or pelvis.    The liver, gallbladder, spleen, and left adrenal gland are normal in  appearance. Unchanged 10 mm right adrenal nodule. Diffuse fatty  atrophy of the pancreas. Symmetric renal cortical enhancement. No  hydronephrosis or nephrolithiasis. Urinary bladder is unremarkable.  Small bilateral fat-containing inguinal hernias, greater on the right.  Post surgical changes of prostatectomy. Calcified pelvic phlebolith.  No  significant free fluid within the pelvis. No abnormally dilated  loops of large or small bowel. Very mild colonic diverticulosis  without evidence of acute diverticulitis. No abdominal or pelvic  lymphadenopathy by size criteria. Mild to moderate stenosis at the  origin of the superior mesenteric artery. Moderate calcified and  noncalcified atherosclerotic disease involving the intra-abdominal  aorta and iliac vasculature. Unchanged 3.4 cm infrarenal abdominal  aortic aneurysm. Unchanged 1.9 cm right and 1.8 cm common iliac artery  aneurysms.    BONES:   Small sclerotic focus with PSMA-avidity within the left posterior  fourth rib (series 3 image 185) SUV max 5.4. Sclerotic focus with  PSMA-avidity within the T6 vertebral body, SUV max 35.6 (series 3  image 209). Diffuse sclerosis involving the S2, S3, and S4 vertebral  bodies with associated PSMA-avidity SUV max of 18.0. Additionally,  there is sclerosis and PSMA-avidity within the coccyx, SUV max 14.0.  Areas of sclerosis are more pronounced, most notably within the sacrum  as compared to prior CT on 6/30/2021.    Multilevel degenerative changes of the spine. L5-S1 spondylolisthesis.  Degenerative changes of the hips and sacroiliac joints.      Impression    IMPRESSION:  In this patient with history of prostate cancer status  post prostatectomy, there is evidence of slightly progressed osseous  metastatic disease compared to prior CT from 6/30/2021:    1. Multifocal sclerotic foci with associated PSMA-avidity as described  above, consistent with osseous metastatic disease.   2. No evidence of nonosseous metastatic disease.  3. Multiple subcentimeter pulmonary nodules have either decreased in  size or are unchanged compared to prior CT from 6/30/2021. No new or  suspicious pulmonary nodules.  4. Unchanged 3.3 cm infrarenal abdominal aortic aneurysm and bilateral  common iliac artery aneurysms.        I have personally reviewed the examination and initial  interpretation  and I agree with the findings.    EUGENE WILDER MD         SYSTEM ID:  [id number]     Recent Labs   Lab Test 10/27/22  1046 09/22/22  1012 08/15/22  0810 06/27/22  0827 03/28/22  1050 02/23/22  1419 12/29/21  0923   PSA 10.30* 9.12* 7.81* 5.17 2.00 2.11 0.99   TESTOSTTOTAL  --  <2*  --  <2* <2* <2* <2*          ASSESSMENT AND PLAN   1. 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 ECOG PS 0   3. HTN  4. ECOG PS1  5. No medical comorbidity    I had a lengthy discussion with Steve who is alone at this visit. Clinical research nurse - Kala Arcos joined us for the visit and was present except for physical examination.     He has enrolled in the ECLIPSE clinical trial which randomizes patients to [id number] or enzalutamide. He has been randomized to the control arm. He has been disappointed by this.     We again reviewed that this was the preferred strategy outside of clinical trial - specially given that he is firm on not getting chemotherapy. Unfortunately the SOC drug cost is not covered by the study. It is typical for all SOC to be billed to patients insurance company. We have been working already to get enzalutamide approved for him and have applied for free drug.     I reviewed his PET-CT scan with him. He only has 2 main lesions on the PET-CT scan - T6 vertebral body and a lesion in sacrum that extends to coccyx. There is another lesion in left 4th rib with marginal uptake.     This is overall good news that he has limited disease burden. I reviewed lutetium therapy as our next step as it would be covered by the trial - crossover.     We again reviewed enzalutamide.     Enzalutamide is a newer androgen receptor antagonist with much higher affinity to the same androgen receptor as bicalutamide which gives it a lower Ki. It also inhibits the translocation of receptor to the nucleus and subsequent binding of receptor to androgen response element on  DNA. It has been shown to prolong survival as compared to placebo in both pre and post docetaxel setting. In the AFFIRM study (N Engl J Med. 2012 Sep 27;367(13):1187-97) post docetaxel patients had median survival of 18.4 months as compared to 13.6 months in placebo arm. There was improvement in all secondary endpoints. In the chemotherapy naive settings, the PREVAIL study (N Engl J Med. 2014 Jul 31;371(5):424-33) had to be stopped early due to the significant improvement see in these patients with 81% reduction of risk of progression and improvement across all other secondary end points. Fatigue and hypertension were the most common clinically relevant adverse events associated with enzalutamide treatment. Overall enzalutamide is very well tolerated and 87% of patients in both arms had progressed on prior anti-androgen therapies with other androgen receptor blockers.     He would have closer monitoring while on clinical trial. He would like to have follow up with me as much as possible. He was reminded that he could have a few visits with Rodolfo who is part of the same team. He has met her in the past.     45 minutes spent on the date of the encounter doing chart review, history and exam, documentation and further activities as noted above      Gigi Ward    Hematologist and Medical Oncologist  [facility]            Again, thank you for allowing me to participate in the care of your patient.        Sincerely,        Gigi Ward MD

## 2022-10-27 NOTE — PROGRESS NOTES
Medical Assistant Note:  Wallace Zelaya presents today for lab draw.    Patient seen by provider today: Yes: Dr. Ward.   present during visit today: Not Applicable.    Concerns: No Concerns.    Procedure:  Lab draw site: Right Hand, Needle type: Butterfly, Gauge: 23.    Post Assessment:  Labs drawn without difficulty: Yes.    Discharge Plan:  Departure Mode: Ambulatory.    Face to Face Time: 4 min.    Shari Schoenberger, CMA

## 2022-10-27 NOTE — ORAL ONC MGMT
Oral Chemotherapy Monitoring Program    Lab Monitoring Plan  Per treatment plan. Likely CMP, CBC, and Magnesium monthly.  Subjective/Objective:  Wallace Zelaya is a 77 year old male seen in clinic for an initial visit for oral chemotherapy education. I discussed with Steve about the typical dosing of Enzalutamide 160 mg daily. We discussed possible side effects such as fatigue and the need to continue monitoring blood pressure. He understand he will also have lab monitoring likely per the study treatment plan. He has been on Zytiga and is aware of some of the similar side effects. He is aware he is stopping Zytiga and starting Enzalutamide.    ORAL CHEMOTHERAPY 10/1/2021 10/21/2021 12/23/2021 1/17/2022 2/23/2022 8/16/2022 9/13/2022   Assessment Type Lab Monitoring Left Voicemail Refill Other Lab Monitoring;Monthly Follow up Lab Monitoring Discontinuation   Stop Date - - - - - - 9/13/2022   Reason for Discontinuation - - - - - - Disease progression   Other: - - - - - - enrolled in clinical trial   Diagnosis Code Prostate Cancer Prostate Cancer Prostate Cancer Prostate Cancer Prostate Cancer Prostate Cancer Prostate Cancer   Providers Dr. Ed Ward   Clinic [place] [facility] [place]   Drug Name Xtandi (enzalutamide) Xtandi (enzalutamide) Xtandi (enzalutamide) Zytiga (abiraterone) Zytiga (abiraterone) Zytiga (abiraterone) Zytiga (abiraterone)   Dose 160 mg 160 mg 160 mg 500 mg 500 mg 1,000 mg 500 mg   Current Schedule Daily Daily Daily Daily Daily Daily Daily   Cycle Details Continuous Continuous Continuous Continuous Continuous Continuous Continuous   Start Date of Last Cycle - - - - - - -   Planned next cycle start date - - 12/28/2021 1/17/2022 3/3/2022 - -   Doses missed in last 2 weeks - - - - 0 - -   Adherence Assessment - - - - Adherent - -   Adverse Effects Increased AST/ALT/T BILI - - - Increased AST/ALT/T  "BILI - -   Increased AST/ALT/T BILI Grade 1 - - - Grade 1 - -   Pharmacist Intervention(increased ast/alt/t bili) Yes - - - No - -   Intervention(s) Increased lab monitoring - - - - - -   Any new drug interactions? - - - - No - -   Is the dose as ordered appropriate for the patient? - - - - Yes - -   Is the patient currently in pain? - - - - No - -   Has the patient been assessed within the past 6 months for depression? - - - - Yes - -   Has the patient missed any days of school, work, or other routine activity? - - - - No - -   Since the last time we talked, have you been hospitalized or used the emergency room? - - - - No - -       Last PHQ-2 Score on record:   PHQ-2 ( 1999 [employer]) 1/20/2022 1/6/2021   Q1: Little interest or pleasure in doing things 0 0   Q2: Feeling down, depressed or hopeless 0 0   PHQ-2 Score 0 0   PHQ-2 Total Score (12-17 Years)- Positive if 3 or more points; Administer PHQ-A if positive - 0   Q1: Little interest or pleasure in doing things Not at all -   Q2: Feeling down, depressed or hopeless Not at all -   PHQ-2 Score 0 -       Vitals:  BP:   BP Readings from Last 1 Encounters:   10/27/22 (!) 149/83     Wt Readings from Last 1 Encounters:   10/27/22 99.8 kg (220 lb)     Estimated body surface area is 2.21 meters squared as calculated from the following:    Height as of 7/1/22: 1.765 m (5' 9.5\").    Weight as of this encounter: 99.8 kg (220 lb).    Labs:  _  Result Component Current Result Ref Range   Sodium 140 (10/27/2022) 133 - 144 mmol/L     _  Result Component Current Result Ref Range   Potassium 4.0 (10/27/2022) 3.4 - 5.3 mmol/L     _  Result Component Current Result Ref Range   Calcium 9.0 (10/27/2022) 8.5 - 10.1 mg/dL     _  Result Component Current Result Ref Range   Magnesium 2.3 (10/27/2022) 1.6 - 2.3 mg/dL     No results found for Phos within last 30 days.     _  Result Component Current Result Ref Range   Albumin 3.8 (10/27/2022) 3.4 - 5.0 g/dL     _  Result Component Current " Result Ref Range   Urea Nitrogen 13 (10/27/2022) 7 - 30 mg/dL     _  Result Component Current Result Ref Range   Creatinine 0.84 (10/27/2022) 0.66 - 1.25 mg/dL     _  Result Component Current Result Ref Range   AST 27 (10/27/2022) 0 - 45 U/L     _  Result Component Current Result Ref Range   ALT 34 (10/27/2022) 0 - 70 U/L     _  Result Component Current Result Ref Range   Bilirubin Total 0.8 (10/27/2022) 0.2 - 1.3 mg/dL     _  Result Component Current Result Ref Range   WBC Count 6.4 (10/27/2022) 4.0 - 11.0 10e3/uL     _  Result Component Current Result Ref Range   Hemoglobin 15.1 (10/27/2022) 13.3 - 17.7 g/dL     _  Result Component Current Result Ref Range   Platelet Count 166 (10/27/2022) 150 - 450 10e3/uL     No results found for ANC within last 30 days.     _  Result Component Current Result Ref Range   Absolute Neutrophils 4.5 (10/27/2022) 1.6 - 8.3 10e3/uL        Assessment:  Patient is appropriate to start therapy. Labs and medications reviewed from today. Of note, is on blood pressure medications and monitors frequently at home.    Plan:  Basic chemotherapy teaching was reviewed with the patient including indication, start date of therapy, dose, administration, adverse effects, missed doses, food and drug interactions, monitoring, side effect management, office contact information, and safe handling. Written materials were provided and all questions answered.    Follow-Up:  Need to coordinate liaison access information. Then start the medication. Then follow up per study or treatment plan in regard to lab monitoring.      Genaro Don PharmD  [facility]  [phone]  October 27, 2022

## 2022-10-28 NOTE — PROGRESS NOTES
[facility] CANCER CLINIC  FOLLOW-UP VISIT NOTE    PATIENT NAME: Wallace Zelaya MRN # [id number]  DATE OF VISIT: Oct 27, 2022 YOB: 1945    REFERRING PROVIDER: No referring provider defined for this encounter.    CANCER TYPE: Prostate cancer; Biochemical recurrence; Castration resistant disease  STAGE: Stage III - pT3b at diagnosis; M0    HISTORY OF PRESENTING ILLNESS:  Wallace was noted to have rising screening PSA from 2 - 4. He was referred to Dr. Rodolfo Connor. He had radical prostatectomy on 11/2015. Pathology from this revealed Cayla 4+4 disease with extraprostatic extension, seminal vesicle extension and he was staged at pT3b. All of the 12 lymph nodes resected were negative for disease. Post operatively his PSA did not drop to undetectable levels and remained elevated at 0.56. It vladimir to 0.9 in April 2016 and he was referred to Dr. Newton for adjuvant radiation therapy. He completed radiation therapy but did not have any PSA response to this treatment.     TREATMENT SUMMARY:  11/13/2015 Radical prostatectomy  April 2016  Radiation therapy  He was started on intermittent androgen deprivation therapy which had to be changed to continuous with rapid rise in his PSA.      April 2020 - he was started on bicalutamide for complete androgen blockade  He had rising PSA in May 2021. His bone scan done on 6/30/21 revealed metastatic lesions of T6 and the sacrum. He was switched to abiraterone with prednisone on 8/7/21    CURRENT INTERVENTIONS:  Abiraterone with prednisone starting 8/7/21    SUBJECTIVE   Wallace is being seen for his prostate cancer    Steve was followed in person at this visit. He has been on abiraterone. He has been tolerating this well. He has no new complains on therapy.  We discussed ECLIPSE clinical trial at our last visit about 10 days ago. He still has a number of questions for me prompting this visit.       PAST MEDICAL HISTORY    1. HTN  2. Dyslipidemia  3. Prostate cancer as detailed above      CURRENT OUTPATIENT MEDICATIONS     Current Outpatient Medications   Medication Sig     amLODIPine (NORVASC) 10 MG tablet Take 1 tablet (10 mg) by mouth daily     atorvastatin (LIPITOR) 20 MG tablet Take 1 tablet by mouth once daily     ibuprofen (ADVIL/MOTRIN) 800 MG tablet TAKE 1 TABLET BY MOUTH THREE TIMES DAILY WITH FOOD     ondansetron (ZOFRAN) 4 MG tablet Take 1 tablet (4 mg) by mouth every 8 hours as needed for nausea     tamsulosin (FLOMAX) 0.4 MG capsule Take 1 capsule (0.4 mg) by mouth daily     valsartan (DIOVAN) 160 MG tablet Take 1 tablet (160 mg) by mouth daily     No current facility-administered medications for this visit.        ALLERGIES     No Known Allergies     REVIEW OF SYSTEMS   As above in the HPI, o/w complete 12-point ROS was negative.     PHYSICAL EXAM   BP (!) 149/83 (BP Location: Left arm, Patient Position: Left side, Cuff Size: Adult Large)   Pulse 83   Temp 98.6  F (37  C) (Oral)   Resp 20   Wt 99.8 kg (220 lb)   SpO2 95%   BMI 32.02 kg/m    SpO2 Readings from Last 4 Encounters:   09/21/18 96%   08/24/18 94%   06/22/18 95%   05/11/18 97%     Wt Readings from Last 3 Encounters:   10/27/22 99.8 kg (220 lb)   09/23/22 99.4 kg (219 lb 1.6 oz)   08/30/22 98.1 kg (216 lb 3.2 oz)     GENERAL/CONSTITUTIONAL: No acute distress.  EYES: Pupils are equal, round, and react to light and accommodation. Extraocular movements intact.  No scleral icterus.  ENT/MOUTH: Neck supple. Oropharynx clear, no mucositis.  LYMPH: No anterior cervical, posterior cervical, supraclavicular, axillary or inguinal adenopathy.   RESPIRATORY: Clear to auscultation bilaterally. No crackles or wheezing.   CARDIOVASCULAR: Regular rate and rhythm without murmurs, gallops, or rubs.  GASTROINTESTINAL: No hepatosplenomegaly, masses, or tenderness. The patient has normal bowel sounds. No guarding.  No distention.  : Deferred  MUSCULOSKELETAL: Warm and  well-perfused, no cyanosis, clubbing, or edema.  NEUROLOGIC: Cranial nerves II-XII are intact. Alert, oriented, answers questions appropriately. No focal neurologic deficit  INTEGUMENTARY: No rashes or jaundice.  GAIT: Normal     LABORATORY AND IMAGING STUDIES     Recent Labs   Lab Test 10/27/22  1046 09/22/22  1012 08/15/22  0810 06/27/22  0827 03/28/22  1050    143 140 140 140   POTASSIUM 4.0 4.4 4.1 4.1 4.7   CHLORIDE 106 104 104 109 107   CO2 27 29 28 26 31   ANIONGAP 7 10 8 5 2*   BUN 13 11.6 12.7 17 18   CR 0.84 0.86 0.93 0.91 0.90   * 118* 97 107* 103*   TY 9.0 9.2 9.1 8.8 8.9     Recent Labs   Lab Test 10/27/22  1046 09/22/22  1012 01/21/20  1154   MAG 2.3 2.1 2.0     Recent Labs   Lab Test 10/27/22  1046 09/22/22  1012 08/15/22  0810 06/27/22  0827 03/28/22  1050   WBC 6.4 6.6 7.8 5.7 6.1   HGB 15.1 15.2 15.1 15.4 15.3    167 200 158 181   MCV 92 94 94 93 93   NEUTROPHIL 70 70 60 71 59     Recent Labs   Lab Test 10/27/22  1046 09/22/22  1012 08/15/22  0810 06/27/22  0827 03/28/22  1050 02/23/22  1419   BILITOTAL 0.8 0.6 0.7 0.9 0.7 0.8   ALKPHOS 94 95 101 94 108 113   ALT 34 30 34 49 80* 88*   AST 27 31 31 4 43 46*   ALBUMIN 3.8 4.1 4.2 3.7 3.6 3.4   LDH  --   --   --  246* 259* 246*     TSH   Date Value Ref Range Status   10/27/2022 0.65 0.40 - 4.00 mU/L Final   09/22/2022 0.86 0.30 - 4.20 uIU/mL Final     No results for input(s): CEA in the last 31739 hours.  Results for orders placed or performed during the hospital encounter of 09/29/22   PET PSMA Eyes to Thighs    Narrative    Combined Report of: PET and CT on 9/29/2022 2:46 PM :    1. PET of the neck, chest, abdomen, and pelvis.  2. PET CT Fusion for Attenuation Correction and Anatomical  Localization:    3. Diagnostic CT scan of the chest, abdomen, and pelvis with  intravenous contrast for interpretation.  3. CT of the chest, abdomen and pelvis obtained for diagnostic  interpretation.  4. 3D MIP and PET-CT fused images were  processed on an independent  workstation and archived to PACS and reviewed by a radiologist.    Technique:    1. PET: The patient received 5.4 mCi of Ga-68 PSMA, body weight was  99.4 kg. Images were evaluated in the axial, sagittal, and coronal  planes as well as the rotational whole body MIP. Images were acquired  from the Vertex to the proximal thighs.    2. CT: Volumetric acquisition for clinical interpretation of the  chest, abdomen, and pelvis acquired at 3 mm sections . The chest,  abdomen, and pelvis were evaluated at 5 mm sections in bone, soft  tissue, and lung windows.      The patient received 121 cc of Isovue 370 intravenously and 500 mL  Breeza oral contrast for the examination.      3. 3D MIP and PET-CT fused images were processed on an independent  workstation and archived to PACS and reviewed by a radiologist.    INDICATION: Prostate cancer with metastasis to the bones and  progressing disease -need PET CT scan prior to enrollment on the  Eclipse clinical trial with Pluvicto; Prostate cancer (H); Bone  metastasis (H); Other fatigue    ADDITIONAL INFORMATION OBTAINED FROM EMR: PSA: 9.12 (9/22/2022)  History of prior prostatectomy on 11/13/2015.    COMPARISON: Bone scan 9/22/2022 and CT chest abdomen and pelvis  6/30/2021    FINDINGS:     Liver SUV= 7.2,  Aorta SUV: 3.1    HEAD/NECK:  There is no suspicious uptake in the neck.    The paranasal sinuses are clear. The mastoid air cells are clear.     The mucosal pharyngeal space, the , prevertebral and carotid  spaces are within normal limits.     No masses, mass effect or pathologically enlarged lymph nodes are  evident. The thyroid gland is unremarkable.      CHEST:  There is no suspicious PSMA uptake in the chest.     The heart is at the upper limits of normal for size. No pericardial  effusion. Moderate coronary artery calcifications. Normal caliber  thoracic vasculature. Atherosclerotic calcifications of the aortic  arch. No axillary or  mediastinal lymphadenopathy. Small hiatal hernia.  The central airways are patent. Mild central bronchial wall  thickening. Multiple scattered calcified granulomas. Multiple sub-6 mm  pulmonary nodules are either unchanged or decreased in size, for  example a previously demonstrated 7 mm pulmonary nodule in the right  lower lobe now measures approximately 2 mm (series 11 image 71). No  pleural effusion, pneumothorax, focal airspace consolidations.      ABDOMEN AND PELVIS:  There is no suspicious PSMA uptake in the abdomen or pelvis.    The liver, gallbladder, spleen, and left adrenal gland are normal in  appearance. Unchanged 10 mm right adrenal nodule. Diffuse fatty  atrophy of the pancreas. Symmetric renal cortical enhancement. No  hydronephrosis or nephrolithiasis. Urinary bladder is unremarkable.  Small bilateral fat-containing inguinal hernias, greater on the right.  Post surgical changes of prostatectomy. Calcified pelvic phlebolith.  No significant free fluid within the pelvis. No abnormally dilated  loops of large or small bowel. Very mild colonic diverticulosis  without evidence of acute diverticulitis. No abdominal or pelvic  lymphadenopathy by size criteria. Mild to moderate stenosis at the  origin of the superior mesenteric artery. Moderate calcified and  noncalcified atherosclerotic disease involving the intra-abdominal  aorta and iliac vasculature. Unchanged 3.4 cm infrarenal abdominal  aortic aneurysm. Unchanged 1.9 cm right and 1.8 cm common iliac artery  aneurysms.    BONES:   Small sclerotic focus with PSMA-avidity within the left posterior  fourth rib (series 3 image 185) SUV max 5.4. Sclerotic focus with  PSMA-avidity within the T6 vertebral body, SUV max 35.6 (series 3  image 209). Diffuse sclerosis involving the S2, S3, and S4 vertebral  bodies with associated PSMA-avidity SUV max of 18.0. Additionally,  there is sclerosis and PSMA-avidity within the coccyx, SUV max 14.0.  Areas of sclerosis  are more pronounced, most notably within the sacrum  as compared to prior CT on 6/30/2021.    Multilevel degenerative changes of the spine. L5-S1 spondylolisthesis.  Degenerative changes of the hips and sacroiliac joints.      Impression    IMPRESSION:  In this patient with history of prostate cancer status  post prostatectomy, there is evidence of slightly progressed osseous  metastatic disease compared to prior CT from 6/30/2021:    1. Multifocal sclerotic foci with associated PSMA-avidity as described  above, consistent with osseous metastatic disease.   2. No evidence of nonosseous metastatic disease.  3. Multiple subcentimeter pulmonary nodules have either decreased in  size or are unchanged compared to prior CT from 6/30/2021. No new or  suspicious pulmonary nodules.  4. Unchanged 3.3 cm infrarenal abdominal aortic aneurysm and bilateral  common iliac artery aneurysms.        I have personally reviewed the examination and initial interpretation  and I agree with the findings.    EUGENE WILDER MD         SYSTEM ID:  [id number]   CT Chest/Abdomen/Pelvis w Contrast    Narrative    Combined Report of: PET and CT on 9/29/2022 2:46 PM :    1. PET of the neck, chest, abdomen, and pelvis.  2. PET CT Fusion for Attenuation Correction and Anatomical  Localization:    3. Diagnostic CT scan of the chest, abdomen, and pelvis with  intravenous contrast for interpretation.  3. CT of the chest, abdomen and pelvis obtained for diagnostic  interpretation.  4. 3D MIP and PET-CT fused images were processed on an independent  workstation and archived to PACS and reviewed by a radiologist.    Technique:    1. PET: The patient received 5.4 mCi of Ga-68 PSMA, body weight was  99.4 kg. Images were evaluated in the axial, sagittal, and coronal  planes as well as the rotational whole body MIP. Images were acquired  from the Vertex to the proximal thighs.    2. CT: Volumetric acquisition for clinical interpretation of the  chest,  abdomen, and pelvis acquired at 3 mm sections . The chest,  abdomen, and pelvis were evaluated at 5 mm sections in bone, soft  tissue, and lung windows.      The patient received 121 cc of Isovue 370 intravenously and 500 mL  Breeza oral contrast for the examination.      3. 3D MIP and PET-CT fused images were processed on an independent  workstation and archived to PACS and reviewed by a radiologist.    INDICATION: Prostate cancer with metastasis to the bones and  progressing disease -need PET CT scan prior to enrollment on the  [facility] clinical trial with Pluvicto; Prostate cancer (H); Bone  metastasis (H); Other fatigue    ADDITIONAL INFORMATION OBTAINED FROM EMR: PSA: 9.12 (9/22/2022)  History of prior prostatectomy on 11/13/2015.    COMPARISON: Bone scan 9/22/2022 and CT chest abdomen and pelvis  6/30/2021    FINDINGS:     Liver SUV= 7.2,  Aorta SUV: 3.1    HEAD/NECK:  There is no suspicious uptake in the neck.    The paranasal sinuses are clear. The mastoid air cells are clear.     The mucosal pharyngeal space, the , prevertebral and carotid  spaces are within normal limits.     No masses, mass effect or pathologically enlarged lymph nodes are  evident. The thyroid gland is unremarkable.      CHEST:  There is no suspicious PSMA uptake in the chest.     The heart is at the upper limits of normal for size. No pericardial  effusion. Moderate coronary artery calcifications. Normal caliber  thoracic vasculature. Atherosclerotic calcifications of the aortic  arch. No axillary or mediastinal lymphadenopathy. Small hiatal hernia.  The central airways are patent. Mild central bronchial wall  thickening. Multiple scattered calcified granulomas. Multiple sub-6 mm  pulmonary nodules are either unchanged or decreased in size, for  example a previously demonstrated 7 mm pulmonary nodule in the right  lower lobe now measures approximately 2 mm (series 11 image 71). No  pleural effusion, pneumothorax, focal airspace  consolidations.      ABDOMEN AND PELVIS:  There is no suspicious PSMA uptake in the abdomen or pelvis.    The liver, gallbladder, spleen, and left adrenal gland are normal in  appearance. Unchanged 10 mm right adrenal nodule. Diffuse fatty  atrophy of the pancreas. Symmetric renal cortical enhancement. No  hydronephrosis or nephrolithiasis. Urinary bladder is unremarkable.  Small bilateral fat-containing inguinal hernias, greater on the right.  Post surgical changes of prostatectomy. Calcified pelvic phlebolith.  No significant free fluid within the pelvis. No abnormally dilated  loops of large or small bowel. Very mild colonic diverticulosis  without evidence of acute diverticulitis. No abdominal or pelvic  lymphadenopathy by size criteria. Mild to moderate stenosis at the  origin of the superior mesenteric artery. Moderate calcified and  noncalcified atherosclerotic disease involving the intra-abdominal  aorta and iliac vasculature. Unchanged 3.4 cm infrarenal abdominal  aortic aneurysm. Unchanged 1.9 cm right and 1.8 cm common iliac artery  aneurysms.    BONES:   Small sclerotic focus with PSMA-avidity within the left posterior  fourth rib (series 3 image 185) SUV max 5.4. Sclerotic focus with  PSMA-avidity within the T6 vertebral body, SUV max 35.6 (series 3  image 209). Diffuse sclerosis involving the S2, S3, and S4 vertebral  bodies with associated PSMA-avidity SUV max of 18.0. Additionally,  there is sclerosis and PSMA-avidity within the coccyx, SUV max 14.0.  Areas of sclerosis are more pronounced, most notably within the sacrum  as compared to prior CT on 6/30/2021.    Multilevel degenerative changes of the spine. L5-S1 spondylolisthesis.  Degenerative changes of the hips and sacroiliac joints.      Impression    IMPRESSION:  In this patient with history of prostate cancer status  post prostatectomy, there is evidence of slightly progressed osseous  metastatic disease compared to prior CT from  6/30/2021:    1. Multifocal sclerotic foci with associated PSMA-avidity as described  above, consistent with osseous metastatic disease.   2. No evidence of nonosseous metastatic disease.  3. Multiple subcentimeter pulmonary nodules have either decreased in  size or are unchanged compared to prior CT from 6/30/2021. No new or  suspicious pulmonary nodules.  4. Unchanged 3.3 cm infrarenal abdominal aortic aneurysm and bilateral  common iliac artery aneurysms.        I have personally reviewed the examination and initial interpretation  and I agree with the findings.    EUGENE WILDER MD         SYSTEM ID:  [id number]     Recent Labs   Lab Test 10/27/22  1046 09/22/22  1012 08/15/22  0810 06/27/22  0827 03/28/22  1050 02/23/22  1419 12/29/21  0923   PSA 10.30* 9.12* 7.81* 5.17 2.00 2.11 0.99   TESTOSTTOTAL  --  <2*  --  <2* <2* <2* <2*          ASSESSMENT AND PLAN   1. 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 ECOG PS 0   3. HTN  4. ECOG PS1  5. No medical comorbidity    I had a lengthy discussion with Steve who is alone at this visit. Clinical research nurse - Kala Arcos joined us for the visit and was present except for physical examination.     He has enrolled in the ECLIPSE clinical trial which randomizes patients to [id number] or enzalutamide. He has been randomized to the control arm. He has been disappointed by this.     We again reviewed that this was the preferred strategy outside of clinical trial - specially given that he is firm on not getting chemotherapy. Unfortunately the SOC drug cost is not covered by the study. It is typical for all SOC to be billed to patients insurance company. We have been working already to get enzalutamide approved for him and have applied for free drug.     I reviewed his PET-CT scan with him. He only has 2 main lesions on the PET-CT scan - T6 vertebral body and a lesion in sacrum that extends to coccyx. There is another  lesion in left 4th rib with marginal uptake.     This is overall good news that he has limited disease burden. I reviewed lutetium therapy as our next step as it would be covered by the trial - crossover.     We again reviewed enzalutamide.     Enzalutamide is a newer androgen receptor antagonist with much higher affinity to the same androgen receptor as bicalutamide which gives it a lower Ki. It also inhibits the translocation of receptor to the nucleus and subsequent binding of receptor to androgen response element on DNA. It has been shown to prolong survival as compared to placebo in both pre and post docetaxel setting. In the AFFIRM study (N Engl J Med. 2012 Sep 27;367(13):1187-97) post docetaxel patients had median survival of 18.4 months as compared to 13.6 months in placebo arm. There was improvement in all secondary endpoints. In the chemotherapy naive settings, the PREVAIL study (N Engl J Med. 2014 Jul 31;371(5):424-33) had to be stopped early due to the significant improvement see in these patients with 81% reduction of risk of progression and improvement across all other secondary end points. Fatigue and hypertension were the most common clinically relevant adverse events associated with enzalutamide treatment. Overall enzalutamide is very well tolerated and 87% of patients in both arms had progressed on prior anti-androgen therapies with other androgen receptor blockers.     He would have closer monitoring while on clinical trial. He would like to have follow up with me as much as possible. He was reminded that he could have a few visits with Rodolfo who is part of the same team. He has met her in the past.     45 minutes spent on the date of the encounter doing chart review, history and exam, documentation and further activities as noted above      Gigi Ward    Hematologist and Medical Oncologist  [facility]

## 2022-10-28 NOTE — TELEPHONE ENCOUNTER
I called [employer] to check status, spoke with rep De La Cruz, she notified the  Db that we have already done the benefit investigation and will expedite this case. I explained the patient needs this ASAP for his clinical trial and they noted that and will put him on the top of the list.

## 2022-10-31 NOTE — TELEPHONE ENCOUNTER
I called [employer] to check the status, I spoke with rep Brandt.     The patient is approved, he is sending this info to the  and they should call him today. He is approved until 12/31/2022 so he will have to re-enroll for 2023 which they will fax us the loan-enrollment form sometime in November. Awaiting approval letter via fax.

## 2022-10-31 NOTE — TELEPHONE ENCOUNTER
Free Drug Application Approved  Effective Dates: 10/31/2022-12/31/2022  Patient notified: we will call him  Additional Information: [facility]

## 2022-11-02 NOTE — TELEPHONE ENCOUNTER
[facility] to check [facility] as the pt told the RNCC he has not heard from the pharmacy yet. Spoke with rep Turner, she is sending a message in her management team as she cannot find the patient in the system yet. She said it can take 24-48 hours for the approval to show up on the pharmacies end so she is not sure why it is not visible to them yet.

## 2022-11-02 NOTE — TELEPHONE ENCOUNTER
I called the patient, he will try calling [facility] in a few hours, he said he was not worried about having to call them multiple times. Patient has my direct # incase he has any issues setting this up for delivery.

## 2022-11-03 ENCOUNTER — TELEPHONE (OUTPATIENT)
Dept: PHARMACY | Facility: CLINIC | Age: 77
End: 2022-11-03

## 2022-11-03 LAB — TESTOST SERPL-MCNC: <2 NG/DL (ref 240–950)

## 2022-11-03 NOTE — TELEPHONE ENCOUNTER
Spoke with Steve this morning about progress with receiving enzalutamide. This morning he was contacted by [facility] and is now in their system. They collected his shipping information and stated he would receive another call early next week. Pharmacy will follow-up with Steve for an assessment after he has started the medication.    Miko Taylor  PharmD Candidate  [facility]  [phone]

## 2022-11-10 ENCOUNTER — TELEPHONE (OUTPATIENT)
Dept: ONCOLOGY | Facility: CLINIC | Age: 77
End: 2022-11-10

## 2022-11-14 ENCOUNTER — IMMUNIZATION (OUTPATIENT)
Dept: FAMILY MEDICINE | Facility: CLINIC | Age: 77
End: 2022-11-14
Payer: COMMERCIAL

## 2022-11-14 PROCEDURE — 0124A COVID-19,PF,PFIZER BOOSTER BIVALENT: CPT

## 2022-11-14 PROCEDURE — 91312 COVID-19,PF,PFIZER BOOSTER BIVALENT: CPT

## 2022-11-16 ENCOUNTER — TELEPHONE (OUTPATIENT)
Dept: PHARMACY | Facility: CLINIC | Age: 77
End: 2022-11-16

## 2022-11-16 NOTE — ORAL ONC MGMT
Oral Chemotherapy Monitoring Program   Left Voicemail    Attempted to contact patient today for follow up regarding oral chemotherapy. No answer. Left voicemail for patient to call us back at [phone] when able. No medication name was left.    Sanjay Kim PharmD  Oral Chemotherapy Monitoring Program  November 16, 2022

## 2022-11-17 ENCOUNTER — TELEPHONE (OUTPATIENT)
Dept: ONCOLOGY | Facility: CLINIC | Age: 77
End: 2022-11-17

## 2022-11-17 NOTE — TELEPHONE ENCOUNTER
Oral Chemotherapy Monitoring Program     Left message to please call back in follow-up for Enzalutamide therapy No patient or drug names were mentioned.     Anshul Trevino, PharmD, BCOP  November 17, 2022  [facility]  [phone]

## 2022-11-23 DIAGNOSIS — C61 PROSTATE CANCER (H): Primary | ICD-10-CM

## 2022-11-25 ENCOUNTER — ALLIED HEALTH/NURSE VISIT (OUTPATIENT)
Dept: ONCOLOGY | Facility: CLINIC | Age: 77
End: 2022-11-25

## 2022-11-25 ENCOUNTER — ONCOLOGY VISIT (OUTPATIENT)
Dept: ONCOLOGY | Facility: CLINIC | Age: 77
End: 2022-11-25
Attending: INTERNAL MEDICINE
Payer: COMMERCIAL

## 2022-11-25 VITALS
BODY MASS INDEX: 32.18 KG/M2 | OXYGEN SATURATION: 94 % | DIASTOLIC BLOOD PRESSURE: 84 MMHG | RESPIRATION RATE: 16 BRPM | WEIGHT: 221.1 LBS | TEMPERATURE: 98.4 F | HEART RATE: 92 BPM | SYSTOLIC BLOOD PRESSURE: 152 MMHG

## 2022-11-25 DIAGNOSIS — Z00.6 EXAMINATION OF PARTICIPANT IN CLINICAL TRIAL: Primary | ICD-10-CM

## 2022-11-25 DIAGNOSIS — C61 PROSTATE CANCER (H): ICD-10-CM

## 2022-11-25 DIAGNOSIS — C79.51 BONE METASTASIS: ICD-10-CM

## 2022-11-25 DIAGNOSIS — C61 PROSTATE CANCER (H): Primary | ICD-10-CM

## 2022-11-25 LAB
ALBUMIN SERPL BCG-MCNC: 4 G/DL (ref 3.5–5.2)
ALP SERPL-CCNC: 152 U/L (ref 40–129)
ALT SERPL W P-5'-P-CCNC: 17 U/L (ref 10–50)
ANION GAP SERPL CALCULATED.3IONS-SCNC: 9 MMOL/L (ref 7–15)
AST SERPL W P-5'-P-CCNC: 27 U/L (ref 10–50)
BASOPHILS # BLD AUTO: 0.1 10E3/UL (ref 0–0.2)
BASOPHILS NFR BLD AUTO: 1 %
BILIRUB SERPL-MCNC: 0.8 MG/DL
BUN SERPL-MCNC: 12.1 MG/DL (ref 8–23)
CALCIUM SERPL-MCNC: 9.1 MG/DL (ref 8.8–10.2)
CHLORIDE SERPL-SCNC: 102 MMOL/L (ref 98–107)
CREAT SERPL-MCNC: 0.94 MG/DL (ref 0.67–1.17)
DEPRECATED HCO3 PLAS-SCNC: 29 MMOL/L (ref 22–29)
EOSINOPHIL # BLD AUTO: 0.3 10E3/UL (ref 0–0.7)
EOSINOPHIL NFR BLD AUTO: 4 %
ERYTHROCYTE [DISTWIDTH] IN BLOOD BY AUTOMATED COUNT: 12.9 % (ref 10–15)
GFR SERPL CREATININE-BSD FRML MDRD: 83 ML/MIN/1.73M2
GLUCOSE SERPL-MCNC: 110 MG/DL (ref 70–99)
HCT VFR BLD AUTO: 43.2 % (ref 40–53)
HGB BLD-MCNC: 14.7 G/DL (ref 13.3–17.7)
IMM GRANULOCYTES # BLD: 0 10E3/UL
IMM GRANULOCYTES NFR BLD: 0 %
LYMPHOCYTES # BLD AUTO: 1.7 10E3/UL (ref 0.8–5.3)
LYMPHOCYTES NFR BLD AUTO: 21 %
MCH RBC QN AUTO: 31.5 PG (ref 26.5–33)
MCHC RBC AUTO-ENTMCNC: 34 G/DL (ref 31.5–36.5)
MCV RBC AUTO: 93 FL (ref 78–100)
MONOCYTES # BLD AUTO: 0.7 10E3/UL (ref 0–1.3)
MONOCYTES NFR BLD AUTO: 9 %
NEUTROPHILS # BLD AUTO: 5.5 10E3/UL (ref 1.6–8.3)
NEUTROPHILS NFR BLD AUTO: 65 %
NRBC # BLD AUTO: 0 10E3/UL
NRBC BLD AUTO-RTO: 0 /100
PLATELET # BLD AUTO: 173 10E3/UL (ref 150–450)
POTASSIUM SERPL-SCNC: 4.3 MMOL/L (ref 3.4–5.3)
PROT SERPL-MCNC: 6.9 G/DL (ref 6.4–8.3)
RBC # BLD AUTO: 4.66 10E6/UL (ref 4.4–5.9)
SODIUM SERPL-SCNC: 140 MMOL/L (ref 136–145)
WBC # BLD AUTO: 8.4 10E3/UL (ref 4–11)

## 2022-11-25 PROCEDURE — 80053 COMPREHEN METABOLIC PANEL: CPT | Performed by: INTERNAL MEDICINE

## 2022-11-25 PROCEDURE — 85025 COMPLETE CBC W/AUTO DIFF WBC: CPT | Performed by: INTERNAL MEDICINE

## 2022-11-25 PROCEDURE — 99214 OFFICE O/P EST MOD 30 MIN: CPT | Performed by: INTERNAL MEDICINE

## 2022-11-25 PROCEDURE — 82040 ASSAY OF SERUM ALBUMIN: CPT | Performed by: INTERNAL MEDICINE

## 2022-11-25 PROCEDURE — 36415 COLL VENOUS BLD VENIPUNCTURE: CPT

## 2022-11-25 PROCEDURE — 99212 OFFICE O/P EST SF 10 MIN: CPT | Performed by: INTERNAL MEDICINE

## 2022-11-25 ASSESSMENT — PAIN SCALES - GENERAL: PAINLEVEL: NO PAIN (0)

## 2022-11-25 NOTE — ORAL ONC MGMT
Oral Chemotherapy Monitoring Program    Subjective/Objective:  Wallace Zelaya is a 77 year old male seen in clinic for a follow-up visit for oral chemotherapy.  He denies any side effects.    ORAL CHEMOTHERAPY 12/23/2021 1/17/2022 2/23/2022 8/16/2022 9/13/2022 10/27/2022 11/25/2022   Assessment Type Refill Other Lab Monitoring;Monthly Follow up Lab Monitoring Discontinuation Chart Review;Initial Work up;New Teach Initial Follow up   Stop Date - - - - 9/13/2022 - -   Reason for Discontinuation - - - - Disease progression - -   Other: - - - - enrolled in clinical trial - -   Diagnosis Code Prostate Cancer Prostate Cancer Prostate Cancer Prostate Cancer Prostate Cancer Prostate Cancer Prostate Cancer   Providers Dr. Ed Ward   Clinic Name/Location [facility]   Drug Name Xtandi (enzalutamide) Zytiga (abiraterone) Zytiga (abiraterone) Zytiga (abiraterone) Zytiga (abiraterone) Xtandi (enzalutamide) Xtandi (enzalutamide)   Dose 160 mg 500 mg 500 mg 1,000 mg 500 mg 160 mg 160 mg   Current Schedule Daily Daily Daily Daily Daily Daily Daily   Cycle Details Continuous Continuous Continuous Continuous Continuous Continuous Continuous   Start Date of Last Cycle - - - - - - -   Planned next cycle start date 12/28/2021 1/17/2022 3/3/2022 - - 11/10/2022 11/8/2022   Doses missed in last 2 weeks - - 0 - - - 0   Adherence Assessment - - Adherent - - - Adherent   Adverse Effects - - Increased AST/ALT/T BILI - - - No AE identified during assessment   Increased AST/ALT/T BILI - - Grade 1 - - - -   Pharmacist Intervention(increased ast/alt/t bili) - - No - - - -   Intervention(s) - - - - - - -   Any new drug interactions? - - No - - No No   Is the dose as ordered appropriate for the patient? - - Yes - - Yes Yes   Is the patient currently in pain? - - No - - - No   Has the patient been assessed within the past 6 months for depression?  "- - Yes - - - No   Has the patient missed any days of school, work, or other routine activity? - - No - - - No   Since the last time we talked, have you been hospitalized or used the emergency room? - - No - - - -       Last PHQ-2 Score on record:   PHQ-2 ( 1999 [employer]) 1/20/2022 1/6/2021   Q1: Little interest or pleasure in doing things 0 0   Q2: Feeling down, depressed or hopeless 0 0   PHQ-2 Score 0 0   PHQ-2 Total Score (12-17 Years)- Positive if 3 or more points; Administer PHQ-A if positive - 0   Q1: Little interest or pleasure in doing things Not at all -   Q2: Feeling down, depressed or hopeless Not at all -   PHQ-2 Score 0 -       Vitals:  BP:   BP Readings from Last 1 Encounters:   11/25/22 (!) 152/84     Wt Readings from Last 1 Encounters:   11/25/22 100.3 kg (221 lb 1.6 oz)     Estimated body surface area is 2.22 meters squared as calculated from the following:    Height as of 7/1/22: 1.765 m (5' 9.5\").    Weight as of this encounter: 100.3 kg (221 lb 1.6 oz).    Labs:  _  Result Component Current Result Ref Range   Sodium 140 (11/25/2022) 136 - 145 mmol/L     _  Result Component Current Result Ref Range   Potassium 4.3 (11/25/2022) 3.4 - 5.3 mmol/L     _  Result Component Current Result Ref Range   Calcium 9.1 (11/25/2022) 8.8 - 10.2 mg/dL     _  Result Component Current Result Ref Range   Magnesium 2.3 (10/27/2022) 1.6 - 2.3 mg/dL     No results found for Phos within last 30 days.     _  Result Component Current Result Ref Range   Albumin 4.0 (11/25/2022) 3.5 - 5.2 g/dL     _  Result Component Current Result Ref Range   Urea Nitrogen 12.1 (11/25/2022) 8.0 - 23.0 mg/dL     _  Result Component Current Result Ref Range   Creatinine 0.94 (11/25/2022) 0.67 - 1.17 mg/dL     _  Result Component Current Result Ref Range   AST 27 (11/25/2022) 10 - 50 U/L     _  Result Component Current Result Ref Range   ALT 17 (11/25/2022) 10 - 50 U/L     _  Result Component Current Result Ref Range   Bilirubin Total 0.8 " (11/25/2022) <=1.2 mg/dL     _  Result Component Current Result Ref Range   WBC Count 8.4 (11/25/2022) 4.0 - 11.0 10e3/uL     _  Result Component Current Result Ref Range   Hemoglobin 14.7 (11/25/2022) 13.3 - 17.7 g/dL     _  Result Component Current Result Ref Range   Platelet Count 173 (11/25/2022) 150 - 450 10e3/uL     No results found for ANC within last 30 days.     _  Result Component Current Result Ref Range   Absolute Neutrophils 5.5 (11/25/2022) 1.6 - 8.3 10e3/uL          Assessment/Plan:  Patient is tolerating his xtandi without significant side effects.  Continue with current regimen.    Follow-Up:  12/19/22 check for appt for labs      Refill Due:  Has refills x 11 through 10/26/22    Marty Meese, Pharm.D., BCOP

## 2022-11-25 NOTE — NURSING NOTE
"Pt in on the SOC arm of the ECLIPSE clinical trial. He was in clinic for \"Week 4\" of treatment. All assessments, except labs, are out of window for the trial. This is due to delayed start of Xtandi, holidays, provider availability. Study Sponsor was notified in advance and agrees with proposed timeframe for visits. Per conversation with Sponsor, on 11/21, we need to keep labs on schedule but visits may be OOW. Goal is to get pt back on schedule after holidays.  All vitals signs, QOLs, and limited physical exam were completed on 11/25/22 at the [facility]. All treatment parameters were reviewed and met. Pt reported no new medical symptoms (AEs) or new medications. CBC and CMP were collected per Dr. Ward request. At this visit pt has grade 1 increased ALP and grade 1 hyperglycemia (fasting). These were noted on trial AE sheet and were brought to MD and PharmD attention.   Pharmacist also met with pt to discuss side effects rt Xtandi as well as med compliance. Pt states he takes 4 pills/day and has not missed any doses since beginning on 11/8/22.     Next trial appointments were also discussed with pt and are currently awaiting scheduling:  \"Week 4\" labs ~12/6  Week 8 assessments (labs, bone scan, CT) ~1/3/23 and clinic visit with Dr. Ward on 1/6/23 to review results  Week 12 assessments ~2/3/23 with Dr. Ward    Pt is aware he can contact me at anytime with questions or concerns. He will be notified of upcoming appointments once they are made. He is in agreement.   "
heroin/cocaine

## 2022-11-25 NOTE — NURSING NOTE
"Oncology Rooming Note    November 25, 2022 10:43 AM   Wallace Zelaya is a 77 year old male who presents for:    Chief Complaint   Patient presents with     Oncology Clinic Visit     Bone metastasis      Initial Vitals: BP (!) 152/84   Pulse 92   Temp 98.4  F (36.9  C) (Tympanic)   Resp 16   Wt 100.3 kg (221 lb 1.6 oz)   SpO2 94%   BMI 32.18 kg/m   Estimated body mass index is 32.18 kg/m  as calculated from the following:    Height as of 7/1/22: 1.765 m (5' 9.5\").    Weight as of this encounter: 100.3 kg (221 lb 1.6 oz). Body surface area is 2.22 meters squared.  No Pain (0) Comment: Data Unavailable   No LMP for male patient.  Allergies reviewed: Yes  Medications reviewed: Yes    Medications: Medication refills not needed today.  Pharmacy name entered into Dar[facility]:    [facility] - [place], [place] - [id number]  [facility] - [place] [address]  [facility] [place] [address] [place], [place] - [facility] [address]  [facility] - [place], [place] - [id number]    Clinical concerns: f/u       Olya Valenzuela CMA              "

## 2022-11-25 NOTE — LETTER
11/25/2022         RE: Wallace Zelaya  [address] [facility] [id number]        Dear Colleague,    Thank you for referring your patient, Wallace Zelaya, to the [facility]. Please see a copy of my visit note below.    [facility]  FOLLOW-UP VISIT NOTE    PATIENT NAME: Wallace Zelaya MRN # [id number]  DATE OF VISIT: Nov 25, 2022 YOB: 1945    REFERRING PROVIDER: No referring provider defined for this encounter.    CANCER TYPE: Prostate cancer; Biochemical recurrence; Castration resistant disease  STAGE: Stage III - pT3b at diagnosis; M0    HISTORY OF PRESENTING ILLNESS:  Wallace was noted to have rising screening PSA from 2 - 4. He was referred to Dr. Rodolfo Connor. He had radical prostatectomy on 11/2015. Pathology from this revealed [place] 4+4 disease with extraprostatic extension, seminal vesicle extension and he was staged at pT3b. All of the 12 lymph nodes resected were negative for disease. Post operatively his PSA did not drop to undetectable levels and remained elevated at 0.56. It vladimir to 0.9 in April 2016 and he was referred to Dr. Newton for adjuvant radiation therapy. He completed radiation therapy but did not have any PSA response to this treatment.     TREATMENT SUMMARY:  11/13/2015 Radical prostatectomy  April 2016  Radiation therapy  He was started on intermittent androgen deprivation therapy which had to be changed to continuous with rapid rise in his PSA.      April 2020 - he was started on bicalutamide for complete androgen blockade  He had rising PSA in May 2021. His bone scan done on 6/30/21 revealed metastatic lesions of T6 and the sacrum. He was switched to abiraterone with prednisone on 8/7/21.  He had relatively stable PSA on therapy without any significant PSA response.  He was eventually taken off therapy in September 2022 for progressive disease.    He enrolled in the Eclipse clinical trial  on 10/27/2022 and was randomized to the standard of care enzalutamide arm.    CURRENT INTERVENTIONS:  Enzalutamide 160 mg oral once a day as a standard of care on ECLIPSE trial    DELANEY Gonsalez is being seen for his prostate cancer    Steve was followed in person at this visit. He has been on abiraterone. He has been tolerating this well. He has no new complains on therapy.  We discussed ECLIPSE clinical trial at our last visit about 10 days ago. He still has a number of questions for me prompting this visit.       PAST MEDICAL HISTORY   1. HTN  2. Dyslipidemia  3. Prostate cancer as detailed above      CURRENT OUTPATIENT MEDICATIONS     Current Outpatient Medications   Medication Sig     amLODIPine (NORVASC) 10 MG tablet Take 1 tablet (10 mg) by mouth daily     atorvastatin (LIPITOR) 20 MG tablet Take 1 tablet by mouth once daily     enzalutamide (XTANDI) 40 MG capsule Take 4 capsules (160 mg) by mouth daily     ibuprofen (ADVIL/MOTRIN) 800 MG tablet TAKE 1 TABLET BY MOUTH THREE TIMES DAILY WITH FOOD     ondansetron (ZOFRAN) 4 MG tablet Take 1 tablet (4 mg) by mouth every 8 hours as needed for nausea     tamsulosin (FLOMAX) 0.4 MG capsule Take 1 capsule (0.4 mg) by mouth daily     valsartan (DIOVAN) 160 MG tablet Take 1 tablet (160 mg) by mouth daily     No current facility-administered medications for this visit.        ALLERGIES     No Known Allergies     REVIEW OF SYSTEMS   As above in the HPI, o/w complete 12-point ROS was negative.     PHYSICAL EXAM   BP (!) 152/84   Pulse 92   Temp 98.4  F (36.9  C) (Tympanic)   Resp 16   Wt 100.3 kg (221 lb 1.6 oz)   SpO2 94%   BMI 32.18 kg/m    SpO2 Readings from Last 4 Encounters:   09/21/18 96%   08/24/18 94%   06/22/18 95%   05/11/18 97%     Wt Readings from Last 3 Encounters:   11/25/22 100.3 kg (221 lb 1.6 oz)   10/27/22 99.8 kg (220 lb)   09/23/22 99.4 kg (219 lb 1.6 oz)     GENERAL/CONSTITUTIONAL: No acute distress.  EYES: Pupils are equal, round, and react  to light and accommodation. Extraocular movements intact.  No scleral icterus.  ENT/MOUTH: Neck supple. Oropharynx clear, no mucositis.  LYMPH: No anterior cervical, posterior cervical, supraclavicular, axillary or inguinal adenopathy.   RESPIRATORY: Clear to auscultation bilaterally. No crackles or wheezing.   CARDIOVASCULAR: Regular rate and rhythm without murmurs, gallops, or rubs.  GASTROINTESTINAL: No hepatosplenomegaly, masses, or tenderness. The patient has normal bowel sounds. No guarding.  No distention.  : Deferred  MUSCULOSKELETAL: Warm and well-perfused, no cyanosis, clubbing, or edema.  NEUROLOGIC: Cranial nerves II-XII are intact. Alert, oriented, answers questions appropriately. No focal neurologic deficit  INTEGUMENTARY: No rashes or jaundice.  GAIT: Normal     LABORATORY AND IMAGING STUDIES     Recent Labs   Lab Test 10/27/22  1046 09/22/22  1012 08/15/22  0810 06/27/22  0827 03/28/22  1050    143 140 140 140   POTASSIUM 4.0 4.4 4.1 4.1 4.7   CHLORIDE 106 104 104 109 107   CO2 27 29 28 26 31   ANIONGAP 7 10 8 5 2*   BUN 13 11.6 12.7 17 18   CR 0.84 0.86 0.93 0.91 0.90   * 118* 97 107* 103*   TY 9.0 9.2 9.1 8.8 8.9     Recent Labs   Lab Test 10/27/22  1046 09/22/22  1012 01/21/20  1154   MAG 2.3 2.1 2.0     Recent Labs   Lab Test 11/25/22  1031 10/27/22  1046 09/22/22  1012 08/15/22  0810 06/27/22  0827   WBC 8.4 6.4 6.6 7.8 5.7   HGB 14.7 15.1 15.2 15.1 15.4    166 167 200 158   MCV 93 92 94 94 93   NEUTROPHIL 65 70 70 60 71     Recent Labs   Lab Test 10/27/22  1046 09/22/22  1012 08/15/22  0810 06/27/22  0827 03/28/22  1050 02/23/22  1419   BILITOTAL 0.8 0.6 0.7 0.9 0.7 0.8   ALKPHOS 94 95 101 94 108 113   ALT 34 30 34 49 80* 88*   AST 27 31 31 4 43 46*   ALBUMIN 3.8 4.1 4.2 3.7 3.6 3.4   LDH  --   --   --  246* 259* 246*     TSH   Date Value Ref Range Status   10/27/2022 0.65 0.40 - 4.00 mU/L Final   09/22/2022 0.86 0.30 - 4.20 uIU/mL Final     No results for input(s): CEA  in the last 35341 hours.  Results for orders placed or performed during the hospital encounter of 09/29/22   PET PSMA Eyes to Thighs    Narrative    Combined Report of: PET and CT on 9/29/2022 2:46 PM :    1. PET of the neck, chest, abdomen, and pelvis.  2. PET CT Fusion for Attenuation Correction and Anatomical  Localization:    3. Diagnostic CT scan of the chest, abdomen, and pelvis with  intravenous contrast for interpretation.  3. CT of the chest, abdomen and pelvis obtained for diagnostic  interpretation.  4. 3D MIP and PET-CT fused images were processed on an independent  workstation and archived to PACS and reviewed by a radiologist.    Technique:    1. PET: The patient received 5.4 mCi of Ga-68 PSMA, body weight was  99.4 kg. Images were evaluated in the axial, sagittal, and coronal  planes as well as the rotational whole body MIP. Images were acquired  from the Vertex to the proximal thighs.    2. CT: Volumetric acquisition for clinical interpretation of the  chest, abdomen, and pelvis acquired at 3 mm sections . The chest,  abdomen, and pelvis were evaluated at 5 mm sections in bone, soft  tissue, and lung windows.      The patient received 121 cc of Isovue 370 intravenously and 500 mL  Breeza oral contrast for the examination.      3. 3D MIP and PET-CT fused images were processed on an independent  workstation and archived to PACS and reviewed by a radiologist.    INDICATION: Prostate cancer with metastasis to the bones and  progressing disease -need PET CT scan prior to enrollment on the  Eclipse clinical trial with Pluvicto; Prostate cancer (H); Bone  metastasis (H); Other fatigue    ADDITIONAL INFORMATION OBTAINED FROM EMR: PSA: 9.12 (9/22/2022)  History of prior prostatectomy on 11/13/2015.    COMPARISON: Bone scan 9/22/2022 and CT chest abdomen and pelvis  6/30/2021    FINDINGS:     Liver SUV= 7.2,  Aorta SUV: 3.1    HEAD/NECK:  There is no suspicious uptake in the neck.    The paranasal sinuses are  clear. The mastoid air cells are clear.     The mucosal pharyngeal space, the , prevertebral and carotid  spaces are within normal limits.     No masses, mass effect or pathologically enlarged lymph nodes are  evident. The thyroid gland is unremarkable.      CHEST:  There is no suspicious PSMA uptake in the chest.     The heart is at the upper limits of normal for size. No pericardial  effusion. Moderate coronary artery calcifications. Normal caliber  thoracic vasculature. Atherosclerotic calcifications of the aortic  arch. No axillary or mediastinal lymphadenopathy. Small hiatal hernia.  The central airways are patent. Mild central bronchial wall  thickening. Multiple scattered calcified granulomas. Multiple sub-6 mm  pulmonary nodules are either unchanged or decreased in size, for  example a previously demonstrated 7 mm pulmonary nodule in the right  lower lobe now measures approximately 2 mm (series 11 image 71). No  pleural effusion, pneumothorax, focal airspace consolidations.      ABDOMEN AND PELVIS:  There is no suspicious PSMA uptake in the abdomen or pelvis.    The liver, gallbladder, spleen, and left adrenal gland are normal in  appearance. Unchanged 10 mm right adrenal nodule. Diffuse fatty  atrophy of the pancreas. Symmetric renal cortical enhancement. No  hydronephrosis or nephrolithiasis. Urinary bladder is unremarkable.  Small bilateral fat-containing inguinal hernias, greater on the right.  Post surgical changes of prostatectomy. Calcified pelvic phlebolith.  No significant free fluid within the pelvis. No abnormally dilated  loops of large or small bowel. Very mild colonic diverticulosis  without evidence of acute diverticulitis. No abdominal or pelvic  lymphadenopathy by size criteria. Mild to moderate stenosis at the  origin of the superior mesenteric artery. Moderate calcified and  noncalcified atherosclerotic disease involving the intra-abdominal  aorta and iliac vasculature. Unchanged  3.4 cm infrarenal abdominal  aortic aneurysm. Unchanged 1.9 cm right and 1.8 cm common iliac artery  aneurysms.    BONES:   Small sclerotic focus with PSMA-avidity within the left posterior  fourth rib (series 3 image 185) SUV max 5.4. Sclerotic focus with  PSMA-avidity within the T6 vertebral body, SUV max 35.6 (series 3  image 209). Diffuse sclerosis involving the S2, S3, and S4 vertebral  bodies with associated PSMA-avidity SUV max of 18.0. Additionally,  there is sclerosis and PSMA-avidity within the coccyx, SUV max 14.0.  Areas of sclerosis are more pronounced, most notably within the sacrum  as compared to prior CT on 6/30/2021.    Multilevel degenerative changes of the spine. L5-S1 spondylolisthesis.  Degenerative changes of the hips and sacroiliac joints.      Impression    IMPRESSION:  In this patient with history of prostate cancer status  post prostatectomy, there is evidence of slightly progressed osseous  metastatic disease compared to prior CT from 6/30/2021:    1. Multifocal sclerotic foci with associated PSMA-avidity as described  above, consistent with osseous metastatic disease.   2. No evidence of nonosseous metastatic disease.  3. Multiple subcentimeter pulmonary nodules have either decreased in  size or are unchanged compared to prior CT from 6/30/2021. No new or  suspicious pulmonary nodules.  4. Unchanged 3.3 cm infrarenal abdominal aortic aneurysm and bilateral  common iliac artery aneurysms.        I have personally reviewed the examination and initial interpretation  and I agree with the findings.    EUGENE WILDER MD         SYSTEM ID:  [id number]   CT Chest/Abdomen/Pelvis w Contrast    Narrative    Combined Report of: PET and CT on 9/29/2022 2:46 PM :    1. PET of the neck, chest, abdomen, and pelvis.  2. PET CT Fusion for Attenuation Correction and Anatomical  Localization:    3. Diagnostic CT scan of the chest, abdomen, and pelvis with  intravenous contrast for interpretation.  3. CT of  the chest, abdomen and pelvis obtained for diagnostic  interpretation.  4. 3D MIP and PET-CT fused images were processed on an independent  workstation and archived to PACS and reviewed by a radiologist.    Technique:    1. PET: The patient received 5.4 mCi of Ga-68 PSMA, body weight was  99.4 kg. Images were evaluated in the axial, sagittal, and coronal  planes as well as the rotational whole body MIP. Images were acquired  from the Vertex to the proximal thighs.    2. CT: Volumetric acquisition for clinical interpretation of the  chest, abdomen, and pelvis acquired at 3 mm sections . The chest,  abdomen, and pelvis were evaluated at 5 mm sections in bone, soft  tissue, and lung windows.      The patient received 121 cc of Isovue 370 intravenously and 500 mL  Breeza oral contrast for the examination.      3. 3D MIP and PET-CT fused images were processed on an independent  workstation and archived to PACS and reviewed by a radiologist.    INDICATION: Prostate cancer with metastasis to the bones and  progressing disease -need PET CT scan prior to enrollment on the  Eclipse clinical trial with Pluvicto; Prostate cancer (H); Bone  metastasis (H); Other fatigue    ADDITIONAL INFORMATION OBTAINED FROM EMR: PSA: 9.12 (9/22/2022)  History of prior prostatectomy on 11/13/2015.    COMPARISON: Bone scan 9/22/2022 and CT chest abdomen and pelvis  6/30/2021    FINDINGS:     Liver SUV= 7.2,  Aorta SUV: 3.1    HEAD/NECK:  There is no suspicious uptake in the neck.    The paranasal sinuses are clear. The mastoid air cells are clear.     The mucosal pharyngeal space, the , prevertebral and carotid  spaces are within normal limits.     No masses, mass effect or pathologically enlarged lymph nodes are  evident. The thyroid gland is unremarkable.      CHEST:  There is no suspicious PSMA uptake in the chest.     The heart is at the upper limits of normal for size. No pericardial  effusion. Moderate coronary artery  calcifications. Normal caliber  thoracic vasculature. Atherosclerotic calcifications of the aortic  arch. No axillary or mediastinal lymphadenopathy. Small hiatal hernia.  The central airways are patent. Mild central bronchial wall  thickening. Multiple scattered calcified granulomas. Multiple sub-6 mm  pulmonary nodules are either unchanged or decreased in size, for  example a previously demonstrated 7 mm pulmonary nodule in the right  lower lobe now measures approximately 2 mm (series 11 image 71). No  pleural effusion, pneumothorax, focal airspace consolidations.      ABDOMEN AND PELVIS:  There is no suspicious PSMA uptake in the abdomen or pelvis.    The liver, gallbladder, spleen, and left adrenal gland are normal in  appearance. Unchanged 10 mm right adrenal nodule. Diffuse fatty  atrophy of the pancreas. Symmetric renal cortical enhancement. No  hydronephrosis or nephrolithiasis. Urinary bladder is unremarkable.  Small bilateral fat-containing inguinal hernias, greater on the right.  Post surgical changes of prostatectomy. Calcified pelvic phlebolith.  No significant free fluid within the pelvis. No abnormally dilated  loops of large or small bowel. Very mild colonic diverticulosis  without evidence of acute diverticulitis. No abdominal or pelvic  lymphadenopathy by size criteria. Mild to moderate stenosis at the  origin of the superior mesenteric artery. Moderate calcified and  noncalcified atherosclerotic disease involving the intra-abdominal  aorta and iliac vasculature. Unchanged 3.4 cm infrarenal abdominal  aortic aneurysm. Unchanged 1.9 cm right and 1.8 cm common iliac artery  aneurysms.    BONES:   Small sclerotic focus with PSMA-avidity within the left posterior  fourth rib (series 3 image 185) SUV max 5.4. Sclerotic focus with  PSMA-avidity within the T6 vertebral body, SUV max 35.6 (series 3  image 209). Diffuse sclerosis involving the S2, S3, and S4 vertebral  bodies with associated PSMA-avidity  SUV max of 18.0. Additionally,  there is sclerosis and PSMA-avidity within the coccyx, SUV max 14.0.  Areas of sclerosis are more pronounced, most notably within the sacrum  as compared to prior CT on 6/30/2021.    Multilevel degenerative changes of the spine. L5-S1 spondylolisthesis.  Degenerative changes of the hips and sacroiliac joints.      Impression    IMPRESSION:  In this patient with history of prostate cancer status  post prostatectomy, there is evidence of slightly progressed osseous  metastatic disease compared to prior CT from 6/30/2021:    1. Multifocal sclerotic foci with associated PSMA-avidity as described  above, consistent with osseous metastatic disease.   2. No evidence of nonosseous metastatic disease.  3. Multiple subcentimeter pulmonary nodules have either decreased in  size or are unchanged compared to prior CT from 6/30/2021. No new or  suspicious pulmonary nodules.  4. Unchanged 3.3 cm infrarenal abdominal aortic aneurysm and bilateral  common iliac artery aneurysms.        I have personally reviewed the examination and initial interpretation  and I agree with the findings.    EUGENE WILDER MD         SYSTEM ID:  [id number]              ASSESSMENT AND PLAN   1. 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 ECOG PS 0   3. HTN  4. ECOG PS1  5. No medical comorbidity    I had a lengthy discussion with Steve who is alone at this visit. Clinical research nurse - Kala Arcos joined us for the visit and was present except for physical examination.     He has enrolled in the ECLIPSE clinical trial which randomizes patients to [id number] or enzalutamide. He has been randomized to the control arm. He has been disappointed by this.     We again reviewed that this was the preferred strategy outside of clinical trial - specially given that he is firm on not getting chemotherapy. Unfortunately the SOC drug cost is not covered by the study. It is typical  for all SOC to be billed to patients insurance company. We have been working already to get enzalutamide approved for him and have applied for free drug.     I reviewed his PET-CT scan with him. He only has 2 main lesions on the PET-CT scan - T6 vertebral body and a lesion in sacrum that extends to coccyx. There is another lesion in left 4th rib with marginal uptake.     This is overall good news that he has limited disease burden. I reviewed lutetium therapy as our next step as it would be covered by the trial - crossover.     We again reviewed enzalutamide.     Enzalutamide is a newer androgen receptor antagonist with much higher affinity to the same androgen receptor as bicalutamide which gives it a lower Ki. It also inhibits the translocation of receptor to the nucleus and subsequent binding of receptor to androgen response element on DNA. It has been shown to prolong survival as compared to placebo in both pre and post docetaxel setting. In the AFFIRM study (N Engl J Med. 2012 Sep 27;367(13):1187-97) post docetaxel patients had median survival of 18.4 months as compared to 13.6 months in placebo arm. There was improvement in all secondary endpoints. In the chemotherapy naive settings, the PREVAIL study (N Engl J Med. 2014 Jul 31;371(5):424-33) had to be stopped early due to the significant improvement see in these patients with 81% reduction of risk of progression and improvement across all other secondary end points. Fatigue and hypertension were the most common clinically relevant adverse events associated with enzalutamide treatment. Overall enzalutamide is very well tolerated and 87% of patients in both arms had progressed on prior anti-androgen therapies with other androgen receptor blockers.     He would have closer monitoring while on clinical trial. He would like to have follow up with me as much as possible. He was reminded that he could have a few visits with Rodolfo who is part of the same team. He  has met her in the past.     45 minutes spent on the date of the encounter doing chart review, history and exam, documentation and further activities as noted above      Gigi Ward    Hematologist and Medical Oncologist  [facility]            Again, thank you for allowing me to participate in the care of your patient.        Sincerely,        Gigi Ward MD

## 2022-11-25 NOTE — PROGRESS NOTES
Medical Assistant Note:  Wallace Zelaya presents today for blood draw.    Patient seen by provider today: Yes: Dr. Ward.   present during visit today: Not Applicable.    Concerns: No Concerns.    Procedure:  Labs drawn    Post Assessment:  Labs drawn without difficulty: Yes.    Discharge Plan:  Departure Mode: Ambulatory.    Face to Face Time: 10 min.    Judie Linton CMA

## 2022-11-25 NOTE — PROGRESS NOTES
[facility] CANCER CLINIC  FOLLOW-UP VISIT NOTE    PATIENT NAME: Wallace Zelaya MRN # [id number]  DATE OF VISIT: Nov 25, 2022 YOB: 1945    REFERRING PROVIDER: No referring provider defined for this encounter.    CANCER TYPE: Prostate cancer; Biochemical recurrence; Castration resistant disease  STAGE: Stage III - pT3b at diagnosis; M0    HISTORY OF PRESENTING ILLNESS:  Wallace was noted to have rising screening PSA from 2 - 4. He was referred to Dr. Rodolfo Connor. He had radical prostatectomy on 11/2015. Pathology from this revealed Cayla 4+4 disease with extraprostatic extension, seminal vesicle extension and he was staged at pT3b. All of the 12 lymph nodes resected were negative for disease. Post operatively his PSA did not drop to undetectable levels and remained elevated at 0.56. It vladimir to 0.9 in April 2016 and he was referred to Dr. Newton for adjuvant radiation therapy. He completed radiation therapy but did not have any PSA response to this treatment.     TREATMENT SUMMARY:  11/13/2015 Radical prostatectomy  April 2016  Radiation therapy  He was started on intermittent androgen deprivation therapy which had to be changed to continuous with rapid rise in his PSA.      April 2020 - he was started on bicalutamide for complete androgen blockade  He had rising PSA in May 2021. His bone scan done on 6/30/21 revealed metastatic lesions of T6 and the sacrum. He was switched to abiraterone with prednisone on 8/7/21.  He had relatively stable PSA on therapy without any significant PSA response.  He was eventually taken off therapy in September 2022 for progressive disease.    He enrolled in the Eclipse clinical trial on 10/27/2022 and was randomized to the standard of care enzalutamide arm.    CURRENT INTERVENTIONS:  Enzalutamide 160 mg oral once a day as a standard of care on ECLIPSE trial    SUBJECTIVE   Wallace is being seen for his prostate cancer    Steve was followed in  person at this visit. He is seen for scheduled follow up visit.  He has been started on enzalutamide since our last visit. He  Denies missing a dose. He has tolerated this without difficulty. He denies any new side effects. He was in positive mood at this visit.        PAST MEDICAL HISTORY   1. HTN  2. Dyslipidemia  3. Prostate cancer as detailed above      CURRENT OUTPATIENT MEDICATIONS     Current Outpatient Medications   Medication Sig     amLODIPine (NORVASC) 10 MG tablet Take 1 tablet (10 mg) by mouth daily     atorvastatin (LIPITOR) 20 MG tablet Take 1 tablet by mouth once daily     enzalutamide (XTANDI) 40 MG capsule Take 4 capsules (160 mg) by mouth daily     ibuprofen (ADVIL/MOTRIN) 800 MG tablet TAKE 1 TABLET BY MOUTH THREE TIMES DAILY WITH FOOD     ondansetron (ZOFRAN) 4 MG tablet Take 1 tablet (4 mg) by mouth every 8 hours as needed for nausea     tamsulosin (FLOMAX) 0.4 MG capsule Take 1 capsule (0.4 mg) by mouth daily     valsartan (DIOVAN) 160 MG tablet Take 1 tablet (160 mg) by mouth daily     No current facility-administered medications for this visit.        ALLERGIES     No Known Allergies     REVIEW OF SYSTEMS   As above in the HPI, o/w complete 12-point ROS was negative.     PHYSICAL EXAM   BP (!) 152/84   Pulse 92   Temp 98.4  F (36.9  C) (Tympanic)   Resp 16   Wt 100.3 kg (221 lb 1.6 oz)   SpO2 94%   BMI 32.18 kg/m    SpO2 Readings from Last 4 Encounters:   09/21/18 96%   08/24/18 94%   06/22/18 95%   05/11/18 97%     Wt Readings from Last 3 Encounters:   11/25/22 100.3 kg (221 lb 1.6 oz)   10/27/22 99.8 kg (220 lb)   09/23/22 99.4 kg (219 lb 1.6 oz)     GENERAL/CONSTITUTIONAL: No acute distress.  EYES: Pupils are equal, round, and react to light and accommodation. Extraocular movements intact.  No scleral icterus.  ENT/MOUTH: Neck supple. Oropharynx clear, no mucositis.  LYMPH: No anterior cervical, posterior cervical, supraclavicular, axillary or inguinal adenopathy.   RESPIRATORY:  Clear to auscultation bilaterally. No crackles or wheezing.   CARDIOVASCULAR: Regular rate and rhythm without murmurs, gallops, or rubs.  GASTROINTESTINAL: No hepatosplenomegaly, masses, or tenderness. The patient has normal bowel sounds. No guarding.  No distention.  : Deferred  MUSCULOSKELETAL: Warm and well-perfused, no cyanosis, clubbing, or edema.  NEUROLOGIC: Cranial nerves II-XII are intact. Alert, oriented, answers questions appropriately. No focal neurologic deficit  INTEGUMENTARY: No rashes or jaundice.  GAIT: Normal     LABORATORY AND IMAGING STUDIES     Recent Labs   Lab Test 10/27/22  1046 09/22/22  1012 08/15/22  0810 06/27/22  0827 03/28/22  1050    143 140 140 140   POTASSIUM 4.0 4.4 4.1 4.1 4.7   CHLORIDE 106 104 104 109 107   CO2 27 29 28 26 31   ANIONGAP 7 10 8 5 2*   BUN 13 11.6 12.7 17 18   CR 0.84 0.86 0.93 0.91 0.90   * 118* 97 107* 103*   TY 9.0 9.2 9.1 8.8 8.9     Recent Labs   Lab Test 10/27/22  1046 09/22/22  1012 01/21/20  1154   MAG 2.3 2.1 2.0     Recent Labs   Lab Test 11/25/22  1031 10/27/22  1046 09/22/22  1012 08/15/22  0810 06/27/22  0827   WBC 8.4 6.4 6.6 7.8 5.7   HGB 14.7 15.1 15.2 15.1 15.4    166 167 200 158   MCV 93 92 94 94 93   NEUTROPHIL 65 70 70 60 71     Recent Labs   Lab Test 10/27/22  1046 09/22/22  1012 08/15/22  0810 06/27/22  0827 03/28/22  1050 02/23/22  1419   BILITOTAL 0.8 0.6 0.7 0.9 0.7 0.8   ALKPHOS 94 95 101 94 108 113   ALT 34 30 34 49 80* 88*   AST 27 31 31 4 43 46*   ALBUMIN 3.8 4.1 4.2 3.7 3.6 3.4   LDH  --   --   --  246* 259* 246*     TSH   Date Value Ref Range Status   10/27/2022 0.65 0.40 - 4.00 mU/L Final   09/22/2022 0.86 0.30 - 4.20 uIU/mL Final     No results for input(s): CEA in the last 04901 hours.  Results for orders placed or performed during the hospital encounter of 09/29/22   PET PSMA Eyes to Thighs    Narrative    Combined Report of: PET and CT on 9/29/2022 2:46 PM :    1. PET of the neck, chest, abdomen, and  pelvis.  2. PET CT Fusion for Attenuation Correction and Anatomical  Localization:    3. Diagnostic CT scan of the chest, abdomen, and pelvis with  intravenous contrast for interpretation.  3. CT of the chest, abdomen and pelvis obtained for diagnostic  interpretation.  4. 3D MIP and PET-CT fused images were processed on an independent  workstation and archived to PACS and reviewed by a radiologist.    Technique:    1. PET: The patient received 5.4 mCi of Ga-68 PSMA, body weight was  99.4 kg. Images were evaluated in the axial, sagittal, and coronal  planes as well as the rotational whole body MIP. Images were acquired  from the Vertex to the proximal thighs.    2. CT: Volumetric acquisition for clinical interpretation of the  chest, abdomen, and pelvis acquired at 3 mm sections . The chest,  abdomen, and pelvis were evaluated at 5 mm sections in bone, soft  tissue, and lung windows.      The patient received 121 cc of Isovue 370 intravenously and 500 mL  Breeza oral contrast for the examination.      3. 3D MIP and PET-CT fused images were processed on an independent  workstation and archived to PACS and reviewed by a radiologist.    INDICATION: Prostate cancer with metastasis to the bones and  progressing disease -need PET CT scan prior to enrollment on the  Eclipse clinical trial with Pluvicto; Prostate cancer (H); Bone  metastasis (H); Other fatigue    ADDITIONAL INFORMATION OBTAINED FROM EMR: PSA: 9.12 (9/22/2022)  History of prior prostatectomy on 11/13/2015.    COMPARISON: Bone scan 9/22/2022 and CT chest abdomen and pelvis  6/30/2021    FINDINGS:     Liver SUV= 7.2,  Aorta SUV: 3.1    HEAD/NECK:  There is no suspicious uptake in the neck.    The paranasal sinuses are clear. The mastoid air cells are clear.     The mucosal pharyngeal space, the , prevertebral and carotid  spaces are within normal limits.     No masses, mass effect or pathologically enlarged lymph nodes are  evident. The thyroid gland  is unremarkable.      CHEST:  There is no suspicious PSMA uptake in the chest.     The heart is at the upper limits of normal for size. No pericardial  effusion. Moderate coronary artery calcifications. Normal caliber  thoracic vasculature. Atherosclerotic calcifications of the aortic  arch. No axillary or mediastinal lymphadenopathy. Small hiatal hernia.  The central airways are patent. Mild central bronchial wall  thickening. Multiple scattered calcified granulomas. Multiple sub-6 mm  pulmonary nodules are either unchanged or decreased in size, for  example a previously demonstrated 7 mm pulmonary nodule in the right  lower lobe now measures approximately 2 mm (series 11 image 71). No  pleural effusion, pneumothorax, focal airspace consolidations.      ABDOMEN AND PELVIS:  There is no suspicious PSMA uptake in the abdomen or pelvis.    The liver, gallbladder, spleen, and left adrenal gland are normal in  appearance. Unchanged 10 mm right adrenal nodule. Diffuse fatty  atrophy of the pancreas. Symmetric renal cortical enhancement. No  hydronephrosis or nephrolithiasis. Urinary bladder is unremarkable.  Small bilateral fat-containing inguinal hernias, greater on the right.  Post surgical changes of prostatectomy. Calcified pelvic phlebolith.  No significant free fluid within the pelvis. No abnormally dilated  loops of large or small bowel. Very mild colonic diverticulosis  without evidence of acute diverticulitis. No abdominal or pelvic  lymphadenopathy by size criteria. Mild to moderate stenosis at the  origin of the superior mesenteric artery. Moderate calcified and  noncalcified atherosclerotic disease involving the intra-abdominal  aorta and iliac vasculature. Unchanged 3.4 cm infrarenal abdominal  aortic aneurysm. Unchanged 1.9 cm right and 1.8 cm common iliac artery  aneurysms.    BONES:   Small sclerotic focus with PSMA-avidity within the left posterior  fourth rib (series 3 image 185) SUV max 5.4. Sclerotic  focus with  PSMA-avidity within the T6 vertebral body, SUV max 35.6 (series 3  image 209). Diffuse sclerosis involving the S2, S3, and S4 vertebral  bodies with associated PSMA-avidity SUV max of 18.0. Additionally,  there is sclerosis and PSMA-avidity within the coccyx, SUV max 14.0.  Areas of sclerosis are more pronounced, most notably within the sacrum  as compared to prior CT on 6/30/2021.    Multilevel degenerative changes of the spine. L5-S1 spondylolisthesis.  Degenerative changes of the hips and sacroiliac joints.      Impression    IMPRESSION:  In this patient with history of prostate cancer status  post prostatectomy, there is evidence of slightly progressed osseous  metastatic disease compared to prior CT from 6/30/2021:    1. Multifocal sclerotic foci with associated PSMA-avidity as described  above, consistent with osseous metastatic disease.   2. No evidence of nonosseous metastatic disease.  3. Multiple subcentimeter pulmonary nodules have either decreased in  size or are unchanged compared to prior CT from 6/30/2021. No new or  suspicious pulmonary nodules.  4. Unchanged 3.3 cm infrarenal abdominal aortic aneurysm and bilateral  common iliac artery aneurysms.        I have personally reviewed the examination and initial interpretation  and I agree with the findings.    EUGENE WILDER MD         SYSTEM ID:  [id number]   CT Chest/Abdomen/Pelvis w Contrast    Narrative    Combined Report of: PET and CT on 9/29/2022 2:46 PM :    1. PET of the neck, chest, abdomen, and pelvis.  2. PET CT Fusion for Attenuation Correction and Anatomical  Localization:    3. Diagnostic CT scan of the chest, abdomen, and pelvis with  intravenous contrast for interpretation.  3. CT of the chest, abdomen and pelvis obtained for diagnostic  interpretation.  4. 3D MIP and PET-CT fused images were processed on an independent  workstation and archived to PACS and reviewed by a radiologist.    Technique:    1. PET: The patient  received 5.4 mCi of Ga-68 PSMA, body weight was  99.4 kg. Images were evaluated in the axial, sagittal, and coronal  planes as well as the rotational whole body MIP. Images were acquired  from the Vertex to the proximal thighs.    2. CT: Volumetric acquisition for clinical interpretation of the  chest, abdomen, and pelvis acquired at 3 mm sections . The chest,  abdomen, and pelvis were evaluated at 5 mm sections in bone, soft  tissue, and lung windows.      The patient received 121 cc of Isovue 370 intravenously and 500 mL  Breeza oral contrast for the examination.      3. 3D MIP and PET-CT fused images were processed on an independent  workstation and archived to PACS and reviewed by a radiologist.    INDICATION: Prostate cancer with metastasis to the bones and  progressing disease -need PET CT scan prior to enrollment on the  Eclipse clinical trial with Pluvicto; Prostate cancer (H); Bone  metastasis (H); Other fatigue    ADDITIONAL INFORMATION OBTAINED FROM EMR: PSA: 9.12 (9/22/2022)  History of prior prostatectomy on 11/13/2015.    COMPARISON: Bone scan 9/22/2022 and CT chest abdomen and pelvis  6/30/2021    FINDINGS:     Liver SUV= 7.2,  Aorta SUV: 3.1    HEAD/NECK:  There is no suspicious uptake in the neck.    The paranasal sinuses are clear. The mastoid air cells are clear.     The mucosal pharyngeal space, the , prevertebral and carotid  spaces are within normal limits.     No masses, mass effect or pathologically enlarged lymph nodes are  evident. The thyroid gland is unremarkable.      CHEST:  There is no suspicious PSMA uptake in the chest.     The heart is at the upper limits of normal for size. No pericardial  effusion. Moderate coronary artery calcifications. Normal caliber  thoracic vasculature. Atherosclerotic calcifications of the aortic  arch. No axillary or mediastinal lymphadenopathy. Small hiatal hernia.  The central airways are patent. Mild central bronchial wall  thickening.  Multiple scattered calcified granulomas. Multiple sub-6 mm  pulmonary nodules are either unchanged or decreased in size, for  example a previously demonstrated 7 mm pulmonary nodule in the right  lower lobe now measures approximately 2 mm (series 11 image 71). No  pleural effusion, pneumothorax, focal airspace consolidations.      ABDOMEN AND PELVIS:  There is no suspicious PSMA uptake in the abdomen or pelvis.    The liver, gallbladder, spleen, and left adrenal gland are normal in  appearance. Unchanged 10 mm right adrenal nodule. Diffuse fatty  atrophy of the pancreas. Symmetric renal cortical enhancement. No  hydronephrosis or nephrolithiasis. Urinary bladder is unremarkable.  Small bilateral fat-containing inguinal hernias, greater on the right.  Post surgical changes of prostatectomy. Calcified pelvic phlebolith.  No significant free fluid within the pelvis. No abnormally dilated  loops of large or small bowel. Very mild colonic diverticulosis  without evidence of acute diverticulitis. No abdominal or pelvic  lymphadenopathy by size criteria. Mild to moderate stenosis at the  origin of the superior mesenteric artery. Moderate calcified and  noncalcified atherosclerotic disease involving the intra-abdominal  aorta and iliac vasculature. Unchanged 3.4 cm infrarenal abdominal  aortic aneurysm. Unchanged 1.9 cm right and 1.8 cm common iliac artery  aneurysms.    BONES:   Small sclerotic focus with PSMA-avidity within the left posterior  fourth rib (series 3 image 185) SUV max 5.4. Sclerotic focus with  PSMA-avidity within the T6 vertebral body, SUV max 35.6 (series 3  image 209). Diffuse sclerosis involving the S2, S3, and S4 vertebral  bodies with associated PSMA-avidity SUV max of 18.0. Additionally,  there is sclerosis and PSMA-avidity within the coccyx, SUV max 14.0.  Areas of sclerosis are more pronounced, most notably within the sacrum  as compared to prior CT on 6/30/2021.    Multilevel degenerative changes  of the spine. L5-S1 spondylolisthesis.  Degenerative changes of the hips and sacroiliac joints.      Impression    IMPRESSION:  In this patient with history of prostate cancer status  post prostatectomy, there is evidence of slightly progressed osseous  metastatic disease compared to prior CT from 6/30/2021:    1. Multifocal sclerotic foci with associated PSMA-avidity as described  above, consistent with osseous metastatic disease.   2. No evidence of nonosseous metastatic disease.  3. Multiple subcentimeter pulmonary nodules have either decreased in  size or are unchanged compared to prior CT from 6/30/2021. No new or  suspicious pulmonary nodules.  4. Unchanged 3.3 cm infrarenal abdominal aortic aneurysm and bilateral  common iliac artery aneurysms.        I have personally reviewed the examination and initial interpretation  and I agree with the findings.    EUGENE WILDER MD         SYSTEM ID:  [id number]              ASSESSMENT AND PLAN   1. 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 ECOG PS 0   3. HTN  4. ECOG PS1  5. No medical comorbidity    I had a lengthy discussion with Steve who is alone at this visit. Clinical research nurse - Kala Arcos joined us for the visit and was present except for physical examination.     He is tolerating enzalutamide well. He denies any new side effects on this medication. He has not been missing doses. He has not started any new medications.     He again had questions about the trial which were reviewed with him.     He would have closer monitoring while on clinical trial. He would like to have follow up with me as much as possible. He was reminded that he could have a few visits with Rodolfo who is part of the same team. He has met her in the past.     25 minutes spent on the date of the encounter doing chart review, history and exam, documentation and further activities as noted above      Gigi Ward    Hematologist and  Medical Oncologist  [facility]

## 2022-11-25 NOTE — TELEPHONE ENCOUNTER
Pt is on SOC arm of Eclipse clinical trial and alerted me that he received, and started, his Xtandi on 11/8/22.

## 2022-11-25 NOTE — NURSING NOTE
[id number]: Re-Consent Note      New information has been added to the consent form. This was explained to the patient, and all his questions were answered. The patient verbalized understanding and would like to continue participation in the trial. The patient was provided a signed copy of the IRB-approved consent form. A second copy was provided to [facility] to be scanned into his chart.      Consent Version Date: 21OCT2021  Approved by IRB on 03OCT2022  Consent obtained by: Kala Arcos RN   Date: 27/OCT/2022    After re consent to the trial, the following week 0 assessments were performed:  Pt in on the SOC arm of the ECLIPSE clinical trial and was in clinic to start his treatment with Xtandi. Pharmacy was present to do initial teaching on Xtandi and Dr. Ward was also available for questions. All required labs, vitals signs, QOLs, and a physical exam were completed at the [facility]. Pt reported no new baseline medical symptoms or new medications. Since we are still waiting on prior auth from insurance, Xtandi was not dispensed at this visit. Pt is aware that we will do all other assessments today, but that he should call and report when his Xtandi arrives and when he takes his first dose. He is in agreement.     Pt is aware he can contact me at anytime with questions or concerns. Four week appointment is scheduled on 11/25/22.

## 2022-12-06 ENCOUNTER — LAB (OUTPATIENT)
Dept: ONCOLOGY | Facility: CLINIC | Age: 77
End: 2022-12-06
Attending: INTERNAL MEDICINE
Payer: COMMERCIAL

## 2022-12-06 DIAGNOSIS — C61 PROSTATE CANCER (H): ICD-10-CM

## 2022-12-06 DIAGNOSIS — C79.51 BONE METASTASIS: Primary | ICD-10-CM

## 2022-12-06 LAB
ALBUMIN SERPL BCG-MCNC: 4.2 G/DL (ref 3.5–5.2)
ALP SERPL-CCNC: 123 U/L (ref 40–129)
ALT SERPL W P-5'-P-CCNC: 17 U/L (ref 10–50)
ANION GAP SERPL CALCULATED.3IONS-SCNC: 8 MMOL/L (ref 7–15)
AST SERPL W P-5'-P-CCNC: 23 U/L (ref 10–50)
BASOPHILS # BLD AUTO: 0.1 10E3/UL (ref 0–0.2)
BASOPHILS NFR BLD AUTO: 1 %
BILIRUB SERPL-MCNC: 0.5 MG/DL
BUN SERPL-MCNC: 11.9 MG/DL (ref 8–23)
CALCIUM SERPL-MCNC: 9.4 MG/DL (ref 8.8–10.2)
CHLORIDE SERPL-SCNC: 104 MMOL/L (ref 98–107)
CREAT SERPL-MCNC: 0.92 MG/DL (ref 0.67–1.17)
DEPRECATED HCO3 PLAS-SCNC: 27 MMOL/L (ref 22–29)
EOSINOPHIL # BLD AUTO: 0.4 10E3/UL (ref 0–0.7)
EOSINOPHIL NFR BLD AUTO: 7 %
ERYTHROCYTE [DISTWIDTH] IN BLOOD BY AUTOMATED COUNT: 12.8 % (ref 10–15)
GFR SERPL CREATININE-BSD FRML MDRD: 85 ML/MIN/1.73M2
GLUCOSE SERPL-MCNC: 106 MG/DL (ref 70–99)
HCT VFR BLD AUTO: 45 % (ref 40–53)
HGB BLD-MCNC: 14.9 G/DL (ref 13.3–17.7)
IMM GRANULOCYTES # BLD: 0 10E3/UL
IMM GRANULOCYTES NFR BLD: 0 %
LYMPHOCYTES # BLD AUTO: 1.6 10E3/UL (ref 0.8–5.3)
LYMPHOCYTES NFR BLD AUTO: 28 %
MAGNESIUM SERPL-MCNC: 2 MG/DL (ref 1.7–2.3)
MCH RBC QN AUTO: 31.2 PG (ref 26.5–33)
MCHC RBC AUTO-ENTMCNC: 33.1 G/DL (ref 31.5–36.5)
MCV RBC AUTO: 94 FL (ref 78–100)
MONOCYTES # BLD AUTO: 0.5 10E3/UL (ref 0–1.3)
MONOCYTES NFR BLD AUTO: 9 %
NEUTROPHILS # BLD AUTO: 3 10E3/UL (ref 1.6–8.3)
NEUTROPHILS NFR BLD AUTO: 55 %
NRBC # BLD AUTO: 0 10E3/UL
NRBC BLD AUTO-RTO: 0 /100
PLATELET # BLD AUTO: 210 10E3/UL (ref 150–450)
POTASSIUM SERPL-SCNC: 4.3 MMOL/L (ref 3.4–5.3)
PROT SERPL-MCNC: 7.1 G/DL (ref 6.4–8.3)
RBC # BLD AUTO: 4.77 10E6/UL (ref 4.4–5.9)
SODIUM SERPL-SCNC: 139 MMOL/L (ref 136–145)
WBC # BLD AUTO: 5.6 10E3/UL (ref 4–11)

## 2022-12-06 PROCEDURE — 36415 COLL VENOUS BLD VENIPUNCTURE: CPT | Performed by: INTERNAL MEDICINE

## 2022-12-06 PROCEDURE — 82310 ASSAY OF CALCIUM: CPT | Performed by: INTERNAL MEDICINE

## 2022-12-06 PROCEDURE — 82374 ASSAY BLOOD CARBON DIOXIDE: CPT | Performed by: INTERNAL MEDICINE

## 2022-12-06 PROCEDURE — 83735 ASSAY OF MAGNESIUM: CPT | Performed by: INTERNAL MEDICINE

## 2022-12-06 PROCEDURE — 85025 COMPLETE CBC W/AUTO DIFF WBC: CPT | Performed by: INTERNAL MEDICINE

## 2022-12-06 NOTE — PROGRESS NOTES
Medical Assistant Note:  Wallace Zelaya presents today for labs.    Patient seen by provider today: No.   present during visit today: Not Applicable.    Concerns: No Concerns.    Procedure:  Labs drawn: .    Post Assessment:  Labs drawn without difficulty: Yes.    Discharge Plan:  Departure Mode: Ambulatory.    Face to Face Time: 10 minutes.    Tammy Mortensen, CMA

## 2022-12-20 DIAGNOSIS — C61 PROSTATE CANCER (H): Primary | ICD-10-CM

## 2022-12-21 ENCOUNTER — TELEPHONE (OUTPATIENT)
Dept: ONCOLOGY | Facility: CLINIC | Age: 77
End: 2022-12-21

## 2022-12-21 NOTE — NURSING NOTE
Correcting date error made on 9/9/2021.     Patient was consented with:  Consent Version 3/0, Date: 21OCT2021

## 2022-12-22 NOTE — TELEPHONE ENCOUNTER
Free Drug Application Initiated  Medication: Xtandi  Sponsor: [employer]  Phone #:   Fax #:   Additional Information:     Faxed renewal application to Moni,

## 2022-12-29 NOTE — TELEPHONE ENCOUNTER
Called Xtandi and spoke with rep Em, brett is still pending, she will send a message to the  [facility] for an update

## 2022-12-30 NOTE — TELEPHONE ENCOUNTER
Free Drug Application Approved  Effective Dates: 1/1/2023 - 12/31/2023  Patient notified: Yes  Additional Information: N/A

## 2023-01-03 ENCOUNTER — HOSPITAL ENCOUNTER (OUTPATIENT)
Dept: NUCLEAR MEDICINE | Facility: CLINIC | Age: 78
Setting detail: NUCLEAR MEDICINE
Discharge: HOME | End: 2023-01-03
Attending: INTERNAL MEDICINE
Payer: COMMERCIAL

## 2023-01-03 ENCOUNTER — HOSPITAL ENCOUNTER (OUTPATIENT)
Dept: CT IMAGING | Facility: CLINIC | Age: 78
Discharge: HOME | End: 2023-01-03
Attending: INTERNAL MEDICINE | Admitting: INTERNAL MEDICINE
Payer: COMMERCIAL

## 2023-01-03 ENCOUNTER — LAB (OUTPATIENT)
Dept: INFUSION THERAPY | Facility: CLINIC | Age: 78
End: 2023-01-03
Attending: INTERNAL MEDICINE
Payer: COMMERCIAL

## 2023-01-03 DIAGNOSIS — C79.51 BONE METASTASIS: ICD-10-CM

## 2023-01-03 DIAGNOSIS — C61 PROSTATE CANCER (H): ICD-10-CM

## 2023-01-03 LAB
ALBUMIN SERPL BCG-MCNC: 4.3 G/DL (ref 3.5–5.2)
ALP SERPL-CCNC: 99 U/L (ref 40–129)
ALT SERPL W P-5'-P-CCNC: 16 U/L (ref 10–50)
ANION GAP SERPL CALCULATED.3IONS-SCNC: 9 MMOL/L (ref 7–15)
AST SERPL W P-5'-P-CCNC: 30 U/L (ref 10–50)
BASOPHILS # BLD AUTO: 0.1 10E3/UL (ref 0–0.2)
BASOPHILS NFR BLD AUTO: 1 %
BILIRUB SERPL-MCNC: 0.6 MG/DL
BUN SERPL-MCNC: 12.3 MG/DL (ref 8–23)
CALCIUM SERPL-MCNC: 9.3 MG/DL (ref 8.8–10.2)
CHLORIDE SERPL-SCNC: 100 MMOL/L (ref 98–107)
CREAT SERPL-MCNC: 0.9 MG/DL (ref 0.67–1.17)
DEPRECATED HCO3 PLAS-SCNC: 26 MMOL/L (ref 22–29)
EOSINOPHIL # BLD AUTO: 0.2 10E3/UL (ref 0–0.7)
EOSINOPHIL NFR BLD AUTO: 4 %
ERYTHROCYTE [DISTWIDTH] IN BLOOD BY AUTOMATED COUNT: 13.1 % (ref 10–15)
GFR SERPL CREATININE-BSD FRML MDRD: 87 ML/MIN/1.73M2
GLUCOSE SERPL-MCNC: 105 MG/DL (ref 70–99)
HCT VFR BLD AUTO: 46.1 % (ref 40–53)
HGB BLD-MCNC: 15.7 G/DL (ref 13.3–17.7)
IMM GRANULOCYTES # BLD: 0 10E3/UL
IMM GRANULOCYTES NFR BLD: 0 %
LYMPHOCYTES # BLD AUTO: 1.5 10E3/UL (ref 0.8–5.3)
LYMPHOCYTES NFR BLD AUTO: 28 %
MCH RBC QN AUTO: 31.5 PG (ref 26.5–33)
MCHC RBC AUTO-ENTMCNC: 34.1 G/DL (ref 31.5–36.5)
MCV RBC AUTO: 93 FL (ref 78–100)
MONOCYTES # BLD AUTO: 0.4 10E3/UL (ref 0–1.3)
MONOCYTES NFR BLD AUTO: 7 %
NEUTROPHILS # BLD AUTO: 3.1 10E3/UL (ref 1.6–8.3)
NEUTROPHILS NFR BLD AUTO: 60 %
NRBC # BLD AUTO: 0 10E3/UL
NRBC BLD AUTO-RTO: 0 /100
PLATELET # BLD AUTO: 184 10E3/UL (ref 150–450)
POTASSIUM SERPL-SCNC: 4.2 MMOL/L (ref 3.4–5.3)
PROT SERPL-MCNC: 7.3 G/DL (ref 6.4–8.3)
PSA SERPL-MCNC: 0.46 NG/ML (ref 0–6.5)
RBC # BLD AUTO: 4.98 10E6/UL (ref 4.4–5.9)
SODIUM SERPL-SCNC: 135 MMOL/L (ref 136–145)
WBC # BLD AUTO: 5.3 10E3/UL (ref 4–11)

## 2023-01-03 PROCEDURE — 84403 ASSAY OF TOTAL TESTOSTERONE: CPT | Performed by: INTERNAL MEDICINE

## 2023-01-03 PROCEDURE — 258N000003 HC RX IP 258 OP 636: Performed by: INTERNAL MEDICINE

## 2023-01-03 PROCEDURE — A9561 TC99M OXIDRONATE: HCPCS | Performed by: INTERNAL MEDICINE

## 2023-01-03 PROCEDURE — 343N000001 HC RX 343: Performed by: INTERNAL MEDICINE

## 2023-01-03 PROCEDURE — 85025 COMPLETE CBC W/AUTO DIFF WBC: CPT | Performed by: INTERNAL MEDICINE

## 2023-01-03 PROCEDURE — 84153 ASSAY OF PSA TOTAL: CPT | Performed by: INTERNAL MEDICINE

## 2023-01-03 PROCEDURE — 74177 CT ABD & PELVIS W/CONTRAST: CPT

## 2023-01-03 PROCEDURE — 78306 BONE IMAGING WHOLE BODY: CPT

## 2023-01-03 PROCEDURE — 36415 COLL VENOUS BLD VENIPUNCTURE: CPT

## 2023-01-03 PROCEDURE — 250N000011 HC RX IP 250 OP 636: Performed by: INTERNAL MEDICINE

## 2023-01-03 PROCEDURE — 80053 COMPREHEN METABOLIC PANEL: CPT | Performed by: INTERNAL MEDICINE

## 2023-01-03 RX ORDER — IOPAMIDOL 755 MG/ML
500 INJECTION, SOLUTION INTRAVASCULAR ONCE
Status: COMPLETED | OUTPATIENT
Start: 2023-01-03 | End: 2023-01-03

## 2023-01-03 RX ADMIN — SODIUM CHLORIDE 65 ML: 9 INJECTION, SOLUTION INTRAVENOUS at 10:13

## 2023-01-03 RX ADMIN — Medication 27 MILLICURIE: at 10:29

## 2023-01-03 RX ADMIN — IOPAMIDOL 100 ML: 755 INJECTION, SOLUTION INTRAVENOUS at 10:13

## 2023-01-03 NOTE — PROGRESS NOTES
Nursing Note:  Wallace Zelaya presents today for PIV start, labs.    Patient seen by provider today: No   present during visit today: Not Applicable.    Note: N/A.    Intravenous Access:  Labs drawn without difficulty.  Peripheral IV placed.    Discharge Plan:   Patient was sent to imaging for bone scan appointment.    Judie Aguirre RN

## 2023-01-04 LAB — TESTOST SERPL-MCNC: 7 NG/DL (ref 240–950)

## 2023-01-05 ENCOUNTER — ANCILLARY PROCEDURE (OUTPATIENT)
Dept: RADIOLOGY | Facility: CLINIC | Age: 78
End: 2023-01-05
Attending: INTERNAL MEDICINE
Payer: COMMERCIAL

## 2023-01-06 ENCOUNTER — RESEARCH ENCOUNTER (OUTPATIENT)
Dept: ONCOLOGY | Facility: CLINIC | Age: 78
End: 2023-01-06

## 2023-01-06 ENCOUNTER — ONCOLOGY VISIT (OUTPATIENT)
Dept: ONCOLOGY | Facility: CLINIC | Age: 78
End: 2023-01-06
Attending: INTERNAL MEDICINE
Payer: COMMERCIAL

## 2023-01-06 VITALS
HEART RATE: 59 BPM | OXYGEN SATURATION: 97 % | TEMPERATURE: 97.6 F | SYSTOLIC BLOOD PRESSURE: 142 MMHG | RESPIRATION RATE: 16 BRPM | BODY MASS INDEX: 32.17 KG/M2 | DIASTOLIC BLOOD PRESSURE: 78 MMHG | WEIGHT: 221 LBS

## 2023-01-06 DIAGNOSIS — C79.51 BONE METASTASIS: ICD-10-CM

## 2023-01-06 DIAGNOSIS — C61 PROSTATE CANCER (H): Primary | ICD-10-CM

## 2023-01-06 PROCEDURE — G0463 HOSPITAL OUTPT CLINIC VISIT: HCPCS | Performed by: INTERNAL MEDICINE

## 2023-01-06 PROCEDURE — 99215 OFFICE O/P EST HI 40 MIN: CPT | Performed by: INTERNAL MEDICINE

## 2023-01-06 ASSESSMENT — PAIN SCALES - GENERAL: PAINLEVEL: NO PAIN (0)

## 2023-01-06 NOTE — NURSING NOTE
Setve was in clinic today for his Week 8 visit in the ECLIPSE study.  He is on the SOC arm of the study and is taking enzalutamide.  He reports missing no doses.  He started the drug in late November 2022 and has thus far has tolerated it very well.  He reports no obvious side effects from the treatment, and no new adverse events.  There have been no changes to his concomitant medications.  Labs and scans were completed 1/3/23.  Lab results were not clinically significant with the exception of his PSA, which has dropped dramatically from his last reading.  His scans indicated stable disease.  He completed and returned the QOLs. He will return to the clinic 1/27/23 for Week 12.  Follow-up scans will be scheduled in 8 weeks.

## 2023-01-06 NOTE — PROGRESS NOTES
[facility] CANCER CLINIC  FOLLOW-UP VISIT NOTE    PATIENT NAME: Wallace Zelaya MRN # [id number]  DATE OF VISIT: Jan 6, 2023 YOB: 1945    REFERRING PROVIDER: No referring provider defined for this encounter.    CANCER TYPE: Prostate cancer; Biochemical recurrence; Castration resistant disease  STAGE: Stage III - pT3b at diagnosis; M0    HISTORY OF PRESENTING ILLNESS:  Wallace was noted to have rising screening PSA from 2 - 4. He was referred to Dr. Rodolfo Connor. He had radical prostatectomy on 11/2015. Pathology from this revealed [place] 4+4 disease with extraprostatic extension, seminal vesicle extension and he was staged at pT3b. All of the 12 lymph nodes resected were negative for disease. Post operatively his PSA did not drop to undetectable levels and remained elevated at 0.56. It vladimir to 0.9 in April 2016 and he was referred to Dr. Newton for adjuvant radiation therapy. He completed radiation therapy but did not have any PSA response to this treatment.     TREATMENT SUMMARY:  11/13/2015 Radical prostatectomy  April 2016  Radiation therapy  He was started on intermittent androgen deprivation therapy which had to be changed to continuous with rapid rise in his PSA.      April 2020 - he was started on bicalutamide for complete androgen blockade  He had rising PSA in May 2021. His bone scan done on 6/30/21 revealed metastatic lesions of T6 and the sacrum. He was switched to abiraterone with prednisone on 8/7/21.  He had relatively stable PSA on therapy without any significant PSA response.  He was eventually taken off therapy in September 2022 for progressive disease.    He enrolled in the Eclipse clinical trial on 10/27/2022 and was randomized to the standard of care enzalutamide arm.    CURRENT INTERVENTIONS:  Enzalutamide 160 mg oral once a day as a standard of care on ECLIPSE trial    SUBJECTIVE   Wallace is being seen for his prostate cancer    Steve was followed in  person at this visit. He is seen for scheduled follow up visit.  He has been started on enzalutamide since our last visit. He  Denies missing a dose. He has tolerated this without difficulty. He denies any new side effects. He was in positive mood at this visit.      He is specially excited about his PSA which has dramatically dropped since our last evaluation.     He has been active and has been playing basketball with his friends. He plays the game of HORSE (basketball) at [facility] which he explained for me. He would like to resume his golf at [employer] [facility].       PAST MEDICAL HISTORY   1. HTN  2. Dyslipidemia  3. Prostate cancer as detailed above      CURRENT OUTPATIENT MEDICATIONS     Current Outpatient Medications   Medication Sig     amLODIPine (NORVASC) 10 MG tablet Take 1 tablet (10 mg) by mouth daily     atorvastatin (LIPITOR) 20 MG tablet Take 1 tablet by mouth once daily     enzalutamide (XTANDI) 40 MG capsule Take 4 capsules (160 mg) by mouth daily     ibuprofen (ADVIL/MOTRIN) 800 MG tablet TAKE 1 TABLET BY MOUTH THREE TIMES DAILY WITH FOOD     ondansetron (ZOFRAN) 4 MG tablet Take 1 tablet (4 mg) by mouth every 8 hours as needed for nausea     tamsulosin (FLOMAX) 0.4 MG capsule Take 1 capsule (0.4 mg) by mouth daily     valsartan (DIOVAN) 160 MG tablet Take 1 tablet (160 mg) by mouth daily     No current facility-administered medications for this visit.        ALLERGIES     No Known Allergies     REVIEW OF SYSTEMS   As above in the HPI, o/w complete 12-point ROS was negative.     PHYSICAL EXAM   There were no vitals taken for this visit.  SpO2 Readings from Last 4 Encounters:   09/21/18 96%   08/24/18 94%   06/22/18 95%   05/11/18 97%     Wt Readings from Last 3 Encounters:   11/25/22 100.3 kg (221 lb 1.6 oz)   10/27/22 99.8 kg (220 lb)   09/23/22 99.4 kg (219 lb 1.6 oz)     GENERAL/CONSTITUTIONAL: No acute distress.  EYES: Pupils are equal, round, and react to light and accommodation. Extraocular movements  intact.  No scleral icterus.  ENT/MOUTH: Neck supple. Oropharynx clear, no mucositis.  LYMPH: No anterior cervical, posterior cervical, supraclavicular, axillary or inguinal adenopathy.   RESPIRATORY: Clear to auscultation bilaterally. No crackles or wheezing.   CARDIOVASCULAR: Regular rate and rhythm without murmurs, gallops, or rubs.  GASTROINTESTINAL: No hepatosplenomegaly, masses, or tenderness. The patient has normal bowel sounds. No guarding.  No distention.  : Deferred  MUSCULOSKELETAL: Warm and well-perfused, no cyanosis, clubbing, or edema.  NEUROLOGIC: Cranial nerves II-XII are intact. Alert, oriented, answers questions appropriately. No focal neurologic deficit  INTEGUMENTARY: No rashes or jaundice.  GAIT: Normal     LABORATORY AND IMAGING STUDIES     Recent Labs   Lab Test 01/03/23  0926 12/06/22  0810 11/25/22  1031 10/27/22  1046 09/22/22  1012   * 139 140 140 143   POTASSIUM 4.2 4.3 4.3 4.0 4.4   CHLORIDE 100 104 102 106 104   CO2 26 27 29 27 29   ANIONGAP 9 8 9 7 10   BUN 12.3 11.9 12.1 13 11.6   CR 0.90 0.92 0.94 0.84 0.86   * 106* 110* 103* 118*   TY 9.3 9.4 9.1 9.0 9.2     Recent Labs   Lab Test 12/06/22  0810 10/27/22  1046 09/22/22  1012 01/21/20  1154   MAG 2.0 2.3 2.1 2.0     Recent Labs   Lab Test 01/03/23  0926 12/06/22  0810 11/25/22  1031 10/27/22  1046 09/22/22  1012   WBC 5.3 5.6 8.4 6.4 6.6   HGB 15.7 14.9 14.7 15.1 15.2    210 173 166 167   MCV 93 94 93 92 94   NEUTROPHIL 60 55 65 70 70     Recent Labs   Lab Test 01/03/23  0926 12/06/22  0810 11/25/22  1031 08/15/22  0810 06/27/22  0827 03/28/22  1050 02/23/22  1419   BILITOTAL 0.6 0.5 0.8   < > 0.9 0.7 0.8   ALKPHOS 99 123 152*   < > 94 108 113   ALT 16 17 17   < > 49 80* 88*   AST 30 23 27   < > 4 43 46*   ALBUMIN 4.3 4.2 4.0   < > 3.7 3.6 3.4   LDH  --   --   --   --  246* 259* 246*    < > = values in this interval not displayed.     TSH   Date Value Ref Range Status   10/27/2022 0.65 0.40 - 4.00 mU/L Final    09/22/2022 0.86 0.30 - 4.20 uIU/mL Final     Narrative & Impression   CT CHEST/ABDOMEN/PELVIS WITH CONTRAST  1/3/2023 10:38 AM     HISTORY:  Prostate cancer with bony metastasis. Prostate cancer (H).  Bone metastasis (H).     TECHNIQUE: CT scan obtained of the chest, abdomen, and pelvis with IV  contrast. 100 mL Isovue-370 IV injected. Radiation dose for this scan  was reduced using automated exposure control, adjustment of the mA  and/or kV according to patient size, or iterative reconstruction  technique.     COMPARISON: PET/CT on 9/29/2022.     FINDINGS:  Chest/mediastinum: No cardiomegaly or significant pericardial  effusion. No significant mediastinal, hilar or axillary  lymphadenopathy. Moderate atherosclerotic vascular calcification of  the coronary arteries.     Lung/pleura: No pleural effusion or pneumothorax. Mild upper lobes  predominant emphysematous changes. Few scattered pulmonary nodules  including 2 mm left upper lobe nodule (series 8 image 59) and 2 mm  medial right upper lobe nodule (series 8 image 88), both are not  significantly changed as compared to 6/30/2021. No new pulmonary  nodule.     Abdomen/pelvis:  Hepatobiliary: No suspicious focal hepatic lesion. The gallbladder is  unremarkable.     Pancreas: No main pancreatic ductal dilatation or definite solid  pancreatic mass.     Spleen: No splenomegaly.     Adrenal glands: No adrenal nodules.     Kidneys: No radiodense kidney/ureteral stones or hydronephrosis in the  kidney.     Bowel: No abnormally dilated bowel loops. The appendix is visualized  and appears normal.     Peritoneum: No significant free fluid in the abdomen or pelvis. No  free peritoneal portal venous gas.     Pelvic organs: Postsurgical changes of prostatectomy.     Vascular: Extensive calcified and noncalcified atherosclerotic plaques  of the abdominal aorta and iliac vessels. Mild aneurysmal dilatation  of the infrarenal abdominal aorta measuring 3.1 cm in AP  dimension  (series 6 image 73).     Lymph nodes: No significant abdominopelvic lymphadenopathy.     Bones and soft tissue: Scattered sclerotic osseous metastases, most  prominent in T6 and sacrococcygeal spine, and posterior aspect of the  left fourth rib (series 2 image 37) with increased sclerosis of the  previously noted osseous lesion as compared to 9/29/2022 exam.                                                                      IMPRESSION: In this patient with history of metastatic prostate  cancer, there is:  1. Scattered sclerotic osseous metastases, most prominent in T6 and  sacrococcygeal spine, not significantly changed in size although  increased in sclerosis as compared to 9/29/2022 PET/CT.  2. No other suspicious foci of metastatic disease in the chest,  abdomen or pelvis.     OSITO GANDHI MD         SYSTEM ID:  [id number]         Results for orders placed or performed during the hospital encounter of 01/03/23   NM Bone Scan Whole Body    Narrative    EXAM: NM BONE SCAN WHOLE BODY  LOCATION: [facility]  DATE/TIME: 1/3/2023 2:18 PM    INDICATION: Malignant neoplasm prostate  COMPARISON: [facility] PET/CT dated 09/29/2022  TECHNIQUE: 27.0 mCi technetium-99m MDP, IV. Anterior and posterior delayed whole-body images at 3 hours with additional spot images of the skull.    FINDINGS: Focal radiotracer uptake involving the T6 vertebral body and lower portions of the sacrum/coccyx, which were PSMA positive on recent PET/CT dated 09/29/2022 and remain suspicious for osseous metastases.      Impression    IMPRESSION:    Stable burden of osseous metastases since prior [facility] PET/CT dated 09/29/2022     Recent Labs   Lab Test 01/03/23 0926 10/27/22  1046 09/22/22  1012 08/15/22  0810 06/27/22  0827 03/28/22  1050   PSA 0.46 10.30* 9.12* 7.81* 5.17 2.00   TESTOSTTOTAL 7* <2* <2*  --  <2* <2*     Recent Labs   Lab Test 01/03/23  0926 12/06/22  0810 11/25/22  1031 10/27/22  1046 09/22/22  1012  08/15/22  0810 06/27/22  0827 03/28/22  1050 02/23/22  1419 01/10/22  1120 12/29/21  0923 12/10/21  1018   PSA 0.46  --   --  10.30* 9.12* 7.81* 5.17 2.00 2.11  --  0.99 0.78   ALKPHOS 99 123 152* 94 95 101 94 108 113   < > 118 112   LDH  --   --   --   --   --   --  246* 259* 246*  --  262* 274*    < > = values in this interval not displayed.            ASSESSMENT AND PLAN   1. 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 ECOG PS 0   3. HTN  4. ECOG PS1  5. No medical comorbidity    I had a lengthy discussion with Steve who is alone at this visit. Clinical research nurse - Janle Gee joined us for the visit and was present except for physical examination.     He is tolerating enzalutamide well. He denies any new side effects on this medication. He has not been missing doses. He has not started any new medications.     I have reviewed all of the labs done prior to this clinic visit.  Labs are all completely normal including electrolytes, renal function, hepatic panel, complete blood count and differential. His PSA has nicely responded to enzalutamide from 10.3 ng/ml at the start of therapy to 0.46 ng/ml at this visit. This is indeed a good response. He is elated.     I have reviewed actual images from his CT chest, abdomen and pelvis and bone scan. There are no new lesions in either the CT scan or bone scan. His CT scan does show increased sclerosis at some of know sites of metastatic disease which is consistent with treatment response.     He again had questions about the trial which were reviewed with him.     He would have closer monitoring while on clinical trial. He would like to have follow up with me as much as possible. He was reminded that he could have a few visits with Rodolfo who is part of the same team. He has met her in the past.     45 minutes spent on the date of the encounter doing chart review, history and exam, documentation and further activities  as noted above      Gigi Ward    Hematologist and Medical Oncologist  [facility]

## 2023-01-06 NOTE — LETTER
1/6/2023         RE: Wallace Zelaya  [id number] [address] [id number]        Dear Colleague,    Thank you for referring your patient, Wallace Zelaya, to the [facility] CANCER [facility]. Please see a copy of my visit note below.    [facility] CANCER CLINIC  FOLLOW-UP VISIT NOTE    PATIENT NAME: Wallace Zelaya MRN # [id number]  DATE OF VISIT: Jan 6, 2023 YOB: 1945    REFERRING PROVIDER: No referring provider defined for this encounter.    CANCER TYPE: Prostate cancer; Biochemical recurrence; Castration resistant disease  STAGE: Stage III - pT3b at diagnosis; M0    HISTORY OF PRESENTING ILLNESS:  Wallace was noted to have rising screening PSA from 2 - 4. He was referred to Dr. Rodolfo Connor. He had radical prostatectomy on 11/2015. Pathology from this revealed [place] 4+4 disease with extraprostatic extension, seminal vesicle extension and he was staged at pT3b. All of the 12 lymph nodes resected were negative for disease. Post operatively his PSA did not drop to undetectable levels and remained elevated at 0.56. It vladimir to 0.9 in April 2016 and he was referred to Dr. Newton for adjuvant radiation therapy. He completed radiation therapy but did not have any PSA response to this treatment.     TREATMENT SUMMARY:  11/13/2015 Radical prostatectomy  April 2016  Radiation therapy  He was started on intermittent androgen deprivation therapy which had to be changed to continuous with rapid rise in his PSA.      April 2020 - he was started on bicalutamide for complete androgen blockade  He had rising PSA in May 2021. His bone scan done on 6/30/21 revealed metastatic lesions of T6 and the sacrum. He was switched to abiraterone with prednisone on 8/7/21.  He had relatively stable PSA on therapy without any significant PSA response.  He was eventually taken off therapy in September 2022 for progressive disease.    He enrolled in the Eclipse clinical trial on  10/27/2022 and was randomized to the standard of care enzalutamide arm.    CURRENT INTERVENTIONS:  Enzalutamide 160 mg oral once a day as a standard of care on ECLIPSE trial    DELANEY Gonsalez is being seen for his prostate cancer    Steve was followed in person at this visit. He is seen for scheduled follow up visit.  He has been started on enzalutamide since our last visit. He  Denies missing a dose. He has tolerated this without difficulty. He denies any new side effects. He was in positive mood at this visit.      He is specially excited about his PSA which has dramatically dropped since our last evaluation.     He has been active and has been playing basketball with his friends. He plays the game of HORSE (basketball) at [facility] which he explained for me. He would like to resume his golf at [employer].       PAST MEDICAL HISTORY   1. HTN  2. Dyslipidemia  3. Prostate cancer as detailed above      CURRENT OUTPATIENT MEDICATIONS     Current Outpatient Medications   Medication Sig     amLODIPine (NORVASC) 10 MG tablet Take 1 tablet (10 mg) by mouth daily     atorvastatin (LIPITOR) 20 MG tablet Take 1 tablet by mouth once daily     enzalutamide (XTANDI) 40 MG capsule Take 4 capsules (160 mg) by mouth daily     ibuprofen (ADVIL/MOTRIN) 800 MG tablet TAKE 1 TABLET BY MOUTH THREE TIMES DAILY WITH FOOD     ondansetron (ZOFRAN) 4 MG tablet Take 1 tablet (4 mg) by mouth every 8 hours as needed for nausea     tamsulosin (FLOMAX) 0.4 MG capsule Take 1 capsule (0.4 mg) by mouth daily     valsartan (DIOVAN) 160 MG tablet Take 1 tablet (160 mg) by mouth daily     No current facility-administered medications for this visit.        ALLERGIES     No Known Allergies     REVIEW OF SYSTEMS   As above in the HPI, o/w complete 12-point ROS was negative.     PHYSICAL EXAM   There were no vitals taken for this visit.  SpO2 Readings from Last 4 Encounters:   09/21/18 96%   08/24/18 94%   06/22/18 95%   05/11/18 97%     Wt Readings from Last  3 Encounters:   11/25/22 100.3 kg (221 lb 1.6 oz)   10/27/22 99.8 kg (220 lb)   09/23/22 99.4 kg (219 lb 1.6 oz)     GENERAL/CONSTITUTIONAL: No acute distress.  EYES: Pupils are equal, round, and react to light and accommodation. Extraocular movements intact.  No scleral icterus.  ENT/MOUTH: Neck supple. Oropharynx clear, no mucositis.  LYMPH: No anterior cervical, posterior cervical, supraclavicular, axillary or inguinal adenopathy.   RESPIRATORY: Clear to auscultation bilaterally. No crackles or wheezing.   CARDIOVASCULAR: Regular rate and rhythm without murmurs, gallops, or rubs.  GASTROINTESTINAL: No hepatosplenomegaly, masses, or tenderness. The patient has normal bowel sounds. No guarding.  No distention.  : Deferred  MUSCULOSKELETAL: Warm and well-perfused, no cyanosis, clubbing, or edema.  NEUROLOGIC: Cranial nerves II-XII are intact. Alert, oriented, answers questions appropriately. No focal neurologic deficit  INTEGUMENTARY: No rashes or jaundice.  GAIT: Normal     LABORATORY AND IMAGING STUDIES     Recent Labs   Lab Test 01/03/23  0926 12/06/22  0810 11/25/22  1031 10/27/22  1046 09/22/22  1012   * 139 140 140 143   POTASSIUM 4.2 4.3 4.3 4.0 4.4   CHLORIDE 100 104 102 106 104   CO2 26 27 29 27 29   ANIONGAP 9 8 9 7 10   BUN 12.3 11.9 12.1 13 11.6   CR 0.90 0.92 0.94 0.84 0.86   * 106* 110* 103* 118*   TY 9.3 9.4 9.1 9.0 9.2     Recent Labs   Lab Test 12/06/22  0810 10/27/22  1046 09/22/22  1012 01/21/20  1154   MAG 2.0 2.3 2.1 2.0     Recent Labs   Lab Test 01/03/23  0926 12/06/22  0810 11/25/22  1031 10/27/22  1046 09/22/22  1012   WBC 5.3 5.6 8.4 6.4 6.6   HGB 15.7 14.9 14.7 15.1 15.2    210 173 166 167   MCV 93 94 93 92 94   NEUTROPHIL 60 55 65 70 70     Recent Labs   Lab Test 01/03/23  0926 12/06/22  0810 11/25/22  1031 08/15/22  0810 06/27/22  0827 03/28/22  1050 02/23/22  1419   BILITOTAL 0.6 0.5 0.8   < > 0.9 0.7 0.8   ALKPHOS 99 123 152*   < > 94 108 113   ALT 16 17 17   < >  49 80* 88*   AST 30 23 27   < > 4 43 46*   ALBUMIN 4.3 4.2 4.0   < > 3.7 3.6 3.4   LDH  --   --   --   --  246* 259* 246*    < > = values in this interval not displayed.     TSH   Date Value Ref Range Status   10/27/2022 0.65 0.40 - 4.00 mU/L Final   09/22/2022 0.86 0.30 - 4.20 uIU/mL Final     Narrative & Impression   CT CHEST/ABDOMEN/PELVIS WITH CONTRAST  1/3/2023 10:38 AM     HISTORY:  Prostate cancer with bony metastasis. Prostate cancer (H).  Bone metastasis (H).     TECHNIQUE: CT scan obtained of the chest, abdomen, and pelvis with IV  contrast. 100 mL Isovue-370 IV injected. Radiation dose for this scan  was reduced using automated exposure control, adjustment of the mA  and/or kV according to patient size, or iterative reconstruction  technique.     COMPARISON: PET/CT on 9/29/2022.     FINDINGS:  Chest/mediastinum: No cardiomegaly or significant pericardial  effusion. No significant mediastinal, hilar or axillary  lymphadenopathy. Moderate atherosclerotic vascular calcification of  the coronary arteries.     Lung/pleura: No pleural effusion or pneumothorax. Mild upper lobes  predominant emphysematous changes. Few scattered pulmonary nodules  including 2 mm left upper lobe nodule (series 8 image 59) and 2 mm  medial right upper lobe nodule (series 8 image 88), both are not  significantly changed as compared to 6/30/2021. No new pulmonary  nodule.     Abdomen/pelvis:  Hepatobiliary: No suspicious focal hepatic lesion. The gallbladder is  unremarkable.     Pancreas: No main pancreatic ductal dilatation or definite solid  pancreatic mass.     Spleen: No splenomegaly.     Adrenal glands: No adrenal nodules.     Kidneys: No radiodense kidney/ureteral stones or hydronephrosis in the  kidney.     Bowel: No abnormally dilated bowel loops. The appendix is visualized  and appears normal.     Peritoneum: No significant free fluid in the abdomen or pelvis. No  free peritoneal portal venous gas.     Pelvic organs:  Postsurgical changes of prostatectomy.     Vascular: Extensive calcified and noncalcified atherosclerotic plaques  of the abdominal aorta and iliac vessels. Mild aneurysmal dilatation  of the infrarenal abdominal aorta measuring 3.1 cm in AP dimension  (series 6 image 73).     Lymph nodes: No significant abdominopelvic lymphadenopathy.     Bones and soft tissue: Scattered sclerotic osseous metastases, most  prominent in T6 and sacrococcygeal spine, and posterior aspect of the  left fourth rib (series 2 image 37) with increased sclerosis of the  previously noted osseous lesion as compared to 9/29/2022 exam.                                                                      IMPRESSION: In this patient with history of metastatic prostate  cancer, there is:  1. Scattered sclerotic osseous metastases, most prominent in T6 and  sacrococcygeal spine, not significantly changed in size although  increased in sclerosis as compared to 9/29/2022 PET/CT.  2. No other suspicious foci of metastatic disease in the chest,  abdomen or pelvis.     OSITO GANDHI MD         SYSTEM ID:  [id number]         Results for orders placed or performed during the hospital encounter of 01/03/23   NM Bone Scan Whole Body    Narrative    EXAM: NM BONE SCAN WHOLE BODY  LOCATION: [facility]  DATE/TIME: 1/3/2023 2:18 PM    INDICATION: Malignant neoplasm prostate  COMPARISON: [facility] PET/CT dated 09/29/2022  TECHNIQUE: 27.0 mCi technetium-99m MDP, IV. Anterior and posterior delayed whole-body images at 3 hours with additional spot images of the skull.    FINDINGS: Focal radiotracer uptake involving the T6 vertebral body and lower portions of the sacrum/coccyx, which were PSMA positive on recent PET/CT dated 09/29/2022 and remain suspicious for osseous metastases.      Impression    IMPRESSION:    Stable burden of osseous metastases since prior PSMA PET/CT dated 09/29/2022     Recent Labs   Lab Test 01/03/23  0926 10/27/22  1048  09/22/22  1012 08/15/22  0810 06/27/22  0827 03/28/22  1050   PSA 0.46 10.30* 9.12* 7.81* 5.17 2.00   TESTOSTTOTAL 7* <2* <2*  --  <2* <2*     Recent Labs   Lab Test 01/03/23  0926 12/06/22  0810 11/25/22  1031 10/27/22  1046 09/22/22  1012 08/15/22  0810 06/27/22  0827 03/28/22  1050 02/23/22  1419 01/10/22  1120 12/29/21  0923 12/10/21  1018   PSA 0.46  --   --  10.30* 9.12* 7.81* 5.17 2.00 2.11  --  0.99 0.78   ALKPHOS 99 123 152* 94 95 101 94 108 113   < > 118 112   LDH  --   --   --   --   --   --  246* 259* 246*  --  262* 274*    < > = values in this interval not displayed.            ASSESSMENT AND PLAN   1. 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 ECOG PS 0   3. HTN  4. ECOG PS1  5. No medical comorbidity    I had a lengthy discussion with Steve who is alone at this visit. Clinical research nurse - Janel Gee joined us for the visit and was present except for physical examination.     He is tolerating enzalutamide well. He denies any new side effects on this medication. He has not been missing doses. He has not started any new medications.     I have reviewed all of the labs done prior to this clinic visit.  Labs are all completely normal including electrolytes, renal function, hepatic panel, complete blood count and differential. His PSA has nicely responded to enzalutamide from 10.3 ng/ml at the start of therapy to 0.46 ng/ml at this visit. This is indeed a good response. He is elated.     I have reviewed actual images from his CT chest, abdomen and pelvis and bone scan. There are no new lesions in either the CT scan or bone scan. His CT scan does show increased sclerosis at some of know sites of metastatic disease which is consistent with treatment response.     He again had questions about the trial which were reviewed with him.     He would have closer monitoring while on clinical trial. He would like to have follow up with me as much as possible. He  was reminded that he could have a few visits with Rodolfo who is part of the same team. He has met her in the past.     45 minutes spent on the date of the encounter doing chart review, history and exam, documentation and further activities as noted above      Gigi Ward    Hematologist and Medical Oncologist  [facility]            Again, thank you for allowing me to participate in the care of your patient.        Sincerely,        Gigi Ward MD

## 2023-01-06 NOTE — NURSING NOTE
"Oncology Rooming Note    January 6, 2023 10:06 AM   Wallace Zelaya is a 77 year old male who presents for:    Chief Complaint   Patient presents with     Oncology Clinic Visit     Prostate cancer      Initial Vitals: BP (!) 142/78   Pulse 59   Temp 97.6  F (36.4  C) (Tympanic)   Resp 16   Wt 100.2 kg (221 lb)   SpO2 97%   BMI 32.17 kg/m   Estimated body mass index is 32.17 kg/m  as calculated from the following:    Height as of 7/1/22: 1.765 m (5' 9.5\").    Weight as of this encounter: 100.2 kg (221 lb). Body surface area is 2.22 meters squared.  No Pain (0) Comment: Data Unavailable   No LMP for male patient.  Allergies reviewed: Yes  Medications reviewed: Yes    Medications: Medication refills not needed today.  Pharmacy name entered into [employer]:    [facility] - [facility] PHARMAC[facility] MAIL DELIVERY - [facility] [address] [facility] 0114 - [place], [place] - [address] [facility] MAIL/SPECIALTY PHARMACY - [place] - [address]   [place] - [facility] [address] [facility] [address]  Counts include 234 beds at the [facility] PHARMACY SERVICES - [place], [place] - [address] [facility] [address]    Clinical concerns: f/u       Olya Valenzuela CMA              "

## 2023-01-13 ENCOUNTER — ANCILLARY PROCEDURE (OUTPATIENT)
Dept: RADIOLOGY | Facility: CLINIC | Age: 78
End: 2023-01-13
Attending: INTERNAL MEDICINE
Payer: COMMERCIAL

## 2023-01-19 ENCOUNTER — ANCILLARY PROCEDURE (OUTPATIENT)
Dept: RADIOLOGY | Facility: CLINIC | Age: 78
End: 2023-01-19
Attending: INTERNAL MEDICINE
Payer: COMMERCIAL

## 2023-01-20 DIAGNOSIS — C61 PROSTATE CANCER (H): Primary | ICD-10-CM

## 2023-01-27 ENCOUNTER — ALLIED HEALTH/NURSE VISIT (OUTPATIENT)
Dept: ONCOLOGY | Facility: CLINIC | Age: 78
End: 2023-01-27

## 2023-01-27 ENCOUNTER — ONCOLOGY VISIT (OUTPATIENT)
Dept: ONCOLOGY | Facility: CLINIC | Age: 78
End: 2023-01-27
Attending: INTERNAL MEDICINE
Payer: COMMERCIAL

## 2023-01-27 VITALS
DIASTOLIC BLOOD PRESSURE: 85 MMHG | OXYGEN SATURATION: 94 % | SYSTOLIC BLOOD PRESSURE: 151 MMHG | TEMPERATURE: 97 F | RESPIRATION RATE: 16 BRPM | HEART RATE: 74 BPM | WEIGHT: 221 LBS | BODY MASS INDEX: 32.17 KG/M2

## 2023-01-27 DIAGNOSIS — C61 PROSTATE CANCER (H): ICD-10-CM

## 2023-01-27 DIAGNOSIS — C79.51 BONE METASTASIS: ICD-10-CM

## 2023-01-27 DIAGNOSIS — C61 PROSTATE CANCER (H): Primary | ICD-10-CM

## 2023-01-27 LAB
ALBUMIN SERPL BCG-MCNC: 4.2 G/DL (ref 3.5–5.2)
ALP SERPL-CCNC: 87 U/L (ref 40–129)
ALT SERPL W P-5'-P-CCNC: 16 U/L (ref 10–50)
ANION GAP SERPL CALCULATED.3IONS-SCNC: 9 MMOL/L (ref 7–15)
AST SERPL W P-5'-P-CCNC: 23 U/L (ref 10–50)
BASOPHILS # BLD AUTO: 0.1 10E3/UL (ref 0–0.2)
BASOPHILS NFR BLD AUTO: 1 %
BILIRUB SERPL-MCNC: 0.5 MG/DL
BUN SERPL-MCNC: 12.3 MG/DL (ref 8–23)
CALCIUM SERPL-MCNC: 9.3 MG/DL (ref 8.8–10.2)
CHLORIDE SERPL-SCNC: 99 MMOL/L (ref 98–107)
CREAT SERPL-MCNC: 0.94 MG/DL (ref 0.67–1.17)
DEPRECATED HCO3 PLAS-SCNC: 28 MMOL/L (ref 22–29)
EOSINOPHIL # BLD AUTO: 0.2 10E3/UL (ref 0–0.7)
EOSINOPHIL NFR BLD AUTO: 3 %
ERYTHROCYTE [DISTWIDTH] IN BLOOD BY AUTOMATED COUNT: 12.9 % (ref 10–15)
GFR SERPL CREATININE-BSD FRML MDRD: 83 ML/MIN/1.73M2
GLUCOSE SERPL-MCNC: 95 MG/DL (ref 70–99)
HCT VFR BLD AUTO: 44 % (ref 40–53)
HGB BLD-MCNC: 15.1 G/DL (ref 13.3–17.7)
IMM GRANULOCYTES # BLD: 0 10E3/UL
IMM GRANULOCYTES NFR BLD: 0 %
LYMPHOCYTES # BLD AUTO: 1.5 10E3/UL (ref 0.8–5.3)
LYMPHOCYTES NFR BLD AUTO: 22 %
MAGNESIUM SERPL-MCNC: 1.9 MG/DL (ref 1.7–2.3)
MCH RBC QN AUTO: 31.7 PG (ref 26.5–33)
MCHC RBC AUTO-ENTMCNC: 34.3 G/DL (ref 31.5–36.5)
MCV RBC AUTO: 92 FL (ref 78–100)
MONOCYTES # BLD AUTO: 0.5 10E3/UL (ref 0–1.3)
MONOCYTES NFR BLD AUTO: 8 %
NEUTROPHILS # BLD AUTO: 4.5 10E3/UL (ref 1.6–8.3)
NEUTROPHILS NFR BLD AUTO: 66 %
NRBC # BLD AUTO: 0 10E3/UL
NRBC BLD AUTO-RTO: 0 /100
PLATELET # BLD AUTO: 167 10E3/UL (ref 150–450)
POTASSIUM SERPL-SCNC: 4.6 MMOL/L (ref 3.4–5.3)
PROT SERPL-MCNC: 7 G/DL (ref 6.4–8.3)
RBC # BLD AUTO: 4.76 10E6/UL (ref 4.4–5.9)
SODIUM SERPL-SCNC: 136 MMOL/L (ref 136–145)
WBC # BLD AUTO: 6.8 10E3/UL (ref 4–11)

## 2023-01-27 PROCEDURE — 83735 ASSAY OF MAGNESIUM: CPT | Performed by: INTERNAL MEDICINE

## 2023-01-27 PROCEDURE — G0463 HOSPITAL OUTPT CLINIC VISIT: HCPCS | Mod: 25 | Performed by: INTERNAL MEDICINE

## 2023-01-27 PROCEDURE — 36415 COLL VENOUS BLD VENIPUNCTURE: CPT

## 2023-01-27 PROCEDURE — 99214 OFFICE O/P EST MOD 30 MIN: CPT | Performed by: INTERNAL MEDICINE

## 2023-01-27 PROCEDURE — 99212 OFFICE O/P EST SF 10 MIN: CPT | Mod: 25 | Performed by: INTERNAL MEDICINE

## 2023-01-27 PROCEDURE — 80053 COMPREHEN METABOLIC PANEL: CPT | Performed by: INTERNAL MEDICINE

## 2023-01-27 PROCEDURE — 85025 COMPLETE CBC W/AUTO DIFF WBC: CPT | Performed by: INTERNAL MEDICINE

## 2023-01-27 PROCEDURE — 250N000011 HC RX IP 250 OP 636: Performed by: INTERNAL MEDICINE

## 2023-01-27 PROCEDURE — 96402 CHEMO HORMON ANTINEOPL SQ/IM: CPT

## 2023-01-27 RX ADMIN — LEUPROLIDE ACETATE 45 MG: KIT at 14:52

## 2023-01-27 ASSESSMENT — PAIN SCALES - GENERAL: PAINLEVEL: NO PAIN (0)

## 2023-01-27 NOTE — NURSING NOTE
Clinic Administered Medication Documentation    Administrations This Visit     leuprolide (LUPRON DEPOT) kit 45 mg     Admin Date  01/27/2023 Action  Given Dose  45 mg Route  Intramuscular Site  Left Gluteus Peter Administered By  Tess Horton CMA    Ordering Provider: Gigi Ward MD    Patient Supplied?: No                Tess Horton CMA

## 2023-01-27 NOTE — NURSING NOTE
"Oncology Rooming Note    January 27, 2023 1:30 PM   Wallace Zelaya is a 77 year old male who presents for:    Chief Complaint   Patient presents with     Oncology Clinic Visit     Prostate cancer      Initial Vitals: BP (!) 151/85   Pulse 74   Temp 97  F (36.1  C) (Tympanic)   Resp 16   Wt 100.2 kg (221 lb)   SpO2 94%   BMI 32.17 kg/m   Estimated body mass index is 32.17 kg/m  as calculated from the following:    Height as of 7/1/22: 1.765 m (5' 9.5\").    Weight as of this encounter: 100.2 kg (221 lb). Body surface area is 2.22 meters squared.  No Pain (0) Comment: Data Unavailable   No LMP for male patient.  Allergies reviewed: Yes  Medications reviewed: Yes    Medications: Medication refills not needed today.  Pharmacy name entered into [employer]:    [facility] - [facility] PHARMACY MAIL DELIVERY - [facility] [address]  [facility] PHARMAC[facility] 0462 - [place] - [address]  [place] MAIL/SPECIALTY PHARMACY - [place], [place] - [address]   [facility] - [place], [place] - [address]  [facility] PHARMACY SERVICES - [place], [place] - [address]      Judie Linton CMA              "

## 2023-01-27 NOTE — LETTER
1/27/2023         RE: Wallace Zelaya  [address] [address] [id number]        Dear Colleague,    Thank you for referring your patient, Wallace Zelaya, to the [facility] CANCER [facility]. Please see a copy of my visit note below.    [facility] CANCER CLINIC  FOLLOW-UP VISIT NOTE    PATIENT NAME: Wallace Zelaya MRN # [id number]  DATE OF VISIT: Jan 27, 2023 YOB: 1945    REFERRING PROVIDER: No referring provider defined for this encounter.    CANCER TYPE: Prostate cancer; Biochemical recurrence; Castration resistant disease  STAGE: Stage III - pT3b at diagnosis; M0    HISTORY OF PRESENTING ILLNESS:  Wallace was noted to have rising screening PSA from 2 - 4. He was referred to Dr. Rodolfo Connor. He had radical prostatectomy on 11/2015. Pathology from this revealed Cayla 4+4 disease with extraprostatic extension, seminal vesicle extension and he was staged at pT3b. All of the 12 lymph nodes resected were negative for disease. Post operatively his PSA did not drop to undetectable levels and remained elevated at 0.56. It vladimir to 0.9 in April 2016 and he was referred to Dr. Newton for adjuvant radiation therapy. He completed radiation therapy but did not have any PSA response to this treatment.     TREATMENT SUMMARY:  11/13/2015 Radical prostatectomy  April 2016  Radiation therapy  He was started on intermittent androgen deprivation therapy which had to be changed to continuous with rapid rise in his PSA.      April 2020 - he was started on bicalutamide for complete androgen blockade  He had rising PSA in May 2021. His bone scan done on 6/30/21 revealed metastatic lesions of T6 and the sacrum. He was switched to abiraterone with prednisone on 8/7/21.  He had relatively stable PSA on therapy without any significant PSA response.  He was eventually taken off therapy in September 2022 for progressive disease.    He enrolled in the Eclipse clinical trial  on 10/27/2022 and was randomized to the standard of care enzalutamide arm.    CURRENT INTERVENTIONS:  Enzalutamide 160 mg oral once a day as a standard of care on ECLIPSE trial    DELANEY Gonsalez is being seen for his prostate cancer    Steve was followed in person at this visit. He is seen for scheduled follow up visit.  He has been randomized to enzalutamide and has been taking enzalutamide since since 10/27/2022. He  denies missing a dose. He has tolerated this without difficulty. He denies any new side effects. He was in positive mood at this visit.      He is specially excited about his PSA which has dramatically dropped at our last evaluation.  PSA drawn for this visit has been pending.    He has been active and has been playing basketball with his friends. He plays the game of HORSE (basketball) at [facility] which he explained for me. He would like to resume his golf at [employer].       PAST MEDICAL HISTORY   1. HTN  2. Dyslipidemia  3. Prostate cancer as detailed above      CURRENT OUTPATIENT MEDICATIONS     Current Outpatient Medications   Medication Sig     amLODIPine (NORVASC) 10 MG tablet Take 1 tablet (10 mg) by mouth daily     atorvastatin (LIPITOR) 20 MG tablet Take 1 tablet by mouth once daily     enzalutamide (XTANDI) 40 MG capsule Take 4 capsules (160 mg) by mouth daily     ibuprofen (ADVIL/MOTRIN) 800 MG tablet TAKE 1 TABLET BY MOUTH THREE TIMES DAILY WITH FOOD     ondansetron (ZOFRAN) 4 MG tablet Take 1 tablet (4 mg) by mouth every 8 hours as needed for nausea     tamsulosin (FLOMAX) 0.4 MG capsule Take 1 capsule (0.4 mg) by mouth daily     valsartan (DIOVAN) 160 MG tablet Take 1 tablet (160 mg) by mouth daily     No current facility-administered medications for this visit.        ALLERGIES     No Known Allergies     REVIEW OF SYSTEMS   As above in the HPI, o/w complete 12-point ROS was negative.     PHYSICAL EXAM   BP (!) 151/85   Pulse 74   Temp 97  F (36.1  C) (Tympanic)   Resp 16   Wt 100.2 kg  (221 lb)   SpO2 94%   BMI 32.17 kg/m    SpO2 Readings from Last 4 Encounters:   09/21/18 96%   08/24/18 94%   06/22/18 95%   05/11/18 97%     Wt Readings from Last 3 Encounters:   01/27/23 100.2 kg (221 lb)   01/06/23 100.2 kg (221 lb)   11/25/22 100.3 kg (221 lb 1.6 oz)     GENERAL/CONSTITUTIONAL: No acute distress.  EYES: Pupils are equal, round, and react to light and accommodation. Extraocular movements intact.  No scleral icterus.  ENT/MOUTH: Neck supple. Oropharynx clear, no mucositis.  LYMPH: No anterior cervical, posterior cervical, supraclavicular, axillary or inguinal adenopathy.   RESPIRATORY: Clear to auscultation bilaterally. No crackles or wheezing.   CARDIOVASCULAR: Regular rate and rhythm without murmurs, gallops, or rubs.  GASTROINTESTINAL: No hepatosplenomegaly, masses, or tenderness. The patient has normal bowel sounds. No guarding.  No distention.  : Deferred  MUSCULOSKELETAL: Warm and well-perfused, no cyanosis, clubbing, or edema.  NEUROLOGIC: Cranial nerves II-XII are intact. Alert, oriented, answers questions appropriately. No focal neurologic deficit  INTEGUMENTARY: No rashes or jaundice.  GAIT: Normal     LABORATORY AND IMAGING STUDIES     Recent Labs   Lab Test 01/27/23  1312 01/03/23  0926 12/06/22  0810 11/25/22  1031 10/27/22  1046    135* 139 140 140   POTASSIUM 4.6 4.2 4.3 4.3 4.0   CHLORIDE 99 100 104 102 106   CO2 28 26 27 29 27   ANIONGAP 9 9 8 9 7   BUN 12.3 12.3 11.9 12.1 13   CR 0.94 0.90 0.92 0.94 0.84   GLC 95 105* 106* 110* 103*   TY 9.3 9.3 9.4 9.1 9.0     Recent Labs   Lab Test 01/27/23  1312 12/06/22  0810 10/27/22  1046 09/22/22  1012 01/21/20  1154   MAG 1.9 2.0 2.3 2.1 2.0     Recent Labs   Lab Test 01/27/23  1312 01/03/23  0926 12/06/22  0810 11/25/22  1031 10/27/22  1046   WBC 6.8 5.3 5.6 8.4 6.4   HGB 15.1 15.7 14.9 14.7 15.1    184 210 173 166   MCV 92 93 94 93 92   NEUTROPHIL 66 60 55 65 70     Recent Labs   Lab Test 01/27/23  1312 01/03/23  0926  12/06/22  0810 08/15/22  0810 06/27/22  0827 03/28/22  1050 02/23/22  1419   BILITOTAL 0.5 0.6 0.5   < > 0.9 0.7 0.8   ALKPHOS 87 99 123   < > 94 108 113   ALT 16 16 17   < > 49 80* 88*   AST 23 30 23   < > 4 43 46*   ALBUMIN 4.2 4.3 4.2   < > 3.7 3.6 3.4   LDH  --   --   --   --  246* 259* 246*    < > = values in this interval not displayed.     TSH   Date Value Ref Range Status   10/27/2022 0.65 0.40 - 4.00 mU/L Final   09/22/2022 0.86 0.30 - 4.20 uIU/mL Final     No results for input(s): CEA in the last 24801 hours.  Results for orders placed or performed during the hospital encounter of 01/03/23   NM Bone Scan Whole Body    Narrative    EXAM: NM BONE SCAN WHOLE BODY  LOCATION: [facility]  DATE/TIME: 1/3/2023 2:18 PM    INDICATION: Malignant neoplasm prostate  COMPARISON: [facility]MA PET/CT dated 09/29/2022  TECHNIQUE: 27.0 mCi technetium-99m MDP, IV. Anterior and posterior delayed whole-body images at 3 hours with additional spot images of the skull.    FINDINGS: Focal radiotracer uptake involving the T6 vertebral body and lower portions of the sacrum/coccyx, which were PSMA positive on recent PET/CT dated 09/29/2022 and remain suspicious for osseous metastases.      Impression    IMPRESSION:    Stable burden of osseous metastases since prior PSMA PET/CT dated 09/29/2022     Recent Labs   Lab Test 01/03/23  0926 10/27/22  1046 09/22/22  1012 08/15/22  0810 06/27/22  0827 03/28/22  1050   PSA 0.46 10.30* 9.12* 7.81* 5.17 2.00   TESTOSTTOTAL 7* <2* <2*  --  <2* <2*           ASSESSMENT AND PLAN   1. 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 ECOG PS 0   3. HTN  4. ECOG PS1  5. No medical comorbidity    I had a lengthy discussion with Steve who is alone at this visit. Clinical research nurse - Janel Gee did not join us for the visit.     He is tolerating enzalutamide well. He denies any new side effects on this medication. He has not been missing  doses. He has not started any new medications.     I have reviewed all of the labs done prior to this clinic visit.  Labs are all completely normal including electrolytes, renal function, hepatic panel, complete blood count and differential. His PSA had nicely responded to enzalutamide from 10.3 ng/ml at the start of therapy to 0.46 ng/ml at his last visit. This is indeed a good response.  He was not scheduled to have PSA drawn at this visit as per the clinical trial.    He is due for his leuprolide and I will have this administered today.  We will have to continue with androgen deprivation therapy with GnRH analog.    He again had questions about the trial which were reviewed with him.     He would have closer monitoring while on clinical trial. He would like to have follow up with me as much as possible. He was reminded that he could have a few visits with Rodolfo who is part of the same team. He has met her in the past.     25 minutes spent on the date of the encounter doing chart review, history and exam, documentation and further activities as noted above      Gigi Ward    Hematologist and Medical Oncologist  [facility]            Again, thank you for allowing me to participate in the care of your patient.        Sincerely,        Gigi Ward MD

## 2023-01-27 NOTE — PROGRESS NOTES
Steve was in today for Week 12 in the ECLIPSE clinical trial.  He returned the completed QOLs.  Labs were drawn per protocol and all results WNL.  He reported no changes to his concomitant medications and he has experienced no changes in his health since starting the SOC enzalutamide.  He is scheduled for scans prior to returning to the clinic for Week 16.  He also received a Lupron injection in clinic today.

## 2023-01-27 NOTE — PROGRESS NOTES
[facility] CANCER CLINIC  FOLLOW-UP VISIT NOTE    PATIENT NAME: Wallace Zelaya MRN # [id number]  DATE OF VISIT: Jan 27, 2023 YOB: 1945    REFERRING PROVIDER: No referring provider defined for this encounter.    CANCER TYPE: Prostate cancer; Biochemical recurrence; Castration resistant disease  STAGE: Stage III - pT3b at diagnosis; M0    HISTORY OF PRESENTING ILLNESS:  Wallace was noted to have rising screening PSA from 2 - 4. He was referred to Dr. Rodolfo Connor. He had radical prostatectomy on 11/2015. Pathology from this revealed Cayla 4+4 disease with extraprostatic extension, seminal vesicle extension and he was staged at pT3b. All of the 12 lymph nodes resected were negative for disease. Post operatively his PSA did not drop to undetectable levels and remained elevated at 0.56. It vladimir to 0.9 in April 2016 and he was referred to Dr. Newton for adjuvant radiation therapy. He completed radiation therapy but did not have any PSA response to this treatment.     TREATMENT SUMMARY:  11/13/2015 Radical prostatectomy  April 2016  Radiation therapy  He was started on intermittent androgen deprivation therapy which had to be changed to continuous with rapid rise in his PSA.      April 2020 - he was started on bicalutamide for complete androgen blockade  He had rising PSA in May 2021. His bone scan done on 6/30/21 revealed metastatic lesions of T6 and the sacrum. He was switched to abiraterone with prednisone on 8/7/21.  He had relatively stable PSA on therapy without any significant PSA response.  He was eventually taken off therapy in September 2022 for progressive disease.    He enrolled in the Eclipse clinical trial on 10/27/2022 and was randomized to the standard of care enzalutamide arm.    CURRENT INTERVENTIONS:  Enzalutamide 160 mg oral once a day as a standard of care on ECLIPSE trial    SUBJECTIVE   Wallace is being seen for his prostate cancer    Steve was followed in  person at this visit. He is seen for scheduled follow up visit.  He has been randomized to enzalutamide and has been taking enzalutamide since since 10/27/2022. He  denies missing a dose. He has tolerated this without difficulty. He denies any new side effects. He was in positive mood at this visit.      He is specially excited about his PSA which has dramatically dropped at our last evaluation.  PSA drawn for this visit has been pending.    He has been active and has been playing basketball with his friends. He plays the game of HORSE (basketball) at [employer] which he explained for me. He would like to resume his golf at [employer].       PAST MEDICAL HISTORY   1. HTN  2. Dyslipidemia  3. Prostate cancer as detailed above      CURRENT OUTPATIENT MEDICATIONS     Current Outpatient Medications   Medication Sig     amLODIPine (NORVASC) 10 MG tablet Take 1 tablet (10 mg) by mouth daily     atorvastatin (LIPITOR) 20 MG tablet Take 1 tablet by mouth once daily     enzalutamide (XTANDI) 40 MG capsule Take 4 capsules (160 mg) by mouth daily     ibuprofen (ADVIL/MOTRIN) 800 MG tablet TAKE 1 TABLET BY MOUTH THREE TIMES DAILY WITH FOOD     ondansetron (ZOFRAN) 4 MG tablet Take 1 tablet (4 mg) by mouth every 8 hours as needed for nausea     tamsulosin (FLOMAX) 0.4 MG capsule Take 1 capsule (0.4 mg) by mouth daily     valsartan (DIOVAN) 160 MG tablet Take 1 tablet (160 mg) by mouth daily     No current facility-administered medications for this visit.        ALLERGIES     No Known Allergies     REVIEW OF SYSTEMS   As above in the HPI, o/w complete 12-point ROS was negative.     PHYSICAL EXAM   BP (!) 151/85   Pulse 74   Temp 97  F (36.1  C) (Tympanic)   Resp 16   Wt 100.2 kg (221 lb)   SpO2 94%   BMI 32.17 kg/m    SpO2 Readings from Last 4 Encounters:   09/21/18 96%   08/24/18 94%   06/22/18 95%   05/11/18 97%     Wt Readings from Last 3 Encounters:   01/27/23 100.2 kg (221 lb)   01/06/23 100.2 kg (221 lb)   11/25/22 100.3 kg  (221 lb 1.6 oz)     GENERAL/CONSTITUTIONAL: No acute distress.  EYES: Pupils are equal, round, and react to light and accommodation. Extraocular movements intact.  No scleral icterus.  ENT/MOUTH: Neck supple. Oropharynx clear, no mucositis.  LYMPH: No anterior cervical, posterior cervical, supraclavicular, axillary or inguinal adenopathy.   RESPIRATORY: Clear to auscultation bilaterally. No crackles or wheezing.   CARDIOVASCULAR: Regular rate and rhythm without murmurs, gallops, or rubs.  GASTROINTESTINAL: No hepatosplenomegaly, masses, or tenderness. The patient has normal bowel sounds. No guarding.  No distention.  : Deferred  MUSCULOSKELETAL: Warm and well-perfused, no cyanosis, clubbing, or edema.  NEUROLOGIC: Cranial nerves II-XII are intact. Alert, oriented, answers questions appropriately. No focal neurologic deficit  INTEGUMENTARY: No rashes or jaundice.  GAIT: Normal     LABORATORY AND IMAGING STUDIES     Recent Labs   Lab Test 01/27/23  1312 01/03/23  0926 12/06/22  0810 11/25/22  1031 10/27/22  1046    135* 139 140 140   POTASSIUM 4.6 4.2 4.3 4.3 4.0   CHLORIDE 99 100 104 102 106   CO2 28 26 27 29 27   ANIONGAP 9 9 8 9 7   BUN 12.3 12.3 11.9 12.1 13   CR 0.94 0.90 0.92 0.94 0.84   GLC 95 105* 106* 110* 103*   TY 9.3 9.3 9.4 9.1 9.0     Recent Labs   Lab Test 01/27/23  1312 12/06/22  0810 10/27/22  1046 09/22/22  1012 01/21/20  1154   MAG 1.9 2.0 2.3 2.1 2.0     Recent Labs   Lab Test 01/27/23  1312 01/03/23  0926 12/06/22  0810 11/25/22  1031 10/27/22  1046   WBC 6.8 5.3 5.6 8.4 6.4   HGB 15.1 15.7 14.9 14.7 15.1    184 210 173 166   MCV 92 93 94 93 92   NEUTROPHIL 66 60 55 65 70     Recent Labs   Lab Test 01/27/23  1312 01/03/23  0926 12/06/22  0810 08/15/22  0810 06/27/22  0827 03/28/22  1050 02/23/22  1419   BILITOTAL 0.5 0.6 0.5   < > 0.9 0.7 0.8   ALKPHOS 87 99 123   < > 94 108 113   ALT 16 16 17   < > 49 80* 88*   AST 23 30 23   < > 4 43 46*   ALBUMIN 4.2 4.3 4.2   < > 3.7 3.6 3.4    LDH  --   --   --   --  246* 259* 246*    < > = values in this interval not displayed.     TSH   Date Value Ref Range Status   10/27/2022 0.65 0.40 - 4.00 mU/L Final   09/22/2022 0.86 0.30 - 4.20 uIU/mL Final     No results for input(s): CEA in the last 89350 hours.  Results for orders placed or performed during the hospital encounter of 01/03/23   NM Bone Scan Whole Body    Narrative    EXAM: NM BONE SCAN WHOLE BODY  LOCATION: [facility]  DATE/TIME: 1/3/2023 2:18 PM    INDICATION: Malignant neoplasm prostate  COMPARISON: [facility] PET/CT dated 09/29/2022  TECHNIQUE: 27.0 mCi technetium-99m MDP, IV. Anterior and posterior delayed whole-body images at 3 hours with additional spot images of the skull.    FINDINGS: Focal radiotracer uptake involving the T6 vertebral body and lower portions of the sacrum/coccyx, which were PSMA positive on recent PET/CT dated 09/29/2022 and remain suspicious for osseous metastases.      Impression    IMPRESSION:    Stable burden of osseous metastases since prior PSMA PET/CT dated 09/29/2022     Recent Labs   Lab Test 01/03/23  0926 10/27/22  1046 09/22/22  1012 08/15/22  0810 06/27/22  0827 03/28/22  1050   PSA 0.46 10.30* 9.12* 7.81* 5.17 2.00   TESTOSTTOTAL 7* <2* <2*  --  <2* <2*           ASSESSMENT AND PLAN   1. 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 ECOG PS 0   3. HTN  4. ECOG PS1  5. No medical comorbidity    I had a lengthy discussion with Steve who is alone at this visit. Clinical research nurse - Janel Gee did not join us for the visit.     He is tolerating enzalutamide well. He denies any new side effects on this medication. He has not been missing doses. He has not started any new medications.     I have reviewed all of the labs done prior to this clinic visit.  Labs are all completely normal including electrolytes, renal function, hepatic panel, complete blood count and differential. His PSA had  nicely responded to enzalutamide from 10.3 ng/ml at the start of therapy to 0.46 ng/ml at his last visit. This is indeed a good response.  He was not scheduled to have PSA drawn at this visit as per the clinical trial.    He is due for his leuprolide and I will have this administered today.  We will have to continue with androgen deprivation therapy with GnRH analog.    He again had questions about the trial which were reviewed with him.     He would have closer monitoring while on clinical trial. He would like to have follow up with me as much as possible. He was reminded that he could have a few visits with Rodolfo who is part of the same team. He has met her in the past.     25 minutes spent on the date of the encounter doing chart review, history and exam, documentation and further activities as noted above      Gigi Ward    Hematologist and Medical Oncologist  [facility]

## 2023-02-13 NOTE — PATIENT INSTRUCTIONS
3/1/23 Lab + CT Scan  3/3/23 Return visit with Dr. Ward  3/27/23 Lab + Rodolfo MELTON PA visit   4/14/23 Lab + Return visit with Dr. Ed Barnett, RN, BSN.  RN Care Coordinator     [facility]   [phone]

## 2023-02-15 NOTE — PATIENT INSTRUCTIONS
3/1/23 Labs + Scans   3/3/23 Return visit with Dr. Ward  3/27/23 Labs + Rodolfo BERG   4/14/23 Labs + Return visit with Dr. Ed Barnett, RN, BSN.  RN Care Coordinator     [facility]   [phone]

## 2023-02-23 ENCOUNTER — MYC MEDICAL ADVICE (OUTPATIENT)
Dept: FAMILY MEDICINE | Facility: CLINIC | Age: 78
End: 2023-02-23
Payer: COMMERCIAL

## 2023-02-23 NOTE — TELEPHONE ENCOUNTER
His oncologist is currently prescribing his prostate cancer treatment, many of these cancer treatments have drug drug interactions, this would be best reviewed with his oncologist.    If he would like to review this with me, I would recommend scheduling a visit to discuss.    DAVID

## 2023-03-01 ENCOUNTER — LAB (OUTPATIENT)
Dept: INFUSION THERAPY | Facility: CLINIC | Age: 78
End: 2023-03-01
Attending: INTERNAL MEDICINE
Payer: COMMERCIAL

## 2023-03-01 ENCOUNTER — HOSPITAL ENCOUNTER (OUTPATIENT)
Dept: CT IMAGING | Facility: CLINIC | Age: 78
Setting detail: OBSERVATION
Discharge: HOME | End: 2023-03-01
Attending: INTERNAL MEDICINE
Payer: COMMERCIAL

## 2023-03-01 ENCOUNTER — HOSPITAL ENCOUNTER (OUTPATIENT)
Dept: NUCLEAR MEDICINE | Facility: CLINIC | Age: 78
Setting detail: NUCLEAR MEDICINE
Discharge: HOME | End: 2023-03-01
Attending: INTERNAL MEDICINE
Payer: COMMERCIAL

## 2023-03-01 ENCOUNTER — DOCUMENTATION ONLY (OUTPATIENT)
Dept: ONCOLOGY | Facility: CLINIC | Age: 78
End: 2023-03-01

## 2023-03-01 DIAGNOSIS — C79.51 BONE METASTASIS: Primary | ICD-10-CM

## 2023-03-01 DIAGNOSIS — C61 PROSTATE CANCER (H): ICD-10-CM

## 2023-03-01 DIAGNOSIS — C79.51 BONE METASTASIS: ICD-10-CM

## 2023-03-01 PROBLEM — C44.91 BASAL CELL CARCINOMA: Status: ACTIVE | Noted: 2023-03-01

## 2023-03-01 LAB
ALBUMIN SERPL BCG-MCNC: 4.2 G/DL (ref 3.5–5.2)
ALP SERPL-CCNC: 87 U/L (ref 40–129)
ALT SERPL W P-5'-P-CCNC: 18 U/L (ref 10–50)
ANION GAP SERPL CALCULATED.3IONS-SCNC: 10 MMOL/L (ref 7–15)
AST SERPL W P-5'-P-CCNC: 26 U/L (ref 10–50)
BASOPHILS # BLD AUTO: 0.1 10E3/UL (ref 0–0.2)
BASOPHILS NFR BLD AUTO: 1 %
BILIRUB SERPL-MCNC: 0.7 MG/DL
BUN SERPL-MCNC: 11.3 MG/DL (ref 8–23)
CALCIUM SERPL-MCNC: 9.4 MG/DL (ref 8.8–10.2)
CHLORIDE SERPL-SCNC: 101 MMOL/L (ref 98–107)
CHOLEST SERPL-MCNC: 197 MG/DL
CREAT SERPL-MCNC: 0.92 MG/DL (ref 0.67–1.17)
DEPRECATED HCO3 PLAS-SCNC: 28 MMOL/L (ref 22–29)
EOSINOPHIL # BLD AUTO: 0.3 10E3/UL (ref 0–0.7)
EOSINOPHIL NFR BLD AUTO: 5 %
ERYTHROCYTE [DISTWIDTH] IN BLOOD BY AUTOMATED COUNT: 13.2 % (ref 10–15)
GFR SERPL CREATININE-BSD FRML MDRD: 85 ML/MIN/1.73M2
GLUCOSE SERPL-MCNC: 109 MG/DL (ref 70–99)
HCT VFR BLD AUTO: 45.7 % (ref 40–53)
HDLC SERPL-MCNC: 46 MG/DL
HGB BLD-MCNC: 15.8 G/DL (ref 13.3–17.7)
IMM GRANULOCYTES # BLD: 0 10E3/UL
IMM GRANULOCYTES NFR BLD: 0 %
LDLC SERPL CALC-MCNC: 116 MG/DL
LYMPHOCYTES # BLD AUTO: 1.4 10E3/UL (ref 0.8–5.3)
LYMPHOCYTES NFR BLD AUTO: 25 %
MAGNESIUM SERPL-MCNC: 2 MG/DL (ref 1.7–2.3)
MCH RBC QN AUTO: 31.6 PG (ref 26.5–33)
MCHC RBC AUTO-ENTMCNC: 34.6 G/DL (ref 31.5–36.5)
MCV RBC AUTO: 91 FL (ref 78–100)
MONOCYTES # BLD AUTO: 0.4 10E3/UL (ref 0–1.3)
MONOCYTES NFR BLD AUTO: 8 %
NEUTROPHILS # BLD AUTO: 3.4 10E3/UL (ref 1.6–8.3)
NEUTROPHILS NFR BLD AUTO: 61 %
NONHDLC SERPL-MCNC: 151 MG/DL
NRBC # BLD AUTO: 0 10E3/UL
NRBC BLD AUTO-RTO: 0 /100
PLATELET # BLD AUTO: 193 10E3/UL (ref 150–450)
POTASSIUM SERPL-SCNC: 4.4 MMOL/L (ref 3.4–5.3)
PROT SERPL-MCNC: 7.3 G/DL (ref 6.4–8.3)
PSA SERPL-MCNC: 0.39 NG/ML (ref 0–6.5)
RBC # BLD AUTO: 5 10E6/UL (ref 4.4–5.9)
SODIUM SERPL-SCNC: 139 MMOL/L (ref 136–145)
TRIGL SERPL-MCNC: 174 MG/DL
TSH SERPL DL<=0.005 MIU/L-ACNC: 1.14 UIU/ML (ref 0.3–4.2)
WBC # BLD AUTO: 5.6 10E3/UL (ref 4–11)

## 2023-03-01 PROCEDURE — 36415 COLL VENOUS BLD VENIPUNCTURE: CPT | Performed by: INTERNAL MEDICINE

## 2023-03-01 PROCEDURE — A9561 TC99M OXIDRONATE: HCPCS | Performed by: INTERNAL MEDICINE

## 2023-03-01 PROCEDURE — 74177 CT ABD & PELVIS W/CONTRAST: CPT

## 2023-03-01 PROCEDURE — 84403 ASSAY OF TOTAL TESTOSTERONE: CPT | Performed by: INTERNAL MEDICINE

## 2023-03-01 PROCEDURE — 343N000001 HC RX 343: Performed by: INTERNAL MEDICINE

## 2023-03-01 PROCEDURE — 84443 ASSAY THYROID STIM HORMONE: CPT | Performed by: INTERNAL MEDICINE

## 2023-03-01 PROCEDURE — 36415 COLL VENOUS BLD VENIPUNCTURE: CPT

## 2023-03-01 PROCEDURE — 78306 BONE IMAGING WHOLE BODY: CPT

## 2023-03-01 PROCEDURE — 250N000009 HC RX 250: Performed by: INTERNAL MEDICINE

## 2023-03-01 PROCEDURE — 84153 ASSAY OF PSA TOTAL: CPT | Performed by: INTERNAL MEDICINE

## 2023-03-01 PROCEDURE — 80053 COMPREHEN METABOLIC PANEL: CPT | Performed by: INTERNAL MEDICINE

## 2023-03-01 PROCEDURE — 250N000011 HC RX IP 250 OP 636: Performed by: INTERNAL MEDICINE

## 2023-03-01 PROCEDURE — 83735 ASSAY OF MAGNESIUM: CPT | Performed by: INTERNAL MEDICINE

## 2023-03-01 PROCEDURE — 85025 COMPLETE CBC W/AUTO DIFF WBC: CPT | Performed by: INTERNAL MEDICINE

## 2023-03-01 PROCEDURE — 80061 LIPID PANEL: CPT | Performed by: INTERNAL MEDICINE

## 2023-03-01 RX ORDER — IOPAMIDOL 755 MG/ML
500 INJECTION, SOLUTION INTRAVASCULAR ONCE
Status: COMPLETED | OUTPATIENT
Start: 2023-03-01 | End: 2023-03-01

## 2023-03-01 RX ADMIN — Medication 25 MILLICURIE: at 11:00

## 2023-03-01 RX ADMIN — IOPAMIDOL 100 ML: 755 INJECTION, SOLUTION INTRAVENOUS at 11:49

## 2023-03-01 RX ADMIN — SODIUM CHLORIDE 65 ML: 9 INJECTION, SOLUTION INTRAVENOUS at 11:50

## 2023-03-01 NOTE — PROGRESS NOTES
Nursing Note:  Wallace Zelaya presents today for Labs + PIV insertion for CT and bone scans.    Patient seen by provider today: No   present during visit today: Not Applicable.    Note: Steve reports he is fasting today for lipid panel.  Reports he last ate at 6 PM last night.    Intravenous Access:  Labs drawn without difficulty.  Peripheral IV placed.    Discharge Plan:   Patient was sent to imaging for CT and bone scan appointment.  PIV to be discontinued by imaging staff.  Patient to return on 3/3 for appointment with Dr. Ward.      Fuentes Russell RN

## 2023-03-01 NOTE — PROGRESS NOTES
Oral Chemotherapy Monitoring Program  Lab Follow Up    Reviewed lab results from 03/1/2023.    ORAL CHEMOTHERAPY 1/17/2022 2/23/2022 8/16/2022 9/13/2022 10/27/2022 11/25/2022 3/1/2023   Assessment Type Other Lab Monitoring;Monthly Follow up Lab Monitoring Discontinuation Chart Review;Initial Work up;New Teach Initial Follow up Lab Monitoring   Stop Date - - - 9/13/2022 - - -   Reason for Discontinuation - - - Disease progression - - -   Other: - - - enrolled in clinical trial - - -   Diagnosis Code Prostate Cancer Prostate Cancer Prostate Cancer Prostate Cancer Prostate Cancer Prostate Cancer Prostate Cancer   Providers Dr. Ed Ward   Clinic Name/Location [facility] [place]   Drug Name Zytiga (abiraterone) Zytiga (abiraterone) Zytiga (abiraterone) Zytiga (abiraterone) Xtandi (enzalutamide) Xtandi (enzalutamide) Xtandi (enzalutamide)   Dose 500 mg 500 mg 1,000 mg 500 mg 160 mg 160 mg 160 mg   Current Schedule Daily Daily Daily Daily Daily Daily Daily   Cycle Details Continuous Continuous Continuous Continuous Continuous Continuous Continuous   Start Date of Last Cycle - - - - - - -   Planned next cycle start date 1/17/2022 3/3/2022 - - 11/10/2022 11/8/2022 -   Doses missed in last 2 weeks - 0 - - - 0 -   Adherence Assessment - Adherent - - - Adherent -   Adverse Effects - Increased AST/ALT/T BILI - - - No AE identified during assessment -   Increased AST/ALT/T BILI - Grade 1 - - - - -   Pharmacist Intervention(increased ast/alt/t bili) - No - - - - -   Intervention(s) - - - - - - -   Any new drug interactions? - No - - No No -   Is the dose as ordered appropriate for the patient? - Yes - - Yes Yes -   Is the patient currently in pain? - No - - - No -   Has the patient been assessed within the past 6 months for depression? - Yes - - - No -   Has the patient missed any days of school, work, or other routine activity? - No  - - - No -   Since the last time we talked, have you been hospitalized or used the emergency room? - No - - - - -       Labs:  _  Result Component Current Result Ref Range   Sodium 139 (3/1/2023) 136 - 145 mmol/L     _  Result Component Current Result Ref Range   Potassium 4.4 (3/1/2023) 3.4 - 5.3 mmol/L     _  Result Component Current Result Ref Range   Calcium 9.4 (3/1/2023) 8.8 - 10.2 mg/dL     _  Result Component Current Result Ref Range   Magnesium 2.0 (3/1/2023) 1.7 - 2.3 mg/dL     No results found for Phos within last 30 days.     _  Result Component Current Result Ref Range   Albumin 4.2 (3/1/2023) 3.5 - 5.2 g/dL     _  Result Component Current Result Ref Range   Urea Nitrogen 11.3 (3/1/2023) 8.0 - 23.0 mg/dL     _  Result Component Current Result Ref Range   Creatinine 0.92 (3/1/2023) 0.67 - 1.17 mg/dL     _  Result Component Current Result Ref Range   AST 26 (3/1/2023) 10 - 50 U/L     _  Result Component Current Result Ref Range   ALT 18 (3/1/2023) 10 - 50 U/L     _  Result Component Current Result Ref Range   Bilirubin Total 0.7 (3/1/2023) <=1.2 mg/dL     _  Result Component Current Result Ref Range   WBC Count 5.6 (3/1/2023) 4.0 - 11.0 10e3/uL     _  Result Component Current Result Ref Range   Hemoglobin 15.8 (3/1/2023) 13.3 - 17.7 g/dL     _  Result Component Current Result Ref Range   Platelet Count 193 (3/1/2023) 150 - 450 10e3/uL     No results found for ANC within last 30 days.     _  Result Component Current Result Ref Range   Absolute Neutrophils 3.4 (3/1/2023) 1.6 - 8.3 10e3/uL        Assessment & Plan:  Their were no concerning abnormalities. Continue with current regimen.       Follow-Up:  03/03/23    Peyton Chappell, Pharmacy Intern on 3/1/2023 at 1:47 PM

## 2023-03-02 ENCOUNTER — ANCILLARY PROCEDURE (OUTPATIENT)
Dept: RADIOLOGY | Facility: CLINIC | Age: 78
End: 2023-03-02
Attending: INTERNAL MEDICINE
Payer: COMMERCIAL

## 2023-03-03 ENCOUNTER — ALLIED HEALTH/NURSE VISIT (OUTPATIENT)
Dept: ONCOLOGY | Facility: CLINIC | Age: 78
End: 2023-03-03

## 2023-03-03 ENCOUNTER — ONCOLOGY VISIT (OUTPATIENT)
Dept: ONCOLOGY | Facility: CLINIC | Age: 78
End: 2023-03-03
Attending: INTERNAL MEDICINE
Payer: COMMERCIAL

## 2023-03-03 VITALS
HEART RATE: 67 BPM | RESPIRATION RATE: 16 BRPM | TEMPERATURE: 98 F | OXYGEN SATURATION: 95 % | HEIGHT: 68 IN | BODY MASS INDEX: 33.49 KG/M2 | SYSTOLIC BLOOD PRESSURE: 152 MMHG | DIASTOLIC BLOOD PRESSURE: 84 MMHG | WEIGHT: 221 LBS

## 2023-03-03 DIAGNOSIS — C79.51 BONE METASTASIS: ICD-10-CM

## 2023-03-03 DIAGNOSIS — C61 PROSTATE CANCER (H): Primary | ICD-10-CM

## 2023-03-03 LAB — TESTOST SERPL-MCNC: 7 NG/DL (ref 240–950)

## 2023-03-03 PROCEDURE — G0463 HOSPITAL OUTPT CLINIC VISIT: HCPCS | Performed by: INTERNAL MEDICINE

## 2023-03-03 PROCEDURE — 99214 OFFICE O/P EST MOD 30 MIN: CPT | Performed by: INTERNAL MEDICINE

## 2023-03-03 ASSESSMENT — PAIN SCALES - GENERAL: PAINLEVEL: NO PAIN (0)

## 2023-03-03 NOTE — NURSING NOTE
Steve was in today for the Week 16 visit in the ECLIPSE study.  He is on the SOC arm of the study and taking enzalutamide.  Labs were drawn 3/1/23 and PSA has remained very low.  Scans done 3/1 showed stable disease.  He reported no new adverse events today and no changes to his concomitant medications.  He was concerned about potential drug interaction between amlodipine and enzalutamide but spoke with Homer in pharmacy who reassured him regarding the safety of this combination.  He has reported virutally no side effects since starting the enzalutamide.  He will return to the clinic in four weeks for Week 20 and will have repeat scans in 8 weeks.

## 2023-03-03 NOTE — PROGRESS NOTES
[facility] CANCER CLINIC  FOLLOW-UP VISIT NOTE    PATIENT NAME: Wallace Zelaya MRN # [id number]  DATE OF VISIT: Mar 3, 2023 YOB: 1945    REFERRING PROVIDER: No referring provider defined for this encounter.    CANCER TYPE: Prostate cancer; Biochemical recurrence; Castration resistant disease  STAGE: Stage III - pT3b at diagnosis; M0    HISTORY OF PRESENTING ILLNESS:  Wallace was noted to have rising screening PSA from 2 - 4. He was referred to Dr. Rodolfo Connor. He had radical prostatectomy on 11/2015. Pathology from this revealed [place] 4+4 disease with extraprostatic extension, seminal vesicle extension and he was staged at pT3b. All of the 12 lymph nodes resected were negative for disease. Post operatively his PSA did not drop to undetectable levels and remained elevated at 0.56. It vladimir to 0.9 in April 2016 and he was referred to Dr. Newton for adjuvant radiation therapy. He completed radiation therapy but did not have any PSA response to this treatment.     TREATMENT SUMMARY:  11/13/2015 Radical prostatectomy  April 2016  Radiation therapy  He was started on intermittent androgen deprivation therapy which had to be changed to continuous with rapid rise in his PSA.      April 2020 - he was started on bicalutamide for complete androgen blockade  He had rising PSA in May 2021. His bone scan done on 6/30/21 revealed metastatic lesions of T6 and the sacrum. He was switched to abiraterone with prednisone on 8/7/21.  He had relatively stable PSA on therapy without any significant PSA response.  He was eventually taken off therapy in September 2022 for progressive disease.    He enrolled in the Eclipse clinical trial on 10/27/2022 and was randomized to the standard of care enzalutamide arm.    CURRENT INTERVENTIONS:  Enzalutamide 160 mg oral once a day as a standard of care on ECLIPSE trial    SUBJECTIVE   Wallace is being seen for his prostate cancer    Steve was followed in  "person at this visit. He is seen for scheduled follow up visit.  He has been randomized to enzalutamide and has been taking enzalutamide since since 10/27/2022. He  denies missing a dose. He has tolerated this without difficulty. He denies any new side effects. He was in positive mood at this visit.      He is specially excited about his PSA which has dramatically dropped at our last evaluation.  PSA drawn for this visit has been pending.    He has been active and has been playing basketball with his friends. He plays the game of HORSE (basketball) at [facility] which he explained for me. He got pictures of his group of friends playing basket ball. He would like to resume his golf at [employer].       PAST MEDICAL HISTORY   1. HTN  2. Dyslipidemia  3. Prostate cancer as detailed above      CURRENT OUTPATIENT MEDICATIONS     Current Outpatient Medications   Medication Sig     amLODIPine (NORVASC) 10 MG tablet Take 1 tablet (10 mg) by mouth daily     atorvastatin (LIPITOR) 20 MG tablet Take 1 tablet by mouth once daily     enzalutamide (XTANDI) 40 MG capsule Take 4 capsules (160 mg) by mouth daily     ibuprofen (ADVIL/MOTRIN) 800 MG tablet TAKE 1 TABLET BY MOUTH THREE TIMES DAILY WITH FOOD     tamsulosin (FLOMAX) 0.4 MG capsule Take 1 capsule (0.4 mg) by mouth daily     valsartan (DIOVAN) 160 MG tablet Take 1 tablet (160 mg) by mouth daily     ondansetron (ZOFRAN) 4 MG tablet Take 1 tablet (4 mg) by mouth every 8 hours as needed for nausea     No current facility-administered medications for this visit.        ALLERGIES     No Known Allergies     REVIEW OF SYSTEMS   As above in the HPI, o/w complete 12-point ROS was negative.     PHYSICAL EXAM   BP (!) 152/84 (Cuff Size: Adult Large)   Pulse 67   Temp 98  F (36.7  C) (Tympanic)   Resp 16   Ht 1.734 m (5' 8.25\")   Wt 100.2 kg (221 lb)   SpO2 95%   BMI 33.36 kg/m    SpO2 Readings from Last 4 Encounters:   09/21/18 96%   08/24/18 94%   06/22/18 95%   05/11/18 97%     Wt " Readings from Last 3 Encounters:   03/03/23 100.2 kg (221 lb)   01/27/23 100.2 kg (221 lb)   01/06/23 100.2 kg (221 lb)     GENERAL/CONSTITUTIONAL: No acute distress.  EYES: Pupils are equal, round, and react to light and accommodation. Extraocular movements intact.  No scleral icterus.  ENT/MOUTH: Neck supple. Oropharynx clear, no mucositis.  LYMPH: No anterior cervical, posterior cervical, supraclavicular, axillary or inguinal adenopathy.   RESPIRATORY: Clear to auscultation bilaterally. No crackles or wheezing.   CARDIOVASCULAR: Regular rate and rhythm without murmurs, gallops, or rubs.  GASTROINTESTINAL: No hepatosplenomegaly, masses, or tenderness. The patient has normal bowel sounds. No guarding.  No distention.  : Deferred  MUSCULOSKELETAL: Warm and well-perfused, no cyanosis, clubbing, or edema.  NEUROLOGIC: Cranial nerves II-XII are intact. Alert, oriented, answers questions appropriately. No focal neurologic deficit  INTEGUMENTARY: No rashes or jaundice.  GAIT: Normal     LABORATORY AND IMAGING STUDIES     Recent Labs   Lab Test 03/01/23  1039 01/27/23  1312 01/03/23  0926 12/06/22  0810 11/25/22  1031    136 135* 139 140   POTASSIUM 4.4 4.6 4.2 4.3 4.3   CHLORIDE 101 99 100 104 102   CO2 28 28 26 27 29   ANIONGAP 10 9 9 8 9   BUN 11.3 12.3 12.3 11.9 12.1   CR 0.92 0.94 0.90 0.92 0.94   * 95 105* 106* 110*   TY 9.4 9.3 9.3 9.4 9.1     Recent Labs   Lab Test 03/01/23  1039 01/27/23  1312 12/06/22  0810 10/27/22  1046 09/22/22  1012   MAG 2.0 1.9 2.0 2.3 2.1     Recent Labs   Lab Test 03/01/23  1039 01/27/23  1312 01/03/23  0926 12/06/22  0810 11/25/22  1031   WBC 5.6 6.8 5.3 5.6 8.4   HGB 15.8 15.1 15.7 14.9 14.7    167 184 210 173   MCV 91 92 93 94 93   NEUTROPHIL 61 66 60 55 65     Recent Labs   Lab Test 03/01/23  1039 01/27/23  1312 01/03/23  0926 08/15/22  0810 06/27/22  0827 03/28/22  1050 02/23/22  1419   BILITOTAL 0.7 0.5 0.6   < > 0.9 0.7 0.8   ALKPHOS 87 87 99   < > 94 108  113   ALT 18 16 16   < > 49 80* 88*   AST 26 23 30   < > 4 43 46*   ALBUMIN 4.2 4.2 4.3   < > 3.7 3.6 3.4   LDH  --   --   --   --  246* 259* 246*    < > = values in this interval not displayed.     TSH   Date Value Ref Range Status   03/01/2023 1.14 0.30 - 4.20 uIU/mL Final   10/27/2022 0.65 0.40 - 4.00 mU/L Final   09/22/2022 0.86 0.30 - 4.20 uIU/mL Final     No results for input(s): CEA in the last 97791 hours.  Results for orders placed or performed during the hospital encounter of 03/01/23   CT Chest/Abdomen/Pelvis w Contrast    Narrative    CT CHEST/ABDOMEN/PELVIS WITH CONTRAST 3/1/2023 12:06 PM    CLINICAL HISTORY: Prostate cancer (H); Bone metastasis (H).  TECHNIQUE: CT scan of the chest, abdomen, and pelvis was performed  following injection of IV contrast. Multiplanar reformats were  obtained. Dose reduction techniques were used.   CONTRAST: 100mL Isovue-370  COMPARISON: CT of the chest, abdomen, and pelvis performed 1/3/2023.    FINDINGS:   LUNGS AND PLEURA: No pleural effusions. A 0.2 cm left upper lobe  pulmonary nodule (series 8 image 60) is unchanged. No new or enlarging  pulmonary nodules are identified. Mild changes of centrilobular  emphysema in both upper lungs.    MEDIASTINUM/AXILLAE: No enlarged lymph nodes in the chest. No  pericardial effusion. Severe atherosclerotic calcification of the  thoracic aorta. Small hiatal hernia.    CORONARY ARTERY CALCIFICATION: Moderate.    HEPATOBILIARY: Unremarkable. No hepatic masses are seen.    PANCREAS: Normal.    SPLEEN: Normal.    ADRENAL GLANDS: A 1.1 cm right adrenal nodule is unchanged.    KIDNEYS/BLADDER: Unremarkable. No hydronephrosis.    BOWEL: No bowel obstruction. No convincing evidence for colitis or  diverticulitis. Unremarkable appendix.    PELVIC ORGANS: Prostatectomy. No pelvic masses. No free fluid in the  pelvis.    LYMPH NODES: No enlarged lymph nodes are identified in the abdomen or  pelvis.    VASCULATURE: Severe atherosclerotic  aortoiliac calcification. Mild  aneurysmal dilatation of the infrarenal abdominal aorta is unchanged,  measuring 3.2 cm AP.    ADDITIONAL FINDINGS: None.    MUSCULOSKELETAL: Bilateral L5 spondylolysis. Degenerative changes in  the thoracolumbar spine. Scattered bony sclerotic lesions suspicious  for metastases are not significantly changed, most prominently in the  lower sacrum (series 2 image 224) and in the T6 vertebral body (series  2 image 61).      Impression    IMPRESSION:   1.  Scattered bony sclerotic lesions suspicious for metastatic disease are not significantly changed.  2.  No findings suspicious for new areas of metastatic disease.  3.  Prostatectomy.    JESSICA RINCON MD         SYSTEM ID:  [id number]     Narrative & Impression   EXAM: NM BONE SCAN WHOLE BODY  LOCATION: [facility]  DATE/TIME: 3/1/2023 2:28 PM     INDICATION: Malignant neoplasm of prostate  COMPARISON: Bone scan dated 01/03/2023 and PSMA PET/CT dated 09/29/2022  TECHNIQUE: 25.0 mCi technetium-99m MDP, IV. Anterior and posterior delayed whole-body images at 3 hours with additional spot images of the skull.     FINDINGS: Redemonstrated presumed osseous metastasis involving the T6 vertebral body and lower portions of the sacrum/coccyx, similar to prior exam and were PSMA positive on recent PET/CT dated 09/29/2022.                                                                      IMPRESSION:     Stable osseous metastases involving the T6 vertebral body and lower portions of the sacrum/coccyx, similar to prior bone scan dated 01/03/2023          Lab Test 03/01/23  1039 01/03/23  0926 10/27/22  1046 09/22/22  1012 08/15/22  0810   PSA 0.39 0.46 10.30* 9.12* 7.81*   TESTOSTTOTAL 7* 7* <2* <2*  --            ASSESSMENT AND PLAN   1. 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 ECOG PS 0   3. HTN  4. ECOG PS1  5. No medical comorbidity    I had a lengthy discussion with  Steve who is alone at this visit. Clinical research nurse - Janel Gee did join us for the visit.     He is tolerating enzalutamide well. He denies any new side effects on this medication. He has not been missing doses. He has not started any new medications.     I have reviewed all of the labs done prior to this clinic visit.  Labs are all completely normal including electrolytes, renal function, hepatic panel, complete blood count and differential. His PSA had nicely responded to enzalutamide from 10.3 ng/ml at the start of therapy to 0.39 ng/ml at his last visit. This is indeed a good response.     I have reviewed actual images from his restaging scans. There is nothing to suggest disease progression. Both CT chest, abdomen and pelvis and bone scans reveal stable disease.      We will continue with androgen deprivation therapy with GnRH analog - he received his last dose on 1/27/23.    He again had questions about the trial which were reviewed with him.     He would have closer monitoring while on clinical trial. He would like to have follow up with me as much as possible. He was reminded that he could have a few visits with Rodolfo who is part of the same team. He has met her in the past. He will see her in 4 weeks and I will see him in 8 weeks with labs and restaging scans.     25 minutes spent on the date of the encounter doing chart review, history and exam, documentation and further activities as noted above      Gigi Ward    Hematologist and Medical Oncologist  [facility]

## 2023-03-03 NOTE — LETTER
3/3/2023         RE: Wallace Zelaya  [id number] [facility] [address]        Dear Colleague,    Thank you for referring your patient, Wallace Zelaya, to the [facility] CANCER [facility]. Please see a copy of my visit note below.    [facility] CANCER CLINIC  FOLLOW-UP VISIT NOTE    PATIENT NAME: Wallace Zelaya MRN # [id number]  DATE OF VISIT: Mar 3, 2023 YOB: 1945    REFERRING PROVIDER: No referring provider defined for this encounter.    CANCER TYPE: Prostate cancer; Biochemical recurrence; Castration resistant disease  STAGE: Stage III - pT3b at diagnosis; M0    HISTORY OF PRESENTING ILLNESS:  Wallace was noted to have rising screening PSA from 2 - 4. He was referred to Dr. Rodolfo Connor. He had radical prostatectomy on 11/2015. Pathology from this revealed [place] 4+4 disease with extraprostatic extension, seminal vesicle extension and he was staged at pT3b. All of the 12 lymph nodes resected were negative for disease. Post operatively his PSA did not drop to undetectable levels and remained elevated at 0.56. It vladimir to 0.9 in April 2016 and he was referred to Dr. Newton for adjuvant radiation therapy. He completed radiation therapy but did not have any PSA response to this treatment.     TREATMENT SUMMARY:  11/13/2015 Radical prostatectomy  April 2016  Radiation therapy  He was started on intermittent androgen deprivation therapy which had to be changed to continuous with rapid rise in his PSA.      April 2020 - he was started on bicalutamide for complete androgen blockade  He had rising PSA in May 2021. His bone scan done on 6/30/21 revealed metastatic lesions of T6 and the sacrum. He was switched to abiraterone with prednisone on 8/7/21.  He had relatively stable PSA on therapy without any significant PSA response.  He was eventually taken off therapy in September 2022 for progressive disease.    He enrolled in the Eclipse clinical trial on  10/27/2022 and was randomized to the standard of care enzalutamide arm.    CURRENT INTERVENTIONS:  Enzalutamide 160 mg oral once a day as a standard of care on ECLIPSE trial    DELANEY Gonsalez is being seen for his prostate cancer    Steve was followed in person at this visit. He is seen for scheduled follow up visit.  He has been randomized to enzalutamide and has been taking enzalutamide since since 10/27/2022. He  denies missing a dose. He has tolerated this without difficulty. He denies any new side effects. He was in positive mood at this visit.      He is specially excited about his PSA which has dramatically dropped at our last evaluation.  PSA drawn for this visit has been pending.    He has been active and has been playing basketball with his friends. He plays the game of HORSE (basketball) at [facility] which he explained for me. He got pictures of his group of friends playing basket ball. He would like to resume his golf at [employer] [employer].       PAST MEDICAL HISTORY   1. HTN  2. Dyslipidemia  3. Prostate cancer as detailed above      CURRENT OUTPATIENT MEDICATIONS     Current Outpatient Medications   Medication Sig     amLODIPine (NORVASC) 10 MG tablet Take 1 tablet (10 mg) by mouth daily     atorvastatin (LIPITOR) 20 MG tablet Take 1 tablet by mouth once daily     enzalutamide (XTANDI) 40 MG capsule Take 4 capsules (160 mg) by mouth daily     ibuprofen (ADVIL/MOTRIN) 800 MG tablet TAKE 1 TABLET BY MOUTH THREE TIMES DAILY WITH FOOD     tamsulosin (FLOMAX) 0.4 MG capsule Take 1 capsule (0.4 mg) by mouth daily     valsartan (DIOVAN) 160 MG tablet Take 1 tablet (160 mg) by mouth daily     ondansetron (ZOFRAN) 4 MG tablet Take 1 tablet (4 mg) by mouth every 8 hours as needed for nausea     No current facility-administered medications for this visit.        ALLERGIES     No Known Allergies     REVIEW OF SYSTEMS   As above in the HPI, o/w complete 12-point ROS was negative.     PHYSICAL EXAM   BP (!) 152/84 (Cuff Size:  "Adult Large)   Pulse 67   Temp 98  F (36.7  C) (Tympanic)   Resp 16   Ht 1.734 m (5' 8.25\")   Wt 100.2 kg (221 lb)   SpO2 95%   BMI 33.36 kg/m    SpO2 Readings from Last 4 Encounters:   09/21/18 96%   08/24/18 94%   06/22/18 95%   05/11/18 97%     Wt Readings from Last 3 Encounters:   03/03/23 100.2 kg (221 lb)   01/27/23 100.2 kg (221 lb)   01/06/23 100.2 kg (221 lb)     GENERAL/CONSTITUTIONAL: No acute distress.  EYES: Pupils are equal, round, and react to light and accommodation. Extraocular movements intact.  No scleral icterus.  ENT/MOUTH: Neck supple. Oropharynx clear, no mucositis.  LYMPH: No anterior cervical, posterior cervical, supraclavicular, axillary or inguinal adenopathy.   RESPIRATORY: Clear to auscultation bilaterally. No crackles or wheezing.   CARDIOVASCULAR: Regular rate and rhythm without murmurs, gallops, or rubs.  GASTROINTESTINAL: No hepatosplenomegaly, masses, or tenderness. The patient has normal bowel sounds. No guarding.  No distention.  : Deferred  MUSCULOSKELETAL: Warm and well-perfused, no cyanosis, clubbing, or edema.  NEUROLOGIC: Cranial nerves II-XII are intact. Alert, oriented, answers questions appropriately. No focal neurologic deficit  INTEGUMENTARY: No rashes or jaundice.  GAIT: Normal     LABORATORY AND IMAGING STUDIES     Recent Labs   Lab Test 03/01/23  1039 01/27/23  1312 01/03/23  0926 12/06/22  0810 11/25/22  1031    136 135* 139 140   POTASSIUM 4.4 4.6 4.2 4.3 4.3   CHLORIDE 101 99 100 104 102   CO2 28 28 26 27 29   ANIONGAP 10 9 9 8 9   BUN 11.3 12.3 12.3 11.9 12.1   CR 0.92 0.94 0.90 0.92 0.94   * 95 105* 106* 110*   TY 9.4 9.3 9.3 9.4 9.1     Recent Labs   Lab Test 03/01/23  1039 01/27/23  1312 12/06/22  0810 10/27/22  1046 09/22/22  1012   MAG 2.0 1.9 2.0 2.3 2.1     Recent Labs   Lab Test 03/01/23  1039 01/27/23  1312 01/03/23  0926 12/06/22  0810 11/25/22  1031   WBC 5.6 6.8 5.3 5.6 8.4   HGB 15.8 15.1 15.7 14.9 14.7    167 184 210 173 "   MCV 91 92 93 94 93   NEUTROPHIL 61 66 60 55 65     Recent Labs   Lab Test 03/01/23  1039 01/27/23  1312 01/03/23  0926 08/15/22  0810 06/27/22  0827 03/28/22  1050 02/23/22  1419   BILITOTAL 0.7 0.5 0.6   < > 0.9 0.7 0.8   ALKPHOS 87 87 99   < > 94 108 113   ALT 18 16 16   < > 49 80* 88*   AST 26 23 30   < > 4 43 46*   ALBUMIN 4.2 4.2 4.3   < > 3.7 3.6 3.4   LDH  --   --   --   --  246* 259* 246*    < > = values in this interval not displayed.     TSH   Date Value Ref Range Status   03/01/2023 1.14 0.30 - 4.20 uIU/mL Final   10/27/2022 0.65 0.40 - 4.00 mU/L Final   09/22/2022 0.86 0.30 - 4.20 uIU/mL Final     No results for input(s): CEA in the last 66315 hours.  Results for orders placed or performed during the hospital encounter of 03/01/23   CT Chest/Abdomen/Pelvis w Contrast    Narrative    CT CHEST/ABDOMEN/PELVIS WITH CONTRAST 3/1/2023 12:06 PM    CLINICAL HISTORY: Prostate cancer (H); Bone metastasis (H).  TECHNIQUE: CT scan of the chest, abdomen, and pelvis was performed  following injection of IV contrast. Multiplanar reformats were  obtained. Dose reduction techniques were used.   CONTRAST: 100mL Isovue-370  COMPARISON: CT of the chest, abdomen, and pelvis performed 1/3/2023.    FINDINGS:   LUNGS AND PLEURA: No pleural effusions. A 0.2 cm left upper lobe  pulmonary nodule (series 8 image 60) is unchanged. No new or enlarging  pulmonary nodules are identified. Mild changes of centrilobular  emphysema in both upper lungs.    MEDIASTINUM/AXILLAE: No enlarged lymph nodes in the chest. No  pericardial effusion. Severe atherosclerotic calcification of the  thoracic aorta. Small hiatal hernia.    CORONARY ARTERY CALCIFICATION: Moderate.    HEPATOBILIARY: Unremarkable. No hepatic masses are seen.    PANCREAS: Normal.    SPLEEN: Normal.    ADRENAL GLANDS: A 1.1 cm right adrenal nodule is unchanged.    KIDNEYS/BLADDER: Unremarkable. No hydronephrosis.    BOWEL: No bowel obstruction. No convincing evidence for  colitis or  diverticulitis. Unremarkable appendix.    PELVIC ORGANS: Prostatectomy. No pelvic masses. No free fluid in the  pelvis.    LYMPH NODES: No enlarged lymph nodes are identified in the abdomen or  pelvis.    VASCULATURE: Severe atherosclerotic aortoiliac calcification. Mild  aneurysmal dilatation of the infrarenal abdominal aorta is unchanged,  measuring 3.2 cm AP.    ADDITIONAL FINDINGS: None.    MUSCULOSKELETAL: Bilateral L5 spondylolysis. Degenerative changes in  the thoracolumbar spine. Scattered bony sclerotic lesions suspicious  for metastases are not significantly changed, most prominently in the  lower sacrum (series 2 image 224) and in the T6 vertebral body (series  2 image 61).      Impression    IMPRESSION:   1.  Scattered bony sclerotic lesions suspicious for metastatic disease are not significantly changed.  2.  No findings suspicious for new areas of metastatic disease.  3.  Prostatectomy.    JESSICA RINCON MD         SYSTEM ID:  [id number]     Narrative & Impression   EXAM: NM BONE SCAN WHOLE BODY  LOCATION: [facility]  DATE/TIME: 3/1/2023 2:28 PM     INDICATION: Malignant neoplasm of prostate  COMPARISON: Bone scan dated 01/03/2023 and PSMA PET/CT dated 09/29/2022  TECHNIQUE: 25.0 mCi technetium-99m MDP, IV. Anterior and posterior delayed whole-body images at 3 hours with additional spot images of the skull.     FINDINGS: Redemonstrated presumed osseous metastasis involving the T6 vertebral body and lower portions of the sacrum/coccyx, similar to prior exam and were PSMA positive on recent PET/CT dated 09/29/2022.                                                                      IMPRESSION:     Stable osseous metastases involving the T6 vertebral body and lower portions of the sacrum/coccyx, similar to prior bone scan dated 01/03/2023          Lab Test 03/01/23  1039 01/03/23  0926 10/27/22  1046 09/22/22  1012 08/15/22  0810   PSA 0.39 0.46 10.30* 9.12* 7.81*    TESTOSTTOTAL 7* 7* <2* <2*  --            ASSESSMENT AND PLAN   1. 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 ECOG PS 0   3. HTN  4. ECOG PS1  5. No medical comorbidity    I had a lengthy discussion with Steve who is alone at this visit. Clinical research nurse - Janelree Gee did join us for the visit.     He is tolerating enzalutamide well. He denies any new side effects on this medication. He has not been missing doses. He has not started any new medications.     I have reviewed all of the labs done prior to this clinic visit.  Labs are all completely normal including electrolytes, renal function, hepatic panel, complete blood count and differential. His PSA had nicely responded to enzalutamide from 10.3 ng/ml at the start of therapy to 0.39 ng/ml at his last visit. This is indeed a good response.     I have reviewed actual images from his restaging scans. There is nothing to suggest disease progression. Both CT chest, abdomen and pelvis and bone scans reveal stable disease.      We will continue with androgen deprivation therapy with GnRH analog - he received his last dose on 1/27/23.    He again had questions about the trial which were reviewed with him.     He would have closer monitoring while on clinical trial. He would like to have follow up with me as much as possible. He was reminded that he could have a few visits with Rodolfo who is part of the same team. He has met her in the past. He will see her in 4 weeks and I will see him in 8 weeks with labs and restaging scans.     25 minutes spent on the date of the encounter doing chart review, history and exam, documentation and further activities as noted above      Gigi Ward    Hematologist and Medical Oncologist  [facility]            Again, thank you for allowing me to participate in the care of your patient.        Sincerely,        Gigi Ward MD

## 2023-03-03 NOTE — NURSING NOTE
"Oncology Rooming Note    March 3, 2023 10:06 AM   Wallace Zelaya is a 77 year old male who presents for:    Chief Complaint   Patient presents with     Oncology Clinic Visit     Prostate cancer     Initial Vitals: BP (!) 154/86 (Cuff Size: Adult Large)   Pulse 67   Temp 98  F (36.7  C) (Tympanic)   Resp 16   Ht 1.734 m (5' 8.25\")   Wt 100.2 kg (221 lb)   SpO2 95%   BMI 33.36 kg/m   Estimated body mass index is 33.36 kg/m  as calculated from the following:    Height as of this encounter: 1.734 m (5' 8.25\").    Weight as of this encounter: 100.2 kg (221 lb). Body surface area is 2.2 meters squared.  No Pain (0) Comment: Data Unavailable   No LMP for male patient.  Allergies reviewed: Yes  Medications reviewed: Yes    Medications: Medication refills not needed today.  Pharmacy name entered into [employer]:    [facility] [place], [place] [facility] [address] [facility] - [place] [facility] [address] [address] [facility] [place], [place] - [address] [place], [place] - [address] [facility] [address]  [facility] - [place], [place] - [address] [facility] [id number]    Clinical concerns: f/u       Tess Horton, TATA              "

## 2023-03-07 DIAGNOSIS — I10 ESSENTIAL HYPERTENSION: ICD-10-CM

## 2023-03-07 RX ORDER — VALSARTAN 160 MG/1
160 TABLET ORAL DAILY
Qty: 90 TABLET | Refills: 1 | Status: SHIPPED | OUTPATIENT
Start: 2023-03-07 | End: 2023-07-06 | Stop reason: DRUGHIGH

## 2023-03-07 NOTE — LETTER
March 9, 2023      Wallace Zelaya  [id number] [facility] [address]        Wallace Zelaya      We recently received a refill request for your valsartan (DIOVAN) 160 MG tablet. We have sent in a refill for you to your pharmacy.    Our records show you are due for a follow up visit with your provider.     Please call the clinic at [phone] to schedule as the providers are booking out.      Sincerely,        Ailyn Marinelli/

## 2023-03-15 DIAGNOSIS — C61 PROSTATE CANCER (H): Primary | ICD-10-CM

## 2023-03-26 NOTE — PROGRESS NOTES
Oncology/Hematology Visit Note  Mar 27, 2023    Reason for Visit: Follow up of prostate cancer with biochemical recurrence      History of Present Illness:   Wallace was noted to have rising screening PSA from 2 - 4. He was referred to Dr. Rodolfo Connor. He had radical prostatectomy on 11/2015. Pathology from this revealed [place] 4+4 disease with extraprostatic extension, seminal vesicle extension and he was staged at pT3b. All of the 12 lymph nodes resected were negative for disease. Post operatively his PSA did not drop to undetectable levels and remained elevated at 0.56. It vladimir to 0.9 in April 2016 and he was referred to Dr. Newton for adjuvant radiation therapy. He completed radiation therapy but did not have any PSA response to this treatment.      TREATMENT SUMMARY:  11/13/2015 Radical prostatectomy  April 2016  Radiation therapy  He was started on intermittent androgen deprivation therapy which had to be changed to continuous with rapid rise in his PSA.       April 2020 - he was started on bicalutamide for complete androgen blockade  He had rising PSA in May 2021. His bone scan done on 6/30/21 revealed metastatic lesions of T6 and the sacrum. He was switched to abiraterone with prednisone on 8/7/21 October 2022 started Eclipse trial, was randomized to standard of care enzalutamide arm.     Interval History:   Steve is doing very well. Denies any side effects from Enzalutamide. Has very brief hot flashes occasionally. Has chronic slight swelling above sock line, not bothersome. Eyes water this spring. BP is normal at home. He is exercising weekly.     Review of Systems:  Patient denies fevers, chills, night sweats, unexplained weight changes, headaches, dizziness, vision or hearing changes, new lumps or bumps, chest pain, shortness of breath, cough, abdominal pain, nausea, vomiting, changes to bowel or bladder, swelling of extremities, bleeding issues, or rash.     Current Outpatient Medications    Medication Sig Dispense Refill     amLODIPine (NORVASC) 10 MG tablet Take 1 tablet (10 mg) by mouth daily 90 tablet 3     atorvastatin (LIPITOR) 20 MG tablet Take 1 tablet by mouth once daily 90 tablet 3     enzalutamide (XTANDI) 40 MG capsule Take 4 capsules (160 mg) by mouth daily 112 capsule 0     ibuprofen (ADVIL/MOTRIN) 800 MG tablet TAKE 1 TABLET BY MOUTH THREE TIMES DAILY WITH FOOD 90 tablet 0     ondansetron (ZOFRAN) 4 MG tablet Take 1 tablet (4 mg) by mouth every 8 hours as needed for nausea 30 tablet 1     tamsulosin (FLOMAX) 0.4 MG capsule Take 1 capsule (0.4 mg) by mouth daily 30 capsule 11     valsartan (DIOVAN) 160 MG tablet Take 1 tablet (160 mg) by mouth daily 90 tablet 1       Past Medical History  Past Medical History:   Diagnosis Date     Basal cell carcinoma      Essential hypertension 2006     Problem list name updated by automated process. Provider to review     Hyperlipidemia LDL goal <130 10/31/2010     Prostate cancer (HCC) 2015    Cayla 8 prostate cancer [id number], s/p robotic radical prostatectomy and adjuvant radiation      Past Surgical History:   Procedure Laterality Date     BIOPSY  2011    skin tagged left arm     COLONOSCOPY  2013    Procedure: COLONOSCOPY;  Colonoscopy ;  Surgeon: Emerson Martinez MD, MD;  Location:  GI     DAVINCI PROSTATECTOMY N/A 2015    Procedure: DAVINCI PROSTATECTOMY;  Surgeon: Rodolfo Connor MD;  Location: [facility] OR     ZZC NONSPECIFIC PROCEDURE      Lawnmower injury right foot-toe      ZZC NONSPECIFIC PROCEDURE      Pilonidal cyst     No Known Allergies  Social History   Social History     Tobacco Use     Smoking status: Former     Packs/day: 1.00     Years: 25.00     Pack years: 25.00     Types: Cigarettes     Quit date: 2009     Years since quittin.6     Smokeless tobacco: Never   Vaping Use     Vaping Use: Never used   Substance Use Topics     Alcohol use: Yes     Comment: socially,very little     Drug use:  "No      Past medical history and social history were reviewed.    Physical Examination:  BP (!) 165/89 (Cuff Size: Adult Large)   Pulse 66   Temp 97  F (36.1  C) (Tympanic)   Resp 14   Ht 1.734 m (5' 8.25\")   Wt 100.2 kg (221 lb)   SpO2 95%   BMI 33.36 kg/m    Wt Readings from Last 10 Encounters:   03/03/23 100.2 kg (221 lb)   01/27/23 100.2 kg (221 lb)   01/06/23 100.2 kg (221 lb)   11/25/22 100.3 kg (221 lb 1.6 oz)   10/27/22 99.8 kg (220 lb)   09/23/22 99.4 kg (219 lb 1.6 oz)   08/30/22 98.1 kg (216 lb 3.2 oz)   08/01/22 99.3 kg (219 lb)   07/01/22 99.7 kg (219 lb 14.4 oz)   04/01/22 99.8 kg (220 lb)     Constitutional: Well-appearing male in no acute distress.  Eyes: EOMI, PERRL. No scleral icterus.  ENT: Oral mucosa is moist without lesions or thrush.   Lymphatic: Neck is supple without cervical or supraclavicular lymphadenopathy.  Breast: No chest wall tenderness.    Cardiovascular: Regular rate and rhythm. No murmurs, gallops, or rubs. Trace peripheral edema above sock line.  Respiratory: Clear to auscultation bilaterally. No wheezes or crackles.  Gastrointestinal: Bowel sounds present. Abdomen soft, non-tender. No palpable hepatosplenomegaly or masses.   Neurologic: Cranial nerves II through XII are grossly intact.  Skin: No rashes, petechiae, or bruising noted on exposed skin.    ECOG 0  Laboratory Data:   Latest Reference Range & Units 03/27/23 10:24   Sodium 136 - 145 mmol/L 138   Potassium 3.4 - 5.3 mmol/L 4.6   Chloride 98 - 107 mmol/L 102   Carbon Dioxide (CO2) 22 - 29 mmol/L 29   Urea Nitrogen 8.0 - 23.0 mg/dL 9.3   Creatinine 0.67 - 1.17 mg/dL 0.92   GFR Estimate >60 mL/min/1.73m2 85   Calcium 8.8 - 10.2 mg/dL 9.5   Anion Gap 7 - 15 mmol/L 7   Magnesium 1.7 - 2.3 mg/dL 2.0   Albumin 3.5 - 5.2 g/dL 4.3   Protein Total 6.4 - 8.3 g/dL 7.3   Alkaline Phosphatase 40 - 129 U/L 95   ALT 10 - 50 U/L 16   AST 10 - 50 U/L 25   Bilirubin Total <=1.2 mg/dL 0.7   Glucose 70 - 99 mg/dL 110 (H)   WBC 4.0 - " 11.0 10e3/uL 5.6   Hemoglobin 13.3 - 17.7 g/dL 15.8   Hematocrit 40.0 - 53.0 % 46.3   Platelet Count 150 - 450 10e3/uL 173   RBC Count 4.40 - 5.90 10e6/uL 5.03   MCV 78 - 100 fL 92   MCH 26.5 - 33.0 pg 31.4   MCHC 31.5 - 36.5 g/dL 34.1   RDW 10.0 - 15.0 % 13.2   % Neutrophils % 60   % Lymphocytes % 27   % Monocytes % 8   % Eosinophils % 4   % Basophils % 1   Absolute Basophils 0.0 - 0.2 10e3/uL 0.1   Absolute Eosinophils 0.0 - 0.7 10e3/uL 0.2   Absolute Immature Granulocytes <=0.4 10e3/uL 0.0   Absolute Lymphocytes 0.8 - 5.3 10e3/uL 1.5   Absolute Monocytes 0.0 - 1.3 10e3/uL 0.4   % Immature Granulocytes % 0   Absolute Neutrophils 1.6 - 8.3 10e3/uL 3.4   Absolute NRBCs 10e3/uL 0.0   NRBCs per 100 WBC <1 /100 0   (H): Data is abnormally high      Assessment and Plan:  # Metastatic Castration Resistant Prostate Cancer  See history above, currently on Enzalutamide on ECLIPSE trial since October 2022 and doing very well. Has had decrease in PSA and no side effects. Labs with no concerns. Also on Lupron, next due July 2023.    Will continue with Enzalutamide and follow-up per trial. Has imaging scheduled in April.    # HTN  Slightly worse here, though he has been monitoring at home and <140/90. Continue amlodipine and valsartan. Has PCP visit scheduled in April.    Of note his chronic trace peripheral edema is likely 2/2 amlodipine, not bothering him so no medication adjustment needed.    25 minutes spent on the date of the encounter doing chart review, review of test results, interpretation of tests, patient visit and documentation     Rodolfo Logan PA-C  Department of Hematology and Oncology  [facility]

## 2023-03-27 ENCOUNTER — LAB (OUTPATIENT)
Dept: ONCOLOGY | Facility: CLINIC | Age: 78
End: 2023-03-27
Attending: PHYSICIAN ASSISTANT
Payer: COMMERCIAL

## 2023-03-27 ENCOUNTER — ALLIED HEALTH/NURSE VISIT (OUTPATIENT)
Dept: ONCOLOGY | Facility: CLINIC | Age: 78
End: 2023-03-27

## 2023-03-27 VITALS
BODY MASS INDEX: 33.49 KG/M2 | DIASTOLIC BLOOD PRESSURE: 89 MMHG | TEMPERATURE: 97 F | OXYGEN SATURATION: 95 % | RESPIRATION RATE: 14 BRPM | SYSTOLIC BLOOD PRESSURE: 165 MMHG | HEART RATE: 66 BPM | HEIGHT: 68 IN | WEIGHT: 221 LBS

## 2023-03-27 DIAGNOSIS — C79.51 BONE METASTASIS: ICD-10-CM

## 2023-03-27 DIAGNOSIS — C61 PROSTATE CANCER (H): Primary | ICD-10-CM

## 2023-03-27 DIAGNOSIS — C61 PROSTATE CANCER (H): ICD-10-CM

## 2023-03-27 LAB
ALBUMIN SERPL BCG-MCNC: 4.3 G/DL (ref 3.5–5.2)
ALP SERPL-CCNC: 95 U/L (ref 40–129)
ALT SERPL W P-5'-P-CCNC: 16 U/L (ref 10–50)
ANION GAP SERPL CALCULATED.3IONS-SCNC: 7 MMOL/L (ref 7–15)
AST SERPL W P-5'-P-CCNC: 25 U/L (ref 10–50)
BASOPHILS # BLD AUTO: 0.1 10E3/UL (ref 0–0.2)
BASOPHILS NFR BLD AUTO: 1 %
BILIRUB SERPL-MCNC: 0.7 MG/DL
BUN SERPL-MCNC: 9.3 MG/DL (ref 8–23)
CALCIUM SERPL-MCNC: 9.5 MG/DL (ref 8.8–10.2)
CHLORIDE SERPL-SCNC: 102 MMOL/L (ref 98–107)
CREAT SERPL-MCNC: 0.92 MG/DL (ref 0.67–1.17)
DEPRECATED HCO3 PLAS-SCNC: 29 MMOL/L (ref 22–29)
EOSINOPHIL # BLD AUTO: 0.2 10E3/UL (ref 0–0.7)
EOSINOPHIL NFR BLD AUTO: 4 %
ERYTHROCYTE [DISTWIDTH] IN BLOOD BY AUTOMATED COUNT: 13.2 % (ref 10–15)
GFR SERPL CREATININE-BSD FRML MDRD: 85 ML/MIN/1.73M2
GLUCOSE SERPL-MCNC: 110 MG/DL (ref 70–99)
HCT VFR BLD AUTO: 46.3 % (ref 40–53)
HGB BLD-MCNC: 15.8 G/DL (ref 13.3–17.7)
IMM GRANULOCYTES # BLD: 0 10E3/UL
IMM GRANULOCYTES NFR BLD: 0 %
LYMPHOCYTES # BLD AUTO: 1.5 10E3/UL (ref 0.8–5.3)
LYMPHOCYTES NFR BLD AUTO: 27 %
MAGNESIUM SERPL-MCNC: 2 MG/DL (ref 1.7–2.3)
MCH RBC QN AUTO: 31.4 PG (ref 26.5–33)
MCHC RBC AUTO-ENTMCNC: 34.1 G/DL (ref 31.5–36.5)
MCV RBC AUTO: 92 FL (ref 78–100)
MONOCYTES # BLD AUTO: 0.4 10E3/UL (ref 0–1.3)
MONOCYTES NFR BLD AUTO: 8 %
NEUTROPHILS # BLD AUTO: 3.4 10E3/UL (ref 1.6–8.3)
NEUTROPHILS NFR BLD AUTO: 60 %
NRBC # BLD AUTO: 0 10E3/UL
NRBC BLD AUTO-RTO: 0 /100
PLATELET # BLD AUTO: 173 10E3/UL (ref 150–450)
POTASSIUM SERPL-SCNC: 4.6 MMOL/L (ref 3.4–5.3)
PROT SERPL-MCNC: 7.3 G/DL (ref 6.4–8.3)
RBC # BLD AUTO: 5.03 10E6/UL (ref 4.4–5.9)
SODIUM SERPL-SCNC: 138 MMOL/L (ref 136–145)
WBC # BLD AUTO: 5.6 10E3/UL (ref 4–11)

## 2023-03-27 PROCEDURE — 99214 OFFICE O/P EST MOD 30 MIN: CPT | Performed by: PHYSICIAN ASSISTANT

## 2023-03-27 PROCEDURE — G0463 HOSPITAL OUTPT CLINIC VISIT: HCPCS | Performed by: PHYSICIAN ASSISTANT

## 2023-03-27 PROCEDURE — 80053 COMPREHEN METABOLIC PANEL: CPT | Performed by: INTERNAL MEDICINE

## 2023-03-27 PROCEDURE — 83735 ASSAY OF MAGNESIUM: CPT | Performed by: INTERNAL MEDICINE

## 2023-03-27 PROCEDURE — 85018 HEMOGLOBIN: CPT | Performed by: INTERNAL MEDICINE

## 2023-03-27 PROCEDURE — 36415 COLL VENOUS BLD VENIPUNCTURE: CPT | Performed by: INTERNAL MEDICINE

## 2023-03-27 ASSESSMENT — PAIN SCALES - GENERAL: PAINLEVEL: NO PAIN (0)

## 2023-03-27 NOTE — PROGRESS NOTES
Medical Assistant Note:  Wallace Zelaya presents today for blood draw.    Patient seen by provider today: Yes: Rodolfo MELTON   present during visit today: Not Applicable.    Concerns: No Concerns.    Procedure:  Lab draw site: rt antecub, Needle type: butterfly, Gauge: 23.    Post Assessment:  Labs drawn without difficulty: Yes.    Discharge Plan:  Departure Mode: Ambulatory.    Face to Face Time: 10.    Tess Horton CMA

## 2023-03-27 NOTE — NURSING NOTE
"Oncology Rooming Note    March 27, 2023 10:32 AM   Wallace Zelaya is a 77 year old male who presents for:    Chief Complaint   Patient presents with     Oncology Clinic Visit     Prostate cancer     Initial Vitals: BP (!) 165/89 (Cuff Size: Adult Large)   Pulse 66   Temp 97  F (36.1  C) (Tympanic)   Resp 14   Ht 1.734 m (5' 8.25\")   Wt 100.2 kg (221 lb)   SpO2 95%   BMI 33.36 kg/m   Estimated body mass index is 33.36 kg/m  as calculated from the following:    Height as of this encounter: 1.734 m (5' 8.25\").    Weight as of this encounter: 100.2 kg (221 lb). Body surface area is 2.2 meters squared.  No Pain (0) Comment: Data Unavailable   No LMP for male patient.  Allergies reviewed: Yes  Medications reviewed: Yes    Medications: Medication refills not needed today.  Pharmacy name entered into [employer]:    [facility] - [place], [place] - [address]  [facility] [address] [facility] [address] [address]      Clinical concerns: f/u       Tess Horton, TATA              "

## 2023-03-27 NOTE — NURSING NOTE
Steve was in clinic today for Week 20 in the ECLIPSE study.  He is on the SOC arm of the study and being treated with enzalutamide.  He continues to tolerate this medication very well.  Labs drawn today were all WNL.  QOLs were completed per protocol.  Steve reported no new adverse events and no changes in his concomitant medications.  He has missed no doses of enzalutamide since his last visit.  He is scheduled to see Dr. Ward for his EOT visit 4/14/23 and will have repeat scans in four weeks.

## 2023-03-27 NOTE — LETTER
3/27/2023         RE: Wallace Zelaya  [address] [facility] [address]        Dear Colleague,    Thank you for referring your patient, Wallace Zelaya, to the [facility]. Please see a copy of my visit note below.    Oncology/Hematology Visit Note  Mar 27, 2023    Reason for Visit: Follow up of prostate cancer with biochemical recurrence      History of Present Illness:   Wallace was noted to have rising screening PSA from 2 - 4. He was referred to Dr. Rodolfo Connor. He had radical prostatectomy on 11/2015. Pathology from this revealed Cayla 4+4 disease with extraprostatic extension, seminal vesicle extension and he was staged at pT3b. All of the 12 lymph nodes resected were negative for disease. Post operatively his PSA did not drop to undetectable levels and remained elevated at 0.56. It vladimir to 0.9 in April 2016 and he was referred to Dr. Newton for adjuvant radiation therapy. He completed radiation therapy but did not have any PSA response to this treatment.      TREATMENT SUMMARY:  11/13/2015 Radical prostatectomy  April 2016  Radiation therapy  He was started on intermittent androgen deprivation therapy which had to be changed to continuous with rapid rise in his PSA.       April 2020 - he was started on bicalutamide for complete androgen blockade  He had rising PSA in May 2021. His bone scan done on 6/30/21 revealed metastatic lesions of T6 and the sacrum. He was switched to abiraterone with prednisone on 8/7/21 October 2022 started Eclipse trial, was randomized to standard of care enzalutamide arm.     Interval History:   Steve is doing very well. Denies any side effects from Enzalutamide. Has very brief hot flashes occasionally. Has chronic slight swelling above sock line, not bothersome. Eyes water this spring. BP is normal at home. He is exercising weekly.     Review of Systems:  Patient denies fevers, chills, night sweats, unexplained weight changes,  headaches, dizziness, vision or hearing changes, new lumps or bumps, chest pain, shortness of breath, cough, abdominal pain, nausea, vomiting, changes to bowel or bladder, swelling of extremities, bleeding issues, or rash.     Current Outpatient Medications   Medication Sig Dispense Refill     amLODIPine (NORVASC) 10 MG tablet Take 1 tablet (10 mg) by mouth daily 90 tablet 3     atorvastatin (LIPITOR) 20 MG tablet Take 1 tablet by mouth once daily 90 tablet 3     enzalutamide (XTANDI) 40 MG capsule Take 4 capsules (160 mg) by mouth daily 112 capsule 0     ibuprofen (ADVIL/MOTRIN) 800 MG tablet TAKE 1 TABLET BY MOUTH THREE TIMES DAILY WITH FOOD 90 tablet 0     ondansetron (ZOFRAN) 4 MG tablet Take 1 tablet (4 mg) by mouth every 8 hours as needed for nausea 30 tablet 1     tamsulosin (FLOMAX) 0.4 MG capsule Take 1 capsule (0.4 mg) by mouth daily 30 capsule 11     valsartan (DIOVAN) 160 MG tablet Take 1 tablet (160 mg) by mouth daily 90 tablet 1       Past Medical History  Past Medical History:   Diagnosis Date     Basal cell carcinoma      Essential hypertension 08/24/2006     Problem list name updated by automated process. Provider to review     Hyperlipidemia LDL goal <130 10/31/2010     Prostate cancer (HCC) 11/13/2015    Cayla 8 prostate cancer [id number], s/p robotic radical prostatectomy and adjuvant radiation      Past Surgical History:   Procedure Laterality Date     BIOPSY  02/2011    skin tagged left arm     COLONOSCOPY  8/19/2013    Procedure: COLONOSCOPY;  Colonoscopy ;  Surgeon: Emerson Martinez MD, MD;  Location:  GI     DAVINCI PROSTATECTOMY N/A 11/13/2015    Procedure: DAVINCI PROSTATECTOMY;  Surgeon: Rodolfo Connor MD;  Location:  OR     [facility] NONSPECIFIC PROCEDURE      Lawnmower injury right foot-toe      ZZC NONSPECIFIC PROCEDURE      Pilonidal cyst     No Known Allergies  Social History   Social History     Tobacco Use     Smoking status: Former     Packs/day: 1.00     Years: 25.00      "Pack years: 25.00     Types: Cigarettes     Quit date: 2009     Years since quittin.6     Smokeless tobacco: Never   Vaping Use     Vaping Use: Never used   Substance Use Topics     Alcohol use: Yes     Comment: socially,very little     Drug use: No      Past medical history and social history were reviewed.    Physical Examination:  BP (!) 165/89 (Cuff Size: Adult Large)   Pulse 66   Temp 97  F (36.1  C) (Tympanic)   Resp 14   Ht 1.734 m (5' 8.25\")   Wt 100.2 kg (221 lb)   SpO2 95%   BMI 33.36 kg/m    Wt Readings from Last 10 Encounters:   23 100.2 kg (221 lb)   23 100.2 kg (221 lb)   23 100.2 kg (221 lb)   22 100.3 kg (221 lb 1.6 oz)   10/27/22 99.8 kg (220 lb)   22 99.4 kg (219 lb 1.6 oz)   22 98.1 kg (216 lb 3.2 oz)   22 99.3 kg (219 lb)   22 99.7 kg (219 lb 14.4 oz)   22 99.8 kg (220 lb)     Constitutional: Well-appearing male in no acute distress.  Eyes: EOMI, PERRL. No scleral icterus.  ENT: Oral mucosa is moist without lesions or thrush.   Lymphatic: Neck is supple without cervical or supraclavicular lymphadenopathy.  Breast: No chest wall tenderness.    Cardiovascular: Regular rate and rhythm. No murmurs, gallops, or rubs. Trace peripheral edema above sock line.  Respiratory: Clear to auscultation bilaterally. No wheezes or crackles.  Gastrointestinal: Bowel sounds present. Abdomen soft, non-tender. No palpable hepatosplenomegaly or masses.   Neurologic: Cranial nerves II through XII are grossly intact.  Skin: No rashes, petechiae, or bruising noted on exposed skin.    ECOG 0  Laboratory Data:   Latest Reference Range & Units 23 10:24   Sodium 136 - 145 mmol/L 138   Potassium 3.4 - 5.3 mmol/L 4.6   Chloride 98 - 107 mmol/L 102   Carbon Dioxide (CO2) 22 - 29 mmol/L 29   Urea Nitrogen 8.0 - 23.0 mg/dL 9.3   Creatinine 0.67 - 1.17 mg/dL 0.92   GFR Estimate >60 mL/min/1.73m2 85   Calcium 8.8 - 10.2 mg/dL 9.5   Anion Gap 7 - 15 mmol/L 7 "   Magnesium 1.7 - 2.3 mg/dL 2.0   Albumin 3.5 - 5.2 g/dL 4.3   Protein Total 6.4 - 8.3 g/dL 7.3   Alkaline Phosphatase 40 - 129 U/L 95   ALT 10 - 50 U/L 16   AST 10 - 50 U/L 25   Bilirubin Total <=1.2 mg/dL 0.7   Glucose 70 - 99 mg/dL 110 (H)   WBC 4.0 - 11.0 10e3/uL 5.6   Hemoglobin 13.3 - 17.7 g/dL 15.8   Hematocrit 40.0 - 53.0 % 46.3   Platelet Count 150 - 450 10e3/uL 173   RBC Count 4.40 - 5.90 10e6/uL 5.03   MCV 78 - 100 fL 92   MCH 26.5 - 33.0 pg 31.4   MCHC 31.5 - 36.5 g/dL 34.1   RDW 10.0 - 15.0 % 13.2   % Neutrophils % 60   % Lymphocytes % 27   % Monocytes % 8   % Eosinophils % 4   % Basophils % 1   Absolute Basophils 0.0 - 0.2 10e3/uL 0.1   Absolute Eosinophils 0.0 - 0.7 10e3/uL 0.2   Absolute Immature Granulocytes <=0.4 10e3/uL 0.0   Absolute Lymphocytes 0.8 - 5.3 10e3/uL 1.5   Absolute Monocytes 0.0 - 1.3 10e3/uL 0.4   % Immature Granulocytes % 0   Absolute Neutrophils 1.6 - 8.3 10e3/uL 3.4   Absolute NRBCs 10e3/uL 0.0   NRBCs per 100 WBC <1 /100 0   (H): Data is abnormally high      Assessment and Plan:  # Metastatic Castration Resistant Prostate Cancer  See history above, currently on Enzalutamide on ECLIPSE trial since October 2022 and doing very well. Has had decrease in PSA and no side effects. Labs with no concerns. Also on Lupron, next due July 2023.    Will continue with Enzalutamide and follow-up per trial. Has imaging scheduled in April.    # HTN  Slightly worse here, though he has been monitoring at home and <140/90. Continue amlodipine and valsartan. Has PCP visit scheduled in April.    Of note his chronic trace peripheral edema is likely 2/2 amlodipine, not bothering him so no medication adjustment needed.    25 minutes spent on the date of the encounter doing chart review, review of test results, interpretation of tests, patient visit and documentation     Rodolfo Logan PA-C  Department of Hematology and Oncology  [facility]         Again, thank you for allowing  me to participate in the care of your patient.        Sincerely,        OTIS Bah

## 2023-04-01 ENCOUNTER — HEALTH MAINTENANCE LETTER (OUTPATIENT)
Age: 78
End: 2023-04-01

## 2023-04-03 PROBLEM — C79.51 MALIGNANT NEOPLASM METASTATIC TO BONE (H): Status: ACTIVE | Noted: 2021-10-01

## 2023-04-13 DIAGNOSIS — E78.5 HYPERLIPIDEMIA LDL GOAL <130: ICD-10-CM

## 2023-04-13 RX ORDER — ATORVASTATIN CALCIUM 20 MG/1
TABLET, FILM COATED ORAL
Qty: 90 TABLET | Refills: 3 | Status: SHIPPED | OUTPATIENT
Start: 2023-04-13 | End: 2024-03-25

## 2023-04-13 NOTE — TELEPHONE ENCOUNTER
Routing to new PCP. Pt est care 8 months ago but didn't need refill at that time.  Please advise  Arturo PATEL RN, BSN

## 2023-04-14 ENCOUNTER — ALLIED HEALTH/NURSE VISIT (OUTPATIENT)
Dept: ONCOLOGY | Facility: CLINIC | Age: 78
End: 2023-04-14

## 2023-04-14 ENCOUNTER — ONCOLOGY VISIT (OUTPATIENT)
Dept: ONCOLOGY | Facility: CLINIC | Age: 78
End: 2023-04-14
Attending: INTERNAL MEDICINE
Payer: COMMERCIAL

## 2023-04-14 VITALS
TEMPERATURE: 98.1 F | BODY MASS INDEX: 33.34 KG/M2 | RESPIRATION RATE: 16 BRPM | HEIGHT: 68 IN | DIASTOLIC BLOOD PRESSURE: 87 MMHG | WEIGHT: 220 LBS | HEART RATE: 66 BPM | OXYGEN SATURATION: 97 % | SYSTOLIC BLOOD PRESSURE: 152 MMHG

## 2023-04-14 DIAGNOSIS — C61 PROSTATE CANCER (H): Primary | ICD-10-CM

## 2023-04-14 DIAGNOSIS — R53.83 OTHER FATIGUE: ICD-10-CM

## 2023-04-14 DIAGNOSIS — C61 PROSTATE CANCER (H): ICD-10-CM

## 2023-04-14 DIAGNOSIS — C79.51 MALIGNANT NEOPLASM METASTATIC TO BONE (H): Primary | ICD-10-CM

## 2023-04-14 LAB
ALBUMIN SERPL BCG-MCNC: 4.1 G/DL (ref 3.5–5.2)
ALP SERPL-CCNC: 91 U/L (ref 40–129)
ALT SERPL W P-5'-P-CCNC: 15 U/L (ref 10–50)
ANION GAP SERPL CALCULATED.3IONS-SCNC: 10 MMOL/L (ref 7–15)
AST SERPL W P-5'-P-CCNC: 23 U/L (ref 10–50)
BASOPHILS # BLD AUTO: 0.1 10E3/UL (ref 0–0.2)
BASOPHILS NFR BLD AUTO: 1 %
BILIRUB SERPL-MCNC: 0.7 MG/DL
BUN SERPL-MCNC: 13.7 MG/DL (ref 8–23)
CALCIUM SERPL-MCNC: 9.3 MG/DL (ref 8.8–10.2)
CHLORIDE SERPL-SCNC: 104 MMOL/L (ref 98–107)
CHOLEST SERPL-MCNC: 197 MG/DL
CREAT SERPL-MCNC: 0.91 MG/DL (ref 0.67–1.17)
DEPRECATED HCO3 PLAS-SCNC: 26 MMOL/L (ref 22–29)
EOSINOPHIL # BLD AUTO: 0.3 10E3/UL (ref 0–0.7)
EOSINOPHIL NFR BLD AUTO: 5 %
ERYTHROCYTE [DISTWIDTH] IN BLOOD BY AUTOMATED COUNT: 13.2 % (ref 10–15)
GFR SERPL CREATININE-BSD FRML MDRD: 86 ML/MIN/1.73M2
GLUCOSE SERPL-MCNC: 106 MG/DL (ref 70–99)
HCT VFR BLD AUTO: 42.7 % (ref 40–53)
HDLC SERPL-MCNC: 47 MG/DL
HGB BLD-MCNC: 14.7 G/DL (ref 13.3–17.7)
IMM GRANULOCYTES # BLD: 0 10E3/UL
IMM GRANULOCYTES NFR BLD: 0 %
LDLC SERPL CALC-MCNC: 127 MG/DL
LYMPHOCYTES # BLD AUTO: 1.6 10E3/UL (ref 0.8–5.3)
LYMPHOCYTES NFR BLD AUTO: 28 %
MAGNESIUM SERPL-MCNC: 1.8 MG/DL (ref 1.7–2.3)
MCH RBC QN AUTO: 31.4 PG (ref 26.5–33)
MCHC RBC AUTO-ENTMCNC: 34.4 G/DL (ref 31.5–36.5)
MCV RBC AUTO: 91 FL (ref 78–100)
MONOCYTES # BLD AUTO: 0.5 10E3/UL (ref 0–1.3)
MONOCYTES NFR BLD AUTO: 8 %
NEUTROPHILS # BLD AUTO: 3.2 10E3/UL (ref 1.6–8.3)
NEUTROPHILS NFR BLD AUTO: 58 %
NONHDLC SERPL-MCNC: 150 MG/DL
NRBC # BLD AUTO: 0 10E3/UL
NRBC BLD AUTO-RTO: 0 /100
PLATELET # BLD AUTO: 183 10E3/UL (ref 150–450)
POTASSIUM SERPL-SCNC: 4.1 MMOL/L (ref 3.4–5.3)
PROT SERPL-MCNC: 7 G/DL (ref 6.4–8.3)
PSA SERPL DL<=0.01 NG/ML-MCNC: 0.42 NG/ML (ref 0–6.5)
RBC # BLD AUTO: 4.68 10E6/UL (ref 4.4–5.9)
SODIUM SERPL-SCNC: 140 MMOL/L (ref 136–145)
TRIGL SERPL-MCNC: 117 MG/DL
TSH SERPL DL<=0.005 MIU/L-ACNC: 1.02 UIU/ML (ref 0.3–4.2)
WBC # BLD AUTO: 5.5 10E3/UL (ref 4–11)

## 2023-04-14 PROCEDURE — 83735 ASSAY OF MAGNESIUM: CPT | Performed by: INTERNAL MEDICINE

## 2023-04-14 PROCEDURE — 84153 ASSAY OF PSA TOTAL: CPT | Performed by: INTERNAL MEDICINE

## 2023-04-14 PROCEDURE — 36415 COLL VENOUS BLD VENIPUNCTURE: CPT | Performed by: INTERNAL MEDICINE

## 2023-04-14 PROCEDURE — 99215 OFFICE O/P EST HI 40 MIN: CPT | Performed by: INTERNAL MEDICINE

## 2023-04-14 PROCEDURE — 80053 COMPREHEN METABOLIC PANEL: CPT | Performed by: INTERNAL MEDICINE

## 2023-04-14 PROCEDURE — 84443 ASSAY THYROID STIM HORMONE: CPT | Performed by: INTERNAL MEDICINE

## 2023-04-14 PROCEDURE — 80061 LIPID PANEL: CPT | Performed by: INTERNAL MEDICINE

## 2023-04-14 PROCEDURE — 84403 ASSAY OF TOTAL TESTOSTERONE: CPT | Performed by: INTERNAL MEDICINE

## 2023-04-14 PROCEDURE — G0463 HOSPITAL OUTPT CLINIC VISIT: HCPCS | Performed by: INTERNAL MEDICINE

## 2023-04-14 PROCEDURE — 85025 COMPLETE CBC W/AUTO DIFF WBC: CPT | Performed by: INTERNAL MEDICINE

## 2023-04-14 ASSESSMENT — PAIN SCALES - GENERAL: PAINLEVEL: NO PAIN (0)

## 2023-04-14 NOTE — PROGRESS NOTES
"Oncology Rooming Note    April 14, 2023 11:15 AM   Wallace Zelaya is a 77 year old male who presents for:    Chief Complaint   Patient presents with     Oncology Clinic Visit     Prostate cancer (HCC)     Initial Vitals: BP (!) 152/87   Pulse 66   Temp 98.1  F (36.7  C) (Oral)   Resp 16   Ht 1.734 m (5' 8.25\")   Wt 99.8 kg (220 lb)   SpO2 97%   BMI 33.21 kg/m   Estimated body mass index is 33.21 kg/m  as calculated from the following:    Height as of this encounter: 1.734 m (5' 8.25\").    Weight as of this encounter: 99.8 kg (220 lb). Body surface area is 2.19 meters squared.  No Pain (0) Comment: Data Unavailable   No LMP for male patient.  Allergies reviewed: Yes  Medications reviewed: Yes    Medications: Medication refills not needed today.  Pharmacy name entered into Clickst:    [facility] [place], [place] - [address]  [facility] - [place] - [address]  [facility] - [place], [place] - [address]  [facility] - [place], [place] - [address] [facility] [address]  [facility] - [place], [place] - [address] [facility] [address]    Clinical concerns: None       Babita Quan MA              "

## 2023-04-14 NOTE — PROGRESS NOTES
Steve was in today for the Week 22 End Of Treatment visit in the ECLIPSE clinical trial.  He has been on the SOC arm of the study.  He continues to do well on enzalutamide and has reported no obvious side effects since starting the medication.  His labs were drawn today and PSA is still pending.  QOLs were completed per protocol.  He reports no changes to his medications.  He is scheduled for his Week 24 CT and bone scan on 4/28, after which the frequency of his scans will go to Q12 weeks.

## 2023-04-14 NOTE — LETTER
"    4/14/2023         RE: Wallace Zelaya  [address] [facility] [address]        Dear Colleague,    Thank you for referring your patient, Wallace Zelaay, to the [facility]. Please see a copy of my visit note below.    Oncology Rooming Note    April 14, 2023 11:15 AM   Wallace Zelaya is a 77 year old male who presents for:    Chief Complaint   Patient presents with     Oncology Clinic Visit     Prostate cancer (HCC)     Initial Vitals: BP (!) 152/87   Pulse 66   Temp 98.1  F (36.7  C) (Oral)   Resp 16   Ht 1.734 m (5' 8.25\")   Wt 99.8 kg (220 lb)   SpO2 97%   BMI 33.21 kg/m   Estimated body mass index is 33.21 kg/m  as calculated from the following:    Height as of this encounter: 1.734 m (5' 8.25\").    Weight as of this encounter: 99.8 kg (220 lb). Body surface area is 2.19 meters squared.  No Pain (0) Comment: Data Unavailable   No LMP for male patient.  Allergies reviewed: Yes  Medications reviewed: Yes    Medications: Medication refills not needed today.  Pharmacy name entered into AlegrÃ­a:    [facility] - [place], [place] - [address] [facility] - [place], [place] - [address] [facility]CIAL[facility] - [place], [place] - [address] [place], [place] - [address] [facility] [address]  [facility] - [place], [place] - [address] [facility] [address]    Clinical concerns: None       Babita Quan MA                [facility]  FOLLOW-UP VISIT NOTE    PATIENT NAME: Wallace Zelaya MRN # [id number]  DATE OF VISIT: Apr 14, 2023 YOB: 1945    REFERRING PROVIDER: No referring provider defined for this encounter.    CANCER TYPE: Prostate cancer; Biochemical recurrence; Castration resistant disease  STAGE: Stage III - pT3b at diagnosis; M0    HISTORY OF PRESENTING ILLNESS:  Wallace was noted to have rising screening PSA from " 2 - 4. He was referred to Dr. Rodolfo Connor. He had radical prostatectomy on 11/2015. Pathology from this revealed [place] 4+4 disease with extraprostatic extension, seminal vesicle extension and he was staged at pT3b. All of the 12 lymph nodes resected were negative for disease. Post operatively his PSA did not drop to undetectable levels and remained elevated at 0.56. It vladimir to 0.9 in April 2016 and he was referred to Dr. Newton for adjuvant radiation therapy. He completed radiation therapy but did not have any PSA response to this treatment.     TREATMENT SUMMARY:  11/13/2015 Radical prostatectomy  April 2016  Radiation therapy  He was started on intermittent androgen deprivation therapy which had to be changed to continuous with rapid rise in his PSA.      April 2020 - he was started on bicalutamide for complete androgen blockade  He had rising PSA in May 2021. His bone scan done on 6/30/21 revealed metastatic lesions of T6 and the sacrum. He was switched to abiraterone with prednisone on 8/7/21.  He had relatively stable PSA on therapy without any significant PSA response.  He was eventually taken off therapy in September 2022 for progressive disease.    He enrolled in the Eclipse clinical trial on 10/27/2022 and was randomized to the standard of care enzalutamide arm.    CURRENT INTERVENTIONS:  Enzalutamide 160 mg oral once a day as a standard of care on ECLIPSE trial    DELANEY Gonsalez is being seen for his prostate cancer    Steve was followed in person at this visit. He is seen for scheduled follow up visit.  He has been randomized to enzalutamide and has been taking enzalutamide since since 10/27/2022. He  denies missing a dose. He has tolerated this without difficulty. He denies any new side effects. He was in positive mood at this visit.      He is specially excited about his PSA which has dramatically dropped at our last evaluation.  PSA drawn for this visit has been pending.    He has been  "active and has been playing basketball with his friends. He plays the game of HORSE (basketball) at [facility] which he explained for me. He got pictures of his group of friends playing basket ball. He would like to resume his golf at [employer].       PAST MEDICAL HISTORY   1. HTN  2. Dyslipidemia  3. Prostate cancer as detailed above      CURRENT OUTPATIENT MEDICATIONS     Current Outpatient Medications   Medication Sig     amLODIPine (NORVASC) 10 MG tablet Take 1 tablet (10 mg) by mouth daily     atorvastatin (LIPITOR) 20 MG tablet Take 1 tablet by mouth once daily     enzalutamide (XTANDI) 40 MG capsule Take 4 capsules (160 mg) by mouth daily     ibuprofen (ADVIL/MOTRIN) 800 MG tablet TAKE 1 TABLET BY MOUTH THREE TIMES DAILY WITH FOOD     tamsulosin (FLOMAX) 0.4 MG capsule Take 1 capsule (0.4 mg) by mouth daily     valsartan (DIOVAN) 160 MG tablet Take 1 tablet (160 mg) by mouth daily     No current facility-administered medications for this visit.        ALLERGIES     No Known Allergies     REVIEW OF SYSTEMS   As above in the HPI, o/w complete 12-point ROS was negative.     PHYSICAL EXAM   BP (!) 152/87   Pulse 66   Temp 98.1  F (36.7  C) (Oral)   Resp 16   Ht 1.734 m (5' 8.25\")   Wt 99.8 kg (220 lb)   SpO2 97%   BMI 33.21 kg/m    SpO2 Readings from Last 4 Encounters:   09/21/18 96%   08/24/18 94%   06/22/18 95%   05/11/18 97%     Wt Readings from Last 3 Encounters:   04/14/23 99.8 kg (220 lb)   03/27/23 100.2 kg (221 lb)   03/03/23 100.2 kg (221 lb)     GENERAL/CONSTITUTIONAL: No acute distress.  EYES: Pupils are equal, round, and react to light and accommodation. Extraocular movements intact.  No scleral icterus.  ENT/MOUTH: Neck supple. Oropharynx clear, no mucositis.  LYMPH: No anterior cervical, posterior cervical, supraclavicular, axillary or inguinal adenopathy.   RESPIRATORY: Clear to auscultation bilaterally. No crackles or wheezing.   CARDIOVASCULAR: Regular rate and rhythm without murmurs, gallops, or " rubs.  GASTROINTESTINAL: No hepatosplenomegaly, masses, or tenderness. The patient has normal bowel sounds. No guarding.  No distention.  : Deferred  MUSCULOSKELETAL: Warm and well-perfused, no cyanosis, clubbing, or edema.  NEUROLOGIC: Cranial nerves II-XII are intact. Alert, oriented, answers questions appropriately. No focal neurologic deficit  INTEGUMENTARY: No rashes or jaundice.  GAIT: Normal     LABORATORY AND IMAGING STUDIES     Recent Labs   Lab Test 04/14/23  1021 03/27/23  1024 03/01/23  1039 01/27/23  1312 01/03/23  0926    138 139 136 135*   POTASSIUM 4.1 4.6 4.4 4.6 4.2   CHLORIDE 104 102 101 99 100   CO2 26 29 28 28 26   ANIONGAP 10 7 10 9 9   BUN 13.7 9.3 11.3 12.3 12.3   CR 0.91 0.92 0.92 0.94 0.90   * 110* 109* 95 105*   TY 9.3 9.5 9.4 9.3 9.3     Recent Labs   Lab Test 04/14/23  1021 03/27/23  1024 03/01/23  1039 01/27/23  1312 12/06/22  0810   MAG 1.8 2.0 2.0 1.9 2.0     Recent Labs   Lab Test 04/14/23  1021 03/27/23  1024 03/01/23  1039 01/27/23  1312 01/03/23  0926   WBC 5.5 5.6 5.6 6.8 5.3   HGB 14.7 15.8 15.8 15.1 15.7    173 193 167 184   MCV 91 92 91 92 93   NEUTROPHIL 58 60 61 66 60     Recent Labs   Lab Test 04/14/23  1021 03/27/23  1024 03/01/23  1039 08/15/22  0810 06/27/22  0827 03/28/22  1050 02/23/22  1419   BILITOTAL 0.7 0.7 0.7   < > 0.9 0.7 0.8   ALKPHOS 91 95 87   < > 94 108 113   ALT 15 16 18   < > 49 80* 88*   AST 23 25 26   < > 4 43 46*   ALBUMIN 4.1 4.3 4.2   < > 3.7 3.6 3.4   LDH  --   --   --   --  246* 259* 246*    < > = values in this interval not displayed.     TSH   Date Value Ref Range Status   04/14/2023 1.02 0.30 - 4.20 uIU/mL Final   03/01/2023 1.14 0.30 - 4.20 uIU/mL Final   10/27/2022 0.65 0.40 - 4.00 mU/L Final   09/22/2022 0.86 0.30 - 4.20 uIU/mL Final     No results for input(s): CEA in the last 72323 hours.  Results for orders placed or performed during the hospital encounter of 03/01/23   CT Chest/Abdomen/Pelvis w Contrast    Narrative     CT CHEST/ABDOMEN/PELVIS WITH CONTRAST 3/1/2023 12:06 PM    CLINICAL HISTORY: Prostate cancer (H); Bone metastasis (H).  TECHNIQUE: CT scan of the chest, abdomen, and pelvis was performed  following injection of IV contrast. Multiplanar reformats were  obtained. Dose reduction techniques were used.   CONTRAST: 100mL Isovue-370  COMPARISON: CT of the chest, abdomen, and pelvis performed 1/3/2023.    FINDINGS:   LUNGS AND PLEURA: No pleural effusions. A 0.2 cm left upper lobe  pulmonary nodule (series 8 image 60) is unchanged. No new or enlarging  pulmonary nodules are identified. Mild changes of centrilobular  emphysema in both upper lungs.    MEDIASTINUM/AXILLAE: No enlarged lymph nodes in the chest. No  pericardial effusion. Severe atherosclerotic calcification of the  thoracic aorta. Small hiatal hernia.    CORONARY ARTERY CALCIFICATION: Moderate.    HEPATOBILIARY: Unremarkable. No hepatic masses are seen.    PANCREAS: Normal.    SPLEEN: Normal.    ADRENAL GLANDS: A 1.1 cm right adrenal nodule is unchanged.    KIDNEYS/BLADDER: Unremarkable. No hydronephrosis.    BOWEL: No bowel obstruction. No convincing evidence for colitis or  diverticulitis. Unremarkable appendix.    PELVIC ORGANS: Prostatectomy. No pelvic masses. No free fluid in the  pelvis.    LYMPH NODES: No enlarged lymph nodes are identified in the abdomen or  pelvis.    VASCULATURE: Severe atherosclerotic aortoiliac calcification. Mild  aneurysmal dilatation of the infrarenal abdominal aorta is unchanged,  measuring 3.2 cm AP.    ADDITIONAL FINDINGS: None.    MUSCULOSKELETAL: Bilateral L5 spondylolysis. Degenerative changes in  the thoracolumbar spine. Scattered bony sclerotic lesions suspicious  for metastases are not significantly changed, most prominently in the  lower sacrum (series 2 image 224) and in the T6 vertebral body (series  2 image 61).      Impression    IMPRESSION:   1.  Scattered bony sclerotic lesions suspicious for metastatic  disease  are not significantly changed.  2.  No findings suspicious for new areas of metastatic disease.  3.  Prostatectomy.    JESSICA RINCON MD         SYSTEM ID:  [id number]     Recent Labs   Lab Test 04/14/23  1021 03/01/23  1039 01/03/23  0926 10/27/22  1046 09/22/22  1012 08/15/22  0810 06/27/22  0827   PSA 0.42 0.39 0.46 10.30* 9.12*   < > 5.17   TESTOSTTOTAL  --  7* 7* <2* <2*  --  <2*    < > = values in this interval not displayed.             ASSESSMENT AND PLAN   1. 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 ECOG PS 0   3. HTN  4. ECOG PS1  5. No medical comorbidity    I had a lengthy discussion with Steve who is alone at this visit. Clinical research nurse - Janel Gee did join us for the visit.     He is tolerating enzalutamide well. He denies any new side effects on this medication. He has not been missing doses. He has not started any new medications.     I have reviewed all of the labs done prior to this clinic visit.  Labs are all completely normal including electrolytes, renal function, hepatic panel, complete blood count and differential. His PSA had nicely responded to enzalutamide from 10.3 ng/ml at the start of therapy to 0.39 ng/ml at his last visit. This is indeed a good response.     His last restaging scans -  CT chest, abdomen and pelvis and bone scans revealed stable disease. He has his next scan scheduled in 2 weeks or so. I really doubt that we would have disease progression at that visit. We will continue to monitor as per clinical trial protocol. I will see him in 3 months with labs and restaging scans.       We will continue with androgen deprivation therapy with GnRH analog - he received his last dose on 1/27/23.    He again had questions about the trial which were reviewed with him.      I have reviewed his charts and his next option would be chemotherapy with docetaxel. He is not excited about this. I did review the option of  phase I/II CYPIDES trial -  safety and pharmacokinetics of ODM-208 in patients with metastatic castration-resistant prostate cancer. ODM-208 IS is an oral CYP11 inhibitor with causes adrenal ablation and reduces the number of promiscuous ligands (androgens, progestins, glucocorticoids, mineralocorticoids) that may bind to the devious androgen receptor.  It requires co-administration with fludrocortisone and dexamethasone.     He would be eligible for this trial. I do not see any significant downside to participation in this trial. He had a good treatment response to enzalutamide. He does not have any critical disease. He is not excited about chemotherapy. A newer anti-androgen is a reasonable choice in that scenario.     25 minutes spent on the date of the encounter doing chart review, history and exam, documentation and further activities as noted above      Gigi Ward    Hematologist and Medical Oncologist  [facility]            Again, thank you for allowing me to participate in the care of your patient.        Sincerely,        Gigi Ward MD

## 2023-04-14 NOTE — PROGRESS NOTES
Medical Assistant Note:  Wallace Zelaya presents today for labs.    Patient seen by provider today: Yes: Dr. Ward.   present during visit today: Not Applicable.    Concerns: No Concerns.    Procedure:  Lab draw site: left hand , Needle type: butterfly, Gauge: 21.    Post Assessment:  Labs drawn without difficulty: Yes.    Discharge Plan:  Departure Mode: Ambulatory.    Face to Face Time: 10 minutes.    Tammy Mortensen, CMA

## 2023-04-16 NOTE — PROGRESS NOTES
[facility] CANCER CLINIC  FOLLOW-UP VISIT NOTE    PATIENT NAME: Wallace Zelaya MRN # [id number]  DATE OF VISIT: Apr 14, 2023 YOB: 1945    REFERRING PROVIDER: No referring provider defined for this encounter.    CANCER TYPE: Prostate cancer; Biochemical recurrence; Castration resistant disease  STAGE: Stage III - pT3b at diagnosis; M0    HISTORY OF PRESENTING ILLNESS:  Wallace was noted to have rising screening PSA from 2 - 4. He was referred to Dr. Rodolfo Connor. He had radical prostatectomy on 11/2015. Pathology from this revealed Cayla 4+4 disease with extraprostatic extension, seminal vesicle extension and he was staged at pT3b. All of the 12 lymph nodes resected were negative for disease. Post operatively his PSA did not drop to undetectable levels and remained elevated at 0.56. It vladimir to 0.9 in April 2016 and he was referred to Dr. Newton for adjuvant radiation therapy. He completed radiation therapy but did not have any PSA response to this treatment.     TREATMENT SUMMARY:  11/13/2015 Radical prostatectomy  April 2016  Radiation therapy  He was started on intermittent androgen deprivation therapy which had to be changed to continuous with rapid rise in his PSA.      April 2020 - he was started on bicalutamide for complete androgen blockade  He had rising PSA in May 2021. His bone scan done on 6/30/21 revealed metastatic lesions of T6 and the sacrum. He was switched to abiraterone with prednisone on 8/7/21.  He had relatively stable PSA on therapy without any significant PSA response.  He was eventually taken off therapy in September 2022 for progressive disease.    He enrolled in the Eclipse clinical trial on 10/27/2022 and was randomized to the standard of care enzalutamide arm.    CURRENT INTERVENTIONS:  Enzalutamide 160 mg oral once a day as a standard of care on ECLIPSE trial    SUBJECTIVE   Wallace is being seen for his prostate cancer    Steve was followed in  "person at this visit. He is seen for scheduled follow up visit.  He has been randomized to enzalutamide and has been taking enzalutamide since since 10/27/2022. He  denies missing a dose. He has tolerated this without difficulty. He denies any new side effects. He was in positive mood at this visit.      He is specially excited about his PSA which has dramatically dropped at our last evaluation.  PSA drawn for this visit has been pending.    He has been active and has been playing basketball with his friends. He plays the game of HORSE (basketball) at [facility] which he explained for me. He got pictures of his group of friends playing basket ball. He would like to resume his golf at [employer].       PAST MEDICAL HISTORY   1. HTN  2. Dyslipidemia  3. Prostate cancer as detailed above      CURRENT OUTPATIENT MEDICATIONS     Current Outpatient Medications   Medication Sig     amLODIPine (NORVASC) 10 MG tablet Take 1 tablet (10 mg) by mouth daily     atorvastatin (LIPITOR) 20 MG tablet Take 1 tablet by mouth once daily     enzalutamide (XTANDI) 40 MG capsule Take 4 capsules (160 mg) by mouth daily     ibuprofen (ADVIL/MOTRIN) 800 MG tablet TAKE 1 TABLET BY MOUTH THREE TIMES DAILY WITH FOOD     tamsulosin (FLOMAX) 0.4 MG capsule Take 1 capsule (0.4 mg) by mouth daily     valsartan (DIOVAN) 160 MG tablet Take 1 tablet (160 mg) by mouth daily     No current facility-administered medications for this visit.        ALLERGIES     No Known Allergies     REVIEW OF SYSTEMS   As above in the HPI, o/w complete 12-point ROS was negative.     PHYSICAL EXAM   BP (!) 152/87   Pulse 66   Temp 98.1  F (36.7  C) (Oral)   Resp 16   Ht 1.734 m (5' 8.25\")   Wt 99.8 kg (220 lb)   SpO2 97%   BMI 33.21 kg/m    SpO2 Readings from Last 4 Encounters:   09/21/18 96%   08/24/18 94%   06/22/18 95%   05/11/18 97%     Wt Readings from Last 3 Encounters:   04/14/23 99.8 kg (220 lb)   03/27/23 100.2 kg (221 lb)   03/03/23 100.2 kg (221 lb) "     GENERAL/CONSTITUTIONAL: No acute distress.  EYES: Pupils are equal, round, and react to light and accommodation. Extraocular movements intact.  No scleral icterus.  ENT/MOUTH: Neck supple. Oropharynx clear, no mucositis.  LYMPH: No anterior cervical, posterior cervical, supraclavicular, axillary or inguinal adenopathy.   RESPIRATORY: Clear to auscultation bilaterally. No crackles or wheezing.   CARDIOVASCULAR: Regular rate and rhythm without murmurs, gallops, or rubs.  GASTROINTESTINAL: No hepatosplenomegaly, masses, or tenderness. The patient has normal bowel sounds. No guarding.  No distention.  : Deferred  MUSCULOSKELETAL: Warm and well-perfused, no cyanosis, clubbing, or edema.  NEUROLOGIC: Cranial nerves II-XII are intact. Alert, oriented, answers questions appropriately. No focal neurologic deficit  INTEGUMENTARY: No rashes or jaundice.  GAIT: Normal     LABORATORY AND IMAGING STUDIES     Recent Labs   Lab Test 04/14/23  1021 03/27/23  1024 03/01/23  1039 01/27/23  1312 01/03/23  0926    138 139 136 135*   POTASSIUM 4.1 4.6 4.4 4.6 4.2   CHLORIDE 104 102 101 99 100   CO2 26 29 28 28 26   ANIONGAP 10 7 10 9 9   BUN 13.7 9.3 11.3 12.3 12.3   CR 0.91 0.92 0.92 0.94 0.90   * 110* 109* 95 105*   TY 9.3 9.5 9.4 9.3 9.3     Recent Labs   Lab Test 04/14/23  1021 03/27/23  1024 03/01/23  1039 01/27/23  1312 12/06/22  0810   MAG 1.8 2.0 2.0 1.9 2.0     Recent Labs   Lab Test 04/14/23  1021 03/27/23  1024 03/01/23  1039 01/27/23  1312 01/03/23  0926   WBC 5.5 5.6 5.6 6.8 5.3   HGB 14.7 15.8 15.8 15.1 15.7    173 193 167 184   MCV 91 92 91 92 93   NEUTROPHIL 58 60 61 66 60     Recent Labs   Lab Test 04/14/23  1021 03/27/23  1024 03/01/23  1039 08/15/22  0810 06/27/22  0827 03/28/22  1050 02/23/22  1419   BILITOTAL 0.7 0.7 0.7   < > 0.9 0.7 0.8   ALKPHOS 91 95 87   < > 94 108 113   ALT 15 16 18   < > 49 80* 88*   AST 23 25 26   < > 4 43 46*   ALBUMIN 4.1 4.3 4.2   < > 3.7 3.6 3.4   LDH  --   --    --   --  246* 259* 246*    < > = values in this interval not displayed.     TSH   Date Value Ref Range Status   04/14/2023 1.02 0.30 - 4.20 uIU/mL Final   03/01/2023 1.14 0.30 - 4.20 uIU/mL Final   10/27/2022 0.65 0.40 - 4.00 mU/L Final   09/22/2022 0.86 0.30 - 4.20 uIU/mL Final     No results for input(s): CEA in the last 22911 hours.  Results for orders placed or performed during the hospital encounter of 03/01/23   CT Chest/Abdomen/Pelvis w Contrast    Narrative    CT CHEST/ABDOMEN/PELVIS WITH CONTRAST 3/1/2023 12:06 PM    CLINICAL HISTORY: Prostate cancer (H); Bone metastasis (H).  TECHNIQUE: CT scan of the chest, abdomen, and pelvis was performed  following injection of IV contrast. Multiplanar reformats were  obtained. Dose reduction techniques were used.   CONTRAST: 100mL Isovue-370  COMPARISON: CT of the chest, abdomen, and pelvis performed 1/3/2023.    FINDINGS:   LUNGS AND PLEURA: No pleural effusions. A 0.2 cm left upper lobe  pulmonary nodule (series 8 image 60) is unchanged. No new or enlarging  pulmonary nodules are identified. Mild changes of centrilobular  emphysema in both upper lungs.    MEDIASTINUM/AXILLAE: No enlarged lymph nodes in the chest. No  pericardial effusion. Severe atherosclerotic calcification of the  thoracic aorta. Small hiatal hernia.    CORONARY ARTERY CALCIFICATION: Moderate.    HEPATOBILIARY: Unremarkable. No hepatic masses are seen.    PANCREAS: Normal.    SPLEEN: Normal.    ADRENAL GLANDS: A 1.1 cm right adrenal nodule is unchanged.    KIDNEYS/BLADDER: Unremarkable. No hydronephrosis.    BOWEL: No bowel obstruction. No convincing evidence for colitis or  diverticulitis. Unremarkable appendix.    PELVIC ORGANS: Prostatectomy. No pelvic masses. No free fluid in the  pelvis.    LYMPH NODES: No enlarged lymph nodes are identified in the abdomen or  pelvis.    VASCULATURE: Severe atherosclerotic aortoiliac calcification. Mild  aneurysmal dilatation of the infrarenal abdominal aorta  is unchanged,  measuring 3.2 cm AP.    ADDITIONAL FINDINGS: None.    MUSCULOSKELETAL: Bilateral L5 spondylolysis. Degenerative changes in  the thoracolumbar spine. Scattered bony sclerotic lesions suspicious  for metastases are not significantly changed, most prominently in the  lower sacrum (series 2 image 224) and in the T6 vertebral body (series  2 image 61).      Impression    IMPRESSION:   1.  Scattered bony sclerotic lesions suspicious for metastatic disease  are not significantly changed.  2.  No findings suspicious for new areas of metastatic disease.  3.  Prostatectomy.    JESSICA RINCON MD         SYSTEM ID:  [id number]     Recent Labs   Lab Test 04/14/23  1021 03/01/23  1039 01/03/23  0926 10/27/22  1046 09/22/22  1012 08/15/22  0810 06/27/22  0827   PSA 0.42 0.39 0.46 10.30* 9.12*   < > 5.17   TESTOSTTOTAL  --  7* 7* <2* <2*  --  <2*    < > = values in this interval not displayed.             ASSESSMENT AND PLAN   1. 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 ECOG PS 0   3. HTN  4. ECOG PS1  5. No medical comorbidity    I had a lengthy discussion with Steve who is alone at this visit. Clinical research nurse - Janel Gee did join us for the visit.     He is tolerating enzalutamide well. He denies any new side effects on this medication. He has not been missing doses. He has not started any new medications.     I have reviewed all of the labs done prior to this clinic visit.  Labs are all completely normal including electrolytes, renal function, hepatic panel, complete blood count and differential. His PSA had nicely responded to enzalutamide from 10.3 ng/ml at the start of therapy to 0.39 ng/ml at his last visit. This is indeed a good response.     His last restaging scans -  CT chest, abdomen and pelvis and bone scans revealed stable disease. He has his next scan scheduled in 2 weeks or so. I really doubt that we would have disease progression at that  visit. We will continue to monitor as per clinical trial protocol. I will see him in 3 months with labs and restaging scans.       We will continue with androgen deprivation therapy with GnRH analog - he received his last dose on 1/27/23.    He again had questions about the trial which were reviewed with him.      I have reviewed his charts and his next option would be chemotherapy with docetaxel. He is not excited about this. I did review the option of phase I/II CYPIDES trial -  safety and pharmacokinetics of ODM-208 in patients with metastatic castration-resistant prostate cancer. ODM-208 IS is an oral CYP11 inhibitor with causes adrenal ablation and reduces the number of promiscuous ligands (androgens, progestins, glucocorticoids, mineralocorticoids) that may bind to the devious androgen receptor.  It requires co-administration with fludrocortisone and dexamethasone.     He would be eligible for this trial. I do not see any significant downside to participation in this trial. He had a good treatment response to enzalutamide. He does not have any critical disease. He is not excited about chemotherapy. A newer anti-androgen is a reasonable choice in that scenario.     25 minutes spent on the date of the encounter doing chart review, history and exam, documentation and further activities as noted above      Gigi Wadr    Hematologist and Medical Oncologist  [facility]

## 2023-04-18 DIAGNOSIS — I10 ESSENTIAL HYPERTENSION: ICD-10-CM

## 2023-04-18 RX ORDER — AMLODIPINE BESYLATE 10 MG/1
10 TABLET ORAL DAILY
Qty: 30 TABLET | Refills: 0 | Status: SHIPPED | OUTPATIENT
Start: 2023-04-18 | End: 2023-05-09

## 2023-04-18 NOTE — TELEPHONE ENCOUNTER
Routing refill request to provider for review/approval because:  Labs out of range:  Blood pressure under 140/90 in past 12 months        BP Readings from Last 3 Encounters:   04/14/23 (!) 152/87   03/27/23 (!) 165/89   03/03/23 (!) 152/84      Patient needs to be seen because it has been more than 1 year since last office visit.  Has establish care future appointment

## 2023-04-20 LAB — TESTOST SERPL-MCNC: 6 NG/DL (ref 240–950)

## 2023-04-28 ENCOUNTER — HOSPITAL ENCOUNTER (OUTPATIENT)
Dept: CT IMAGING | Facility: CLINIC | Age: 78
Discharge: HOME | End: 2023-04-28
Attending: INTERNAL MEDICINE
Payer: COMMERCIAL

## 2023-04-28 ENCOUNTER — HOSPITAL ENCOUNTER (OUTPATIENT)
Dept: NUCLEAR MEDICINE | Facility: CLINIC | Age: 78
Setting detail: NUCLEAR MEDICINE
Discharge: HOME | End: 2023-04-28
Attending: INTERNAL MEDICINE
Payer: COMMERCIAL

## 2023-04-28 ENCOUNTER — ANCILLARY PROCEDURE (OUTPATIENT)
Dept: RADIOLOGY | Facility: CLINIC | Age: 78
End: 2023-04-28
Attending: INTERNAL MEDICINE
Payer: COMMERCIAL

## 2023-04-28 DIAGNOSIS — C61 PROSTATE CANCER (H): ICD-10-CM

## 2023-04-28 DIAGNOSIS — C79.51 MALIGNANT NEOPLASM METASTATIC TO BONE (H): ICD-10-CM

## 2023-04-28 PROCEDURE — 74177 CT ABD & PELVIS W/CONTRAST: CPT

## 2023-04-28 PROCEDURE — 78306 BONE IMAGING WHOLE BODY: CPT

## 2023-04-28 PROCEDURE — 343N000001 HC RX 343: Performed by: INTERNAL MEDICINE

## 2023-04-28 PROCEDURE — 250N000009 HC RX 250: Performed by: INTERNAL MEDICINE

## 2023-04-28 PROCEDURE — 250N000011 HC RX IP 250 OP 636: Performed by: INTERNAL MEDICINE

## 2023-04-28 PROCEDURE — A9561 TC99M OXIDRONATE: HCPCS | Performed by: INTERNAL MEDICINE

## 2023-04-28 RX ORDER — IOPAMIDOL 755 MG/ML
500 INJECTION, SOLUTION INTRAVASCULAR ONCE
Status: COMPLETED | OUTPATIENT
Start: 2023-04-28 | End: 2023-04-28

## 2023-04-28 RX ADMIN — IOPAMIDOL 100 ML: 755 INJECTION, SOLUTION INTRAVENOUS at 10:11

## 2023-04-28 RX ADMIN — Medication 26 MILLICURIE: at 10:05

## 2023-04-28 RX ADMIN — SODIUM CHLORIDE 65 ML: 9 INJECTION, SOLUTION INTRAVENOUS at 10:11

## 2023-05-02 NOTE — PROGRESS NOTES
Oncology/Hematology Visit Note  May 3, 2023    Reason for Visit: Follow up of prostate cancer with biochemical recurrence      History of Present Illness:   Wallace was noted to have rising screening PSA from 2 - 4. He was referred to Dr. Rodolfo Connor. He had radical prostatectomy on 11/2015. Pathology from this revealed Cayla 4+4 disease with extraprostatic extension, seminal vesicle extension and he was staged at pT3b. All of the 12 lymph nodes resected were negative for disease. Post operatively his PSA did not drop to undetectable levels and remained elevated at 0.56. It vladimir to 0.9 in April 2016 and he was referred to Dr. Newton for adjuvant radiation therapy. He completed radiation therapy but did not have any PSA response to this treatment.      TREATMENT SUMMARY:  11/13/2015 Radical prostatectomy  April 2016  Radiation therapy  He was started on intermittent androgen deprivation therapy which had to be changed to continuous with rapid rise in his PSA.       April 2020 - he was started on bicalutamide for complete androgen blockade  He had rising PSA in May 2021. His bone scan done on 6/30/21 revealed metastatic lesions of T6 and the sacrum. He was switched to abiraterone with prednisone on 8/7/21 October 2022 started Eclipse trial, was randomized to standard of care enzalutamide arm.     Interval History:  Steve is doing well. He has occasional hot flashes but otherwise no issues with enzalutamide. He has noted his BP is slightly higher. Seeing PCP next week. Wondering about atherosclerosis noted on CT. Is on statin. Notes he does need to lose some weight. He denies any other concerns.     Review of Systems:  Patient denies fevers, chills, night sweats, unexplained weight changes, headaches, dizziness, vision or hearing changes, new lumps or bumps, chest pain, shortness of breath, cough, abdominal pain, nausea, vomiting, changes to bowel or bladder, swelling of extremities, bleeding issues, or  rash.    Current Outpatient Medications   Medication Sig Dispense Refill     amLODIPine (NORVASC) 10 MG tablet Take 1 tablet (10 mg) by mouth daily 30 tablet 0     atorvastatin (LIPITOR) 20 MG tablet Take 1 tablet by mouth once daily 90 tablet 3     enzalutamide (XTANDI) 40 MG capsule Take 4 capsules (160 mg) by mouth daily 112 capsule 0     ibuprofen (ADVIL/MOTRIN) 800 MG tablet TAKE 1 TABLET BY MOUTH THREE TIMES DAILY WITH FOOD 90 tablet 0     tamsulosin (FLOMAX) 0.4 MG capsule Take 1 capsule (0.4 mg) by mouth daily 30 capsule 11     valsartan (DIOVAN) 160 MG tablet Take 1 tablet (160 mg) by mouth daily 90 tablet 1       Past Medical History  Past Medical History:   Diagnosis Date     Basal cell carcinoma      Essential hypertension 2006     Problem list name updated by automated process. Provider to review     Hyperlipidemia LDL goal <130 10/31/2010     Prostate cancer (HCC) 2015    [place] 8 prostate cancer [id number], s/p robotic radical prostatectomy and adjuvant radiation      Past Surgical History:   Procedure Laterality Date     BIOPSY  2011    skin tagged left arm     COLONOSCOPY  2013    Procedure: COLONOSCOPY;  Colonoscopy ;  Surgeon: Emerson Martinez MD, MD;  Location:  GI     DAVINCI PROSTATECTOMY N/A 2015    Procedure: DAVINCI PROSTATECTOMY;  Surgeon: Rodolfo Connor MD;  Location:  OR     Z NONSPECIFIC PROCEDURE      Lawnmower injury right foot-toe      ZZC NONSPECIFIC PROCEDURE      Pilonidal cyst     No Known Allergies  Social History   Social History     Tobacco Use     Smoking status: Former     Packs/day: 1.00     Years: 25.00     Pack years: 25.00     Types: Cigarettes     Quit date: 2009     Years since quittin.7     Smokeless tobacco: Never   Vaping Use     Vaping status: Never Used   Substance Use Topics     Alcohol use: Yes     Comment: socially,very little     Drug use: No      Past medical history and social history were  "reviewed.    Physical Examination:  BP (!) 162/81   Pulse 68   Temp 97.3  F (36.3  C) (Tympanic)   Resp 16   Ht 1.734 m (5' 8.25\")   Wt 100.3 kg (221 lb 3.2 oz)   SpO2 95%   BMI 33.39 kg/m    Wt Readings from Last 10 Encounters:   04/14/23 99.8 kg (220 lb)   03/27/23 100.2 kg (221 lb)   03/03/23 100.2 kg (221 lb)   01/27/23 100.2 kg (221 lb)   01/06/23 100.2 kg (221 lb)   11/25/22 100.3 kg (221 lb 1.6 oz)   10/27/22 99.8 kg (220 lb)   09/23/22 99.4 kg (219 lb 1.6 oz)   08/30/22 98.1 kg (216 lb 3.2 oz)   08/01/22 99.3 kg (219 lb)     Constitutional: Well-appearing male in no acute distress.  Eyes: EOMI, PERRL. No scleral icterus.  ENT: Oral mucosa is moist without lesions or thrush.   Lymphatic: Neck is supple without cervical or supraclavicular lymphadenopathy.     Cardiovascular: Regular rate and rhythm. No murmurs, gallops, or rubs. Trace peripheral edema.  Respiratory: Clear to auscultation bilaterally. No wheezes or crackles.  Gastrointestinal: Bowel sounds present. Abdomen soft, non-tender. No palpable hepatosplenomegaly or masses.   Neurologic: Cranial nerves II through XII are grossly intact.  Skin: No rashes, petechiae, or bruising noted on exposed skin.    Laboratory Data:   Latest Reference Range & Units 04/14/23 10:21   Sodium 136 - 145 mmol/L 140   Potassium 3.4 - 5.3 mmol/L 4.1   Chloride 98 - 107 mmol/L 104   Carbon Dioxide (CO2) 22 - 29 mmol/L 26   Urea Nitrogen 8.0 - 23.0 mg/dL 13.7   Creatinine 0.67 - 1.17 mg/dL 0.91   GFR Estimate >60 mL/min/1.73m2 86   Calcium 8.8 - 10.2 mg/dL 9.3   Anion Gap 7 - 15 mmol/L 10   Magnesium 1.7 - 2.3 mg/dL 1.8   Albumin 3.5 - 5.2 g/dL 4.1   Protein Total 6.4 - 8.3 g/dL 7.0   Alkaline Phosphatase 40 - 129 U/L 91   ALT 10 - 50 U/L 15   AST 10 - 50 U/L 23   Bilirubin Total <=1.2 mg/dL 0.7   Cholesterol <200 mg/dL 197   Glucose 70 - 99 mg/dL 106 (H)   HDL Cholesterol >=40 mg/dL 47   LDL Cholesterol Calculated <=100 mg/dL 127 (H)   Non HDL Cholesterol <130 mg/dL " 150 (H)   PSA Tumor Marker 0.00 - 6.50 ng/mL 0.42   Testosterone Total 240 - 950 ng/dL 6 (L)   Triglycerides <150 mg/dL 117   TSH 0.30 - 4.20 uIU/mL 1.02   WBC 4.0 - 11.0 10e3/uL 5.5   Hemoglobin 13.3 - 17.7 g/dL 14.7   Hematocrit 40.0 - 53.0 % 42.7   Platelet Count 150 - 450 10e3/uL 183   RBC Count 4.40 - 5.90 10e6/uL 4.68   MCV 78 - 100 fL 91   MCH 26.5 - 33.0 pg 31.4   MCHC 31.5 - 36.5 g/dL 34.4   RDW 10.0 - 15.0 % 13.2   % Neutrophils % 58   % Lymphocytes % 28   % Monocytes % 8   % Eosinophils % 5   % Basophils % 1   Absolute Basophils 0.0 - 0.2 10e3/uL 0.1   Absolute Eosinophils 0.0 - 0.7 10e3/uL 0.3   Absolute Immature Granulocytes <=0.4 10e3/uL 0.0   Absolute Lymphocytes 0.8 - 5.3 10e3/uL 1.6   Absolute Monocytes 0.0 - 1.3 10e3/uL 0.5   % Immature Granulocytes % 0   Absolute Neutrophils 1.6 - 8.3 10e3/uL 3.2   Absolute NRBCs 10e3/uL 0.0   NRBCs per 100 WBC <1 /100 0   (H): Data is abnormally high  (L): Data is abnormally low    CT 4/28/23  FINDINGS:   LUNGS AND PLEURA: Mild changes of centrilobular emphysema in both  lungs. A 0.2 cm left upper lobe pulmonary nodule is unchanged (series  10 image 63). No new or enlarging pulmonary nodules. No pleural  effusions.     MEDIASTINUM/AXILLAE: No enlarged lymph nodes in the chest. A mildly  prominent right axillary lymph node measures 0.9 cm, similar to the  previous exam. No pericardial effusion. Severe atherosclerotic  calcification of the thoracic aorta.     CORONARY ARTERY CALCIFICATION: Moderate.     HEPATOBILIARY: Unremarkable. No hepatic masses are seen.     PANCREAS: Normal.     SPLEEN: Normal.     ADRENAL GLANDS: A 1 cm right adrenal nodule is unchanged..     KIDNEYS/BLADDER: Unremarkable. No hydronephrosis.     BOWEL: No bowel obstruction. No convincing evidence for colitis or  diverticulitis. Unremarkable appendix.     PELVIC ORGANS: Prostatectomy. No pelvic masses.     LYMPH NODES: No enlarged lymph nodes are identified in the abdomen  or  pelvis.     VASCULATURE: Severe atherosclerotic aortoiliac calcification. Mild  aneurysmal dilatation of the infrarenal abdominal aorta is unchanged,  measuring 3.2 cm AP.     ADDITIONAL FINDINGS: None.     MUSCULOSKELETAL: Bilateral L5 spondylolysis. Scattered areas of bony  sclerosis suspicious for metastatic disease are not significantly  changed, and are again most prominent in the T6 vertebral body and  within the sacrum.                                                                      IMPRESSION:   1.  No significant interval change compared to 3/1/2023.  2.  Scattered sclerotic bony lesions suspicious for metastatic disease  are unchanged.  3.  Prostatectomy.     JESSICA RINCON MD      NM Bone Scan 4/28/23  FINDINGS: Redemonstrated radiotracer uptake involving the T6 vertebral body similar to prior exam. The previously seen lesions in the inferior portion of the sacrum/coccyx are obscured on this exam due to shielding in this region.  No new suspicious   lesions identified Additional scattered areas of radiotracer uptake in a pattern typical degenerative change.                                                                         IMPRESSION:     Stable osseous metastasis involving the T6 vertebral body. The previously seen lesion in the lower portions of the sacrum/coccyx obscured on this exam due to shielding.      Assessment and Plan:    # Metastatic Castration Resistant Prostate Cancer  See history above, currently on Enzalutamide on ECLIPSE trial since October 2022 and doing very well. Has had decrease in PSA and no side effects. Labs with no concerns. Also on Lupron, next due July 2023.     Reviewed imaging today which shows ongoing stable disease. Reviewed atheresclerosis is stable from prior imaging and exercise/weight loss/healthy diet can all help with this.    Will continue Enzalutamide. Plan for labs, scans, Dr. Ward, and Lupron in July 2023.      # HTN  Increasing. Continue amlodipine  and valsartan. Has PCP visit scheduled next week and will talk to PCP about management.     # Bone Health  Continue calcium and vit D. Zometa currently on hold with improved bony involvement. Will clarify with Dr. Ward if/when we would restart in future.    25 minutes spent on the date of the encounter doing chart review, review of test results, interpretation of tests, patient visit and documentation     Rodolfo Logan PA-C  Department of Hematology and Oncology  [facility]

## 2023-05-03 ENCOUNTER — ONCOLOGY VISIT (OUTPATIENT)
Dept: ONCOLOGY | Facility: CLINIC | Age: 78
End: 2023-05-03
Attending: PHYSICIAN ASSISTANT
Payer: COMMERCIAL

## 2023-05-03 ENCOUNTER — ALLIED HEALTH/NURSE VISIT (OUTPATIENT)
Dept: ONCOLOGY | Facility: CLINIC | Age: 78
End: 2023-05-03

## 2023-05-03 VITALS
OXYGEN SATURATION: 95 % | SYSTOLIC BLOOD PRESSURE: 162 MMHG | DIASTOLIC BLOOD PRESSURE: 81 MMHG | RESPIRATION RATE: 16 BRPM | WEIGHT: 221.2 LBS | TEMPERATURE: 97.3 F | HEART RATE: 68 BPM | HEIGHT: 68 IN | BODY MASS INDEX: 33.52 KG/M2

## 2023-05-03 DIAGNOSIS — C79.51 MALIGNANT NEOPLASM METASTATIC TO BONE (H): ICD-10-CM

## 2023-05-03 DIAGNOSIS — C61 PROSTATE CANCER (H): ICD-10-CM

## 2023-05-03 DIAGNOSIS — C61 PROSTATE CANCER (H): Primary | ICD-10-CM

## 2023-05-03 PROCEDURE — 99214 OFFICE O/P EST MOD 30 MIN: CPT | Performed by: PHYSICIAN ASSISTANT

## 2023-05-03 PROCEDURE — G0463 HOSPITAL OUTPT CLINIC VISIT: HCPCS | Performed by: PHYSICIAN ASSISTANT

## 2023-05-03 ASSESSMENT — PAIN SCALES - GENERAL: PAINLEVEL: NO PAIN (0)

## 2023-05-03 NOTE — NURSING NOTE
"Oncology Rooming Note    May 3, 2023 10:57 AM   Wallace Zelaya is a 77 year old male who presents for:    Chief Complaint   Patient presents with     Oncology Clinic Visit     Prostate cancer     Initial Vitals: BP (!) 162/81   Pulse 68   Temp 97.3  F (36.3  C) (Tympanic)   Resp 16   Ht 1.734 m (5' 8.25\")   Wt 100.3 kg (221 lb 3.2 oz)   SpO2 95%   BMI 33.39 kg/m   Estimated body mass index is 33.39 kg/m  as calculated from the following:    Height as of this encounter: 1.734 m (5' 8.25\").    Weight as of this encounter: 100.3 kg (221 lb 3.2 oz). Body surface area is 2.2 meters squared.  No Pain (0) Comment: Data Unavailable   No LMP for male patient.  Allergies reviewed: Yes  Medications reviewed: Yes    Medications: Medication refills not needed today.  Pharmacy name entered into Dada:    [facility] - [place], [place] - [address]  [facility] - [place] - [address] [facility] - [place], [place] - [address] [place], [place] - [address] [facility] [address]  [facility] - [place], [place] - [address] [facility] [phone]    Clinical concerns: f/u       Olya Valenzuela, TATA              "

## 2023-05-03 NOTE — LETTER
5/3/2023         RE: Wallace Zelaya  [id number] [facility] [id number]        Dear Colleague,    Thank you for referring your patient, Wallace Zelaya, to the [facility]. Please see a copy of my visit note below.    Oncology/Hematology Visit Note  May 3, 2023    Reason for Visit: Follow up of prostate cancer with biochemical recurrence      History of Present Illness:   Wallace was noted to have rising screening PSA from 2 - 4. He was referred to Dr. Rodolfo Connor. He had radical prostatectomy on 11/2015. Pathology from this revealed [place] 4+4 disease with extraprostatic extension, seminal vesicle extension and he was staged at pT3b. All of the 12 lymph nodes resected were negative for disease. Post operatively his PSA did not drop to undetectable levels and remained elevated at 0.56. It vladimir to 0.9 in April 2016 and he was referred to Dr. Newton for adjuvant radiation therapy. He completed radiation therapy but did not have any PSA response to this treatment.      TREATMENT SUMMARY:  11/13/2015 Radical prostatectomy  April 2016  Radiation therapy  He was started on intermittent androgen deprivation therapy which had to be changed to continuous with rapid rise in his PSA.       April 2020 - he was started on bicalutamide for complete androgen blockade  He had rising PSA in May 2021. His bone scan done on 6/30/21 revealed metastatic lesions of T6 and the sacrum. He was switched to abiraterone with prednisone on 8/7/21 October 2022 started Eclipse trial, was randomized to standard of care enzalutamide arm.     Interval History:  Steve is doing well. He has occasional hot flashes but otherwise no issues with enzalutamide. He has noted his BP is slightly higher. Seeing PCP next week. Wondering about atherosclerosis noted on CT. Is on statin. Notes he does need to lose some weight. He denies any other concerns.     Review of Systems:  Patient denies fevers, chills,  night sweats, unexplained weight changes, headaches, dizziness, vision or hearing changes, new lumps or bumps, chest pain, shortness of breath, cough, abdominal pain, nausea, vomiting, changes to bowel or bladder, swelling of extremities, bleeding issues, or rash.    Current Outpatient Medications   Medication Sig Dispense Refill     amLODIPine (NORVASC) 10 MG tablet Take 1 tablet (10 mg) by mouth daily 30 tablet 0     atorvastatin (LIPITOR) 20 MG tablet Take 1 tablet by mouth once daily 90 tablet 3     enzalutamide (XTANDI) 40 MG capsule Take 4 capsules (160 mg) by mouth daily 112 capsule 0     ibuprofen (ADVIL/MOTRIN) 800 MG tablet TAKE 1 TABLET BY MOUTH THREE TIMES DAILY WITH FOOD 90 tablet 0     tamsulosin (FLOMAX) 0.4 MG capsule Take 1 capsule (0.4 mg) by mouth daily 30 capsule 11     valsartan (DIOVAN) 160 MG tablet Take 1 tablet (160 mg) by mouth daily 90 tablet 1       Past Medical History  Past Medical History:   Diagnosis Date     Basal cell carcinoma      Essential hypertension 08/24/2006     Problem list name updated by automated process. Provider to review     Hyperlipidemia LDL goal <130 10/31/2010     Prostate cancer (HCC) 11/13/2015    [place] 8 prostate cancer [id number], s/p robotic radical prostatectomy and adjuvant radiation      Past Surgical History:   Procedure Laterality Date     BIOPSY  02/2011    skin tagged left arm     COLONOSCOPY  8/19/2013    Procedure: COLONOSCOPY;  Colonoscopy ;  Surgeon: Emerson Martinez MD, MD;  Location:  GI     DAVINCI PROSTATECTOMY N/A 11/13/2015    Procedure: DAVINCI PROSTATECTOMY;  Surgeon: Rodolfo Connor MD;  Location:  OR     [facility] NONSPECIFIC PROCEDURE      Lawnmower injury right foot-toe      ZZC NONSPECIFIC PROCEDURE      Pilonidal cyst     No Known Allergies  Social History   Social History     Tobacco Use     Smoking status: Former     Packs/day: 1.00     Years: 25.00     Pack years: 25.00     Types: Cigarettes     Quit date: 8/17/2009      "Years since quittin.7     Smokeless tobacco: Never   Vaping Use     Vaping status: Never Used   Substance Use Topics     Alcohol use: Yes     Comment: socially,very little     Drug use: No      Past medical history and social history were reviewed.    Physical Examination:  BP (!) 162/81   Pulse 68   Temp 97.3  F (36.3  C) (Tympanic)   Resp 16   Ht 1.734 m (5' 8.25\")   Wt 100.3 kg (221 lb 3.2 oz)   SpO2 95%   BMI 33.39 kg/m    Wt Readings from Last 10 Encounters:   23 99.8 kg (220 lb)   23 100.2 kg (221 lb)   23 100.2 kg (221 lb)   23 100.2 kg (221 lb)   23 100.2 kg (221 lb)   22 100.3 kg (221 lb 1.6 oz)   10/27/22 99.8 kg (220 lb)   22 99.4 kg (219 lb 1.6 oz)   22 98.1 kg (216 lb 3.2 oz)   22 99.3 kg (219 lb)     Constitutional: Well-appearing male in no acute distress.  Eyes: EOMI, PERRL. No scleral icterus.  ENT: Oral mucosa is moist without lesions or thrush.   Lymphatic: Neck is supple without cervical or supraclavicular lymphadenopathy.     Cardiovascular: Regular rate and rhythm. No murmurs, gallops, or rubs. Trace peripheral edema.  Respiratory: Clear to auscultation bilaterally. No wheezes or crackles.  Gastrointestinal: Bowel sounds present. Abdomen soft, non-tender. No palpable hepatosplenomegaly or masses.   Neurologic: Cranial nerves II through XII are grossly intact.  Skin: No rashes, petechiae, or bruising noted on exposed skin.    Laboratory Data:   Latest Reference Range & Units 23 10:21   Sodium 136 - 145 mmol/L 140   Potassium 3.4 - 5.3 mmol/L 4.1   Chloride 98 - 107 mmol/L 104   Carbon Dioxide (CO2) 22 - 29 mmol/L 26   Urea Nitrogen 8.0 - 23.0 mg/dL 13.7   Creatinine 0.67 - 1.17 mg/dL 0.91   GFR Estimate >60 mL/min/1.73m2 86   Calcium 8.8 - 10.2 mg/dL 9.3   Anion Gap 7 - 15 mmol/L 10   Magnesium 1.7 - 2.3 mg/dL 1.8   Albumin 3.5 - 5.2 g/dL 4.1   Protein Total 6.4 - 8.3 g/dL 7.0   Alkaline Phosphatase 40 - 129 U/L 91   ALT 10 " - 50 U/L 15   AST 10 - 50 U/L 23   Bilirubin Total <=1.2 mg/dL 0.7   Cholesterol <200 mg/dL 197   Glucose 70 - 99 mg/dL 106 (H)   HDL Cholesterol >=40 mg/dL 47   LDL Cholesterol Calculated <=100 mg/dL 127 (H)   Non HDL Cholesterol <130 mg/dL 150 (H)   PSA Tumor Marker 0.00 - 6.50 ng/mL 0.42   Testosterone Total 240 - 950 ng/dL 6 (L)   Triglycerides <150 mg/dL 117   TSH 0.30 - 4.20 uIU/mL 1.02   WBC 4.0 - 11.0 10e3/uL 5.5   Hemoglobin 13.3 - 17.7 g/dL 14.7   Hematocrit 40.0 - 53.0 % 42.7   Platelet Count 150 - 450 10e3/uL 183   RBC Count 4.40 - 5.90 10e6/uL 4.68   MCV 78 - 100 fL 91   MCH 26.5 - 33.0 pg 31.4   MCHC 31.5 - 36.5 g/dL 34.4   RDW 10.0 - 15.0 % 13.2   % Neutrophils % 58   % Lymphocytes % 28   % Monocytes % 8   % Eosinophils % 5   % Basophils % 1   Absolute Basophils 0.0 - 0.2 10e3/uL 0.1   Absolute Eosinophils 0.0 - 0.7 10e3/uL 0.3   Absolute Immature Granulocytes <=0.4 10e3/uL 0.0   Absolute Lymphocytes 0.8 - 5.3 10e3/uL 1.6   Absolute Monocytes 0.0 - 1.3 10e3/uL 0.5   % Immature Granulocytes % 0   Absolute Neutrophils 1.6 - 8.3 10e3/uL 3.2   Absolute NRBCs 10e3/uL 0.0   NRBCs per 100 WBC <1 /100 0   (H): Data is abnormally high  (L): Data is abnormally low    CT 4/28/23  FINDINGS:   LUNGS AND PLEURA: Mild changes of centrilobular emphysema in both  lungs. A 0.2 cm left upper lobe pulmonary nodule is unchanged (series  10 image 63). No new or enlarging pulmonary nodules. No pleural  effusions.     MEDIASTINUM/AXILLAE: No enlarged lymph nodes in the chest. A mildly  prominent right axillary lymph node measures 0.9 cm, similar to the  previous exam. No pericardial effusion. Severe atherosclerotic  calcification of the thoracic aorta.     CORONARY ARTERY CALCIFICATION: Moderate.     HEPATOBILIARY: Unremarkable. No hepatic masses are seen.     PANCREAS: Normal.     SPLEEN: Normal.     ADRENAL GLANDS: A 1 cm right adrenal nodule is unchanged..     KIDNEYS/BLADDER: Unremarkable. No hydronephrosis.     BOWEL:  No bowel obstruction. No convincing evidence for colitis or  diverticulitis. Unremarkable appendix.     PELVIC ORGANS: Prostatectomy. No pelvic masses.     LYMPH NODES: No enlarged lymph nodes are identified in the abdomen or  pelvis.     VASCULATURE: Severe atherosclerotic aortoiliac calcification. Mild  aneurysmal dilatation of the infrarenal abdominal aorta is unchanged,  measuring 3.2 cm AP.     ADDITIONAL FINDINGS: None.     MUSCULOSKELETAL: Bilateral L5 spondylolysis. Scattered areas of bony  sclerosis suspicious for metastatic disease are not significantly  changed, and are again most prominent in the T6 vertebral body and  within the sacrum.                                                                      IMPRESSION:   1.  No significant interval change compared to 3/1/2023.  2.  Scattered sclerotic bony lesions suspicious for metastatic disease  are unchanged.  3.  Prostatectomy.     JESSICA RINCON MD      NM Bone Scan 4/28/23  FINDINGS: Redemonstrated radiotracer uptake involving the T6 vertebral body similar to prior exam. The previously seen lesions in the inferior portion of the sacrum/coccyx are obscured on this exam due to shielding in this region.  No new suspicious   lesions identified Additional scattered areas of radiotracer uptake in a pattern typical degenerative change.                                                                         IMPRESSION:     Stable osseous metastasis involving the T6 vertebral body. The previously seen lesion in the lower portions of the sacrum/coccyx obscured on this exam due to shielding.      Assessment and Plan:    # Metastatic Castration Resistant Prostate Cancer  See history above, currently on Enzalutamide on ECLIPSE trial since October 2022 and doing very well. Has had decrease in PSA and no side effects. Labs with no concerns. Also on Lupron, next due July 2023.     Reviewed imaging today which shows ongoing stable disease. Reviewed atheresclerosis  is stable from prior imaging and exercise/weight loss/healthy diet can all help with this.    Will continue Enzalutamide. Plan for labs, scans, Dr. Ward, and Sirisha in July 2023.      # HTN  Increasing. Continue amlodipine and valsartan. Has PCP visit scheduled next week and will talk to PCP about management.     # Bone Health  Continue calcium and vit D. Zometa currently on hold with improved bony involvement. Will clarify with Dr. Ward if/when we would restart in future.    25 minutes spent on the date of the encounter doing chart review, review of test results, interpretation of tests, patient visit and documentation     Rodolfo Logan PA-C  Department of Hematology and Oncology  [facility]         Again, thank you for allowing me to participate in the care of your patient.        Sincerely,        OTIS Bah

## 2023-05-04 NOTE — NURSING NOTE
Steve was in clinic for the Week 24 follow-up visit in the ECLIPSE study.  He has completed the SOC arm of the study.  He will continue to take enzalutamide off-study, and will be followed with scans every 12 weeks per protocol.  No changes in his medications or new adverse events at this visit.

## 2023-05-06 SDOH — ECONOMIC STABILITY: INCOME INSECURITY: HOW HARD IS IT FOR YOU TO PAY FOR THE VERY BASICS LIKE FOOD, HOUSING, MEDICAL CARE, AND HEATING?: NOT VERY HARD

## 2023-05-06 SDOH — HEALTH STABILITY: PHYSICAL HEALTH: ON AVERAGE, HOW MANY MINUTES DO YOU ENGAGE IN EXERCISE AT THIS LEVEL?: 20 MIN

## 2023-05-06 SDOH — ECONOMIC STABILITY: INCOME INSECURITY: IN THE LAST 12 MONTHS, WAS THERE A TIME WHEN YOU WERE NOT ABLE TO PAY THE MORTGAGE OR RENT ON TIME?: NO

## 2023-05-06 SDOH — HEALTH STABILITY: PHYSICAL HEALTH: ON AVERAGE, HOW MANY DAYS PER WEEK DO YOU ENGAGE IN MODERATE TO STRENUOUS EXERCISE (LIKE A BRISK WALK)?: 5 DAYS

## 2023-05-06 SDOH — ECONOMIC STABILITY: FOOD INSECURITY: WITHIN THE PAST 12 MONTHS, YOU WORRIED THAT YOUR FOOD WOULD RUN OUT BEFORE YOU GOT MONEY TO BUY MORE.: NEVER TRUE

## 2023-05-06 SDOH — ECONOMIC STABILITY: FOOD INSECURITY: WITHIN THE PAST 12 MONTHS, THE FOOD YOU BOUGHT JUST DIDN'T LAST AND YOU DIDN'T HAVE MONEY TO GET MORE.: NEVER TRUE

## 2023-05-06 ASSESSMENT — ENCOUNTER SYMPTOMS
PALPITATIONS: 0
ABDOMINAL PAIN: 0
WEAKNESS: 0
DIARRHEA: 0
DYSURIA: 0
HEMATOCHEZIA: 0
SORE THROAT: 0
EYE PAIN: 0
JOINT SWELLING: 0
HEARTBURN: 0
NAUSEA: 0
MYALGIAS: 0
COUGH: 0
NERVOUS/ANXIOUS: 0
HEMATURIA: 0
PARESTHESIAS: 0
DIZZINESS: 0
FREQUENCY: 1
SHORTNESS OF BREATH: 0
FEVER: 0
CHILLS: 0
ARTHRALGIAS: 0
HEADACHES: 0
CONSTIPATION: 0

## 2023-05-06 ASSESSMENT — SOCIAL DETERMINANTS OF HEALTH (SDOH)
DO YOU BELONG TO ANY CLUBS OR ORGANIZATIONS SUCH AS CHURCH GROUPS UNIONS, FRATERNAL OR ATHLETIC GROUPS, OR SCHOOL GROUPS?: YES
HOW OFTEN DO YOU GET TOGETHER WITH FRIENDS OR RELATIVES?: ONCE A WEEK
IN A TYPICAL WEEK, HOW MANY TIMES DO YOU TALK ON THE PHONE WITH FAMILY, FRIENDS, OR NEIGHBORS?: THREE TIMES A WEEK
HOW OFTEN DO YOU ATTEND CHURCH OR RELIGIOUS SERVICES?: MORE THAN 4 TIMES PER YEAR

## 2023-05-06 ASSESSMENT — LIFESTYLE VARIABLES
HOW OFTEN DO YOU HAVE A DRINK CONTAINING ALCOHOL: 2-4 TIMES A MONTH
HOW MANY STANDARD DRINKS CONTAINING ALCOHOL DO YOU HAVE ON A TYPICAL DAY: 1 OR 2
HOW OFTEN DO YOU HAVE SIX OR MORE DRINKS ON ONE OCCASION: NEVER
SKIP TO QUESTIONS 9-10: 1
AUDIT-C TOTAL SCORE: 2

## 2023-05-06 ASSESSMENT — ACTIVITIES OF DAILY LIVING (ADL): CURRENT_FUNCTION: NO ASSISTANCE NEEDED

## 2023-05-09 ENCOUNTER — OFFICE VISIT (OUTPATIENT)
Dept: FAMILY MEDICINE | Facility: CLINIC | Age: 78
End: 2023-05-09
Payer: COMMERCIAL

## 2023-05-09 VITALS
SYSTOLIC BLOOD PRESSURE: 130 MMHG | TEMPERATURE: 97.4 F | BODY MASS INDEX: 33.27 KG/M2 | DIASTOLIC BLOOD PRESSURE: 84 MMHG | WEIGHT: 219.5 LBS | OXYGEN SATURATION: 96 % | HEART RATE: 73 BPM | HEIGHT: 68 IN | RESPIRATION RATE: 16 BRPM

## 2023-05-09 DIAGNOSIS — I10 BENIGN ESSENTIAL HYPERTENSION: ICD-10-CM

## 2023-05-09 DIAGNOSIS — Z00.00 ENCOUNTER FOR MEDICARE ANNUAL WELLNESS EXAM: Primary | ICD-10-CM

## 2023-05-09 DIAGNOSIS — C61 PROSTATE CANCER (H): Primary | ICD-10-CM

## 2023-05-09 DIAGNOSIS — R60.0 PERIPHERAL EDEMA: ICD-10-CM

## 2023-05-09 PROCEDURE — G0439 PPPS, SUBSEQ VISIT: HCPCS | Performed by: FAMILY MEDICINE

## 2023-05-09 PROCEDURE — 99214 OFFICE O/P EST MOD 30 MIN: CPT | Mod: 25 | Performed by: FAMILY MEDICINE

## 2023-05-09 RX ORDER — HYDROCHLOROTHIAZIDE 12.5 MG/1
12.5 TABLET ORAL DAILY
Qty: 90 TABLET | Refills: 3 | Status: SHIPPED | OUTPATIENT
Start: 2023-05-09 | End: 2023-07-06 | Stop reason: DRUGHIGH

## 2023-05-09 ASSESSMENT — ENCOUNTER SYMPTOMS
COUGH: 0
HEADACHES: 0
WEAKNESS: 0
NERVOUS/ANXIOUS: 0
PARESTHESIAS: 0
DIARRHEA: 0
DYSURIA: 0
HEMATURIA: 0
ABDOMINAL PAIN: 0
CHILLS: 0
SORE THROAT: 0
ARTHRALGIAS: 0
EYE PAIN: 0
HEARTBURN: 0
FEVER: 0
FREQUENCY: 1
HEMATOCHEZIA: 0
NAUSEA: 0
CONSTIPATION: 0
JOINT SWELLING: 0
SHORTNESS OF BREATH: 0
DIZZINESS: 0
MYALGIAS: 0
PALPITATIONS: 0

## 2023-05-09 ASSESSMENT — ACTIVITIES OF DAILY LIVING (ADL): CURRENT_FUNCTION: NO ASSISTANCE NEEDED

## 2023-05-09 NOTE — PROGRESS NOTES
"SUBJECTIVE:   Steve is a 77 year old who presents for Preventive Visit.       View : No data to display.              Patient has been advised of split billing requirements and indicates understanding: Yes  Are you in the first 12 months of your Medicare coverage?  No    Healthy Habits:     In general, how would you rate your overall health?  Good    Frequency of exercise:  2-3 days/week    Duration of exercise:  30-45 minutes    Do you usually eat at least 4 servings of fruit and vegetables a day, include whole grains    & fiber and avoid regularly eating high fat or \"junk\" foods?  Yes    Taking medications regularly:  Yes    Medication side effects:  None    Ability to successfully perform activities of daily living:  No assistance needed    Home Safety:  No safety concerns identified    Hearing Impairment:  No hearing concerns    In the past 6 months, have you been bothered by leaking of urine? Yes    In general, how would you rate your overall mental or emotional health?  Good      PHQ-2 Total Score: 0    Additional concerns today:  Yes    Patient inquiring about medication changes he wonders if the amlodipine is causing leg swelling.    Have you ever done Advance Care Planning? (For example, a Health Directive, POLST, or a discussion with a medical provider or your loved ones about your wishes): No, advance care planning information given to patient to review.  Patient plans to discuss their wishes with loved ones or provider.       Fall risk  Fallen 2 or more times in the past year?: No  Any fall with injury in the past year?: No    Cognitive Screening   1) Repeat 3 items (Leader, Season, Table)    2) Clock draw: NORMAL  3) 3 item recall: Recalls 3 objects  Results: 3 items recalled: COGNITIVE IMPAIRMENT LESS LIKELY    Mini-CogTM Copyright [employer]. Licensed by the author for use in [facility]; reprinted with permission (kindra@.[facility]). All rights reserved.      Do you have sleep apnea, excessive " snoring or daytime drowsiness?: no    Reviewed and updated as needed this visit by clinical staff   Tobacco  Allergies  Meds              Reviewed and updated as needed this visit by Provider     Meds             Social History     Tobacco Use     Smoking status: Former     Packs/day: 1.00     Years: 25.00     Pack years: 25.00     Types: Cigarettes     Start date: 7/15/1966     Quit date: 2009     Years since quittin.7     Smokeless tobacco: Never   Vaping Use     Vaping status: Never Used   Substance Use Topics     Alcohol use: Yes     Comment: 1 beer a week             2023    10:53 AM   Alcohol Use   Prescreen: >3 drinks/day or >7 drinks/week? No          View : No data to display.              Do you have a current opioid prescription? No  Do you use any other controlled substances or medications that are not prescribed by a provider? None          Current providers sharing in care for this patient include:   Patient Care Team:  Cedric Ball MD as PCP - General (Family Medicine)  Sung, Jd Boston MD as Referring Physician (Family Practice)  Nikolas, Rodolfo Jurado MD as MD (Urology)  Ashely Torres RN as Registered Nurse (Hematology & Oncology)  [facility], Gigi Napier MD as Assigned Cancer Care Provider  Cedric Ball MD as Assigned PCP    The following health maintenance items are reviewed in Epic and correct as of today:  Health Maintenance   Topic Date Due     MEDICARE ANNUAL WELLNESS VISIT  2023     HEPATITIS C SCREENING  2024 (Originally 1963)     COLORECTAL CANCER SCREENING  2023     BMP  2024     ANNUAL REVIEW OF HM ORDERS  2024     FALL RISK ASSESSMENT  2024     ADVANCE CARE PLANNING  2027     DTAP/TDAP/TD IMMUNIZATION (3 - Td or Tdap) 2028     LIPID  [id number]     PHQ-2 (once per calendar year)  Completed     INFLUENZA VACCINE  Completed     Pneumococcal Vaccine: 65+ Years  Completed     ZOSTER IMMUNIZATION  Completed      "COVID-19 Vaccine  Completed     IPV IMMUNIZATION  Aged Out     MENINGITIS IMMUNIZATION  Aged Out     LUNG CANCER SCREENING  Discontinued         Review of Systems   Constitutional: Negative for chills and fever.   HENT: Negative for congestion, ear pain, hearing loss and sore throat.    Eyes: Negative for pain and visual disturbance.   Respiratory: Negative for cough and shortness of breath.    Cardiovascular: Negative for chest pain, palpitations and peripheral edema.   Gastrointestinal: Negative for abdominal pain, constipation, diarrhea, heartburn, hematochezia and nausea.   Genitourinary: Positive for frequency, impotence and urgency. Negative for dysuria, genital sores, hematuria and penile discharge.   Musculoskeletal: Negative for arthralgias, joint swelling and myalgias.   Skin: Negative for rash.   Neurological: Negative for dizziness, weakness, headaches and paresthesias.   Psychiatric/Behavioral: Negative for mood changes. The patient is not nervous/anxious.        OBJECTIVE:   /84   Pulse 73   Temp 97.4  F (36.3  C) (Tympanic)   Resp 16   Ht 1.734 m (5' 8.25\")   Wt 99.6 kg (219 lb 8 oz)   SpO2 96%   BMI 33.13 kg/m   Estimated body mass index is 33.13 kg/m  as calculated from the following:    Height as of this encounter: 1.734 m (5' 8.25\").    Weight as of this encounter: 99.6 kg (219 lb 8 oz).  Physical Exam  GENERAL: healthy, alert and no distress  NECK: no adenopathy, no asymmetry, masses, or scars and thyroid normal to palpation  RESP: lungs clear to auscultation - no rales, rhonchi or wheezes  CV: regular rate and rhythm, normal S1 S2, no S3 or S4, no murmur, click or rub, 1+ pitting nontender bilateral symmetric lower extremity edema and peripheral pulses strong  ABDOMEN: soft, nontender, no hepatosplenomegaly, no masses and bowel sounds normal  MS: no gross musculoskeletal defects noted, no edema    Diagnostic Test Results:  Labs reviewed in Epic    ASSESSMENT / PLAN:   Wallace was " "seen today for wellness visit.    Diagnoses and all orders for this visit:    Encounter for Medicare annual wellness exam    Benign essential hypertension  Trial switching amlodipine to hydrochlorothiazide due to peripheral edema, recheck metabolic panel in 2 to 4 weeks.  Patient has ambulatory blood pressure cuff and will be sending home readings into me  -     hydrochlorothiazide (HYDRODIURIL) 12.5 MG tablet; Take 1 tablet (12.5 mg) by mouth daily  -     Basic metabolic panel  (Ca, Cl, CO2, Creat, Gluc, K, Na, BUN); Future    Peripheral edema    Other orders  -     REVIEW OF HEALTH MAINTENANCE PROTOCOL ORDERS            COUNSELING:  Reviewed preventive health counseling, as reflected in patient instructions       Regular exercise       Healthy diet/nutrition      BMI:   Estimated body mass index is 33.13 kg/m  as calculated from the following:    Height as of this encounter: 1.734 m (5' 8.25\").    Weight as of this encounter: 99.6 kg (219 lb 8 oz).   Weight management plan: Discussed healthy diet and exercise guidelines      He reports that he quit smoking about 13 years ago. His smoking use included cigarettes. He started smoking about 56 years ago. He has a 25.00 pack-year smoking history. He has never used smokeless tobacco.      Appropriate preventive services were discussed with this patient, including applicable screening as appropriate for cardiovascular disease, diabetes, osteopenia/osteoporosis, and glaucoma.  As appropriate for age/gender, discussed screening for colorectal cancer, prostate cancer, breast cancer, and cervical cancer. Checklist reviewing preventive services available has been given to the patient.    Reviewed patients plan of care and provided an AVS. The Basic Care Plan (routine screening as documented in Health Maintenance) for Wallace meets the Care Plan requirement. This Care Plan has been established and reviewed with the Patient.          Cedric Ball MD  [facility] " [place]    Identified Health Risks:    I have reviewed Opioid Use Disorder and Substance Use Disorder risk factors and made any needed referrals.       Information on urinary incontinence and treatment options given to patient.

## 2023-05-09 NOTE — PATIENT INSTRUCTIONS
Patient Education   Personalized Prevention Plan  You are due for the preventive services outlined below.  Your care team is available to assist you in scheduling these services.  If you have already completed any of these items, please share that information with your care team to update in your medical record.  Health Maintenance Due   Topic Date Due     Hepatitis C Screening  Never done     Annual Wellness Visit  01/26/2023     ANNUAL REVIEW OF HM ORDERS  01/26/2023       Urinary Incontinence (Male)  We understand that gender is a spectrum. We may use gendered terms to talk about anatomy and health risk. Please use this sheet in a way that works best for you and your provider as you talk about your care.     Urinary incontinence means not being able to control the release of urine from the bladder.   Causes  Common causes of urinary incontinence in men include:    Infection    Certain medicines    Aging    Poor pelvic muscle tone    Bladder spasms    Obesity    Enlarged prostate    Trouble urinating and fully emptying the bladder (urinary retention)  Other things that can cause incontinence are:     Nervous system diseases    Diabetes    Sleep apnea    Urinary tract infections    Prostate surgery    Pelvic injury  Constipation and smoking have also been identified as risk factors.   Symptoms    Urge incontinence (overactive bladder). This is a sudden urge to urinate. It occurs even though there may not be much urine in the bladder. The need to urinate often during the night is common. It's due to overactive bladder muscles.    Stress incontinence. This is urine leakage that you can't control. It can occur with sneezing, coughing, and other actions that put stress on the bladder.    Treatment  Treatment depends on what is causing the condition. Bladder infections are treated with antibiotics. Urinary retention is treated with a bladder catheter.   Home care  Follow these guidelines when caring for yourself at  home:    Don't have any foods and drinks that may irritate the bladder. This includes:  ? Chocolate  ? Alcohol  ? Caffeine  ? Carbonated drinks  ? Acidic fruits and juices    Limit fluids to 6 to 8 cups a day.    Lose weight if you are overweight. This may reduce your symptoms.    If advised, do regular pelvic muscle-strengthening exercises such as Kegel exercises.    If needed, wear absorbent pads to catch urine. Change the pads often. This is for good hygiene and to prevent skin and bladder infections.    Bathe daily for good hygiene.    If an antibiotic was prescribed to treat a bladder infection, take it until it's finished. Keep taking it even if you are feeling better. This is to make sure your infection has cleared.    If a catheter was left in place, keep bacteria from getting into the collection bag. Don't disconnect the catheter from the collection bag.    Use a leg band to secure the catheter drainage tube, so it does not pull on the catheter. Drain the collection bag when it becomes full. To do this, use the drain spout at the bottom of the bag. Don't disconnect the bag from the catheter.    Don't pull on or try to remove a catheter. The catheter must be removed by a healthcare provider.    If you smoke, stop. Ask your provider for help if you can't do this on your own.  Follow-up care  Follow up with your healthcare provider, or as advised.  When to get medical advice  Call your healthcare provider right away if any of these occur:     Fever over 100.4 F (38 C), or as directed by your provider    Bladder pain or fullness    Belly swelling, nausea, or vomiting    Back pain    Weakness, dizziness, or fainting    If a catheter was left in place, return if:  ? The catheter falls out  ? The catheter stops draining for 6 hours  ? Your urine gets cloudy or smells bad  [employer] last reviewed this educational content on 6/1/2022 2000-2022 [employer]. All rights reserved. This information is not  intended as a substitute for professional medical care. Always follow your healthcare professional's instructions.

## 2023-05-23 ENCOUNTER — LAB (OUTPATIENT)
Dept: LAB | Facility: CLINIC | Age: 78
End: 2023-05-23
Payer: COMMERCIAL

## 2023-05-23 LAB
ALBUMIN SERPL BCG-MCNC: 4.1 G/DL (ref 3.5–5.2)
ALP SERPL-CCNC: 88 U/L (ref 40–129)
ALT SERPL W P-5'-P-CCNC: 15 U/L (ref 10–50)
ANION GAP SERPL CALCULATED.3IONS-SCNC: 10 MMOL/L (ref 7–15)
AST SERPL W P-5'-P-CCNC: 27 U/L (ref 10–50)
BASOPHILS # BLD AUTO: 0.1 10E3/UL (ref 0–0.2)
BASOPHILS NFR BLD AUTO: 1 %
BILIRUB SERPL-MCNC: 0.6 MG/DL
BUN SERPL-MCNC: 16.4 MG/DL (ref 8–23)
CALCIUM SERPL-MCNC: 9.3 MG/DL (ref 8.8–10.2)
CHLORIDE SERPL-SCNC: 105 MMOL/L (ref 98–107)
CREAT SERPL-MCNC: 1.02 MG/DL (ref 0.67–1.17)
DEPRECATED HCO3 PLAS-SCNC: 28 MMOL/L (ref 22–29)
EOSINOPHIL # BLD AUTO: 0.3 10E3/UL (ref 0–0.7)
EOSINOPHIL NFR BLD AUTO: 6 %
ERYTHROCYTE [DISTWIDTH] IN BLOOD BY AUTOMATED COUNT: 13 % (ref 10–15)
GFR SERPL CREATININE-BSD FRML MDRD: 75 ML/MIN/1.73M2
GLUCOSE SERPL-MCNC: 103 MG/DL (ref 70–99)
HCT VFR BLD AUTO: 44 % (ref 40–53)
HGB BLD-MCNC: 14.9 G/DL (ref 13.3–17.7)
IMM GRANULOCYTES # BLD: 0 10E3/UL
IMM GRANULOCYTES NFR BLD: 0 %
LYMPHOCYTES # BLD AUTO: 1.5 10E3/UL (ref 0.8–5.3)
LYMPHOCYTES NFR BLD AUTO: 27 %
MCH RBC QN AUTO: 31.7 PG (ref 26.5–33)
MCHC RBC AUTO-ENTMCNC: 33.9 G/DL (ref 31.5–36.5)
MCV RBC AUTO: 94 FL (ref 78–100)
MONOCYTES # BLD AUTO: 0.5 10E3/UL (ref 0–1.3)
MONOCYTES NFR BLD AUTO: 9 %
NEUTROPHILS # BLD AUTO: 3.2 10E3/UL (ref 1.6–8.3)
NEUTROPHILS NFR BLD AUTO: 57 %
PLATELET # BLD AUTO: 177 10E3/UL (ref 150–450)
POTASSIUM SERPL-SCNC: 4.5 MMOL/L (ref 3.4–5.3)
PROT SERPL-MCNC: 7.2 G/DL (ref 6.4–8.3)
PSA SERPL DL<=0.01 NG/ML-MCNC: 0.55 NG/ML (ref 0–6.5)
RBC # BLD AUTO: 4.7 10E6/UL (ref 4.4–5.9)
SODIUM SERPL-SCNC: 143 MMOL/L (ref 136–145)
WBC # BLD AUTO: 5.6 10E3/UL (ref 4–11)

## 2023-05-23 PROCEDURE — 84153 ASSAY OF PSA TOTAL: CPT | Performed by: INTERNAL MEDICINE

## 2023-05-23 PROCEDURE — 85025 COMPLETE CBC W/AUTO DIFF WBC: CPT | Performed by: INTERNAL MEDICINE

## 2023-05-23 PROCEDURE — 36415 COLL VENOUS BLD VENIPUNCTURE: CPT | Performed by: INTERNAL MEDICINE

## 2023-05-23 PROCEDURE — 80053 COMPREHEN METABOLIC PANEL: CPT | Performed by: INTERNAL MEDICINE

## 2023-05-23 NOTE — ORAL ONC MGMT
Oral Chemotherapy Monitoring Program  Lab Follow Up    Reviewed lab results from 5/23/23.        2/23/2022     3:00 PM 8/16/2022    10:00 AM 9/13/2022    11:00 AM 10/27/2022    12:00 PM 11/25/2022     1:00 PM 3/1/2023     1:00 PM 5/23/2023     4:00 PM   ORAL CHEMOTHERAPY   Assessment Type Lab Monitoring;Monthly Follow up Lab Monitoring Discontinuation Chart Review;Initial Work up;New Teach Initial Follow up Lab Monitoring Lab Monitoring   Stop Date   9/13/2022       Reason for Discontinuation   Disease progression       Other:   enrolled in clinical trial       Diagnosis Code Prostate Cancer Prostate Cancer Prostate Cancer Prostate Cancer Prostate Cancer Prostate Cancer Prostate Cancer   Providers Dr. Ed Ward   [place] [facility]   Drug Name Zytiga (abiraterone) Zytiga (abiraterone) Zytiga (abiraterone) Xtandi (enzalutamide) Xtandi (enzalutamide) Xtandi (enzalutamide) Xtandi (enzalutamide)   Dose 500 mg 1,000 mg 500 mg 160 mg 160 mg 160 mg 160 mg   Current Schedule Daily Daily Daily Daily Daily Daily Daily   Cycle Details Continuous Continuous Continuous Continuous Continuous Continuous Continuous   Planned next cycle start date 3/3/2022   11/10/2022 11/8/2022     Doses missed in last 2 weeks 0    0     Adherence Assessment Adherent    Adherent     Adverse Effects Increased AST/ALT/T BILI    No AE identified during assessment     Increased AST/ALT/T BILI Grade 1         Pharmacist Intervention(increased ast/alt/t bili) No         Any new drug interactions? No   No No     Is the dose as ordered appropriate for the patient? Yes   Yes Yes     Is the patient currently in pain? No    No     Has the patient been assessed within the past 6 months for depression? Yes    No     Has the patient missed any days of school, work, or other routine activity? No    No     Since the last time we talked, have you  been hospitalized or used the emergency room? No             Labs:  _  Result Component Current Result Ref Range   Sodium 143 (5/23/2023) 136 - 145 mmol/L     _  Result Component Current Result Ref Range   Potassium 4.5 (5/23/2023) 3.4 - 5.3 mmol/L     _  Result Component Current Result Ref Range   Calcium 9.3 (5/23/2023) 8.8 - 10.2 mg/dL     No results found for Mag within last 30 days.     No results found for Phos within last 30 days.     _  Result Component Current Result Ref Range   Albumin 4.1 (5/23/2023) 3.5 - 5.2 g/dL     _  Result Component Current Result Ref Range   Urea Nitrogen 16.4 (5/23/2023) 8.0 - 23.0 mg/dL     _  Result Component Current Result Ref Range   Creatinine 1.02 (5/23/2023) 0.67 - 1.17 mg/dL     _  Result Component Current Result Ref Range   AST 27 (5/23/2023) 10 - 50 U/L     _  Result Component Current Result Ref Range   ALT 15 (5/23/2023) 10 - 50 U/L     _  Result Component Current Result Ref Range   Bilirubin Total 0.6 (5/23/2023) <=1.2 mg/dL     _  Result Component Current Result Ref Range   WBC Count 5.6 (5/23/2023) 4.0 - 11.0 10e3/uL     _  Result Component Current Result Ref Range   Hemoglobin 14.9 (5/23/2023) 13.3 - 17.7 g/dL     _  Result Component Current Result Ref Range   Platelet Count 177 (5/23/2023) 150 - 450 10e3/uL     No results found for ANC within last 30 days.     _  Result Component Current Result Ref Range   Absolute Neutrophils 3.2 (5/23/2023) 1.6 - 8.3 10e3/uL        Assessment & Plan:  No concerning abnormalities but PSA is slightly increased. Continue current regimen.       Follow-Up:  Follow-up with Dr. Ward 7/19/23    Lien Reed - Student Pharmacist

## 2023-05-25 NOTE — LETTER
11/3/2017         RE: Wallace Zelaya  [address] [facility] [id number]        Dear Colleague,    Thank you for referring your patient, Wallace Zelaya, to the [facility]. Please see a copy of my visit note below.    [facility]  FOLLOW-UP VISIT NOTE    PATIENT NAME: Wallace Zelaya MRN # [id number]  DATE OF VISIT: Nov 3, 2017 YOB: 1945    REFERRING PROVIDER: No referring provider defined for this encounter.    CANCER TYPE: Prostate cancer; Biochemical recurrence; Hormone sensitive disease  STAGE: Stage III - pT3b at diagnosis; M0    HISTORY OF PRESENTING ILLNESS:  Wallace was noted to have rising screening PSA from 2 - 4. He was referred to Dr. Rodolfo Connor. He had radical prostatectomy on 11/2015. Pathology from this revealed [place] 4+4 disease with extraprostatic extension, seminal vesicle extension and he was staged at pT3b. All of the 12 lymph nodes resected were negative for disease. Post operatively his PSA did not drop to undetectable levels and remained elevated at 0.56. It vladimir to 0.9 in April 2016 and he was referred to Dr. Newton for adjuvant radiation therapy. He completed radiation therapy but did not have any PSA response to this treatment.     TREATMENT SUMMARY:  11/13/2015 Radical prostatectomy  April 2016  Radiation therapy    Oncology Supportive Medications 1/10/2017 4/11/2017   leuprolide (LUPRON DEPOT) IM 22.5 mg 22.5 mg     CURRENT INTERVENTIONS:  Lupron - Intermittent androgen deprivation OFF treatment phase    SUBJECTIVE   Wallace is being seen for his prostate cancer    He has been off lupron and is feeling better. His hot flashes have gone. He had a good summer and did play a lot of golf. He has no complains at this visit.       PAST MEDICAL HISTORY   1. HTN  2. Dyslipidemia  3. Prostate cancer as detailed above      CURRENT OUTPATIENT MEDICATIONS     Current Outpatient Prescriptions   Medication  "Sig     Lidocaine 2 % GEL Externally apply 1 Application topically daily as needed     nitroGLYcerin (RECTIV) 0.4 % OINT rectal ointment Place 1 inch (1.5 mg) rectally every 12 hours     amLODIPine (NORVASC) 10 MG tablet Take 1 tablet (10 mg) by mouth daily     valsartan (DIOVAN) 160 MG tablet Take 1 tablet (160 mg) by mouth daily     atorvastatin (LIPITOR) 20 MG tablet Take 1 tablet (20 mg) by mouth daily     No current facility-administered medications for this visit.         ALLERGIES     Allergies   Allergen Reactions     Seasonal Allergies         REVIEW OF SYSTEMS   As above in the HPI, o/w complete 12-point ROS was negative.     PHYSICAL EXAM   /74  Pulse 72  Temp 97  F (36.1  C) (Tympanic)  Resp 16  Ht 1.753 m (5' 9\")  Wt 104.3 kg (230 lb)  SpO2 96%  BMI 33.97 kg/m2  SpO2 Readings from Last 4 Encounters:   11/03/17 96%   07/18/17 97%   07/06/17 97%   06/24/17 97%     Wt Readings from Last 3 Encounters:   11/03/17 104.3 kg (230 lb)   08/04/17 102.2 kg (225 lb 3.2 oz)   07/18/17 101.6 kg (224 lb)     GEN: NAD  HEENT: PERRL, EOMI, no icterus, injection or pallor. Oropharynx is clear.  NECK: no cervical or supraclavicular lymphadenopathy  LUNGS: clear bilaterally  CV: regular, no murmurs, rubs, or gallops  ABDOMEN: soft, non-tender, non-distended, normal bowel sounds, no hepatosplenomegaly by percussion or palpation  EXT: warm, well perfused, no edema  NEURO: alert  SKIN: no rashes   LABORATORY AND IMAGING STUDIES     Recent Labs   Lab Test  10/26/17   1553  07/28/17   1558  04/04/17   1542  01/10/17   1257  08/03/16   0920   NA  140  140  141  139  142   POTASSIUM  4.0  3.9  3.9  4.0  4.7   CHLORIDE  105  104  106  106  107   CO2  26  29  27  26  27   ANIONGAP  9  7  8  7  8   BUN  15  15  12  14  17   CR  1.05  1.04  0.95  0.96  1.03   GLC  98  92  88  90  96   TY  8.5  8.5  8.4*  8.8  8.8     No results for input(s): MAG, PHOS in the last 52750 hours.  Recent Labs   Lab Test  10/26/17   1553  " 07/28/17   1558  04/04/17   1542  01/10/17   1257  08/03/16   0920   04/09/13   1003   WBC  5.0  5.3  5.0  4.8  5.1   < >  5.8   HGB  14.0  13.9  14.7  15.2  15.6   < >  15.9   PLT  204  191  197  179  158   < >  182   MCV  92  90  89  90  91   < >  89   NEUTROPHIL  51.7  52.8  65.3  58.0   --    --   48.1    < > = values in this interval not displayed.     Recent Labs   Lab Test  10/26/17   1553  07/28/17   1558  04/04/17   1542   BILITOTAL  0.4  0.4  0.5   ALKPHOS  117  111  99   ALT  36  48  56   AST  28  26  32   ALBUMIN  3.6  3.7  3.8   LDH  206  235*  244*     No results found for: TSH  No results for input(s): CEA in the last 61202 hours.  Results for orders placed or performed during the hospital encounter of 01/20/17   CT Chest w Contrast    Narrative    CT CHEST WITH CONTRAST   1/20/2017 4:04 PM     HISTORY: Malignant neoplasm of the prostate gland.    COMPARISON: 11/9/2016 and 4/14/2016 - CT abdomen and pelvis.    TECHNIQUE: Following the uneventful administration of 80mL Isovue-370  intravenous contrast, helical sections were acquired through the  lungs. Coronal reconstructions were generated. Radiation dose for this  scan was reduced using automated exposure control, adjustment of the  mA and/or kV according to the patient's size, or iterative  reconstruction technique.    FINDINGS: Tiny 0.2 cm nodule in the posterolateral aspect of the left  upper lobe (series 3 image 13) and tiny 0.2 cm nodule in the  posteromedial aspect of the right upper lobe (series 3 image 19). A  few linear opacities in the lungs likely represent atelectasis or  scarring. The lungs are otherwise clear. No pleural or pericardial  effusion. No enlarged lymph nodes in the chest. Atherosclerotic  calcification in the thoracic aorta. Small hiatal hernia.    Scan through the upper abdomen is significant for a 1.3 cm right  adrenal nodule that is unchanged since 4/14/2016.      Impression    IMPRESSION:   1. No convincing evidence of  metastatic neoplasm in the chest.  2. Two tiny indeterminate pulmonary nodules. These are likely benign  nodules. Recommend comparison with prior imaging studies, if  available. If not available and the patient is a smoker or has other  significant risk factors, a followup CT scan could be considered in 12  months to confirm stability.   3. Indeterminate 1.3 cm right adrenal nodule, unchanged since  4/14/2016. 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    MANUEL MARQUEZ MD     Recent Labs   Lab Test  10/26/17   1553  07/28/17   1558  04/04/17   1542  01/10/17   1257  10/24/16   0826   PSA  0.04  0.01  0.09  2.15  2.08   TESTOSTTOTAL  174*  4*  7*  310   --              ASSESSMENT AND PLAN   1. Biochemical PSA relapse post prostatectomy without any response to adjuvant radiation therapy  2. Observation phase of intermittent androgen deprivation therapy  3. HTN  4. ECOG PS1  5. No medical comorbidity    I had a lengthy discussion with Wallace. All his labs are within normal limits. His PSA remains low at 0.04. He does not have hot flashes any more. His testosterone is recovering and is up to 174. We will continue with there observation phase of his intermittent androgen deprivation therapy. I will see her in 4 months or so with labs prior to visit.     Gigi Ward  ,  Division of Hematology, Oncology & Transplantation  [facility].       Again, thank you for allowing me to participate in the care of your patient.        Sincerely,        Gigi Ward MD     Detail Level: Detailed Include Location In Plan?: No

## 2023-06-11 ENCOUNTER — OFFICE VISIT (OUTPATIENT)
Dept: URGENT CARE | Facility: URGENT CARE | Age: 78
End: 2023-06-11
Payer: COMMERCIAL

## 2023-06-11 VITALS
SYSTOLIC BLOOD PRESSURE: 193 MMHG | DIASTOLIC BLOOD PRESSURE: 92 MMHG | OXYGEN SATURATION: 97 % | RESPIRATION RATE: 20 BRPM | HEART RATE: 55 BPM | TEMPERATURE: 97 F

## 2023-06-11 DIAGNOSIS — I10 ESSENTIAL HYPERTENSION: Primary | ICD-10-CM

## 2023-06-11 DIAGNOSIS — I99.8 FLUCTUATING BLOOD PRESSURE: ICD-10-CM

## 2023-06-11 PROCEDURE — 99213 OFFICE O/P EST LOW 20 MIN: CPT | Performed by: FAMILY MEDICINE

## 2023-06-11 RX ORDER — AMLODIPINE BESYLATE 2.5 MG/1
2.5 TABLET ORAL DAILY
Qty: 30 TABLET | Refills: 0 | Status: SHIPPED | OUTPATIENT
Start: 2023-06-11 | End: 2023-07-21

## 2023-06-11 NOTE — PROGRESS NOTES
Chief Complaint   Patient presents with     Urgent Care     Hypertension     Elevated blood pressure readings this morning-202/180-Patient does take BP meds-Recent change in blood pressure pills-Change from amlodipine to hydrochlorothiazide 2 weeks ago        Wallace was seen today for urgent care and hypertension.    Diagnoses and all orders for this visit:    Essential hypertension  -     amLODIPine (NORVASC) 2.5 MG tablet; Take 1 tablet (2.5 mg) by mouth daily    Fluctuating blood pressure          Hypertension control uncertain, needs further observation, needs improvement.     Plan:   current treatment plan is effective, no change in therapy, reviewed diet, exercise and weight control, recommended sodium restriction, reviewed medications and side effects in detail.  I did discuss with patient that as her blood pressure is noted to be elevated he needs to do tighter control.  Patient was advised to start taking 2.5 mg of amlodipine along with the other blood pressure pills   he was advised to do losartan 160 mg at bedtime and amlodipine 2.5 mg and hydrochlorothiazide 12.5 mg in the morning continue monitoring blood pressure closely also watch his diet   Do regular exercise  To maintain a log for blood pressure follow-up if blood pressure continues to be elevated goal blood pressure should be less than 140/90  As he has no chest pain shortness of breath or any worsening headache or worsening dizziness no further testing is needed at this point.  Patient does understand if the blood pressure continues to be elevated he should follow-up.  subjective:   Wallace Zelaya is a 77 year old male with hypertension.  Current Outpatient Medications   Medication Sig Dispense Refill     amLODIPine (NORVASC) 2.5 MG tablet Take 1 tablet (2.5 mg) by mouth daily 30 tablet 0     atorvastatin (LIPITOR) 20 MG tablet Take 1 tablet by mouth once daily 90 tablet 3     enzalutamide (XTANDI) 40 MG capsule Take 4 capsules (160 mg) by mouth  daily 112 capsule 0     hydrochlorothiazide (HYDRODIURIL) 12.5 MG tablet Take 1 tablet (12.5 mg) by mouth daily 90 tablet 3     ibuprofen (ADVIL/MOTRIN) 800 MG tablet TAKE 1 TABLET BY MOUTH THREE TIMES DAILY WITH FOOD 90 tablet 0     valsartan (DIOVAN) 160 MG tablet Take 1 tablet (160 mg) by mouth daily 90 tablet 1     tamsulosin (FLOMAX) 0.4 MG capsule Take 1 capsule (0.4 mg) by mouth daily (Patient not taking: Reported on 6/11/2023) 30 capsule 11      Hypertension ROS: taking medications as instructed, no medication side effects noted, no TIA's, no chest pain on exertion, no dyspnea on exertion, no swelling of ankles.   New concerns: none   For last 10 days he has been noticing on and off dizziness but denies any chest pain shortness of breath headache  or blurred vision  He was on amlodipine which was switched to hydrochlorothiazide a month back because of increased edema in the lower extremity.  Today while he was checking his blood pressure he noticed that his blood pressure was elevated recheck in the clinic also showed elevated blood pressure number I did a manual check and the blood pressure was 185/90.      Objective:   BP (!) 193/92   Pulse 55   Temp 97  F (36.1  C) (Tympanic)   Resp 20   SpO2 97%    Appearance healthy, alert and cooperative.  General exam BP noted to be well controlled today in office, S1, S2 normal, no gallop, no murmur, chest clear, no JVD, no HSM, no edema.   Lab review: labs are reviewed, up to date and normal.     Hillary Azar MD

## 2023-06-11 NOTE — PATIENT INSTRUCTIONS
Continue losartan 160 mg at bedtime  amlodipine 2.5 mg and hydrochlorothiazide 12.5 mg in the morning continue monitoring blood pressure closely also watch diet   Do regular exercise  To maintain a log for blood pressure follow-up if blood pressure continues to be elevated goal blood pressure should be less than 140/90  Hillary Azar MD

## 2023-07-05 DIAGNOSIS — I10 ESSENTIAL HYPERTENSION: ICD-10-CM

## 2023-07-05 RX ORDER — AMLODIPINE BESYLATE 2.5 MG/1
2.5 TABLET ORAL DAILY
Qty: 30 TABLET | Refills: 0 | OUTPATIENT
Start: 2023-07-05

## 2023-07-05 NOTE — TELEPHONE ENCOUNTER
Spoke with pt to advise need for follow up of UC visit for further refills. Pt started 2.5 mg in UC however started taking 5 mg from a previous prescription that was stopped by PCP on 6/27 or 6/28. Most recent BP readings below.    June 26th: 134/86  June 27th: 133/81  July 1st: 135/87    Pt denies any swelling or dizziness. Follow up appointment made for 7/6/23 with Riri Marte CNP.    Maria Antonia PARKER RN

## 2023-07-06 ENCOUNTER — OFFICE VISIT (OUTPATIENT)
Dept: FAMILY MEDICINE | Facility: CLINIC | Age: 78
End: 2023-07-06
Payer: COMMERCIAL

## 2023-07-06 VITALS
DIASTOLIC BLOOD PRESSURE: 84 MMHG | HEIGHT: 68 IN | WEIGHT: 217 LBS | HEART RATE: 66 BPM | SYSTOLIC BLOOD PRESSURE: 160 MMHG | BODY MASS INDEX: 32.89 KG/M2 | RESPIRATION RATE: 16 BRPM | OXYGEN SATURATION: 97 % | TEMPERATURE: 98.1 F

## 2023-07-06 DIAGNOSIS — C61 PROSTATE CANCER (H): ICD-10-CM

## 2023-07-06 DIAGNOSIS — I10 ESSENTIAL HYPERTENSION: Primary | ICD-10-CM

## 2023-07-06 PROCEDURE — 99214 OFFICE O/P EST MOD 30 MIN: CPT | Performed by: NURSE PRACTITIONER

## 2023-07-06 RX ORDER — VALSARTAN 320 MG/1
320 TABLET ORAL DAILY
Qty: 90 TABLET | Refills: 1 | Status: SHIPPED | OUTPATIENT
Start: 2023-07-06 | End: 2023-08-09

## 2023-07-06 RX ORDER — AMLODIPINE BESYLATE 5 MG/1
5 TABLET ORAL DAILY
Start: 2023-07-06 | End: 2023-07-21

## 2023-07-06 RX ORDER — HYDROCHLOROTHIAZIDE 25 MG/1
25 TABLET ORAL DAILY
Qty: 90 TABLET | Refills: 1 | Status: SHIPPED | OUTPATIENT
Start: 2023-07-06 | End: 2023-08-09

## 2023-07-06 NOTE — PROGRESS NOTES
"  Assessment & Plan     Essential hypertension  Increase diovan and then increase norvasc and and hydrochlorothiazide and then follow up in 4 weeks for recheck   - valsartan (DIOVAN) 320 MG tablet  Dispense: 90 tablet; Refill: 1  - hydrochlorothiazide (HYDRODIURIL) 25 MG tablet  Dispense: 90 tablet; Refill: 1  - amLODIPine (NORVASC) 5 MG tablet    Prostate cancer (HCC)  - monitor, follows with urology.      MED REC REQUIRED  Post Medication Reconciliation Status:   yadira Marte NP  [facility] CHARBEL Wilson is a 77 year old, presenting for the following health issues:  Follow Up (Follow-up urgent care, was seen at [facility] urgent care on 06/11/2023) and Hypertension (Follow-up meds, BP check)        7/6/2023     9:16 AM   Additional Questions   Roomed by Saira Johnson     [facility]     ED/UC Followup:    Facility:  [facility] urgent care  Date of visit: 06/11/2023  Reason for visit: increased BP readings  Current Status: readings still elevated    He is not following a low salt diet, he feels well, he would like to golf and wants to make sure his BP is well controlled.       Review of Systems         Objective    BP (!) 160/84   Pulse 66   Temp 98.1  F (36.7  C) (Oral)   Resp 16   Ht 1.734 m (5' 8.25\")   Wt 98.4 kg (217 lb)   SpO2 97%   BMI 32.75 kg/m    Body mass index is 32.75 kg/m .  Physical Exam   GENERAL: healthy, alert and no distress  NECK: no adenopathy, no asymmetry, masses, or scars and thyroid normal to palpation  RESP: lungs clear to auscultation - no rales, rhonchi or wheezes  CV: regular rate and rhythm, normal S1 S2, no S3 or S4, no murmur, click or rub, no peripheral edema and peripheral pulses strong  ABDOMEN: soft, nontender, no hepatosplenomegaly, no masses and bowel sounds normal  MS: no gross musculoskeletal defects noted, no edema                    "

## 2023-07-17 ENCOUNTER — TELEPHONE (OUTPATIENT)
Dept: FAMILY MEDICINE | Facility: CLINIC | Age: 78
End: 2023-07-17
Payer: COMMERCIAL

## 2023-07-17 NOTE — TELEPHONE ENCOUNTER
"Received incoming call from Estephania, pharmacy technician who wanted to verify patient's medication list. Estephania states that pharmacy has faxed over several requests for amlodipine 2.5 mg, but noticed that patient was also prescribed amlodipine 5 mg and wants to verify if 2.5mg are still needed and to \"verify what medication and dosages patient is taking\".    Called and spoke with patient to confirm medication dosages. Patient states he is currently taking amlodipine 5 mg, hydrochlorothiazide 25 mg and valsartan 320 mg.     Patient is scheduled with Riri Marte NP for 08/09/2023 for BP check follow up.    Called and spoke to pharmacy staff to update them on current med list and dosages. Pharmacy will disregard amlodipine 2.5 mg tablet. No further action needed.    Thank you,  Jono Lowe, Triage RN [facility]  11:22 AM 7/17/2023     "

## 2023-07-19 ENCOUNTER — HOSPITAL ENCOUNTER (OUTPATIENT)
Dept: NUCLEAR MEDICINE | Facility: CLINIC | Age: 78
Setting detail: NUCLEAR MEDICINE
Discharge: HOME | End: 2023-07-19
Attending: INTERNAL MEDICINE
Payer: COMMERCIAL

## 2023-07-19 ENCOUNTER — HOSPITAL ENCOUNTER (OUTPATIENT)
Dept: CT IMAGING | Facility: CLINIC | Age: 78
Discharge: HOME | End: 2023-07-19
Attending: INTERNAL MEDICINE
Payer: COMMERCIAL

## 2023-07-19 ENCOUNTER — ANCILLARY PROCEDURE (OUTPATIENT)
Dept: RADIOLOGY | Facility: CLINIC | Age: 78
End: 2023-07-19
Attending: INTERNAL MEDICINE
Payer: COMMERCIAL

## 2023-07-19 ENCOUNTER — LAB (OUTPATIENT)
Dept: INFUSION THERAPY | Facility: CLINIC | Age: 78
End: 2023-07-19
Attending: INTERNAL MEDICINE
Payer: COMMERCIAL

## 2023-07-19 DIAGNOSIS — C61 PROSTATE CANCER (H): Primary | ICD-10-CM

## 2023-07-19 DIAGNOSIS — C61 PROSTATE CANCER (H): ICD-10-CM

## 2023-07-19 DIAGNOSIS — R53.83 OTHER FATIGUE: ICD-10-CM

## 2023-07-19 LAB
ALBUMIN SERPL BCG-MCNC: 4.2 G/DL (ref 3.5–5.2)
ALP SERPL-CCNC: 93 U/L (ref 40–129)
ALT SERPL W P-5'-P-CCNC: 16 U/L (ref 0–70)
ANION GAP SERPL CALCULATED.3IONS-SCNC: 12 MMOL/L (ref 7–15)
AST SERPL W P-5'-P-CCNC: 36 U/L (ref 0–45)
BASOPHILS # BLD AUTO: 0.1 10E3/UL (ref 0–0.2)
BASOPHILS NFR BLD AUTO: 1 %
BILIRUB SERPL-MCNC: 0.6 MG/DL
BUN SERPL-MCNC: 15.4 MG/DL (ref 8–23)
CALCIUM SERPL-MCNC: 9.2 MG/DL (ref 8.8–10.2)
CHLORIDE SERPL-SCNC: 101 MMOL/L (ref 98–107)
CREAT SERPL-MCNC: 0.91 MG/DL (ref 0.67–1.17)
DEPRECATED HCO3 PLAS-SCNC: 26 MMOL/L (ref 22–29)
EOSINOPHIL # BLD AUTO: 0.3 10E3/UL (ref 0–0.7)
EOSINOPHIL NFR BLD AUTO: 6 %
ERYTHROCYTE [DISTWIDTH] IN BLOOD BY AUTOMATED COUNT: 13 % (ref 10–15)
GFR SERPL CREATININE-BSD FRML MDRD: 86 ML/MIN/1.73M2
GLUCOSE SERPL-MCNC: 107 MG/DL (ref 70–99)
HCT VFR BLD AUTO: 44.5 % (ref 40–53)
HGB BLD-MCNC: 15.5 G/DL (ref 13.3–17.7)
IMM GRANULOCYTES # BLD: 0 10E3/UL
IMM GRANULOCYTES NFR BLD: 0 %
LYMPHOCYTES # BLD AUTO: 1.6 10E3/UL (ref 0.8–5.3)
LYMPHOCYTES NFR BLD AUTO: 33 %
MCH RBC QN AUTO: 32.2 PG (ref 26.5–33)
MCHC RBC AUTO-ENTMCNC: 34.8 G/DL (ref 31.5–36.5)
MCV RBC AUTO: 93 FL (ref 78–100)
MONOCYTES # BLD AUTO: 0.4 10E3/UL (ref 0–1.3)
MONOCYTES NFR BLD AUTO: 8 %
NEUTROPHILS # BLD AUTO: 2.5 10E3/UL (ref 1.6–8.3)
NEUTROPHILS NFR BLD AUTO: 52 %
NRBC # BLD AUTO: 0 10E3/UL
NRBC BLD AUTO-RTO: 0 /100
PLATELET # BLD AUTO: 189 10E3/UL (ref 150–450)
POTASSIUM SERPL-SCNC: 4.9 MMOL/L (ref 3.4–5.3)
PROT SERPL-MCNC: 7.1 G/DL (ref 6.4–8.3)
PSA SERPL DL<=0.01 NG/ML-MCNC: 0.75 NG/ML (ref 0–6.5)
RBC # BLD AUTO: 4.81 10E6/UL (ref 4.4–5.9)
SODIUM SERPL-SCNC: 139 MMOL/L (ref 136–145)
WBC # BLD AUTO: 4.9 10E3/UL (ref 4–11)

## 2023-07-19 PROCEDURE — 71250 CT THORAX DX C-: CPT

## 2023-07-19 PROCEDURE — 78306 BONE IMAGING WHOLE BODY: CPT

## 2023-07-19 PROCEDURE — 80053 COMPREHEN METABOLIC PANEL: CPT | Performed by: INTERNAL MEDICINE

## 2023-07-19 PROCEDURE — 84153 ASSAY OF PSA TOTAL: CPT | Performed by: INTERNAL MEDICINE

## 2023-07-19 PROCEDURE — 36415 COLL VENOUS BLD VENIPUNCTURE: CPT

## 2023-07-19 PROCEDURE — 85025 COMPLETE CBC W/AUTO DIFF WBC: CPT | Performed by: INTERNAL MEDICINE

## 2023-07-19 PROCEDURE — 343N000001 HC RX 343: Performed by: INTERNAL MEDICINE

## 2023-07-19 PROCEDURE — 84403 ASSAY OF TOTAL TESTOSTERONE: CPT | Performed by: INTERNAL MEDICINE

## 2023-07-19 PROCEDURE — A9561 TC99M OXIDRONATE: HCPCS | Performed by: INTERNAL MEDICINE

## 2023-07-19 RX ADMIN — Medication 26.5 MILLICURIE: at 09:34

## 2023-07-19 NOTE — PROGRESS NOTES
Nursing Note:  Wallace Zelaya presents today for PIV and labs. CT to follow.    Patient seen by provider today: No   present during visit today: Not Applicable.    Note: N/A.    Intravenous Access:  Lab draw site right AC, Needle type IV, Gauge 20.  Labs drawn without difficulty.  Peripheral IV placed.    Discharge Plan:   Patient was sent to radiology for CT appointment.    Isha Zamora RN

## 2023-07-21 ENCOUNTER — LAB (OUTPATIENT)
Dept: ONCOLOGY | Facility: CLINIC | Age: 78
End: 2023-07-21
Attending: INTERNAL MEDICINE
Payer: COMMERCIAL

## 2023-07-21 VITALS
WEIGHT: 212.8 LBS | SYSTOLIC BLOOD PRESSURE: 147 MMHG | HEART RATE: 60 BPM | RESPIRATION RATE: 16 BRPM | OXYGEN SATURATION: 96 % | TEMPERATURE: 98.1 F | DIASTOLIC BLOOD PRESSURE: 79 MMHG | BODY MASS INDEX: 32.12 KG/M2

## 2023-07-21 DIAGNOSIS — C79.51 MALIGNANT NEOPLASM METASTATIC TO BONE (H): ICD-10-CM

## 2023-07-21 DIAGNOSIS — C61 PROSTATE CANCER (H): Primary | ICD-10-CM

## 2023-07-21 DIAGNOSIS — I10 ESSENTIAL HYPERTENSION: ICD-10-CM

## 2023-07-21 DIAGNOSIS — C61 PROSTATE CANCER (H): ICD-10-CM

## 2023-07-21 LAB — TESTOST SERPL-MCNC: 6 NG/DL (ref 240–950)

## 2023-07-21 PROCEDURE — G0463 HOSPITAL OUTPT CLINIC VISIT: HCPCS | Performed by: INTERNAL MEDICINE

## 2023-07-21 PROCEDURE — 250N000011 HC RX IP 250 OP 636: Mod: JZ | Performed by: INTERNAL MEDICINE

## 2023-07-21 PROCEDURE — G0463 HOSPITAL OUTPT CLINIC VISIT: HCPCS | Mod: 25 | Performed by: INTERNAL MEDICINE

## 2023-07-21 PROCEDURE — 99214 OFFICE O/P EST MOD 30 MIN: CPT | Performed by: INTERNAL MEDICINE

## 2023-07-21 PROCEDURE — 96402 CHEMO HORMON ANTINEOPL SQ/IM: CPT

## 2023-07-21 RX ORDER — AMLODIPINE BESYLATE 5 MG/1
5 TABLET ORAL DAILY
Qty: 90 TABLET | Refills: 3 | Status: SHIPPED | OUTPATIENT
Start: 2023-07-21 | End: 2024-07-07

## 2023-07-21 RX ADMIN — LEUPROLIDE ACETATE 45 MG: KIT at 14:52

## 2023-07-21 ASSESSMENT — PAIN SCALES - GENERAL: PAINLEVEL: NO PAIN (0)

## 2023-07-21 NOTE — PROGRESS NOTES
"Oncology Rooming Note    July 21, 2023 2:05 PM   Wallace Zelaya is a 77 year old male who presents for:    Chief Complaint   Patient presents with     Oncology Clinic Visit     Basal cell carcinoma  Bone metastasis  Prostate cancer (HCC)       Initial Vitals: BP (!) 147/79   Pulse 60   Temp 98.1  F (36.7  C) (Oral)   Resp 16   Wt 96.5 kg (212 lb 12.8 oz)   SpO2 96%   BMI 32.12 kg/m   Estimated body mass index is 32.12 kg/m  as calculated from the following:    Height as of 7/6/23: 1.734 m (5' 8.25\").    Weight as of this encounter: 96.5 kg (212 lb 12.8 oz). Body surface area is 2.16 meters squared.  No Pain (0) Comment: Data Unavailable   No LMP for male patient.  Allergies reviewed: Yes  Medications reviewed: Yes    Medications: MEDICATION REFILLS NEEDED TODAY. Provider was notified. AMLODIPINE  Pharmacy name entered into [facility]:    [facility] - [place], [place] - [address]  [facility] - [place] [address] [facility] [place] [address]   [facility] - [place], [place] - [address]  [facility] - [place], [place] - [id number]    Clinical concerns: follow up    Janey Novak            "

## 2023-07-21 NOTE — PROGRESS NOTES
Injection pt received: lupron    Tolerated injection without difficulties: Yes    Observation needed: Karishma Valenzuela CMA

## 2023-07-21 NOTE — LETTER
"    7/21/2023         RE: Wallace Zelaya  [address] [facility] [id number]        Dear Colleague,    Thank you for referring your patient, Wallace Zelaya, to the [facility]. Please see a copy of my visit note below.    Oncology Rooming Note    July 21, 2023 2:05 PM   Wallace Zelaya is a 77 year old male who presents for:    Chief Complaint   Patient presents with     Oncology Clinic Visit     Basal cell carcinoma  Bone metastasis  Prostate cancer (HCC)       Initial Vitals: BP (!) 147/79   Pulse 60   Temp 98.1  F (36.7  C) (Oral)   Resp 16   Wt 96.5 kg (212 lb 12.8 oz)   SpO2 96%   BMI 32.12 kg/m   Estimated body mass index is 32.12 kg/m  as calculated from the following:    Height as of 7/6/23: 1.734 m (5' 8.25\").    Weight as of this encounter: 96.5 kg (212 lb 12.8 oz). Body surface area is 2.16 meters squared.  No Pain (0) Comment: Data Unavailable   No LMP for male patient.  Allergies reviewed: Yes  Medications reviewed: Yes    Medications: MEDICATION REFILLS NEEDED TODAY. Provider was notified. AMLODIPINE  Pharmacy name entered into [employer]:    [facility] - [place], [place] - [address] [facility] - [place] - [address] [facility] - [place], [place] - [address] [place], [place] - [address] [facility] [address]  [facility] - [place], [place] - [address] [place] [address] [phone]    Clinical concerns: follow up    Janey Novak              [facility]  FOLLOW-UP VISIT NOTE    PATIENT NAME: Wallace Zelaya MRN # [id number]  DATE OF VISIT: Jul 21, 2023 YOB: 1945    REFERRING PROVIDER: No referring provider defined for this encounter.    CANCER TYPE: Prostate cancer; Biochemical recurrence; Castration resistant disease  STAGE: Stage III - pT3b at diagnosis; M0    HISTORY OF PRESENTING " ILLNESS:  Wallace was noted to have rising screening PSA from 2 - 4. He was referred to Dr. Rodolfo Connor. He had radical prostatectomy on 11/2015. Pathology from this revealed Cayla 4+4 disease with extraprostatic extension, seminal vesicle extension and he was staged at pT3b. All of the 12 lymph nodes resected were negative for disease. Post operatively his PSA did not drop to undetectable levels and remained elevated at 0.56. It vladimir to 0.9 in April 2016 and he was referred to Dr. Newton for adjuvant radiation therapy. He completed radiation therapy but did not have any PSA response to this treatment.     TREATMENT SUMMARY:  11/13/2015 Radical prostatectomy  April 2016  Radiation therapy  He was started on intermittent androgen deprivation therapy which had to be changed to continuous with rapid rise in his PSA.      April 2020 - he was started on bicalutamide for complete androgen blockade  He had rising PSA in May 2021. His bone scan done on 6/30/21 revealed metastatic lesions of T6 and the sacrum. He was switched to abiraterone with prednisone on 8/7/21.  He had relatively stable PSA on therapy without any significant PSA response.  He was eventually taken off therapy in September 2022 for progressive disease.    He enrolled in the Eclipse clinical trial on 10/27/2022 and was randomized to the standard of care enzalutamide arm.    CURRENT INTERVENTIONS:  Enzalutamide 160 mg oral once a day as a standard of care on ECLIPSE trial    SUBJECTIVE   Wallace is being seen for his prostate cancer    Steve was followed in person at this visit. He is seen for scheduled follow up visit.  He has been randomized to enzalutamide and has been taking enzalutamide since since 10/27/2022. He  denies missing a dose. He has tolerated this without difficulty. He denies any new side effects. He was in positive mood at this visit.      He is being followed with labs and restaging scans. He is aware of the rising PSA.     He has  been active and has been playing basketball with his friends. He plays the game of HORSE (basketball) at [facility] which he explained for me. He got pictures of his group of friends playing basket ball. He would like to resume his golf at [email or web address]       PAST MEDICAL HISTORY   HTN  Dyslipidemia  Prostate cancer as detailed above      CURRENT OUTPATIENT MEDICATIONS     Current Outpatient Medications   Medication Sig     amLODIPine (NORVASC) 5 MG tablet Take 1 tablet (5 mg) by mouth daily     atorvastatin (LIPITOR) 20 MG tablet Take 1 tablet by mouth once daily     enzalutamide (XTANDI) 40 MG capsule Take 4 capsules (160 mg) by mouth daily     hydrochlorothiazide (HYDRODIURIL) 25 MG tablet Take 1 tablet (25 mg) by mouth daily     ibuprofen (ADVIL/MOTRIN) 800 MG tablet TAKE 1 TABLET BY MOUTH THREE TIMES DAILY WITH FOOD     valsartan (DIOVAN) 320 MG tablet Take 1 tablet (320 mg) by mouth daily     No current facility-administered medications for this visit.        ALLERGIES     No Known Allergies     REVIEW OF SYSTEMS   As above in the HPI, o/w complete 12-point ROS was negative.     PHYSICAL EXAM   BP (!) 147/79   Pulse 60   Temp 98.1  F (36.7  C) (Oral)   Resp 16   Wt 96.5 kg (212 lb 12.8 oz)   SpO2 96%   BMI 32.12 kg/m    SpO2 Readings from Last 4 Encounters:   09/21/18 96%   08/24/18 94%   06/22/18 95%   05/11/18 97%     Wt Readings from Last 3 Encounters:   07/21/23 96.5 kg (212 lb 12.8 oz)   07/06/23 98.4 kg (217 lb)   05/09/23 99.6 kg (219 lb 8 oz)     GENERAL/CONSTITUTIONAL: No acute distress.  EYES: Pupils are equal, round, and react to light and accommodation. Extraocular movements intact.  No scleral icterus.  ENT/MOUTH: Neck supple. Oropharynx clear, no mucositis.  LYMPH: No anterior cervical, posterior cervical, supraclavicular, axillary or inguinal adenopathy.   RESPIRATORY: Clear to auscultation bilaterally. No crackles or wheezing.   CARDIOVASCULAR: Regular rate and rhythm without murmurs, gallops, or  rubs.  GASTROINTESTINAL: No hepatosplenomegaly, masses, or tenderness. The patient has normal bowel sounds. No guarding.  No distention.  : Deferred  MUSCULOSKELETAL: Warm and well-perfused, no cyanosis, clubbing, or edema.  NEUROLOGIC: Cranial nerves II-XII are intact. Alert, oriented, answers questions appropriately. No focal neurologic deficit  INTEGUMENTARY: No rashes or jaundice.  GAIT: Normal     LABORATORY AND IMAGING STUDIES     Recent Labs   Lab Test 07/19/23  0901 05/23/23  0857 04/14/23  1021 03/27/23  1024 03/01/23  1039    143 140 138 139   POTASSIUM 4.9 4.5 4.1 4.6 4.4   CHLORIDE 101 105 104 102 101   CO2 26 28 26 29 28   ANIONGAP 12 10 10 7 10   BUN 15.4 16.4 13.7 9.3 11.3   CR 0.91 1.02 0.91 0.92 0.92   * 103* 106* 110* 109*   TY 9.2 9.3 9.3 9.5 9.4     Recent Labs   Lab Test 04/14/23  1021 03/27/23  1024 03/01/23  1039 01/27/23  1312 12/06/22  0810   MAG 1.8 2.0 2.0 1.9 2.0     Recent Labs   Lab Test 07/19/23  0901 05/23/23  0857 04/14/23  1021 03/27/23  1024 03/01/23  1039   WBC 4.9 5.6 5.5 5.6 5.6   HGB 15.5 14.9 14.7 15.8 15.8    177 183 173 193   MCV 93 94 91 92 91   NEUTROPHIL 52 57 58 60 61     Recent Labs   Lab Test 07/19/23  0901 05/23/23  0857 04/14/23  1021 08/15/22  0810 06/27/22  0827 03/28/22  1050 02/23/22  1419   BILITOTAL 0.6 0.6 0.7   < > 0.9 0.7 0.8   ALKPHOS 93 88 91   < > 94 108 113   ALT 16 15 15   < > 49 80* 88*   AST 36 27 23   < > 4 43 46*   ALBUMIN 4.2 4.1 4.1   < > 3.7 3.6 3.4   LDH  --   --   --   --  246* 259* 246*    < > = values in this interval not displayed.     TSH   Date Value Ref Range Status   04/14/2023 1.02 0.30 - 4.20 uIU/mL Final   03/01/2023 1.14 0.30 - 4.20 uIU/mL Final   10/27/2022 0.65 0.40 - 4.00 mU/L Final   09/22/2022 0.86 0.30 - 4.20 uIU/mL Final     No results for input(s): CEA in the last [id number] hours.  Results for orders placed or performed during the hospital encounter of 07/19/23   CT Chest Abdomen Pelvis w/o Contrast     Narrative    CT CHEST, ABDOMEN & PELVIS WITHOUT CONTRAST July 19, 2023 9:52 AM    HISTORY: CRPC on enzalutamide as per clinical trial, measure and  compare all known previous RECIST lesions. Prostate cancer (H).  Fatigue.    TECHNIQUE: CT scan obtained of the chest, abdomen, and pelvis without  IV contrast. Radiation dose for this scan was reduced using automated  exposure control, adjustment of the mA and/or kV according to patient  size, or iterative reconstruction technique.    COMPARISON: CT chest, abdomen and pelvis on 4/28/2023.    FINDINGS:    Chest/mediastinum: No cardiomegaly or significant pericardial  effusion. No significant mediastinal, hilar or axillary  lymphadenopathy. No significant atherosclerotic vascular calcification  of the coronary arteries.    Lung/pleura: No pleural effusion or pneumothorax. A few scattered  pulmonary nodules including 2 mm left upper lobe nodule (series 12  image 66) and 2 mm right middle lobe nodule (series 12 image 191), are  not significantly changed as compared to 4/28/2023.    Abdomen/pelvis: Limited evaluation of the abdominal organs due to lack  of intravenous contrast, within this limitation, there is;    Hepatobiliary: The unenhanced liver and gallbladder are grossly  unremarkable.    Pancreas: The unenhanced pancreas is grossly unremarkable.    Spleen: No splenomegaly.    Adrenal glands: There is 1.1 cm right adrenal nodule (series 4 image  153), not significantly changed as compared to 4/28/2023. Nodular  thickening of the left adrenal gland without discrete nodule.    Kidneys: No radiodense kidney/ureteral stones or hydronephrosis in the  kidney.    Bowel: No abnormally dilated bowel loops.    Peritoneum: No significant free fluid in the abdomen or pelvis. No  free peritoneal portal venous gas.    Pelvic organs: The prostate gland is surgically absent.    Vascular: Moderate atherosclerotic vascular calcification of the  abdominal aorta and iliac  vessels.    Lymph nodes: No significant abdominopelvic lymphadenopathy.    Bones and soft tissue: No suspicious osseous lesion.      Impression    IMPRESSION: In this patient with history of prostate cancer status  post prostatectomy, there is;  1. No significant change in the scattered areas of sclerotic osseous  metastases as compared to 4/28/2023. No convincing evidence of new  metastatic disease in the chest, abdomen or pelvis.  2. Moderate atherosclerotic vascular calcification of the coronary  arteries.    OSITO GANDHI MD         SYSTEM ID:  [id number]     Narrative & Impression   EXAM: NM BONE SCAN WHOLE BODY  LOCATION: [facility]  DATE: 7/19/2023     INDICATION: Malignant neoplasm of prostate  COMPARISON: Nuclear medicine bone scan dated 01/03/2023 and PSMA PET/CT dated 09/29/2022.  TECHNIQUE: 26.5 mCi technetium-99m MDP, IV. Anterior and posterior delayed whole-body images at 3 hours with additional spot images of the skull.     FINDINGS: Redemonstrated presumed osseous metastasis involving the T6 vertebral body and lower portions of the sacrum/coccyx, similar to prior exam and were PSMA positive on recent PET/CT dated 09/29/2022 and have not significantly changed since nuclear   medicine bone scan dated 01/03/2023.                                                                      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 Clinical  research nurse - Janel Gee did join us for the visit.     He is tolerating enzalutamide well. He denies any new side effects on this medication. He has not been missing doses. He has not started any new medications.     We will continue with androgen deprivation therapy with GnRH analog - he received his last dose on 1/27/23 and is due for it today.     I have reviewed all of the labs done prior to this clinic visit.  Labs are all completely normal including electrolytes, renal function, hepatic panel, complete blood count and differential. His PSA had nicely responded to enzalutamide from 10.3 ng/ml at the start of therapy to 0.39 ng/ml on 3/1/23. It has been slowly rising since then. It has increased to 0.75 ng/ml in 3 months time.      His is being seen with restaging scans -  CT chest, abdomen and pelvis and bone scans. I have reviewed actual images from his restaging scans and they continue to reveal stable disease.        He again had questions about the trial which were reviewed with him.      I have reviewed his charts and his next option would be chemotherapy with docetaxel. He is not excited about this. I did review the option of phase I/II CYPIDES trial -  safety and pharmacokinetics of ODM-208 in patients with metastatic castration-resistant prostate cancer. ODM-208 IS is an oral CYP11 inhibitor with causes adrenal ablation and reduces the number of promiscuous ligands (androgens, progestins, glucocorticoids, mineralocorticoids) that may bind to the devious androgen receptor.  It requires co-administration with fludrocortisone and dexamethasone. I have also reviewed STEP trial with bipolar androgen deprivation therapy.      He would be eligible for this trial. I do not see any significant downside to participation in this trial. He had a good treatment response to enzalutamide. He does not have any critical disease. He is not excited about chemotherapy. Bipolar therapy or newer anti-androgen is a  reasonable choice in that scenario. I will reach out to my research team and request screening for trials.     25 minutes spent on the date of the encounter doing chart review, history and exam, documentation and further activities as noted above      Gigi Ward    Hematologist and Medical Oncologist  [facility]          Again, thank you for allowing me to participate in the care of your patient.        Sincerely,        Gigi Ward MD

## 2023-08-03 ENCOUNTER — TELEPHONE (OUTPATIENT)
Dept: ONCOLOGY | Facility: CLINIC | Age: 78
End: 2023-08-03
Payer: COMMERCIAL

## 2023-08-03 NOTE — TELEPHONE ENCOUNTER
Steve was contacted by phone today for the Week 38 follow-up visit in the ECLIPSE study.  He reported that his blood pressure shot up several weeks ago and that his primary care team increased his BP medications.  This has been recorded in the EDC and patient chart.  No other changes were reported.  I also informed Steve that the opportunity to crossover to the treatment arm of the study will be available for the entire period that he is in follow-up (approximately five years).

## 2023-08-03 NOTE — PROGRESS NOTES
[facility] CANCER CLINIC  FOLLOW-UP VISIT NOTE    PATIENT NAME: Wallace Zelaya MRN # [id number]  DATE OF VISIT: Jul 21, 2023 YOB: 1945    REFERRING PROVIDER: No referring provider defined for this encounter.    CANCER TYPE: Prostate cancer; Biochemical recurrence; Castration resistant disease  STAGE: Stage III - pT3b at diagnosis; M0    HISTORY OF PRESENTING ILLNESS:  Wallace was noted to have rising screening PSA from 2 - 4. He was referred to Dr. Rodolfo Connor. He had radical prostatectomy on 11/2015. Pathology from this revealed Cayla 4+4 disease with extraprostatic extension, seminal vesicle extension and he was staged at pT3b. All of the 12 lymph nodes resected were negative for disease. Post operatively his PSA did not drop to undetectable levels and remained elevated at 0.56. It vladimir to 0.9 in April 2016 and he was referred to Dr. Newton for adjuvant radiation therapy. He completed radiation therapy but did not have any PSA response to this treatment.     TREATMENT SUMMARY:  11/13/2015 Radical prostatectomy  April 2016  Radiation therapy  He was started on intermittent androgen deprivation therapy which had to be changed to continuous with rapid rise in his PSA.      April 2020 - he was started on bicalutamide for complete androgen blockade  He had rising PSA in May 2021. His bone scan done on 6/30/21 revealed metastatic lesions of T6 and the sacrum. He was switched to abiraterone with prednisone on 8/7/21.  He had relatively stable PSA on therapy without any significant PSA response.  He was eventually taken off therapy in September 2022 for progressive disease.    He enrolled in the Eclipse clinical trial on 10/27/2022 and was randomized to the standard of care enzalutamide arm.    CURRENT INTERVENTIONS:  Enzalutamide 160 mg oral once a day as a standard of care on ECLIPSE trial    SUBJECTIVE   Wallace is being seen for his prostate cancer    Steve was followed in  person at this visit. He is seen for scheduled follow up visit.  He has been randomized to enzalutamide and has been taking enzalutamide since since 10/27/2022. He  denies missing a dose. He has tolerated this without difficulty. He denies any new side effects. He was in positive mood at this visit.      He is being followed with labs and restaging scans. He is aware of the rising PSA.     He has been active and has been playing basketball with his friends. He plays the game of HORSE (basketball) at [facility] which he explained for me. He got pictures of his group of friends playing basket ball. He would like to resume his golf at [email or web address].       PAST MEDICAL HISTORY   HTN  Dyslipidemia  Prostate cancer as detailed above      CURRENT OUTPATIENT MEDICATIONS     Current Outpatient Medications   Medication Sig    amLODIPine (NORVASC) 5 MG tablet Take 1 tablet (5 mg) by mouth daily    atorvastatin (LIPITOR) 20 MG tablet Take 1 tablet by mouth once daily    enzalutamide (XTANDI) 40 MG capsule Take 4 capsules (160 mg) by mouth daily    hydrochlorothiazide (HYDRODIURIL) 25 MG tablet Take 1 tablet (25 mg) by mouth daily    ibuprofen (ADVIL/MOTRIN) 800 MG tablet TAKE 1 TABLET BY MOUTH THREE TIMES DAILY WITH FOOD    valsartan (DIOVAN) 320 MG tablet Take 1 tablet (320 mg) by mouth daily     No current facility-administered medications for this visit.        ALLERGIES     No Known Allergies     REVIEW OF SYSTEMS   As above in the HPI, o/w complete 12-point ROS was negative.     PHYSICAL EXAM   BP (!) 147/79   Pulse 60   Temp 98.1  F (36.7  C) (Oral)   Resp 16   Wt 96.5 kg (212 lb 12.8 oz)   SpO2 96%   BMI 32.12 kg/m    SpO2 Readings from Last 4 Encounters:   09/21/18 96%   08/24/18 94%   06/22/18 95%   05/11/18 97%     Wt Readings from Last 3 Encounters:   07/21/23 96.5 kg (212 lb 12.8 oz)   07/06/23 98.4 kg (217 lb)   05/09/23 99.6 kg (219 lb 8 oz)     GENERAL/CONSTITUTIONAL: No acute distress.  EYES: Pupils are equal, round,  and react to light and accommodation. Extraocular movements intact.  No scleral icterus.  ENT/MOUTH: Neck supple. Oropharynx clear, no mucositis.  LYMPH: No anterior cervical, posterior cervical, supraclavicular, axillary or inguinal adenopathy.   RESPIRATORY: Clear to auscultation bilaterally. No crackles or wheezing.   CARDIOVASCULAR: Regular rate and rhythm without murmurs, gallops, or rubs.  GASTROINTESTINAL: No hepatosplenomegaly, masses, or tenderness. The patient has normal bowel sounds. No guarding.  No distention.  : Deferred  MUSCULOSKELETAL: Warm and well-perfused, no cyanosis, clubbing, or edema.  NEUROLOGIC: Cranial nerves II-XII are intact. Alert, oriented, answers questions appropriately. No focal neurologic deficit  INTEGUMENTARY: No rashes or jaundice.  GAIT: Normal     LABORATORY AND IMAGING STUDIES     Recent Labs   Lab Test 07/19/23  0901 05/23/23  0857 04/14/23  1021 03/27/23  1024 03/01/23  1039    143 140 138 139   POTASSIUM 4.9 4.5 4.1 4.6 4.4   CHLORIDE 101 105 104 102 101   CO2 26 28 26 29 28   ANIONGAP 12 10 10 7 10   BUN 15.4 16.4 13.7 9.3 11.3   CR 0.91 1.02 0.91 0.92 0.92   * 103* 106* 110* 109*   TY 9.2 9.3 9.3 9.5 9.4     Recent Labs   Lab Test 04/14/23  1021 03/27/23  1024 03/01/23  1039 01/27/23  1312 12/06/22  0810   MAG 1.8 2.0 2.0 1.9 2.0     Recent Labs   Lab Test 07/19/23  0901 05/23/23  0857 04/14/23  1021 03/27/23  1024 03/01/23  1039   WBC 4.9 5.6 5.5 5.6 5.6   HGB 15.5 14.9 14.7 15.8 15.8    177 183 173 193   MCV 93 94 91 92 91   NEUTROPHIL 52 57 58 60 61     Recent Labs   Lab Test 07/19/23  0901 05/23/23  0857 04/14/23  1021 08/15/22  0810 06/27/22  0827 03/28/22  1050 02/23/22  1419   BILITOTAL 0.6 0.6 0.7   < > 0.9 0.7 0.8   ALKPHOS 93 88 91   < > 94 108 113   ALT 16 15 15   < > 49 80* 88*   AST 36 27 23   < > 4 43 46*   ALBUMIN 4.2 4.1 4.1   < > 3.7 3.6 3.4   LDH  --   --   --   --  246* 259* 246*    < > = values in this interval not displayed.      TSH   Date Value Ref Range Status   04/14/2023 1.02 0.30 - 4.20 uIU/mL Final   03/01/2023 1.14 0.30 - 4.20 uIU/mL Final   10/27/2022 0.65 0.40 - 4.00 mU/L Final   09/22/2022 0.86 0.30 - 4.20 uIU/mL Final     No results for input(s): CEA in the last 18245 hours.  Results for orders placed or performed during the hospital encounter of 07/19/23   CT Chest Abdomen Pelvis w/o Contrast    Narrative    CT CHEST, ABDOMEN & PELVIS WITHOUT CONTRAST July 19, 2023 9:52 AM    HISTORY: CRPC on enzalutamide as per clinical trial, measure and  compare all known previous RECIST lesions. Prostate cancer (H).  Fatigue.    TECHNIQUE: CT scan obtained of the chest, abdomen, and pelvis without  IV contrast. Radiation dose for this scan was reduced using automated  exposure control, adjustment of the mA and/or kV according to patient  size, or iterative reconstruction technique.    COMPARISON: CT chest, abdomen and pelvis on 4/28/2023.    FINDINGS:    Chest/mediastinum: No cardiomegaly or significant pericardial  effusion. No significant mediastinal, hilar or axillary  lymphadenopathy. No significant atherosclerotic vascular calcification  of the coronary arteries.    Lung/pleura: No pleural effusion or pneumothorax. A few scattered  pulmonary nodules including 2 mm left upper lobe nodule (series 12  image 66) and 2 mm right middle lobe nodule (series 12 image 191), are  not significantly changed as compared to 4/28/2023.    Abdomen/pelvis: Limited evaluation of the abdominal organs due to lack  of intravenous contrast, within this limitation, there is;    Hepatobiliary: The unenhanced liver and gallbladder are grossly  unremarkable.    Pancreas: The unenhanced pancreas is grossly unremarkable.    Spleen: No splenomegaly.    Adrenal glands: There is 1.1 cm right adrenal nodule (series 4 image  153), not significantly changed as compared to 4/28/2023. Nodular  thickening of the left adrenal gland without discrete nodule.    Kidneys: No  radiodense kidney/ureteral stones or hydronephrosis in the  kidney.    Bowel: No abnormally dilated bowel loops.    Peritoneum: No significant free fluid in the abdomen or pelvis. No  free peritoneal portal venous gas.    Pelvic organs: The prostate gland is surgically absent.    Vascular: Moderate atherosclerotic vascular calcification of the  abdominal aorta and iliac vessels.    Lymph nodes: No significant abdominopelvic lymphadenopathy.    Bones and soft tissue: No suspicious osseous lesion.      Impression    IMPRESSION: In this patient with history of prostate cancer status  post prostatectomy, there is;  1. No significant change in the scattered areas of sclerotic osseous  metastases as compared to 4/28/2023. No convincing evidence of new  metastatic disease in the chest, abdomen or pelvis.  2. Moderate atherosclerotic vascular calcification of the coronary  arteries.    OSITO GANDHI MD         SYSTEM ID:  [id number]     Narrative & Impression   EXAM: NM BONE SCAN WHOLE BODY  LOCATION: [facility]  DATE: 7/19/2023     INDICATION: Malignant neoplasm of prostate  COMPARISON: Nuclear medicine bone scan dated 01/03/2023 and PSMA PET/CT dated 09/29/2022.  TECHNIQUE: 26.5 mCi technetium-99m MDP, IV. Anterior and posterior delayed whole-body images at 3 hours with additional spot images of the skull.     FINDINGS: Redemonstrated presumed osseous metastasis involving the T6 vertebral body and lower portions of the sacrum/coccyx, similar to prior exam and were PSMA positive on recent PET/CT dated 09/29/2022 and have not significantly changed since nuclear   medicine bone scan dated 01/03/2023.                                                                      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 Clinical research nurse - Janel Gee did join us for the visit.     He is tolerating enzalutamide well. He denies any new side effects on this medication. He has not been missing doses. He has not started any new medications.     We will continue with androgen deprivation therapy with GnRH analog - he received his last dose on 1/27/23 and is due for it today.     I have reviewed all of the labs done prior to this clinic visit.  Labs are all completely normal including electrolytes, renal function, hepatic panel, complete blood count and differential. His PSA had nicely responded to enzalutamide from 10.3 ng/ml at the start of therapy to 0.39 ng/ml on 3/1/23. It has been slowly rising since then. It has increased to 0.75 ng/ml in 3 months time.      His is being seen with restaging scans -  CT chest, abdomen and pelvis and bone scans. I have reviewed actual images from his restaging scans and they continue to reveal stable disease.        He again had questions about the trial which were reviewed with him.      I have reviewed his charts and his next option would be chemotherapy with docetaxel. He is not excited about this. I did review the option of phase I/II CYPIDES trial -  safety and pharmacokinetics of ODM-208 in patients with metastatic castration-resistant prostate cancer. ODM-208 IS is an oral CYP11 inhibitor with causes adrenal ablation and reduces the number of promiscuous ligands (androgens, progestins, glucocorticoids, mineralocorticoids) that may bind to the devious androgen receptor.  It requires co-administration with fludrocortisone and  dexamethasone. I have also reviewed STEP trial with bipolar androgen deprivation therapy.      He would be eligible for this trial. I do not see any significant downside to participation in this trial. He had a good treatment response to enzalutamide. He does not have any critical disease. He is not excited about chemotherapy. Bipolar therapy or newer anti-androgen is a reasonable choice in that scenario. I will reach out to my research team and request screening for trials.     25 minutes spent on the date of the encounter doing chart review, history and exam, documentation and further activities as noted above      Gigi Ward    Hematologist and Medical Oncologist  [facility]

## 2023-08-09 ENCOUNTER — OFFICE VISIT (OUTPATIENT)
Dept: FAMILY MEDICINE | Facility: CLINIC | Age: 78
End: 2023-08-09
Payer: COMMERCIAL

## 2023-08-09 VITALS
RESPIRATION RATE: 18 BRPM | HEART RATE: 66 BPM | TEMPERATURE: 98 F | OXYGEN SATURATION: 98 % | DIASTOLIC BLOOD PRESSURE: 78 MMHG | SYSTOLIC BLOOD PRESSURE: 132 MMHG | WEIGHT: 214 LBS | BODY MASS INDEX: 32.43 KG/M2 | HEIGHT: 68 IN

## 2023-08-09 DIAGNOSIS — Z12.11 SCREEN FOR COLON CANCER: Primary | ICD-10-CM

## 2023-08-09 DIAGNOSIS — C61 PROSTATE CANCER (H): ICD-10-CM

## 2023-08-09 DIAGNOSIS — I10 ESSENTIAL HYPERTENSION: ICD-10-CM

## 2023-08-09 DIAGNOSIS — I10 BENIGN ESSENTIAL HYPERTENSION: ICD-10-CM

## 2023-08-09 PROCEDURE — 99213 OFFICE O/P EST LOW 20 MIN: CPT | Performed by: NURSE PRACTITIONER

## 2023-08-09 RX ORDER — VALSARTAN 320 MG/1
320 TABLET ORAL DAILY
Qty: 90 TABLET | Refills: 1 | Status: SHIPPED | OUTPATIENT
Start: 2023-08-09 | End: 2024-08-05

## 2023-08-09 RX ORDER — HYDROCHLOROTHIAZIDE 25 MG/1
25 TABLET ORAL DAILY
Qty: 90 TABLET | Refills: 1 | Status: SHIPPED | OUTPATIENT
Start: 2023-08-09 | End: 2024-07-11

## 2023-08-09 ASSESSMENT — PAIN SCALES - GENERAL: PAINLEVEL: NO PAIN (0)

## 2023-08-09 NOTE — PROGRESS NOTES
"  Assessment & Plan     Screen for colon cancer  Due to age would like to stop screening, health mainteance updated    Benign essential hypertension  Controlled on current medication, up to date on labs,   Follow up in 6 months    Prostate cancer (H)  Stable, continue to follow with oncology     Essential hypertension  - see above  - hydrochlorothiazide (HYDRODIURIL) 25 MG tablet  Dispense: 90 tablet; Refill: 1  - valsartan (DIOVAN) 320 MG tablet  Dispense: 90 tablet; Refill: 1    [facility] is a 78 year old, presenting for the following health issues:  Hypertension        8/9/2023     7:39 AM   Additional Questions   Roomed by Vanita CAMARA       History of Present Illness       Hypertension: He presents for follow up of hypertension.  He does check blood pressure  regularly outside of the clinic. Outpatient blood pressures have not been over 140/90. He follows a low salt diet.     He eats 2-3 servings of fruits and vegetables daily.He consumes 2 sweetened beverage(s) daily.He exercises with enough effort to increase his heart rate 20 to 29 minutes per day.  He exercises with enough effort to increase his heart rate 5 days per week.   He is taking medications regularly.     He is here today for HTN check up, last time he was here his medications.   He is doing well on his prostates cancer trial. He feels well, he wants to be done with colon cancer screening.       Review of Systems   Constitutional, HEENT, cardiovascular, pulmonary, gi and gu systems are negative, except as otherwise noted.      Objective    /78 (BP Location: Right arm, Patient Position: Sitting, Cuff Size: Adult Large)   Pulse 66   Temp 98  F (36.7  C) (Oral)   Resp 18   Ht 1.734 m (5' 8.25\")   Wt 97.1 kg (214 lb)   SpO2 98%   BMI 32.30 kg/m    Body mass index is 32.3 kg/m .  Physical Exam   GENERAL: healthy, alert and no distress  NECK: no adenopathy, no asymmetry, " masses, or scars and thyroid normal to palpation  RESP: lungs clear to auscultation - no rales, rhonchi or wheezes  CV: regular rate and rhythm, normal S1 S2, no S3 or S4, no murmur, click or rub, no peripheral edema and peripheral pulses strong  ABDOMEN: soft, nontender, no hepatosplenomegaly, no masses and bowel sounds normal  MS: no gross musculoskeletal defects noted, no edema

## 2023-08-21 ENCOUNTER — MYC MEDICAL ADVICE (OUTPATIENT)
Dept: FAMILY MEDICINE | Facility: CLINIC | Age: 78
End: 2023-08-21
Payer: COMMERCIAL

## 2023-08-21 DIAGNOSIS — M54.50 CHRONIC MIDLINE LOW BACK PAIN WITHOUT SCIATICA: ICD-10-CM

## 2023-08-21 DIAGNOSIS — G89.29 CHRONIC MIDLINE LOW BACK PAIN WITHOUT SCIATICA: ICD-10-CM

## 2023-08-22 RX ORDER — IBUPROFEN 800 MG/1
TABLET, FILM COATED ORAL
Qty: 90 TABLET | Refills: 3 | Status: SHIPPED | OUTPATIENT
Start: 2023-08-22

## 2023-08-24 DIAGNOSIS — C61 PROSTATE CANCER (H): Primary | ICD-10-CM

## 2023-10-02 NOTE — PATIENT INSTRUCTIONS
10/9/23 Labs/Scans  10/13/23 Return visit with Dr. Ed Barnett, RN, BSN.  RN Care Coordinator     [facility]   [phone]

## 2023-10-08 ENCOUNTER — TELEPHONE (OUTPATIENT)
Dept: ONCOLOGY | Facility: CLINIC | Age: 78
End: 2023-10-08
Payer: COMMERCIAL

## 2023-10-08 NOTE — TELEPHONE ENCOUNTER
PA Initiation    Medication: XTANDI 40 MG PO CAPS  Insurance Company: [employer] - Phone [phone] Fax [phone]  Pharmacy Filling the Rx:    Filling Pharmacy Phone:    Filling Pharmacy Fax:    Start Date: 10/8/2023

## 2023-10-09 ENCOUNTER — LAB (OUTPATIENT)
Dept: INFUSION THERAPY | Facility: CLINIC | Age: 78
End: 2023-10-09
Attending: INTERNAL MEDICINE
Payer: COMMERCIAL

## 2023-10-09 ENCOUNTER — HOSPITAL ENCOUNTER (OUTPATIENT)
Dept: CT IMAGING | Facility: CLINIC | Age: 78
Discharge: HOME | End: 2023-10-09
Attending: INTERNAL MEDICINE
Payer: COMMERCIAL

## 2023-10-09 ENCOUNTER — HOSPITAL ENCOUNTER (OUTPATIENT)
Dept: NUCLEAR MEDICINE | Facility: CLINIC | Age: 78
Setting detail: NUCLEAR MEDICINE
Discharge: HOME | End: 2023-10-09
Attending: INTERNAL MEDICINE
Payer: COMMERCIAL

## 2023-10-09 ENCOUNTER — ANCILLARY PROCEDURE (OUTPATIENT)
Dept: RADIOLOGY | Facility: CLINIC | Age: 78
End: 2023-10-09
Attending: INTERNAL MEDICINE
Payer: COMMERCIAL

## 2023-10-09 ENCOUNTER — TELEPHONE (OUTPATIENT)
Dept: ONCOLOGY | Facility: CLINIC | Age: 78
End: 2023-10-09

## 2023-10-09 DIAGNOSIS — C61 PROSTATE CANCER (H): ICD-10-CM

## 2023-10-09 DIAGNOSIS — C79.51 MALIGNANT NEOPLASM METASTATIC TO BONE (H): ICD-10-CM

## 2023-10-09 DIAGNOSIS — I10 ESSENTIAL HYPERTENSION: ICD-10-CM

## 2023-10-09 LAB
ALBUMIN SERPL BCG-MCNC: 4.1 G/DL (ref 3.5–5.2)
ALP SERPL-CCNC: 89 U/L (ref 40–129)
ALT SERPL W P-5'-P-CCNC: 16 U/L (ref 0–70)
ANION GAP SERPL CALCULATED.3IONS-SCNC: 11 MMOL/L (ref 7–15)
AST SERPL W P-5'-P-CCNC: 25 U/L (ref 0–45)
BASO+EOS+MONOS # BLD AUTO: ABNORMAL 10*3/UL
BASO+EOS+MONOS NFR BLD AUTO: ABNORMAL %
BASOPHILS # BLD AUTO: 0 10E3/UL (ref 0–0.2)
BASOPHILS NFR BLD AUTO: 1 %
BILIRUB SERPL-MCNC: 0.7 MG/DL
BUN SERPL-MCNC: 13.9 MG/DL (ref 8–23)
CALCIUM SERPL-MCNC: 9 MG/DL (ref 8.8–10.2)
CHLORIDE SERPL-SCNC: 102 MMOL/L (ref 98–107)
CREAT SERPL-MCNC: 0.91 MG/DL (ref 0.67–1.17)
DEPRECATED HCO3 PLAS-SCNC: 26 MMOL/L (ref 22–29)
EGFRCR SERPLBLD CKD-EPI 2021: 86 ML/MIN/1.73M2
EOSINOPHIL # BLD AUTO: 0.2 10E3/UL (ref 0–0.7)
EOSINOPHIL NFR BLD AUTO: 5 %
ERYTHROCYTE [DISTWIDTH] IN BLOOD BY AUTOMATED COUNT: 13.2 % (ref 10–15)
GLUCOSE SERPL-MCNC: 105 MG/DL (ref 70–99)
HCT VFR BLD AUTO: 39.9 % (ref 40–53)
HGB BLD-MCNC: 13.9 G/DL (ref 13.3–17.7)
IMM GRANULOCYTES # BLD: 0 10E3/UL
IMM GRANULOCYTES NFR BLD: 0 %
LYMPHOCYTES # BLD AUTO: 1.5 10E3/UL (ref 0.8–5.3)
LYMPHOCYTES NFR BLD AUTO: 30 %
MCH RBC QN AUTO: 32.6 PG (ref 26.5–33)
MCHC RBC AUTO-ENTMCNC: 34.8 G/DL (ref 31.5–36.5)
MCV RBC AUTO: 93 FL (ref 78–100)
MONOCYTES # BLD AUTO: 0.5 10E3/UL (ref 0–1.3)
MONOCYTES NFR BLD AUTO: 10 %
NEUTROPHILS # BLD AUTO: 2.7 10E3/UL (ref 1.6–8.3)
NEUTROPHILS NFR BLD AUTO: 54 %
NRBC # BLD AUTO: 0 10E3/UL
NRBC BLD AUTO-RTO: 0 /100
PLATELET # BLD AUTO: 177 10E3/UL (ref 150–450)
POTASSIUM SERPL-SCNC: 3.7 MMOL/L (ref 3.4–5.3)
PROT SERPL-MCNC: 6.6 G/DL (ref 6.4–8.3)
PSA SERPL DL<=0.01 NG/ML-MCNC: 0.85 NG/ML (ref 0–6.5)
RBC # BLD AUTO: 4.27 10E6/UL (ref 4.4–5.9)
SODIUM SERPL-SCNC: 139 MMOL/L (ref 135–145)
WBC # BLD AUTO: 5 10E3/UL (ref 4–11)

## 2023-10-09 PROCEDURE — 250N000011 HC RX IP 250 OP 636: Performed by: INTERNAL MEDICINE

## 2023-10-09 PROCEDURE — 85025 COMPLETE CBC W/AUTO DIFF WBC: CPT | Performed by: INTERNAL MEDICINE

## 2023-10-09 PROCEDURE — 343N000001 HC RX 343: Performed by: INTERNAL MEDICINE

## 2023-10-09 PROCEDURE — 78306 BONE IMAGING WHOLE BODY: CPT

## 2023-10-09 PROCEDURE — 74177 CT ABD & PELVIS W/CONTRAST: CPT

## 2023-10-09 PROCEDURE — 36415 COLL VENOUS BLD VENIPUNCTURE: CPT

## 2023-10-09 PROCEDURE — 84153 ASSAY OF PSA TOTAL: CPT | Performed by: INTERNAL MEDICINE

## 2023-10-09 PROCEDURE — 80053 COMPREHEN METABOLIC PANEL: CPT | Performed by: INTERNAL MEDICINE

## 2023-10-09 PROCEDURE — 250N000009 HC RX 250: Performed by: INTERNAL MEDICINE

## 2023-10-09 PROCEDURE — A9561 TC99M OXIDRONATE: HCPCS | Performed by: INTERNAL MEDICINE

## 2023-10-09 RX ORDER — IOPAMIDOL 755 MG/ML
500 INJECTION, SOLUTION INTRAVASCULAR ONCE
Status: COMPLETED | OUTPATIENT
Start: 2023-10-09 | End: 2023-10-09

## 2023-10-09 RX ADMIN — IOPAMIDOL 100 ML: 755 INJECTION, SOLUTION INTRAVENOUS at 11:49

## 2023-10-09 RX ADMIN — Medication 21 MILLICURIE: at 11:00

## 2023-10-09 RX ADMIN — SODIUM CHLORIDE 65 ML: 9 INJECTION, SOLUTION INTRAVENOUS at 11:49

## 2023-10-09 NOTE — TELEPHONE ENCOUNTER
Prior Authorization Approval    Medication: XTANDI 40 MG PO CAPS  Authorization Effective Date: 7/10/2023  Authorization Expiration Date: 10/8/2024  Approved Dose/Quantity: 120/30  Reference #: [id number]   Insurance Company: [employer] - Phone [phone] Fax [phone]  Expected CoPay: $    CoPay Card Available:      Financial Assistance Needed:   Which Pharmacy is filling the prescription:    Pharmacy Notified: Yes  Patient Notified: Yes

## 2023-10-09 NOTE — ORAL ONC MGMT
Oral Chemotherapy Monitoring Program  Lab Follow Up    Reviewed lab results from 10/9/23..        8/16/2022    10:00 AM 9/13/2022    11:00 AM 10/27/2022    12:00 PM 11/25/2022     1:00 PM 3/1/2023     1:00 PM 5/23/2023     4:00 PM 10/9/2023     2:00 PM   ORAL CHEMOTHERAPY   Assessment Type Lab Monitoring Discontinuation Chart Review;Initial Work up;New Teach Initial Follow up Lab Monitoring Lab Monitoring Lab Monitoring   Stop Date  9/13/2022        Reason for Discontinuation  Disease progression        Other:  enrolled in clinical trial        Diagnosis Code Prostate Cancer Prostate Cancer Prostate Cancer Prostate Cancer Prostate Cancer Prostate Cancer Prostate Cancer   Providers Dr. Ed Ward   Clinic Name/Location [facility]   Drug Name Zytiga (abiraterone) Zytiga (abiraterone) Xtandi (enzalutamide) Xtandi (enzalutamide) Xtandi (enzalutamide) Xtandi (enzalutamide) Xtandi (enzalutamide)   Dose 1,000 mg 500 mg 160 mg 160 mg 160 mg 160 mg 160 mg   Current Schedule Daily Daily Daily Daily Daily Daily Daily   Cycle Details Continuous Continuous Continuous Continuous Continuous Continuous Continuous   Planned next cycle start date   11/10/2022 11/8/2022      Doses missed in last 2 weeks    0      Adherence Assessment    Adherent      Adverse Effects    No AE identified during assessment      Any new drug interactions?   No No      Is the dose as ordered appropriate for the patient?   Yes Yes      Is the patient currently in pain?    No      Has the patient been assessed within the past 6 months for depression?    No      Has the patient missed any days of school, work, or other routine activity?    No          Labs:  _  Result Component Current Result Ref Range   Sodium 139 (10/9/2023) 135 - 145 mmol/L     _  Result Component Current Result Ref Range   Potassium 3.7 (10/9/2023) 3.4 - 5.3 mmol/L     _  Result  Component Current Result Ref Range   Calcium 9.0 (10/9/2023) 8.8 - 10.2 mg/dL     No results found for Mag within last 30 days.     No results found for Phos within last 30 days.     _  Result Component Current Result Ref Range   Albumin 4.1 (10/9/2023) 3.5 - 5.2 g/dL     _  Result Component Current Result Ref Range   Urea Nitrogen 13.9 (10/9/2023) 8.0 - 23.0 mg/dL     _  Result Component Current Result Ref Range   Creatinine 0.91 (10/9/2023) 0.67 - 1.17 mg/dL     _  Result Component Current Result Ref Range   AST 25 (10/9/2023) 0 - 45 U/L     _  Result Component Current Result Ref Range   ALT 16 (10/9/2023) 0 - 70 U/L     _  Result Component Current Result Ref Range   Bilirubin Total 0.7 (10/9/2023) <=1.2 mg/dL     _  Result Component Current Result Ref Range   WBC Count 5.0 (10/9/2023) 4.0 - 11.0 10e3/uL     _  Result Component Current Result Ref Range   Hemoglobin 13.9 (10/9/2023) 13.3 - 17.7 g/dL     _  Result Component Current Result Ref Range   Platelet Count 177 (10/9/2023) 150 - 450 10e3/uL     No results found for ANC within last 30 days.     _  Result Component Current Result Ref Range   Absolute Neutrophils 2.7 (10/9/2023) 1.6 - 8.3 10e3/uL        Assessment & Plan:  There were no concerning abnormalities.  Continue with current xtandi regimen.  I have Marci looking at renewing his xtandi free drug application.      Follow-Up:  10/13/23 Dr. Jha appt Martin Meese, [facility]

## 2023-10-09 NOTE — PROGRESS NOTES
Nursing Note:  Wallace GOMEZ Nathalie presents today for Labs + PIV insertion for CT and NM scans.    Patient seen by provider today: No   present during visit today: Not Applicable.    Note: N/A.    Intravenous Access:  Peripheral IV placed, required 2 attempts.  PIV placed in left lower arm, but unable to get blood return after placement.  Flushed with 30 ml NS with no signs/symptoms of infiltration.    Lab draw site right hand, Needle type butterfly, Gauge 23.  Labs drawn without difficulty.    Discharge Plan:   Patient was sent to imaging department for CT and NM scans.  Imaging department to discontinue PIV after use.      Fuentes Russell RN

## 2023-10-10 NOTE — TELEPHONE ENCOUNTER
I called [facility] to check the status of his refills, I spoke with rep Villanueva, he said they thought the patient had no refills but they consulted the renewal application and did see that the patient has 11 refills dates 12/16/2022. They will update their records and let us know if anything changes but they should not need further refills till 12/16/2023.

## 2023-10-13 ENCOUNTER — ONCOLOGY VISIT (OUTPATIENT)
Dept: ONCOLOGY | Facility: CLINIC | Age: 78
End: 2023-10-13
Attending: INTERNAL MEDICINE
Payer: COMMERCIAL

## 2023-10-13 VITALS
HEART RATE: 66 BPM | DIASTOLIC BLOOD PRESSURE: 74 MMHG | RESPIRATION RATE: 16 BRPM | BODY MASS INDEX: 32.48 KG/M2 | OXYGEN SATURATION: 96 % | SYSTOLIC BLOOD PRESSURE: 152 MMHG | WEIGHT: 215.2 LBS | TEMPERATURE: 98.3 F

## 2023-10-13 DIAGNOSIS — C79.51 MALIGNANT NEOPLASM METASTATIC TO BONE (H): ICD-10-CM

## 2023-10-13 DIAGNOSIS — C61 PROSTATE CANCER (H): Primary | ICD-10-CM

## 2023-10-13 PROCEDURE — G0463 HOSPITAL OUTPT CLINIC VISIT: HCPCS | Performed by: INTERNAL MEDICINE

## 2023-10-13 PROCEDURE — 99214 OFFICE O/P EST MOD 30 MIN: CPT | Performed by: INTERNAL MEDICINE

## 2023-10-13 ASSESSMENT — PAIN SCALES - GENERAL: PAINLEVEL: NO PAIN (0)

## 2023-10-13 NOTE — LETTER
10/13/2023         RE: Wallace Zelaya  [id number] [facility] [id number]        Dear Colleague,    Thank you for referring your patient, Wallace Zelaya, to the [facility]CER [facility]. Please see a copy of my visit note below.    [facility]  FOLLOW-UP VISIT NOTE    PATIENT NAME: Wallace Zelaya MRN # [id number]  DATE OF VISIT: Oct 13, 2023 YOB: 1945    REFERRING PROVIDER: No referring provider defined for this encounter.    CANCER TYPE: Prostate cancer; Biochemical recurrence; Castration resistant disease  STAGE: Stage III - pT3b at diagnosis; M0    HISTORY OF PRESENTING ILLNESS:  Wallace was noted to have rising screening PSA from 2 - 4. He was referred to Dr. Rodolfo Connor. He had radical prostatectomy on 11/2015. Pathology from this revealed [place] 4+4 disease with extraprostatic extension, seminal vesicle extension and he was staged at pT3b. All of the 12 lymph nodes resected were negative for disease. Post operatively his PSA did not drop to undetectable levels and remained elevated at 0.56. It vladimir to 0.9 in April 2016 and he was referred to Dr. Newton for adjuvant radiation therapy. He completed radiation therapy but did not have any PSA response to this treatment.     TREATMENT SUMMARY:  11/13/2015 Radical prostatectomy  April 2016  Radiation therapy  He was started on intermittent androgen deprivation therapy which had to be changed to continuous with rapid rise in his PSA.      April 2020 - he was started on bicalutamide for complete androgen blockade  He had rising PSA in May 2021. His bone scan done on 6/30/21 revealed metastatic lesions of T6 and the sacrum. He was switched to abiraterone with prednisone on 8/7/21.  He had relatively stable PSA on therapy without any significant PSA response.  He was eventually taken off therapy in September 2022 for progressive disease.    He enrolled in the Eclipse clinical trial  on 10/27/2022 and was randomized to the standard of care enzalutamide arm.    CURRENT INTERVENTIONS:  Enzalutamide 160 mg oral once a day as a standard of care on ECLIPSE trial    DELANEY Gonsalez is being seen for his prostate cancer    Steve was followed in person at this visit. He is seen for scheduled follow up visit.  He has been randomized to enzalutamide and has been taking enzalutamide since since 10/27/2022. He  denies missing a dose. He has tolerated this without difficulty. He denies any new side effects. He was in positive mood at this visit.      He is being followed with labs and restaging scans.      He has been active and has been playing basketball with his friends. He plays the game of HORSE (basketball) at [facility] which he explained for me. He got pictures of his group of friends playing basket ball. He would like to resume his golf at [employer].       PAST MEDICAL HISTORY   HTN  Dyslipidemia  Prostate cancer as detailed above      CURRENT OUTPATIENT MEDICATIONS     Current Outpatient Medications   Medication Sig     amLODIPine (NORVASC) 5 MG tablet Take 1 tablet (5 mg) by mouth daily     atorvastatin (LIPITOR) 20 MG tablet Take 1 tablet by mouth once daily     enzalutamide (XTANDI) 40 MG capsule Take 4 capsules (160 mg) by mouth daily     hydrochlorothiazide (HYDRODIURIL) 25 MG tablet Take 1 tablet (25 mg) by mouth daily     ibuprofen (ADVIL/MOTRIN) 800 MG tablet TAKE 1 TABLET BY MOUTH THREE TIMES DAILY WITH FOOD     valsartan (DIOVAN) 320 MG tablet Take 1 tablet (320 mg) by mouth daily     No current facility-administered medications for this visit.        ALLERGIES     No Known Allergies     REVIEW OF SYSTEMS   As above in the HPI, o/w complete 12-point ROS was negative.     PHYSICAL EXAM   BP (!) 152/74   Pulse 66   Temp 98.3  F (36.8  C) (Oral)   Resp 16   Wt 97.6 kg (215 lb 3.2 oz)   SpO2 96%   BMI 32.48 kg/m    SpO2 Readings from Last 4 Encounters:   09/21/18 96%   08/24/18 94%   06/22/18  95%   05/11/18 97%     Wt Readings from Last 3 Encounters:   10/13/23 97.6 kg (215 lb 3.2 oz)   08/09/23 97.1 kg (214 lb)   07/21/23 96.5 kg (212 lb 12.8 oz)     GENERAL/CONSTITUTIONAL: No acute distress.  EYES: Pupils are equal, round, and react to light and accommodation. Extraocular movements intact.  No scleral icterus.  ENT/MOUTH: Neck supple. Oropharynx clear, no mucositis.  LYMPH: No anterior cervical, posterior cervical, supraclavicular, axillary or inguinal adenopathy.   RESPIRATORY: Clear to auscultation bilaterally. No crackles or wheezing.   CARDIOVASCULAR: Regular rate and rhythm without murmurs, gallops, or rubs.  GASTROINTESTINAL: No hepatosplenomegaly, masses, or tenderness. The patient has normal bowel sounds. No guarding.  No distention.  : Deferred  MUSCULOSKELETAL: Warm and well-perfused, no cyanosis, clubbing, or edema.  NEUROLOGIC: Cranial nerves II-XII are intact. Alert, oriented, answers questions appropriately. No focal neurologic deficit  INTEGUMENTARY: No rashes or jaundice.  GAIT: Normal     LABORATORY AND IMAGING STUDIES     Recent Labs   Lab Test 10/09/23  1051 07/19/23  0901 05/23/23  0857 04/14/23  1021 03/27/23  1024    139 143 140 138   POTASSIUM 3.7 4.9 4.5 4.1 4.6   CHLORIDE 102 101 105 104 102   CO2 26 26 28 26 29   ANIONGAP 11 12 10 10 7   BUN 13.9 15.4 16.4 13.7 9.3   CR 0.91 0.91 1.02 0.91 0.92   * 107* 103* 106* 110*   TY 9.0 9.2 9.3 9.3 9.5     Recent Labs   Lab Test 04/14/23  1021 03/27/23  1024 03/01/23  1039 01/27/23  1312 12/06/22  0810   MAG 1.8 2.0 2.0 1.9 2.0     Recent Labs   Lab Test 10/09/23  1051 07/19/23  0901 05/23/23  0857 04/14/23  1021 03/27/23  1024   WBC 5.0 4.9 5.6 5.5 5.6   HGB 13.9 15.5 14.9 14.7 15.8    189 177 183 173   MCV 93 93 94 91 92   NEUTROPHIL 54 52 57 58 60     Recent Labs   Lab Test 10/09/23  1051 07/19/23  0901 05/23/23  0857 08/15/22  0810 06/27/22  0827 03/28/22  1050 02/23/22  1419   BILITOTAL 0.7 0.6 0.6   < > 0.9  "0.7 0.8   ALKPHOS 89 93 88   < > 94 108 113   ALT 16 16 15   < > 49 80* 88*   AST 25 36 27   < > 4 43 46*   ALBUMIN 4.1 4.2 4.1   < > 3.7 3.6 3.4   LDH  --   --   --   --  246* 259* 246*    < > = values in this interval not displayed.     TSH   Date Value Ref Range Status   04/14/2023 1.02 0.30 - 4.20 uIU/mL Final   03/01/2023 1.14 0.30 - 4.20 uIU/mL Final   10/27/2022 0.65 0.40 - 4.00 mU/L Final   09/22/2022 0.86 0.30 - 4.20 uIU/mL Final     No results for input(s): \"CEA\" in the last 15833 hours.  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 Multiplanar reformats were  obtained. Dose reduction techniques were used.   CONTRAST: 100mL Isovue-370    COMPARISON: Same day bone scan and CT of the chest, abdomen and pelvis  on 7/19/2023    FINDINGS:   LUNGS AND PLEURA: Few stable small pulmonary nodules. For example, a  right upper lobe 2 mm nodule (series 10, image 67) and a right middle  lobe subpleural nodule (series 10, image 181) are stable. No new or  enlarging nodules. Mild emphysema. No infiltrate or pleural effusion.    MEDIASTINUM/AXILLAE: No lymphadenopathy. No thoracic aortic aneurysm.  Severe coronary artery catheterizations.    HEPATOBILIARY: No focal lesions in the liver. Cholelithiasis.    PANCREAS: Normal.    SPLEEN: Linear subcapsular hypodensity around the spleen is stable  since 4/28/2023 at least, and consistent with a chronic subcapsular  hematoma.    ADRENAL GLANDS: Stable 10 mm right adrenal " nodule. Mildly thickened  left adrenal gland, stable.    KIDNEYS/BLADDER: No significant mass, stones, or hydronephrosis. There  are simple or benign cysts. No follow up is needed.    BOWEL: Normal with no obstruction or acute inflammatory change.  Nothing for appendicitis.    PELVIC ORGANS: Prostatectomy. No pelvic masses.    ADDITIONAL FINDINGS: No lymphadenopathy in the abdomen and pelvis.  Stable mild aneurysmal abdominal aorta measuring 3.5 cm, previously  3.5 cm using similar measurement technique. No ascites or fluid  collections.    MUSCULOSKELETAL: Stable sclerotic lesions in the sacrum, T6 and right  pubic bone. No new lesions.      Impression    IMPRESSION:  1.  Stable sclerotic osseous metastases.  2.  No new disease in the chest, abdomen and pelvis.  3.  Stable aneurysmal abdominal aorta measuring 3.5 cm.    MINNIE EMANUEL MD         SYSTEM ID:  [id number]     Narrative & Impression   EXAM: NM BONE SCAN WHOLE BODY  LOCATION: [facility]  DATE: 10/9/2023     INDICATION: Malignant neoplasm of prostate  COMPARISON: Nuclear medicine bone scan dated 07/19/2023 and nuclear medicine bone scan dated 01/03/2023 and the [facility] PET/CT dated 09/29/2022.  TECHNIQUE: 21.0 mCi technetium-99m MDP, IV. Anterior and posterior delayed whole-body images at 3 hours with additional spot images of the skull.     FINDINGS: Redemonstrated presumed osseous metastasis involving the T6 vertebral body and lower portions of the sacrum/coccyx, similar to prior exam and were PSMA positive on recent PET/CT dated 09/29/2022 and have not significantly changed since nuclear   medicine bone scan dated 01/03/2023.                                                                      IMPRESSION:     Stable osseous metastases involving the T6 vertebral body and lower portions of the sacrum/coccyx, similar to prior bone scan dated 01/03/2023.          Recent Labs   Lab Test 10/09/23  1051 07/19/23  0901 05/23/23  0857  04/14/23  1021 03/01/23  1039 01/03/23  0926 10/27/22  1046   PSA 0.85 0.75 0.55 0.42 0.39 0.46 10.30*   TESTOSTTOTAL  --  6*  --  6* 7* 7* <2*     Recent Labs   Lab Test 10/09/23  1051 07/19/23  0901 05/23/23  0857 04/14/23  1021 03/27/23  1024 03/01/23  1039 06/27/22  0827 03/28/22  1050 02/23/22  1419 01/10/22  1120 12/29/21  0923 12/10/21  1018   PSA 0.85 0.75 0.55 0.42  --  0.39 5.17 2.00 2.11  --  0.99 0.78   ALKPHOS 89 93 88 91 95 87 94 108 113   < > 118 112   LDH  --   --   --   --   --   --  246* 259* 246*  --  262* 274*    < > = values in this interval not displayed.             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 Clinical research nurse - Janel Gee did not join us for the visit.     He is tolerating enzalutamide well. He denies any new side effects on this medication. He has not been missing doses. He has not started any new medications.     We will continue with androgen deprivation therapy with GnRH analog - he received his last dose on 7/21/23 and is not due for it today.     I have reviewed all of the labs done prior to this clinic visit.  Labs are all completely normal including electrolytes, renal function, hepatic panel, complete blood count and differential. His PSA had nicely responded to enzalutamide from 10.3 ng/ml at the start of therapy to 0.39 ng/ml on 3/1/23. It has been slowly rising since then. It has increased to 0.85 ng/ml in 6 months time.      His is being seen with restaging scans -  CT chest, abdomen and pelvis and bone scans. I have reviewed actual images from his restaging scans and they continue to reveal stable disease.        He again had questions about the trial which were reviewed with him.     I will see him again in 12 weeks or so with labs and restaging scans. We will wait for an input  from our research nurse about the right time for restaging per trial protocol.     30 minutes spent on the date of the encounter doing chart review, history and exam, documentation and further activities as noted above      Gigi Ward    Hematologist and Medical Oncologist  [facility]          Again, thank you for allowing me to participate in the care of your patient.        Sincerely,        Gigi Ward MD

## 2023-10-13 NOTE — PROGRESS NOTES
[facility] CANCER CLINIC  FOLLOW-UP VISIT NOTE    PATIENT NAME: Wallace Zelaya MRN # [id number]  DATE OF VISIT: Oct 13, 2023 YOB: 1945    REFERRING PROVIDER: No referring provider defined for this encounter.    CANCER TYPE: Prostate cancer; Biochemical recurrence; Castration resistant disease  STAGE: Stage III - pT3b at diagnosis; M0    HISTORY OF PRESENTING ILLNESS:  Wallace was noted to have rising screening PSA from 2 - 4. He was referred to Dr. Rodolfo Connor. He had radical prostatectomy on 11/2015. Pathology from this revealed [place] 4+4 disease with extraprostatic extension, seminal vesicle extension and he was staged at pT3b. All of the 12 lymph nodes resected were negative for disease. Post operatively his PSA did not drop to undetectable levels and remained elevated at 0.56. It vladmiir to 0.9 in April 2016 and he was referred to Dr. Newton for adjuvant radiation therapy. He completed radiation therapy but did not have any PSA response to this treatment.     TREATMENT SUMMARY:  11/13/2015 Radical prostatectomy  April 2016  Radiation therapy  He was started on intermittent androgen deprivation therapy which had to be changed to continuous with rapid rise in his PSA.      April 2020 - he was started on bicalutamide for complete androgen blockade  He had rising PSA in May 2021. His bone scan done on 6/30/21 revealed metastatic lesions of T6 and the sacrum. He was switched to abiraterone with prednisone on 8/7/21.  He had relatively stable PSA on therapy without any significant PSA response.  He was eventually taken off therapy in September 2022 for progressive disease.    He enrolled in the Eclipse clinical trial on 10/27/2022 and was randomized to the standard of care enzalutamide arm.    CURRENT INTERVENTIONS:  Enzalutamide 160 mg oral once a day as a standard of care on ECLIPSE trial    SUBJECTIVE   Wallace is being seen for his prostate cancer    Steve was followed in  person at this visit. He is seen for scheduled follow up visit.  He has been randomized to enzalutamide and has been taking enzalutamide since since 10/27/2022. He  denies missing a dose. He has tolerated this without difficulty. He denies any new side effects. He was in positive mood at this visit.      He is being followed with labs and restaging scans.      He has been active and has been playing basketball with his friends. He plays the game of HORSE (basketball) at [facility] which he explained for me. He got pictures of his group of friends playing basket ball. He would like to resume his golf at [employer].       PAST MEDICAL HISTORY   HTN  Dyslipidemia  Prostate cancer as detailed above      CURRENT OUTPATIENT MEDICATIONS     Current Outpatient Medications   Medication Sig    amLODIPine (NORVASC) 5 MG tablet Take 1 tablet (5 mg) by mouth daily    atorvastatin (LIPITOR) 20 MG tablet Take 1 tablet by mouth once daily    enzalutamide (XTANDI) 40 MG capsule Take 4 capsules (160 mg) by mouth daily    hydrochlorothiazide (HYDRODIURIL) 25 MG tablet Take 1 tablet (25 mg) by mouth daily    ibuprofen (ADVIL/MOTRIN) 800 MG tablet TAKE 1 TABLET BY MOUTH THREE TIMES DAILY WITH FOOD    valsartan (DIOVAN) 320 MG tablet Take 1 tablet (320 mg) by mouth daily     No current facility-administered medications for this visit.        ALLERGIES     No Known Allergies     REVIEW OF SYSTEMS   As above in the HPI, o/w complete 12-point ROS was negative.     PHYSICAL EXAM   BP (!) 152/74   Pulse 66   Temp 98.3  F (36.8  C) (Oral)   Resp 16   Wt 97.6 kg (215 lb 3.2 oz)   SpO2 96%   BMI 32.48 kg/m    SpO2 Readings from Last 4 Encounters:   09/21/18 96%   08/24/18 94%   06/22/18 95%   05/11/18 97%     Wt Readings from Last 3 Encounters:   10/13/23 97.6 kg (215 lb 3.2 oz)   08/09/23 97.1 kg (214 lb)   07/21/23 96.5 kg (212 lb 12.8 oz)     GENERAL/CONSTITUTIONAL: No acute distress.  EYES: Pupils are equal, round, and react to light and  accommodation. Extraocular movements intact.  No scleral icterus.  ENT/MOUTH: Neck supple. Oropharynx clear, no mucositis.  LYMPH: No anterior cervical, posterior cervical, supraclavicular, axillary or inguinal adenopathy.   RESPIRATORY: Clear to auscultation bilaterally. No crackles or wheezing.   CARDIOVASCULAR: Regular rate and rhythm without murmurs, gallops, or rubs.  GASTROINTESTINAL: No hepatosplenomegaly, masses, or tenderness. The patient has normal bowel sounds. No guarding.  No distention.  : Deferred  MUSCULOSKELETAL: Warm and well-perfused, no cyanosis, clubbing, or edema.  NEUROLOGIC: Cranial nerves II-XII are intact. Alert, oriented, answers questions appropriately. No focal neurologic deficit  INTEGUMENTARY: No rashes or jaundice.  GAIT: Normal     LABORATORY AND IMAGING STUDIES     Recent Labs   Lab Test 10/09/23  1051 07/19/23  0901 05/23/23  0857 04/14/23  1021 03/27/23  1024    139 143 140 138   POTASSIUM 3.7 4.9 4.5 4.1 4.6   CHLORIDE 102 101 105 104 102   CO2 26 26 28 26 29   ANIONGAP 11 12 10 10 7   BUN 13.9 15.4 16.4 13.7 9.3   CR 0.91 0.91 1.02 0.91 0.92   * 107* 103* 106* 110*   TY 9.0 9.2 9.3 9.3 9.5     Recent Labs   Lab Test 04/14/23  1021 03/27/23  1024 03/01/23  1039 01/27/23  1312 12/06/22  0810   MAG 1.8 2.0 2.0 1.9 2.0     Recent Labs   Lab Test 10/09/23  1051 07/19/23  0901 05/23/23  0857 04/14/23  1021 03/27/23  1024   WBC 5.0 4.9 5.6 5.5 5.6   HGB 13.9 15.5 14.9 14.7 15.8    189 177 183 173   MCV 93 93 94 91 92   NEUTROPHIL 54 52 57 58 60     Recent Labs   Lab Test 10/09/23  1051 07/19/23  0901 05/23/23  0857 08/15/22  0810 06/27/22  0827 03/28/22  1050 02/23/22  1419   BILITOTAL 0.7 0.6 0.6   < > 0.9 0.7 0.8   ALKPHOS 89 93 88   < > 94 108 113   ALT 16 16 15   < > 49 80* 88*   AST 25 36 27   < > 4 43 46*   ALBUMIN 4.1 4.2 4.1   < > 3.7 3.6 3.4   LDH  --   --   --   --  246* 259* 246*    < > = values in this interval not displayed.     TSH   Date Value Ref  "Range Status   04/14/2023 1.02 0.30 - 4.20 uIU/mL Final   03/01/2023 1.14 0.30 - 4.20 uIU/mL Final   10/27/2022 0.65 0.40 - 4.00 mU/L Final   09/22/2022 0.86 0.30 - 4.20 uIU/mL Final     No results for input(s): \"CEA\" in the last 74273 hours.  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 Multiplanar reformats were  obtained. Dose reduction techniques were used.   CONTRAST: 100mL Isovue-370    COMPARISON: Same day bone scan and CT of the chest, abdomen and pelvis  on 7/19/2023    FINDINGS:   LUNGS AND PLEURA: Few stable small pulmonary nodules. For example, a  right upper lobe 2 mm nodule (series 10, image 67) and a right middle  lobe subpleural nodule (series 10, image 181) are stable. No new or  enlarging nodules. Mild emphysema. No infiltrate or pleural effusion.    MEDIASTINUM/AXILLAE: No lymphadenopathy. No thoracic aortic aneurysm.  Severe coronary artery catheterizations.    HEPATOBILIARY: No focal lesions in the liver. Cholelithiasis.    PANCREAS: Normal.    SPLEEN: Linear subcapsular hypodensity around the spleen is stable  since 4/28/2023 at least, and consistent with a chronic subcapsular  hematoma.    ADRENAL GLANDS: Stable 10 mm right adrenal nodule. Mildly thickened  left adrenal gland, stable.    KIDNEYS/BLADDER: No significant mass, stones, or hydronephrosis. There  are simple or benign cysts. No follow up is needed.    BOWEL: Normal with no obstruction or acute inflammatory change.  Nothing for " appendicitis.    PELVIC ORGANS: Prostatectomy. No pelvic masses.    ADDITIONAL FINDINGS: No lymphadenopathy in the abdomen and pelvis.  Stable mild aneurysmal abdominal aorta measuring 3.5 cm, previously  3.5 cm using similar measurement technique. No ascites or fluid  collections.    MUSCULOSKELETAL: Stable sclerotic lesions in the sacrum, T6 and right  pubic bone. No new lesions.      Impression    IMPRESSION:  1.  Stable sclerotic osseous metastases.  2.  No new disease in the chest, abdomen and pelvis.  3.  Stable aneurysmal abdominal aorta measuring 3.5 cm.    MINNIE EMANUEL MD         SYSTEM ID:  [id number]     Narrative & Impression   EXAM: NM BONE SCAN WHOLE BODY  LOCATION: [facility]  DATE: 10/9/2023     INDICATION: Malignant neoplasm of prostate  COMPARISON: Nuclear medicine bone scan dated 07/19/2023 and nuclear medicine bone scan dated 01/03/2023 and the [facility] PET/CT dated 09/29/2022.  TECHNIQUE: 21.0 mCi technetium-99m MDP, IV. Anterior and posterior delayed whole-body images at 3 hours with additional spot images of the skull.     FINDINGS: Redemonstrated presumed osseous metastasis involving the T6 vertebral body and lower portions of the sacrum/coccyx, similar to prior exam and were PSMA positive on recent PET/CT dated 09/29/2022 and have not significantly changed since nuclear   medicine bone scan dated 01/03/2023.                                                                      IMPRESSION:     Stable osseous metastases involving the T6 vertebral body and lower portions of the sacrum/coccyx, similar to prior bone scan dated 01/03/2023.          Recent Labs   Lab Test 10/09/23  1051 07/19/23  0901 05/23/23  0857 04/14/23  1021 03/01/23  1039 01/03/23  0926 10/27/22  1046   PSA 0.85 0.75 0.55 0.42 0.39 0.46 10.30*   TESTOSTTOTAL  --  6*  --  6* 7* 7* <2*     Recent Labs   Lab Test 10/09/23  1051 07/19/23  0901 05/23/23  0857 04/14/23  1021 03/27/23  1024 03/01/23  1039  06/27/22  0827 03/28/22  1050 02/23/22  1419 01/10/22  1120 12/29/21  0923 12/10/21  1018   PSA 0.85 0.75 0.55 0.42  --  0.39 5.17 2.00 2.11  --  0.99 0.78   ALKPHOS 89 93 88 91 95 87 94 108 113   < > 118 112   LDH  --   --   --   --   --   --  246* 259* 246*  --  262* 274*    < > = values in this interval not displayed.             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 Clinical research nurse - Janel Gee did not join us for the visit.     He is tolerating enzalutamide well. He denies any new side effects on this medication. He has not been missing doses. He has not started any new medications.     We will continue with androgen deprivation therapy with GnRH analog - he received his last dose on 7/21/23 and is not due for it today.     I have reviewed all of the labs done prior to this clinic visit.  Labs are all completely normal including electrolytes, renal function, hepatic panel, complete blood count and differential. His PSA had nicely responded to enzalutamide from 10.3 ng/ml at the start of therapy to 0.39 ng/ml on 3/1/23. It has been slowly rising since then. It has increased to 0.85 ng/ml in 6 months time.      His is being seen with restaging scans -  CT chest, abdomen and pelvis and bone scans. I have reviewed actual images from his restaging scans and they continue to reveal stable disease.        He again had questions about the trial which were reviewed with him.     I will see him again in 12 weeks or so with labs and restaging scans. We will wait for an input from our research nurse about the right time for restaging per trial protocol.     30 minutes spent on the date of the encounter doing chart review, history and exam, documentation and further activities as noted above      Gigi Ward    Hematologist and Medical  Oncologist  [facility]

## 2023-10-13 NOTE — NURSING NOTE
"Oncology Rooming Note    October 13, 2023 7:58 AM   Wallace Zelaya is a 78 year old male who presents for:    Chief Complaint   Patient presents with    Oncology Clinic Visit     Basal cell carcinoma  Bone metastasis  Prostate cancer (HCC)          Initial Vitals: BP (!) 152/74   Pulse 66   Temp 98.3  F (36.8  C) (Oral)   Resp 16   Wt 97.6 kg (215 lb 3.2 oz)   SpO2 96%   BMI 32.48 kg/m   Estimated body mass index is 32.48 kg/m  as calculated from the following:    Height as of 8/9/23: 1.734 m (5' 8.25\").    Weight as of this encounter: 97.6 kg (215 lb 3.2 oz). Body surface area is 2.17 meters squared.  No Pain (0) Comment: Data Unavailable   No LMP for male patient.  Allergies reviewed: Yes  Medications reviewed: Yes    Medications: Medication refills not needed today.  Pharmacy name entered into [employer]:    [facility] - [place], [place] - [address]  [id number] - [place] - [address]  [facility] - [place] - [address]  [facility] - [place], [place] - [address] [facility] [address]  [facility] - [place], [place] - [id number]    Clinical concerns: follow up        Janey Novak            "

## 2023-10-17 ENCOUNTER — IMMUNIZATION (OUTPATIENT)
Dept: FAMILY MEDICINE | Facility: CLINIC | Age: 78
End: 2023-10-17
Payer: COMMERCIAL

## 2023-10-17 DIAGNOSIS — Z23 NEED FOR PROPHYLACTIC VACCINATION AND INOCULATION AGAINST INFLUENZA: Primary | ICD-10-CM

## 2023-10-17 PROCEDURE — 90662 IIV NO PRSV INCREASED AG IM: CPT

## 2023-10-17 PROCEDURE — G0008 ADMIN INFLUENZA VIRUS VAC: HCPCS

## 2023-10-17 PROCEDURE — 99207 PR NO CHARGE NURSE ONLY: CPT

## 2023-11-16 ENCOUNTER — TELEPHONE (OUTPATIENT)
Dept: ONCOLOGY | Facility: CLINIC | Age: 78
End: 2023-11-16
Payer: COMMERCIAL

## 2023-11-16 NOTE — ORAL ONC MGMT
Oral Chemotherapy Monitoring Program    Subjective/Objective:  Wallace Zelaya is a 78 year old male contacted by phone for a follow-up visit for oral chemotherapy.  He denies any side effects or missed doses.          9/13/2022    11:00 AM 10/27/2022    12:00 PM 11/25/2022     1:00 PM 3/1/2023     1:00 PM 5/23/2023     4:00 PM 10/9/2023     2:00 PM 11/16/2023     4:00 PM   ORAL CHEMOTHERAPY   Assessment Type Discontinuation Chart Review;Initial Work up;New Teach Initial Follow up Lab Monitoring Lab Monitoring Lab Monitoring Monthly Follow up   Stop Date 9/13/2022         Reason for Discontinuation Disease progression         Other: enrolled in clinical trial         Diagnosis Code Prostate Cancer Prostate Cancer Prostate Cancer Prostate Cancer Prostate Cancer Prostate Cancer Prostate Cancer   Providers Dr. Ed Ward   Clinic Name/Location [facility]   Drug Name Zytiga (abiraterone) Xtandi (enzalutamide) Xtandi (enzalutamide) Xtandi (enzalutamide) Xtandi (enzalutamide) Xtandi (enzalutamide) Xtandi (enzalutamide)   Dose 500 mg 160 mg 160 mg 160 mg 160 mg 160 mg 160 mg   Current Schedule Daily Daily Daily Daily Daily Daily Daily   Cycle Details Continuous Continuous Continuous Continuous Continuous Continuous Continuous   Planned next cycle start date  11/10/2022 11/8/2022       Doses missed in last 2 weeks   0    0   Adherence Assessment   Adherent    Adherent   Adverse Effects   No AE identified during assessment    No AE identified during assessment   Any new drug interactions?  No No    No   Is the dose as ordered appropriate for the patient?  Yes Yes    Yes   Is the patient currently in pain?   No    No   Has the patient been assessed within the past 6 months for depression?   No    Yes   Has the patient missed any days of school, work, or other routine activity?   No    No   Since the last time we talked, have  "you been hospitalized or used the emergency room?       No       Last PHQ-2 Score on record:       5/6/2023    10:54 AM 5/6/2023    10:53 AM   PHQ-2 ( 1999 [employer])   Q1: Little interest or pleasure in doing things 0 0   Q2: Feeling down, depressed or hopeless 0 0   PHQ-2 Score 0 0   Q1: Little interest or pleasure in doing things Not at all Not at all   Q2: Feeling down, depressed or hopeless Not at all Not at all   PHQ-2 Score 0 0       Vitals:  BP:   BP Readings from Last 1 Encounters:   10/13/23 (!) 152/74     Wt Readings from Last 1 Encounters:   10/13/23 97.6 kg (215 lb 3.2 oz)     Estimated body surface area is 2.17 meters squared as calculated from the following:    Height as of 8/9/23: 1.734 m (5' 8.25\").    Weight as of 10/13/23: 97.6 kg (215 lb 3.2 oz).    Labs:  No results found for NA within last 30 days.     No results found for K within last 30 days.     No results found for CA within last 30 days.     No results found for Mag within last 30 days.     No results found for Phos within last 30 days.     No results found for ALBUMIN within last 30 days.     No results found for BUN within last 30 days.     No results found for CR within last 30 days.     No results found for AST within last 30 days.     No results found for ALT within last 30 days.     No results found for BILITOTAL within last 30 days.     No results found for WBC within last 30 days.     No results found for HGB within last 30 days.     No results found for PLT within last 30 days.     No results found for ANC within last 30 days.     No results found for ANC within last 30 days.          Assessment/Plan:  Patient is tolerating his xtandi without significant side effects.  Continue with current regimen.        Follow-Up:  1/9/24      Refill Due:  Gets free drug; good through 12/31/23    Marty Meese, Pharm.D., BCOP      "

## 2023-11-17 ENCOUNTER — TELEPHONE (OUTPATIENT)
Dept: ONCOLOGY | Facility: CLINIC | Age: 78
End: 2023-11-17

## 2023-11-17 NOTE — TELEPHONE ENCOUNTER
FREE DRUG APPLICATION INITIATED    Medication: XTANDI 40 MG PO TABS  Free Drug Program Name:  [facility]  Date Submitted: 11/17/2023 11:48 AM    Additional Information:  Muniraolyas reports patient needs to send in his income documents. I called Steve, he said he dropped off a copy of his 1040 with Janel at the [facility]. I reached out to the care team to locate the 1040.

## 2023-11-24 DIAGNOSIS — C79.51 MALIGNANT NEOPLASM METASTATIC TO BONE (H): ICD-10-CM

## 2023-11-24 DIAGNOSIS — C61 PROSTATE CANCER (H): Primary | ICD-10-CM

## 2023-12-01 DIAGNOSIS — C61 PROSTATE CANCER (H): Primary | ICD-10-CM

## 2023-12-05 DIAGNOSIS — C61 PROSTATE CANCER (H): Primary | ICD-10-CM

## 2023-12-11 NOTE — TELEPHONE ENCOUNTER
I faxed 1040 to [employer] on 11/22/23 but they claimed they did not get it. I uploaded to the xtandi portal and will check again in a few days on status.

## 2023-12-14 ENCOUNTER — OFFICE VISIT (OUTPATIENT)
Dept: FAMILY MEDICINE | Facility: CLINIC | Age: 78
End: 2023-12-14
Payer: COMMERCIAL

## 2023-12-14 VITALS
DIASTOLIC BLOOD PRESSURE: 75 MMHG | OXYGEN SATURATION: 95 % | BODY MASS INDEX: 32.74 KG/M2 | TEMPERATURE: 98 F | RESPIRATION RATE: 14 BRPM | WEIGHT: 216 LBS | HEIGHT: 68 IN | HEART RATE: 70 BPM | SYSTOLIC BLOOD PRESSURE: 131 MMHG

## 2023-12-14 DIAGNOSIS — J31.0 GUSTATORY RHINITIS: Primary | ICD-10-CM

## 2023-12-14 PROCEDURE — 99213 OFFICE O/P EST LOW 20 MIN: CPT | Performed by: FAMILY MEDICINE

## 2023-12-14 RX ORDER — FLUTICASONE PROPIONATE 50 MCG
1 SPRAY, SUSPENSION (ML) NASAL DAILY
Qty: 16 G | Refills: 1 | Status: SHIPPED | OUTPATIENT
Start: 2023-12-14

## 2023-12-14 ASSESSMENT — ENCOUNTER SYMPTOMS
FEVER: 0
SHORTNESS OF BREATH: 0
RHINORRHEA: 1

## 2023-12-14 NOTE — PROGRESS NOTES
"  Assessment & Plan     Gustatory rhinitis  Patient has symptoms consistent with accusatory rhinitis, recommend intranasal fluticasone.  If refractory, consider Astelin.  Backup plan if nothing helping to consider ENT evaluation for laryngoscopy  - fluticasone (FLONASE) 50 MCG/ACT nasal spray  Dispense: 16 g; Refill: 1        Cedric Ball MD  [facility] CHARBEL Wilson is a 78 year old, presenting for the following health issues:  Allergies        12/14/2023    12:44 PM   Additional Questions   Roomed by Kameron Boucher   Accompanied by wife       History of Present Illness       Reason for visit:  Sneezing  Symptom onset:  More than a month  Symptoms include:  Blowing nose  Symptom intensity:  Moderate  Symptom progression:  Staying the same  Had these symptoms before:  Yes  Has tried/received treatment for these symptoms:  No  What makes it worse:  Cold weather  What makes it better:  No    He eats 2-3 servings of fruits and vegetables daily.He consumes 2 sweetened beverage(s) daily.He exercises with enough effort to increase his heart rate 10 to 19 minutes per day.  He exercises with enough effort to increase his heart rate 5 days per week.   He is taking medications regularly.     Notices he sneezes and has nasal drainage with eating certain types of food.  His wife accompanies him and admits that sometimes he can go into quite severe sneezing episodes and is worried that he may be hurting his brain        Review of Systems   Constitutional:  Negative for fever.   HENT:  Positive for rhinorrhea. Negative for ear discharge.    Respiratory:  Negative for shortness of breath.    Cardiovascular:  Negative for chest pain.            Objective    /75 (BP Location: Right arm, Patient Position: Chair, Cuff Size: Adult Large)   Pulse 70   Temp 98  F (36.7  C) (Oral)   Resp 14   Ht 1.734 m (5' 8.25\")   Wt 98 kg (216 lb)   SpO2 95%   BMI 32.60 kg/m    Body mass index is 32.6 " kg/m .  Physical Exam  Vitals reviewed.   Constitutional:       Appearance: He is not ill-appearing.   HENT:      Right Ear: Tympanic membrane normal.      Left Ear: Tympanic membrane normal.      Nose: Congestion present. No rhinorrhea.   Cardiovascular:      Rate and Rhythm: Normal rate and regular rhythm.   Pulmonary:      Effort: Pulmonary effort is normal.      Breath sounds: Normal breath sounds.

## 2023-12-20 ENCOUNTER — TELEPHONE (OUTPATIENT)
Dept: FAMILY MEDICINE | Facility: CLINIC | Age: 78
End: 2023-12-20
Payer: COMMERCIAL

## 2023-12-20 NOTE — TELEPHONE ENCOUNTER
Patient's spouse states that patient has COVID like symptoms and they will be leaving for urgent and wanted a message to get across that patient has COVID like symptoms. Relayed to spouse that urgent care doesn't do appointment's and not able to get the message across to urgent care team other than Epic message (which they may or may not see). Advised them to let  know and wear mask.    Thank you,  Jono Lowe, Triage RN [facility]  9:47 AM 12/20/2023

## 2023-12-21 NOTE — TELEPHONE ENCOUNTER
Online portal still showing pending, called [facility] to check status, spoke with rep Hinds. She did see they received his income documents and its still pending. She suggested calling back next Tuesday for status.

## 2023-12-26 NOTE — TELEPHONE ENCOUNTER
I called [employer], spoke with rep Lazaro she said his case is still pending. She sent an update to the  that all files are in and to expedite his case.

## 2023-12-29 NOTE — TELEPHONE ENCOUNTER
I updated the patient on the renewal process, he was confused about the drug strength. Looks like the strength was switched to 80mg from 40mg. He has a full bottle plus about half a month left, he is anxious for a response from the [facility], [facility] wants to check the [facility].

## 2023-12-29 NOTE — PATIENT INSTRUCTIONS
1/9/24 Labs/Scans  1/12/24 Return visit with Dr. Ed Barnett, RN, BSN.  RN Care Coordinator     [facility]   [phone]

## 2024-01-04 ENCOUNTER — ANCILLARY PROCEDURE (OUTPATIENT)
Dept: RADIOLOGY | Facility: CLINIC | Age: 79
End: 2024-01-04
Attending: INTERNAL MEDICINE
Payer: COMMERCIAL

## 2024-01-04 NOTE — TELEPHONE ENCOUNTER
Patient called stating he [facility].    I called [facility] @ [phone], spoke with rep Diego they do have all they need and they apologized. Patient should call [facility] in the morning @ [phone] option #2 to get the rx scheduled for shipment.    I updated the patient and he will let us know if he has any other issues.

## 2024-01-08 RX ORDER — ENZALUTAMIDE 40 MG/1
TABLET ORAL
Qty: 120 TABLET | Refills: 10 | OUTPATIENT
Start: 2024-01-08

## 2024-01-09 ENCOUNTER — HOSPITAL ENCOUNTER (OUTPATIENT)
Dept: NUCLEAR MEDICINE | Facility: CLINIC | Age: 79
Setting detail: NUCLEAR MEDICINE
Discharge: HOME | End: 2024-01-09
Attending: INTERNAL MEDICINE
Payer: COMMERCIAL

## 2024-01-09 ENCOUNTER — ANCILLARY PROCEDURE (OUTPATIENT)
Dept: RADIOLOGY | Facility: CLINIC | Age: 79
End: 2024-01-09
Attending: INTERNAL MEDICINE
Payer: COMMERCIAL

## 2024-01-09 ENCOUNTER — HOSPITAL ENCOUNTER (OUTPATIENT)
Dept: CT IMAGING | Facility: CLINIC | Age: 79
Discharge: HOME | End: 2024-01-09
Attending: INTERNAL MEDICINE | Admitting: INTERNAL MEDICINE
Payer: COMMERCIAL

## 2024-01-09 ENCOUNTER — LAB (OUTPATIENT)
Dept: INFUSION THERAPY | Facility: CLINIC | Age: 79
End: 2024-01-09
Attending: INTERNAL MEDICINE
Payer: COMMERCIAL

## 2024-01-09 DIAGNOSIS — C61 PROSTATE CANCER (H): ICD-10-CM

## 2024-01-09 DIAGNOSIS — C79.51 MALIGNANT NEOPLASM METASTATIC TO BONE (H): ICD-10-CM

## 2024-01-09 LAB
ALBUMIN SERPL BCG-MCNC: 4.1 G/DL (ref 3.5–5.2)
ALP SERPL-CCNC: 94 U/L (ref 40–150)
ALT SERPL W P-5'-P-CCNC: 16 U/L (ref 0–70)
ANION GAP SERPL CALCULATED.3IONS-SCNC: 10 MMOL/L (ref 7–15)
AST SERPL W P-5'-P-CCNC: 22 U/L (ref 0–45)
BASOPHILS # BLD AUTO: 0 10E3/UL (ref 0–0.2)
BASOPHILS NFR BLD AUTO: 1 %
BILIRUB SERPL-MCNC: 0.6 MG/DL
BUN SERPL-MCNC: 12.8 MG/DL (ref 8–23)
CALCIUM SERPL-MCNC: 8.8 MG/DL (ref 8.8–10.2)
CHLORIDE SERPL-SCNC: 101 MMOL/L (ref 98–107)
CREAT SERPL-MCNC: 0.94 MG/DL (ref 0.67–1.17)
DEPRECATED HCO3 PLAS-SCNC: 28 MMOL/L (ref 22–29)
EGFRCR SERPLBLD CKD-EPI 2021: 82 ML/MIN/1.73M2
EOSINOPHIL # BLD AUTO: 0.2 10E3/UL (ref 0–0.7)
EOSINOPHIL NFR BLD AUTO: 4 %
ERYTHROCYTE [DISTWIDTH] IN BLOOD BY AUTOMATED COUNT: 13 % (ref 10–15)
GLUCOSE SERPL-MCNC: 99 MG/DL (ref 70–99)
HCT VFR BLD AUTO: 44 % (ref 40–53)
HGB BLD-MCNC: 15.2 G/DL (ref 13.3–17.7)
IMM GRANULOCYTES # BLD: 0 10E3/UL
IMM GRANULOCYTES NFR BLD: 0 %
LYMPHOCYTES # BLD AUTO: 1.5 10E3/UL (ref 0.8–5.3)
LYMPHOCYTES NFR BLD AUTO: 30 %
MCH RBC QN AUTO: 31.8 PG (ref 26.5–33)
MCHC RBC AUTO-ENTMCNC: 34.5 G/DL (ref 31.5–36.5)
MCV RBC AUTO: 92 FL (ref 78–100)
MONOCYTES # BLD AUTO: 0.4 10E3/UL (ref 0–1.3)
MONOCYTES NFR BLD AUTO: 8 %
NEUTROPHILS # BLD AUTO: 2.7 10E3/UL (ref 1.6–8.3)
NEUTROPHILS NFR BLD AUTO: 57 %
NRBC # BLD AUTO: 0 10E3/UL
NRBC BLD AUTO-RTO: 0 /100
PLATELET # BLD AUTO: 198 10E3/UL (ref 150–450)
POTASSIUM SERPL-SCNC: 4.1 MMOL/L (ref 3.4–5.3)
PROT SERPL-MCNC: 7.2 G/DL (ref 6.4–8.3)
PSA SERPL DL<=0.01 NG/ML-MCNC: 1.55 NG/ML (ref 0–6.5)
RBC # BLD AUTO: 4.78 10E6/UL (ref 4.4–5.9)
SODIUM SERPL-SCNC: 139 MMOL/L (ref 135–145)
WBC # BLD AUTO: 4.9 10E3/UL (ref 4–11)

## 2024-01-09 PROCEDURE — 80053 COMPREHEN METABOLIC PANEL: CPT | Performed by: PHYSICIAN ASSISTANT

## 2024-01-09 PROCEDURE — 78306 BONE IMAGING WHOLE BODY: CPT

## 2024-01-09 PROCEDURE — 36415 COLL VENOUS BLD VENIPUNCTURE: CPT

## 2024-01-09 PROCEDURE — 343N000001 HC RX 343: Performed by: INTERNAL MEDICINE

## 2024-01-09 PROCEDURE — 85025 COMPLETE CBC W/AUTO DIFF WBC: CPT | Performed by: PHYSICIAN ASSISTANT

## 2024-01-09 PROCEDURE — 84153 ASSAY OF PSA TOTAL: CPT | Performed by: INTERNAL MEDICINE

## 2024-01-09 PROCEDURE — A9561 TC99M OXIDRONATE: HCPCS | Performed by: INTERNAL MEDICINE

## 2024-01-09 PROCEDURE — 71250 CT THORAX DX C-: CPT

## 2024-01-09 RX ADMIN — Medication 26.8 MILLICURIE: at 10:46

## 2024-01-09 NOTE — PROGRESS NOTES
Nursing Note:  Wallace JASON Zelaya presents today for Labs + PIV insertion for scan.    Patient seen by provider today: No   present during visit today: Not Applicable.    Note: N/A.    Intravenous Access:  Labs drawn without difficulty.  Peripheral IV placed.    Discharge Plan:   Patient was sent to imaging department for CT and bone scan appointments.  Imaging department to discontinue PIV after scans are complete.      Fuentes Russell RN

## 2024-01-12 ENCOUNTER — LAB (OUTPATIENT)
Dept: ONCOLOGY | Facility: CLINIC | Age: 79
End: 2024-01-12
Attending: INTERNAL MEDICINE
Payer: COMMERCIAL

## 2024-01-12 VITALS
SYSTOLIC BLOOD PRESSURE: 110 MMHG | HEIGHT: 68 IN | OXYGEN SATURATION: 97 % | BODY MASS INDEX: 32.54 KG/M2 | HEART RATE: 73 BPM | WEIGHT: 214.7 LBS | DIASTOLIC BLOOD PRESSURE: 70 MMHG | RESPIRATION RATE: 12 BRPM | TEMPERATURE: 97.7 F

## 2024-01-12 DIAGNOSIS — C61 PROSTATE CANCER (H): Primary | ICD-10-CM

## 2024-01-12 DIAGNOSIS — C79.51 MALIGNANT NEOPLASM METASTATIC TO BONE (H): ICD-10-CM

## 2024-01-12 PROCEDURE — 99214 OFFICE O/P EST MOD 30 MIN: CPT | Performed by: INTERNAL MEDICINE

## 2024-01-12 PROCEDURE — 250N000011 HC RX IP 250 OP 636: Mod: JZ | Performed by: INTERNAL MEDICINE

## 2024-01-12 PROCEDURE — G0463 HOSPITAL OUTPT CLINIC VISIT: HCPCS | Performed by: INTERNAL MEDICINE

## 2024-01-12 PROCEDURE — G0463 HOSPITAL OUTPT CLINIC VISIT: HCPCS | Mod: 25 | Performed by: INTERNAL MEDICINE

## 2024-01-12 PROCEDURE — 96401 CHEMO ANTI-NEOPL SQ/IM: CPT | Performed by: INTERNAL MEDICINE

## 2024-01-12 RX ADMIN — LEUPROLIDE ACETATE 45 MG: KIT at 15:33

## 2024-01-12 ASSESSMENT — PAIN SCALES - GENERAL: PAINLEVEL: NO PAIN (0)

## 2024-01-12 NOTE — PROGRESS NOTES
[facility]  FOLLOW-UP VISIT NOTE    PATIENT NAME: Wallace Zelaya MRN # [id number]  DATE OF VISIT: Jan 12, 2024 YOB: 1945    REFERRING PROVIDER: No referring provider defined for this encounter.    CANCER TYPE: Prostate cancer; Biochemical recurrence; Castration resistant disease  STAGE: Stage III - pT3b at diagnosis; M0    HISTORY OF PRESENTING ILLNESS:  Wallace was noted to have rising screening PSA from 2 - 4. He was referred to Dr. Rodolfo Connor. He had radical prostatectomy on 11/2015. Pathology from this revealed Cayla 4+4 disease with extraprostatic extension, seminal vesicle extension and he was staged at pT3b. All of the 12 lymph nodes resected were negative for disease. Post operatively his PSA did not drop to undetectable levels and remained elevated at 0.56. It vladimir to 0.9 in April 2016 and he was referred to Dr. Newton for adjuvant radiation therapy. He completed radiation therapy but did not have any PSA response to this treatment.     TREATMENT SUMMARY:  11/13/2015 Radical prostatectomy  April 2016  Radiation therapy  He was started on intermittent androgen deprivation therapy which had to be changed to continuous with rapid rise in his PSA.      April 2020 - he was started on bicalutamide for complete androgen blockade  He had rising PSA in May 2021. His bone scan done on 6/30/21 revealed metastatic lesions of T6 and the sacrum. He was switched to abiraterone with prednisone on 8/7/21.  He had relatively stable PSA on therapy without any significant PSA response.  He was eventually taken off therapy in September 2022 for progressive disease.    He enrolled in the Eclipse clinical trial on 10/27/2022 and was randomized to the standard of care enzalutamide arm.    CURRENT INTERVENTIONS:  Enzalutamide 160 mg oral once a day as a standard of care on ECLIPSE trial    SUBJECTIVE   Wallace is being seen for his prostate cancer    Steve was followed in  "person at this visit. He is seen for scheduled follow up visit.  He has been randomized to enzalutamide and has been taking enzalutamide since since 10/27/2022. He  denies missing a dose. He has tolerated this without difficulty. He denies any new side effects. He was in positive mood at this visit.      He is being followed with labs and restaging scans.      He was playing basketball with his group of friends and [employer] but caught COVID infection last month and is very upset about it.  He does not plan to go back to [employer] during the ceballos.  He would like to resume his golf at [employer].       PAST MEDICAL HISTORY   HTN  Dyslipidemia  Prostate cancer as detailed above      CURRENT OUTPATIENT MEDICATIONS     Current Outpatient Medications   Medication Sig    amLODIPine (NORVASC) 5 MG tablet Take 1 tablet (5 mg) by mouth daily    atorvastatin (LIPITOR) 20 MG tablet Take 1 tablet by mouth once daily    enzalutamide (XTANDI) 80 MG tablet Take 2 tablets (160 mg) by mouth daily    fluticasone (FLONASE) 50 MCG/ACT nasal spray Spray 1 spray into both nostrils daily    hydrochlorothiazide (HYDRODIURIL) 25 MG tablet Take 1 tablet (25 mg) by mouth daily    ibuprofen (ADVIL/MOTRIN) 800 MG tablet TAKE 1 TABLET BY MOUTH THREE TIMES DAILY WITH FOOD    valsartan (DIOVAN) 320 MG tablet Take 1 tablet (320 mg) by mouth daily    enzalutamide (XTANDI) 40 MG capsule Take 4 capsules (160 mg) by mouth daily     No current facility-administered medications for this visit.        ALLERGIES     No Known Allergies     REVIEW OF SYSTEMS   As above in the HPI, o/w complete 12-point ROS was negative.     PHYSICAL EXAM   /70   Pulse 73   Temp 97.7  F (36.5  C) (Oral)   Resp 12   Ht 1.734 m (5' 8.27\")   Wt 97.4 kg (214 lb 11.2 oz)   SpO2 97%   BMI 32.39 kg/m    SpO2 Readings from Last 4 Encounters:   09/21/18 96%   08/24/18 94%   06/22/18 95%   05/11/18 97%     Wt Readings from Last 3 Encounters:   01/12/24 97.4 kg (214 lb 11.2 oz) "   12/14/23 98 kg (216 lb)   10/13/23 97.6 kg (215 lb 3.2 oz)     GENERAL/CONSTITUTIONAL: No acute distress.  EYES: Pupils are equal, round, and react to light and accommodation. Extraocular movements intact.  No scleral icterus.  ENT/MOUTH: Neck supple. Oropharynx clear, no mucositis.  LYMPH: No anterior cervical, posterior cervical, supraclavicular, axillary or inguinal adenopathy.   RESPIRATORY: Clear to auscultation bilaterally. No crackles or wheezing.   CARDIOVASCULAR: Regular rate and rhythm without murmurs, gallops, or rubs.  GASTROINTESTINAL: No hepatosplenomegaly, masses, or tenderness. The patient has normal bowel sounds. No guarding.  No distention.  : Deferred  MUSCULOSKELETAL: Warm and well-perfused, no cyanosis, clubbing, or edema.  NEUROLOGIC: Cranial nerves II-XII are intact. Alert, oriented, answers questions appropriately. No focal neurologic deficit  INTEGUMENTARY: No rashes or jaundice.  GAIT: Normal     LABORATORY AND IMAGING STUDIES       Recent Labs   Lab Test 01/09/24  1011 10/09/23  1051 07/19/23  0901 05/23/23  0857 04/14/23  1021    139 139 143 140   POTASSIUM 4.1 3.7 4.9 4.5 4.1   CHLORIDE 101 102 101 105 104   CO2 28 26 26 28 26   ANIONGAP 10 11 12 10 10   BUN 12.8 13.9 15.4 16.4 13.7   CR 0.94 0.91 0.91 1.02 0.91   GLC 99 105* 107* 103* 106*   TY 8.8 9.0 9.2 9.3 9.3     Recent Labs   Lab Test 04/14/23  1021 03/27/23  1024 03/01/23  1039 01/27/23  1312 12/06/22  0810   MAG 1.8 2.0 2.0 1.9 2.0     Recent Labs   Lab Test 01/09/24  1011 10/09/23  1051 07/19/23  0901 05/23/23  0857 04/14/23  1021   WBC 4.9 5.0 4.9 5.6 5.5   HGB 15.2 13.9 15.5 14.9 14.7    177 189 177 183   MCV 92 93 93 94 91   NEUTROPHIL 57 54 52 57 58     Recent Labs   Lab Test 01/09/24  1011 10/09/23  1051 07/19/23  0901 08/15/22  0810 06/27/22  0827 03/28/22  1050 02/23/22  1419   BILITOTAL 0.6 0.7 0.6   < > 0.9 0.7 0.8   ALKPHOS 94 89 93   < > 94 108 113   ALT 16 16 16   < > 49 80* 88*   AST 22 25 36   <  "> 4 43 46*   ALBUMIN 4.1 4.1 4.2   < > 3.7 3.6 3.4   LDH  --   --   --   --  246* 259* 246*    < > = values in this interval not displayed.     TSH   Date Value Ref Range Status   04/14/2023 1.02 0.30 - 4.20 uIU/mL Final   03/01/2023 1.14 0.30 - 4.20 uIU/mL Final   10/27/2022 0.65 0.40 - 4.00 mU/L Final   09/22/2022 0.86 0.30 - 4.20 uIU/mL Final     No results for input(s): \"CEA\" in the last 28145 hours.  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 Multiplanar reformats were obtained. Dose  reduction techniques were used.   CONTRAST: None.    COMPARISON: 10/9/2023    FINDINGS:   LUNGS AND PLEURA: Stable small pulmonary nodules. For example, a 3 mm  left upper lobe nodule (12/62) is stable. Few tiny foci of bronchial  mucus plugging and micronodules in the left lower lobe at the lung  base were not previously visualized measuring 1 to 2 mm (series 12,  image [id number]). No infiltrate or pleural effusion.    MEDIASTINUM/AXILLAE: No lymphadenopathy. No thoracic aortic aneurysm.  No pericardial effusion. Severe coronary artery calcifications. Small  hiatal hernia.    HEPATOBILIARY: No focal lesions of the liver on noncontrast CT.  Cholelithiasis.    PANCREAS: Normal.    SPLEEN: Normal.    ADRENAL GLANDS: Stable 1.0 cm nodule in the right adrenal gland.  Normal left adrenal gland.    KIDNEYS/BLADDER: Subcentimeter hyperdense lesion in the upper pole of  the right kidney, consistent with a cyst containing hemorrhagic  material. No calculi or hydronephrosis " in either kidney.     BOWEL: No small bowel or colonic obstruction or inflammatory changes.  Normal appendix.    LYMPH NODES: No lymphadenopathy.    VASCULATURE: Stable mildly aneurysmal infrarenal abdominal aorta  measuring 3.4 cm. Aortobiiliac atherosclerotic calcifications.    PELVIC ORGANS: Prostatectomy. No pelvic masses.    OTHER: No free fluid or fluid collections. No free air.    MUSCULOSKELETAL: Stable appearance of scattered sclerotic lesions  including lesions in the right pubic symphysis, mid sacrum and T6.  Please correlate with bone scan.      Impression    IMPRESSION:  1.  Stable appearance of sclerotic osseous lesions. Correlate with  upcoming bone scan for disease activity.  2.  A few tiny nodules and tree-in-bud micronodules measuring 1-2 mm  in the left lower lobe at the lung base are new, favored to be  inflammatory. Attention on follow-up.    MINNIE EMANUEL MD         SYSTEM ID:  [id number]     Narrative & Impression   EXAM: NM BONE SCAN WHOLE BODY  LOCATION: [facility]  DATE: 1/9/2024     INDICATION: Malignant neoplasm of prostate.  COMPARISON: Nuclear medicine bone scan 10/09/2023.  TECHNIQUE: 26.8 mCi technetium-99m MDP, IV. Anterior and posterior delayed whole-body images at 3 hours with additional spot images of the skull.     FINDINGS: Redemonstrated presumed osseous metastasis in the T6 vertebral body and lower sacrum/coccyx, similar to prior and have not significantly changed from multiple prior bone scans dating back to 01/03/2023. Additional scattered areas of   periarticular radiotracer uptake in a pattern typical degenerative change.                                                                      IMPRESSION:     Stable osseous metastases involving the T6 vertebral body and lower sacrum/coccyx, similar to prior bone scans dating back to 01/03/2023.             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 Clinical research nurse - Janel Gee did not join us for the visit.     He is tolerating enzalutamide well. He denies any new side effects on this medication. He has not been missing doses. He has not started any new medications.     We will continue with androgen deprivation therapy with GnRH analog - he received his last dose on 7/21/23 and is due for it today.     I have reviewed all of the labs done prior to this clinic visit.  Labs are all completely normal including electrolytes, renal function, hepatic panel, complete blood count and differential. His PSA had nicely responded to enzalutamide from 10.3 ng/ml at the start of therapy to 0.39 ng/ml on 3/1/23. It has been slowly rising since then. It has increased to 0.85 ng/ml in October and 1.55 ng/ml at this visit on 1/9/2024.      His is being seen with restaging scans -  CT chest, abdomen and pelvis and bone scans. I have reviewed actual images from his restaging scans and they continue to reveal stable disease.  He has read the reports and is happy about it.    We will continue on therapy until he has radiographic disease progression.  We will get a repeat PSMA PET scan once we confirm progression as he would be eligible for lutetium crossover as part of the clinical trial.    Overall he is delighted about how well he is feeling at this time.    He again had questions about the trial which were reviewed with him.     I will see him again in 12 weeks or so with labs and restaging scans. We will wait for an input from our research nurse about the right time for restaging per trial protocol.      30 minutes spent on the date of the encounter doing chart review, history and exam, documentation and further activities as noted above      Gigi Ward    Hematologist and Medical Oncologist  [facility]

## 2024-01-12 NOTE — NURSING NOTE
"Oncology Rooming Note    January 12, 2024 2:50 PM   Wallace Zelaya is a 78 year old male who presents for:    Chief Complaint   Patient presents with    Oncology Clinic Visit     Basal cell carcinoma  Bone metastasis  Prostate cancer        Initial Vitals: /70   Pulse 73   Temp 97.7  F (36.5  C) (Oral)   Resp 12   Ht 1.734 m (5' 8.27\")   Wt 97.4 kg (214 lb 11.2 oz)   SpO2 97%   BMI 32.39 kg/m   Estimated body mass index is 32.39 kg/m  as calculated from the following:    Height as of this encounter: 1.734 m (5' 8.27\").    Weight as of this encounter: 97.4 kg (214 lb 11.2 oz). Body surface area is 2.17 meters squared.  No Pain (0) Comment: Data Unavailable   No LMP for male patient.  Allergies reviewed: Yes  Medications reviewed: Yes    Medications: Medication refills not needed today.  Pharmacy name entered into [facility]:    [facility] [address] [facility] - [place] [address] [facility] - [place] [address]  [place] [address] [facility] [address]  [facility] - [place], [place] - [address] [facility] SERVICES - [place], [place] - [address] [facility] [address]    Frailty Screening:   Is the patient here for a new oncology consult visit in cancer care? 2. No      Clinical concerns: Follow up        Cata Altman MA              "

## 2024-01-12 NOTE — LETTER
1/12/2024         RE: Wallace Zelaya  [address] [address]        Dear Colleague,    Thank you for referring your patient, Wallace Zelaya, to the [facility] CANCER [facility]. Please see a copy of my visit note below.    [facility] CANCER CLINIC  FOLLOW-UP VISIT NOTE    PATIENT NAME: Wallace Zelaya MRN # [id number]  DATE OF VISIT: Jan 12, 2024 YOB: 1945    REFERRING PROVIDER: No referring provider defined for this encounter.    CANCER TYPE: Prostate cancer; Biochemical recurrence; Castration resistant disease  STAGE: Stage III - pT3b at diagnosis; M0    HISTORY OF PRESENTING ILLNESS:  Wallace was noted to have rising screening PSA from 2 - 4. He was referred to Dr. Rodolfo Connor. He had radical prostatectomy on 11/2015. Pathology from this revealed Cayla 4+4 disease with extraprostatic extension, seminal vesicle extension and he was staged at pT3b. All of the 12 lymph nodes resected were negative for disease. Post operatively his PSA did not drop to undetectable levels and remained elevated at 0.56. It vladimir to 0.9 in April 2016 and he was referred to Dr. Newton for adjuvant radiation therapy. He completed radiation therapy but did not have any PSA response to this treatment.     TREATMENT SUMMARY:  11/13/2015 Radical prostatectomy  April 2016  Radiation therapy  He was started on intermittent androgen deprivation therapy which had to be changed to continuous with rapid rise in his PSA.      April 2020 - he was started on bicalutamide for complete androgen blockade  He had rising PSA in May 2021. His bone scan done on 6/30/21 revealed metastatic lesions of T6 and the sacrum. He was switched to abiraterone with prednisone on 8/7/21.  He had relatively stable PSA on therapy without any significant PSA response.  He was eventually taken off therapy in September 2022 for progressive disease.    He enrolled in the Eclipse clinical trial  on 10/27/2022 and was randomized to the standard of care enzalutamide arm.    CURRENT INTERVENTIONS:  Enzalutamide 160 mg oral once a day as a standard of care on ECLIPSE trial    DELANEY Gonsalez is being seen for his prostate cancer    Steve was followed in person at this visit. He is seen for scheduled follow up visit.  He has been randomized to enzalutamide and has been taking enzalutamide since since 10/27/2022. He  denies missing a dose. He has tolerated this without difficulty. He denies any new side effects. He was in positive mood at this visit.      He is being followed with labs and restaging scans.      He was playing basketball with his group of friends and [facility] but caught COVID infection last month and is very upset about it.  He does not plan to go back to [facility] during the ceballos.  He would like to resume his golf at [employer].       PAST MEDICAL HISTORY   HTN  Dyslipidemia  Prostate cancer as detailed above      CURRENT OUTPATIENT MEDICATIONS     Current Outpatient Medications   Medication Sig     amLODIPine (NORVASC) 5 MG tablet Take 1 tablet (5 mg) by mouth daily     atorvastatin (LIPITOR) 20 MG tablet Take 1 tablet by mouth once daily     enzalutamide (XTANDI) 80 MG tablet Take 2 tablets (160 mg) by mouth daily     fluticasone (FLONASE) 50 MCG/ACT nasal spray Spray 1 spray into both nostrils daily     hydrochlorothiazide (HYDRODIURIL) 25 MG tablet Take 1 tablet (25 mg) by mouth daily     ibuprofen (ADVIL/MOTRIN) 800 MG tablet TAKE 1 TABLET BY MOUTH THREE TIMES DAILY WITH FOOD     valsartan (DIOVAN) 320 MG tablet Take 1 tablet (320 mg) by mouth daily     enzalutamide (XTANDI) 40 MG capsule Take 4 capsules (160 mg) by mouth daily     No current facility-administered medications for this visit.        ALLERGIES     No Known Allergies     REVIEW OF SYSTEMS   As above in the HPI, o/w complete 12-point ROS was negative.     PHYSICAL EXAM   /70   Pulse 73   Temp 97.7  F (36.5  C) (Oral)   Resp 12   " Ht 1.734 m (5' 8.27\")   Wt 97.4 kg (214 lb 11.2 oz)   SpO2 97%   BMI 32.39 kg/m    SpO2 Readings from Last 4 Encounters:   09/21/18 96%   08/24/18 94%   06/22/18 95%   05/11/18 97%     Wt Readings from Last 3 Encounters:   01/12/24 97.4 kg (214 lb 11.2 oz)   12/14/23 98 kg (216 lb)   10/13/23 97.6 kg (215 lb 3.2 oz)     GENERAL/CONSTITUTIONAL: No acute distress.  EYES: Pupils are equal, round, and react to light and accommodation. Extraocular movements intact.  No scleral icterus.  ENT/MOUTH: Neck supple. Oropharynx clear, no mucositis.  LYMPH: No anterior cervical, posterior cervical, supraclavicular, axillary or inguinal adenopathy.   RESPIRATORY: Clear to auscultation bilaterally. No crackles or wheezing.   CARDIOVASCULAR: Regular rate and rhythm without murmurs, gallops, or rubs.  GASTROINTESTINAL: No hepatosplenomegaly, masses, or tenderness. The patient has normal bowel sounds. No guarding.  No distention.  : Deferred  MUSCULOSKELETAL: Warm and well-perfused, no cyanosis, clubbing, or edema.  NEUROLOGIC: Cranial nerves II-XII are intact. Alert, oriented, answers questions appropriately. No focal neurologic deficit  INTEGUMENTARY: No rashes or jaundice.  GAIT: Normal     LABORATORY AND IMAGING STUDIES       Recent Labs   Lab Test 01/09/24  1011 10/09/23  1051 07/19/23  0901 05/23/23  0857 04/14/23  1021    139 139 143 140   POTASSIUM 4.1 3.7 4.9 4.5 4.1   CHLORIDE 101 102 101 105 104   CO2 28 26 26 28 26   ANIONGAP 10 11 12 10 10   BUN 12.8 13.9 15.4 16.4 13.7   CR 0.94 0.91 0.91 1.02 0.91   GLC 99 105* 107* 103* 106*   TY 8.8 9.0 9.2 9.3 9.3     Recent Labs   Lab Test 04/14/23  1021 03/27/23  1024 03/01/23  1039 01/27/23  1312 12/06/22  0810   MAG 1.8 2.0 2.0 1.9 2.0     Recent Labs   Lab Test 01/09/24  1011 10/09/23  1051 07/19/23  0901 05/23/23  0857 04/14/23  1021   WBC 4.9 5.0 4.9 5.6 5.5   HGB 15.2 13.9 15.5 14.9 14.7    177 189 177 183   MCV 92 93 93 94 91   NEUTROPHIL 57 54 52 57 58 " "    Recent Labs   Lab Test 01/09/24  1011 10/09/23  1051 07/19/23  0901 08/15/22  0810 06/27/22  0827 03/28/22  1050 02/23/22  1419   BILITOTAL 0.6 0.7 0.6   < > 0.9 0.7 0.8   ALKPHOS 94 89 93   < > 94 108 113   ALT 16 16 16   < > 49 80* 88*   AST 22 25 36   < > 4 43 46*   ALBUMIN 4.1 4.1 4.2   < > 3.7 3.6 3.4   LDH  --   --   --   --  246* 259* 246*    < > = values in this interval not displayed.     TSH   Date Value Ref Range Status   04/14/2023 1.02 0.30 - 4.20 uIU/mL Final   03/01/2023 1.14 0.30 - 4.20 uIU/mL Final   10/27/2022 0.65 0.40 - 4.00 mU/L Final   09/22/2022 0.86 0.30 - 4.20 uIU/mL Final     No results for input(s): \"CEA\" in the last 72251 hours.  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 Multiplanar reformats were obtained. Dose  reduction techniques were used.   CONTRAST: None.    COMPARISON: 10/9/2023    FINDINGS:   LUNGS AND PLEURA: Stable small pulmonary nodules. For example, a 3 mm  left upper lobe nodule (12/62) is stable. Few tiny foci of bronchial  mucus plugging and micronodules in the left lower lobe at the lung  base were not previously visualized measuring 1 to 2 mm (series 12,  image [id number]). No infiltrate or pleural effusion.    MEDIASTINUM/AXILLAE: No lymphadenopathy. No thoracic aortic aneurysm.  No pericardial effusion. Severe coronary artery calcifications. Small  hiatal hernia.    HEPATOBILIARY: No focal lesions of the liver on noncontrast CT.  Cholelithiasis.    PANCREAS: Normal.    SPLEEN: Normal.    ADRENAL " GLANDS: Stable 1.0 cm nodule in the right adrenal gland.  Normal left adrenal gland.    KIDNEYS/BLADDER: Subcentimeter hyperdense lesion in the upper pole of  the right kidney, consistent with a cyst containing hemorrhagic  material. No calculi or hydronephrosis in either kidney.     BOWEL: No small bowel or colonic obstruction or inflammatory changes.  Normal appendix.    LYMPH NODES: No lymphadenopathy.    VASCULATURE: Stable mildly aneurysmal infrarenal abdominal aorta  measuring 3.4 cm. Aortobiiliac atherosclerotic calcifications.    PELVIC ORGANS: Prostatectomy. No pelvic masses.    OTHER: No free fluid or fluid collections. No free air.    MUSCULOSKELETAL: Stable appearance of scattered sclerotic lesions  including lesions in the right pubic symphysis, mid sacrum and T6.  Please correlate with bone scan.      Impression    IMPRESSION:  1.  Stable appearance of sclerotic osseous lesions. Correlate with  upcoming bone scan for disease activity.  2.  A few tiny nodules and tree-in-bud micronodules measuring 1-2 mm  in the left lower lobe at the lung base are new, favored to be  inflammatory. Attention on follow-up.    MINNIE EMANUEL MD         SYSTEM ID:  [id number]     Narrative & Impression   EXAM: NM BONE SCAN WHOLE BODY  LOCATION: [facility]  DATE: 1/9/2024     INDICATION: Malignant neoplasm of prostate.  COMPARISON: Nuclear medicine bone scan 10/09/2023.  TECHNIQUE: 26.8 mCi technetium-99m MDP, IV. Anterior and posterior delayed whole-body images at 3 hours with additional spot images of the skull.     FINDINGS: Redemonstrated presumed osseous metastasis in the T6 vertebral body and lower sacrum/coccyx, similar to prior and have not significantly changed from multiple prior bone scans dating back to 01/03/2023. Additional scattered areas of   periarticular radiotracer uptake in a pattern typical degenerative change.                                                                       IMPRESSION:     Stable osseous metastases involving the T6 vertebral body and lower sacrum/coccyx, similar to prior bone scans dating back to 01/03/2023.             Recent Labs   Lab Test 01/09/24  1009 10/09/23  1051 07/19/23  0901 05/23/23  0857 04/14/23  1021 03/01/23  1039 01/03/23  0926 10/27/22  1046   PSA 1.55 0.85 0.75 0.55 0.42 0.39 0.46 10.30*   TESTOSTTOTAL  --   --  6*  --  6* 7* 7* <2*           ASSESSMENT AND PLAN   Castration resistant metastatic prostate cancer progressing on abiraterone with prednisone  ECOG PS 1   HTN  No medical comorbidity    I had a lengthy discussion with Steve who is alone at this visit. Clinical research nurse - Janel Gee did not join us for the visit.     He is tolerating enzalutamide well. He denies any new side effects on this medication. He has not been missing doses. He has not started any new medications.     We will continue with androgen deprivation therapy with GnRH analog - he received his last dose on 7/21/23 and is due for it today.     I have reviewed all of the labs done prior to this clinic visit.  Labs are all completely normal including electrolytes, renal function, hepatic panel, complete blood count and differential. His PSA had nicely responded to enzalutamide from 10.3 ng/ml at the start of therapy to 0.39 ng/ml on 3/1/23. It has been slowly rising since then. It has increased to 0.85 ng/ml in October and 1.55 ng/ml at this visit on 1/9/2024.      His is being seen with restaging scans -  CT chest, abdomen and pelvis and bone scans. I have reviewed actual images from his restaging scans and they continue to reveal stable disease.  He has read the reports and is happy about it.    We will continue on therapy until he has radiographic disease progression.  We will get a repeat PSMA PET scan once we confirm progression as he would be eligible for lutetium crossover as part of the clinical trial.    Overall he is delighted about how well he is feeling at  this time.    He again had questions about the trial which were reviewed with him.     I will see him again in 12 weeks or so with labs and restaging scans. We will wait for an input from our research nurse about the right time for restaging per trial protocol.     30 minutes spent on the date of the encounter doing chart review, history and exam, documentation and further activities as noted above      Gigi Ward    Hematologist and Medical Oncologist  [facility]          Again, thank you for allowing me to participate in the care of your patient.        Sincerely,        Gigi Ward MD

## 2024-01-23 ENCOUNTER — TELEPHONE (OUTPATIENT)
Dept: ONCOLOGY | Facility: CLINIC | Age: 79
End: 2024-01-23
Payer: COMMERCIAL

## 2024-01-23 NOTE — PROGRESS NOTES
CLINICAL NUTRITION SERVICES- ONCOLOGY DISTRESS SCREENING     Identified Concern and Score From Distress Screenin. How concerned are you about your ability to eat? :  0  2. How concerned are you about unintended weight loss or your current weight? : 8     Date of Distress Screenin/12     Findings: Phone call with Steve. Weight is down from this summer and currently stable. Reports no nutrition questions or concerns at this time.      Follow-up Required: As needed.     Ev Swartz RD, LD  [facility] [phone]

## 2024-01-23 NOTE — TELEPHONE ENCOUNTER
FREE DRUG APPLICATION APPROVED    Medication: XTANDI 40 MG PO TABS  Program Name:  [employer]  Effective Date: 1/4/2024  Expiration Date: 12/31/2024  Pharmacy Filling the Rx: [facility] - [place] [id number]  Patient Notified: yes  Additional Information:  pt got a letter today with no dates on it from [employer] that is requesting income documents,  he called and left them a v/m to call him back. He requested that I call to clarify this letter as well.  He did get a refill for a month supply last week.

## 2024-01-25 NOTE — TELEPHONE ENCOUNTER
I called vinny to inquire about the letter the pt got in the mail 1/23/24. Spoke with rep Hair, she said the mail was delayed on their end so that is an older letter and not applicable. He is approved and the refills were sent to the pharmacy. Nothing further is needed.    I spoke with Steve, he will ignore the letter. He did get the 80mg refill a few weeks ago but will let us know if he has any further issues.

## 2024-02-13 DIAGNOSIS — C79.51 MALIGNANT NEOPLASM METASTATIC TO BONE (H): Primary | ICD-10-CM

## 2024-03-05 ENCOUNTER — ANCILLARY PROCEDURE (OUTPATIENT)
Dept: RADIOLOGY | Facility: CLINIC | Age: 79
End: 2024-03-05
Attending: INTERNAL MEDICINE
Payer: COMMERCIAL

## 2024-03-05 DIAGNOSIS — C79.51 MALIGNANT NEOPLASM METASTATIC TO BONE (H): Primary | ICD-10-CM

## 2024-03-15 ENCOUNTER — TELEPHONE (OUTPATIENT)
Dept: ONCOLOGY | Facility: CLINIC | Age: 79
End: 2024-03-15
Payer: COMMERCIAL

## 2024-03-15 NOTE — TELEPHONE ENCOUNTER
Steve was contacted today in follow-up of his participation in the ECLIPSE clinical trial.  He was on the SOC arm of the study and continues to take enzalutamide in the LTFU phase of the study.   He states he doing well and will be seen in clinic later this month for scans and to see Dr. Ward.

## 2024-03-23 DIAGNOSIS — E78.5 HYPERLIPIDEMIA LDL GOAL <130: ICD-10-CM

## 2024-03-23 NOTE — TELEPHONE ENCOUNTER
Medication Question or Refill    Contacts         Type Contact Phone/Fax    03/23/2024 10:51 AM CDT Phone (Incoming) Steve Zelaya (Self) [phone] (M)            What medication are you calling about (include dose and sig)?: atorvastatin (LIPITOR) 20 MG tablet     Preferred Pharmacy:      [facility] [address] [place] [address] [facility]  [address] [facility] [address]  Phone: [phone] Fax: [phone]      Controlled Substance Agreement on file:   CSA -- Patient Level:    CSA: None found at the patient level.       Who prescribed the medication?:     Do you need a refill? Yes,     When did you use the medication last? TAKES IN EVENING LAST NIGHT 03/22 - WILL BE OUT IN ABOUT 6 DAYS    Patient offered an appointment? No    Do you have any questions or concerns?  No      Could we send this information to you in [facility] or would you prefer to receive a phone call?:   Patient would prefer a phone call   Okay to leave a detailed message?: Yes at Home number on file [phone] (home)

## 2024-03-25 ENCOUNTER — MYC REFILL (OUTPATIENT)
Dept: FAMILY MEDICINE | Facility: CLINIC | Age: 79
End: 2024-03-25
Payer: COMMERCIAL

## 2024-03-25 DIAGNOSIS — E78.5 HYPERLIPIDEMIA LDL GOAL <130: ICD-10-CM

## 2024-03-25 RX ORDER — ATORVASTATIN CALCIUM 20 MG/1
TABLET, FILM COATED ORAL
Qty: 90 TABLET | Refills: 3 | OUTPATIENT
Start: 2024-03-25

## 2024-03-25 RX ORDER — ATORVASTATIN CALCIUM 20 MG/1
TABLET, FILM COATED ORAL
Qty: 90 TABLET | Refills: 1 | Status: SHIPPED | OUTPATIENT
Start: 2024-03-25 | End: 2024-08-23

## 2024-03-26 ENCOUNTER — ANCILLARY PROCEDURE (OUTPATIENT)
Dept: RADIOLOGY | Facility: CLINIC | Age: 79
End: 2024-03-26
Attending: INTERNAL MEDICINE
Payer: COMMERCIAL

## 2024-03-26 ENCOUNTER — HOSPITAL ENCOUNTER (OUTPATIENT)
Dept: NUCLEAR MEDICINE | Facility: CLINIC | Age: 79
Setting detail: NUCLEAR MEDICINE
Discharge: HOME | End: 2024-03-26
Attending: INTERNAL MEDICINE
Payer: COMMERCIAL

## 2024-03-26 ENCOUNTER — LAB (OUTPATIENT)
Dept: INFUSION THERAPY | Facility: CLINIC | Age: 79
End: 2024-03-26
Attending: INTERNAL MEDICINE
Payer: COMMERCIAL

## 2024-03-26 ENCOUNTER — HOSPITAL ENCOUNTER (OUTPATIENT)
Dept: CT IMAGING | Facility: CLINIC | Age: 79
Discharge: HOME | End: 2024-03-26
Attending: INTERNAL MEDICINE | Admitting: INTERNAL MEDICINE
Payer: COMMERCIAL

## 2024-03-26 DIAGNOSIS — C61 PROSTATE CANCER (H): ICD-10-CM

## 2024-03-26 DIAGNOSIS — C79.51 MALIGNANT NEOPLASM METASTATIC TO BONE (H): ICD-10-CM

## 2024-03-26 DIAGNOSIS — C61 PROSTATE CANCER (H): Primary | ICD-10-CM

## 2024-03-26 DIAGNOSIS — I10 ESSENTIAL HYPERTENSION: ICD-10-CM

## 2024-03-26 LAB
ALBUMIN SERPL BCG-MCNC: 4.3 G/DL (ref 3.5–5.2)
ALP SERPL-CCNC: 96 U/L (ref 40–150)
ALT SERPL W P-5'-P-CCNC: 17 U/L (ref 0–70)
ANION GAP SERPL CALCULATED.3IONS-SCNC: 12 MMOL/L (ref 7–15)
AST SERPL W P-5'-P-CCNC: 30 U/L (ref 0–45)
BASOPHILS # BLD AUTO: 0.1 10E3/UL (ref 0–0.2)
BASOPHILS NFR BLD AUTO: 1 %
BILIRUB SERPL-MCNC: 0.7 MG/DL
BUN SERPL-MCNC: 14.9 MG/DL (ref 8–23)
CALCIUM SERPL-MCNC: 9.6 MG/DL (ref 8.8–10.2)
CHLORIDE SERPL-SCNC: 99 MMOL/L (ref 98–107)
CREAT SERPL-MCNC: 0.95 MG/DL (ref 0.67–1.17)
DEPRECATED HCO3 PLAS-SCNC: 27 MMOL/L (ref 22–29)
EGFRCR SERPLBLD CKD-EPI 2021: 81 ML/MIN/1.73M2
EOSINOPHIL # BLD AUTO: 0.3 10E3/UL (ref 0–0.7)
EOSINOPHIL NFR BLD AUTO: 6 %
ERYTHROCYTE [DISTWIDTH] IN BLOOD BY AUTOMATED COUNT: 13.1 % (ref 10–15)
GLUCOSE SERPL-MCNC: 100 MG/DL (ref 70–99)
HCT VFR BLD AUTO: 47 % (ref 40–53)
HGB BLD-MCNC: 16.3 G/DL (ref 13.3–17.7)
IMM GRANULOCYTES # BLD: 0 10E3/UL
IMM GRANULOCYTES NFR BLD: 0 %
LYMPHOCYTES # BLD AUTO: 1.7 10E3/UL (ref 0.8–5.3)
LYMPHOCYTES NFR BLD AUTO: 30 %
MCH RBC QN AUTO: 32 PG (ref 26.5–33)
MCHC RBC AUTO-ENTMCNC: 34.7 G/DL (ref 31.5–36.5)
MCV RBC AUTO: 92 FL (ref 78–100)
MONOCYTES # BLD AUTO: 0.4 10E3/UL (ref 0–1.3)
MONOCYTES NFR BLD AUTO: 7 %
NEUTROPHILS # BLD AUTO: 3.1 10E3/UL (ref 1.6–8.3)
NEUTROPHILS NFR BLD AUTO: 56 %
NRBC # BLD AUTO: 0 10E3/UL
NRBC BLD AUTO-RTO: 0 /100
PLATELET # BLD AUTO: 184 10E3/UL (ref 150–450)
POTASSIUM SERPL-SCNC: 4.5 MMOL/L (ref 3.4–5.3)
PROT SERPL-MCNC: 7.9 G/DL (ref 6.4–8.3)
PSA SERPL DL<=0.01 NG/ML-MCNC: 1.51 NG/ML (ref 0–6.5)
RBC # BLD AUTO: 5.1 10E6/UL (ref 4.4–5.9)
SODIUM SERPL-SCNC: 138 MMOL/L (ref 135–145)
WBC # BLD AUTO: 5.5 10E3/UL (ref 4–11)

## 2024-03-26 PROCEDURE — 85025 COMPLETE CBC W/AUTO DIFF WBC: CPT | Performed by: INTERNAL MEDICINE

## 2024-03-26 PROCEDURE — 80053 COMPREHEN METABOLIC PANEL: CPT | Performed by: INTERNAL MEDICINE

## 2024-03-26 PROCEDURE — 250N000011 HC RX IP 250 OP 636: Performed by: RADIOLOGY

## 2024-03-26 PROCEDURE — A9561 TC99M OXIDRONATE: HCPCS | Performed by: INTERNAL MEDICINE

## 2024-03-26 PROCEDURE — 71260 CT THORAX DX C+: CPT

## 2024-03-26 PROCEDURE — 84153 ASSAY OF PSA TOTAL: CPT | Performed by: INTERNAL MEDICINE

## 2024-03-26 PROCEDURE — 250N000009 HC RX 250: Performed by: RADIOLOGY

## 2024-03-26 PROCEDURE — 343N000001 HC RX 343: Performed by: INTERNAL MEDICINE

## 2024-03-26 PROCEDURE — 78306 BONE IMAGING WHOLE BODY: CPT

## 2024-03-26 PROCEDURE — 84403 ASSAY OF TOTAL TESTOSTERONE: CPT | Performed by: INTERNAL MEDICINE

## 2024-03-26 PROCEDURE — 36415 COLL VENOUS BLD VENIPUNCTURE: CPT

## 2024-03-26 RX ORDER — IOPAMIDOL 755 MG/ML
500 INJECTION, SOLUTION INTRAVASCULAR ONCE
Status: COMPLETED | OUTPATIENT
Start: 2024-03-26 | End: 2024-03-26

## 2024-03-26 RX ADMIN — SODIUM CHLORIDE 65 ML: 9 INJECTION, SOLUTION INTRAVENOUS at 11:00

## 2024-03-26 RX ADMIN — Medication 26 MILLICURIE: at 10:44

## 2024-03-26 RX ADMIN — IOPAMIDOL 100 ML: 755 INJECTION, SOLUTION INTRAVENOUS at 11:00

## 2024-03-26 NOTE — PROGRESS NOTES
Nursing Note:  Wallace Zelaya presents today for PIV start and labs.    Patient seen by provider today: No   present during visit today: Not Applicable.    Note: N/A.    Intravenous Access:  Labs drawn without difficulty.  Peripheral IV placed.    Discharge Plan:   Patient was sent to CT for appointment.    Nely Felix RN

## 2024-03-28 LAB — TESTOST SERPL-MCNC: 8 NG/DL (ref 240–950)

## 2024-03-29 ENCOUNTER — ALLIED HEALTH/NURSE VISIT (OUTPATIENT)
Dept: ONCOLOGY | Facility: CLINIC | Age: 79
End: 2024-03-29
Payer: COMMERCIAL

## 2024-03-29 ENCOUNTER — ONCOLOGY VISIT (OUTPATIENT)
Dept: ONCOLOGY | Facility: CLINIC | Age: 79
End: 2024-03-29
Attending: INTERNAL MEDICINE
Payer: COMMERCIAL

## 2024-03-29 VITALS
DIASTOLIC BLOOD PRESSURE: 72 MMHG | RESPIRATION RATE: 16 BRPM | SYSTOLIC BLOOD PRESSURE: 126 MMHG | TEMPERATURE: 97.6 F | HEART RATE: 71 BPM | BODY MASS INDEX: 33.2 KG/M2 | OXYGEN SATURATION: 95 % | WEIGHT: 220.1 LBS

## 2024-03-29 DIAGNOSIS — C79.51 MALIGNANT NEOPLASM METASTATIC TO BONE (H): Primary | ICD-10-CM

## 2024-03-29 DIAGNOSIS — C79.51 MALIGNANT NEOPLASM METASTATIC TO BONE (H): ICD-10-CM

## 2024-03-29 DIAGNOSIS — C61 PROSTATE CANCER (H): Primary | ICD-10-CM

## 2024-03-29 PROCEDURE — 99214 OFFICE O/P EST MOD 30 MIN: CPT | Performed by: INTERNAL MEDICINE

## 2024-03-29 PROCEDURE — G0463 HOSPITAL OUTPT CLINIC VISIT: HCPCS | Performed by: INTERNAL MEDICINE

## 2024-03-29 ASSESSMENT — PAIN SCALES - GENERAL: PAINLEVEL: NO PAIN (0)

## 2024-03-29 NOTE — NURSING NOTE
"Oncology Rooming Note    March 29, 2024 2:55 PM   Wallace Zelaya is a 78 year old male who presents for:    Chief Complaint   Patient presents with    Oncology Clinic Visit     Initial Vitals: /72   Pulse 71   Temp 97.6  F (36.4  C) (Oral)   Resp 16   Wt 99.8 kg (220 lb 1.6 oz)   SpO2 95%   BMI 33.20 kg/m   Estimated body mass index is 33.2 kg/m  as calculated from the following:    Height as of 1/12/24: 1.734 m (5' 8.27\").    Weight as of this encounter: 99.8 kg (220 lb 1.6 oz). Body surface area is 2.19 meters squared.  No Pain (0) Comment: Data Unavailable   No LMP for male patient.  Allergies reviewed: Yes  Medications reviewed: Yes    Medications: Medication refills not needed today.  Pharmacy name entered into [employer]:    [facility] [address] [facility] [facility] [place] - [address] [facility]SPECIAL[facility] - [place], [place] - [address] [place] [place] [address] [facility] [facility] [address]  [facility] - [place], [place] - [address] [facility] SERVICES - [place], [place] - [address]    Frailty Screening:   Is the patient here for a new oncology consult visit in cancer care? 2. No      Clinical concerns: follow up       Janey Novak            "
Head is atraumatic. Head shape is symmetrical.

## 2024-03-29 NOTE — NURSING NOTE
Study Title: A Multi-Center, Open-Label, Randomized Phase 3 Trial Comparing the Safety and Efficacy of 177Lu-PSMA-I&T versus Hormone Therapy in Patients with Metastatic Castration-Resistant Prostate Cancer.       SCARLET Ward  Treating Investigator : [place] Ward        Steve was seen in clinic today.  Pt is on the SOC arm of the ECLIPSE clinical trial. He was reconsented today with Version 5.0 dated 1/30/24.  All changes were reviewed including the changes to the crossover phase which Steve may at some point be eligible for. Questions were answered to his satisfaction.  The consent was signed and Steve received a signed copy.    Pt confirms he has been taking enzalutamide daily.     The following assessments were collected at the [facility]:  CT and Bone scans - 3/26/24  Required labs -3/26/24  ECOG: Grade 0: Fully active, able to carry on all pre-disease performance without restriction.      Labs reviewed by Dr. Ward and all abnormal values were determined to be NCS.   Concomitant medications were reviewed, there were no updates.   Adverse events were reviewed and Steve reports no changes since we last saw him.      He will return to clinic for labs and scans and to see Dr. Ward in mid-June.

## 2024-03-29 NOTE — PROGRESS NOTES
[facility]  FOLLOW-UP VISIT NOTE    PATIENT NAME: Wallace Zelaya MRN # [id number]  DATE OF VISIT: Mar 29, 2024 YOB: 1945    REFERRING PROVIDER: No referring provider defined for this encounter.    CANCER TYPE: Prostate cancer; Biochemical recurrence; Castration resistant disease  STAGE: Stage III - pT3b at diagnosis; M0    HISTORY OF PRESENTING ILLNESS:  Wallace was noted to have rising screening PSA from 2 - 4. He was referred to Dr. Rodolfo Connor. He had radical prostatectomy on 11/2015. Pathology from this revealed Cayla 4+4 disease with extraprostatic extension, seminal vesicle extension and he was staged at pT3b. All of the 12 lymph nodes resected were negative for disease. Post operatively his PSA did not drop to undetectable levels and remained elevated at 0.56. It vladimir to 0.9 in April 2016 and he was referred to Dr. Newton for adjuvant radiation therapy. He completed radiation therapy but did not have any PSA response to this treatment.     TREATMENT SUMMARY:  11/13/2015 Radical prostatectomy  April 2016  Radiation therapy  He was started on intermittent androgen deprivation therapy which had to be changed to continuous with rapid rise in his PSA.      April 2020 - he was started on bicalutamide for complete androgen blockade  He had rising PSA in May 2021. His bone scan done on 6/30/21 revealed metastatic lesions of T6 and the sacrum. He was switched to abiraterone with prednisone on 8/7/21.  He had relatively stable PSA on therapy without any significant PSA response.  He was eventually taken off therapy in September 2022 for progressive disease.    He enrolled in the Eclipse clinical trial on 10/27/2022 and was randomized to the standard of care enzalutamide arm.    CURRENT INTERVENTIONS:  Enzalutamide 160 mg oral once a day as a standard of care on ECLIPSE trial    SUBJECTIVE   Wallace is being seen for his prostate cancer    Steve was followed in  person at this visit. He is seen for scheduled follow up visit.  He has been randomized to enzalutamide and has been taking enzalutamide since since 10/27/2022. He  denies missing a dose. He has tolerated this without difficulty. He denies any new side effects. He was in positive mood at this visit.      He is being followed with labs and restaging scans.      He was playing basketball with his group of friends and [facility] but caught COVID infection last month and is very upset about it.  He does not plan to go back to [facility] during the ceballos.  He would like to resume his golf at [employer].       PAST MEDICAL HISTORY   HTN  Dyslipidemia  Prostate cancer as detailed above      CURRENT OUTPATIENT MEDICATIONS     Current Outpatient Medications   Medication Sig    amLODIPine (NORVASC) 5 MG tablet Take 1 tablet (5 mg) by mouth daily    atorvastatin (LIPITOR) 20 MG tablet Take 1 tablet by mouth once daily    enzalutamide (XTANDI) 80 MG tablet Take 2 tablets (160 mg) by mouth daily    fluticasone (FLONASE) 50 MCG/ACT nasal spray Spray 1 spray into both nostrils daily    hydrochlorothiazide (HYDRODIURIL) 25 MG tablet Take 1 tablet (25 mg) by mouth daily    ibuprofen (ADVIL/MOTRIN) 800 MG tablet TAKE 1 TABLET BY MOUTH THREE TIMES DAILY WITH FOOD    valsartan (DIOVAN) 320 MG tablet Take 1 tablet (320 mg) by mouth daily     No current facility-administered medications for this visit.        ALLERGIES     No Known Allergies     REVIEW OF SYSTEMS   As above in the HPI, o/w complete 12-point ROS was negative.     PHYSICAL EXAM   Resp 16   Wt 99.8 kg (220 lb 1.6 oz)   BMI 33.20 kg/m    SpO2 Readings from Last 4 Encounters:   09/21/18 96%   08/24/18 94%   06/22/18 95%   05/11/18 97%     Wt Readings from Last 3 Encounters:   03/29/24 99.8 kg (220 lb 1.6 oz)   01/12/24 97.4 kg (214 lb 11.2 oz)   12/14/23 98 kg (216 lb)     GENERAL/CONSTITUTIONAL: No acute distress.  EYES: Pupils are equal, round, and react to light and accommodation.  Extraocular movements intact.  No scleral icterus.  ENT/MOUTH: Neck supple. Oropharynx clear, no mucositis.  LYMPH: No anterior cervical, posterior cervical, supraclavicular, axillary or inguinal adenopathy.   RESPIRATORY: Clear to auscultation bilaterally. No crackles or wheezing.   CARDIOVASCULAR: Regular rate and rhythm without murmurs, gallops, or rubs.  GASTROINTESTINAL: No hepatosplenomegaly, masses, or tenderness. The patient has normal bowel sounds. No guarding.  No distention.  : Deferred  MUSCULOSKELETAL: Warm and well-perfused, no cyanosis, clubbing, or edema.  NEUROLOGIC: Cranial nerves II-XII are intact. Alert, oriented, answers questions appropriately. No focal neurologic deficit  INTEGUMENTARY: No rashes or jaundice.  GAIT: Normal     LABORATORY AND IMAGING STUDIES     Recent Labs   Lab Test 03/26/24  1012 01/09/24  1011 10/09/23  1051 07/19/23  0901 05/23/23  0857    139 139 139 143   POTASSIUM 4.5 4.1 3.7 4.9 4.5   CHLORIDE 99 101 102 101 105   CO2 27 28 26 26 28   ANIONGAP 12 10 11 12 10   BUN 14.9 12.8 13.9 15.4 16.4   CR 0.95 0.94 0.91 0.91 1.02   * 99 105* 107* 103*   TY 9.6 8.8 9.0 9.2 9.3     Recent Labs   Lab Test 04/14/23  1021 03/27/23  1024 03/01/23  1039 01/27/23  1312 12/06/22  0810   MAG 1.8 2.0 2.0 1.9 2.0     Recent Labs   Lab Test 03/26/24  1012 01/09/24  1011 10/09/23  1051 07/19/23  0901 05/23/23  0857   WBC 5.5 4.9 5.0 4.9 5.6   HGB 16.3 15.2 13.9 15.5 14.9    198 177 189 177   MCV 92 92 93 93 94   NEUTROPHIL 56 57 54 52 57     Recent Labs   Lab Test 03/26/24  1012 01/09/24  1011 10/09/23  1051 08/15/22  0810 06/27/22  0827 03/28/22  1050 02/23/22  1419   BILITOTAL 0.7 0.6 0.7   < > 0.9 0.7 0.8   ALKPHOS 96 94 89   < > 94 108 113   ALT 17 16 16   < > 49 80* 88*   AST 30 22 25   < > 4 43 46*   ALBUMIN 4.3 4.1 4.1   < > 3.7 3.6 3.4   LDH  --   --   --   --  246* 259* 246*    < > = values in this interval not displayed.     TSH   Date Value Ref Range Status  "  04/14/2023 1.02 0.30 - 4.20 uIU/mL Final   03/01/2023 1.14 0.30 - 4.20 uIU/mL Final   10/27/2022 0.65 0.40 - 4.00 mU/L Final   09/22/2022 0.86 0.30 - 4.20 uIU/mL Final     No results for input(s): \"CEA\" in the last 07133 hours.  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 Multiplanar reformats were obtained. Dose  reduction techniques were used.   CONTRAST: None.    COMPARISON: CT chest, abdomen and pelvis performed on 1/9/2024    FINDINGS:   LUNGS AND PLEURA: Stable appearance of small pulmonary nodules  measuring up to 3 mm in the left upper lobe (series 12, image 61).  Previously seen tiny foci of bronchial mucus plugging and micronodules  in the left lower lobe at the lung base appear improved and no longer  definitively seen. No pleural effusion or pneumothorax.    MEDIASTINUM/AXILLAE: No lymphadenopathy. No thoracic aortic aneurysm.  Moderate to severe vascular calcifications are seen in the thoracic  aorta. No pericardial effusion. Severe coronary artery calcifications.  Small hiatal hernia is unchanged.    HEPATOBILIARY: No focal hepatic lesion is present. Cholelithiasis is  seen in the gallbladder.    PANCREAS: No significant mass, duct dilatation, or inflammatory  change.    SPLEEN: Small amount of subcapsular fluid is noted along the spleen  measuring up to 8 mm in thickness.    ADRENAL GLANDS: Stable 1.0 cm nodule in the right adrenal gland.  Unremarkable left adrenal gland.    KIDNEYS/BLADDER: Stable subcentimeter hyperdense lesion in the " upper  pole of the right kidney, consistent with a cyst containing  hemorrhagic material. No calculi or hydronephrosis is present.    BOWEL: No obstruction or inflammatory change.    LYMPH NODES: No lymphadenopathy.    VASCULATURE: Stable mildly aneurysmal infrarenal abdominal aorta  measuring 3.4 cm. Severe aortobiiliac atherosclerotic calcifications.    PELVIC ORGANS: Prostatectomy. Stable appearance of small  fat-containing inguinal hernias, right greater than left.    OTHER: No free fluid or fluid collections. No free air.    MUSCULOSKELETAL: Stable appearance of scattered sclerotic lesions  including lesions in the right pubic symphysis, mid sacrum and T6  vertebral level.       Impression    IMPRESSION:  1.  Stable appearance of sclerotic osseous lesions.   2.  Previously seen tree-in-bud nodularity and small nodules in the  left lower lobe are no longer visualized. Findings likely reflect  resolved bronchiolitis. Other pulmonary nodules are unchanged. A few  tiny nodules and tree-in-bud micronodules measuring 1-2 mm in the left  lower lobe at the lung base are new, favored to be inflammatory.  Attention on follow-up.    JONAS ZUNIGA MD         SYSTEM ID:  [id number]     Exam Date Exam Time Exam Date Exam Time Accession # Performing Department Results    3/26/24  3:12 PM 3/26/24  3:12 PM [id number] [facility]       Study Result    Narrative & Impression   EXAM: NM BONE SCAN WHOLE BODY  LOCATION: [facility]  DATE: 3/26/2024     INDICATION: Malignant neoplasm prostate  COMPARISON: CT of the chest abdomen pelvis dated 03/26/2024) nuclear medicine bone scan dated 01/09/2024  TECHNIQUE: 27.0 mCi technetium-99m MDP, IV. Anterior and posterior delayed whole-body images at 3 hours with additional spot images of the skull.     FINDINGS: While there is broadly stable focal radiotracer uptake in the T6 vertebral body with increasing  radiotracer uptake in the superior aspect of the lower sacral/coccygeal lesion, which could suggest early progression of disease.                                                                      IMPRESSION:     While there is broadly stable focal radiotracer uptake in the T6 vertebral body with increasing radiotracer uptake in the superior aspect of the lower sacral/coccygeal lesion, which could suggest early progression of disease.         Recent Labs   Lab Test 03/26/24  1012 01/09/24  1009 10/09/23  1051 07/19/23  0901 05/23/23  0857 04/14/23  1021 03/01/23  1039 01/03/23  0926   PSA 1.51 1.55 0.85 0.75 0.55 0.42 0.39 0.46   TESTOSTTOTAL 8*  --   --  6*  --  6* 7* 7*            ASSESSMENT AND PLAN   Castration resistant metastatic prostate cancer progressing on abiraterone with prednisone  ECOG PS 1   HTN  No medical comorbidity    I had a lengthy discussion with Steve who is alone at this visit. Clinical research nurse - Janel Gee did not join us for the visit.     He is tolerating enzalutamide well. He denies any new side effects on this medication. He has not been missing doses. He has not started any new medications.     We will continue with androgen deprivation therapy with GnRH analog - he received his last dose on 7/21/23 and is due for it today.     I have reviewed all of the labs done prior to this clinic visit.  Labs are all completely normal including electrolytes, renal function, hepatic panel, complete blood count and differential. His PSA had nicely responded to enzalutamide from 10.3 ng/ml at the start of therapy to 0.39 ng/ml on 3/1/23. It has been slowly rising since then. It has increased to 0.85 ng/ml in October and 1.55 ng/ml at this visit on 1/9/2024.      His is being seen with restaging scans -  CT chest, abdomen and pelvis and bone scans. I have reviewed actual images from his restaging scans and they continue to reveal stable disease.  He has read the reports and is happy about  it.    We will continue on therapy until he has radiographic disease progression.  We will get a repeat PSMA PET scan once we confirm progression as he would be eligible for lutetium crossover as part of the clinical trial.    Overall he is delighted about how well he is feeling at this time.    He again had questions about the trial which were reviewed with him.     I will see him again in 12 weeks or so with labs and restaging scans. We will wait for an input from our research nurse about the right time for restaging per trial protocol.     30 minutes spent on the date of the encounter doing chart review, history and exam, documentation and further activities as noted above      Gigi Ward    Hematologist and Medical Oncologist  [facility]

## 2024-03-29 NOTE — LETTER
3/29/2024         RE: Wallace Zelaya  [address] [facility] [address]        Dear Colleague,    Thank you for referring your patient, Wallace Zelaya, to the [facility]. Please see a copy of my visit note below.    [facility]  FOLLOW-UP VISIT NOTE    PATIENT NAME: Wallace Zelaya MRN # [id number]  DATE OF VISIT: Mar 29, 2024 YOB: 1945    REFERRING PROVIDER: No referring provider defined for this encounter.    CANCER TYPE: Prostate cancer; Biochemical recurrence; Castration resistant disease  STAGE: Stage III - pT3b at diagnosis; M0    HISTORY OF PRESENTING ILLNESS:  Wallace was noted to have rising screening PSA from 2 - 4. He was referred to Dr. Rodolfo Connor. He had radical prostatectomy on 11/2015. Pathology from this revealed Cayla 4+4 disease with extraprostatic extension, seminal vesicle extension and he was staged at pT3b. All of the 12 lymph nodes resected were negative for disease. Post operatively his PSA did not drop to undetectable levels and remained elevated at 0.56. It vladimir to 0.9 in April 2016 and he was referred to Dr. Newton for adjuvant radiation therapy. He completed radiation therapy but did not have any PSA response to this treatment.     TREATMENT SUMMARY:  11/13/2015 Radical prostatectomy  April 2016  Radiation therapy  He was started on intermittent androgen deprivation therapy which had to be changed to continuous with rapid rise in his PSA.      April 2020 - he was started on bicalutamide for complete androgen blockade  He had rising PSA in May 2021. His bone scan done on 6/30/21 revealed metastatic lesions of T6 and the sacrum. He was switched to abiraterone with prednisone on 8/7/21.  He had relatively stable PSA on therapy without any significant PSA response.  He was eventually taken off therapy in September 2022 for progressive disease.    He enrolled in the Eclipse clinical trial  on 10/27/2022 and was randomized to the standard of care enzalutamide arm.    CURRENT INTERVENTIONS:  Enzalutamide 160 mg oral once a day as a standard of care on ECLIPSE trial    DELANEY Gonsalez is being seen for his prostate cancer    Steve was followed in person at this visit. He is seen for scheduled follow up visit.  He has been randomized to enzalutamide and has been taking enzalutamide since since 10/27/2022. He  denies missing a dose. He has tolerated this without difficulty. He denies any new side effects. He was in positive mood at this visit.      He is being followed with labs and restaging scans.      He was playing basketball with his group of friends and [facility] but caught COVID infection last month and is very upset about it.  He does not plan to go back to [facility] during the ceballos.  He would like to resume his golf at [employer].       PAST MEDICAL HISTORY   HTN  Dyslipidemia  Prostate cancer as detailed above      CURRENT OUTPATIENT MEDICATIONS     Current Outpatient Medications   Medication Sig     amLODIPine (NORVASC) 5 MG tablet Take 1 tablet (5 mg) by mouth daily     atorvastatin (LIPITOR) 20 MG tablet Take 1 tablet by mouth once daily     enzalutamide (XTANDI) 80 MG tablet Take 2 tablets (160 mg) by mouth daily     fluticasone (FLONASE) 50 MCG/ACT nasal spray Spray 1 spray into both nostrils daily     hydrochlorothiazide (HYDRODIURIL) 25 MG tablet Take 1 tablet (25 mg) by mouth daily     ibuprofen (ADVIL/MOTRIN) 800 MG tablet TAKE 1 TABLET BY MOUTH THREE TIMES DAILY WITH FOOD     valsartan (DIOVAN) 320 MG tablet Take 1 tablet (320 mg) by mouth daily     No current facility-administered medications for this visit.        ALLERGIES     No Known Allergies     REVIEW OF SYSTEMS   As above in the HPI, o/w complete 12-point ROS was negative.     PHYSICAL EXAM   Resp 16   Wt 99.8 kg (220 lb 1.6 oz)   BMI 33.20 kg/m    SpO2 Readings from Last 4 Encounters:   09/21/18 96%   08/24/18 94%   06/22/18 95%    05/11/18 97%     Wt Readings from Last 3 Encounters:   03/29/24 99.8 kg (220 lb 1.6 oz)   01/12/24 97.4 kg (214 lb 11.2 oz)   12/14/23 98 kg (216 lb)     GENERAL/CONSTITUTIONAL: No acute distress.  EYES: Pupils are equal, round, and react to light and accommodation. Extraocular movements intact.  No scleral icterus.  ENT/MOUTH: Neck supple. Oropharynx clear, no mucositis.  LYMPH: No anterior cervical, posterior cervical, supraclavicular, axillary or inguinal adenopathy.   RESPIRATORY: Clear to auscultation bilaterally. No crackles or wheezing.   CARDIOVASCULAR: Regular rate and rhythm without murmurs, gallops, or rubs.  GASTROINTESTINAL: No hepatosplenomegaly, masses, or tenderness. The patient has normal bowel sounds. No guarding.  No distention.  : Deferred  MUSCULOSKELETAL: Warm and well-perfused, no cyanosis, clubbing, or edema.  NEUROLOGIC: Cranial nerves II-XII are intact. Alert, oriented, answers questions appropriately. No focal neurologic deficit  INTEGUMENTARY: No rashes or jaundice.  GAIT: Normal     LABORATORY AND IMAGING STUDIES     Recent Labs   Lab Test 03/26/24  1012 01/09/24  1011 10/09/23  1051 07/19/23  0901 05/23/23  0857    139 139 139 143   POTASSIUM 4.5 4.1 3.7 4.9 4.5   CHLORIDE 99 101 102 101 105   CO2 27 28 26 26 28   ANIONGAP 12 10 11 12 10   BUN 14.9 12.8 13.9 15.4 16.4   CR 0.95 0.94 0.91 0.91 1.02   * 99 105* 107* 103*   TY 9.6 8.8 9.0 9.2 9.3     Recent Labs   Lab Test 04/14/23  1021 03/27/23  1024 03/01/23  1039 01/27/23  1312 12/06/22  0810   MAG 1.8 2.0 2.0 1.9 2.0     Recent Labs   Lab Test 03/26/24  1012 01/09/24  1011 10/09/23  1051 07/19/23  0901 05/23/23  0857   WBC 5.5 4.9 5.0 4.9 5.6   HGB 16.3 15.2 13.9 15.5 14.9    198 177 189 177   MCV 92 92 93 93 94   NEUTROPHIL 56 57 54 52 57     Recent Labs   Lab Test 03/26/24  1012 01/09/24  1011 10/09/23  1051 08/15/22  0810 06/27/22  0827 03/28/22  1050 02/23/22  1419   BILITOTAL 0.7 0.6 0.7   < > 0.9 0.7 0.8  "  ALKPHOS 96 94 89   < > 94 108 113   ALT 17 16 16   < > 49 80* 88*   AST 30 22 25   < > 4 43 46*   ALBUMIN 4.3 4.1 4.1   < > 3.7 3.6 3.4   LDH  --   --   --   --  246* 259* 246*    < > = values in this interval not displayed.     TSH   Date Value Ref Range Status   04/14/2023 1.02 0.30 - 4.20 uIU/mL Final   03/01/2023 1.14 0.30 - 4.20 uIU/mL Final   10/27/2022 0.65 0.40 - 4.00 mU/L Final   09/22/2022 0.86 0.30 - 4.20 uIU/mL Final     No results for input(s): \"CEA\" in the last [id number] hours.  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 Multiplanar reformats were obtained. Dose  reduction techniques were used.   CONTRAST: None.    COMPARISON: CT chest, abdomen and pelvis performed on 1/9/2024    FINDINGS:   LUNGS AND PLEURA: Stable appearance of small pulmonary nodules  measuring up to 3 mm in the left upper lobe (series 12, image 61).  Previously seen tiny foci of bronchial mucus plugging and micronodules  in the left lower lobe at the lung base appear improved and no longer  definitively seen. No pleural effusion or pneumothorax.    MEDIASTINUM/AXILLAE: No lymphadenopathy. No thoracic aortic aneurysm.  Moderate to severe vascular calcifications are seen in the thoracic  aorta. No pericardial effusion. Severe coronary artery calcifications.  Small hiatal hernia is unchanged.    HEPATOBILIARY: No focal hepatic lesion is present. Cholelithiasis is  seen in the gallbladder.    PANCREAS: No significant mass, duct dilatation, or " inflammatory  change.    SPLEEN: Small amount of subcapsular fluid is noted along the spleen  measuring up to 8 mm in thickness.    ADRENAL GLANDS: Stable 1.0 cm nodule in the right adrenal gland.  Unremarkable left adrenal gland.    KIDNEYS/BLADDER: Stable subcentimeter hyperdense lesion in the upper  pole of the right kidney, consistent with a cyst containing  hemorrhagic material. No calculi or hydronephrosis is present.    BOWEL: No obstruction or inflammatory change.    LYMPH NODES: No lymphadenopathy.    VASCULATURE: Stable mildly aneurysmal infrarenal abdominal aorta  measuring 3.4 cm. Severe aortobiiliac atherosclerotic calcifications.    PELVIC ORGANS: Prostatectomy. Stable appearance of small  fat-containing inguinal hernias, right greater than left.    OTHER: No free fluid or fluid collections. No free air.    MUSCULOSKELETAL: Stable appearance of scattered sclerotic lesions  including lesions in the right pubic symphysis, mid sacrum and T6  vertebral level.       Impression    IMPRESSION:  1.  Stable appearance of sclerotic osseous lesions.   2.  Previously seen tree-in-bud nodularity and small nodules in the  left lower lobe are no longer visualized. Findings likely reflect  resolved bronchiolitis. Other pulmonary nodules are unchanged. A few  tiny nodules and tree-in-bud micronodules measuring 1-2 mm in the left  lower lobe at the lung base are new, favored to be inflammatory.  Attention on follow-up.    JONAS ZUNIGA MD         SYSTEM ID:  [id number]     Exam Date Exam Time Exam Date Exam Time Accession # Performing Department Results    3/26/24  3:12 PM 3/26/24  3:12 PM [id number] [facility] Imaging       Study Result    Narrative & Impression   EXAM: NM BONE SCAN WHOLE BODY  LOCATION: [facility]  DATE: 3/26/2024     INDICATION: Malignant neoplasm prostate  COMPARISON: CT of the chest abdomen pelvis dated 03/26/2024) nuclear  medicine bone scan dated 01/09/2024  TECHNIQUE: 27.0 mCi technetium-99m MDP, IV. Anterior and posterior delayed whole-body images at 3 hours with additional spot images of the skull.     FINDINGS: While there is broadly stable focal radiotracer uptake in the T6 vertebral body with increasing radiotracer uptake in the superior aspect of the lower sacral/coccygeal lesion, which could suggest early progression of disease.                                                                      IMPRESSION:     While there is broadly stable focal radiotracer uptake in the T6 vertebral body with increasing radiotracer uptake in the superior aspect of the lower sacral/coccygeal lesion, which could suggest early progression of disease.         Recent Labs   Lab Test 03/26/24  1012 01/09/24  1009 10/09/23  1051 07/19/23  0901 05/23/23  0857 04/14/23  1021 03/01/23  1039 01/03/23  0926   PSA 1.51 1.55 0.85 0.75 0.55 0.42 0.39 0.46   TESTOSTTOTAL 8*  --   --  6*  --  6* 7* 7*            ASSESSMENT AND PLAN   Castration resistant metastatic prostate cancer progressing on abiraterone with prednisone  ECOG PS 1   HTN  No medical comorbidity    I had a lengthy discussion with Steve who is alone at this visit. Clinical research nurse - Janel Gee did not join us for the visit.     He is tolerating enzalutamide well. He denies any new side effects on this medication. He has not been missing doses. He has not started any new medications.     We will continue with androgen deprivation therapy with GnRH analog - he received his last dose on 7/21/23 and is due for it today.     I have reviewed all of the labs done prior to this clinic visit.  Labs are all completely normal including electrolytes, renal function, hepatic panel, complete blood count and differential. His PSA had nicely responded to enzalutamide from 10.3 ng/ml at the start of therapy to 0.39 ng/ml on 3/1/23. It has been slowly rising since then. It has increased to 0.85 ng/ml  in October and 1.55 ng/ml at this visit on 1/9/2024.      His is being seen with restaging scans -  CT chest, abdomen and pelvis and bone scans. I have reviewed actual images from his restaging scans and they continue to reveal stable disease.  He has read the reports and is happy about it.    We will continue on therapy until he has radiographic disease progression.  We will get a repeat PSMA PET scan once we confirm progression as he would be eligible for lutetium crossover as part of the clinical trial.    Overall he is delighted about how well he is feeling at this time.    He again had questions about the trial which were reviewed with him.     I will see him again in 12 weeks or so with labs and restaging scans. We will wait for an input from our research nurse about the right time for restaging per trial protocol.     30 minutes spent on the date of the encounter doing chart review, history and exam, documentation and further activities as noted above      Gigi Ward    Hematologist and Medical Oncologist  [facility]          Again, thank you for allowing me to participate in the care of your patient.        Sincerely,        Gigi Ward MD

## 2024-05-02 DIAGNOSIS — C79.51 MALIGNANT NEOPLASM METASTATIC TO BONE (H): Primary | ICD-10-CM

## 2024-06-20 SDOH — HEALTH STABILITY: PHYSICAL HEALTH: ON AVERAGE, HOW MANY DAYS PER WEEK DO YOU ENGAGE IN MODERATE TO STRENUOUS EXERCISE (LIKE A BRISK WALK)?: 7 DAYS

## 2024-06-20 SDOH — HEALTH STABILITY: PHYSICAL HEALTH: ON AVERAGE, HOW MANY MINUTES DO YOU ENGAGE IN EXERCISE AT THIS LEVEL?: 20 MIN

## 2024-06-20 ASSESSMENT — SOCIAL DETERMINANTS OF HEALTH (SDOH): HOW OFTEN DO YOU GET TOGETHER WITH FRIENDS OR RELATIVES?: ONCE A WEEK

## 2024-06-25 ENCOUNTER — OFFICE VISIT (OUTPATIENT)
Dept: FAMILY MEDICINE | Facility: CLINIC | Age: 79
End: 2024-06-25
Payer: COMMERCIAL

## 2024-06-25 VITALS
SYSTOLIC BLOOD PRESSURE: 148 MMHG | OXYGEN SATURATION: 96 % | TEMPERATURE: 98.5 F | DIASTOLIC BLOOD PRESSURE: 70 MMHG | BODY MASS INDEX: 32.69 KG/M2 | WEIGHT: 215.7 LBS | HEART RATE: 67 BPM | RESPIRATION RATE: 16 BRPM | HEIGHT: 68 IN

## 2024-06-25 DIAGNOSIS — R01.1 MURMUR, CARDIAC: ICD-10-CM

## 2024-06-25 DIAGNOSIS — Z00.00 MEDICARE ANNUAL WELLNESS VISIT, SUBSEQUENT: Primary | ICD-10-CM

## 2024-06-25 DIAGNOSIS — E78.5 HYPERLIPIDEMIA LDL GOAL <130: ICD-10-CM

## 2024-06-25 DIAGNOSIS — I10 ESSENTIAL HYPERTENSION: ICD-10-CM

## 2024-06-25 PROCEDURE — 99214 OFFICE O/P EST MOD 30 MIN: CPT | Mod: 25 | Performed by: FAMILY MEDICINE

## 2024-06-25 PROCEDURE — G0439 PPPS, SUBSEQ VISIT: HCPCS | Performed by: FAMILY MEDICINE

## 2024-06-25 RX ORDER — RESPIRATORY SYNCYTIAL VIRUS VACCINE 120MCG/0.5
0.5 KIT INTRAMUSCULAR ONCE
Qty: 1 EACH | Refills: 0 | Status: CANCELLED | OUTPATIENT
Start: 2024-06-25 | End: 2024-06-25

## 2024-06-25 NOTE — PROGRESS NOTES
"Preventive Care Visit  [facility]  Cedric Ball MD, Family Medicine  Jun 25, 2024      Assessment & Plan     Medicare annual wellness visit, subsequent  - Hepatitis C Screen Reflex to HCV RNA Quant and Genotype    Hyperlipidemia LDL goal <130  - Lipid panel reflex to direct LDL Fasting    Murmur, cardiac  Patient due systolic ejection murmur at the right upper sternal border, high likelihood this is most likely aortic stenosis.  With his intermittent dizziness with position changes exertional intolerance, I recommend we proceed with 2D echocardiogram for characterization.  I will follow-up with him in 3 months for recheck.   - Echocardiogram Complete    Essential hypertension  With his episodic dizziness, I hesitate to increase his antihypertensive regimen until we find the source.  Further workup as per above.              BMI  Estimated body mass index is 32.54 kg/m  as calculated from the following:    Height as of this encounter: 1.734 m (5' 8.27\").    Weight as of this encounter: 97.8 kg (215 lb 11.2 oz).   Weight management plan: Discussed healthy diet and exercise guidelines    Counseling  Appropriate preventive services were discussed with this patient, including applicable screening as appropriate for fall prevention, nutrition, physical activity, Tobacco-use cessation, weight loss and cognition.  Checklist reviewing preventive services available has been given to the patient.  Reviewed patient's diet, addressing concerns and/or questions.   Discussed possible causes of fatigue. Updated plan of care.  Patient reported difficulty with activities of daily living were addressed today.Information on urinary incontinence and treatment options given to patient.           Sara Wilson is a 78 year old, presenting for the following:  Medicare Visit        6/25/2024    10:03 AM   Additional Questions   Roomed by LINN Panchal   Accompanied by Self         Health Care " Directive  Patient does not have a Health Care Directive or Living Will: Discussed advance care planning with patient; however, patient declined at this time.    HPI  For the last several months Steve has noticed he has episodic dizziness, most recent episode occurred when he was picking up golf balls from the TV, he is concerned this is due to position change.  Additionally this has occurred when he is going up a hill notices exercise intolerance when compared to his peers.  Any blurry vision double vision difficulty with speech, no difficulty swallowing.  Symptoms do not occur with getting out of bed.      6/20/2024   General Health   How would you rate your overall physical health? Good   Feel stress (tense, anxious, or unable to sleep) Not at all            6/20/2024   Nutrition   Diet: Regular (no restrictions)            6/20/2024   Exercise   Days per week of moderate/strenous exercise 7 days   Average minutes spent exercising at this level 20 min            6/20/2024   Social Factors   Frequency of gathering with friends or relatives Once a week   Worry food won't last until get money to buy more No   Food not last or not have enough money for food? No   Do you have housing? (Housing is defined as stable permanent housing and does not include staying ouside in a car, in a tent, in an abandoned building, in an overnight shelter, or couch-surfing.) Yes   Are you worried about losing your housing? No   Lack of transportation? No   Unable to get utilities (heat,electricity)? No            6/20/2024   Fall Risk   Fallen 2 or more times in the past year? No    No   Trouble with walking or balance? No    No       Multiple values from one day are sorted in reverse-chronological order          6/20/2024   Activities of Daily Living- Home Safety   Needs help with the following daily activites Preparing meals   Safety concerns in the home None of the above            6/20/2024   Dental   Dentist two times every year? Yes             2024   Hearing Screening   Hearing concerns? None of the above            2024   Driving Risk Screening   Patient/family members have concerns about driving No            2024   General Alertness/Fatigue Screening   Have you been more tired than usual lately? (!) YES            2024   Urinary Incontinence Screening   Bothered by leaking urine in past 6 months Yes            2024   TB Screening   Were you born outside of the [place]? No            Today's PHQ-2 Score:       2024    10:01 AM   PHQ-2 (  [employer])   Q1: Little interest or pleasure in doing things 0   Q2: Feeling down, depressed or hopeless 0   PHQ-2 Score 0   Q1: Little interest or pleasure in doing things Not at all   Q2: Feeling down, depressed or hopeless Not at all   PHQ-2 Score 0           2024   Substance Use   Alcohol more than 3/day or more than 7/wk No   Do you have a current opioid prescription? No   How severe/bad is pain from 1 to 10? 1/10   Do you use any other substances recreationally? No        Social History     Tobacco Use    Smoking status: Former     Current packs/day: 0.00     Average packs/day: 1 pack/day for 43.1 years (43.1 ttl pk-yrs)     Types: Cigarettes     Start date: 7/15/1966     Quit date: 2009     Years since quittin.8    Smokeless tobacco: Never   Vaping Use    Vaping status: Never Used   Substance Use Topics    Alcohol use: Yes     Comment: 1 beer a week    Drug use: No       ASCVD Risk   The 10-year ASCVD risk score (Brigitte ERAZO, et al., 2019) is: 45.6%    Values used to calculate the score:      Age: 78 years      Sex: Male      Is Non- : No      Diabetic: No      Tobacco smoker: No      Systolic Blood Pressure: 153 mmHg      Is BP treated: Yes      HDL Cholesterol: 47 mg/dL      Total Cholesterol: 197 mg/dL            Reviewed and updated as needed this visit by Provider                      Current providers sharing in care for this  "patient include:  Patient Care Team:  Cedric Ball MD as PCP - General (Family Medicine)  Rodolfo Connor MD as MD (Urology)  Ashely Torres, RN as Registered Nurse (Hematology & Oncology)  Gigi Ward MD as Assigned Cancer Care Provider  Cedric Ball MD as Assigned PCP    The following health maintenance items are reviewed in Epic and correct as of today:  Health Maintenance   Topic Date Due    HEPATITIS C SCREENING  Never done    RSV VACCINE (Pregnancy & 60+) (1 - 1-dose 60+ series) Never done    COVID-19 Vaccine (7 - 2023-24 season) 12/12/2023    LIPID  04/14/2024    MEDICARE ANNUAL WELLNESS VISIT  05/09/2024    ANNUAL REVIEW OF HM ORDERS  05/09/2024    BMP  03/26/2025    FALL RISK ASSESSMENT  06/25/2025    GLUCOSE  03/26/2027    DTAP/TDAP/TD IMMUNIZATION (3 - Td or Tdap) 03/09/2028    ADVANCE CARE PLANNING  05/09/2028    PHQ-2 (once per calendar year)  Completed    INFLUENZA VACCINE  Completed    Pneumococcal Vaccine: 65+ Years  Completed    ZOSTER IMMUNIZATION  Completed    IPV IMMUNIZATION  Aged Out    HPV IMMUNIZATION  Aged Out    MENINGITIS IMMUNIZATION  Aged Out    RSV MONOCLONAL ANTIBODY  Aged Out    COLORECTAL CANCER SCREENING  Discontinued    LUNG CANCER SCREENING  Discontinued            Objective    Exam  BP (!) 153/85 (BP Location: Left arm, Patient Position: Sitting, Cuff Size: Adult Large)   Pulse 67   Temp 98.5  F (36.9  C) (Oral)   Resp 16   Ht 1.734 m (5' 8.27\")   Wt 97.8 kg (215 lb 11.2 oz)   SpO2 96%   BMI 32.54 kg/m     Estimated body mass index is 32.54 kg/m  as calculated from the following:    Height as of this encounter: 1.734 m (5' 8.27\").    Weight as of this encounter: 97.8 kg (215 lb 11.2 oz).    Physical Exam  Vitals reviewed.   Constitutional:       Appearance: He is not ill-appearing.   HENT:      Head: Normocephalic.      Right Ear: Tympanic membrane normal.      Left Ear: Tympanic membrane normal.      Nose: No congestion or rhinorrhea.   Eyes:      " Extraocular Movements: Extraocular movements intact.      Pupils: Pupils are equal, round, and reactive to light.   Cardiovascular:      Comments: Grade 3/6 systolic ejection murmur loudest over the right upper sternal border    Audible bilateral carotid bruits  Abdominal:      General: Abdomen is flat.      Palpations: Abdomen is soft.               6/25/2024   Mini Cog   Clock Draw Score 2 Normal   3 Item Recall 2 objects recalled   Mini Cog Total Score 4                 Signed Electronically by: Cedric Ball MD

## 2024-06-26 ENCOUNTER — HOSPITAL ENCOUNTER (OUTPATIENT)
Dept: NUCLEAR MEDICINE | Facility: CLINIC | Age: 79
Setting detail: NUCLEAR MEDICINE
Discharge: HOME | End: 2024-06-26
Attending: INTERNAL MEDICINE
Payer: COMMERCIAL

## 2024-06-26 ENCOUNTER — HOSPITAL ENCOUNTER (OUTPATIENT)
Dept: CT IMAGING | Facility: CLINIC | Age: 79
Discharge: HOME | End: 2024-06-26
Attending: INTERNAL MEDICINE | Admitting: INTERNAL MEDICINE
Payer: COMMERCIAL

## 2024-06-26 ENCOUNTER — ANCILLARY PROCEDURE (OUTPATIENT)
Dept: RADIOLOGY | Facility: CLINIC | Age: 79
End: 2024-06-26
Attending: INTERNAL MEDICINE
Payer: COMMERCIAL

## 2024-06-26 ENCOUNTER — LAB (OUTPATIENT)
Dept: INFUSION THERAPY | Facility: CLINIC | Age: 79
End: 2024-06-26
Attending: INTERNAL MEDICINE
Payer: COMMERCIAL

## 2024-06-26 DIAGNOSIS — C61 PROSTATE CANCER (H): ICD-10-CM

## 2024-06-26 DIAGNOSIS — Z00.00 MEDICARE ANNUAL WELLNESS VISIT, SUBSEQUENT: ICD-10-CM

## 2024-06-26 DIAGNOSIS — I10 ESSENTIAL HYPERTENSION: ICD-10-CM

## 2024-06-26 DIAGNOSIS — C79.51 MALIGNANT NEOPLASM METASTATIC TO BONE (H): ICD-10-CM

## 2024-06-26 DIAGNOSIS — E78.5 HYPERLIPIDEMIA LDL GOAL <130: ICD-10-CM

## 2024-06-26 LAB
ALBUMIN SERPL BCG-MCNC: 4.1 G/DL (ref 3.5–5.2)
ALP SERPL-CCNC: 98 U/L (ref 40–150)
ALT SERPL W P-5'-P-CCNC: 14 U/L (ref 0–70)
ANION GAP SERPL CALCULATED.3IONS-SCNC: 13 MMOL/L (ref 7–15)
AST SERPL W P-5'-P-CCNC: 21 U/L (ref 0–45)
BASOPHILS # BLD AUTO: 0.1 10E3/UL (ref 0–0.2)
BASOPHILS NFR BLD AUTO: 1 %
BILIRUB SERPL-MCNC: 0.6 MG/DL
BUN SERPL-MCNC: 17.7 MG/DL (ref 8–23)
CALCIUM SERPL-MCNC: 8.7 MG/DL (ref 8.8–10.2)
CHLORIDE SERPL-SCNC: 102 MMOL/L (ref 98–107)
CREAT SERPL-MCNC: 0.91 MG/DL (ref 0.67–1.17)
DEPRECATED HCO3 PLAS-SCNC: 24 MMOL/L (ref 22–29)
EGFRCR SERPLBLD CKD-EPI 2021: 86 ML/MIN/1.73M2
EOSINOPHIL # BLD AUTO: 0.3 10E3/UL (ref 0–0.7)
EOSINOPHIL NFR BLD AUTO: 5 %
ERYTHROCYTE [DISTWIDTH] IN BLOOD BY AUTOMATED COUNT: 13 % (ref 10–15)
GLUCOSE SERPL-MCNC: 99 MG/DL (ref 70–99)
HCT VFR BLD AUTO: 42.2 % (ref 40–53)
HGB BLD-MCNC: 14.5 G/DL (ref 13.3–17.7)
IMM GRANULOCYTES # BLD: 0 10E3/UL
IMM GRANULOCYTES NFR BLD: 0 %
LYMPHOCYTES # BLD AUTO: 1.6 10E3/UL (ref 0.8–5.3)
LYMPHOCYTES NFR BLD AUTO: 31 %
MCH RBC QN AUTO: 31.5 PG (ref 26.5–33)
MCHC RBC AUTO-ENTMCNC: 34.4 G/DL (ref 31.5–36.5)
MCV RBC AUTO: 92 FL (ref 78–100)
MONOCYTES # BLD AUTO: 0.5 10E3/UL (ref 0–1.3)
MONOCYTES NFR BLD AUTO: 9 %
NEUTROPHILS # BLD AUTO: 2.8 10E3/UL (ref 1.6–8.3)
NEUTROPHILS NFR BLD AUTO: 54 %
NRBC # BLD AUTO: 0 10E3/UL
NRBC BLD AUTO-RTO: 0 /100
PLATELET # BLD AUTO: 167 10E3/UL (ref 150–450)
POTASSIUM SERPL-SCNC: 3.9 MMOL/L (ref 3.4–5.3)
PROT SERPL-MCNC: 7.1 G/DL (ref 6.4–8.3)
RBC # BLD AUTO: 4.6 10E6/UL (ref 4.4–5.9)
SODIUM SERPL-SCNC: 139 MMOL/L (ref 135–145)
WBC # BLD AUTO: 5.2 10E3/UL (ref 4–11)

## 2024-06-26 PROCEDURE — 85049 AUTOMATED PLATELET COUNT: CPT | Performed by: INTERNAL MEDICINE

## 2024-06-26 PROCEDURE — A9503 TC99M MEDRONATE: HCPCS | Performed by: INTERNAL MEDICINE

## 2024-06-26 PROCEDURE — 71260 CT THORAX DX C+: CPT

## 2024-06-26 PROCEDURE — 250N000009 HC RX 250: Performed by: INTERNAL MEDICINE

## 2024-06-26 PROCEDURE — 82465 ASSAY BLD/SERUM CHOLESTEROL: CPT | Performed by: FAMILY MEDICINE

## 2024-06-26 PROCEDURE — 36415 COLL VENOUS BLD VENIPUNCTURE: CPT

## 2024-06-26 PROCEDURE — 84153 ASSAY OF PSA TOTAL: CPT | Performed by: INTERNAL MEDICINE

## 2024-06-26 PROCEDURE — 86803 HEPATITIS C AB TEST: CPT | Performed by: FAMILY MEDICINE

## 2024-06-26 PROCEDURE — 78306 BONE IMAGING WHOLE BODY: CPT

## 2024-06-26 PROCEDURE — 343N000001 HC RX 343: Performed by: INTERNAL MEDICINE

## 2024-06-26 PROCEDURE — 80053 COMPREHEN METABOLIC PANEL: CPT | Performed by: INTERNAL MEDICINE

## 2024-06-26 PROCEDURE — 84403 ASSAY OF TOTAL TESTOSTERONE: CPT | Performed by: INTERNAL MEDICINE

## 2024-06-26 PROCEDURE — 250N000011 HC RX IP 250 OP 636: Performed by: INTERNAL MEDICINE

## 2024-06-26 RX ORDER — IOPAMIDOL 755 MG/ML
500 INJECTION, SOLUTION INTRAVASCULAR ONCE
Status: COMPLETED | OUTPATIENT
Start: 2024-06-26 | End: 2024-06-26

## 2024-06-26 RX ORDER — TC 99M MEDRONATE 20 MG/10ML
25 INJECTION, POWDER, LYOPHILIZED, FOR SOLUTION INTRAVENOUS ONCE
Status: COMPLETED | OUTPATIENT
Start: 2024-06-26 | End: 2024-06-26

## 2024-06-26 RX ADMIN — IOPAMIDOL 100 ML: 755 INJECTION, SOLUTION INTRAVENOUS at 11:31

## 2024-06-26 RX ADMIN — SODIUM CHLORIDE 65 ML: 9 INJECTION, SOLUTION INTRAVENOUS at 11:31

## 2024-06-26 RX ADMIN — TC 99M MEDRONATE 26 MILLICURIE: 20 INJECTION, POWDER, LYOPHILIZED, FOR SOLUTION INTRAVENOUS at 11:01

## 2024-06-26 NOTE — PROGRESS NOTES
Nursing Note:  Wallace Zelaya presents today for labs, PIV placement.    Patient seen by provider today: No   present during visit today: Not Applicable.    Note: labs for PCP drawn as well (hep C & lipid)    Intravenous Access:  Labs drawn without difficulty.  Peripheral IV placed using ultrasound, however educated patient that this may not be necessary with every IV placement. Patient verbalizes understanding.    Discharge Plan:   Patient was sent to Radiology for imaging appointment.    Judie Chandler RN

## 2024-06-27 LAB
CHOLEST SERPL-MCNC: 190 MG/DL
FASTING STATUS PATIENT QL REPORTED: YES
HCV AB SERPL QL IA: NONREACTIVE
HDLC SERPL-MCNC: 43 MG/DL
LDLC SERPL CALC-MCNC: 120 MG/DL
NONHDLC SERPL-MCNC: 147 MG/DL
PSA SERPL DL<=0.01 NG/ML-MCNC: 3.15 NG/ML (ref 0–6.5)
TRIGL SERPL-MCNC: 134 MG/DL

## 2024-06-28 ENCOUNTER — ONCOLOGY VISIT (OUTPATIENT)
Dept: ONCOLOGY | Facility: CLINIC | Age: 79
End: 2024-06-28
Attending: INTERNAL MEDICINE
Payer: COMMERCIAL

## 2024-06-28 VITALS
WEIGHT: 217 LBS | TEMPERATURE: 97 F | RESPIRATION RATE: 16 BRPM | DIASTOLIC BLOOD PRESSURE: 86 MMHG | HEART RATE: 66 BPM | OXYGEN SATURATION: 96 % | BODY MASS INDEX: 32.73 KG/M2 | SYSTOLIC BLOOD PRESSURE: 159 MMHG

## 2024-06-28 DIAGNOSIS — C61 PROSTATE CANCER (H): Primary | ICD-10-CM

## 2024-06-28 DIAGNOSIS — C79.51 MALIGNANT NEOPLASM METASTATIC TO BONE (H): ICD-10-CM

## 2024-06-28 DIAGNOSIS — Z00.6 EXAMINATION OF PARTICIPANT IN CLINICAL TRIAL: ICD-10-CM

## 2024-06-28 DIAGNOSIS — I10 ESSENTIAL HYPERTENSION: ICD-10-CM

## 2024-06-28 PROCEDURE — G0463 HOSPITAL OUTPT CLINIC VISIT: HCPCS | Mod: 25 | Performed by: INTERNAL MEDICINE

## 2024-06-28 PROCEDURE — 250N000011 HC RX IP 250 OP 636: Performed by: INTERNAL MEDICINE

## 2024-06-28 PROCEDURE — G2211 COMPLEX E/M VISIT ADD ON: HCPCS | Performed by: INTERNAL MEDICINE

## 2024-06-28 PROCEDURE — 99215 OFFICE O/P EST HI 40 MIN: CPT | Performed by: INTERNAL MEDICINE

## 2024-06-28 PROCEDURE — 96401 CHEMO ANTI-NEOPL SQ/IM: CPT | Performed by: INTERNAL MEDICINE

## 2024-06-28 RX ADMIN — LEUPROLIDE ACETATE 45 MG: KIT at 10:17

## 2024-06-28 ASSESSMENT — PAIN SCALES - GENERAL: PAINLEVEL: NO PAIN (0)

## 2024-06-28 NOTE — NURSING NOTE
"Oncology Rooming Note    June 28, 2024 9:26 AM   Wallace Zelaya is a 78 year old male who presents for:    Chief Complaint   Patient presents with    Oncology Clinic Visit     Initial Vitals: BP (!) 159/86   Pulse 66   Temp 97  F (36.1  C) (Oral)   Resp 16   Wt 98.4 kg (217 lb)   SpO2 96%   BMI 32.73 kg/m   Estimated body mass index is 32.73 kg/m  as calculated from the following:    Height as of 6/25/24: 1.734 m (5' 8.27\").    Weight as of this encounter: 98.4 kg (217 lb). Body surface area is 2.18 meters squared.  No Pain (0) Comment: Data Unavailable   No LMP for male patient.  Allergies reviewed: Yes  Medications reviewed: Yes    Medications: Medication refills not needed today.  Pharmacy name entered into [place]:    [facility] - [place], [place] - [address] [facility] [place], [place] [facility] [address] [facility] [place], [place] [address] [place], [place] [address] [place] [address] [address]  [facility] - [place], [place] - [address] [facility] - [place], [place] - [address] [facility] [id number]    Frailty Screening:   Is the patient here for a new oncology consult visit in cancer care? 2. No      Clinical concerns: follow up       Janey Novak            "

## 2024-06-28 NOTE — PROGRESS NOTES
[facility]  FOLLOW-UP VISIT NOTE    PATIENT NAME: Wallace Zelaya MRN # [id number]  DATE OF VISIT: Jun 28, 2024 YOB: 1945    REFERRING PROVIDER: No referring provider defined for this encounter.    CANCER TYPE: Prostate cancer; Biochemical recurrence; Castration resistant disease  STAGE: Stage III - pT3b at diagnosis; M0    HISTORY OF PRESENTING ILLNESS:  Wallace was noted to have rising screening PSA from 2 - 4. He was referred to Dr. Rodolfo Connor. He had radical prostatectomy on 11/2015. Pathology from this revealed Cayla 4+4 disease with extraprostatic extension, seminal vesicle extension and he was staged at pT3b. All of the 12 lymph nodes resected were negative for disease. Post operatively his PSA did not drop to undetectable levels and remained elevated at 0.56. It vladimir to 0.9 in April 2016 and he was referred to Dr. Newton for adjuvant radiation therapy. He completed radiation therapy but did not have any PSA response to this treatment.     TREATMENT SUMMARY:  11/13/2015 Radical prostatectomy  April 2016  Radiation therapy  He was started on intermittent androgen deprivation therapy which had to be changed to continuous with rapid rise in his PSA.      April 2020 - he was started on bicalutamide for complete androgen blockade  He had rising PSA in May 2021. His bone scan done on 6/30/21 revealed metastatic lesions of T6 and the sacrum. He was switched to abiraterone with prednisone on 8/7/21.  He had relatively stable PSA on therapy without any significant PSA response.  He was eventually taken off therapy in September 2022 for progressive disease.    He enrolled in the [facility] clinical trial on 10/27/2022 and was randomized to the standard of care enzalutamide arm.    CURRENT INTERVENTIONS:  Enzalutamide 160 mg oral once a day as a standard of care on ECLIPSE trial    SUBJECTIVE   Wallace is being seen for his prostate cancer    Steve was followed in  person at this visit. He is seen for scheduled follow up visit.  He has been randomized to enzalutamide and has been taking enzalutamide since since 10/27/2022. He denies missing a dose. He has tolerated this without difficulty. He denies any new side effects. He was in positive mood at this visit.      He is being followed with labs and restaging scans.      He has not been able to golf much this season due to the rains.  He denies any new symptoms other than couple of episodes of dizziness.  Once he was walking a little uphill to catch up with his brother who went ahead with the golf cart and got dizzy briefly.  On another occasion he was bending to plant T on the golf course and felt a little dizzy.  He has brought this up with his primary care physician who is working this up.        PAST MEDICAL HISTORY   HTN  Dyslipidemia  Prostate cancer as detailed above      CURRENT OUTPATIENT MEDICATIONS     Current Outpatient Medications   Medication Sig Dispense Refill    amLODIPine (NORVASC) 5 MG tablet Take 1 tablet (5 mg) by mouth daily 90 tablet 3    atorvastatin (LIPITOR) 20 MG tablet Take 1 tablet by mouth once daily 90 tablet 1    enzalutamide (XTANDI) 80 MG tablet Take 2 tablets (160 mg) by mouth daily 60 tablet 11    fluticasone (FLONASE) 50 MCG/ACT nasal spray Spray 1 spray into both nostrils daily 16 g 1    hydrochlorothiazide (HYDRODIURIL) 25 MG tablet Take 1 tablet (25 mg) by mouth daily 90 tablet 1    ibuprofen (ADVIL/MOTRIN) 800 MG tablet TAKE 1 TABLET BY MOUTH THREE TIMES DAILY WITH FOOD 90 tablet 3    valsartan (DIOVAN) 320 MG tablet Take 1 tablet (320 mg) by mouth daily 90 tablet 1     No current facility-administered medications for this visit.        ALLERGIES     No Known Allergies     REVIEW OF SYSTEMS   As above in the HPI, o/w complete 12-point ROS was negative.     PHYSICAL EXAM   BP (!) 159/86   Pulse 66   Temp 97  F (36.1  C) (Oral)   Resp 16   Wt 98.4 kg (217 lb)   SpO2 96%   BMI 32.73  kg/m    SpO2 Readings from Last 4 Encounters:   09/21/18 96%   08/24/18 94%   06/22/18 95%   05/11/18 97%     Wt Readings from Last 3 Encounters:   06/28/24 98.4 kg (217 lb)   06/25/24 97.8 kg (215 lb 11.2 oz)   03/29/24 99.8 kg (220 lb 1.6 oz)     GENERAL/CONSTITUTIONAL: No acute distress.  EYES: Pupils are equal, round, and react to light and accommodation. Extraocular movements intact.  No scleral icterus.  ENT/MOUTH: Neck supple. Oropharynx clear, no mucositis.  LYMPH: No anterior cervical, posterior cervical, supraclavicular, axillary or inguinal adenopathy.   RESPIRATORY: Clear to auscultation bilaterally. No crackles or wheezing.   CARDIOVASCULAR: Regular rate and rhythm without murmurs, gallops, or rubs.  GASTROINTESTINAL: No hepatosplenomegaly, masses, or tenderness. The patient has normal bowel sounds. No guarding.  No distention.  : Deferred  MUSCULOSKELETAL: Warm and well-perfused, no cyanosis, clubbing, or edema.  NEUROLOGIC: Cranial nerves II-XII are intact. Alert, oriented, answers questions appropriately. No focal neurologic deficit  INTEGUMENTARY: No rashes or jaundice.  GAIT: Normal     LABORATORY AND IMAGING STUDIES     Recent Labs   Lab Test 06/26/24  1044 03/26/24  1012 01/09/24  1011 10/09/23  1051 07/19/23  0901    138 139 139 139   POTASSIUM 3.9 4.5 4.1 3.7 4.9   CHLORIDE 102 99 101 102 101   CO2 24 27 28 26 26   ANIONGAP 13 12 10 11 12   BUN 17.7 14.9 12.8 13.9 15.4   CR 0.91 0.95 0.94 0.91 0.91   GLC 99 100* 99 105* 107*   TY 8.7* 9.6 8.8 9.0 9.2     Recent Labs   Lab Test 04/14/23  1021 03/27/23  1024 03/01/23  1039 01/27/23  1312 12/06/22  0810   MAG 1.8 2.0 2.0 1.9 2.0     Recent Labs   Lab Test 06/26/24  1044 03/26/24  1012 01/09/24  1011 10/09/23  1051 07/19/23  0901   WBC 5.2 5.5 4.9 5.0 4.9   HGB 14.5 16.3 15.2 13.9 15.5    184 198 177 189   MCV 92 92 92 93 93   NEUTROPHIL 54 56 57 54 52     Recent Labs   Lab Test 06/26/24  1044 03/26/24  1012 01/09/24  1011  "08/15/22  0810 06/27/22  0827 03/28/22  1050 02/23/22  1419   BILITOTAL 0.6 0.7 0.6   < > 0.9 0.7 0.8   ALKPHOS 98 96 94   < > 94 108 113   ALT 14 17 16   < > 49 80* 88*   AST 21 30 22   < > 4 43 46*   ALBUMIN 4.1 4.3 4.1   < > 3.7 3.6 3.4   LDH  --   --   --   --  246* 259* 246*    < > = values in this interval not displayed.     TSH   Date Value Ref Range Status   04/14/2023 1.02 0.30 - 4.20 uIU/mL Final   03/01/2023 1.14 0.30 - 4.20 uIU/mL Final   10/27/2022 0.65 0.40 - 4.00 mU/L Final   09/22/2022 0.86 0.30 - 4.20 uIU/mL Final     No results for input(s): \"CEA\" in the last [id number] hours.  Results for orders placed or performed during the hospital encounter of 06/26/24   CT Chest/Abdomen/Pelvis w Contrast    Narrative    CT CHEST/ABDOMEN/PELVIS WITH CONTRAST 6/26/2024 11:49 AM    CLINICAL HISTORY: Castration resistant metastatic prostate cancer  progressing on abiraterone + prednisone; enrolled in ECLIPSE trial and  randomized to SOC enzalutamide. Prostate cancer (H). 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    TECHNIQUE: CT scan of the chest, abdomen, and pelvis was performed  following injection of IV contrast. Multiplanar reformats were  obtained. Dose reduction techniques were used.   CONTRAST: 100mL Isovue-370    COMPARISON: None.    FINDINGS:   LUNGS AND PLEURA: No effusions. No acute airspace consolidation. No  new worrisome airspace disease. A few stable tiny nodules. Stable 2 mm  example at the left upper lobe, series 12 image 49.    MEDIASTINUM/AXILLAE: No acute abnormality. No enlarged lymph nodes.  Scattered thoracic aortic calcifications. Mild wall thickening of the  esophagus distally.    CORONARY ARTERY CALCIFICATION: Moderate.    HEPATOBILIARY: Mild fatty liver. No focal liver lesion. Gallbladder  unremarkable.    PANCREAS: Normal.    SPLEEN: No new abnormality. Subcapsular hypodense fluid appears  stable.    ADRENAL GLANDS: Normal.    KIDNEYS/BLADDER: No hydronephrosis or urolithiasis. No " renal  parenchymal lesion. Bladder appears unremarkable.    BOWEL: No obstruction or acute inflammation.    PELVIC ORGANS: Normal.    ADDITIONAL FINDINGS: Infrarenal abdominal aortic enlargement is 3.3  cm, stable. No enlarged lymph nodes identified.    MUSCULOSKELETAL: Stable sclerotic foci identified. Stable T6 example  series 3 image 78. Stable sclerosis at the right pubic symphysis and  right ischial tuberosity. Stable sclerosis at the sacrum.      Impression    IMPRESSION:  1.  Stable appearance of sclerotic bony lesions. Correlate with bone  scanning for more sensitive assessment.  2.  No new disease identified.  3.  A few stable findings as above.     HARSH OROZCO MD         SYSTEM ID:  [id number]       Study Result    Narrative & Impression   EXAM: NM BONE SCAN WHOLE BODY  LOCATION: [facility]  DATE: 6/26/2024     INDICATION: Castration resistant metastatic prostate cancer progressing on abiraterone + prednisone; enrolled in ECLIPSE trial  and  randomized to [facility] enzalutamide  COMPARISON: CTA chest abdomen pelvis 06/26/2024. Nuclear medicine bone scan 03/26/2024.  TECHNIQUE: 26.0 mCi technetium-99m MDP, IV. Anterior and posterior delayed whole-body images at 3 hours with additional spot images of the skull.     FINDINGS: Stable foci of radiotracer uptake of the T6 vertebral body. Mildly increased extent of radiotracer uptake involving the sacrum.     Degenerative uptake of the bilateral shoulders, hands, knees, and feet. Soft tissue uptake is maintained. Both kidneys are visualized.                                                                      IMPRESSION:     Mildly increased extent of radiotracer uptake of the sacrum. Stable radiotracer avid uptake of the T6 vertebral body. No new site of osteoblastic metastatic disease.         Recent Labs   Lab Test 06/26/24  1044 03/26/24  1012 01/09/24  1009 10/09/23  1051 07/19/23  0901 05/23/23  0857 04/14/23  1021 03/01/23  1039  01/03/23  0926   PSA 3.15 1.51 1.55 0.85 0.75   < > 0.42 0.39 0.46   TESTOSTTOTAL  --  8*  --   --  6*  --  6* 7* 7*    < > = values in this interval not displayed.            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 Clinical research nurse - Janel Gee did not join us for the visit.     He is tolerating enzalutamide well. He denies any new side effects on this medication. He has not been missing doses. He has not started any new medications.     We will continue with androgen deprivation therapy with GnRH analog - he received his last dose on 7/21/23 and is due for it today.     I have reviewed all of the labs done prior to this clinic visit.  Labs are all completely normal including electrolytes, renal function, hepatic panel, complete blood count and differential. His PSA had nicely responded to enzalutamide from 10.3 ng/ml at the start of therapy to 0.39 ng/ml on 3/1/23. It has been slowly rising since then. It has increased to 0.85 ng/ml in October and 1.55 ng/ml on 1/9/2024, 1.51 ng/ml on 3/26/2024 and currently 3.15 ng/ml..      His is being seen with restaging scans -  CT chest, abdomen and pelvis and bone scans. I have reviewed actual images from his restaging scans and they continue to reveal stable disease.  He has read the reports and is happy about it.    We will continue on therapy until he has radiographic disease progression.  We will get a repeat PSMA PET scan once we confirm progression as he would be eligible for lutetium crossover as part of the clinical trial.  I explained him about lutetium therapy including the mechanism of action, rationale, expected side effects, risks, benefits and alternatives.  Most commonly are seen fatigue, dry mouth, dry eyes and cytopenias with lutetium.  It is an intravenous infusion  administered over 15 minutes at the [facility] every 6 weeks.  Most patients tend to tolerate this reasonably well.  He is overall positive and excited about the idea of getting lutetium.    Overall he is delighted about how well he is feeling at this time.  He is due for his Lupron and it would be administered after this clinic visit.  He does get hot flashes for a few days after his leuprolide injection.    He had a couple of episodes of dizziness.  His primary care physician is already working him up for this.  I have encouraged him to increase his oral fluid intake.  Occasionally, dizziness could be secondary to dehydration.    He again had questions about the trial which were reviewed with him.     I will see him again in 12 weeks or so with labs and restaging scans. We will wait for an input from our research nurse about the right time for restaging per trial protocol.     40 minutes spent on the date of the encounter doing chart review, history and exam, documentation and further activities as noted above      Gigi Ward    Hematologist and Medical Oncologist  [facility]

## 2024-06-28 NOTE — LETTER
6/28/2024      Wallace Zelaya  [address] [facility] [address]      Dear Colleague,    Thank you for referring your patient, Wallace Zelaya, to the [facility] CANCER [facility]. Please see a copy of my visit note below.    [facility] CANCER CLINIC  FOLLOW-UP VISIT NOTE    PATIENT NAME: Wallace Zelaya MRN # [id number]  DATE OF VISIT: Jun 28, 2024 YOB: 1945    REFERRING PROVIDER: No referring provider defined for this encounter.    CANCER TYPE: Prostate cancer; Biochemical recurrence; Castration resistant disease  STAGE: Stage III - pT3b at diagnosis; M0    HISTORY OF PRESENTING ILLNESS:  Wallace was noted to have rising screening PSA from 2 - 4. He was referred to Dr. Rodolfo Connor. He had radical prostatectomy on 11/2015. Pathology from this revealed [place] 4+4 disease with extraprostatic extension, seminal vesicle extension and he was staged at pT3b. All of the 12 lymph nodes resected were negative for disease. Post operatively his PSA did not drop to undetectable levels and remained elevated at 0.56. It vladimir to 0.9 in April 2016 and he was referred to Dr. Newton for adjuvant radiation therapy. He completed radiation therapy but did not have any PSA response to this treatment.     TREATMENT SUMMARY:  11/13/2015 Radical prostatectomy  April 2016  Radiation therapy  He was started on intermittent androgen deprivation therapy which had to be changed to continuous with rapid rise in his PSA.      April 2020 - he was started on bicalutamide for complete androgen blockade  He had rising PSA in May 2021. His bone scan done on 6/30/21 revealed metastatic lesions of T6 and the sacrum. He was switched to abiraterone with prednisone on 8/7/21.  He had relatively stable PSA on therapy without any significant PSA response.  He was eventually taken off therapy in September 2022 for progressive disease.    He enrolled in the Eclipse clinical trial on 10/27/2022  and was randomized to the standard of care enzalutamide arm.    CURRENT INTERVENTIONS:  Enzalutamide 160 mg oral once a day as a standard of care on ECLIPSE trial    DELANEY Gonsalez is being seen for his prostate cancer    Steve was followed in person at this visit. He is seen for scheduled follow up visit.  He has been randomized to enzalutamide and has been taking enzalutamide since since 10/27/2022. He denies missing a dose. He has tolerated this without difficulty. He denies any new side effects. He was in positive mood at this visit.      He is being followed with labs and restaging scans.      He has not been able to golf much this season due to the rains.  He denies any new symptoms other than couple of episodes of dizziness.  Once he was walking a little uphill to catch up with his brother who went ahead with the golf cart and got dizzy briefly.  On another occasion he was bending to plant T on the golf course and felt a little dizzy.  He has brought this up with his primary care physician who is working this up.        PAST MEDICAL HISTORY   HTN  Dyslipidemia  Prostate cancer as detailed above      CURRENT OUTPATIENT MEDICATIONS     Current Outpatient Medications   Medication Sig Dispense Refill     amLODIPine (NORVASC) 5 MG tablet Take 1 tablet (5 mg) by mouth daily 90 tablet 3     atorvastatin (LIPITOR) 20 MG tablet Take 1 tablet by mouth once daily 90 tablet 1     enzalutamide (XTANDI) 80 MG tablet Take 2 tablets (160 mg) by mouth daily 60 tablet 11     fluticasone (FLONASE) 50 MCG/ACT nasal spray Spray 1 spray into both nostrils daily 16 g 1     hydrochlorothiazide (HYDRODIURIL) 25 MG tablet Take 1 tablet (25 mg) by mouth daily 90 tablet 1     ibuprofen (ADVIL/MOTRIN) 800 MG tablet TAKE 1 TABLET BY MOUTH THREE TIMES DAILY WITH FOOD 90 tablet 3     valsartan (DIOVAN) 320 MG tablet Take 1 tablet (320 mg) by mouth daily 90 tablet 1     No current facility-administered medications for this visit.         ALLERGIES     No Known Allergies     REVIEW OF SYSTEMS   As above in the HPI, o/w complete 12-point ROS was negative.     PHYSICAL EXAM   BP (!) 159/86   Pulse 66   Temp 97  F (36.1  C) (Oral)   Resp 16   Wt 98.4 kg (217 lb)   SpO2 96%   BMI 32.73 kg/m    SpO2 Readings from Last 4 Encounters:   09/21/18 96%   08/24/18 94%   06/22/18 95%   05/11/18 97%     Wt Readings from Last 3 Encounters:   06/28/24 98.4 kg (217 lb)   06/25/24 97.8 kg (215 lb 11.2 oz)   03/29/24 99.8 kg (220 lb 1.6 oz)     GENERAL/CONSTITUTIONAL: No acute distress.  EYES: Pupils are equal, round, and react to light and accommodation. Extraocular movements intact.  No scleral icterus.  ENT/MOUTH: Neck supple. Oropharynx clear, no mucositis.  LYMPH: No anterior cervical, posterior cervical, supraclavicular, axillary or inguinal adenopathy.   RESPIRATORY: Clear to auscultation bilaterally. No crackles or wheezing.   CARDIOVASCULAR: Regular rate and rhythm without murmurs, gallops, or rubs.  GASTROINTESTINAL: No hepatosplenomegaly, masses, or tenderness. The patient has normal bowel sounds. No guarding.  No distention.  : Deferred  MUSCULOSKELETAL: Warm and well-perfused, no cyanosis, clubbing, or edema.  NEUROLOGIC: Cranial nerves II-XII are intact. Alert, oriented, answers questions appropriately. No focal neurologic deficit  INTEGUMENTARY: No rashes or jaundice.  GAIT: Normal     LABORATORY AND IMAGING STUDIES     Recent Labs   Lab Test 06/26/24  1044 03/26/24  1012 01/09/24  1011 10/09/23  1051 07/19/23  0901    138 139 139 139   POTASSIUM 3.9 4.5 4.1 3.7 4.9   CHLORIDE 102 99 101 102 101   CO2 24 27 28 26 26   ANIONGAP 13 12 10 11 12   BUN 17.7 14.9 12.8 13.9 15.4   CR 0.91 0.95 0.94 0.91 0.91   GLC 99 100* 99 105* 107*   TY 8.7* 9.6 8.8 9.0 9.2     Recent Labs   Lab Test 04/14/23  1021 03/27/23  1024 03/01/23  1039 01/27/23  1312 12/06/22  0810   MAG 1.8 2.0 2.0 1.9 2.0     Recent Labs   Lab Test 06/26/24  1044 03/26/24  1012  "01/09/24  1011 10/09/23  1051 07/19/23  0901   WBC 5.2 5.5 4.9 5.0 4.9   HGB 14.5 16.3 15.2 13.9 15.5    184 198 177 189   MCV 92 92 92 93 93   NEUTROPHIL 54 56 57 54 52     Recent Labs   Lab Test 06/26/24  1044 03/26/24  1012 01/09/24  1011 08/15/22  0810 06/27/22  0827 03/28/22  1050 02/23/22  1419   BILITOTAL 0.6 0.7 0.6   < > 0.9 0.7 0.8   ALKPHOS 98 96 94   < > 94 108 113   ALT 14 17 16   < > 49 80* 88*   AST 21 30 22   < > 4 43 46*   ALBUMIN 4.1 4.3 4.1   < > 3.7 3.6 3.4   LDH  --   --   --   --  246* 259* 246*    < > = values in this interval not displayed.     TSH   Date Value Ref Range Status   04/14/2023 1.02 0.30 - 4.20 uIU/mL Final   03/01/2023 1.14 0.30 - 4.20 uIU/mL Final   10/27/2022 0.65 0.40 - 4.00 mU/L Final   09/22/2022 0.86 0.30 - 4.20 uIU/mL Final     No results for input(s): \"CEA\" in the last 23138 hours.  Results for orders placed or performed during the hospital encounter of 06/26/24   CT Chest/Abdomen/Pelvis w Contrast    Narrative    CT CHEST/ABDOMEN/PELVIS WITH CONTRAST 6/26/2024 11:49 AM    CLINICAL HISTORY: Castration resistant metastatic prostate cancer  progressing on abiraterone + prednisone; enrolled in ECLIPSE trial and  randomized to SOC enzalutamide. Prostate cancer (H). 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    TECHNIQUE: CT scan of the chest, abdomen, and pelvis was performed  following injection of IV contrast. Multiplanar reformats were  obtained. Dose reduction techniques were used.   CONTRAST: 100mL Isovue-370    COMPARISON: None.    FINDINGS:   LUNGS AND PLEURA: No effusions. No acute airspace consolidation. No  new worrisome airspace disease. A few stable tiny nodules. Stable 2 mm  example at the left upper lobe, series 12 image 49.    MEDIASTINUM/AXILLAE: No acute abnormality. No enlarged lymph nodes.  Scattered thoracic aortic calcifications. Mild wall thickening of the  esophagus distally.    CORONARY ARTERY CALCIFICATION: Moderate.    HEPATOBILIARY: Mild " fatty liver. No focal liver lesion. Gallbladder  unremarkable.    PANCREAS: Normal.    SPLEEN: No new abnormality. Subcapsular hypodense fluid appears  stable.    ADRENAL GLANDS: Normal.    KIDNEYS/BLADDER: No hydronephrosis or urolithiasis. No renal  parenchymal lesion. Bladder appears unremarkable.    BOWEL: No obstruction or acute inflammation.    PELVIC ORGANS: Normal.    ADDITIONAL FINDINGS: Infrarenal abdominal aortic enlargement is 3.3  cm, stable. No enlarged lymph nodes identified.    MUSCULOSKELETAL: Stable sclerotic foci identified. Stable T6 example  series 3 image 78. Stable sclerosis at the right pubic symphysis and  right ischial tuberosity. Stable sclerosis at the sacrum.      Impression    IMPRESSION:  1.  Stable appearance of sclerotic bony lesions. Correlate with bone  scanning for more sensitive assessment.  2.  No new disease identified.  3.  A few stable findings as above.     HARSH OROZCO MD         SYSTEM ID:  [id number]       Study Result    Narrative & Impression   EXAM: NM BONE SCAN WHOLE BODY  LOCATION: [facility]  DATE: 6/26/2024     INDICATION: Castration resistant metastatic prostate cancer progressing on abiraterone + prednisone; enrolled in ECLIPSE trial  and  randomized to SOC enzalutamide  COMPARISON: CTA chest abdomen pelvis 06/26/2024. Nuclear medicine bone scan 03/26/2024.  TECHNIQUE: 26.0 mCi technetium-99m MDP, IV. Anterior and posterior delayed whole-body images at 3 hours with additional spot images of the skull.     FINDINGS: Stable foci of radiotracer uptake of the T6 vertebral body. Mildly increased extent of radiotracer uptake involving the sacrum.     Degenerative uptake of the bilateral shoulders, hands, knees, and feet. Soft tissue uptake is maintained. Both kidneys are visualized.                                                                      IMPRESSION:     Mildly increased extent of radiotracer uptake of the sacrum. Stable radiotracer avid  uptake of the T6 vertebral body. No new site of osteoblastic metastatic disease.         Recent Labs   Lab Test 06/26/24  1044 03/26/24  1012 01/09/24  1009 10/09/23  1051 07/19/23  0901 05/23/23  0857 04/14/23  1021 03/01/23  1039 01/03/23  0926   PSA 3.15 1.51 1.55 0.85 0.75   < > 0.42 0.39 0.46   TESTOSTTOTAL  --  8*  --   --  6*  --  6* 7* 7*    < > = values in this interval not displayed.            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 Clinical research nurse - Janel Gee did not join us for the visit.     He is tolerating enzalutamide well. He denies any new side effects on this medication. He has not been missing doses. He has not started any new medications.     We will continue with androgen deprivation therapy with GnRH analog - he received his last dose on 7/21/23 and is due for it today.     I have reviewed all of the labs done prior to this clinic visit.  Labs are all completely normal including electrolytes, renal function, hepatic panel, complete blood count and differential. His PSA had nicely responded to enzalutamide from 10.3 ng/ml at the start of therapy to 0.39 ng/ml on 3/1/23. It has been slowly rising since then. It has increased to 0.85 ng/ml in October and 1.55 ng/ml on 1/9/2024, 1.51 ng/ml on 3/26/2024 and currently 3.15 ng/ml..      His is being seen with restaging scans -  CT chest, abdomen and pelvis and bone scans. I have reviewed actual images from his restaging scans and they continue to reveal stable disease.  He has read the reports and is happy about it.    We will continue on therapy until he has radiographic disease progression.  We will get a repeat PSMA PET scan once we confirm progression as he would be eligible for lutetium crossover as part of the clinical trial.  I explained him about  lutetium therapy including the mechanism of action, rationale, expected side effects, risks, benefits and alternatives.  Most commonly are seen fatigue, dry mouth, dry eyes and cytopenias with lutetium.  It is an intravenous infusion administered over 15 minutes at the [facility] every 6 weeks.  Most patients tend to tolerate this reasonably well.  He is overall positive and excited about the idea of getting lutetium.    Overall he is delighted about how well he is feeling at this time.  He is due for his Lupron and it would be administered after this clinic visit.  He does get hot flashes for a few days after his leuprolide injection.    He had a couple of episodes of dizziness.  His primary care physician is already working him up for this.  I have encouraged him to increase his oral fluid intake.  Occasionally, dizziness could be secondary to dehydration.    He again had questions about the trial which were reviewed with him.     I will see him again in 12 weeks or so with labs and restaging scans. We will wait for an input from our research nurse about the right time for restaging per trial protocol.     40 minutes spent on the date of the encounter doing chart review, history and exam, documentation and further activities as noted above      Gigi Ward    Hematologist and Medical Oncologist  [facility]          Again, thank you for allowing me to participate in the care of your patient.        Sincerely,        Gigi Ward MD

## 2024-07-02 LAB — TESTOST SERPL-MCNC: 6 NG/DL (ref 240–950)

## 2024-07-05 DIAGNOSIS — I10 ESSENTIAL HYPERTENSION: ICD-10-CM

## 2024-07-05 NOTE — CONFIDENTIAL NOTE
Signed Prescriptions:                        Disp   Refills    amLODIPine (NORVASC) 5 MG tablet           90 tab*0        Sig: Take 1 tablet by mouth once daily  Authorizing Provider: AYALA KITCHEN          Amlodipine       Last Written Prescription Date:  7/21/23  Last Fill Quantity: 90,   # refills: 3  Last Office Visit: 6/28/24  Future Office visit:    Next 5 appointments (look out 90 days)      Sep 25, 2024 10:00 AM  (Arrive by 9:40 AM)  Provider Visit with Cedric Ball MD  [facility] ([facility] ) [id number] [place] [id number] [phone]             Routing refill request to provider for review/approval.     Christiano Barnett, RN, BSN.  RN Care Coordinator     [facility]   [phone]

## 2024-07-07 RX ORDER — AMLODIPINE BESYLATE 5 MG/1
5 TABLET ORAL DAILY
Qty: 90 TABLET | Refills: 0 | Status: SHIPPED | OUTPATIENT
Start: 2024-07-07 | End: 2024-09-25

## 2024-07-11 DIAGNOSIS — I10 ESSENTIAL HYPERTENSION: ICD-10-CM

## 2024-07-11 RX ORDER — HYDROCHLOROTHIAZIDE 25 MG/1
25 TABLET ORAL DAILY
Qty: 90 TABLET | Refills: 0 | Status: SHIPPED | OUTPATIENT
Start: 2024-07-11 | End: 2024-09-25

## 2024-07-15 ENCOUNTER — OFFICE VISIT (OUTPATIENT)
Dept: URGENT CARE | Facility: URGENT CARE | Age: 79
End: 2024-07-15
Payer: COMMERCIAL

## 2024-07-15 VITALS
DIASTOLIC BLOOD PRESSURE: 76 MMHG | RESPIRATION RATE: 14 BRPM | WEIGHT: 217 LBS | HEART RATE: 67 BPM | BODY MASS INDEX: 32.73 KG/M2 | OXYGEN SATURATION: 98 % | SYSTOLIC BLOOD PRESSURE: 128 MMHG | TEMPERATURE: 98.1 F

## 2024-07-15 DIAGNOSIS — C61 PROSTATE CANCER (H): Primary | ICD-10-CM

## 2024-07-15 DIAGNOSIS — M54.50 ACUTE MIDLINE LOW BACK PAIN WITHOUT SCIATICA: Primary | ICD-10-CM

## 2024-07-15 PROCEDURE — 99213 OFFICE O/P EST LOW 20 MIN: CPT | Performed by: PHYSICIAN ASSISTANT

## 2024-07-15 RX ORDER — TIZANIDINE 2 MG/1
2 TABLET ORAL 3 TIMES DAILY
Qty: 21 TABLET | Refills: 0 | Status: SHIPPED | OUTPATIENT
Start: 2024-07-15 | End: 2024-07-22

## 2024-07-15 NOTE — PROGRESS NOTES
URGENT CARE VISIT:    SUBJECTIVE:   Wallace Zelaya is a 78 year old male who presents for evaluation of back pain  Symptoms began 5 day(s) ago, have been onset acute and are stable.  Pain is located in the low back region, without radiation and are moderate.  Pain is exacerbated by: sitting.  Pain is relieved by: standing. Associated symptoms include: none. Denies any fever, unintentional weight loss,bladder urgency, bladder incontinence, and bowel incontinence. Recent injury: none. He did a lot of yard work the day prior to pain starting.  Personal hx of back pain is recurrent self limited episodes of low back pain in the past.     PMH:   Past Medical History:   Diagnosis Date    Basal cell carcinoma     Essential hypertension 08/24/2006     Problem list name updated by automated process. Provider to review    Hyperlipidemia LDL goal <130 10/31/2010    Prostate cancer (HCC) 11/13/2015    Cayla 8 prostate cancer [id number], s/p robotic radical prostatectomy and adjuvant radiation      Allergies: Patient has no known allergies.  Medications:   Current Outpatient Medications   Medication Sig Dispense Refill    amLODIPine (NORVASC) 5 MG tablet Take 1 tablet by mouth once daily 90 tablet 0    atorvastatin (LIPITOR) 20 MG tablet Take 1 tablet by mouth once daily 90 tablet 1    enzalutamide (XTANDI) 80 MG tablet Take 2 tablets (160 mg) by mouth daily 60 tablet 11    hydrochlorothiazide (HYDRODIURIL) 25 MG tablet Take 1 tablet (25 mg) by mouth daily 90 tablet 0    ibuprofen (ADVIL/MOTRIN) 800 MG tablet TAKE 1 TABLET BY MOUTH THREE TIMES DAILY WITH FOOD 90 tablet 3    tiZANidine (ZANAFLEX) 2 MG tablet Take 1 tablet (2 mg) by mouth 3 times daily for 7 days 21 tablet 0    valsartan (DIOVAN) 320 MG tablet Take 1 tablet (320 mg) by mouth daily 90 tablet 1    fluticasone (FLONASE) 50 MCG/ACT nasal spray Spray 1 spray into both nostrils daily (Patient not taking: Reported on 7/15/2024) 16 g 1     Social History:   Social History      Tobacco Use    Smoking status: Former     Current packs/day: 0.00     Average packs/day: 1 pack/day for 43.1 years (43.1 ttl pk-yrs)     Types: Cigarettes     Start date: 7/15/1966     Quit date: 2009     Years since quittin.9    Smokeless tobacco: Never   Substance Use Topics    Alcohol use: Yes     Comment: 1 beer a week       ROS: ROS otherwise found to be negative except as noted above.     OBJECTIVE:  /76 (BP Location: Right arm, Patient Position: Chair, Cuff Size: Adult Regular)   Pulse 67   Temp 98.1  F (36.7  C) (Oral)   Resp 14   Wt 98.4 kg (217 lb)   SpO2 98%   BMI 32.73 kg/m    General: WDWN in NAD.   Cardiac: RRR without murmurs, rubs, or gallops.  Respiratory: LCTAB without adventitious sounds. Non-labored breathing.  Musculoskeletal: Ambulating without difficulty. Back symmetric, no curvature. ROM normal. No CVA tenderness. No spinal or paraspinal TTP. Straight leg raise test: negative  Neurological: Normal strength and tone with no weakness or sensory deficit noted, reflexes normal.       ASSESSMENT:    ICD-10-CM    1. Acute midline low back pain without sciatica  M54.50 tiZANidine (ZANAFLEX) 2 MG tablet            PLAN:  Patient Instructions   Patient was educated on the natural course of injury which usually resolves within a few weeks. Take medication as prescribed. Side effects discussed. Conservative measures discussed including rest, ice for 48 hours then heat after, stretching exercises, and over-the-counter analgesics (Tylenol). Exercise handout given. See your primary care provider if symptoms worsen or do not improve in 7 days.  Seek emergency care if you develop severe pain, weakness, or changes in bowel/bladder habits.  Patient verbalized understanding and is agreeable to plan. The patient was discharged ambulatory and in stable condition.    Alisha Dhillon PA-C ....................  7/15/2024   10:51 AM

## 2024-07-15 NOTE — PATIENT INSTRUCTIONS
Patient was educated on the natural course of injury which usually resolves within a few weeks.  Conservative measures discussed including rest, ice for 48 hours then heat after, stretching exercises, and over-the-counter analgesics (Tylenol). Exercise handout given. See your primary care provider if symptoms worsen or do not improve in 7 days.  Seek emergency care if you develop severe pain, weakness, or changes in bowel/bladder habits.

## 2024-07-31 DIAGNOSIS — C79.51 MALIGNANT NEOPLASM METASTATIC TO BONE (H): Primary | ICD-10-CM

## 2024-08-03 DIAGNOSIS — I10 ESSENTIAL HYPERTENSION: ICD-10-CM

## 2024-08-03 NOTE — TELEPHONE ENCOUNTER
Medication Question or Refill        What medication are you calling about (include dose and sig)?: valsartan (DIOVAN) 320 MG tablet     Preferred Pharmacy:       [facility] [address] [place] [address] [facility]  [id number] [place] [address]  Phone: [phone] Fax: [phone]      Controlled Substance Agreement on file:   CSA -- Patient Level:    CSA: None found at the patient level.       Who prescribed the medication?: Riri Parmar    Do you need a refill? Yes    When did you use the medication last? Have 2 pills left    Patient offered an appointment? No    Do you have any questions or concerns?  Yes: Have two pills left and need to put Dr Ball name on as provide.      Could we send this information to you in [employer] or would you prefer to receive a phone call?:   Patient would prefer a phone call   Okay to leave a detailed message?: Yes at Home number on file [phone] (home)

## 2024-08-05 ENCOUNTER — HOSPITAL ENCOUNTER (OUTPATIENT)
Dept: CARDIOLOGY | Facility: CLINIC | Age: 79
Discharge: HOME | End: 2024-08-05
Attending: FAMILY MEDICINE | Admitting: FAMILY MEDICINE
Payer: COMMERCIAL

## 2024-08-05 DIAGNOSIS — R01.1 MURMUR, CARDIAC: ICD-10-CM

## 2024-08-05 LAB — LVEF ECHO: NORMAL

## 2024-08-05 PROCEDURE — 255N000002 HC RX 255 OP 636: Performed by: FAMILY MEDICINE

## 2024-08-05 PROCEDURE — 93306 TTE W/DOPPLER COMPLETE: CPT | Mod: 26 | Performed by: INTERNAL MEDICINE

## 2024-08-05 PROCEDURE — 999N000208 ECHOCARDIOGRAM COMPLETE

## 2024-08-05 RX ORDER — VALSARTAN 320 MG/1
320 TABLET ORAL DAILY
Qty: 90 TABLET | Refills: 1 | Status: SHIPPED | OUTPATIENT
Start: 2024-08-05

## 2024-08-05 RX ADMIN — HUMAN ALBUMIN MICROSPHERES AND PERFLUTREN 6 ML: 10; .22 INJECTION, SOLUTION INTRAVENOUS at 15:37

## 2024-08-05 NOTE — TELEPHONE ENCOUNTER
Patient states there has been no break in taking his Valsartan, he has taken 320 mg 1 tab every day without fail.  He is now out of the medication, pharmacy said they would give him a few tabs until this issue gets resolved.  Please send rx to [facility] as loaded. HOA Aguilar R.N.

## 2024-08-05 NOTE — TELEPHONE ENCOUNTER
valsartan (DIOVAN) 320 MG tablet 90 tablet 1 8/9/2023 -- No   Sig - Route: Take 1 tablet (320 mg) by mouth daily - Oral   Sent to pharmacy as: Valsartan 320 MG Oral Tablet (DIOVAN)     See below entry break in medication, call to Steve, no answer left message to call back. Cheri Baradles R.N.

## 2024-08-05 NOTE — TELEPHONE ENCOUNTER
Clinic RN: Please contact patient because patient should have run out of this medication on 02/2024. Confirm patient is taking this medication as prescribed. Document findings and route refill encounter to provider for approval or denial.     Break in med greater than 90 days requires triage RN to contact clinic to clarify reason to gap in med, then route refill request to primary care provider.    Please contact patient  to clarify gap in medication, then route to primary care provider.

## 2024-08-22 DIAGNOSIS — E78.5 HYPERLIPIDEMIA LDL GOAL <130: ICD-10-CM

## 2024-08-23 RX ORDER — ATORVASTATIN CALCIUM 20 MG/1
TABLET, FILM COATED ORAL
Qty: 90 TABLET | Refills: 2 | Status: SHIPPED | OUTPATIENT
Start: 2024-08-23

## 2024-09-25 ENCOUNTER — OFFICE VISIT (OUTPATIENT)
Dept: FAMILY MEDICINE | Facility: CLINIC | Age: 79
End: 2024-09-25
Payer: COMMERCIAL

## 2024-09-25 VITALS
WEIGHT: 212 LBS | RESPIRATION RATE: 14 BRPM | BODY MASS INDEX: 32.13 KG/M2 | OXYGEN SATURATION: 96 % | SYSTOLIC BLOOD PRESSURE: 148 MMHG | TEMPERATURE: 99.1 F | HEIGHT: 68 IN | HEART RATE: 65 BPM | DIASTOLIC BLOOD PRESSURE: 80 MMHG

## 2024-09-25 DIAGNOSIS — Z29.11 NEED FOR VACCINATION AGAINST RESPIRATORY SYNCYTIAL VIRUS: ICD-10-CM

## 2024-09-25 DIAGNOSIS — I10 ESSENTIAL HYPERTENSION: ICD-10-CM

## 2024-09-25 DIAGNOSIS — D69.2 SENILE PURPURA (H): ICD-10-CM

## 2024-09-25 DIAGNOSIS — R41.3 MEMORY DIFFICULTY: Primary | ICD-10-CM

## 2024-09-25 PROCEDURE — 99214 OFFICE O/P EST MOD 30 MIN: CPT | Mod: 25 | Performed by: FAMILY MEDICINE

## 2024-09-25 PROCEDURE — 90662 IIV NO PRSV INCREASED AG IM: CPT | Performed by: FAMILY MEDICINE

## 2024-09-25 PROCEDURE — G0008 ADMIN INFLUENZA VIRUS VAC: HCPCS | Performed by: FAMILY MEDICINE

## 2024-09-25 RX ORDER — AMLODIPINE BESYLATE 10 MG/1
10 TABLET ORAL DAILY
Qty: 90 TABLET | Refills: 3 | Status: SHIPPED | OUTPATIENT
Start: 2024-09-25

## 2024-09-25 RX ORDER — HYDROCHLOROTHIAZIDE 25 MG/1
25 TABLET ORAL DAILY
Qty: 90 TABLET | Refills: 3 | Status: SHIPPED | OUTPATIENT
Start: 2024-09-25

## 2024-09-25 RX ORDER — MEMANTINE HYDROCHLORIDE 5 MG/1
5 TABLET ORAL DAILY
Qty: 30 TABLET | Refills: 2 | Status: SHIPPED | OUTPATIENT
Start: 2024-09-25

## 2024-09-25 ASSESSMENT — PAIN SCALES - GENERAL: PAINLEVEL: NO PAIN (0)

## 2024-09-25 NOTE — PROGRESS NOTES
Assessment & Plan     Memory difficulty  History of Alzheimer's, was at this progressive memory symptoms, patient is showing symptoms suggestive of dementia.  Recommend starting memantine.  - memantine (NAMENDA) 5 MG tablet  Dispense: 30 tablet; Refill: 2    Essential hypertension  Controlled, increase amlodipine from 5 to 10 mg.  Continue hydrochlorothiazide, refilled.  - amLODIPine (NORVASC) 10 MG tablet  Dispense: 90 tablet; Refill: 3  - hydrochlorothiazide (HYDRODIURIL) 25 MG tablet  Dispense: 90 tablet; Refill: 3    Senile purpura (H24)  Reassured patient of benign findings, check CBC and INR as his wife would like further investigation.  - INR  - CBC with platelets    Need for vaccination against respiratory syncytial virus  Discussed with patient, they declined.                  Sara Wilson is a 79 year old, presenting for the following health issues:  Follow Up (3 Month Follow up on his Dizziness. Hasn't happened again since June. ) and Bleeding/Bruising (Noticed more bruising to his arm. )        9/25/2024     9:54 AM   Additional Questions   Roomed by Khushbu Talavera CMA   Accompanied by Self     History of Present Illness       Hyperlipidemia:  He presents for follow up of hyperlipidemia.   He is taking medication to lower cholesterol. He is not having myalgia or other side effects to statin medications.    Hypertension: He presents for follow up of hypertension.  He does check blood pressure  regularly outside of the clinic. Outside blood pressures have been over 140/90. He follows a low salt diet.     He eats 2-3 servings of fruits and vegetables daily.He consumes 2 sweetened beverage(s) daily.He exercises with enough effort to increase his heart rate 20 to 29 minutes per day.  He exercises with enough effort to increase his heart rate 4 days per week.   He is taking medications regularly.       Medication Follow  Taking Medication as prescribed: yes  Side Effects:  None  Medication Helping Symptoms:  " yes        Wife states concerns for patient's memory to be declining and he is using more filler words.  Dad has a history of Alzheimer's.    Since we last talked, his dizziness does improve, he proudly tells me he has been golfing and staining his deck without any difficulty.    Some bruising and dark patches on his left arm.    Patient's biggest concern is his upcoming prostate cancer evaluation, he is set up for a full body scan and repeat lab testing for his treatment of prostate cancer.        Objective    BP (!) 148/80   Pulse 65   Temp 99.1  F (37.3  C) (Oral)   Resp 14   Ht 1.734 m (5' 8.27\")   Wt 96.2 kg (212 lb)   SpO2 96%   BMI 31.98 kg/m    Body mass index is 31.98 kg/m .  Physical Exam  Vitals reviewed.   Constitutional:       Appearance: He is not ill-appearing.   Cardiovascular:      Rate and Rhythm: Normal rate and regular rhythm.   Pulmonary:      Effort: Pulmonary effort is normal.      Breath sounds: Normal breath sounds.   Skin:     Comments: Small purpura on the left upper extremity.   Psychiatric:         Mood and Affect: Mood normal.         Behavior: Behavior normal.                    Signed Electronically by: Cedric Ball MD    "

## 2024-10-07 ENCOUNTER — TELEPHONE (OUTPATIENT)
Dept: ONCOLOGY | Facility: CLINIC | Age: 79
End: 2024-10-07
Payer: COMMERCIAL

## 2024-10-07 NOTE — TELEPHONE ENCOUNTER
Steve is a participant in the Eclipse clinical trial.   He has study-dictated imaging on 10/8 and a review of scans on 10/11/24. He is aware that research RN will not accompany him at these visits, but that we will speak next week about the results of these visits and potential next steps.

## 2024-10-08 ENCOUNTER — HOSPITAL ENCOUNTER (OUTPATIENT)
Dept: NUCLEAR MEDICINE | Facility: CLINIC | Age: 79
Setting detail: NUCLEAR MEDICINE
Discharge: HOME | End: 2024-10-08
Attending: INTERNAL MEDICINE
Payer: MEDICARE

## 2024-10-08 ENCOUNTER — ANCILLARY PROCEDURE (OUTPATIENT)
Dept: RADIOLOGY | Facility: CLINIC | Age: 79
End: 2024-10-08
Attending: INTERNAL MEDICINE
Payer: COMMERCIAL

## 2024-10-08 ENCOUNTER — LAB (OUTPATIENT)
Dept: INFUSION THERAPY | Facility: CLINIC | Age: 79
End: 2024-10-08
Attending: INTERNAL MEDICINE
Payer: COMMERCIAL

## 2024-10-08 ENCOUNTER — HOSPITAL ENCOUNTER (OUTPATIENT)
Dept: CT IMAGING | Facility: CLINIC | Age: 79
Discharge: HOME | End: 2024-10-08
Attending: INTERNAL MEDICINE | Admitting: INTERNAL MEDICINE
Payer: MEDICARE

## 2024-10-08 DIAGNOSIS — C61 PROSTATE CANCER (H): ICD-10-CM

## 2024-10-08 DIAGNOSIS — C79.51 MALIGNANT NEOPLASM METASTATIC TO BONE (H): ICD-10-CM

## 2024-10-08 DIAGNOSIS — Z00.6 EXAMINATION OF PARTICIPANT IN CLINICAL TRIAL: ICD-10-CM

## 2024-10-08 DIAGNOSIS — I10 ESSENTIAL HYPERTENSION: ICD-10-CM

## 2024-10-08 DIAGNOSIS — D69.2 SENILE PURPURA (H): ICD-10-CM

## 2024-10-08 LAB
ALBUMIN SERPL BCG-MCNC: 4 G/DL (ref 3.5–5.2)
ALP SERPL-CCNC: 103 U/L (ref 40–150)
ALT SERPL W P-5'-P-CCNC: 13 U/L (ref 0–70)
ANION GAP SERPL CALCULATED.3IONS-SCNC: 14 MMOL/L (ref 7–15)
AST SERPL W P-5'-P-CCNC: 26 U/L (ref 0–45)
BASOPHILS # BLD AUTO: 0 10E3/UL (ref 0–0.2)
BASOPHILS NFR BLD AUTO: 1 %
BILIRUB SERPL-MCNC: 0.4 MG/DL
BUN SERPL-MCNC: 15.4 MG/DL (ref 8–23)
CALCIUM SERPL-MCNC: 8.7 MG/DL (ref 8.8–10.4)
CHLORIDE SERPL-SCNC: 103 MMOL/L (ref 98–107)
CREAT SERPL-MCNC: 0.9 MG/DL (ref 0.67–1.17)
EGFRCR SERPLBLD CKD-EPI 2021: 86 ML/MIN/1.73M2
EOSINOPHIL # BLD AUTO: 0.3 10E3/UL (ref 0–0.7)
EOSINOPHIL NFR BLD AUTO: 7 %
ERYTHROCYTE [DISTWIDTH] IN BLOOD BY AUTOMATED COUNT: 12.9 % (ref 10–15)
GLUCOSE SERPL-MCNC: 109 MG/DL (ref 70–99)
HCO3 SERPL-SCNC: 23 MMOL/L (ref 22–29)
HCT VFR BLD AUTO: 40.4 % (ref 40–53)
HGB BLD-MCNC: 14.1 G/DL (ref 13.3–17.7)
IMM GRANULOCYTES # BLD: 0 10E3/UL
IMM GRANULOCYTES NFR BLD: 1 %
INR PPP: 0.93 (ref 0.85–1.15)
LYMPHOCYTES # BLD AUTO: 1.3 10E3/UL (ref 0.8–5.3)
LYMPHOCYTES NFR BLD AUTO: 29 %
MCH RBC QN AUTO: 31.8 PG (ref 26.5–33)
MCHC RBC AUTO-ENTMCNC: 34.9 G/DL (ref 31.5–36.5)
MCV RBC AUTO: 91 FL (ref 78–100)
MONOCYTES # BLD AUTO: 0.4 10E3/UL (ref 0–1.3)
MONOCYTES NFR BLD AUTO: 9 %
NEUTROPHILS # BLD AUTO: 2.4 10E3/UL (ref 1.6–8.3)
NEUTROPHILS NFR BLD AUTO: 54 %
NRBC # BLD AUTO: 0 10E3/UL
NRBC BLD AUTO-RTO: 0 /100
PLATELET # BLD AUTO: 180 10E3/UL (ref 150–450)
POTASSIUM SERPL-SCNC: 4.1 MMOL/L (ref 3.4–5.3)
PROT SERPL-MCNC: 6.9 G/DL (ref 6.4–8.3)
PSA SERPL DL<=0.01 NG/ML-MCNC: 7.61 NG/ML
RBC # BLD AUTO: 4.44 10E6/UL (ref 4.4–5.9)
SODIUM SERPL-SCNC: 140 MMOL/L (ref 135–145)
WBC # BLD AUTO: 4.3 10E3/UL (ref 4–11)

## 2024-10-08 PROCEDURE — A9503 TC99M MEDRONATE: HCPCS | Performed by: INTERNAL MEDICINE

## 2024-10-08 PROCEDURE — 250N000009 HC RX 250: Performed by: INTERNAL MEDICINE

## 2024-10-08 PROCEDURE — 85025 COMPLETE CBC W/AUTO DIFF WBC: CPT | Performed by: INTERNAL MEDICINE

## 2024-10-08 PROCEDURE — 84403 ASSAY OF TOTAL TESTOSTERONE: CPT | Performed by: INTERNAL MEDICINE

## 2024-10-08 PROCEDURE — 84153 ASSAY OF PSA TOTAL: CPT | Performed by: INTERNAL MEDICINE

## 2024-10-08 PROCEDURE — 250N000011 HC RX IP 250 OP 636: Performed by: INTERNAL MEDICINE

## 2024-10-08 PROCEDURE — 78306 BONE IMAGING WHOLE BODY: CPT

## 2024-10-08 PROCEDURE — 80053 COMPREHEN METABOLIC PANEL: CPT | Performed by: INTERNAL MEDICINE

## 2024-10-08 PROCEDURE — 36415 COLL VENOUS BLD VENIPUNCTURE: CPT

## 2024-10-08 PROCEDURE — 343N000001 HC RX 343 MED OP 636: Performed by: INTERNAL MEDICINE

## 2024-10-08 PROCEDURE — 74177 CT ABD & PELVIS W/CONTRAST: CPT

## 2024-10-08 PROCEDURE — 85610 PROTHROMBIN TIME: CPT | Performed by: FAMILY MEDICINE

## 2024-10-08 RX ORDER — TC 99M MEDRONATE 20 MG/10ML
25 INJECTION, POWDER, LYOPHILIZED, FOR SOLUTION INTRAVENOUS ONCE
Status: COMPLETED | OUTPATIENT
Start: 2024-10-08 | End: 2024-10-08

## 2024-10-08 RX ORDER — IOPAMIDOL 755 MG/ML
500 INJECTION, SOLUTION INTRAVASCULAR ONCE
Status: COMPLETED | OUTPATIENT
Start: 2024-10-08 | End: 2024-10-08

## 2024-10-08 RX ADMIN — SODIUM CHLORIDE 65 ML: 9 INJECTION, SOLUTION INTRAVENOUS at 09:03

## 2024-10-08 RX ADMIN — IOPAMIDOL 99 ML: 755 INJECTION, SOLUTION INTRAVENOUS at 09:03

## 2024-10-08 RX ADMIN — TC 99M MEDRONATE 27 MILLICURIE: 20 INJECTION, POWDER, LYOPHILIZED, FOR SOLUTION INTRAVENOUS at 08:52

## 2024-10-08 NOTE — PROGRESS NOTES
Nursing Note:  Wallace Zelaya presents today for labs/PIV placement.    Patient seen by provider today: No   present during visit today: Not Applicable.    Note: N/A.    Intravenous Access:  Lab draw site R hand, Needle type butterfly, Gauge 23.  Labs drawn without difficulty.  Peripheral IV placed.    Discharge Plan:   Patient was sent to imaging for scans     Gala De La Rosa RN

## 2024-10-10 LAB — TESTOST SERPL-MCNC: 4 NG/DL (ref 240–950)

## 2024-10-11 ENCOUNTER — ONCOLOGY VISIT (OUTPATIENT)
Dept: ONCOLOGY | Facility: CLINIC | Age: 79
End: 2024-10-11
Attending: INTERNAL MEDICINE
Payer: COMMERCIAL

## 2024-10-11 ENCOUNTER — DOCUMENTATION ONLY (OUTPATIENT)
Dept: ONCOLOGY | Facility: CLINIC | Age: 79
End: 2024-10-11

## 2024-10-11 VITALS
SYSTOLIC BLOOD PRESSURE: 146 MMHG | BODY MASS INDEX: 32.58 KG/M2 | HEIGHT: 68 IN | TEMPERATURE: 97.7 F | RESPIRATION RATE: 16 BRPM | OXYGEN SATURATION: 98 % | HEART RATE: 70 BPM | WEIGHT: 215 LBS | DIASTOLIC BLOOD PRESSURE: 78 MMHG

## 2024-10-11 DIAGNOSIS — Z00.6 EXAMINATION OF PARTICIPANT IN CLINICAL TRIAL: ICD-10-CM

## 2024-10-11 DIAGNOSIS — C61 PROSTATE CANCER (H): Primary | ICD-10-CM

## 2024-10-11 DIAGNOSIS — C79.51 MALIGNANT NEOPLASM METASTATIC TO BONE (H): ICD-10-CM

## 2024-10-11 PROCEDURE — G2211 COMPLEX E/M VISIT ADD ON: HCPCS | Performed by: INTERNAL MEDICINE

## 2024-10-11 PROCEDURE — 99215 OFFICE O/P EST HI 40 MIN: CPT | Performed by: INTERNAL MEDICINE

## 2024-10-11 PROCEDURE — G0463 HOSPITAL OUTPT CLINIC VISIT: HCPCS | Performed by: INTERNAL MEDICINE

## 2024-10-11 ASSESSMENT — PAIN SCALES - GENERAL: PAINLEVEL: NO PAIN (0)

## 2024-10-11 NOTE — NURSING NOTE
"Oncology Rooming Note    October 11, 2024 2:27 PM   Wallace Zelaya is a 79 year old male who presents for:    Chief Complaint   Patient presents with    Oncology Clinic Visit     Initial Vitals: BP (!) 146/78 (Cuff Size: Adult Large)   Pulse 70   Temp 97.7  F (36.5  C) (Temporal)   Resp 16   Ht 1.734 m (5' 8.25\")   Wt 97.5 kg (215 lb)   SpO2 98%   BMI 32.45 kg/m   Estimated body mass index is 32.45 kg/m  as calculated from the following:    Height as of this encounter: 1.734 m (5' 8.25\").    Weight as of this encounter: 97.5 kg (215 lb). Body surface area is 2.17 meters squared.  No Pain (0) Comment: Data Unavailable   No LMP for male patient.  Allergies reviewed: Yes  Medications reviewed: Yes    Medications: Medication refills not needed today.  Pharmacy name entered into HotDes[employer]:    [facility] - [facility] [address] [facility] - [place], [place] - [address] [facility] [place] [address] [place] [address] [place] [address] [address]  [facility] - [place], [place] - [address] [facility] - [place], [place] - [address] [facility] [id number]    Frailty Screening:   Is the patient here for a new oncology consult visit in cancer care? 2. No      Clinical concerns: f/u       Tess Horton CMA              "

## 2024-10-11 NOTE — PROGRESS NOTES
[facility]  FOLLOW-UP VISIT NOTE    PATIENT NAME: Wallace Zelaya MRN # [id number]  DATE OF VISIT: Oct 11, 2024 YOB: 1945    REFERRING PROVIDER: No referring provider defined for this encounter.    CANCER TYPE: Prostate cancer; Biochemical recurrence; Castration resistant disease  STAGE: Stage III - pT3b at diagnosis; M0    HISTORY OF PRESENTING ILLNESS:  Wallace was noted to have rising screening PSA from 2 - 4. He was referred to Dr. Rodolfo Connor. He had radical prostatectomy on 11/2015. Pathology from this revealed Cayla 4+4 disease with extraprostatic extension, seminal vesicle extension and he was staged at pT3b. All of the 12 lymph nodes resected were negative for disease. Post operatively his PSA did not drop to undetectable levels and remained elevated at 0.56. It vladimir to 0.9 in April 2016 and he was referred to Dr. Newton for adjuvant radiation therapy. He completed radiation therapy but did not have any PSA response to this treatment.     TREATMENT SUMMARY:  11/13/2015 Radical prostatectomy  April 2016  Radiation therapy  He was started on intermittent androgen deprivation therapy which had to be changed to continuous with rapid rise in his PSA.      April 2020 - he was started on bicalutamide for complete androgen blockade  He had rising PSA in May 2021. His bone scan done on 6/30/21 revealed metastatic lesions of T6 and the sacrum. He was switched to abiraterone with prednisone on 8/7/21.  He had relatively stable PSA on therapy without any significant PSA response.  He was eventually taken off therapy in September 2022 for progressive disease.    He enrolled in the Eclipse clinical trial on 10/27/2022 and was randomized to the standard of care enzalutamide arm.    CURRENT INTERVENTIONS:  Enzalutamide 160 mg oral once a day as a standard of care on ECLIPSE trial    SUBJECTIVE   Wallace is being seen for his prostate cancer    Steve was followed in  person at this visit. He is seen for scheduled follow up visit.  He has been randomized to enzalutamide and has been taking enzalutamide since since 10/27/2022. He denies missing a dose. He has tolerated this without difficulty. He denies any new side effects. He was in positive mood at this visit.      He is being followed with labs and restaging scans.      He has not been able to golf much this season due to the rains.  He denies any new symptoms other than couple of episodes of dizziness.  Once he was walking a little uphill to catch up with his brother who went ahead with the golf cart and got dizzy briefly.  On another occasion he was bending to plant T on the golf course and felt a little dizzy.  He has brought this up with his primary care physician who is working this up.        PAST MEDICAL HISTORY   HTN  Dyslipidemia  Prostate cancer as detailed above      CURRENT OUTPATIENT MEDICATIONS     Current Outpatient Medications   Medication Sig Dispense Refill    amLODIPine (NORVASC) 10 MG tablet Take 1 tablet (10 mg) by mouth daily. 90 tablet 3    atorvastatin (LIPITOR) 20 MG tablet Take 1 tablet by mouth once daily 90 tablet 2    enzalutamide (XTANDI) 80 MG tablet Take 2 tablets (160 mg) by mouth daily 60 tablet 11    fluticasone (FLONASE) 50 MCG/ACT nasal spray Spray 1 spray into both nostrils daily 16 g 1    hydrochlorothiazide (HYDRODIURIL) 25 MG tablet Take 1 tablet (25 mg) by mouth daily. 90 tablet 3    ibuprofen (ADVIL/MOTRIN) 800 MG tablet TAKE 1 TABLET BY MOUTH THREE TIMES DAILY WITH FOOD 90 tablet 3    memantine (NAMENDA) 5 MG tablet Take 1 tablet (5 mg) by mouth daily. 30 tablet 2    valsartan (DIOVAN) 320 MG tablet Take 1 tablet (320 mg) by mouth daily 90 tablet 1     No current facility-administered medications for this visit.        ALLERGIES     No Known Allergies     REVIEW OF SYSTEMS   As above in the HPI, o/w complete 12-point ROS was negative.     PHYSICAL EXAM   BP (!) 146/78 (Cuff Size:  "Adult Large)   Pulse 70   Temp 97.7  F (36.5  C) (Temporal)   Resp 16   Ht 1.734 m (5' 8.25\")   Wt 97.5 kg (215 lb)   SpO2 98%   BMI 32.45 kg/m    SpO2 Readings from Last 4 Encounters:   09/21/18 96%   08/24/18 94%   06/22/18 95%   05/11/18 97%     Wt Readings from Last 3 Encounters:   10/11/24 97.5 kg (215 lb)   09/25/24 96.2 kg (212 lb)   07/15/24 98.4 kg (217 lb)     GENERAL/CONSTITUTIONAL: No acute distress.  EYES: Pupils are equal, round, and react to light and accommodation. Extraocular movements intact.  No scleral icterus.  ENT/MOUTH: Neck supple. Oropharynx clear, no mucositis.  LYMPH: No anterior cervical, posterior cervical, supraclavicular, axillary or inguinal adenopathy.   RESPIRATORY: Clear to auscultation bilaterally. No crackles or wheezing.   CARDIOVASCULAR: Regular rate and rhythm without murmurs, gallops, or rubs.  GASTROINTESTINAL: No hepatosplenomegaly, masses, or tenderness. The patient has normal bowel sounds. No guarding.  No distention.  : Deferred  MUSCULOSKELETAL: Warm and well-perfused, no cyanosis, clubbing, or edema.  NEUROLOGIC: Cranial nerves II-XII are intact. Alert, oriented, answers questions appropriately. No focal neurologic deficit  INTEGUMENTARY: No rashes or jaundice.  GAIT: Normal     LABORATORY AND IMAGING STUDIES     Recent Labs   Lab Test 10/08/24  0826 06/26/24  1044 03/26/24  1012 01/09/24  1011 10/09/23  1051    139 138 139 139   POTASSIUM 4.1 3.9 4.5 4.1 3.7   CHLORIDE 103 102 99 101 102   CO2 23 24 27 28 26   ANIONGAP 14 13 12 10 11   BUN 15.4 17.7 14.9 12.8 13.9   CR 0.90 0.91 0.95 0.94 0.91   * 99 100* 99 105*   TY 8.7* 8.7* 9.6 8.8 9.0     Recent Labs   Lab Test 04/14/23  1021 03/27/23  1024 03/01/23  1039 01/27/23  1312 12/06/22  0810   MAG 1.8 2.0 2.0 1.9 2.0     Recent Labs   Lab Test 10/08/24  0826 06/26/24  1044 03/26/24  1012 01/09/24  1011 10/09/23  1051   WBC 4.3 5.2 5.5 4.9 5.0   HGB 14.1 14.5 16.3 15.2 13.9    167 184 198 " "177   MCV 91 92 92 92 93   NEUTROPHIL 54 54 56 57 54     Recent Labs   Lab Test 10/08/24  0826 06/26/24  1044 03/26/24  1012 08/15/22  0810 06/27/22  0827 03/28/22  1050 02/23/22  1419   BILITOTAL 0.4 0.6 0.7   < > 0.9 0.7 0.8   ALKPHOS 103 98 96   < > 94 108 113   ALT 13 14 17   < > 49 80* 88*   AST 26 21 30   < > 4 43 46*   ALBUMIN 4.0 4.1 4.3   < > 3.7 3.6 3.4   LDH  --   --   --   --  246* 259* 246*    < > = values in this interval not displayed.     TSH   Date Value Ref Range Status   04/14/2023 1.02 0.30 - 4.20 uIU/mL Final   03/01/2023 1.14 0.30 - 4.20 uIU/mL Final   10/27/2022 0.65 0.40 - 4.00 mU/L Final   09/22/2022 0.86 0.30 - 4.20 uIU/mL Final     No results for input(s): \"CEA\" in the last 25995 hours.  Results for orders placed or performed during the hospital encounter of 10/08/24   CT Chest/Abdomen/Pelvis w Contrast    Narrative    CT CHEST/ABDOMEN/PELVIS WITH CONTRAST October 8, 2024 9:16 AM    CLINICAL HISTORY: Castration resistant metastatic prostate cancer  progressing on abiraterone + prednisone. Enrolled in ECLIPSE trial  & randomized to SOC enzalutamide. Prostate cancer (H). 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 Essential hypertension. Examination  of participant in clinical trial.    TECHNIQUE: CT scan of the chest, abdomen, and pelvis was performed  following injection of IV contrast. Multiplanar reformats were  obtained. Dose reduction techniques were used.   CONTRAST: 99mL Isovue-370.    COMPARISON: CT 6/26/2024, bone scan 6/26/2024.    FINDINGS:   LUNGS AND PLEURA: No effusions. No acute airspace disease. Stable left  upper lobe 2 mm nodule series 12 image 61. No new worrisome focal  airspace disease identified.    MEDIASTINUM/AXILLAE: No acute mediastinal abnormality. Scattered  moderate thoracic aortic calcifications again identified. Stable small  lymph nodes without enlargement.    CORONARY ARTERY CALCIFICATION: Moderate.    HEPATOBILIARY: Fatty liver. No focal liver lesion. " Gallbladder shows  no acute abnormality. Potential tiny cholelithiasis on image 160.    PANCREAS: Normal.    SPLEEN: Stable small subcapsular hypodensity. This could represent  trace fluid.    ADRENAL GLANDS: No suspicious adrenal lesion.    KIDNEYS/BLADDER: No hydronephrosis or new significant renal  abnormality. Stable small cyst without specific imaging follow-up  recommended. Bladder is unremarkable.    BOWEL: No obstruction or acute inflammation.    PELVIC ORGANS: Prostatectomy. No visible new pelvic lesion identified.  Small fat within the inguinal canals appear stable.    ADDITIONAL FINDINGS: Stable small lymph nodes. No new adenopathy  identified. Scattered vascular calcifications. Stable infrarenal  aortic enlargement measuring 3.4 cm.    MUSCULOSKELETAL: Sclerotic bone lesions appears similar to prior  including T6, right pubic symphysis, sacrum. No convincing new bone  lesion.      Impression    IMPRESSION:  1.  Stable sclerotic bony lesions. Correlate with bone scanning for  more sensitive assessment.  2.  No new disease identified. A few stable findings as above.    HARSH OROZCO MD         SYSTEM ID:  [id number]       Study Result    Narrative & Impression   EXAM: NM BONE SCAN WHOLE BODY  LOCATION: [facility]  DATE: 10/8/2024     INDICATION: Restaging   bony metastasis  COMPARISON: CT CAP 10/08/2024. Bone scan 06/26/2024.  TECHNIQUE: 27.0 mCi technetium-99m MDP, IV. Anterior and posterior delayed whole-body images at 3 hours with additional spot images of the skull.     FINDINGS: Stable focal radiotracer uptake of the T6 vertebral body. Mildly increased extent of multifocal radiotracer uptake involving the sacrum.     Degenerative uptake of the bilateral shoulders, right hip, right knee, and left greater than right feet. Soft tissue uptake is maintained. Both kidneys are visualized.                                                                      IMPRESSION:     1.  Mildly  increased extent of osteoblastic uptake of the sacrum.  2.  Stable radiotracer uptake of the T6 vertebral body.  3.  No new site of osteoblastic metastatic disease.       Recent Labs   Lab Test 10/08/24  0826 06/26/24  1044 03/26/24  1012 01/09/24  1009 10/09/23  1051 07/19/23  0901 05/23/23  0857 04/14/23  1021   PSA 7.61 3.15 1.51 1.55 0.85 0.75   < > 0.42   TESTOSTTOTAL 4* 6* 8*  --   --  6*  --  6*    < > = values in this interval not displayed.                   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 Clinical research nurse - Janel Gee has retired and Kala Isrrael did not join us for the visit.     He is tolerating enzalutamide well. He denies any new side effects on this medication. He has not been missing doses. He has not started any new medications.     We will continue with androgen deprivation therapy with GnRH analog - he received his last dose on 6/28/24 and is not due for it today. He will be due in mid December.    I have reviewed all of the labs done prior to this clinic visit.  Labs are all completely normal including electrolytes, renal function, hepatic panel, complete blood count and differential except for borderline / low normal calcium. His PSA had nicely responded to enzalutamide from 10.3 ng/ml at the start of therapy to 0.39 ng/ml on 3/1/23. It has been slowly rising since then. It increased to 0.85 ng/ml in October 2023, 1.55 ng/ml on 1/9/2024, 1.51 ng/ml on 3/26/2024, 3.15 ng/ml on 6/26/24 and 7.61 ng/ml at this visit. He has a doubling time of 3 months which is fairly short.     His is being seen with restaging scans -  CT chest, abdomen and pelvis and bone scans. I have reviewed actual images from his restaging scans and they continue to reveal relatively stable disease.  There are no new lesions noted.  There is increased uptake in the sacral met but otherwise there is no change.     I reviewed that we will have to change therapies soon. He seems to be progressing on current regimen.  I would recommend KIERA trial for him.   I reviewed chemotherapy with docetaxel which was studied in the  trial which led to its approval in 2004 for castration resistant metastatic prostate cancer. N Engl J Med 2004; 351:1319-6310.  In this study docetaxel with prednisone was compared to mitoxantrone with prednisone and showed a significant improvement in median survival from 16.5 months in the control mitoxantrone group to 18.9 months in the docetaxel arm.  Treatment with docetaxel was associated with improvement in quality of life.  When given with prednisone treatment with docetaxel every 3 weeks left distal.  Survival and improved response rates in terms of pain, PSA levels and quality of life as compared to mitoxantrone with prednisone.    I extensively reviewed the side effects from this chemotherapy.  We reviewed the peripheral neuropathy, alopecia, neutropenic fevers, myelosuppression, mucositis, diarrhea, allergies, pedal edema and rash.  Of these, alopecia and nail changes are more of nuisance.  However, cytopenias and in particular neutropenia can be associated with neutropenic fevers which can be serious and life-threatening. He should take every fever seriously because if his counts are low, then he would not have the defenses and he should give us a call immediately.  He should not even wait for the next morning.  He should be seen either in the clinic or in the ER the same day.  We would get basic blood tests including the CBC and blood cultures.      I reviewed radium 223 as part of the KIERA trial along with docetaxel.  [id number] is approved and castration resistant metastatic prostate cancer patients having progressed on chemotherapy or dose was not a chemotherapy candidate.  The current clinical trial will  study the tolerability and efficacy of radium when administered alongside docetaxel chemotherapy as compared to single agent docetaxel.  Patient will be randomized to standard of care docetaxel once every 3 weeks or the combination of radium once every 6 weeks along with docetaxel every 3 weeks.  The dose of docetaxel is decreased in the combination arm due to concerns of overlapping toxicity -primarily cytopenias.       Alpharadin /Ra-223 /Xofigo (Francisco CAMARA, Andre SANCHEZ, Juan CANTU, et al. N Engl J Med 2013; 369:213-223) is a radioisotope that has chemical structure similar to calcium. Administered intravenously it gets deposited at the sites of bony disease. It releases high energy alpha particles that deliver fatal radiation dose over a very short distance. This treatment is similar to previously administered radioactive isotopes like samarium and strontium. In this phase III clinical trial it significantly improved OS in pts with CRPC with bone mets vs placebo (two-sided P = 0.33423; HR = 0.695; 95% CI, 0.552-0.875; median OS 14.0 mo vs 11.2 mo, respectively) and was very well tolerated. This is a significant improvement over previous agents like samarium used for palliation which failed to improve survival. Patients can have nausea, vomiting, fatigue and diarrhea. It is excreted in stools. But since it emits alpha particle which have a pathlength of only 1 mm, it is remarkably safe and cannot cross the skin barrier. Care is needed for management of body fluids, but again any significant exposure to care givers from this route is not expected.     I summarized the above information so that they could understand.  Radium has been shown to decrease pain medication requirement but is unable to control the progression of disease on its own.  It has been shown to improve survival which was the reason for its FDA approval.  Outside of clinical trial it is administered as an injection once every month for up to 6  doses.  However on this study it is administered every 6 weeks to synchronize with the docetaxel chemotherapy infusions.  Overall radium 223 is very well-tolerated and I have not seen any significant side effects in any of my patients other than cytopenias and an occasional patient who has received extensive radiation or chemotherapy.  He will be closely monitored while on clinical trial and will have CBC drawn and await measured a week prior to the planned injection.  Radium is prepared for an individual patient based on his body weight.      He would be assessed immediately prior to his treatments including radium injection or docetaxel chemotherapy to assess how well he is tolerating therapy and the status of his disease. He did consent to clinical trial participation.  I reminded him that it is not a binding contract but there will be an ongoing consent with each administration and every visit.  He would have the option of dropping from the clinical trial if he chooses.  However, there is a chance that he should benefit from the combination of docetaxel and radium 223 if he is randomized to the treatment.  Treatment on the standard of care were docetaxel arm will not have any direct benefit for him but would not have any downside other than the need to travel to the [place] for the infusions.  He will be monitored closely while on clinical trial.  Patients treated on clinical trials tend to do better than those in clinical practice likely due to this close monitoring.    I will have him return in couple of months with labs and we will review trial enrollment at that time.      He again had questions about the trial which were reviewed with him.     The longitudinal plan of care for the diagnosis(es)/condition(s) as documented were addressed during this visit. Due to the added complexity in care, I will continue to support Steve in the subsequent management and with ongoing continuity of care.    40 minutes  spent on the date of the encounter doing chart review, history and exam, documentation and further activities as noted above      Gigi Ward    Hematologist and Medical Oncologist  [facility]

## 2024-10-11 NOTE — LETTER
10/11/2024      Wallace Zelaya  [address] [facility] [id number]      Dear Colleague,    Thank you for referring your patient, Wallace Zelaya, to the [facility] CANCER [facility]. Please see a copy of my visit note below.    [facility] CANCER CLINIC  FOLLOW-UP VISIT NOTE    PATIENT NAME: Wallace Zelaya MRN # [id number]  DATE OF VISIT: Oct 11, 2024 YOB: 1945    REFERRING PROVIDER: No referring provider defined for this encounter.    CANCER TYPE: Prostate cancer; Biochemical recurrence; Castration resistant disease  STAGE: Stage III - pT3b at diagnosis; M0    HISTORY OF PRESENTING ILLNESS:  Wallace was noted to have rising screening PSA from 2 - 4. He was referred to Dr. Rodolfo Connor. He had radical prostatectomy on 11/2015. Pathology from this revealed Cayla 4+4 disease with extraprostatic extension, seminal vesicle extension and he was staged at pT3b. All of the 12 lymph nodes resected were negative for disease. Post operatively his PSA did not drop to undetectable levels and remained elevated at 0.56. It vladimir to 0.9 in April 2016 and he was referred to Dr. Newton for adjuvant radiation therapy. He completed radiation therapy but did not have any PSA response to this treatment.     TREATMENT SUMMARY:  11/13/2015 Radical prostatectomy  April 2016  Radiation therapy  He was started on intermittent androgen deprivation therapy which had to be changed to continuous with rapid rise in his PSA.      April 2020 - he was started on bicalutamide for complete androgen blockade  He had rising PSA in May 2021. His bone scan done on 6/30/21 revealed metastatic lesions of T6 and the sacrum. He was switched to abiraterone with prednisone on 8/7/21.  He had relatively stable PSA on therapy without any significant PSA response.  He was eventually taken off therapy in September 2022 for progressive disease.    He enrolled in the Eclipse clinical trial on  10/27/2022 and was randomized to the standard of care enzalutamide arm.    CURRENT INTERVENTIONS:  Enzalutamide 160 mg oral once a day as a standard of care on ECLIPSE trial    DELANEY Gonsalez is being seen for his prostate cancer    Steve was followed in person at this visit. He is seen for scheduled follow up visit.  He has been randomized to enzalutamide and has been taking enzalutamide since since 10/27/2022. He denies missing a dose. He has tolerated this without difficulty. He denies any new side effects. He was in positive mood at this visit.      He is being followed with labs and restaging scans.      He has not been able to golf much this season due to the rains.  He denies any new symptoms other than couple of episodes of dizziness.  Once he was walking a little uphill to catch up with his brother who went ahead with the golf cart and got dizzy briefly.  On another occasion he was bending to plant T on the golf course and felt a little dizzy.  He has brought this up with his primary care physician who is working this up.        PAST MEDICAL HISTORY   HTN  Dyslipidemia  Prostate cancer as detailed above      CURRENT OUTPATIENT MEDICATIONS     Current Outpatient Medications   Medication Sig Dispense Refill     amLODIPine (NORVASC) 10 MG tablet Take 1 tablet (10 mg) by mouth daily. 90 tablet 3     atorvastatin (LIPITOR) 20 MG tablet Take 1 tablet by mouth once daily 90 tablet 2     enzalutamide (XTANDI) 80 MG tablet Take 2 tablets (160 mg) by mouth daily 60 tablet 11     fluticasone (FLONASE) 50 MCG/ACT nasal spray Spray 1 spray into both nostrils daily 16 g 1     hydrochlorothiazide (HYDRODIURIL) 25 MG tablet Take 1 tablet (25 mg) by mouth daily. 90 tablet 3     ibuprofen (ADVIL/MOTRIN) 800 MG tablet TAKE 1 TABLET BY MOUTH THREE TIMES DAILY WITH FOOD 90 tablet 3     memantine (NAMENDA) 5 MG tablet Take 1 tablet (5 mg) by mouth daily. 30 tablet 2     valsartan (DIOVAN) 320 MG tablet Take 1 tablet (320 mg)  "by mouth daily 90 tablet 1     No current facility-administered medications for this visit.        ALLERGIES     No Known Allergies     REVIEW OF SYSTEMS   As above in the HPI, o/w complete 12-point ROS was negative.     PHYSICAL EXAM   BP (!) 146/78 (Cuff Size: Adult Large)   Pulse 70   Temp 97.7  F (36.5  C) (Temporal)   Resp 16   Ht 1.734 m (5' 8.25\")   Wt 97.5 kg (215 lb)   SpO2 98%   BMI 32.45 kg/m    SpO2 Readings from Last 4 Encounters:   09/21/18 96%   08/24/18 94%   06/22/18 95%   05/11/18 97%     Wt Readings from Last 3 Encounters:   10/11/24 97.5 kg (215 lb)   09/25/24 96.2 kg (212 lb)   07/15/24 98.4 kg (217 lb)     GENERAL/CONSTITUTIONAL: No acute distress.  EYES: Pupils are equal, round, and react to light and accommodation. Extraocular movements intact.  No scleral icterus.  ENT/MOUTH: Neck supple. Oropharynx clear, no mucositis.  LYMPH: No anterior cervical, posterior cervical, supraclavicular, axillary or inguinal adenopathy.   RESPIRATORY: Clear to auscultation bilaterally. No crackles or wheezing.   CARDIOVASCULAR: Regular rate and rhythm without murmurs, gallops, or rubs.  GASTROINTESTINAL: No hepatosplenomegaly, masses, or tenderness. The patient has normal bowel sounds. No guarding.  No distention.  : Deferred  MUSCULOSKELETAL: Warm and well-perfused, no cyanosis, clubbing, or edema.  NEUROLOGIC: Cranial nerves II-XII are intact. Alert, oriented, answers questions appropriately. No focal neurologic deficit  INTEGUMENTARY: No rashes or jaundice.  GAIT: Normal     LABORATORY AND IMAGING STUDIES     Recent Labs   Lab Test 10/08/24  0826 06/26/24  1044 03/26/24  1012 01/09/24  1011 10/09/23  1051    139 138 139 139   POTASSIUM 4.1 3.9 4.5 4.1 3.7   CHLORIDE 103 102 99 101 102   CO2 23 24 27 28 26   ANIONGAP 14 13 12 10 11   BUN 15.4 17.7 14.9 12.8 13.9   CR 0.90 0.91 0.95 0.94 0.91   * 99 100* 99 105*   TY 8.7* 8.7* 9.6 8.8 9.0     Recent Labs   Lab Test 04/14/23  1021 " "03/27/23  1024 03/01/23  1039 01/27/23  1312 12/06/22  0810   MAG 1.8 2.0 2.0 1.9 2.0     Recent Labs   Lab Test 10/08/24  0826 06/26/24  1044 03/26/24  1012 01/09/24  1011 10/09/23  1051   WBC 4.3 5.2 5.5 4.9 5.0   HGB 14.1 14.5 16.3 15.2 13.9    167 184 198 177   MCV 91 92 92 92 93   NEUTROPHIL 54 54 56 57 54     Recent Labs   Lab Test 10/08/24  0826 06/26/24  1044 03/26/24  1012 08/15/22  0810 06/27/22  0827 03/28/22  1050 02/23/22  1419   BILITOTAL 0.4 0.6 0.7   < > 0.9 0.7 0.8   ALKPHOS 103 98 96   < > 94 108 113   ALT 13 14 17   < > 49 80* 88*   AST 26 21 30   < > 4 43 46*   ALBUMIN 4.0 4.1 4.3   < > 3.7 3.6 3.4   LDH  --   --   --   --  246* 259* 246*    < > = values in this interval not displayed.     TSH   Date Value Ref Range Status   04/14/2023 1.02 0.30 - 4.20 uIU/mL Final   03/01/2023 1.14 0.30 - 4.20 uIU/mL Final   10/27/2022 0.65 0.40 - 4.00 mU/L Final   09/22/2022 0.86 0.30 - 4.20 uIU/mL Final     No results for input(s): \"CEA\" in the last 09452 hours.  Results for orders placed or performed during the hospital encounter of 10/08/24   CT Chest/Abdomen/Pelvis w Contrast    Narrative    CT CHEST/ABDOMEN/PELVIS WITH CONTRAST October 8, 2024 9:16 AM    CLINICAL HISTORY: Castration resistant metastatic prostate cancer  progressing on abiraterone + prednisone. Enrolled in ECLIPSE trial  & randomized to SOC enzalutamide. Prostate cancer (H). 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 Essential hypertension. Examination  of participant in clinical trial.    TECHNIQUE: CT scan of the chest, abdomen, and pelvis was performed  following injection of IV contrast. Multiplanar reformats were  obtained. Dose reduction techniques were used.   CONTRAST: 99mL Isovue-370.    COMPARISON: CT 6/26/2024, bone scan 6/26/2024.    FINDINGS:   LUNGS AND PLEURA: No effusions. No acute airspace disease. Stable left  upper lobe 2 mm nodule series 12 image 61. No new worrisome focal  airspace disease " identified.    MEDIASTINUM/AXILLAE: No acute mediastinal abnormality. Scattered  moderate thoracic aortic calcifications again identified. Stable small  lymph nodes without enlargement.    CORONARY ARTERY CALCIFICATION: Moderate.    HEPATOBILIARY: Fatty liver. No focal liver lesion. Gallbladder shows  no acute abnormality. Potential tiny cholelithiasis on image 160.    PANCREAS: Normal.    SPLEEN: Stable small subcapsular hypodensity. This could represent  trace fluid.    ADRENAL GLANDS: No suspicious adrenal lesion.    KIDNEYS/BLADDER: No hydronephrosis or new significant renal  abnormality. Stable small cyst without specific imaging follow-up  recommended. Bladder is unremarkable.    BOWEL: No obstruction or acute inflammation.    PELVIC ORGANS: Prostatectomy. No visible new pelvic lesion identified.  Small fat within the inguinal canals appear stable.    ADDITIONAL FINDINGS: Stable small lymph nodes. No new adenopathy  identified. Scattered vascular calcifications. Stable infrarenal  aortic enlargement measuring 3.4 cm.    MUSCULOSKELETAL: Sclerotic bone lesions appears similar to prior  including T6, right pubic symphysis, sacrum. No convincing new bone  lesion.      Impression    IMPRESSION:  1.  Stable sclerotic bony lesions. Correlate with bone scanning for  more sensitive assessment.  2.  No new disease identified. A few stable findings as above.    HARSH OROZCO MD         SYSTEM ID:  [id number]       Study Result    Narrative & Impression   EXAM: NM BONE SCAN WHOLE BODY  LOCATION: [facility]  DATE: 10/8/2024     INDICATION: Restaging   bony metastasis  COMPARISON: CT CAP 10/08/2024. Bone scan 06/26/2024.  TECHNIQUE: 27.0 mCi technetium-99m MDP, IV. Anterior and posterior delayed whole-body images at 3 hours with additional spot images of the skull.     FINDINGS: Stable focal radiotracer uptake of the T6 vertebral body. Mildly increased extent of multifocal radiotracer uptake involving  the sacrum.     Degenerative uptake of the bilateral shoulders, right hip, right knee, and left greater than right feet. Soft tissue uptake is maintained. Both kidneys are visualized.                                                                      IMPRESSION:     1.  Mildly increased extent of osteoblastic uptake of the sacrum.  2.  Stable radiotracer uptake of the T6 vertebral body.  3.  No new site of osteoblastic metastatic disease.       Recent Labs   Lab Test 10/08/24  0826 06/26/24  1044 03/26/24  1012 01/09/24  1009 10/09/23  1051 07/19/23  0901 05/23/23  0857 04/14/23  1021   PSA 7.61 3.15 1.51 1.55 0.85 0.75   < > 0.42   TESTOSTTOTAL 4* 6* 8*  --   --  6*  --  6*    < > = values in this interval not displayed.                   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 Clinical research nurse - Janel Gee has retired and Kala Arcos did not join us for the visit.     He is tolerating enzalutamide well. He denies any new side effects on this medication. He has not been missing doses. He has not started any new medications.     We will continue with androgen deprivation therapy with GnRH analog - he received his last dose on 6/28/24 and is not due for it today. He will be due in mid December.    I have reviewed all of the labs done prior to this clinic visit.  Labs are all completely normal including electrolytes, renal function, hepatic panel, complete blood count and differential except for borderline / low normal calcium. His PSA had nicely responded to enzalutamide from 10.3 ng/ml at the start of therapy to 0.39 ng/ml on 3/1/23. It has been slowly rising since then. It increased to 0.85 ng/ml in October 2023, 1.55 ng/ml on 1/9/2024, 1.51 ng/ml on 3/26/2024, 3.15 ng/ml on 6/26/24 and 7.61 ng/ml at this visit. He has a  doubling time of 3 months which is fairly short.     His is being seen with restaging scans -  CT chest, abdomen and pelvis and bone scans. I have reviewed actual images from his restaging scans and they continue to reveal relatively stable disease.  There are no new lesions noted. There is increased uptake in the sacral met but otherwise there is no change.     I reviewed that we will have to change therapies soon. He seems to be progressing on current regimen.  I would recommend KIERA trial for him.   I reviewed chemotherapy with docetaxel which was studied in the  trial which led to its approval in 2004 for castration resistant metastatic prostate cancer. N Engl J Med 2004; 351:3762-2451.  In this study docetaxel with prednisone was compared to mitoxantrone with prednisone and showed a significant improvement in median survival from 16.5 months in the control mitoxantrone group to 18.9 months in the docetaxel arm.  Treatment with docetaxel was associated with improvement in quality of life.  When given with prednisone treatment with docetaxel every 3 weeks left distal.  Survival and improved response rates in terms of pain, PSA levels and quality of life as compared to mitoxantrone with prednisone.    I extensively reviewed the side effects from this chemotherapy.  We reviewed the peripheral neuropathy, alopecia, neutropenic fevers, myelosuppression, mucositis, diarrhea, allergies, pedal edema and rash.  Of these, alopecia and nail changes are more of nuisance.  However, cytopenias and in particular neutropenia can be associated with neutropenic fevers which can be serious and life-threatening. He should take every fever seriously because if his counts are low, then he would not have the defenses and he should give us a call immediately.  He should not even wait for the next morning.  He should be seen either in the clinic or in the ER the same day.  We would get basic blood tests including the CBC and  blood cultures.      I reviewed radium 223 as part of the KIERA trial along with docetaxel.  [id number] is approved and castration resistant metastatic prostate cancer patients having progressed on chemotherapy or dose was not a chemotherapy candidate.  The current clinical trial will study the tolerability and efficacy of radium when administered alongside docetaxel chemotherapy as compared to single agent docetaxel.  Patient will be randomized to standard of care docetaxel once every 3 weeks or the combination of radium once every 6 weeks along with docetaxel every 3 weeks.  The dose of docetaxel is decreased in the combination arm due to concerns of overlapping toxicity -primarily cytopenias.       Alpharadin /Ra-223 /Xofigo (Francisco PIERSON., Andre SANCHEZ, Juan CANTU, et al. N Engl J Med 2013; 369:213-223) is a radioisotope that has chemical structure similar to calcium. Administered intravenously it gets deposited at the sites of bony disease. It releases high energy alpha particles that deliver fatal radiation dose over a very short distance. This treatment is similar to previously administered radioactive isotopes like samarium and strontium. In this phase III clinical trial it significantly improved OS in pts with CRPC with bone mets vs placebo (two-sided P = 0.18425; HR = 0.695; 95% CI, 0.552-0.875; median OS 14.0 mo vs 11.2 mo, respectively) and was very well tolerated. This is a significant improvement over previous agents like samarium used for palliation which failed to improve survival. Patients can have nausea, vomiting, fatigue and diarrhea. It is excreted in stools. But since it emits alpha particle which have a pathlength of only 1 mm, it is remarkably safe and cannot cross the skin barrier. Care is needed for management of body fluids, but again any significant exposure to care givers from this route is not expected.     I summarized the above information so that they could understand.  Radium has been  shown to decrease pain medication requirement but is unable to control the progression of disease on its own.  It has been shown to improve survival which was the reason for its FDA approval.  Outside of clinical trial it is administered as an injection once every month for up to 6 doses.  However on this study it is administered every 6 weeks to synchronize with the docetaxel chemotherapy infusions.  Overall radium 223 is very well-tolerated and I have not seen any significant side effects in any of my patients other than cytopenias and an occasional patient who has received extensive radiation or chemotherapy.  He will be closely monitored while on clinical trial and will have CBC drawn and await measured a week prior to the planned injection.  Radium is prepared for an individual patient based on his body weight.      He would be assessed immediately prior to his treatments including radium injection or docetaxel chemotherapy to assess how well he is tolerating therapy and the status of his disease. He did consent to clinical trial participation.  I reminded him that it is not a binding contract but there will be an ongoing consent with each administration and every visit.  He would have the option of dropping from the clinical trial if he chooses.  However, there is a chance that he should benefit from the combination of docetaxel and radium 223 if he is randomized to the treatment.  Treatment on the standard of care were docetaxel arm will not have any direct benefit for him but would not have any downside other than the need to travel to the [place] for the infusions.  He will be monitored closely while on clinical trial.  Patients treated on clinical trials tend to do better than those in clinical practice likely due to this close monitoring.    I will have him return in couple of months with labs and we will review trial enrollment at that time.      He again had questions about the trial which were  reviewed with him.     The longitudinal plan of care for the diagnosis(es)/condition(s) as documented were addressed during this visit. Due to the added complexity in care, I will continue to support Steve in the subsequent management and with ongoing continuity of care.    40 minutes spent on the date of the encounter doing chart review, history and exam, documentation and further activities as noted above      Gigi Ward    Hematologist and Medical Oncologist  [facility]        Again, thank you for allowing me to participate in the care of your patient.        Sincerely,        Ggii Ward MD

## 2024-10-14 ENCOUNTER — TELEPHONE (OUTPATIENT)
Dept: ONCOLOGY | Facility: CLINIC | Age: 79
End: 2024-10-14
Payer: COMMERCIAL

## 2024-10-14 NOTE — TELEPHONE ENCOUNTER
Per call with patient last week, writer is reaching out to discuss results of oncology visit from 10/11/24.   Pt has my contact information and was encouraged to return call at his convenience.

## 2024-10-15 ENCOUNTER — TELEPHONE (OUTPATIENT)
Dept: ONCOLOGY | Facility: CLINIC | Age: 79
End: 2024-10-15
Payer: COMMERCIAL

## 2024-10-15 NOTE — NURSING NOTE
[id number] Eclipse LTFU Chart Review Note:     Dates of Chart Review: June 29, 2024 through October 11, 2024. LTFU # 5    1. Has patient experienced any Symptomatic Skeletal Events (bone directed radiation therapy, new symptomatic pathological fracture, spinal cord compression, tumor related orthopedic surgery) since last visit? no       2. Has patient experienced any secondary malignancies since last visit? no       3. Has patient been evaluated for radiographic progression since last visit? yes   If yes, date of imaging: 10/8/24    Outcome of imaging: Bone progression    4. Has patient experienced any clinical or pain progression since last visit? no      5. Has patient experienced any Grade 3 or Grade 4 acute kidney injuries since last visit? no       6. Was PSA collected during the above follow up period? yes    7.61 ng/mL on 10/11/24

## 2024-10-15 NOTE — TELEPHONE ENCOUNTER
Writer was able to reach Steve this morning to discuss his most recent scans and visit with Dr. Ward.     Pt is on the SOC arm of the Eclipse clinical trial and he is currently in the follow up phase.   He feels as though his visit from 10/11 went well and, although his PSA is rising, it is rising slowly and he feels ok about that. He states he would not like to start chemo, but would be willing to discuss the KIERA trial as an option at his next visit in December. Patients on both arms of the KIERA trial would receive docetaxel.     A request has been sent to scheduling for an appointment with labs prior. Trial RN will accompany him at that visit.   He will receive day/time by [employer] but is aware he can reach trial RN by phone if needed.     He has no other questions or concerns at this time.

## 2024-10-18 DIAGNOSIS — C79.51 MALIGNANT NEOPLASM METASTATIC TO BONE (H): ICD-10-CM

## 2024-10-18 DIAGNOSIS — C61 PROSTATE CANCER (H): ICD-10-CM

## 2024-10-18 NOTE — TELEPHONE ENCOUNTER
enzalutamide (XTANDI) 80 MG tablet         Last Written Prescription Date:  11/24/2023  Last Fill Quantity: 60,   # refills: 11  Last Office Visit: 10/11/24 with Dr Ward  Future Office visit: 12/27/24    Message from pharmacy below:    Pharmacy is currently out of stock of 80 mg until sometime next year. Could you please submit a new script of 40 mg to equal current dose?    Routing refill request to provider for review/approval.        Tess Horton, CMA

## 2024-10-20 ENCOUNTER — HEALTH MAINTENANCE LETTER (OUTPATIENT)
Age: 79
End: 2024-10-20

## 2024-11-06 ENCOUNTER — TELEPHONE (OUTPATIENT)
Dept: ONCOLOGY | Facility: CLINIC | Age: 79
End: 2024-11-06
Payer: COMMERCIAL

## 2024-11-06 NOTE — TELEPHONE ENCOUNTER
Prior Authorization Approval    Medication: XTANDI 40 MG PO TABS  Authorization Effective Date: 8/8/2024  Authorization Expiration Date: 11/6/2025  Approved Dose/Quantity: 120 for 30 days  Reference #: [id number]   Insurance Company: [employer] - Phone [phone] Fax [phone]  Expected CoPay: $ 3,172.61  CoPay Card Available:      Financial Assistance Needed: pt gets free drug  Which Pharmacy is filling the prescription:    Pharmacy Notified: n/a  Patient Notified: n/a

## 2024-11-06 NOTE — TELEPHONE ENCOUNTER
PA Initiation    Medication: XTANDI 40 MG PO TABS  Insurance Company: [employer] - Phone [phone] Fax [phone]  Pharmacy Filling the Rx:    Filling Pharmacy Phone:    Filling Pharmacy Fax:    Start Date: 11/6/2024

## 2024-11-12 DIAGNOSIS — C61 PROSTATE CANCER (H): Primary | ICD-10-CM

## 2024-11-20 ENCOUNTER — TELEPHONE (OUTPATIENT)
Dept: ONCOLOGY | Facility: CLINIC | Age: 79
End: 2024-11-20
Payer: COMMERCIAL

## 2024-11-20 NOTE — TELEPHONE ENCOUNTER
FREE DRUG APPLICATION APPROVED    Medication: XTANDI 80 MG PO TABS  Program Name:   FPAP  Effective Date:  11/20/24  Expiration Date:  12/31/25  Pharmacy Filling the Rx:    Patient Notified: yes  Additional Information:

## 2024-12-18 ENCOUNTER — LAB (OUTPATIENT)
Dept: LAB | Facility: CLINIC | Age: 79
End: 2024-12-18
Payer: COMMERCIAL

## 2024-12-18 DIAGNOSIS — D69.2 SENILE PURPURA (H): ICD-10-CM

## 2024-12-18 DIAGNOSIS — C61 PROSTATE CANCER (H): ICD-10-CM

## 2024-12-18 DIAGNOSIS — C79.51 MALIGNANT NEOPLASM METASTATIC TO BONE (H): ICD-10-CM

## 2024-12-18 DIAGNOSIS — I10 ESSENTIAL HYPERTENSION: ICD-10-CM

## 2024-12-18 DIAGNOSIS — R41.3 MEMORY DIFFICULTY: ICD-10-CM

## 2024-12-18 LAB
ALBUMIN SERPL BCG-MCNC: 4 G/DL (ref 3.5–5.2)
ALP SERPL-CCNC: 113 U/L (ref 40–150)
ALT SERPL W P-5'-P-CCNC: 9 U/L (ref 0–70)
ANION GAP SERPL CALCULATED.3IONS-SCNC: 13 MMOL/L (ref 7–15)
AST SERPL W P-5'-P-CCNC: 22 U/L (ref 0–45)
BASOPHILS # BLD AUTO: 0 10E3/UL (ref 0–0.2)
BASOPHILS NFR BLD AUTO: 1 %
BILIRUB SERPL-MCNC: 0.4 MG/DL
BUN SERPL-MCNC: 15.5 MG/DL (ref 8–23)
CALCIUM SERPL-MCNC: 9.2 MG/DL (ref 8.8–10.4)
CHLORIDE SERPL-SCNC: 102 MMOL/L (ref 98–107)
CREAT SERPL-MCNC: 1.05 MG/DL (ref 0.67–1.17)
EGFRCR SERPLBLD CKD-EPI 2021: 72 ML/MIN/1.73M2
EOSINOPHIL # BLD AUTO: 0.2 10E3/UL (ref 0–0.7)
EOSINOPHIL NFR BLD AUTO: 5 %
ERYTHROCYTE [DISTWIDTH] IN BLOOD BY AUTOMATED COUNT: 13 % (ref 10–15)
GLUCOSE SERPL-MCNC: 106 MG/DL (ref 70–99)
HCO3 SERPL-SCNC: 26 MMOL/L (ref 22–29)
HCT VFR BLD AUTO: 41.1 % (ref 40–53)
HGB BLD-MCNC: 13.9 G/DL (ref 13.3–17.7)
IMM GRANULOCYTES # BLD: 0 10E3/UL
IMM GRANULOCYTES NFR BLD: 0 %
LYMPHOCYTES # BLD AUTO: 1.6 10E3/UL (ref 0.8–5.3)
LYMPHOCYTES NFR BLD AUTO: 32 %
MCH RBC QN AUTO: 31.7 PG (ref 26.5–33)
MCHC RBC AUTO-ENTMCNC: 33.8 G/DL (ref 31.5–36.5)
MCV RBC AUTO: 94 FL (ref 78–100)
MONOCYTES # BLD AUTO: 0.5 10E3/UL (ref 0–1.3)
MONOCYTES NFR BLD AUTO: 10 %
NEUTROPHILS # BLD AUTO: 2.6 10E3/UL (ref 1.6–8.3)
NEUTROPHILS NFR BLD AUTO: 53 %
PLATELET # BLD AUTO: 181 10E3/UL (ref 150–450)
POTASSIUM SERPL-SCNC: 4.7 MMOL/L (ref 3.4–5.3)
PROT SERPL-MCNC: 7.2 G/DL (ref 6.4–8.3)
PSA SERPL DL<=0.01 NG/ML-MCNC: 11 NG/ML
RBC # BLD AUTO: 4.39 10E6/UL (ref 4.4–5.9)
SODIUM SERPL-SCNC: 141 MMOL/L (ref 135–145)
WBC # BLD AUTO: 4.9 10E3/UL (ref 4–11)

## 2024-12-18 PROCEDURE — 85025 COMPLETE CBC W/AUTO DIFF WBC: CPT

## 2024-12-18 PROCEDURE — 36415 COLL VENOUS BLD VENIPUNCTURE: CPT

## 2024-12-18 PROCEDURE — 80053 COMPREHEN METABOLIC PANEL: CPT

## 2024-12-18 PROCEDURE — 84153 ASSAY OF PSA TOTAL: CPT

## 2024-12-18 RX ORDER — MEMANTINE HYDROCHLORIDE 5 MG/1
5 TABLET ORAL DAILY
Qty: 90 TABLET | Refills: 3 | Status: SHIPPED | OUTPATIENT
Start: 2024-12-18

## 2024-12-23 ENCOUNTER — TELEPHONE (OUTPATIENT)
Dept: ONCOLOGY | Facility: CLINIC | Age: 79
End: 2024-12-23
Payer: COMMERCIAL

## 2024-12-23 NOTE — TELEPHONE ENCOUNTER
ALBERTO APPROVED    Medication: XTANDI 40 MG PO TABS  Amount: $ 8,000  Foundation Name: [employer] Phone: [phone]  [facility] Fax:    Member ID: [id number]   BIN: [id number]  PCN: [id number]  Group: [id number]  Foundation Effective Date: 11/23/2024  Foundation Expiration Date: 11/22/2025  Additional Information:    Patient Notified:

## 2024-12-31 DIAGNOSIS — Z00.6 EXAMINATION OF PARTICIPANT IN CLINICAL TRIAL: Primary | ICD-10-CM

## 2025-01-13 DIAGNOSIS — C61 PROSTATE CANCER (H): Primary | ICD-10-CM

## 2025-01-13 DIAGNOSIS — C79.51 MALIGNANT NEOPLASM METASTATIC TO BONE (H): ICD-10-CM

## 2025-01-16 ENCOUNTER — HOSPITAL ENCOUNTER (OUTPATIENT)
Dept: CT IMAGING | Facility: CLINIC | Age: 80
End: 2025-01-16
Attending: INTERNAL MEDICINE
Payer: COMMERCIAL

## 2025-01-16 ENCOUNTER — ANCILLARY PROCEDURE (OUTPATIENT)
Dept: RADIOLOGY | Facility: CLINIC | Age: 80
End: 2025-01-16
Attending: INTERNAL MEDICINE
Payer: COMMERCIAL

## 2025-01-16 DIAGNOSIS — C79.51 MALIGNANT NEOPLASM METASTATIC TO BONE (H): ICD-10-CM

## 2025-01-16 DIAGNOSIS — Z00.6 EXAMINATION OF PARTICIPANT IN CLINICAL TRIAL: ICD-10-CM

## 2025-01-16 DIAGNOSIS — C61 PROSTATE CANCER (H): ICD-10-CM

## 2025-01-16 LAB
CREAT BLD-MCNC: 1.1 MG/DL (ref 0.7–1.3)
EGFRCR SERPLBLD CKD-EPI 2021: >60 ML/MIN/1.73M2

## 2025-01-16 PROCEDURE — 74177 CT ABD & PELVIS W/CONTRAST: CPT

## 2025-01-16 PROCEDURE — 71260 CT THORAX DX C+: CPT

## 2025-01-16 PROCEDURE — 250N000009 HC RX 250: Performed by: INTERNAL MEDICINE

## 2025-01-16 PROCEDURE — 250N000011 HC RX IP 250 OP 636: Performed by: INTERNAL MEDICINE

## 2025-01-16 PROCEDURE — 82565 ASSAY OF CREATININE: CPT

## 2025-01-16 RX ORDER — IOPAMIDOL 755 MG/ML
500 INJECTION, SOLUTION INTRAVASCULAR ONCE
Status: COMPLETED | OUTPATIENT
Start: 2025-01-16 | End: 2025-01-16

## 2025-01-16 RX ADMIN — IOPAMIDOL 100 ML: 755 INJECTION, SOLUTION INTRAVENOUS at 11:18

## 2025-01-16 RX ADMIN — SODIUM CHLORIDE 65 ML: 9 INJECTION, SOLUTION INTRAVENOUS at 11:18

## 2025-01-20 DIAGNOSIS — I10 ESSENTIAL HYPERTENSION: ICD-10-CM

## 2025-01-20 RX ORDER — VALSARTAN 320 MG/1
320 TABLET ORAL DAILY
Qty: 90 TABLET | Refills: 1 | Status: SHIPPED | OUTPATIENT
Start: 2025-01-20

## 2025-01-21 ENCOUNTER — LAB (OUTPATIENT)
Dept: LAB | Facility: CLINIC | Age: 80
End: 2025-01-21
Payer: COMMERCIAL

## 2025-01-21 DIAGNOSIS — C79.51 MALIGNANT NEOPLASM METASTATIC TO BONE (H): ICD-10-CM

## 2025-01-21 DIAGNOSIS — C61 PROSTATE CANCER (H): ICD-10-CM

## 2025-01-21 DIAGNOSIS — I10 ESSENTIAL HYPERTENSION: ICD-10-CM

## 2025-01-21 LAB
ALBUMIN SERPL BCG-MCNC: 4.2 G/DL (ref 3.5–5.2)
ALP SERPL-CCNC: 109 U/L (ref 40–150)
ALT SERPL W P-5'-P-CCNC: 13 U/L (ref 0–70)
ANION GAP SERPL CALCULATED.3IONS-SCNC: 10 MMOL/L (ref 7–15)
AST SERPL W P-5'-P-CCNC: 19 U/L (ref 0–45)
BASOPHILS # BLD AUTO: 0 10E3/UL (ref 0–0.2)
BASOPHILS NFR BLD AUTO: 0 %
BILIRUB SERPL-MCNC: 0.6 MG/DL
BUN SERPL-MCNC: 13.8 MG/DL (ref 8–23)
CALCIUM SERPL-MCNC: 9.3 MG/DL (ref 8.8–10.4)
CHLORIDE SERPL-SCNC: 103 MMOL/L (ref 98–107)
CREAT SERPL-MCNC: 1.05 MG/DL (ref 0.67–1.17)
EGFRCR SERPLBLD CKD-EPI 2021: 72 ML/MIN/1.73M2
EOSINOPHIL # BLD AUTO: 0.2 10E3/UL (ref 0–0.7)
EOSINOPHIL NFR BLD AUTO: 4 %
ERYTHROCYTE [DISTWIDTH] IN BLOOD BY AUTOMATED COUNT: 12.9 % (ref 10–15)
GLUCOSE SERPL-MCNC: 100 MG/DL (ref 70–99)
HCO3 SERPL-SCNC: 28 MMOL/L (ref 22–29)
HCT VFR BLD AUTO: 41.3 % (ref 40–53)
HGB BLD-MCNC: 14.1 G/DL (ref 13.3–17.7)
IMM GRANULOCYTES # BLD: 0 10E3/UL
IMM GRANULOCYTES NFR BLD: 0 %
LYMPHOCYTES # BLD AUTO: 1.5 10E3/UL (ref 0.8–5.3)
LYMPHOCYTES NFR BLD AUTO: 30 %
MCH RBC QN AUTO: 31.1 PG (ref 26.5–33)
MCHC RBC AUTO-ENTMCNC: 34.1 G/DL (ref 31.5–36.5)
MCV RBC AUTO: 91 FL (ref 78–100)
MONOCYTES # BLD AUTO: 0.5 10E3/UL (ref 0–1.3)
MONOCYTES NFR BLD AUTO: 9 %
NEUTROPHILS # BLD AUTO: 2.8 10E3/UL (ref 1.6–8.3)
NEUTROPHILS NFR BLD AUTO: 56 %
PLATELET # BLD AUTO: 189 10E3/UL (ref 150–450)
POTASSIUM SERPL-SCNC: 4.7 MMOL/L (ref 3.4–5.3)
PROT SERPL-MCNC: 7.3 G/DL (ref 6.4–8.3)
PSA SERPL DL<=0.01 NG/ML-MCNC: 11.4 NG/ML
RBC # BLD AUTO: 4.54 10E6/UL (ref 4.4–5.9)
SODIUM SERPL-SCNC: 141 MMOL/L (ref 135–145)
WBC # BLD AUTO: 5 10E3/UL (ref 4–11)

## 2025-01-23 LAB — TESTOST SERPL-MCNC: 14 NG/DL (ref 240–950)

## 2025-01-24 ENCOUNTER — ONCOLOGY VISIT (OUTPATIENT)
Dept: ONCOLOGY | Facility: CLINIC | Age: 80
End: 2025-01-24
Attending: INTERNAL MEDICINE
Payer: COMMERCIAL

## 2025-01-24 VITALS
SYSTOLIC BLOOD PRESSURE: 137 MMHG | RESPIRATION RATE: 16 BRPM | BODY MASS INDEX: 32.43 KG/M2 | WEIGHT: 214 LBS | HEART RATE: 57 BPM | OXYGEN SATURATION: 96 % | TEMPERATURE: 97.1 F | DIASTOLIC BLOOD PRESSURE: 78 MMHG | HEIGHT: 68 IN

## 2025-01-24 DIAGNOSIS — C61 PROSTATE CANCER (H): Primary | ICD-10-CM

## 2025-01-24 DIAGNOSIS — C79.51 MALIGNANT NEOPLASM METASTATIC TO BONE (H): ICD-10-CM

## 2025-01-24 PROCEDURE — G0463 HOSPITAL OUTPT CLINIC VISIT: HCPCS | Performed by: INTERNAL MEDICINE

## 2025-01-24 ASSESSMENT — PAIN SCALES - GENERAL: PAINLEVEL_OUTOF10: NO PAIN (0)

## 2025-01-24 NOTE — NURSING NOTE
"Oncology Rooming Note    January 24, 2025 10:44 AM   Wallace Zelaya is a 79 year old male who presents for:    Chief Complaint   Patient presents with    Oncology Clinic Visit     Initial Vitals: /78   Pulse 57   Temp 97.1  F (36.2  C) (Temporal)   Resp 16   Ht 1.734 m (5' 8.25\")   Wt 97.1 kg (214 lb)   SpO2 96%   BMI 32.30 kg/m   Estimated body mass index is 32.3 kg/m  as calculated from the following:    Height as of this encounter: 1.734 m (5' 8.25\").    Weight as of this encounter: 97.1 kg (214 lb). Body surface area is 2.16 meters squared.  No Pain (0) Comment: Data Unavailable   No LMP for male patient.  Allergies reviewed: Yes  Medications reviewed: Yes    Medications: Medication refills not needed today.  Pharmacy name entered into [employer] [facility]:    [facility] - [place], [place] - [address] [facility] - [place], [place] - [address] [facility] - [place], [place] - [address]  [place] [facility] [address]  [facility] - [place], [place] - [address] [facility] - [place], [place] - [id number]    Frailty Screening:   Is the patient here for a new oncology consult visit in cancer care? 2. No      Clinical concerns: F/U       Tess Horton, TATA              "

## 2025-01-24 NOTE — LETTER
1/24/2025      Wallace Zelaya  [address] [facility] [id number]      Dear Colleague,    Thank you for referring your patient, Wallace Zelaya, to the [facility] CANCER [facility]. Please see a copy of my visit note below.    [facility] CANCER CLINIC  FOLLOW-UP VISIT NOTE    PATIENT NAME: Wallace Zelaya MRN # [id number]  DATE OF VISIT: Jan 24, 2025 YOB: 1945    REFERRING PROVIDER: No referring provider defined for this encounter.    CANCER TYPE: Prostate cancer; Biochemical recurrence; Castration resistant disease  STAGE: Stage III - pT3b at diagnosis; M0    HISTORY OF PRESENTING ILLNESS:  Wallace was noted to have rising screening PSA from 2 - 4. He was referred to Dr. Rodolfo Connor. He had radical prostatectomy on 11/2015. Pathology from this revealed [place] 4+4 disease with extraprostatic extension, seminal vesicle extension and he was staged at pT3b. All of the 12 lymph nodes resected were negative for disease. Post operatively his PSA did not drop to undetectable levels and remained elevated at 0.56. It vladimir to 0.9 in April 2016 and he was referred to Dr. Newton for adjuvant radiation therapy. He completed radiation therapy but did not have any PSA response to this treatment.     TREATMENT SUMMARY:  11/13/2015 Radical prostatectomy  April 2016  Radiation therapy  He was started on intermittent androgen deprivation therapy which had to be changed to continuous with rapid rise in his PSA.      April 2020 - he was started on bicalutamide for complete androgen blockade  He had rising PSA in May 2021. His bone scan done on 6/30/21 revealed metastatic lesions of T6 and the sacrum. He was switched to abiraterone with prednisone on 8/7/21.  He had relatively stable PSA on therapy without any significant PSA response.  He was eventually taken off therapy in September 2022 for progressive disease.    He enrolled in the [facility] clinical trial on 10/27/2022  and was randomized to the standard of care enzalutamide arm.    CURRENT INTERVENTIONS:  Enzalutamide 160 mg oral once a day as a standard of care on ECLIPSE trial    DELANEY Gonsalez is being seen for his prostate cancer    Steve was followed in person at this visit. He is seen for scheduled follow up visit.  He has been randomized to enzalutamide and has been taking enzalutamide since since 10/27/2022. He denies missing a dose. He has tolerated this without difficulty. He denies any new side effects. He was in positive mood at this visit.      He is being followed with labs..      He denies any new complaints at this visit.  If it was not for these clinic visits he would not even remember he has cancer.  He has been playing golf during the summer.  During this winter he plans to visit his son in [place] and play golf with him over there.      PAST MEDICAL HISTORY   HTN  Dyslipidemia  Prostate cancer as detailed above      CURRENT OUTPATIENT MEDICATIONS     Current Outpatient Medications   Medication Sig Dispense Refill     amLODIPine (NORVASC) 10 MG tablet Take 1 tablet (10 mg) by mouth daily. 90 tablet 3     atorvastatin (LIPITOR) 20 MG tablet Take 1 tablet by mouth once daily 90 tablet 2     enzalutamide (XTANDI) 40 MG tablet Take 4 tablets (160 mg) by mouth daily. 120 tablet 1     enzalutamide (XTANDI) 80 MG tablet Take 2 tablets (160 mg) by mouth daily. 60 tablet 0     fluticasone (FLONASE) 50 MCG/ACT nasal spray Spray 1 spray into both nostrils daily 16 g 1     hydrochlorothiazide (HYDRODIURIL) 25 MG tablet Take 1 tablet (25 mg) by mouth daily. 90 tablet 3     ibuprofen (ADVIL/MOTRIN) 800 MG tablet TAKE 1 TABLET BY MOUTH THREE TIMES DAILY WITH FOOD 90 tablet 3     memantine (NAMENDA) 5 MG tablet Take 1 tablet by mouth once daily 90 tablet 3     valsartan (DIOVAN) 320 MG tablet Take 1 tablet (320 mg) by mouth daily. 90 tablet 1     enzalutamide (XTANDI) 80 MG tablet Take 2 tablets (160 mg) by mouth daily.  "60 tablet 0     No current facility-administered medications for this visit.        ALLERGIES     No Known Allergies     REVIEW OF SYSTEMS   As above in the HPI, o/w complete 12-point ROS was negative.     PHYSICAL EXAM   /78   Pulse 57   Temp 97.1  F (36.2  C) (Temporal)   Resp 16   Ht 1.734 m (5' 8.25\")   Wt 97.1 kg (214 lb)   SpO2 96%   BMI 32.30 kg/m    SpO2 Readings from Last 4 Encounters:   09/21/18 96%   08/24/18 94%   06/22/18 95%   05/11/18 97%     Wt Readings from Last 3 Encounters:   01/24/25 97.1 kg (214 lb)   12/20/24 98.7 kg (217 lb 9.6 oz)   10/11/24 97.5 kg (215 lb)     GENERAL/CONSTITUTIONAL: No acute distress.  EYES: Pupils are equal, round, and react to light and accommodation. Extraocular movements intact.  No scleral icterus.  ENT/MOUTH: Neck supple. Oropharynx clear, no mucositis.  LYMPH: No anterior cervical, posterior cervical, supraclavicular, axillary or inguinal adenopathy.   RESPIRATORY: Clear to auscultation bilaterally. No crackles or wheezing.   CARDIOVASCULAR: Regular rate and rhythm without murmurs, gallops, or rubs.  GASTROINTESTINAL: No hepatosplenomegaly, masses, or tenderness. The patient has normal bowel sounds. No guarding.  No distention.  : Deferred  MUSCULOSKELETAL: Warm and well-perfused, no cyanosis, clubbing, or edema.  NEUROLOGIC: Cranial nerves II-XII are intact. Alert, oriented, answers questions appropriately. No focal neurologic deficit  INTEGUMENTARY: No rashes or jaundice.  GAIT: Normal     LABORATORY AND IMAGING STUDIES     Recent Labs   Lab Test 01/21/25  0852 01/16/25  1102 12/18/24  0930 10/08/24  0826 06/26/24  1044 03/26/24  1012     --  141 140 139 138   POTASSIUM 4.7  --  4.7 4.1 3.9 4.5   CHLORIDE 103  --  102 103 102 99   CO2 28  --  26 23 24 27   ANIONGAP 10  --  13 14 13 12   BUN 13.8  --  15.5 15.4 17.7 14.9   CR 1.05 1.1 1.05 0.90 0.91 0.95   *  --  106* 109* 99 100*   TY 9.3  --  9.2 8.7* 8.7* 9.6     Recent Labs   Lab " Test 04/14/23  1021 03/27/23  1024 03/01/23  1039 01/27/23  1312 12/06/22  0810   MAG 1.8 2.0 2.0 1.9 2.0     Recent Labs   Lab Test 01/21/25  0852 12/18/24  0930 10/08/24  0826 06/26/24  1044 03/26/24  1012   WBC 5.0 4.9 4.3 5.2 5.5   HGB 14.1 13.9 14.1 14.5 16.3    181 180 167 184   MCV 91 94 91 92 92   NEUTROPHIL 56 53 54 54 56     Recent Labs   Lab Test 01/21/25  0852 12/18/24  0930 10/08/24  0826 08/15/22  0810 06/27/22  0827 03/28/22  1050 02/23/22  1419   BILITOTAL 0.6 0.4 0.4   < > 0.9 0.7 0.8   ALKPHOS 109 113 103   < > 94 108 113   ALT 13 9 13   < > 49 80* 88*   AST 19 22 26   < > 4 43 46*   ALBUMIN 4.2 4.0 4.0   < > 3.7 3.6 3.4   LDH  --   --   --   --  246* 259* 246*    < > = values in this interval not displayed.     TSH   Date Value Ref Range Status   04/14/2023 1.02 0.30 - 4.20 uIU/mL Final   03/01/2023 1.14 0.30 - 4.20 uIU/mL Final   10/27/2022 0.65 0.40 - 4.00 mU/L Final   09/22/2022 0.86 0.30 - 4.20 uIU/mL Final         Narrative & Impression   EXAM: CT CHEST/ABDOMEN/PELVIS W CONTRAST  LOCATION: [facility]  DATE: 1/16/2025     INDICATION: Castration resistant metastatic prostate cancer progressing on abiraterone + prednisone; enrolled in ECLIPSE trial  and  randomized to SOC enzalutamide  COMPARISON: CT CAP 10/8/2024.  TECHNIQUE: CT scan of the chest, abdomen, and pelvis was performed following injection of IV contrast. Multiplanar reformats were obtained. Dose reduction techniques were used.   CONTRAST: 100mL Isovue 370     FINDINGS:   LUNGS AND PLEURA: No suspicious new or enlarging pulmonary nodule. No pleural effusion.     MEDIASTINUM/AXILLAE: Normal heart size without pericardial effusion. Moderate atherosclerotic disease of the thoracic aorta. Aortic valve calcifications. No thoracic aneurysm. No thoracic adenopathy.     CORONARY ARTERY CALCIFICATION: Severe.     HEPATOBILIARY: Normal.     PANCREAS: Normal.     SPLEEN: Trace amount of subcapsular splenic fluid is  unchanged.     ADRENAL GLANDS: Stable mildly nodular thickening of both adrenal glands.     KIDNEYS/BLADDER: Symmetric renal enhancement without hydronephrosis. Unchanged hypoattenuating renal lesions which likely represent small cysts. Decompressed urinary bladder.     BOWEL: Normal.     LYMPH NODES: No lymphadenopathy.     VASCULATURE: Heavy burden of atherosclerotic disease. Stable infrarenal abdominal aortic aneurysm measuring 3.5 cm.     PELVIC ORGANS: Prostatectomy.     MUSCULOSKELETAL: Unchanged sclerotic osseous lesions including the T6 vertebral body, right pubic body, and sacrum.                                                                      IMPRESSION:  1.  No new metastatic disease in the chest, abdomen, or pelvis. Sclerotic osseous metastases appear unchanged by CT. Correlate with same-day bone scan.  2.  Unchanged chronic findings as above.          Narrative & Impression   EXAM: NM BONE SCAN WHOLE BODY  LOCATION: [facility]  DATE: 1/16/2025     INDICATION: Malignant neoplasm of prostate  COMPARISON: Nuclear medicine bone scan dated 10/8/2024.  TECHNIQUE: 25.0 mCi technetium-99m MDP, IV. Anterior and posterior delayed whole-body images at 3 hours with additional spot images of the skull.     FINDINGS: While there is broadly stable focal radiotracer uptake in the T6 vertebral body, there is increasing radiotracer uptake/extent of the lesion in the lower presacral region suggesting progression of disease.                                                                      IMPRESSION:     While there is broadly stable focal radiotracer uptake in the T6 vertebral body, there is increasing radiotracer uptake/extent of the lesion in the lower presacral region suggesting progression of disease.         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 Clinical research nurse - Janel Gee has retired and Kala Arcos joined us for the visit.     He is tolerating enzalutamide well. He denies any new side effects on this medication. He has not been missing doses. He has not started any new medications.     We will continue with androgen deprivation therapy with GnRH analog - he received his last dose on 12/20/24 and is not due for it today.     I have reviewed all of the labs done prior to this clinic visit.  Labs are all completely normal including electrolytes, renal function, hepatic panel, complete blood count and differential.  At a previous visit he had borderline low calcium which has completely resolved.  His PSA had nicely responded to enzalutamide from 10.3 ng/ml at the start of therapy to 0.39 ng/ml on 3/1/23. It has been slowly rising since then. It increased to 0.85 ng/ml in October 2023, 1.55 ng/ml on 1/9/2024, 1.51 ng/ml on 3/26/2024, 3.15 ng/ml on 6/26/24 and 7.61 ng/ml on 10/8/2024,  11 ng/ml on 12/18/24 and 11.4 ng/ml on 1/21/25.       I have reviewed actual images from his CT chest, abdomen and pelvis and bone scans which reveal relatively stable disease.  There is increased uptake on the bone scan but there are no new lesions noted.  I will repeat his restaging scans every 3-4 months as per clinical trial protocol. I have entered the orders for these and we will have these scheduled soon.     I reviewed that we will have to change therapies soon. He seems to be progressing on current regimen.  I had recommended KIERA trial for him.  Unfortunately this study is not available for enrollment anymore and has been closed at this site.  Once we determined that he has progression on the current clinical trial he, the study will  provide for lutetium.  I would recommend lutetium 177 for him as the next step in his management.  Lutetium is very well-tolerated for most patients except for dry mouth and dry eyes.  This is also associated with some cytopenias and fatigue.  Overall most patients do not have any significant side effects with therapy.    We will continue on enzalutamide for now.  I will try to see if we could continue with enzalutamide even after he started on lutetium.    He again had questions about the trial which were reviewed with him.     The longitudinal plan of care for the diagnosis(es)/condition(s) as documented were addressed during this visit. Due to the added complexity in care, I will continue to support Steve in the subsequent management and with ongoing continuity of care.    40 minutes spent on the date of the encounter doing chart review, history and exam, documentation and further activities as noted above      Gigi Ward    Hematologist and Medical Oncologist  [facility]        Again, thank you for allowing me to participate in the care of your patient.        Sincerely,        Gigi Ward MD    Electronically signed

## 2025-01-25 NOTE — PROGRESS NOTES
[facility]  FOLLOW-UP VISIT NOTE    PATIENT NAME: Wallace Zelaya MRN # [id number]  DATE OF VISIT: Jan 24, 2025 YOB: 1945    REFERRING PROVIDER: No referring provider defined for this encounter.    CANCER TYPE: Prostate cancer; Biochemical recurrence; Castration resistant disease  STAGE: Stage III - pT3b at diagnosis; M0    HISTORY OF PRESENTING ILLNESS:  Wallace was noted to have rising screening PSA from 2 - 4. He was referred to Dr. Rodolfo Connor. He had radical prostatectomy on 11/2015. Pathology from this revealed Cayla 4+4 disease with extraprostatic extension, seminal vesicle extension and he was staged at pT3b. All of the 12 lymph nodes resected were negative for disease. Post operatively his PSA did not drop to undetectable levels and remained elevated at 0.56. It vladimir to 0.9 in April 2016 and he was referred to Dr. Newton for adjuvant radiation therapy. He completed radiation therapy but did not have any PSA response to this treatment.     TREATMENT SUMMARY:  11/13/2015 Radical prostatectomy  April 2016  Radiation therapy  He was started on intermittent androgen deprivation therapy which had to be changed to continuous with rapid rise in his PSA.      April 2020 - he was started on bicalutamide for complete androgen blockade  He had rising PSA in May 2021. His bone scan done on 6/30/21 revealed metastatic lesions of T6 and the sacrum. He was switched to abiraterone with prednisone on 8/7/21.  He had relatively stable PSA on therapy without any significant PSA response.  He was eventually taken off therapy in September 2022 for progressive disease.    He enrolled in the Eclipse clinical trial on 10/27/2022 and was randomized to the standard of care enzalutamide arm.    CURRENT INTERVENTIONS:  Enzalutamide 160 mg oral once a day as a standard of care on ECLIPSE trial    SUBJECTIVE   Wallace is being seen for his prostate cancer    Steve was followed in  person at this visit. He is seen for scheduled follow up visit.  He has been randomized to enzalutamide and has been taking enzalutamide since since 10/27/2022. He denies missing a dose. He has tolerated this without difficulty. He denies any new side effects. He was in positive mood at this visit.      He is being followed with labs..      He denies any new complaints at this visit.  If it was not for these clinic visits he would not even remember he has cancer.  He has been playing golf during the summer.  During this winter he plans to visit his son in [place] and play golf with him over there.      PAST MEDICAL HISTORY   HTN  Dyslipidemia  Prostate cancer as detailed above      CURRENT OUTPATIENT MEDICATIONS     Current Outpatient Medications   Medication Sig Dispense Refill    amLODIPine (NORVASC) 10 MG tablet Take 1 tablet (10 mg) by mouth daily. 90 tablet 3    atorvastatin (LIPITOR) 20 MG tablet Take 1 tablet by mouth once daily 90 tablet 2    enzalutamide (XTANDI) 40 MG tablet Take 4 tablets (160 mg) by mouth daily. 120 tablet 1    enzalutamide (XTANDI) 80 MG tablet Take 2 tablets (160 mg) by mouth daily. 60 tablet 0    fluticasone (FLONASE) 50 MCG/ACT nasal spray Spray 1 spray into both nostrils daily 16 g 1    hydrochlorothiazide (HYDRODIURIL) 25 MG tablet Take 1 tablet (25 mg) by mouth daily. 90 tablet 3    ibuprofen (ADVIL/MOTRIN) 800 MG tablet TAKE 1 TABLET BY MOUTH THREE TIMES DAILY WITH FOOD 90 tablet 3    memantine (NAMENDA) 5 MG tablet Take 1 tablet by mouth once daily 90 tablet 3    valsartan (DIOVAN) 320 MG tablet Take 1 tablet (320 mg) by mouth daily. 90 tablet 1    enzalutamide (XTANDI) 80 MG tablet Take 2 tablets (160 mg) by mouth daily. 60 tablet 0     No current facility-administered medications for this visit.        ALLERGIES     No Known Allergies     REVIEW OF SYSTEMS   As above in the HPI, o/w complete 12-point ROS was negative.     PHYSICAL EXAM   /78   Pulse 57   Temp 97.1  " F (36.2  C) (Temporal)   Resp 16   Ht 1.734 m (5' 8.25\")   Wt 97.1 kg (214 lb)   SpO2 96%   BMI 32.30 kg/m    SpO2 Readings from Last 4 Encounters:   09/21/18 96%   08/24/18 94%   06/22/18 95%   05/11/18 97%     Wt Readings from Last 3 Encounters:   01/24/25 97.1 kg (214 lb)   12/20/24 98.7 kg (217 lb 9.6 oz)   10/11/24 97.5 kg (215 lb)     GENERAL/CONSTITUTIONAL: No acute distress.  EYES: Pupils are equal, round, and react to light and accommodation. Extraocular movements intact.  No scleral icterus.  ENT/MOUTH: Neck supple. Oropharynx clear, no mucositis.  LYMPH: No anterior cervical, posterior cervical, supraclavicular, axillary or inguinal adenopathy.   RESPIRATORY: Clear to auscultation bilaterally. No crackles or wheezing.   CARDIOVASCULAR: Regular rate and rhythm without murmurs, gallops, or rubs.  GASTROINTESTINAL: No hepatosplenomegaly, masses, or tenderness. The patient has normal bowel sounds. No guarding.  No distention.  : Deferred  MUSCULOSKELETAL: Warm and well-perfused, no cyanosis, clubbing, or edema.  NEUROLOGIC: Cranial nerves II-XII are intact. Alert, oriented, answers questions appropriately. No focal neurologic deficit  INTEGUMENTARY: No rashes or jaundice.  GAIT: Normal     LABORATORY AND IMAGING STUDIES     Recent Labs   Lab Test 01/21/25  0852 01/16/25  1102 12/18/24  0930 10/08/24  0826 06/26/24  1044 03/26/24  1012     --  141 140 139 138   POTASSIUM 4.7  --  4.7 4.1 3.9 4.5   CHLORIDE 103  --  102 103 102 99   CO2 28  --  26 23 24 27   ANIONGAP 10  --  13 14 13 12   BUN 13.8  --  15.5 15.4 17.7 14.9   CR 1.05 1.1 1.05 0.90 0.91 0.95   *  --  106* 109* 99 100*   TY 9.3  --  9.2 8.7* 8.7* 9.6     Recent Labs   Lab Test 04/14/23  1021 03/27/23  1024 03/01/23  1039 01/27/23  1312 12/06/22  0810   MAG 1.8 2.0 2.0 1.9 2.0     Recent Labs   Lab Test 01/21/25  0852 12/18/24  0930 10/08/24  0826 06/26/24  1044 03/26/24  1012   WBC 5.0 4.9 4.3 5.2 5.5   HGB 14.1 13.9 14.1 14.5 " 16.3    181 180 167 184   MCV 91 94 91 92 92   NEUTROPHIL 56 53 54 54 56     Recent Labs   Lab Test 01/21/25  0852 12/18/24  0930 10/08/24  0826 08/15/22  0810 06/27/22  0827 03/28/22  1050 02/23/22  1419   BILITOTAL 0.6 0.4 0.4   < > 0.9 0.7 0.8   ALKPHOS 109 113 103   < > 94 108 113   ALT 13 9 13   < > 49 80* 88*   AST 19 22 26   < > 4 43 46*   ALBUMIN 4.2 4.0 4.0   < > 3.7 3.6 3.4   LDH  --   --   --   --  246* 259* 246*    < > = values in this interval not displayed.     TSH   Date Value Ref Range Status   04/14/2023 1.02 0.30 - 4.20 uIU/mL Final   03/01/2023 1.14 0.30 - 4.20 uIU/mL Final   10/27/2022 0.65 0.40 - 4.00 mU/L Final   09/22/2022 0.86 0.30 - 4.20 uIU/mL Final         Narrative & Impression   EXAM: CT CHEST/ABDOMEN/PELVIS W CONTRAST  LOCATION: [facility]  DATE: 1/16/2025     INDICATION: Castration resistant metastatic prostate cancer progressing on abiraterone + prednisone; enrolled in ECLIPSE trial  and  randomized to SOC enzalutamide  COMPARISON: CT CAP 10/8/2024.  TECHNIQUE: CT scan of the chest, abdomen, and pelvis was performed following injection of IV contrast. Multiplanar reformats were obtained. Dose reduction techniques were used.   CONTRAST: 100mL Isovue 370     FINDINGS:   LUNGS AND PLEURA: No suspicious new or enlarging pulmonary nodule. No pleural effusion.     MEDIASTINUM/AXILLAE: Normal heart size without pericardial effusion. Moderate atherosclerotic disease of the thoracic aorta. Aortic valve calcifications. No thoracic aneurysm. No thoracic adenopathy.     CORONARY ARTERY CALCIFICATION: Severe.     HEPATOBILIARY: Normal.     PANCREAS: Normal.     SPLEEN: Trace amount of subcapsular splenic fluid is unchanged.     ADRENAL GLANDS: Stable mildly nodular thickening of both adrenal glands.     KIDNEYS/BLADDER: Symmetric renal enhancement without hydronephrosis. Unchanged hypoattenuating renal lesions which likely represent small cysts. Decompressed urinary  bladder.     BOWEL: Normal.     LYMPH NODES: No lymphadenopathy.     VASCULATURE: Heavy burden of atherosclerotic disease. Stable infrarenal abdominal aortic aneurysm measuring 3.5 cm.     PELVIC ORGANS: Prostatectomy.     MUSCULOSKELETAL: Unchanged sclerotic osseous lesions including the T6 vertebral body, right pubic body, and sacrum.                                                                      IMPRESSION:  1.  No new metastatic disease in the chest, abdomen, or pelvis. Sclerotic osseous metastases appear unchanged by CT. Correlate with same-day bone scan.  2.  Unchanged chronic findings as above.          Narrative & Impression   EXAM: NM BONE SCAN WHOLE BODY  LOCATION: [facility]  DATE: 1/16/2025     INDICATION: Malignant neoplasm of prostate  COMPARISON: Nuclear medicine bone scan dated 10/8/2024.  TECHNIQUE: 25.0 mCi technetium-99m MDP, IV. Anterior and posterior delayed whole-body images at 3 hours with additional spot images of the skull.     FINDINGS: While there is broadly stable focal radiotracer uptake in the T6 vertebral body, there is increasing radiotracer uptake/extent of the lesion in the lower presacral region suggesting progression of disease.                                                                      IMPRESSION:     While there is broadly stable focal radiotracer uptake in the T6 vertebral body, there is increasing radiotracer uptake/extent of the lesion in the lower presacral region suggesting progression of disease.         Recent Labs   Lab Test 01/21/25  0852 12/18/24  0930 10/08/24  0826 06/26/24  1044 03/26/24  1012   PSA 11.40 11.00 7.61 3.15 1.51   TESTOSTTOTAL 14* 8* 4* 6* 8*                          ASSESSMENT AND PLAN   Castration resistant metastatic prostate cancer progressing on abiraterone with prednisone  ECOG PS 1   HTN  No medical comorbidity    I had a lengthy discussion with Steve who is alone at this visit. Clinical research nurse - Janel  Gerard has retired and Kala Arcos joined us for the visit.     He is tolerating enzalutamide well. He denies any new side effects on this medication. He has not been missing doses. He has not started any new medications.     We will continue with androgen deprivation therapy with GnRH analog - he received his last dose on 12/20/24 and is not due for it today.     I have reviewed all of the labs done prior to this clinic visit.  Labs are all completely normal including electrolytes, renal function, hepatic panel, complete blood count and differential.  At a previous visit he had borderline low calcium which has completely resolved.  His PSA had nicely responded to enzalutamide from 10.3 ng/ml at the start of therapy to 0.39 ng/ml on 3/1/23. It has been slowly rising since then. It increased to 0.85 ng/ml in October 2023, 1.55 ng/ml on 1/9/2024, 1.51 ng/ml on 3/26/2024, 3.15 ng/ml on 6/26/24 and 7.61 ng/ml on 10/8/2024,  11 ng/ml on 12/18/24 and 11.4 ng/ml on 1/21/25.       I have reviewed actual images from his CT chest, abdomen and pelvis and bone scans which reveal relatively stable disease.  There is increased uptake on the bone scan but there are no new lesions noted.  I will repeat his restaging scans every 3-4 months as per clinical trial protocol. I have entered the orders for these and we will have these scheduled soon.     I reviewed that we will have to change therapies soon. He seems to be progressing on current regimen.  I had recommended KIERA trial for him.  Unfortunately this study is not available for enrollment anymore and has been closed at this site.  Once we determined that he has progression on the current clinical trial he, the study will provide for lutetium.  I would recommend lutetium 177 for him as the next step in his management.  Lutetium is very well-tolerated for most patients except for dry mouth and dry eyes.  This is also associated with some cytopenias and fatigue.  Overall most  patients do not have any significant side effects with therapy.    We will continue on enzalutamide for now.  I will try to see if we could continue with enzalutamide even after he started on lutetium.    He again had questions about the trial which were reviewed with him.     The longitudinal plan of care for the diagnosis(es)/condition(s) as documented were addressed during this visit. Due to the added complexity in care, I will continue to support Steve in the subsequent management and with ongoing continuity of care.    40 minutes spent on the date of the encounter doing chart review, history and exam, documentation and further activities as noted above      Gigi Ward    Hematologist and Medical Oncologist  [facility]

## 2025-02-04 ENCOUNTER — OFFICE VISIT (OUTPATIENT)
Dept: FAMILY MEDICINE | Facility: CLINIC | Age: 80
End: 2025-02-04
Payer: COMMERCIAL

## 2025-02-04 ENCOUNTER — MYC MEDICAL ADVICE (OUTPATIENT)
Dept: FAMILY MEDICINE | Facility: CLINIC | Age: 80
End: 2025-02-04

## 2025-02-04 VITALS
TEMPERATURE: 97.6 F | WEIGHT: 217.1 LBS | HEART RATE: 70 BPM | OXYGEN SATURATION: 100 % | BODY MASS INDEX: 32.9 KG/M2 | SYSTOLIC BLOOD PRESSURE: 136 MMHG | DIASTOLIC BLOOD PRESSURE: 80 MMHG | HEIGHT: 68 IN | RESPIRATION RATE: 16 BRPM

## 2025-02-04 DIAGNOSIS — E78.5 HYPERLIPIDEMIA LDL GOAL <130: ICD-10-CM

## 2025-02-04 DIAGNOSIS — I25.10 CORONARY ARTERY CALCIFICATION: Primary | ICD-10-CM

## 2025-02-04 PROCEDURE — G2211 COMPLEX E/M VISIT ADD ON: HCPCS | Performed by: FAMILY MEDICINE

## 2025-02-04 PROCEDURE — 99214 OFFICE O/P EST MOD 30 MIN: CPT | Performed by: FAMILY MEDICINE

## 2025-02-04 RX ORDER — ATORVASTATIN CALCIUM 40 MG/1
TABLET, FILM COATED ORAL
Qty: 90 TABLET | Refills: 3 | Status: SHIPPED | OUTPATIENT
Start: 2025-02-04

## 2025-02-04 NOTE — PROGRESS NOTES
"  Assessment & Plan     Coronary artery calcification  Currently asymptomatic.  I reviewed this incidental finding from his recent CT chest abdomen and pelvis as he is currently following with Dr. Ward for management of prostate cancer.  He is not having any anginal equivalent symptoms, has been walking 1 mile a day while walking his dogs doing yard work without any angina.  Overall reasonable based on recent CT findings to consider escalating Lipitor from 20-40 daily as he is tolerating statins well.  I will recheck his levels and screen him for CRP ApoB and lipoprotein a in 2 months  - Lipid panel reflex to direct LDL Fasting  - CRP cardiac risk  - Apolipoprotein B  - Lipoprotein (a)    Hyperlipidemia LDL goal <130  - atorvastatin (LIPITOR) 40 MG tablet  Dispense: 90 tablet; Refill: 3    30 minutes spent combined with face-to-face and chart review for patient's encounter today.    Sara Wilson is a 79 year old, presenting for the following health issues:  Results (Discuss recent CT)        2/4/2025     8:12 AM   Additional Questions   Roomed by Zaria     History of Present Illness       Reason for visit:  Communicate with Dr Ball on latest scan!   He is taking medications regularly.       Patient recently had a radiology report that showed interval worsening of his coronary artery calcifications which increased from moderate to severe.  He is not having any chest pain or shortness of breath.  To talk about medication options to treat this.                Objective    /80   Pulse 70   Temp 97.6  F (36.4  C) (Tympanic)   Resp 16   Ht 1.734 m (5' 8.25\")   Wt 98.5 kg (217 lb 1.6 oz)   SpO2 100%   BMI 32.77 kg/m    Body mass index is 32.77 kg/m .  Physical Exam  Vitals reviewed.   Constitutional:       Appearance: He is not ill-appearing.   Cardiovascular:      Rate and Rhythm: Normal rate and regular rhythm.   Pulmonary:      Effort: Pulmonary effort is normal.                    .ascvd  Signed " Electronically by: Cedric Ball MD

## 2025-02-05 DIAGNOSIS — C79.51 MALIGNANT NEOPLASM METASTATIC TO BONE (H): ICD-10-CM

## 2025-02-05 DIAGNOSIS — C61 PROSTATE CANCER (H): Primary | ICD-10-CM

## 2025-02-06 DIAGNOSIS — C61 PROSTATE CANCER (H): Primary | ICD-10-CM

## 2025-02-06 DIAGNOSIS — C79.51 MALIGNANT NEOPLASM METASTATIC TO BONE (H): ICD-10-CM

## 2025-02-10 DIAGNOSIS — Z00.6 EXAMINATION OF PARTICIPANT IN CLINICAL TRIAL: Primary | ICD-10-CM

## 2025-02-23 ENCOUNTER — HEALTH MAINTENANCE LETTER (OUTPATIENT)
Age: 80
End: 2025-02-23

## 2025-02-26 DIAGNOSIS — M54.50 CHRONIC MIDLINE LOW BACK PAIN WITHOUT SCIATICA: ICD-10-CM

## 2025-02-26 DIAGNOSIS — G89.29 CHRONIC MIDLINE LOW BACK PAIN WITHOUT SCIATICA: ICD-10-CM

## 2025-02-26 RX ORDER — IBUPROFEN 800 MG/1
TABLET, FILM COATED ORAL
Qty: 90 TABLET | Refills: 0 | Status: SHIPPED | OUTPATIENT
Start: 2025-02-26

## 2025-03-06 DIAGNOSIS — C61 PROSTATE CANCER (H): Primary | ICD-10-CM

## 2025-03-06 DIAGNOSIS — C79.51 MALIGNANT NEOPLASM METASTATIC TO BONE (H): ICD-10-CM

## 2025-03-19 ENCOUNTER — OFFICE VISIT (OUTPATIENT)
Dept: FAMILY MEDICINE | Facility: CLINIC | Age: 80
End: 2025-03-19
Payer: COMMERCIAL

## 2025-03-19 VITALS
OXYGEN SATURATION: 98 % | WEIGHT: 216 LBS | DIASTOLIC BLOOD PRESSURE: 86 MMHG | HEART RATE: 68 BPM | HEIGHT: 68 IN | SYSTOLIC BLOOD PRESSURE: 179 MMHG | RESPIRATION RATE: 14 BRPM | BODY MASS INDEX: 32.74 KG/M2 | TEMPERATURE: 98 F

## 2025-03-19 DIAGNOSIS — M67.432 GANGLION CYST OF WRIST, LEFT: ICD-10-CM

## 2025-03-19 DIAGNOSIS — I10 ACCELERATED HYPERTENSION: Primary | ICD-10-CM

## 2025-03-19 DIAGNOSIS — I35.8 AORTIC VALVE SCLEROSIS: ICD-10-CM

## 2025-03-19 PROCEDURE — 3077F SYST BP >= 140 MM HG: CPT | Performed by: FAMILY MEDICINE

## 2025-03-19 PROCEDURE — 99214 OFFICE O/P EST MOD 30 MIN: CPT | Performed by: FAMILY MEDICINE

## 2025-03-19 PROCEDURE — 3079F DIAST BP 80-89 MM HG: CPT | Performed by: FAMILY MEDICINE

## 2025-03-19 PROCEDURE — G2211 COMPLEX E/M VISIT ADD ON: HCPCS | Performed by: FAMILY MEDICINE

## 2025-03-19 RX ORDER — LABETALOL 100 MG/1
100 TABLET, FILM COATED ORAL 2 TIMES DAILY
Qty: 60 TABLET | Refills: 2 | Status: SHIPPED | OUTPATIENT
Start: 2025-03-19

## 2025-03-19 NOTE — PROGRESS NOTES
"  Assessment & Plan     Accelerated hypertension  Uncontrolled.  Continue amlodipine, hydrochlorothiazide and valsartan.  Start labetalol 100 mg twice daily.  Monitor for bradycardia.  He will start recording ambulatory blood pressure readings and update me via [employer] ([employer]t.  I will follow-up with him in a month for recheck.  If he has new onset lightheadedness, headache, chest pain or worsening shortness of breath to go to the ER for further evaluation.  - labetalol (NORMODYNE) 100 MG tablet  Dispense: 60 tablet; Refill: 2    Aortic valve sclerosis  Echocardiogram on 8-5-2024, he has aortic valve sclerosis without hemodynamically significant valvular aortic stenosis.  My major concern for him is he is having uncontrolled hypertension and I would like to have cardiology's input on this.  Appreciate your assistance.  He is having some symptomatic dyspnea with breaking his lawn and evening walking up a slight incline when he is out golfing.  - Adult Cardiology Eval Humberto Referral    Ganglion cyst of wrist, left  Reviewed etiology and treatment options for ganglion cyst including observation, aspiration with corticosteroid injection or excision by hand surgeon.  As it is not causing him any major symptoms he would like to hold off on any intentional intervention at this time he will reach back out to us if additional intervention is necessary.            BMI  Estimated body mass index is 32.6 kg/m  as calculated from the following:    Height as of this encounter: 1.734 m (5' 8.25\").    Weight as of this encounter: 98 kg (216 lb).             Subjective   Steve is a 79 year old, presenting for the following health issues:  Derm Problem        3/19/2025    10:40 AM   Additional Questions   Roomed by Kameron Boucher   Accompanied by self     History of Present Illness       Reason for visit:  Ganglion cyst on hand   He is taking medications regularly.      Patient is a very pleasant 79-year-old male who presents to clinic with " "concerns of a left ganglion cyst, he was encouraged by his wife to make this appointment.  There is no associated pain, denies any numbness weakness or tingling.  No inciting injury.    Has noticed that he has been coming slightly more short of breath, simple tasks including raking the leaves when the weather was nice a week or 2 ago caused him to pause for a minute.  This also occurs when he is walking up a slight incline and when he was out golfing with his family.      He has been adherent to his blood pressure meds.  He is unsure of his home blood pressure readings.                Objective    BP (!) 179/86 (BP Location: Right arm, Patient Position: Chair, Cuff Size: Adult Large)   Pulse 68   Temp 98  F (36.7  C) (Oral)   Resp 14   Ht 1.734 m (5' 8.25\")   Wt 98 kg (216 lb)   SpO2 98%   BMI 32.60 kg/m    Body mass index is 32.6 kg/m .  Physical Exam  Cardiovascular:      Rate and Rhythm: Normal rate and regular rhythm.   Pulmonary:      Effort: Pulmonary effort is normal.      Breath sounds: Normal breath sounds.   Musculoskeletal:        Hands:       Comments: Soft nontender mass at the dorsum of the left wrist.          Last Comprehensive Metabolic Panel:  Lab Results   Component Value Date     01/21/2025    POTASSIUM 4.7 01/21/2025    CHLORIDE 103 01/21/2025    CO2 28 01/21/2025    ANIONGAP 10 01/21/2025     (H) 01/21/2025    BUN 13.8 01/21/2025    CR 1.05 01/21/2025    GFRESTIMATED 72 01/21/2025    TY 9.3 01/21/2025                 Signed Electronically by: Cedric Ball MD    "

## 2025-03-24 NOTE — MR AVS SNAPSHOT
After Visit Summary   10/3/2018    Wallace Zelaya    MRN: [id number]           Patient Information     Date Of Birth          1945        Visit Information        Provider Department      10/3/2018 8:40 AM Jd Almaraz MD [facility]        Today's Diagnoses     Routine general medical examination at a health care facility    -  1    Chronic midline low back pain without sciatica        Prostate cancer (HCC)        Hyperlipidemia LDL goal <130        Essential hypertension          Care Instructions      Preventive Health Recommendations:       Male Ages 65 and over    Yearly exam:             See your health care provider every year in order to  o   Review health changes.   o   Discuss preventive care.    o   Review your medicines if your doctor has prescribed any.    Talk with your health care provider about whether you should have a test to screen for prostate cancer (PSA).    Every 3 years, have a diabetes test (fasting glucose). If you are at risk for diabetes, you should have this test more often.    Every 5 years, have a cholesterol test. Have this test more often if you are at risk for high cholesterol or heart disease.     Every 10 years, have a colonoscopy. Or, have a yearly FIT test (stool test). These exams will check for colon cancer.    Talk to with your health care provider about screening for Abdominal Aortic Aneurysm if you have a family history of AAA or have a history of smoking.  Shots:     Get a flu shot each year.     Get a tetanus shot every 10 years.     Talk to your doctor about your pneumonia vaccines. There are now two you should receive - Pneumovax (PPSV 23) and Prevnar (PCV 13).    Talk to your pharmacist about a shingles vaccine.     Talk to your doctor about the hepatitis B vaccine.  Nutrition:     Eat at least 5 servings of fruits and vegetables each day.     Eat whole-grain bread, whole-wheat pasta and brown rice instead of white grains and      IUD REMOVAL  Indications for Removal:  Trish Isaacs is a ,  26 y.o. female  /  whose Patient's last menstrual period was 2025 (exact date).  . who presents today for IUD removal. Her current IUD was placed 1 year ago. She has not had any problems with the IUD.    She requests removal of the IUD because she desires to conceive.   The IUD removal procedure was discussed with the patient and she had no further questions.     Procedure: The patient was placed in a dorsal lithotomy position and appropriately draped. On bimanual exam the uterus was anterior and normal in size with no tenderness present. A speculum exam was performed and the cervix was visualized. The cervix was prepped with zephiran solution. The IUD string was visualized. Using ring forceps , the string was grasped and the IUD removed intact. The IUD was shown to the patient.     ASSESSMENT   Diagnosis Orders   1. Encounter for IUD removal          PLAN  No follow-ups on file.     rice.     Get adequate Calcium and Vitamin D.   Lifestyle    Exercise for at least 150 minutes a week (30 minutes a day, 5 days a week). This will help you control your weight and prevent disease.     Limit alcohol to one drink per day.     No smoking.     Wear sunscreen to prevent skin cancer.     See your dentist every six months for an exam and cleaning.     See your eye doctor every 1 to 2 years to screen for conditions such as glaucoma, macular degeneration and cataracts.          Follow-ups after your visit        Your next 10 appointments already scheduled     Dec 21, 2018  8:30 AM CST   LAB with LV LAB   [facility]    [id number] [place] [id number] [phone]           Please do not eat 10-12 hours before your appointment if you are coming in fasting for labs on lipids, cholesterol, or glucose (sugar). This does not apply to pregnant women. Water, hot tea and black coffee (with nothing added) are okay. Do not drink other fluids, diet soda or chew gum.            Dec 28, 2018  2:45 PM CST   Return Visit with Gigi Ward MD   [facility] ([facility])    [facility]  [address]  [facility] [address]   [phone]            Dec 28, 2018  3:30 PM CST   injection with RH LAB DRAW 1   [facility] ([facility])    [facility]  [address]  [facility] [address]   [phone]              Who to contact     If you have questions or need follow up information about today's clinic visit or your schedule please contact [facility] directly at [phone].  Normal or non-critical lab and imaging results will be communicated to you by MyChart, letter or phone within 4 business days after the clinic has received the results. If you do not hear from us within 7 days, please  "contact the clinic through [employer] or phone. If you have a critical or abnormal lab result, we will notify you by phone as soon as possible.  Submit refill requests through [employer] or call your pharmacy and they will forward the refill request to us. Please allow 3 business days for your refill to be completed.          Additional Information About Your Visit        [employer] Information     [employer] gives you secure access to your electronic health record. If you see a primary care provider, you can also send messages to your care team and make appointments. If you have questions, please call your primary care clinic.  If you do not have a primary care provider, please call [phone] and they will assist you.        Care EveryWhere ID     This is your Care EveryWhere ID. This could be used by other organizations to access your [facility] medical records  [id number]        Your Vitals Were     Pulse Temperature Height Pulse Oximetry BMI (Body Mass Index)       58 98  F (36.7  C) (Oral) 5' 9\" (1.753 m) 98% 28.9 kg/m2        Blood Pressure from Last 3 Encounters:   10/03/18 136/70   09/21/18 144/76   08/24/18 127/84    Weight from Last 3 Encounters:   10/03/18 195 lb 11.2 oz (88.8 kg)   09/21/18 207 lb 12.8 oz (94.3 kg)   08/24/18 200 lb (90.7 kg)              Today, you had the following     No orders found for display         Today's Medication Changes          These changes are accurate as of 10/3/18  9:02 AM.  If you have any questions, ask your nurse or doctor.               Start taking these medicines.        Dose/Directions    ibuprofen 800 MG tablet   Commonly known as:  ADVIL/MOTRIN   Used for:  Chronic midline low back pain without sciatica   Started by:  Jd Almaraz MD        Dose:  800 mg   Take 1 tablet (800 mg) by mouth 3 times daily with food   Quantity:  90 tablet   Refills:  3            Where to get your medicines      These medications were sent to [facility] [place] [place] - " [address] [facility]  [address] [facility] [address]     Phone:  [phone]     ibuprofen 800 MG tablet                Primary Care Provider Office Phone # Fax #    Jd Almaraz [phone] [address]  [facility] [address]        Equal Access to Services     CADEN ALLRED : Hadii aad ku hadasho Soomaali, waaxda luqadaha, qaybta kaalmada adeegyada, waxay idiin hayaan adeeg kharash lazamzamn ah. So [facility] [phone].    ATENCIÓN: Si habla español, tiene a crain disposición servicios gratuitos de asistencia lingüística. Alcidesame al [phone].    We comply with applicable federal civil rights laws and [place] laws. We do not discriminate on the basis of race, color, national origin, age, disability, sex, sexual orientation, or gender identity.            Thank you!     Thank you for choosing [facility]  for your care. Our goal is always to provide you with excellent care. Hearing back from our patients is one way we can continue to improve our services. Please take a few minutes to complete the written survey that you may receive in the mail after your visit with us. Thank you!             Your Updated Medication List - Protect others around you: Learn how to safely use, store and throw away your medicines at www.disposemymeds.org.          This list is accurate as of 10/3/18  9:02 AM.  Always use your most recent med list.                   Brand Name Dispense Instructions for use Diagnosis    acyclovir 5 % ointment    ZOVIRAX    15 g    Apply topically 6 times daily    Herpes labialis       amLODIPine 5 MG tablet    NORVASC    90 tablet    Take 1 tablet (5 mg) by mouth daily    Essential hypertension       atorvastatin 20 MG tablet    LIPITOR    90 tablet    TAKE 1 TABLET EVERY DAY    Hyperlipidemia LDL goal <130       ibuprofen 800 MG tablet    ADVIL/MOTRIN    90 tablet    Take 1 tablet (800 mg) by mouth 3 times daily with food    Chronic midline low back pain without  sciatica       valsartan 160 MG tablet    DIOVAN    90 tablet    TAKE 1 TABLET (160 MG) BY MOUTH DAILY    Essential hypertension with goal blood pressure less than 140/90

## 2025-04-07 DIAGNOSIS — C79.51 MALIGNANT NEOPLASM METASTATIC TO BONE (H): ICD-10-CM

## 2025-04-07 DIAGNOSIS — C61 PROSTATE CANCER (H): Primary | ICD-10-CM

## 2025-04-21 ENCOUNTER — OFFICE VISIT (OUTPATIENT)
Dept: FAMILY MEDICINE | Facility: CLINIC | Age: 80
End: 2025-04-21
Payer: COMMERCIAL

## 2025-04-21 VITALS
DIASTOLIC BLOOD PRESSURE: 80 MMHG | TEMPERATURE: 98.7 F | BODY MASS INDEX: 32.43 KG/M2 | HEART RATE: 73 BPM | SYSTOLIC BLOOD PRESSURE: 118 MMHG | RESPIRATION RATE: 20 BRPM | WEIGHT: 214 LBS | OXYGEN SATURATION: 97 % | HEIGHT: 68 IN

## 2025-04-21 DIAGNOSIS — I10 ESSENTIAL HYPERTENSION: Primary | ICD-10-CM

## 2025-04-21 PROCEDURE — 99213 OFFICE O/P EST LOW 20 MIN: CPT | Performed by: FAMILY MEDICINE

## 2025-04-21 PROCEDURE — 3078F DIAST BP <80 MM HG: CPT | Performed by: FAMILY MEDICINE

## 2025-04-21 PROCEDURE — 3074F SYST BP LT 130 MM HG: CPT | Performed by: FAMILY MEDICINE

## 2025-04-21 RX ORDER — LABETALOL 100 MG/1
TABLET, FILM COATED ORAL
Status: SHIPPED | DISCHARGE
Start: 2025-04-21

## 2025-04-21 NOTE — PROGRESS NOTES
"  Assessment & Plan     Essential hypertension  Overall controlled, continue amlodipine, valsartan and hydrochlorothiazide.  He has episodic spikes of his blood pressure exertion.  Like for him to use labetalol as a as needed medication if blood pressure systolics greater than 160 mmHg.  He has an upcoming appointment to talk to cardiology about his severe aortic sclerosis.  - labetalol (NORMODYNE) 100 MG tablet            BMI  Estimated body mass index is 32.3 kg/m  as calculated from the following:    Height as of this encounter: 1.734 m (5' 8.25\").    Weight as of this encounter: 97.1 kg (214 lb).             Sara Wilson is a 79 year old, presenting for the following health issues:  Hypertension (Follow-up BP check)        4/21/2025    11:21 AM   Additional Questions   Roomed by Saira Johnson     History of Present Illness       Hypertension: He presents for follow up of hypertension.  He does check blood pressure  regularly outside of the clinic. Outpatient blood pressures have not been over 140/90. He does not follow a low salt diet.     He eats 2-3 servings of fruits and vegetables daily.He consumes 1 sweetened beverage(s) daily.He exercises with enough effort to increase his heart rate 10 to 19 minutes per day.  He exercises with enough effort to increase his heart rate 7 days per week.   He is taking medications regularly.                      Objective    /80   Pulse 73   Temp 98.7  F (37.1  C) (Oral)   Resp 20   Ht 1.734 m (5' 8.25\")   Wt 97.1 kg (214 lb)   SpO2 97%   BMI 32.30 kg/m    Body mass index is 32.3 kg/m .  Physical Exam  Vitals reviewed.   Constitutional:       Appearance: He is not ill-appearing.   Cardiovascular:      Rate and Rhythm: Normal rate and regular rhythm.   Pulmonary:      Effort: Pulmonary effort is normal.                    Signed Electronically by: Cedric [place] MD    "

## 2025-04-24 ENCOUNTER — DOCUMENTATION ONLY (OUTPATIENT)
Dept: PHARMACY | Facility: CLINIC | Age: 80
End: 2025-04-24

## 2025-04-24 ENCOUNTER — LAB (OUTPATIENT)
Dept: INFUSION THERAPY | Facility: CLINIC | Age: 80
End: 2025-04-24
Attending: INTERNAL MEDICINE
Payer: COMMERCIAL

## 2025-04-24 ENCOUNTER — HOSPITAL ENCOUNTER (OUTPATIENT)
Dept: NUCLEAR MEDICINE | Facility: CLINIC | Age: 80
Setting detail: NUCLEAR MEDICINE
Discharge: HOME | End: 2025-04-24
Attending: INTERNAL MEDICINE
Payer: COMMERCIAL

## 2025-04-24 ENCOUNTER — MYC MEDICAL ADVICE (OUTPATIENT)
Dept: PHARMACY | Facility: CLINIC | Age: 80
End: 2025-04-24

## 2025-04-24 ENCOUNTER — HOSPITAL ENCOUNTER (OUTPATIENT)
Dept: CT IMAGING | Facility: CLINIC | Age: 80
Discharge: HOME | End: 2025-04-24
Attending: INTERNAL MEDICINE
Payer: COMMERCIAL

## 2025-04-24 DIAGNOSIS — C79.51 MALIGNANT NEOPLASM METASTATIC TO BONE (H): ICD-10-CM

## 2025-04-24 DIAGNOSIS — C61 PROSTATE CANCER (H): Primary | ICD-10-CM

## 2025-04-24 DIAGNOSIS — C61 PROSTATE CANCER (H): ICD-10-CM

## 2025-04-24 DIAGNOSIS — Z00.6 EXAMINATION OF PARTICIPANT IN CLINICAL TRIAL: Primary | ICD-10-CM

## 2025-04-24 DIAGNOSIS — I10 ESSENTIAL HYPERTENSION: ICD-10-CM

## 2025-04-24 LAB
ALBUMIN SERPL BCG-MCNC: 4 G/DL (ref 3.5–5.2)
ALP SERPL-CCNC: 112 U/L (ref 40–150)
ALT SERPL W P-5'-P-CCNC: 13 U/L (ref 0–70)
ANION GAP SERPL CALCULATED.3IONS-SCNC: 11 MMOL/L (ref 7–15)
AST SERPL W P-5'-P-CCNC: 21 U/L (ref 0–45)
BASOPHILS # BLD AUTO: 0 10E3/UL (ref 0–0.2)
BASOPHILS NFR BLD AUTO: 1 %
BILIRUB SERPL-MCNC: 0.6 MG/DL
BUN SERPL-MCNC: 17.3 MG/DL (ref 8–23)
CALCIUM SERPL-MCNC: 9 MG/DL (ref 8.8–10.4)
CHLORIDE SERPL-SCNC: 100 MMOL/L (ref 98–107)
CREAT SERPL-MCNC: 0.9 MG/DL (ref 0.67–1.17)
EGFRCR SERPLBLD CKD-EPI 2021: 86 ML/MIN/1.73M2
EOSINOPHIL # BLD AUTO: 0.3 10E3/UL (ref 0–0.7)
EOSINOPHIL NFR BLD AUTO: 6 %
ERYTHROCYTE [DISTWIDTH] IN BLOOD BY AUTOMATED COUNT: 13.2 % (ref 10–15)
GLUCOSE SERPL-MCNC: 106 MG/DL (ref 70–99)
HCO3 SERPL-SCNC: 26 MMOL/L (ref 22–29)
HCT VFR BLD AUTO: 39.1 % (ref 40–53)
HGB BLD-MCNC: 13.2 G/DL (ref 13.3–17.7)
IMM GRANULOCYTES # BLD: 0 10E3/UL
IMM GRANULOCYTES NFR BLD: 0 %
LYMPHOCYTES # BLD AUTO: 1.3 10E3/UL (ref 0.8–5.3)
LYMPHOCYTES NFR BLD AUTO: 28 %
MCH RBC QN AUTO: 29.7 PG (ref 26.5–33)
MCHC RBC AUTO-ENTMCNC: 33.8 G/DL (ref 31.5–36.5)
MCV RBC AUTO: 88 FL (ref 78–100)
MONOCYTES # BLD AUTO: 0.5 10E3/UL (ref 0–1.3)
MONOCYTES NFR BLD AUTO: 10 %
NEUTROPHILS # BLD AUTO: 2.7 10E3/UL (ref 1.6–8.3)
NEUTROPHILS NFR BLD AUTO: 56 %
NRBC # BLD AUTO: 0 10E3/UL
NRBC BLD AUTO-RTO: 0 /100
PLATELET # BLD AUTO: 195 10E3/UL (ref 150–450)
POTASSIUM SERPL-SCNC: 4.1 MMOL/L (ref 3.4–5.3)
PROT SERPL-MCNC: 7.2 G/DL (ref 6.4–8.3)
RBC # BLD AUTO: 4.44 10E6/UL (ref 4.4–5.9)
SODIUM SERPL-SCNC: 137 MMOL/L (ref 135–145)
WBC # BLD AUTO: 4.7 10E3/UL (ref 4–11)

## 2025-04-24 PROCEDURE — 250N000011 HC RX IP 250 OP 636: Performed by: INTERNAL MEDICINE

## 2025-04-24 PROCEDURE — 71260 CT THORAX DX C+: CPT

## 2025-04-24 PROCEDURE — 84460 ALANINE AMINO (ALT) (SGPT): CPT | Performed by: INTERNAL MEDICINE

## 2025-04-24 PROCEDURE — 36415 COLL VENOUS BLD VENIPUNCTURE: CPT

## 2025-04-24 PROCEDURE — 85025 COMPLETE CBC W/AUTO DIFF WBC: CPT | Performed by: INTERNAL MEDICINE

## 2025-04-24 PROCEDURE — 78306 BONE IMAGING WHOLE BODY: CPT

## 2025-04-24 PROCEDURE — 84153 ASSAY OF PSA TOTAL: CPT | Performed by: INTERNAL MEDICINE

## 2025-04-24 PROCEDURE — A9503 TC99M MEDRONATE: HCPCS | Performed by: INTERNAL MEDICINE

## 2025-04-24 PROCEDURE — 250N000009 HC RX 250: Performed by: INTERNAL MEDICINE

## 2025-04-24 PROCEDURE — 343N000001 HC RX 343 MED OP 636: Performed by: INTERNAL MEDICINE

## 2025-04-24 RX ORDER — IOPAMIDOL 755 MG/ML
500 INJECTION, SOLUTION INTRAVASCULAR ONCE
Status: COMPLETED | OUTPATIENT
Start: 2025-04-24 | End: 2025-04-24

## 2025-04-24 RX ORDER — TC 99M MEDRONATE 20 MG/10ML
25 INJECTION, POWDER, LYOPHILIZED, FOR SOLUTION INTRAVENOUS ONCE
Status: COMPLETED | OUTPATIENT
Start: 2025-04-24 | End: 2025-04-24

## 2025-04-24 RX ADMIN — SODIUM CHLORIDE 65 ML: 9 INJECTION, SOLUTION INTRAVENOUS at 10:18

## 2025-04-24 RX ADMIN — IOPAMIDOL 100 ML: 755 INJECTION, SOLUTION INTRAVENOUS at 10:18

## 2025-04-24 RX ADMIN — TC 99M MEDRONATE 25 MILLICURIE: 20 INJECTION, POWDER, LYOPHILIZED, FOR SOLUTION INTRAVENOUS at 10:09

## 2025-04-24 NOTE — PROGRESS NOTES
Oral Chemotherapy Monitoring Program  Lab Follow Up    Reviewed lab results from 4/24/25.    Assessment & Plan:  No concerning abnormalities. Sent patient [employer] message.    Follow-Up:  Dr. Ward Apt 5/2/25    Newton Rae, PharmD.  Oral Chemotherapy Monitoring Program  [phone]          11/25/2022     1:00 PM 3/1/2023     1:00 PM 5/23/2023     4:00 PM 10/9/2023     2:00 PM 11/16/2023     4:00 PM 11/24/2023     1:00 PM 4/24/2025    10:00 AM   ORAL CHEMOTHERAPY   Assessment Type Initial Follow up Lab Monitoring Lab Monitoring Lab Monitoring Monthly Follow up Refill Lab Monitoring   Diagnosis Code Prostate Cancer Prostate Cancer Prostate Cancer Prostate Cancer Prostate Cancer Prostate Cancer Prostate Cancer   Providers Dr. Ed Ward   Clinic Name/Location [facility]   Drug Name Xtandi (enzalutamide) Xtandi (enzalutamide) Xtandi (enzalutamide) Xtandi (enzalutamide) Xtandi (enzalutamide) Xtandi (enzalutamide) Xtandi (enzalutamide)   Dose 160 mg 160 mg 160 mg 160 mg 160 mg 160 mg 160 mg   Current Schedule Daily Daily Daily Daily Daily Daily Daily   Cycle Details Continuous Continuous Continuous Continuous Continuous Continuous Continuous   Planned next cycle start date 11/8/2022         Doses missed in last 2 weeks 0    0     Adherence Assessment Adherent    Adherent     Adverse Effects No AE identified during assessment    No AE identified during assessment     Any new drug interactions? No    No     Is the dose as ordered appropriate for the patient? Yes    Yes     Is the patient currently in pain? No    No     Has the patient been assessed within the past 6 months for depression? No    Yes     Has the patient missed any days of school, work, or other routine activity? No    No     Since the last time we talked, have you been hospitalized or used the emergency room?     No         Labs:  _  Result Component  Current Result Ref Range   Sodium 137 (4/24/2025) 135 - 145 mmol/L   _  Result Component Current Result Ref Range   Potassium 4.1 (4/24/2025) 3.4 - 5.3 mmol/L   _  Result Component Current Result Ref Range   Calcium 9.0 (4/24/2025) 8.8 - 10.4 mg/dL     No results found for Mag within last 30 days.     No results found for Phos within last 30 days.   _  Result Component Current Result Ref Range   Albumin 4.0 (4/24/2025) 3.5 - 5.2 g/dL   _  Result Component Current Result Ref Range   Urea Nitrogen 17.3 (4/24/2025) 8.0 - 23.0 mg/dL   _  Result Component Current Result Ref Range   Creatinine 0.90 (4/24/2025) 0.67 - 1.17 mg/dL   _  Result Component Current Result Ref Range   AST 21 (4/24/2025) 0 - 45 U/L   _  Result Component Current Result Ref Range   ALT 13 (4/24/2025) 0 - 70 U/L   _  Result Component Current Result Ref Range   Bilirubin Total 0.6 (4/24/2025) <=1.2 mg/dL   _  Result Component Current Result Ref Range   WBC Count 4.7 (4/24/2025) 4.0 - 11.0 10e3/uL   _  Result Component Current Result Ref Range   Hemoglobin 13.2 (L) (4/24/2025) 13.3 - 17.7 g/dL   _  Result Component Current Result Ref Range   Platelet Count 195 (4/24/2025) 150 - 450 10e3/uL     No results found for ANC within last 30 days.

## 2025-04-24 NOTE — PROGRESS NOTES
Nursing Note:  Wallace Zelaya presents today for PIV for labs and CT.    Patient seen by provider today: No   present during visit today: Not Applicable.    Note: N/A.    Intravenous Access:  Labs drawn without difficulty.  Peripheral IV placed.    Discharge Plan:   Patient was sent to imaging for CT appointment.    Brandie Villarreal RN

## 2025-04-25 ENCOUNTER — ANCILLARY PROCEDURE (OUTPATIENT)
Facility: CLINIC | Age: 80
End: 2025-04-25
Attending: INTERNAL MEDICINE
Payer: COMMERCIAL

## 2025-04-25 DIAGNOSIS — Z00.6 EXAMINATION OF PARTICIPANT IN CLINICAL TRIAL: ICD-10-CM

## 2025-04-25 PROCEDURE — 99207 CT RESEARCH READING: CPT | Performed by: RADIOLOGY

## 2025-04-25 PROCEDURE — 99207: CPT | Performed by: RADIOLOGY

## 2025-05-02 ENCOUNTER — ALLIED HEALTH/NURSE VISIT (OUTPATIENT)
Dept: ONCOLOGY | Facility: CLINIC | Age: 80
End: 2025-05-02

## 2025-05-02 DIAGNOSIS — Z00.6 EXAMINATION OF PARTICIPANT IN CLINICAL TRIAL: Primary | ICD-10-CM

## 2025-05-06 ENCOUNTER — TELEPHONE (OUTPATIENT)
Dept: ONCOLOGY | Facility: CLINIC | Age: 80
End: 2025-05-06
Payer: COMMERCIAL

## 2025-05-06 NOTE — TELEPHONE ENCOUNTER
"Research RN spoke with Steve to let him know I've reviewed his most recent visit with Dr. Ward and the recommendation to crossover to the Lutetium arm of the Eclipse clinical trial. He was informed that \"progression\" needs to be confirmed by central (study) radiologist and that those images have been sent over and marked as expedited. Writer will follow up with Dr. Ward, care coordinator, and with pt as soon as a determination has been made by central reader. This could take a few days. Steve is in agreement.   "

## 2025-05-13 ENCOUNTER — OFFICE VISIT (OUTPATIENT)
Dept: CARDIOLOGY | Facility: CLINIC | Age: 80
End: 2025-05-13
Attending: FAMILY MEDICINE
Payer: COMMERCIAL

## 2025-05-13 VITALS
DIASTOLIC BLOOD PRESSURE: 74 MMHG | BODY MASS INDEX: 32.28 KG/M2 | HEART RATE: 60 BPM | WEIGHT: 213 LBS | SYSTOLIC BLOOD PRESSURE: 120 MMHG | OXYGEN SATURATION: 96 % | HEIGHT: 68 IN

## 2025-05-13 DIAGNOSIS — R06.02 SHORTNESS OF BREATH: Primary | ICD-10-CM

## 2025-05-13 DIAGNOSIS — I35.8 AORTIC VALVE SCLEROSIS: ICD-10-CM

## 2025-05-13 NOTE — LETTER
5/13/2025    Cedric Ball MD  [id number]  [facility] [address]    RE: Wallace Zelaya       Dear Colleague,     I had the pleasure of seeing Wallace JASON Nathalie in the [facility].  HPI and Plan:   Very pleasant 79-year-old gentleman, referred by primary care physician for evaluation of aortic sclerosis.    He had an echocardiogram performed last year which showed severe aortic sclerosis but no other valvular lesions.  Left ventricular systolic function is normal.  Small mean gradient of 10 mm was found across the aortic valve and this suggest mild aortic stenosis.  However by physical examination his heart sounds are normal with no murmurs.  Therefore I think getting aortic valve sclerosis is highly unlikely to be of any significance.  I do advise regular follow-up and careful auscultation for significant aortic stenotic movements such as late peaking murmur and diminished second heart sound.    Symptomatically he tells me that he has been getting a bit more winded tickly when he mows his yard.  He also gets more winded when he plays golf and is to go up inclines.  No PND orthopnea or ankle swelling.  Cardiac examination certainly reveals no evidence of CHF.  When he walks his dog for a mile he does not get out of breath.    He does not smoke or drink or abuse drugs.  He is not diabetic.  He has a history of stage III prostate cancer.      He does have a history of hypertension.  Blood pressures at home vary between 110-150 systolic and I think he this is reasonable for him and his age.  Today's blood pressure is 120/74 which is perfect blood pressure.    EG shows sinus rhythm with fractionated QRS in the inferior leads.  No changes compared with previous EKG.    Wonder if his shortness of breath on exertion going feels may be due to physical deconditioning and aging.  I will test his exercise tolerance as well as look for ischemia stress echocardiography.    If this is normal no further cardiac  workup will be necessary.  I will keep him abreast of test results and arrange follow-up as necessary.          Orders Placed This Encounter   Procedures     EKG 12-lead complete w/read - Clinics (performed today)     Exercise Stress Echocardiogram       No orders of the defined types were placed in this encounter.      Encounter Diagnoses   Name Primary?     Aortic valve sclerosis      Shortness of breath Yes       CURRENT MEDICATIONS:  Current Outpatient Medications   Medication Sig Dispense Refill     amLODIPine (NORVASC) 10 MG tablet Take 1 tablet (10 mg) by mouth daily. 90 tablet 3     atorvastatin (LIPITOR) 40 MG tablet Take 1 tablet by mouth once daily 90 tablet 3     enzalutamide (XTANDI) 80 MG tablet Take 2 tablets (160 mg) by mouth daily. 60 tablet 0     fluticasone (FLONASE) 50 MCG/ACT nasal spray Spray 1 spray into both nostrils daily 16 g 1     hydrochlorothiazide (HYDRODIURIL) 25 MG tablet Take 1 tablet (25 mg) by mouth daily. 90 tablet 3     ibuprofen (ADVIL/MOTRIN) 800 MG tablet TAKE 1 TABLET BY MOUTH THREE TIMES DAILY WITH FOOD 90 tablet 0     labetalol (NORMODYNE) 100 MG tablet Please take if your blood pressure >160 mmHg with two separate readings.       memantine (NAMENDA) 5 MG tablet Take 1 tablet by mouth once daily 90 tablet 3     valsartan (DIOVAN) 320 MG tablet Take 1 tablet (320 mg) by mouth daily. 90 tablet 1       ALLERGIES   No Known Allergies    PAST MEDICAL HISTORY:  Past Medical History:   Diagnosis Date     Basal cell carcinoma      Essential hypertension 08/24/2006     Problem list name updated by automated process. Provider to review     Hyperlipidemia LDL goal <130 10/31/2010     Prostate cancer (HCC) 11/13/2015    [place] 8 prostate cancer [id number], s/p robotic radical prostatectomy and adjuvant radiation        PAST SURGICAL HISTORY:  Past Surgical History:   Procedure Laterality Date     BIOPSY  02/2011    skin tagged left arm     COLONOSCOPY  8/19/2013    Procedure:  COLONOSCOPY;  Colonoscopy ;  Surgeon: Emerson Martinez MD, MD;  Location: [facility]     DAVINCI PROSTATECTOMY N/A 2015    Procedure: DAVINCI PROSTATECTOMY;  Surgeon: Rodolfo Connor MD;  Location: [facility] OR     ZZC NONSPECIFIC PROCEDURE      Lawnmower injury right foot-toe      ZZC NONSPECIFIC PROCEDURE      Pilonidal cyst       FAMILY HISTORY:  Family History   Problem Relation Age of Onset     Cancer Mother         86 YO AND HYPERTENSION     Hypertension Mother      Colon Cancer Mother      Heart Disease Father          86 YO MI     Prostate Cancer Father         Prostate     Prostate Cancer Brother 50        prostate cancer       SOCIAL HISTORY:  Social History     Socioeconomic History     Marital status:      Spouse name: Sandra     Number of children: 3     Years of education: 14     Highest education level: None   Occupational History     Occupation:      Comment: [employer]   Tobacco Use     Smoking status: Former     Current packs/day: 0.00     Average packs/day: 1 pack/day for 43.1 years (43.1 ttl pk-yrs)     Types: Cigarettes     Start date: 7/15/1966     Quit date: 2009     Years since quitting: 15.7     Passive exposure: Past     Smokeless tobacco: Never   Vaping Use     Vaping status: Never Used   Substance and Sexual Activity     Alcohol use: Yes     Comment: 1 beer a week     Drug use: No     Sexual activity: Not Currently     Partners: Female   Other Topics Concern     Parent/sibling w/ CABG, MI or angioplasty before 65F 55M? No     Social Drivers of Health     Financial Resource Strain: Low Risk  (2024)    Financial Resource Strain      Within the past 12 months, have you or your family members you live with been unable to get utilities (heat, electricity) when it was really needed?: No   Food Insecurity: Low Risk  (2024)    Food Insecurity      Within the past 12 months, did you worry that your food would run out before you got money to buy  "more?: No      Within the past 12 months, did the food you bought just not last and you didn t have money to get more?: No   Transportation Needs: Low Risk  (6/20/2024)    Transportation Needs      Within the past 12 months, has lack of transportation kept you from medical appointments, getting your medicines, non-medical meetings or appointments, work, or from getting things that you need?: No   Physical Activity: Insufficiently Active (6/20/2024)    Exercise Vital Sign      Days of Exercise per Week: 7 days      Minutes of Exercise per Session: 20 min   Stress: No Stress Concern Present (6/20/2024)    [employer] - Occupational Stress Questionnaire      Feeling of Stress : Not at all   Social Connections: Unknown (6/20/2024)    Social Connection and Isolation Panel [NHANES]      Frequency of Social Gatherings with Friends and Family: Once a week   Interpersonal Safety: Low Risk  (2/4/2025)    Interpersonal Safety      Do you feel physically and emotionally safe where you currently live?: Yes      Within the past 12 months, have you been hit, slapped, kicked or otherwise physically hurt by someone?: No      Within the past 12 months, have you been humiliated or emotionally abused in other ways by your partner or ex-partner?: No   Housing Stability: Low Risk  (6/20/2024)    Housing Stability      Do you have housing? : Yes      Are you worried about losing your housing?: No       Review of Systems:  Skin:  not assessed     Eyes:  not assessed    ENT:  not assessed    Respiratory:  Negative    Cardiovascular:  Negative    Gastroenterology: not assessed    Genitourinary:  not assessed    Musculoskeletal:  not assessed    Neurologic:  not assessed    Psychiatric:  not assessed    Heme/Lymph/Imm:  not assessed    Endocrine:  not assessed      Physical Exam:  Vitals: /74 (BP Location: Right arm, Patient Position: Sitting, Cuff Size: Adult Regular)   Pulse 60   Ht 1.734 m (5' 8.25\")   " Wt 96.6 kg (213 lb)   SpO2 96%   BMI 32.15 kg/m      Constitutional:  cooperative, alert and oriented, well developed, well nourished, in no acute distress        Skin:  warm and dry to the touch, no apparent skin lesions or masses noted          Head:  normocephalic, no masses or lesions        Eyes:  conjunctivae and lids unremarkable        Lymph:No Cervical lymphadenopathy present     ENT:  no pallor or cyanosis, dentition good        Neck:  carotid pulses are full and equal bilaterally, JVP normal, no carotid bruit        Respiratory:  normal breath sounds, clear to auscultation, normal A-P diameter, normal symmetry, normal respiratory excursion, no use of accessory muscles         Cardiac: regular rhythm, normal S1/S2, no S3 or S4, apical impulse not displaced, no murmurs, gallops or rubs                pulses full and equal, no bruits auscultated                                        GI:  abdomen soft, non-tender, BS normoactive, no mass, no HSM, no bruits        Extremities and Muscular Skeletal:  no deformities, clubbing, cyanosis, erythema observed              Neurological:  no gross motor deficits        Psych:  Alert and Oriented x 3        Recent Lab Results:  LIPID RESULTS:  Lab Results   Component Value Date    CHOL 177 04/11/2025    CHOL 196 10/31/2019    HDL 45 04/11/2025    HDL 46 10/31/2019     (H) 04/11/2025     (H) 10/31/2019    TRIG 109 04/11/2025    TRIG 111 10/31/2019    CHOLHDLRATIO 4.1 07/02/2015       LIVER ENZYME RESULTS:  Lab Results   Component Value Date    AST 21 04/24/2025    AST 41 06/30/2021    ALT 13 04/24/2025    ALT 32 06/30/2021       CBC RESULTS:  Lab Results   Component Value Date    WBC 4.7 04/24/2025    WBC 5.8 06/30/2021    RBC 4.44 04/24/2025    RBC 4.51 06/30/2021    HGB 13.2 (L) 04/24/2025    HGB 14.4 06/30/2021    HCT 39.1 (L) 04/24/2025    HCT 41.2 06/30/2021    MCV 88 04/24/2025    MCV 91 06/30/2021    MCH 29.7 04/24/2025    MCH 31.9 06/30/2021     "MCHC 33.8 04/24/2025    MCHC 35.0 06/30/2021    RDW 13.2 04/24/2025    RDW 12.8 06/30/2021     04/24/2025     06/30/2021       BMP RESULTS:  Lab Results   Component Value Date     04/24/2025     06/30/2021    POTASSIUM 4.1 04/24/2025    POTASSIUM 4.0 10/27/2022    POTASSIUM 4.6 06/30/2021    CHLORIDE 100 04/24/2025    CHLORIDE 106 10/27/2022    CHLORIDE 106 06/30/2021    CO2 26 04/24/2025    CO2 27 10/27/2022    CO2 27 06/30/2021    ANIONGAP 11 04/24/2025    ANIONGAP 7 10/27/2022    ANIONGAP 3 06/30/2021     (H) 04/24/2025     (H) 10/27/2022    GLC 89 06/30/2021    BUN 17.3 04/24/2025    BUN 13 10/27/2022    BUN 15 06/30/2021    CR 0.90 04/24/2025    CR 0.96 06/30/2021    GFRESTIMATED 86 04/24/2025    GFRESTIMATED >60 01/16/2025    GFRESTIMATED 76 06/30/2021    GFRESTBLACK 88 06/30/2021    TY 9.0 04/24/2025    TY 8.8 06/30/2021        A1C RESULTS:  No results found for: \"A1C\"    INR RESULTS:  Lab Results   Component Value Date    INR 0.93 10/08/2024    INR 0.96 07/18/2006           CC  [place]edric [place]  [id number] [place] [address]                   Thank you for allowing me to participate in the care of your patient.      Sincerely,     DR JESSY DELGADO MD     [facility]  cc:   Cedric Ball [place]  [address] [place] [address]      "

## 2025-05-13 NOTE — PROGRESS NOTES
HPI and Plan:   Very pleasant 79-year-old gentleman, referred by primary care physician for evaluation of aortic sclerosis.    He had an echocardiogram performed last year which showed severe aortic sclerosis but no other valvular lesions.  Left ventricular systolic function is normal.  Small mean gradient of 10 mm was found across the aortic valve and this suggest mild aortic stenosis.  However by physical examination his heart sounds are normal with no murmurs.  Therefore I think getting aortic valve sclerosis is highly unlikely to be of any significance.  I do advise regular follow-up and careful auscultation for significant aortic stenotic movements such as late peaking murmur and diminished second heart sound.    Symptomatically he tells me that he has been getting a bit more winded tickly when he mows his yard.  He also gets more winded when he plays golf and is to go up inclines.  No PND orthopnea or ankle swelling.  Cardiac examination certainly reveals no evidence of CHF.  When he walks his dog for a mile he does not get out of breath.    He does not smoke or drink or abuse drugs.  He is not diabetic.  He has a history of stage III prostate cancer.      He does have a history of hypertension.  Blood pressures at home vary between 110-150 systolic and I think he this is reasonable for him and his age.  Today's blood pressure is 120/74 which is perfect blood pressure.    EG shows sinus rhythm with fractionated QRS in the inferior leads.  No changes compared with previous EKG.    Wonder if his shortness of breath on exertion going feels may be due to physical deconditioning and aging.  I will test his exercise tolerance as well as look for ischemia stress echocardiography.    If this is normal no further cardiac workup will be necessary.  I will keep him abreast of test results and arrange follow-up as necessary.          Orders Placed This Encounter   Procedures    EKG 12-lead complete w/read - Clinics  (performed today)    Exercise Stress Echocardiogram       No orders of the defined types were placed in this encounter.      Encounter Diagnoses   Name Primary?    Aortic valve sclerosis     Shortness of breath Yes       CURRENT MEDICATIONS:  Current Outpatient Medications   Medication Sig Dispense Refill    amLODIPine (NORVASC) 10 MG tablet Take 1 tablet (10 mg) by mouth daily. 90 tablet 3    atorvastatin (LIPITOR) 40 MG tablet Take 1 tablet by mouth once daily 90 tablet 3    enzalutamide (XTANDI) 80 MG tablet Take 2 tablets (160 mg) by mouth daily. 60 tablet 0    fluticasone (FLONASE) 50 MCG/ACT nasal spray Spray 1 spray into both nostrils daily 16 g 1    hydrochlorothiazide (HYDRODIURIL) 25 MG tablet Take 1 tablet (25 mg) by mouth daily. 90 tablet 3    ibuprofen (ADVIL/MOTRIN) 800 MG tablet TAKE 1 TABLET BY MOUTH THREE TIMES DAILY WITH FOOD 90 tablet 0    labetalol (NORMODYNE) 100 MG tablet Please take if your blood pressure >160 mmHg with two separate readings.      memantine (NAMENDA) 5 MG tablet Take 1 tablet by mouth once daily 90 tablet 3    valsartan (DIOVAN) 320 MG tablet Take 1 tablet (320 mg) by mouth daily. 90 tablet 1       ALLERGIES   No Known Allergies    PAST MEDICAL HISTORY:  Past Medical History:   Diagnosis Date    Basal cell carcinoma     Essential hypertension 08/24/2006     Problem list name updated by automated process. Provider to review    Hyperlipidemia LDL goal <130 10/31/2010    Prostate cancer (HCC) 11/13/2015    [place] 8 prostate cancer [id number], s/p robotic radical prostatectomy and adjuvant radiation        PAST SURGICAL HISTORY:  Past Surgical History:   Procedure Laterality Date    BIOPSY  02/2011    skin tagged left arm    COLONOSCOPY  8/19/2013    Procedure: COLONOSCOPY;  Colonoscopy ;  Surgeon: Emerson Martinez MD, MD;  Location:  GI    DAVINCI PROSTATECTOMY N/A 11/13/2015    Procedure: DAVINCI PROSTATECTOMY;  Surgeon: Rodolfo Connor MD;  Location:  OR    [facility]  NONSPECIFIC PROCEDURE      Lawnmower injury right foot-toe     ZZC NONSPECIFIC PROCEDURE      Pilonidal cyst       FAMILY HISTORY:  Family History   Problem Relation Age of Onset    Cancer Mother         86 YO AND HYPERTENSION    Hypertension Mother     Colon Cancer Mother     Heart Disease Father          86 YO MI    Prostate Cancer Father         Prostate    Prostate Cancer Brother 50        prostate cancer       SOCIAL HISTORY:  Social History     Socioeconomic History    Marital status:      Spouse name: Sandra    Number of children: 3    Years of education: 14    Highest education level: None   Occupational History    Occupation:      Comment: [employer]   Tobacco Use    Smoking status: Former     Current packs/day: 0.00     Average packs/day: 1 pack/day for 43.1 years (43.1 ttl pk-yrs)     Types: Cigarettes     Start date: 7/15/1966     Quit date: 2009     Years since quitting: 15.7     Passive exposure: Past    Smokeless tobacco: Never   Vaping Use    Vaping status: Never Used   Substance and Sexual Activity    Alcohol use: Yes     Comment: 1 beer a week    Drug use: No    Sexual activity: Not Currently     Partners: Female   Other Topics Concern    Parent/sibling w/ CABG, MI or angioplasty before 65F 55M? No     Social Drivers of Health     Financial Resource Strain: Low Risk  (2024)    Financial Resource Strain     Within the past 12 months, have you or your family members you live with been unable to get utilities (heat, electricity) when it was really needed?: No   Food Insecurity: Low Risk  (2024)    Food Insecurity     Within the past 12 months, did you worry that your food would run out before you got money to buy more?: No     Within the past 12 months, did the food you bought just not last and you didn t have money to get more?: No   Transportation Needs: Low Risk  (2024)    Transportation Needs     Within the past 12 months, has lack of  "transportation kept you from medical appointments, getting your medicines, non-medical meetings or appointments, work, or from getting things that you need?: No   Physical Activity: Insufficiently Active (6/20/2024)    Exercise Vital Sign     Days of Exercise per Week: 7 days     Minutes of Exercise per Session: 20 min   Stress: No Stress Concern Present (6/20/2024)    [employer] [place] - Occupational Stress Questionnaire     Feeling of Stress : Not at all   Social Connections: Unknown (6/20/2024)    Social Connection and Isolation Panel [NHANES]     Frequency of Social Gatherings with Friends and Family: Once a week   Interpersonal Safety: Low Risk  (2/4/2025)    Interpersonal Safety     Do you feel physically and emotionally safe where you currently live?: Yes     Within the past 12 months, have you been hit, slapped, kicked or otherwise physically hurt by someone?: No     Within the past 12 months, have you been humiliated or emotionally abused in other ways by your partner or ex-partner?: No   Housing Stability: Low Risk  (6/20/2024)    Housing Stability     Do you have housing? : Yes     Are you worried about losing your housing?: No       Review of Systems:  Skin:  not assessed     Eyes:  not assessed    ENT:  not assessed    Respiratory:  Negative    Cardiovascular:  Negative    Gastroenterology: not assessed    Genitourinary:  not assessed    Musculoskeletal:  not assessed    Neurologic:  not assessed    Psychiatric:  not assessed    Heme/Lymph/Imm:  not assessed    Endocrine:  not assessed      Physical Exam:  Vitals: /74 (BP Location: Right arm, Patient Position: Sitting, Cuff Size: Adult Regular)   Pulse 60   Ht 1.734 m (5' 8.25\")   Wt 96.6 kg (213 lb)   SpO2 96%   BMI 32.15 kg/m      Constitutional:  cooperative, alert and oriented, well developed, well nourished, in no acute distress        Skin:  warm and dry to the touch, no apparent skin lesions or masses noted      "     Head:  normocephalic, no masses or lesions        Eyes:  conjunctivae and lids unremarkable        Lymph:No Cervical lymphadenopathy present     ENT:  no pallor or cyanosis, dentition good        Neck:  carotid pulses are full and equal bilaterally, JVP normal, no carotid bruit        Respiratory:  normal breath sounds, clear to auscultation, normal A-P diameter, normal symmetry, normal respiratory excursion, no use of accessory muscles         Cardiac: regular rhythm, normal S1/S2, no S3 or S4, apical impulse not displaced, no murmurs, gallops or rubs                pulses full and equal, no bruits auscultated                                        GI:  abdomen soft, non-tender, BS normoactive, no mass, no HSM, no bruits        Extremities and Muscular Skeletal:  no deformities, clubbing, cyanosis, erythema observed              Neurological:  no gross motor deficits        Psych:  Alert and Oriented x 3        Recent Lab Results:  LIPID RESULTS:  Lab Results   Component Value Date    CHOL 177 04/11/2025    CHOL 196 10/31/2019    HDL 45 04/11/2025    HDL 46 10/31/2019     (H) 04/11/2025     (H) 10/31/2019    TRIG 109 04/11/2025    TRIG 111 10/31/2019    CHOLHDLRATIO 4.1 07/02/2015       LIVER ENZYME RESULTS:  Lab Results   Component Value Date    AST 21 04/24/2025    AST 41 06/30/2021    ALT 13 04/24/2025    ALT 32 06/30/2021       CBC RESULTS:  Lab Results   Component Value Date    WBC 4.7 04/24/2025    WBC 5.8 06/30/2021    RBC 4.44 04/24/2025    RBC 4.51 06/30/2021    HGB 13.2 (L) 04/24/2025    HGB 14.4 06/30/2021    HCT 39.1 (L) 04/24/2025    HCT 41.2 06/30/2021    MCV 88 04/24/2025    MCV 91 06/30/2021    MCH 29.7 04/24/2025    MCH 31.9 06/30/2021    MCHC 33.8 04/24/2025    MCHC 35.0 06/30/2021    RDW 13.2 04/24/2025    RDW 12.8 06/30/2021     04/24/2025     06/30/2021       BMP RESULTS:  Lab Results   Component Value Date     04/24/2025     06/30/2021    POTASSIUM  "4.1 04/24/2025    POTASSIUM 4.0 10/27/2022    POTASSIUM 4.6 06/30/2021    CHLORIDE 100 04/24/2025    CHLORIDE 106 10/27/2022    CHLORIDE 106 06/30/2021    CO2 26 04/24/2025    CO2 27 10/27/2022    CO2 27 06/30/2021    ANIONGAP 11 04/24/2025    ANIONGAP 7 10/27/2022    ANIONGAP 3 06/30/2021     (H) 04/24/2025     (H) 10/27/2022    GLC 89 06/30/2021    BUN 17.3 04/24/2025    BUN 13 10/27/2022    BUN 15 06/30/2021    CR 0.90 04/24/2025    CR 0.96 06/30/2021    GFRESTIMATED 86 04/24/2025    GFRESTIMATED >60 01/16/2025    GFRESTIMATED 76 06/30/2021    GFRESTBLACK 88 06/30/2021    TY 9.0 04/24/2025    TY 8.8 06/30/2021        A1C RESULTS:  No results found for: \"A1C\"    INR RESULTS:  Lab Results   Component Value Date    INR 0.93 10/08/2024    INR 0.96 07/18/2006           CC  Cedric Ball [place]  [id number] [place] [address]                 "

## 2025-06-03 ENCOUNTER — HOSPITAL ENCOUNTER (OUTPATIENT)
Dept: CARDIOLOGY | Facility: CLINIC | Age: 80
Discharge: HOME | End: 2025-06-03
Attending: INTERNAL MEDICINE
Payer: COMMERCIAL

## 2025-06-03 DIAGNOSIS — I35.8 AORTIC VALVE SCLEROSIS: ICD-10-CM

## 2025-06-03 DIAGNOSIS — R06.02 SHORTNESS OF BREATH: ICD-10-CM

## 2025-06-03 PROCEDURE — 999N000208 ECHO STRESS ECHOCARDIOGRAM

## 2025-06-03 PROCEDURE — 255N000002 HC RX 255 OP 636: Performed by: INTERNAL MEDICINE

## 2025-06-03 RX ADMIN — HUMAN ALBUMIN MICROSPHERES AND PERFLUTREN 4 ML: 10; .22 INJECTION, SOLUTION INTRAVENOUS at 10:05

## 2025-06-13 ENCOUNTER — RESULTS FOLLOW-UP (OUTPATIENT)
Dept: CARDIOLOGY | Facility: CLINIC | Age: 80
End: 2025-06-13

## 2025-06-16 DIAGNOSIS — C61 PROSTATE CANCER (H): Primary | ICD-10-CM

## 2025-06-16 DIAGNOSIS — C79.51 MALIGNANT NEOPLASM METASTATIC TO BONE (H): ICD-10-CM

## 2025-06-29 DIAGNOSIS — I10 ESSENTIAL HYPERTENSION: ICD-10-CM

## 2025-06-30 RX ORDER — VALSARTAN 320 MG/1
320 TABLET ORAL DAILY
Qty: 90 TABLET | Refills: 3 | Status: SHIPPED | OUTPATIENT
Start: 2025-06-30

## 2025-07-10 ENCOUNTER — PATIENT OUTREACH (OUTPATIENT)
Dept: CARE COORDINATION | Facility: CLINIC | Age: 80
End: 2025-07-10
Payer: COMMERCIAL

## 2025-07-16 DIAGNOSIS — C79.51 MALIGNANT NEOPLASM METASTATIC TO BONE (H): ICD-10-CM

## 2025-07-16 DIAGNOSIS — C61 PROSTATE CANCER (H): Primary | ICD-10-CM

## 2025-07-18 ENCOUNTER — HOSPITAL ENCOUNTER (OUTPATIENT)
Dept: NUCLEAR MEDICINE | Facility: CLINIC | Age: 80
Setting detail: NUCLEAR MEDICINE
Discharge: HOME | End: 2025-07-18
Attending: INTERNAL MEDICINE
Payer: COMMERCIAL

## 2025-07-18 ENCOUNTER — HOSPITAL ENCOUNTER (OUTPATIENT)
Dept: CT IMAGING | Facility: CLINIC | Age: 80
Discharge: HOME | End: 2025-07-18
Attending: INTERNAL MEDICINE
Payer: COMMERCIAL

## 2025-07-18 DIAGNOSIS — C79.51 MALIGNANT NEOPLASM METASTATIC TO BONE (H): ICD-10-CM

## 2025-07-18 DIAGNOSIS — C61 PROSTATE CANCER (H): ICD-10-CM

## 2025-07-18 DIAGNOSIS — Z00.6 EXAMINATION OF PARTICIPANT IN CLINICAL TRIAL: ICD-10-CM

## 2025-07-18 LAB
CREAT BLD-MCNC: 1 MG/DL (ref 0.7–1.2)
EGFRCR SERPLBLD CKD-EPI 2021: >60 ML/MIN/1.73M2

## 2025-07-18 PROCEDURE — 250N000011 HC RX IP 250 OP 636: Performed by: INTERNAL MEDICINE

## 2025-07-18 PROCEDURE — A9503 TC99M MEDRONATE: HCPCS | Performed by: INTERNAL MEDICINE

## 2025-07-18 PROCEDURE — 84153 ASSAY OF PSA TOTAL: CPT | Performed by: INTERNAL MEDICINE

## 2025-07-18 PROCEDURE — 78306 BONE IMAGING WHOLE BODY: CPT

## 2025-07-18 PROCEDURE — 82565 ASSAY OF CREATININE: CPT

## 2025-07-18 PROCEDURE — 85025 COMPLETE CBC W/AUTO DIFF WBC: CPT | Performed by: INTERNAL MEDICINE

## 2025-07-18 PROCEDURE — 250N000009 HC RX 250: Performed by: INTERNAL MEDICINE

## 2025-07-18 PROCEDURE — 84403 ASSAY OF TOTAL TESTOSTERONE: CPT | Performed by: INTERNAL MEDICINE

## 2025-07-18 PROCEDURE — 343N000001 HC RX 343 MED OP 636: Performed by: INTERNAL MEDICINE

## 2025-07-18 PROCEDURE — 80053 COMPREHEN METABOLIC PANEL: CPT | Performed by: INTERNAL MEDICINE

## 2025-07-18 PROCEDURE — 71260 CT THORAX DX C+: CPT

## 2025-07-18 RX ORDER — TC 99M MEDRONATE 20 MG/10ML
25 INJECTION, POWDER, LYOPHILIZED, FOR SOLUTION INTRAVENOUS ONCE
Status: COMPLETED | OUTPATIENT
Start: 2025-07-18 | End: 2025-07-18

## 2025-07-18 RX ORDER — IOPAMIDOL 755 MG/ML
500 INJECTION, SOLUTION INTRAVASCULAR ONCE
Status: COMPLETED | OUTPATIENT
Start: 2025-07-18 | End: 2025-07-18

## 2025-07-18 RX ADMIN — TC 99M MEDRONATE 26.5 MILLICURIE: 20 INJECTION, POWDER, LYOPHILIZED, FOR SOLUTION INTRAVENOUS at 09:55

## 2025-07-18 RX ADMIN — IOPAMIDOL 100 ML: 755 INJECTION, SOLUTION INTRAVENOUS at 10:16

## 2025-07-18 RX ADMIN — SODIUM CHLORIDE 65 ML: 9 INJECTION, SOLUTION INTRAVENOUS at 10:16

## 2025-07-30 SDOH — HEALTH STABILITY: PHYSICAL HEALTH: ON AVERAGE, HOW MANY DAYS PER WEEK DO YOU ENGAGE IN MODERATE TO STRENUOUS EXERCISE (LIKE A BRISK WALK)?: 7 DAYS

## 2025-07-30 SDOH — HEALTH STABILITY: PHYSICAL HEALTH: ON AVERAGE, HOW MANY MINUTES DO YOU ENGAGE IN EXERCISE AT THIS LEVEL?: 10 MIN

## 2025-07-30 ASSESSMENT — SOCIAL DETERMINANTS OF HEALTH (SDOH): HOW OFTEN DO YOU GET TOGETHER WITH FRIENDS OR RELATIVES?: ONCE A WEEK

## 2025-08-04 ENCOUNTER — OFFICE VISIT (OUTPATIENT)
Dept: FAMILY MEDICINE | Facility: CLINIC | Age: 80
End: 2025-08-04
Payer: COMMERCIAL

## 2025-08-04 VITALS
TEMPERATURE: 98 F | DIASTOLIC BLOOD PRESSURE: 72 MMHG | HEART RATE: 68 BPM | WEIGHT: 209.5 LBS | HEIGHT: 69 IN | BODY MASS INDEX: 31.03 KG/M2 | RESPIRATION RATE: 24 BRPM | SYSTOLIC BLOOD PRESSURE: 122 MMHG | OXYGEN SATURATION: 96 %

## 2025-08-04 DIAGNOSIS — Z00.00 MEDICARE ANNUAL WELLNESS VISIT, SUBSEQUENT: Primary | ICD-10-CM

## 2025-08-04 DIAGNOSIS — Z12.11 COLON CANCER SCREENING: ICD-10-CM

## 2025-08-04 DIAGNOSIS — I10 ESSENTIAL HYPERTENSION: ICD-10-CM

## 2025-08-04 PROCEDURE — 99213 OFFICE O/P EST LOW 20 MIN: CPT | Mod: 25 | Performed by: FAMILY MEDICINE

## 2025-08-04 PROCEDURE — 3074F SYST BP LT 130 MM HG: CPT | Performed by: FAMILY MEDICINE

## 2025-08-04 PROCEDURE — 3078F DIAST BP <80 MM HG: CPT | Performed by: FAMILY MEDICINE

## 2025-08-04 PROCEDURE — G0439 PPPS, SUBSEQ VISIT: HCPCS | Performed by: FAMILY MEDICINE

## 2025-08-04 PROCEDURE — 1126F AMNT PAIN NOTED NONE PRSNT: CPT | Performed by: FAMILY MEDICINE

## 2025-08-04 RX ORDER — HYDROCHLOROTHIAZIDE 25 MG/1
25 TABLET ORAL DAILY
Qty: 90 TABLET | Refills: 3 | Status: SHIPPED | OUTPATIENT
Start: 2025-08-04

## 2025-08-04 RX ORDER — AMLODIPINE BESYLATE 10 MG/1
10 TABLET ORAL DAILY
Qty: 90 TABLET | Refills: 3 | Status: SHIPPED | OUTPATIENT
Start: 2025-08-04

## 2025-08-04 ASSESSMENT — PAIN SCALES - GENERAL: PAINLEVEL_OUTOF10: NO PAIN (0)

## 2025-08-05 ENCOUNTER — ANCILLARY PROCEDURE (OUTPATIENT)
Facility: CLINIC | Age: 80
End: 2025-08-05
Attending: INTERNAL MEDICINE
Payer: COMMERCIAL

## 2025-08-05 DIAGNOSIS — Z00.6 EXAMINATION OF PARTICIPANT IN CLINICAL TRIAL: Primary | ICD-10-CM

## 2025-08-05 PROCEDURE — 99207 CT RESEARCH READING: CPT | Performed by: RADIOLOGY

## 2025-08-08 ENCOUNTER — ONCOLOGY VISIT (OUTPATIENT)
Dept: ONCOLOGY | Facility: CLINIC | Age: 80
End: 2025-08-08
Attending: INTERNAL MEDICINE
Payer: COMMERCIAL

## 2025-08-08 VITALS
RESPIRATION RATE: 16 BRPM | HEIGHT: 69 IN | DIASTOLIC BLOOD PRESSURE: 84 MMHG | TEMPERATURE: 97.3 F | OXYGEN SATURATION: 97 % | HEART RATE: 63 BPM | SYSTOLIC BLOOD PRESSURE: 133 MMHG | WEIGHT: 206.3 LBS | BODY MASS INDEX: 30.56 KG/M2

## 2025-08-08 DIAGNOSIS — C61 PROSTATE CANCER (H): Primary | ICD-10-CM

## 2025-08-08 DIAGNOSIS — C79.51 MALIGNANT NEOPLASM METASTATIC TO BONE (H): ICD-10-CM

## 2025-08-08 DIAGNOSIS — I10 ESSENTIAL HYPERTENSION: ICD-10-CM

## 2025-08-08 PROCEDURE — G0463 HOSPITAL OUTPT CLINIC VISIT: HCPCS | Performed by: INTERNAL MEDICINE

## 2025-08-08 ASSESSMENT — PAIN SCALES - GENERAL: PAINLEVEL_OUTOF10: NO PAIN (0)

## 2025-08-14 ENCOUNTER — PATIENT OUTREACH (OUTPATIENT)
Dept: ONCOLOGY | Facility: CLINIC | Age: 80
End: 2025-08-14
Payer: COMMERCIAL

## 2025-08-18 DIAGNOSIS — C61 PROSTATE CANCER (H): Primary | ICD-10-CM

## 2025-08-18 DIAGNOSIS — C79.51 MALIGNANT NEOPLASM METASTATIC TO BONE (H): ICD-10-CM

## 2025-09-02 ENCOUNTER — OFFICE VISIT (OUTPATIENT)
Dept: URGENT CARE | Facility: URGENT CARE | Age: 80
End: 2025-09-02
Payer: COMMERCIAL

## 2025-09-02 VITALS
HEART RATE: 53 BPM | WEIGHT: 203 LBS | DIASTOLIC BLOOD PRESSURE: 70 MMHG | SYSTOLIC BLOOD PRESSURE: 118 MMHG | BODY MASS INDEX: 29.98 KG/M2 | RESPIRATION RATE: 18 BRPM | OXYGEN SATURATION: 96 % | TEMPERATURE: 97.9 F

## 2025-09-02 DIAGNOSIS — T14.8XXA MUSCLE STRAIN: Primary | ICD-10-CM

## 2025-09-02 PROCEDURE — 3074F SYST BP LT 130 MM HG: CPT | Performed by: PHYSICIAN ASSISTANT

## 2025-09-02 PROCEDURE — 99213 OFFICE O/P EST LOW 20 MIN: CPT | Performed by: PHYSICIAN ASSISTANT

## 2025-09-02 PROCEDURE — 3078F DIAST BP <80 MM HG: CPT | Performed by: PHYSICIAN ASSISTANT

## 2025-09-02 RX ORDER — CYCLOBENZAPRINE HCL 5 MG
10 TABLET ORAL 3 TIMES DAILY PRN
Qty: 30 TABLET | Refills: 0 | Status: SHIPPED | OUTPATIENT
Start: 2025-09-02 | End: 2025-09-09

## (undated) DEVICE — ENDO SNARE POLYPECTOMY OVAL 15MM LOOP SD-240U-15

## (undated) DEVICE — ENDO TRAP POLYP QUICK CATCH 710201

## (undated) DEVICE — KIT ENDO TURNOVER/PROCEDURE W/CLEAN A SCOPE LINERS 103888

## (undated) RX ORDER — FENTANYL CITRATE 50 UG/ML
INJECTION, SOLUTION INTRAMUSCULAR; INTRAVENOUS
Status: DISPENSED
Start: 2018-08-24